# Patient Record
Sex: MALE | Race: WHITE | Employment: OTHER | URBAN - METROPOLITAN AREA
[De-identification: names, ages, dates, MRNs, and addresses within clinical notes are randomized per-mention and may not be internally consistent; named-entity substitution may affect disease eponyms.]

---

## 2017-01-18 ENCOUNTER — TELEPHONE (OUTPATIENT)
Dept: FAMILY MEDICINE | Facility: CLINIC | Age: 67
End: 2017-01-18

## 2017-01-18 NOTE — TELEPHONE ENCOUNTER
"  \"I have a little respiratory trouble lately, cough a lot and can't breathe at times and I just felt like I was going to pass out, \"   \"not feeling well at all. \"   A; sounds short of breath, and feeling faint/ light headed, episode of near faint.   R: advised ED immed. \"oh, really, well, ok. \"  Advised it wouldn't be a good idea to wait for a clinic appt tomorrow. Suggested 911 if he has no one to bring him and / or is worse in any way.  \"oh, ok, I will see what I can do. \"    Toshia Anders RNC    "

## 2017-01-18 NOTE — TELEPHONE ENCOUNTER
"Reason for call:  Patient reporting a symptom    Symptom or request: felt faint while shopping    Duration (how long have symptoms been present): week    Have you been treated for this before? No    Additional comments: pt calling to \"get an appt with Dr Mcgee\". He stated that while out shopping he felt faint and had to go home. He deny's cp, n,v,d. He states it makes him feel tired. He said right now he's not feeling too great. Call transferred to Toshia ADAMES    Phone Number patient can be reached at:  Home number on file [phone] (home)    Best Time:  any    Can we leave a detailed message on this number:  YES    Call taken on 1/18/2017 at 2:43 PM by Imelda Robin    "

## 2017-03-01 ENCOUNTER — TELEPHONE (OUTPATIENT)
Dept: FAMILY MEDICINE | Facility: CLINIC | Age: 67
End: 2017-03-01

## 2017-03-01 NOTE — LETTER
[facility]  [address] [address]  Phone: [phone]    March 3, 2017    Chilo Oneil  [address]  [place] [facility] [address]              Dear Chilo,    Your most recent blood pressure reading performed at our clinic was higher than we like to see it. The goal is to have it under 140/90 in clinic. Please call our clinic [phone] to schedule a blood pressure recheck on our RN schedule. This appointment is free of charge and takes about 15 minutes to complete. Be sure to take all of your blood pressure medications, and avoid stimulants like caffeine, cold medicines, sudafed, or tobacco prior to your recheck.    If your blood pressure medication was changed by your provider recently wait 2 weeks before making this recheck appointment.     Thank you for trusting us with your health care.  Sincerely,    Manish Plasencia MD

## 2017-03-01 NOTE — TELEPHONE ENCOUNTER
Panel Management Review      Patient has the following on his problem list:     Hypertension   Last three blood pressure readings:  BP Readings from Last 3 Encounters:   09/02/16 157/53   08/19/16 156/79   08/12/16 109/40     Blood pressure: FAILED    HTN Guidelines:  Age 18-59 BP range:  Less than 140/90  Age 60-85 with Diabetes:  Less than 140/90  Age 60-85 without Diabetes:  less than 150/90      Composite cancer screening  Chart review shows that this patient is due/due soon for the following Colonoscopy  Summary:    Patient is due/failing the following:   BP CHECK    Action needed:   Patient needs nurse only appointment.    Type of outreach:    Phone, left message for patient to call back.   CSS, when he calls back, please invite him to come in for a free RN BP recheck, thanks,     Questions for provider review:    None                                                                                                                                    Toshia Anders RNC       Chart routed to Care Team .

## 2017-03-03 NOTE — TELEPHONE ENCOUNTER
Left message for patient to return call to clinic.  Closing encounter - will await patient return call.  Eleanor QUINTANA RN

## 2017-06-20 ENCOUNTER — TELEPHONE (OUTPATIENT)
Dept: FAMILY MEDICINE | Facility: CLINIC | Age: 67
End: 2017-06-20

## 2017-06-20 NOTE — TELEPHONE ENCOUNTER
Panel Management:    Patient's blood pressure was elevated at last clinic visit with Dr. Mcgee.  It is important to get blood pressure < 140/80 for those with a history of hypertension, diabetes or vascular disease.      Please call the patient and have them schedule RN visit for free blood pressure check.      He is also due for colon cancer screening, colonoscopy or FIT.

## 2017-08-01 ENCOUNTER — TELEPHONE (OUTPATIENT)
Dept: FAMILY MEDICINE | Facility: CLINIC | Age: 67
End: 2017-08-01

## 2017-11-28 ENCOUNTER — TELEPHONE (OUTPATIENT)
Dept: FAMILY MEDICINE | Facility: CLINIC | Age: 67
End: 2017-11-28

## 2017-11-28 NOTE — TELEPHONE ENCOUNTER
"S-(situation): I spoke with pt.  He explains that his left shoulder began hurting 3 weeks ago.  Started in the middle of his left shoulder blade and has been getting worse with time.  The pain is now constant.  Now, the pain is so bad that pt did not sleep at all last night.  The pain is radiating into his left arm and into his left elbow.  Also, is radiating a little bit into his left chest and the left side of his torso.  Pt reports some numbness in his elbow.    Denies other tingling or numbness.    Denies weakness of extremities or ability to use his left arm.    Denies the ability to reproduce the shoulder pain    Denies chest pain or breathing changes.  Denies shortness of breath.  Denies fatigue.  Denies palpitations.  Denies nausea or vomiting.  Denies increased heartburn or new heartburn symptoms.  Denies GI symptoms.    B-(background): Pt says that he had pain like this in this left shoulder \"years ago\" which was relieved with a steroid injection.    A-(assessment): Left shoulder pain, radicular.    R-(recommendations):  Advised to be seen today given that the pain kept him awake all night.  Pt refuses stating that he has no ride until tomorrow.  Appt tomorrow.  Pt agrees to be seen in sooner if increasing symptoms.  Pt agrees to present to the ED if develops increased chest pain, shortness of breath, difficulty breathing.  Deepthi Love RN        "

## 2017-11-28 NOTE — TELEPHONE ENCOUNTER
Reason for call:  Patient reporting a symptom    Symptom or request: Pt had had left shoulder pain, that goes into pt's side and into the left side of his chest. Transferred to Clinic RN     Duration (how long have symptoms been present): 2 weeks    Have you been treated for this before? No    Additional comments:     Phone Number patient can be reached at:  Cell number on file:    Telephone Information:   Mobile [phone]       Best Time:  any    Can we leave a detailed message on this number:  YES    Call taken on 11/28/2017 at 9:40 AM by Lizzie June

## 2017-11-29 ENCOUNTER — OFFICE VISIT (OUTPATIENT)
Dept: FAMILY MEDICINE | Facility: CLINIC | Age: 67
End: 2017-11-29
Payer: MEDICARE

## 2017-11-29 ENCOUNTER — RADIANT APPOINTMENT (OUTPATIENT)
Dept: GENERAL RADIOLOGY | Facility: CLINIC | Age: 67
End: 2017-11-29
Attending: FAMILY MEDICINE
Payer: MEDICARE

## 2017-11-29 VITALS
HEIGHT: 75 IN | BODY MASS INDEX: 23.25 KG/M2 | HEART RATE: 83 BPM | SYSTOLIC BLOOD PRESSURE: 154 MMHG | DIASTOLIC BLOOD PRESSURE: 82 MMHG | WEIGHT: 187 LBS | TEMPERATURE: 99.4 F

## 2017-11-29 DIAGNOSIS — R07.89 CHEST WALL PAIN: ICD-10-CM

## 2017-11-29 DIAGNOSIS — R07.81 RIB PAIN ON LEFT SIDE: ICD-10-CM

## 2017-11-29 DIAGNOSIS — I10 BENIGN ESSENTIAL HYPERTENSION: ICD-10-CM

## 2017-11-29 DIAGNOSIS — R07.89 CHEST WALL PAIN: Primary | ICD-10-CM

## 2017-11-29 PROCEDURE — 99214 OFFICE O/P EST MOD 30 MIN: CPT | Performed by: FAMILY MEDICINE

## 2017-11-29 PROCEDURE — 71101 X-RAY EXAM UNILAT RIBS/CHEST: CPT | Mod: LT

## 2017-11-29 RX ORDER — INDOMETHACIN 25 MG/1
25 CAPSULE ORAL 2 TIMES DAILY WITH MEALS
Qty: 42 CAPSULE | Refills: 1 | Status: ON HOLD | OUTPATIENT
Start: 2017-11-29 | End: 2018-04-12

## 2017-11-29 NOTE — PATIENT INSTRUCTIONS
Thank you for choosing [facility].  You may be receiving a survey in the mail from Laura Frausto regarding your visit today.  Please take a few minutes to complete and return the survey to let us know how we are doing.      If you have questions or concerns, please contact us via [employer] or you can contact your care team at [phone].    Our Clinic hours are:  Monday 6:40 am  to 7:00 pm  Tuesday -Friday 6:40 am to 5:00 pm    The [place] outpatient lab hours are:  Monday - Friday 6:10 am to 4:45 pm  Saturdays 7:00 am to 11:00 am  Appointments are required, call [phone]    If you have clinical questions after hours or would like to schedule an appointment,  call the clinic at [phone].   *CHEST PAIN, UNCERTAIN CAUSE    Based on your exam today, the exact cause of your chest pain is not certain. Your condition does not seem serious at this time, and your pain does not appear to be coming from your heart. However, sometimes the signs of a serious problem take more time to appear. Therefore, watch for the warning signs listed below.  HOME CARE:  1. Rest today and avoid strenuous activity.  2. Take any prescribed medicine as directed.  FOLLOW UP with your doctor in 1-3 days.   GET PROMPT MEDICAL ATTENTION if any of the following occur:    A change in the type of pain: if it feels different, becomes more severe, lasts longer, or begins to spread into your shoulder, arm, neck, jaw or back    Shortness of breath or increased pain with breathing    Weakness, dizziness, or fainting    Cough with blood or dark colored sputum (phlegm)    Fever over 101  F (38.3  C)    Swelling, pain or redness in one leg    3215-2248 [employer]. [address] [place] [address], [place] [address]. All rights reserved. This information is not intended as a substitute for professional medical care. Always follow your healthcare professional's instructions.  This information has been modified by your health care provider  with permission from the publisher.

## 2017-11-29 NOTE — MR AVS SNAPSHOT
After Visit Summary   11/29/2017    Chilo Oneil    MRN: [id number]           Patient Information     Date Of Birth          1950        Visit Information        Provider Department      11/29/2017 11:20 AM Manish Plasencia MD [facility]        Today's Diagnoses     Chest wall pain    -  1    Rib pain on left side          Care Instructions          Thank you for choosing [facility].  You may be receiving a survey in the mail from [facility] regarding your visit today.  Please take a few minutes to complete and return the survey to let us know how we are doing.      If you have questions or concerns, please contact us via [employer] or you can contact your care team at [phone].    Our Clinic hours are:  Monday 6:40 am  to 7:00 pm  Tuesday -Friday 6:40 am to 5:00 pm    The [place] outpatient lab hours are:  Monday - Friday 6:10 am to 4:45 pm  Saturdays 7:00 am to 11:00 am  Appointments are required, call [phone]    If you have clinical questions after hours or would like to schedule an appointment,  call the clinic at [phone].   *CHEST PAIN, UNCERTAIN CAUSE    Based on your exam today, the exact cause of your chest pain is not certain. Your condition does not seem serious at this time, and your pain does not appear to be coming from your heart. However, sometimes the signs of a serious problem take more time to appear. Therefore, watch for the warning signs listed below.  HOME CARE:  1. Rest today and avoid strenuous activity.  2. Take any prescribed medicine as directed.  FOLLOW UP with your doctor in 1-3 days.   GET PROMPT MEDICAL ATTENTION if any of the following occur:    A change in the type of pain: if it feels different, becomes more severe, lasts longer, or begins to spread into your shoulder, arm, neck, jaw or back    Shortness of breath or increased pain with breathing    Weakness, dizziness, or fainting    Cough with blood or dark colored  "sputum (phlegm)    Fever over 101  F (38.3  C)    Swelling, pain or redness in one leg    1232-8224 [employer]. [address] [place] [address], [place] [address]. All rights reserved. This information is not intended as a substitute for professional medical care. Always follow your healthcare professional's instructions.  This information has been modified by your health care provider with permission from the publisher.            Follow-ups after your visit        Future tests that were ordered for you today     Open Future Orders        Priority Expected Expires Ordered    CT Chest w Contrast Routine  11/29/2018 11/29/2017            Who to contact     If you have questions or need follow up information about today's clinic visit or your schedule please contact [facility] directly at [phone].  Normal or non-critical lab and imaging results will be communicated to you by MyChart, letter or phone within 4 business days after the clinic has received the results. If you do not hear from us within 7 days, please contact the clinic through MyChart or phone. If you have a critical or abnormal lab result, we will notify you by phone as soon as possible.  Submit refill requests through [employer] or call your pharmacy and they will forward the refill request to us. Please allow 3 business days for your refill to be completed.          Additional Information About Your Visit        [employer] Information     [employer] lets you send messages to your doctor, view your test results, renew your prescriptions, schedule appointments and more. To sign up, go to [email or web address] . Click on \"Log in\" on the left side of the screen, which will take you to the Welcome page. Then click on \"Sign up Now\" on the right side of the page.     You will be asked to enter the access code listed below, as well as some personal information. Please follow the directions to create your username and password.     Your access " "code is: [email or web address]  Expires: 2018 12:18 PM     Your access code will  in 90 days. If you need help or a new code, please call your [facility] clinic or [phone].        Care EveryWhere ID     This is your Care EveryWhere ID. This could be used by other organizations to access your [facility] medical records  [id number]        Your Vitals Were     Pulse Temperature Height BMI (Body Mass Index)          83 99.4  F (37.4  C) (Tympanic) 6' 3\" (1.905 m) 23.37 kg/m2         Blood Pressure from Last 3 Encounters:   17 150/63   16 157/53   16 156/79    Weight from Last 3 Encounters:   17 187 lb (84.8 kg)   16 179 lb (81.2 kg)   16 182 lb (82.6 kg)                 Today's Medication Changes          These changes are accurate as of: 17 12:18 PM.  If you have any questions, ask your nurse or doctor.               Start taking these medicines.        Dose/Directions    indomethacin 25 MG capsule   Commonly known as:  INDOCIN   Used for:  Chest wall pain, Rib pain on left side   Started by:  Manish Plasencia MD        Dose:  25 mg   Take 1 capsule (25 mg) by mouth 2 times daily (with meals)   Quantity:  42 capsule   Refills:  1            Where to get your medicines      These medications were sent to [address] [address]     Phone:  [phone]     indomethacin 25 MG capsule                Primary Care Provider Office Phone # Fax #    Belkys Shari Mcgee [phone] [address] [facility] [address]        Equal Access to Services     [place] AH: Kevin Vidales, wawyatt camara, qaybta kaalcelina greene. So [facility] [phone].    ATENCIÓN: Si habla español, tiene a galaviz disposición servicios gratuitos de asistencia lingüística. Lljuana al [phone].    We comply with applicable federal civil rights " laws and [place] laws. We do not discriminate on the basis of race, color, national origin, age, disability, sex, sexual orientation, or gender identity.            Thank you!     Thank you for choosing [facility]  for your care. Our goal is always to provide you with excellent care. Hearing back from our patients is one way we can continue to improve our services. Please take a few minutes to complete the written survey that you may receive in the mail after your visit with us. Thank you!             Your Updated Medication List - Protect others around you: Learn how to safely use, store and throw away your medicines at www.disposemymeds.org.          This list is accurate as of: 11/29/17 12:18 PM.  Always use your most recent med list.                   Brand Name Dispense Instructions for use Diagnosis    aspirin 325 MG tablet     90 tablet    Take 1 tablet (325 mg) by mouth daily    Pain of left lower leg       hydrochlorothiazide 25 MG tablet    HYDRODIURIL    30 tablet    Take 1 tablet (25 mg) by mouth daily    Benign essential hypertension       indomethacin 25 MG capsule    INDOCIN    42 capsule    Take 1 capsule (25 mg) by mouth 2 times daily (with meals)    Chest wall pain, Rib pain on left side       metoprolol 25 MG 24 hr tablet    TOPROL XL    90 tablet    Take 1 tablet (25 mg) by mouth daily    Coronary artery disease, angina presence unspecified, unspecified vessel or lesion type, unspecified whether native or transplanted heart       ramipril 5 MG capsule    ALTACE    60 capsule    Take 2 capsules (10 mg) by mouth 2 times daily    PAD (peripheral artery disease) (H)

## 2017-11-29 NOTE — NURSING NOTE
"Chief Complaint   Patient presents with     Shoulder     L should and side pain        Initial /63  Pulse 83  Temp 99.4  F (37.4  C) (Tympanic)  Ht 6' 3\" (1.905 m)  Wt 187 lb (84.8 kg)  BMI 23.37 kg/m2 Estimated body mass index is 23.37 kg/(m^2) as calculated from the following:    Height as of this encounter: 6' 3\" (1.905 m).    Weight as of this encounter: 187 lb (84.8 kg).  Medication Reconciliation: complete  "

## 2017-11-29 NOTE — PROGRESS NOTES
SUBJECTIVE:   Chilo Oneil is a 67 year old male who presents to clinic today for the following health issues:      Concern - L/shoulder and L side pain   Onset: 3 weeks ago     Description:   Patient started with L side shoulder and has been traveling  to L/side about  to the whole L/side upper back and L side of chest for about 3 week he has had the pain has been taking aleve     Intensity: moderate    Progression of Symptoms:  worsening    Accompanying Signs & Symptoms:  none    Previous history of similar problem:   none    Precipitating factors:   Worsened by: worsen with relaxing     Alleviating factors:  Improved by: aleve    Therapies Tried and outcome: aleve      Patient is a 67 yr old male here for lateral chest wall pain. Started about three weeks ago. He reports that it is a constant dull pain but on occasion pain is sharp and very severe. He has not been able to correlate the pain with breathing, denies any chest pain . Pain does radiate into the front of his chest wall . He complained of a mild cough. He is a heavy smoker . No fevers or chills . Has not felt more tired than usual . Denies any trauma .    Problem list and histories reviewed & adjusted, as indicated.  Additional history: as documented    Patient Active Problem List   Diagnosis     Tobacco use disorder     CARDIOVASCULAR SCREENING; LDL GOAL LESS THAN 130     Hip fracture, left (H)     Alcohol use, daily use     PAD (peripheral artery disease) (H)     Benign essential hypertension     Hyperlipidemia LDL goal <100     Atypical angina (H)     Past Surgical History:   Procedure Laterality Date     FRACTURE TX, ANKLE RT/LT  1975    Fracture TX Ankle, LT     OPEN REDUCTION INTERNAL FIXATION HIP NAILING  9/20/2013    Procedure: OPEN REDUCTION INTERNAL FIXATION HIP NAILING;  Left Hip Open Reduction Internal Fixation ;  Surgeon: Rosas Dennis MD;  Location: [facility] OR       Social History   Substance Use Topics     Smoking status:  "Current Every Day Smoker     Packs/day: 0.30     Types: Cigarettes     Smokeless tobacco: Never Used      Comment: 2-3 cigs daily     Alcohol use Yes     Family History   Problem Relation Age of Onset     DIABETES Brother      type 2     DIABETES Father          Current Outpatient Prescriptions   Medication Sig Dispense Refill     indomethacin (INDOCIN) 25 MG capsule Take 1 capsule (25 mg) by mouth 2 times daily (with meals) 42 capsule 1     aspirin 325 MG tablet Take 1 tablet (325 mg) by mouth daily 90 tablet 3     metoprolol (TOPROL XL) 25 MG 24 hr tablet Take 1 tablet (25 mg) by mouth daily (Patient not taking: Reported on 11/29/2017) 90 tablet 3     ramipril (ALTACE) 5 MG capsule Take 2 capsules (10 mg) by mouth 2 times daily 60 capsule 3     hydrochlorothiazide (HYDRODIURIL) 25 MG tablet Take 1 tablet (25 mg) by mouth daily (Patient not taking: Reported on 11/29/2017) 30 tablet 11     Allergies   Allergen Reactions     Cipro [Ciprofloxacin] Swelling     BP Readings from Last 3 Encounters:   11/29/17 154/82   09/02/16 157/53   08/19/16 156/79    Wt Readings from Last 3 Encounters:   11/29/17 187 lb (84.8 kg)   08/19/16 179 lb (81.2 kg)   08/03/16 182 lb (82.6 kg)                  Labs reviewed in EPIC        Reviewed and updated as needed this visit by clinical staffTobacco  Allergies  Med Hx  Surg Hx  Fam Hx  Soc Hx      Reviewed and updated as needed this visit by Provider         ROS:  Constitutional, HEENT, cardiovascular, pulmonary, gi and gu systems are negative, except as otherwise noted.      OBJECTIVE:   /82  Pulse 83  Temp 99.4  F (37.4  C) (Tympanic)  Ht 6' 3\" (1.905 m)  Wt 187 lb (84.8 kg)  BMI 23.37 kg/m2  Body mass index is 23.37 kg/(m^2).  GENERAL: healthy, alert and no distress  EYES: Eyes grossly normal to inspection, PERRL and conjunctivae and sclerae normal  HENT: ear canals and TM's normal, nose and mouth without ulcers or lesions  NECK: no adenopathy, no asymmetry, masses, or " scars and thyroid normal to palpation  RESP: lungs clear to auscultation - no rales, rhonchi or wheezes  CV: regular rate and rhythm, normal S1 S2, no S3 or S4, no murmur, click or rub, no peripheral edema and peripheral pulses strong  ABDOMEN: soft, nontender, no hepatosplenomegaly, no masses and bowel sounds normal  MS: tenderness to palpation on chest wall    Diagnostic Test Results:  Chest x-ray    IMPRESSION: Two views of the chest show no irregular vague 1.6 cm  nodule projecting at the lateral margin of the left hilum. Chest CT is  recommended in further assessment. Two views the left ribs show no  fracture or other abnormality.  ASSESSMENT/PLAN:   1. Chest wall pain  Nodule seen on chest x-ray   - CT Chest w Contrast; Future  - indomethacin (INDOCIN) 25 MG capsule; Take 1 capsule (25 mg) by mouth 2 times daily (with meals)  Dispense: 42 capsule; Refill: 1    2. Rib pain on left side  No rib fracture seen . Given some indomethacin for pain . Will contact patient when ct results are back.   - XR Ribs & Chest Left G/E 3 Views; Future  - CT Chest w Contrast; Future  - indomethacin (INDOCIN) 25 MG capsule; Take 1 capsule (25 mg) by mouth 2 times daily (with meals)  Dispense: 42 capsule; Refill: 1      3. Benign essential hypertension  BP is elevated , patient has been off his medication citing no insurance, he is trying to get reinstated for prescription insurance    FUTURE APPOINTMENTS:       - Follow-up visit as needed    Manish Plasencia MD  [facility]

## 2017-12-01 RX ORDER — IOPAMIDOL 755 MG/ML
80 INJECTION, SOLUTION INTRAVASCULAR ONCE
Status: COMPLETED | OUTPATIENT
Start: 2017-12-04 | End: 2017-12-04

## 2017-12-04 ENCOUNTER — HOSPITAL ENCOUNTER (OUTPATIENT)
Dept: CT IMAGING | Facility: CLINIC | Age: 67
Discharge: HOME | End: 2017-12-04
Attending: FAMILY MEDICINE | Admitting: FAMILY MEDICINE
Payer: MEDICARE

## 2017-12-04 DIAGNOSIS — R07.89 CHEST WALL PAIN: ICD-10-CM

## 2017-12-04 DIAGNOSIS — R07.81 RIB PAIN ON LEFT SIDE: ICD-10-CM

## 2017-12-04 PROCEDURE — 71260 CT THORAX DX C+: CPT

## 2017-12-04 PROCEDURE — 25000128 H RX IP 250 OP 636: Performed by: RADIOLOGY

## 2017-12-04 PROCEDURE — 25000125 ZZHC RX 250: Performed by: RADIOLOGY

## 2017-12-04 RX ADMIN — SODIUM CHLORIDE 80 ML: 9 INJECTION, SOLUTION INTRAVENOUS at 08:49

## 2017-12-04 RX ADMIN — IOPAMIDOL 80 ML: 755 INJECTION, SOLUTION INTRAVENOUS at 08:49

## 2017-12-04 NOTE — DISCHARGE INSTRUCTIONS
Caring for Yourself After a Contrast Extravasation  ____________________________________    Patient Name: Chilo Oneil  Today's Date: December 4, 2017    During your procedure, some of the IV contrast dye leaked from the blood vessel into the surrounding tissues. This is called contrast extravasation.   Gravity may move this extra fluid down into your arm or hand. This causes swelling. A little swelling is normal.   You may have some pain or discomfort at the needle site over the next few days. Please follow the steps below. Call your doctor or radiology department if the pain gets worse or you have any other problems.    Try to keep your arm raised above heart level for the next 48 hours.    If you have pain, hold an ice pack to the area: 15 minutes on, and then 15 minutes off while awake. Do this for the next 24 hours, or as long as the pain lasts. Place a thin, dry towel between the skin and ice pack to prevent frostbite.     Check the IV site and arm for:  Increased pain or swelling. If you have swelling, use a marker to edmundo your   skin on both sides of the swollen area. Measure the area. Do this once or twice   a day until the swelling decreases. If the area gets bigger, call your doctor.   Blisters or blanching (skin that turns white or changes color).  Tingling or loss of feeling in the arm.   If you have any of these, call your doctor or your radiology department right away.   During weekends or evenings, call your family doctor or go to the emergency room.   Bring your copy of the Extravasation Flow Sheet and pictures with you.  [facility]   [facility]: [phone]   [facility]: [phone]   [facility]: [phone]   [facility]: [phone]  [place]: [phone]  [facility]: [phone]  [place]: [phone]  [facility]:  [phone]   Patient _____________________________ Instructor__________________________ Date/time ___________

## 2017-12-04 NOTE — IP AVS SNAPSHOT
[facility] [place]    [address]    [facility] [address]    Phone:  [phone]    Fax:  [phone]                                       After Visit Summary   12/4/2017    Chilo Oneil    MRN: [id number]           After Visit Summary Signature Page     I have received my discharge instructions, and my questions have been answered. I have discussed any challenges I see with this plan with the nurse or doctor.    ..........................................................................................................................................  Patient/Patient Representative Signature      ..........................................................................................................................................  Patient Representative Print Name and Relationship to Patient    ..................................................               ................................................  Date                                            Time    ..........................................................................................................................................  Reviewed by Signature/Title    ...................................................              ..............................................  Date                                                            Time

## 2017-12-04 NOTE — IP AVS SNAPSHOT
MRN:[id number]                      After Visit Summary   12/4/2017    Chilo Oneil    MRN: [id number]           Visit Information        Provider Department      12/4/2017  9:00 AM [facility] [place]           Review of your medicines      UNREVIEWED medicines. Ask your doctor about these medicines        Dose / Directions    aspirin 325 MG tablet   Used for:  Pain of left lower leg        Dose:  325 mg   Take 1 tablet (325 mg) by mouth daily   Quantity:  90 tablet   Refills:  3       hydrochlorothiazide 25 MG tablet   Commonly known as:  HYDRODIURIL   Used for:  Benign essential hypertension        Dose:  25 mg   Take 1 tablet (25 mg) by mouth daily   Quantity:  30 tablet   Refills:  11       indomethacin 25 MG capsule   Commonly known as:  INDOCIN   Used for:  Chest wall pain, Rib pain on left side        Dose:  25 mg   Take 1 capsule (25 mg) by mouth 2 times daily (with meals)   Quantity:  42 capsule   Refills:  1       metoprolol 25 MG 24 hr tablet   Commonly known as:  TOPROL XL   Used for:  Coronary artery disease, angina presence unspecified, unspecified vessel or lesion type, unspecified whether native or transplanted heart        Dose:  25 mg   Take 1 tablet (25 mg) by mouth daily   Quantity:  90 tablet   Refills:  3       ramipril 5 MG capsule   Commonly known as:  ALTACE   Used for:  PAD (peripheral artery disease) (H)        Dose:  10 mg   Take 2 capsules (10 mg) by mouth 2 times daily   Quantity:  60 capsule   Refills:  3                Protect others around you: Learn how to safely use, store and throw away your medicines at www.disposemymeds.org.         Follow-ups after your visit         Care Instructions        Further instructions from your care team       Caring for Yourself After a Contrast Extravasation  ____________________________________    Patient Name: Chilo Oneil  Today's Date: December 4, 2017    During your procedure, some of the IV contrast dye leaked  from the blood vessel into the surrounding tissues. This is called contrast extravasation.   Gravity may move this extra fluid down into your arm or hand. This causes swelling. A little swelling is normal.   You may have some pain or discomfort at the needle site over the next few days. Please follow the steps below. Call your doctor or radiology department if the pain gets worse or you have any other problems.    Try to keep your arm raised above heart level for the next 48 hours.    If you have pain, hold an ice pack to the area: 15 minutes on, and then 15 minutes off while awake. Do this for the next 24 hours, or as long as the pain lasts. Place a thin, dry towel between the skin and ice pack to prevent frostbite.     Check the IV site and arm for:  Increased pain or swelling. If you have swelling, use a marker to edmundo your   skin on both sides of the swollen area. Measure the area. Do this once or twice   a day until the swelling decreases. If the area gets bigger, call your doctor.   Blisters or blanching (skin that turns white or changes color).  Tingling or loss of feeling in the arm.   If you have any of these, call your doctor or your radiology department right away.   During weekends or evenings, call your family doctor or go to the emergency room.   Bring your copy of the Extravasation Flow Sheet and pictures with you.  [facility]   [facility]: [phone]   [facility]: [phone]   [facility]: [phone]   [facility]: [phone]  [facility]: [phone]  [facility]: [phone]  [facility]: [phone]  [facility]: [phone]   Patient _____________________________ Instructor__________________________ Date/time ___________       Additional Information About Your Visit        Tunezy Information     Tunezy lets you send messages  "to your doctor, view your test results, renew your prescriptions, schedule appointments and more. To sign up, go to [email or web address] . Click on \"Log in\" on the left side of the screen, which will take you to the Welcome page. Then click on \"Sign up Now\" on the right side of the page.     You will be asked to enter the access code listed below, as well as some personal information. Please follow the directions to create your username and password.     Your access code is: [email or web address]  Expires: 2018 12:18 PM     Your access code will  in 90 days. If you need help or a new code, please call your [facility] clinic or [phone].        Care EveryWhere ID     This is your Care EveryWhere ID. This could be used by other organizations to access your [facility] medical records  [id number]         Primary Care Provider Office Phone # Fax #    Belkys Mccarthy [place],  [phone]      Equal Access to Services     [facility]: Hadii krysta jordan hadasho Soearl, waaxda luqadaha, qaybta kaalmada aderaul, celina hewitt . So [facility] [phone].    ATENCIÓN: Si habla español, tiene a galaviz disposición servicios gratuitos de asistencia lingüística. Llame al [phone].    We comply with applicable federal civil rights laws and [place] laws. We do not discriminate on the basis of race, color, national origin, age, disability, sex, sexual orientation, or gender identity.            Thank you!     Thank you for choosing [facility] for your care. Our goal is always to provide you with excellent care. Hearing back from our patients is one way we can continue to improve our services. Please take a few minutes to complete the written survey that you may receive in the mail after you visit with us. Thank you!             Medication List: This is a list of all your medications and when to take them. Check marks below indicate your daily home schedule. Keep this list as a reference.    "   Medications           Morning Afternoon Evening Bedtime As Needed    aspirin 325 MG tablet   Take 1 tablet (325 mg) by mouth daily                                hydrochlorothiazide 25 MG tablet   Commonly known as:  HYDRODIURIL   Take 1 tablet (25 mg) by mouth daily                                indomethacin 25 MG capsule   Commonly known as:  INDOCIN   Take 1 capsule (25 mg) by mouth 2 times daily (with meals)                                metoprolol 25 MG 24 hr tablet   Commonly known as:  TOPROL XL   Take 1 tablet (25 mg) by mouth daily                                ramipril 5 MG capsule   Commonly known as:  ALTACE   Take 2 capsules (10 mg) by mouth 2 times daily

## 2017-12-05 ENCOUNTER — TELEPHONE (OUTPATIENT)
Dept: CT IMAGING | Facility: CLINIC | Age: 67
End: 2017-12-05

## 2017-12-06 ENCOUNTER — OFFICE VISIT (OUTPATIENT)
Dept: FAMILY MEDICINE | Facility: CLINIC | Age: 67
End: 2017-12-06
Payer: MEDICARE

## 2017-12-06 VITALS
TEMPERATURE: 97.3 F | BODY MASS INDEX: 23.87 KG/M2 | DIASTOLIC BLOOD PRESSURE: 84 MMHG | OXYGEN SATURATION: 98 % | SYSTOLIC BLOOD PRESSURE: 168 MMHG | WEIGHT: 192 LBS | HEART RATE: 62 BPM | HEIGHT: 75 IN

## 2017-12-06 DIAGNOSIS — C34.90 MALIGNANT NEOPLASM OF LUNG, UNSPECIFIED LATERALITY, UNSPECIFIED PART OF LUNG (H): ICD-10-CM

## 2017-12-06 DIAGNOSIS — I10 BENIGN ESSENTIAL HYPERTENSION: ICD-10-CM

## 2017-12-06 DIAGNOSIS — Z01.818 PREOP GENERAL PHYSICAL EXAM: Primary | ICD-10-CM

## 2017-12-06 DIAGNOSIS — C7A.090 MALIGNANT CARCINOID TUMOR OF BRONCHUS AND LUNG (H): ICD-10-CM

## 2017-12-06 LAB
INR PPP: 0.98 (ref 0.86–1.14)
PLATELET # BLD AUTO: 288 10E9/L (ref 150–450)

## 2017-12-06 PROCEDURE — 85049 AUTOMATED PLATELET COUNT: CPT | Performed by: FAMILY MEDICINE

## 2017-12-06 PROCEDURE — 99214 OFFICE O/P EST MOD 30 MIN: CPT | Performed by: FAMILY MEDICINE

## 2017-12-06 PROCEDURE — 36415 COLL VENOUS BLD VENIPUNCTURE: CPT | Performed by: FAMILY MEDICINE

## 2017-12-06 PROCEDURE — 85610 PROTHROMBIN TIME: CPT | Performed by: FAMILY MEDICINE

## 2017-12-06 RX ORDER — METOPROLOL TARTRATE 50 MG
50 TABLET ORAL 2 TIMES DAILY
Qty: 60 TABLET | Refills: 1 | Status: SHIPPED | OUTPATIENT
Start: 2017-12-06 | End: 2018-01-01

## 2017-12-06 RX ORDER — HYDROCHLOROTHIAZIDE 25 MG/1
25 TABLET ORAL DAILY
Qty: 90 TABLET | Refills: 3 | Status: SHIPPED | OUTPATIENT
Start: 2017-12-06 | End: 2018-05-10

## 2017-12-06 NOTE — NURSING NOTE
"Chief Complaint   Patient presents with     Results     of CT     Pre-Op Exam       Initial BP (!) 206/72  Pulse 62  Temp 97.3  F (36.3  C) (Tympanic)  Ht 6' 3\" (1.905 m)  Wt 192 lb (87.1 kg)  SpO2 98%  BMI 24 kg/m2 Estimated body mass index is 24 kg/(m^2) as calculated from the following:    Height as of this encounter: 6' 3\" (1.905 m).    Weight as of this encounter: 192 lb (87.1 kg).  Medication Reconciliation: complete  "

## 2017-12-06 NOTE — MR AVS SNAPSHOT
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 This includes signs of a cold or flu (such as a sore throat, runny nose, cough, rash or fever).    Do not smoke, drink alcohol or take over the counter medicine (unless your surgeon or primary care doctor tells you to) for the 24 hours before and after surgery.    If you take prescribed drugs: Follow your doctor s orders about which medicines to take and which to stop until after surgery.    Eating and drinking prior to surgery: follow the instructions from your surgeon    Take a shower or bath the night before surgery. Use the soap your surgeon gave you to gently clean your skin. If you do not have soap from your surgeon, use your regular soap. Do not shave or scrub the surgery site.  Wear clean pajamas and have clean sheets on your bed.           Follow-ups after your visit        Additional Services     ONC/HEME ADULT REFERRAL       Your provider has referred you to: [facility]: Cancer Care/Hematology (All Cancer Related Services) - [place] [phone]   [email or web address]    Please be aware that coverage of these services is subject to the terms and limitations of your health insurance plan.  Call member services at your health plan with any benefit or coverage questions.      Please bring the following with you to your  appointment:    (1) Any X-Rays, CTs or MRIs which have been performed.  Contact the facility where they were done to arrange for  prior to your scheduled appointment.   (2) List of current medications  (3) This referral request   (4) Any documents/labs given to you for this referral                  Your next 10 appointments already scheduled     Dec 18, 2017 12:00 PM CST   (Arrive by 11:45 AM)   CT LUNG MEDIASTINUM BIOPSY with FELIZ SALDIVAR IMAGING NURSEFELIZ   [facility] ([facility])    [address] [address]   [phone]           Plan for an adult to drive you home and stay with you until morning.  Tell your doctor in advance:   If you have any allergies.   If you are breastfeeding or there s any chance you are pregnant.  Please bring any scans or X-rays taken at other hospitals, if they may be helpful. Also bring a list of your medicines, including vitamins, minerals and over-the-counter drugs. It is safest to leave valuables at home.  If you take blood thinners, you may need to stop taking them a few days before treatment. Talk to your doctor before stopping these medicines. You will need a blood test the morning of your exam.   Stop taking Coumadin (warfarin) 5 days before treatment. Restart the day after treatment.   If you take aspirin, you may need to stop taking it 3 days before treatment.   If you take Plavix, Ticlid, Pletal or Persantine, you may need to stop taking them 5 days before your scan.  If you have diabetes:   If you take insulin, call your diabetes care team. Ask if you should adjust your insulin before this test.   If your kidney function is normal, continue taking your metformin (Avandamet, Glucophage, Glucovance, Metaglip) on the day of your exam.   If your kidney function is abnormal, wait 48 hours before restarting this medicine.  If you have any questions, please call the imaging department where you will have your exam.  The day  "before your exam: Drink extra fluids at least six 8-ounce glasses (unless your doctor tells you to restrict fluids). The day of your exam: No eating or drinking for 4 hours before your test. You may take medicine with small sips of water.              Who to contact     If you have questions or need follow up information about today's clinic visit or your schedule please contact [facility] directly at [phone].  Normal or non-critical lab and imaging results will be communicated to you by [employer], letter or phone within 4 business days after the clinic has received the results. If you do not hear from us within 7 days, please contact the clinic through [employer] or phone. If you have a critical or abnormal lab result, we will notify you by phone as soon as possible.  Submit refill requests through Civo or call your pharmacy and they will forward the refill request to us. Please allow 3 business days for your refill to be completed.          Additional Information About Your Visit        Civo Information     Civo lets you send messages to your doctor, view your test results, renew your prescriptions, schedule appointments and more. To sign up, go to [email or web address] . Click on \"Log in\" on the left side of the screen, which will take you to the Welcome page. Then click on \"Sign up Now\" on the right side of the page.     You will be asked to enter the access code listed below, as well as some personal information. Please follow the directions to create your username and password.     Your access code is: [email or web address]  Expires: 2018 12:18 PM     Your access code will  in 90 days. If you need help or a new code, please call your [facility] or [phone].        Care EveryWhere ID     This is your Care EveryWhere ID. This could be used by other organizations to access your [facility] medical records  [id number]        Your Vitals Were     Pulse Temperature Height Pulse " "Oximetry BMI (Body Mass Index)       62 97.3  F (36.3  C) (Tympanic) 6' 3\" (1.905 m) 98% 24 kg/m2        Blood Pressure from Last 3 Encounters:   12/08/17 160/80   12/06/17 168/84   11/29/17 154/82    Weight from Last 3 Encounters:   12/06/17 192 lb (87.1 kg)   11/29/17 187 lb (84.8 kg)   08/19/16 179 lb (81.2 kg)              We Performed the Following     INR     ONC/HEME ADULT REFERRAL     Platelet count          Today's Medication Changes          These changes are accurate as of: 12/6/17 11:59 PM.  If you have any questions, ask your nurse or doctor.               Start taking these medicines.        Dose/Directions    hydrochlorothiazide 25 MG tablet   Commonly known as:  HYDRODIURIL   Used for:  Benign essential hypertension   Started by:  Manish Plasencia MD        Dose:  25 mg   Take 1 tablet (25 mg) by mouth daily   Quantity:  90 tablet   Refills:  3       metoprolol 50 MG tablet   Commonly known as:  LOPRESSOR   Used for:  Benign essential hypertension   Started by:  Manish Plasencia MD        Dose:  50 mg   Take 1 tablet (50 mg) by mouth 2 times daily   Quantity:  60 tablet   Refills:  1            Where to get your medicines      These medications were sent to [facility] [address]  [address] [place] [address]     Phone:  [phone]     hydrochlorothiazide 25 MG tablet    metoprolol 50 MG tablet                Primary Care Provider Office Phone # Fax #    Belkys Shari Mcgee [phone]       [address] [place] [address]        Equal Access to Services     [facility]: Kevin Vidales, pepe cmaara, qacelina escobar. So [facility] [phone].    ATENCIÓN: Si habla español, tiene a galaviz disposición servicios gratuitos de asistencia lingüística. Llame al [phone].    We comply with applicable federal civil rights laws and [place] laws. We do " not discriminate on the basis of race, color, national origin, age, disability, sex, sexual orientation, or gender identity.            Thank you!     Thank you for choosing [facility]  for your care. Our goal is always to provide you with excellent care. Hearing back from our patients is one way we can continue to improve our services. Please take a few minutes to complete the written survey that you may receive in the mail after your visit with us. Thank you!             Your Updated Medication List - Protect others around you: Learn how to safely use, store and throw away your medicines at www.disposemymeds.org.          This list is accurate as of: 12/6/17 11:59 PM.  Always use your most recent med list.                   Brand Name Dispense Instructions for use Diagnosis    aspirin 325 MG tablet     90 tablet    Take 1 tablet (325 mg) by mouth daily    Pain of left lower leg       hydrochlorothiazide 25 MG tablet    HYDRODIURIL    90 tablet    Take 1 tablet (25 mg) by mouth daily    Benign essential hypertension       indomethacin 25 MG capsule    INDOCIN    42 capsule    Take 1 capsule (25 mg) by mouth 2 times daily (with meals)    Chest wall pain, Rib pain on left side       metoprolol 25 MG 24 hr tablet    TOPROL XL    90 tablet    Take 1 tablet (25 mg) by mouth daily    Coronary artery disease, angina presence unspecified, unspecified vessel or lesion type, unspecified whether native or transplanted heart       metoprolol 50 MG tablet    LOPRESSOR    60 tablet    Take 1 tablet (50 mg) by mouth 2 times daily    Benign essential hypertension

## 2017-12-06 NOTE — PROGRESS NOTES
[facility]  [address] [place] [address]  [phone]  Dept: [phone]  Chief Complaint   Patient presents with     Results     of CT     Pre-Op Exam     IMPRESSION: 1.6 cm nodular infiltrate in the left upper lobe seen on  image 24 corresponding with the focal density seen on the recent chest  x-ray. This lesion appears a bit spiculated and is worrisome for  primary lung neoplasm.. There is a second 0.7 cm indeterminate nodule  in the lingular portion of the left lung seen on image 28. There are  couple punctate less than 2 mm nodules in the right lobe seen on image  24 and image 30. The findings are indeterminant neoplasm and or  metastatic disease cannot be excluded.    PRE-OP EVALUATION:  Today's date: 2017    Chilo Oneil (: 1950) presents for pre-operative evaluation assessment as requested by Dr. Vasquez.  He requires evaluation and anesthesia risk assessment prior to undergoing surgery/procedure for treatment of biopsy of Lung  .  Proposed procedure: Biopsy    Date of Surgery/ Procedure: /  Time of Surgery/ Procedure: 11:30am  [place] [facility] [place] Physician: Belkys Mcgee  Type of Anesthesia Anticipated: to be determined    Patient has a Health Care Directive or Living Will:  NO    1. NO - Do you have a history of heart attack, stroke, stent, bypass or surgery on an artery in the head, neck, heart or legs?  2.yes  - Do you ever have any pain or discomfort in your chest?occ  3. NO - Do you have a history of  Heart Failure?  4. yes - Are you troubled by shortness of breath when: walking on the level, up a slight hill or at night? At night   5. NO - Do you currently have a cold, bronchitis or other respiratory infection?  6.yes - Do you have a cough, shortness of breath or wheezing?cough  7. NO - Do you sometimes get pains in the calves of your legs when you walk?  8. NO - Do you or anyone in your family have previous  history of blood clots?  9. NO - Do you or does anyone in your family have a serious bleeding problem such as prolonged bleeding following surgeries or cuts?a  10. NO - Have you ever had problems with anemia or been told to take iron pills?  11. NO - Have you had any abnormal blood loss such as black, tarry or bloody stools, or abnormal vaginal bleeding?  12. NO - Have you ever had a blood transfusion?  13. NO - Have you or any of your relatives ever had problems with anesthesia?  14. NO - Do you have sleep apnea, excessive snoring or daytime drowsiness?  15. NO - Do you have any prosthetic heart valves?  16. NO - Do you have prosthetic joints?  17. NO - Is there any chance that you may be pregnant?        HPI:                                                      Brief HPI related to upcoming procedure: Patient is a 64 yr old male with recently diagnosed lung cancer here for a pre op.He is having a lung biopsy under conscious sedation. Patient denies any acute symptoms at this time. He has a past medical history that is significant for hypertension which is poorly controlled due to non compliance and cost of medication, he has been out of his medication for a period of months. Today is blood pressure is quite high.       See problem list for active medical problems.  Problems all longstanding and stable, except as noted/documented.  See ROS for pertinent symptoms related to these conditions.                                                                                                  .    MEDICAL HISTORY:                                                    Patient Active Problem List    Diagnosis Date Noted     Atypical angina (H) 09/08/2016     Priority: Medium     PAD (peripheral artery disease) (H) 06/13/2016     Priority: Medium     Benign essential hypertension 06/13/2016     Priority: Medium     Hyperlipidemia LDL goal <100 06/13/2016     Priority: Medium     Alcohol use, daily use 09/20/2013     Priority:  Medium     Hip fracture, left (H) 09/19/2013     Priority: Medium     9/19/2013 - s/p Closed reduction, percutaneous screw fixation x3, left femoral neck fracture on 9/20/2013       CARDIOVASCULAR SCREENING; LDL GOAL LESS THAN 130 10/31/2010     Priority: Medium     Tobacco use disorder 10/16/2009     Priority: Medium      Past Medical History:   Diagnosis Date     Alcohol abuse     Status post treatment     Past Surgical History:   Procedure Laterality Date     FRACTURE TX, ANKLE RT/LT  1975    Fracture TX Ankle, LT     OPEN REDUCTION INTERNAL FIXATION HIP NAILING  9/20/2013    Procedure: OPEN REDUCTION INTERNAL FIXATION HIP NAILING;  Left Hip Open Reduction Internal Fixation ;  Surgeon: Rosas Dennis MD;  Location: [facility] OR     Current Outpatient Prescriptions   Medication Sig Dispense Refill     metoprolol (LOPRESSOR) 50 MG tablet Take 1 tablet (50 mg) by mouth 2 times daily 60 tablet 1     hydrochlorothiazide (HYDRODIURIL) 25 MG tablet Take 1 tablet (25 mg) by mouth daily 90 tablet 3     indomethacin (INDOCIN) 25 MG capsule Take 1 capsule (25 mg) by mouth 2 times daily (with meals) 42 capsule 1     aspirin 325 MG tablet Take 1 tablet (325 mg) by mouth daily 90 tablet 3     metoprolol (TOPROL XL) 25 MG 24 hr tablet Take 1 tablet (25 mg) by mouth daily (Patient not taking: Reported on 11/29/2017) 90 tablet 3     OTC products: None, except as noted above    Allergies   Allergen Reactions     Cipro [Ciprofloxacin] Swelling      Latex Allergy: NO    Social History   Substance Use Topics     Smoking status: Current Every Day Smoker     Packs/day: 0.30     Types: Cigarettes     Smokeless tobacco: Never Used      Comment: 2-3 cigs daily     Alcohol use Yes     History   Drug Use No       REVIEW OF SYSTEMS:                                                    C: NEGATIVE for fever, chills, change in weight  E/M: NEGATIVE for ear, mouth and throat problems  R: NEGATIVE for significant cough or SOB  CV: NEGATIVE for  "chest pain, palpitations or peripheral edema    EXAM:                                                    /84  Pulse 62  Temp 97.3  F (36.3  C) (Tympanic)  Ht 6' 3\" (1.905 m)  Wt 192 lb (87.1 kg)  SpO2 98%  BMI 24 kg/m2  GENERAL APPEARANCE: healthy, alert and no distress  HENT: ear canals and TM's normal and nose and mouth without ulcers or lesions  RESP: lungs clear to auscultation - no rales, rhonchi or wheezes  CV: regular rate and rhythm, normal S1 S2, no S3 or S4 and no murmur, click or rub   ABDOMEN: soft, nontender, no HSM or masses and bowel sounds normal  NEURO: Normal strength and tone, sensory exam grossly normal, mentation intact and speech normal    DIAGNOSTICS:                                                    EKG: Not indicated due to non-vascular surgery and low risk of event (age <65 and without cardiac risk factors)    Recent Labs   Lab Test  08/12/16   0840  07/13/16   0730   01/04/16   1433   09/19/13   1319   HGB   --   14.9   --   15.8   < >  15.3   PLT   --   338   --   288   < >  308   INR   --    --    --   0.95   --   1.00   NA  131*  128*   < >  140   < >  132*   POTASSIUM  4.2  4.1   < >  4.0   < >  4.6   CR  0.98  0.73   < >  0.61*   < >  0.57*    < > = values in this interval not displayed.        IMPRESSION:                                                    Reason for surgery/procedure: Lung Neoplasm   Diagnosis/reason for consult: Pre op evaluation     The proposed surgical procedure is considered LOW risk.    REVISED CARDIAC RISK INDEX  The patient has the following serious cardiovascular risks for perioperative complications such as (MI, PE, VFib and 3  AV Block):  No serious cardiac risks  INTERPRETATION: 0 risks: Class I (very low risk - 0.4% complication rate)    The patient has the following additional risks for perioperative complications:  No identified additional risks      ICD-10-CM    1. Preop general physical exam Z01.818 CT Lung Mediastinum Biopsy     " Platelet count     INR   2. Malignant neoplasm of lung, unspecified laterality, unspecified part of lung (H) C34.90 ONC/HEME ADULT REFERRAL   3. Benign essential hypertension I10 metoprolol (LOPRESSOR) 50 MG tablet     hydrochlorothiazide (HYDRODIURIL) 25 MG tablet   4. Malignant carcinoid tumor of bronchus and lung (H)  C7A.090 INR       RECOMMENDATIONS:                                                        Cardiovascular Risk  Performs 4 METs exercise without symptoms (Light housework (dusting, washing dishes) and Climb a flight of stairs) .       Pulmonary Risk  Incentive spirometry post op  Respiratory Therapy (Respiratory Care IP Consult)  post op  NG tube decompression if abdominal distension or significant vomiting       --Patient is to take all scheduled medications on the day of surgery EXCEPT for modifications listed below.    APPROVAL Pending   Needs to come in for a nurse visit to recheck BP .     Approval pending  to proceed with proposed procedure, without further diagnostic evaluation       Signed Electronically by: Manish Plasencia MD    Copy of this evaluation report is provided to requesting physician.    [place] Preop Guidelines

## 2017-12-08 ENCOUNTER — OFFICE VISIT (OUTPATIENT)
Dept: FAMILY MEDICINE | Facility: CLINIC | Age: 67
End: 2017-12-08
Payer: MEDICARE

## 2017-12-08 VITALS — DIASTOLIC BLOOD PRESSURE: 80 MMHG | SYSTOLIC BLOOD PRESSURE: 160 MMHG

## 2017-12-08 DIAGNOSIS — I10 BENIGN ESSENTIAL HYPERTENSION: Primary | ICD-10-CM

## 2017-12-08 PROCEDURE — 99207 ZZC NO CHARGE NURSE ONLY: CPT

## 2017-12-08 NOTE — PROGRESS NOTES
Manual cuff used as mechanical did not work. He plans to rtc Monday as he just restarted meds on Wed. Pau Delarosa RN

## 2017-12-13 ENCOUNTER — ALLIED HEALTH/NURSE VISIT (OUTPATIENT)
Dept: FAMILY MEDICINE | Facility: CLINIC | Age: 67
End: 2017-12-13
Payer: MEDICARE

## 2017-12-13 ENCOUNTER — TELEPHONE (OUTPATIENT)
Dept: FAMILY MEDICINE | Facility: CLINIC | Age: 67
End: 2017-12-13

## 2017-12-13 VITALS — DIASTOLIC BLOOD PRESSURE: 78 MMHG | SYSTOLIC BLOOD PRESSURE: 168 MMHG | OXYGEN SATURATION: 99 % | HEART RATE: 51 BPM

## 2017-12-13 DIAGNOSIS — I10 BENIGN ESSENTIAL HYPERTENSION: Primary | ICD-10-CM

## 2017-12-13 PROCEDURE — 99207 ZZC NO CHARGE NURSE ONLY: CPT

## 2017-12-13 RX ORDER — LOSARTAN POTASSIUM 50 MG/1
50 TABLET ORAL DAILY
Qty: 90 TABLET | Refills: 3 | Status: SHIPPED | OUTPATIENT
Start: 2017-12-13 | End: 2018-01-01

## 2017-12-13 NOTE — TELEPHONE ENCOUNTER
The pt is here for an RN Bp check after restarting his BP medications on 12/6/17. The pt reports taking all medications as advised without diff. He has taken the metoprolol and Hydrochlorothiazide this morning.   I have checked his BP x2 this visit. See those readings below.            BP  170/72   168/78     BP Location  Left arm   Right arm     Patient Position  Chair   Chair     Cuff Size  Adult Regular   Adult Regular     Pulse  51   51     SpO2  97%   99%      The pt denies any symptoms with this elevated BP. No headache, no vision changes.   Please review and advise. The pt reports that he will be running errands today and has okayed leaving a detailed message on his phone. He is requesting  pharmacy [place].   Thank you.   Danica Vanegas RN

## 2017-12-13 NOTE — TELEPHONE ENCOUNTER
Patient returned our call and I gave him the message and repeated it, so he understood.  Taylor Pereira  [facility] [place] [facility]

## 2017-12-13 NOTE — TELEPHONE ENCOUNTER
Blood pressure highly uncontrolled.  Add losartan 50 mg once daily, continue other meds.  Wait to start until 1-2 days after planned biopsy on 12/18.  RN BP check and BMP 2 weeks after starting.

## 2017-12-13 NOTE — TELEPHONE ENCOUNTER
Left message for patient to return call to clinic.    [facility] ok to deliver message below.    Martine QUINTANA Rn

## 2017-12-13 NOTE — NURSING NOTE
The pt is here for an RN Bp check after restarting his BP medications on 12/6/17. The pt reports taking all medications as advised without diff. He has taken the metoprolol and Hydrochlorothiazide this morning.   I have checked his BP x2 this visit. See those readings below.            BP  170/72   168/78     BP Location  Left arm   Right arm     Patient Position  Chair   Chair     Cuff Size  Adult Regular   Adult Regular     Pulse  51   51     SpO2  97%   99%      The pt denies any symptoms with this elevated BP. No headache, no vision changes.   I will route this information to the provider to review and advise.   Danica Vanegas RN

## 2017-12-13 NOTE — MR AVS SNAPSHOT
After Visit Summary   12/13/2017    Chilo Oneil    MRN: [id number]           Patient Information     Date Of Birth          1950        Visit Information        Provider Department      12/13/2017 10:15 AM JASMYN PIPER FP/ALEJANDRO RN [facility]        Today's Diagnoses     Benign essential hypertension    -  1       Follow-ups after your visit        Your next 10 appointments already scheduled     Dec 18, 2017 12:00 PM CST   (Arrive by 11:45 AM)   CT LUNG MEDIASTINUM BIOPSY with [place], [place] IMAGING NURSE, FELIZ BECK   [facility] CT ([facility])    [address] [address]   [phone]           Plan for an adult to drive you home and stay with you until morning.  Tell your doctor in advance:   If you have any allergies.   If you are breastfeeding or there s any chance you are pregnant.  Please bring any scans or X-rays taken at other hospitals, if they may be helpful. Also bring a list of your medicines, including vitamins, minerals and over-the-counter drugs. It is safest to leave valuables at home.  If you take blood thinners, you may need to stop taking them a few days before treatment. Talk to your doctor before stopping these medicines. You will need a blood test the morning of your exam.   Stop taking Coumadin (warfarin) 5 days before treatment. Restart the day after treatment.   If you take aspirin, you may need to stop taking it 3 days before treatment.   If you take Plavix, Ticlid, Pletal or Persantine, you may need to stop taking them 5 days before your scan.  If you have diabetes:   If you take insulin, call your diabetes care team. Ask if you should adjust your insulin before this test.   If your kidney function is normal, continue taking your metformin (Avandamet, Glucophage, Glucovance, Metaglip) on the day of your exam.   If your kidney function is abnormal, wait 48 hours before restarting this medicine.  If you have any questions, please  "call the imaging department where you will have your exam.  The day before your exam: Drink extra fluids at least six 8-ounce glasses (unless your doctor tells you to restrict fluids). The day of your exam: No eating or drinking for 4 hours before your test. You may take medicine with small sips of water.              Who to contact     If you have questions or need follow up information about today's clinic visit or your schedule please contact [facility] directly at [phone].  Normal or non-critical lab and imaging results will be communicated to you by [employer], letter or phone within 4 business days after the clinic has received the results. If you do not hear from us within 7 days, please contact the clinic through [employer] or phone. If you have a critical or abnormal lab result, we will notify you by phone as soon as possible.  Submit refill requests through [employer] or call your pharmacy and they will forward the refill request to us. Please allow 3 business days for your refill to be completed.          Additional Information About Your Visit        [employer] Information     [employer] lets you send messages to your doctor, view your test results, renew your prescriptions, schedule appointments and more. To sign up, go to [email or web address] . Click on \"Log in\" on the left side of the screen, which will take you to the Welcome page. Then click on \"Sign up Now\" on the right side of the page.     You will be asked to enter the access code listed below, as well as some personal information. Please follow the directions to create your username and password.     Your access code is: [email or web address]  Expires: 2018 12:18 PM     Your access code will  in 90 days. If you need help or a new code, please call your [facility] or [phone].        Care EveryWhere ID     This is your Care EveryWhere ID. This could be used by other organizations to access your [place] medical " records  [id number]        Your Vitals Were     Pulse Pulse Oximetry                51 99%           Blood Pressure from Last 3 Encounters:   12/13/17 168/78   12/08/17 160/80   12/06/17 168/84    Weight from Last 3 Encounters:   12/06/17 192 lb (87.1 kg)   11/29/17 187 lb (84.8 kg)   08/19/16 179 lb (81.2 kg)              Today, you had the following     No orders found for display       Primary Care Provider Office Phone # Fax #    Belkys Mcgee,  [phone] [address] [address]        Equal Access to Services     [place] : Hadii krysta Vidales, waaxda jax, qaybta kaalmada aderaul, celina fisher. So [facility] [phone].    ATENCIÓN: Si habla español, tiene a galaviz disposición servicios gratuitos de asistencia lingüística. Llame al [phone].    We comply with applicable federal civil rights laws and [place] laws. We do not discriminate on the basis of race, color, national origin, age, disability, sex, sexual orientation, or gender identity.            Thank you!     Thank you for choosing [facility]  for your care. Our goal is always to provide you with excellent care. Hearing back from our patients is one way we can continue to improve our services. Please take a few minutes to complete the written survey that you may receive in the mail after your visit with us. Thank you!             Your Updated Medication List - Protect others around you: Learn how to safely use, store and throw away your medicines at www.disposemymeds.org.          This list is accurate as of: 12/13/17 11:08 AM.  Always use your most recent med list.                   Brand Name Dispense Instructions for use Diagnosis    aspirin 325 MG tablet     90 tablet    Take 1 tablet (325 mg) by mouth daily    Pain of left lower leg       hydrochlorothiazide 25 MG tablet    HYDRODIURIL    90 tablet    Take 1 tablet (25 mg) by mouth daily     Benign essential hypertension       indomethacin 25 MG capsule    INDOCIN    42 capsule    Take 1 capsule (25 mg) by mouth 2 times daily (with meals)    Chest wall pain, Rib pain on left side       metoprolol 25 MG 24 hr tablet    TOPROL XL    90 tablet    Take 1 tablet (25 mg) by mouth daily    Coronary artery disease, angina presence unspecified, unspecified vessel or lesion type, unspecified whether native or transplanted heart       metoprolol 50 MG tablet    LOPRESSOR    60 tablet    Take 1 tablet (50 mg) by mouth 2 times daily    Benign essential hypertension

## 2017-12-16 RX ORDER — FENTANYL CITRATE 50 UG/ML
25-50 INJECTION, SOLUTION INTRAMUSCULAR; INTRAVENOUS EVERY 5 MIN PRN
Status: DISCONTINUED | OUTPATIENT
Start: 2017-12-16 | End: 2017-12-19 | Stop reason: HOSPADM

## 2017-12-16 RX ORDER — FLUMAZENIL 0.1 MG/ML
0.2 INJECTION, SOLUTION INTRAVENOUS
Status: DISCONTINUED | OUTPATIENT
Start: 2017-12-16 | End: 2017-12-19 | Stop reason: HOSPADM

## 2017-12-16 RX ORDER — NALOXONE HYDROCHLORIDE 0.4 MG/ML
.1-.4 INJECTION, SOLUTION INTRAMUSCULAR; INTRAVENOUS; SUBCUTANEOUS
Status: DISCONTINUED | OUTPATIENT
Start: 2017-12-16 | End: 2017-12-19 | Stop reason: HOSPADM

## 2017-12-18 ENCOUNTER — HOSPITAL ENCOUNTER (OUTPATIENT)
Dept: CT IMAGING | Facility: CLINIC | Age: 67
Discharge: HOME | End: 2017-12-18
Attending: FAMILY MEDICINE | Admitting: FAMILY MEDICINE
Payer: MEDICARE

## 2017-12-18 ENCOUNTER — HOSPITAL ENCOUNTER (OUTPATIENT)
Dept: GENERAL RADIOLOGY | Facility: CLINIC | Age: 67
End: 2017-12-18
Attending: RADIOLOGY
Payer: MEDICARE

## 2017-12-18 VITALS
HEART RATE: 59 BPM | DIASTOLIC BLOOD PRESSURE: 64 MMHG | SYSTOLIC BLOOD PRESSURE: 134 MMHG | TEMPERATURE: 99 F | OXYGEN SATURATION: 96 %

## 2017-12-18 DIAGNOSIS — Z01.818 PREOP GENERAL PHYSICAL EXAM: ICD-10-CM

## 2017-12-18 DIAGNOSIS — C34.92 NON-SMALL CELL CANCER OF LEFT LUNG (H): Primary | ICD-10-CM

## 2017-12-18 PROCEDURE — 88377 M/PHMTRC ALYS ISHQUANT/SEMIQ: CPT | Performed by: FAMILY MEDICINE

## 2017-12-18 PROCEDURE — 88341 IMHCHEM/IMCYTCHM EA ADD ANTB: CPT | Mod: 26 | Performed by: RADIOLOGY

## 2017-12-18 PROCEDURE — 00000159 ZZHCL STATISTIC H-SEND OUTS PREP: Performed by: RADIOLOGY

## 2017-12-18 PROCEDURE — 88305 TISSUE EXAM BY PATHOLOGIST: CPT | Performed by: RADIOLOGY

## 2017-12-18 PROCEDURE — 88342 IMHCHEM/IMCYTCHM 1ST ANTB: CPT | Mod: 26 | Performed by: RADIOLOGY

## 2017-12-18 PROCEDURE — 71010 XR CHEST 1 VW: CPT

## 2017-12-18 PROCEDURE — 40000986 XR CHEST 1 VW

## 2017-12-18 PROCEDURE — 81445 SO NEO GSAP 5-50DNA/DNA&RNA: CPT | Performed by: RADIOLOGY

## 2017-12-18 PROCEDURE — 88341 IMHCHEM/IMCYTCHM EA ADD ANTB: CPT | Performed by: RADIOLOGY

## 2017-12-18 PROCEDURE — 88342 IMHCHEM/IMCYTCHM 1ST ANTB: CPT | Performed by: RADIOLOGY

## 2017-12-18 PROCEDURE — 25000128 H RX IP 250 OP 636: Performed by: RADIOLOGY

## 2017-12-18 PROCEDURE — 32405 CT LUNG MEDIASTINUM BIOPSY: CPT

## 2017-12-18 PROCEDURE — 88305 TISSUE EXAM BY PATHOLOGIST: CPT | Mod: 26 | Performed by: RADIOLOGY

## 2017-12-18 RX ORDER — SODIUM CHLORIDE 9 MG/ML
INJECTION, SOLUTION INTRAVENOUS CONTINUOUS
Status: DISCONTINUED | OUTPATIENT
Start: 2017-12-18 | End: 2017-12-19 | Stop reason: HOSPADM

## 2017-12-18 RX ORDER — LIDOCAINE 40 MG/G
CREAM TOPICAL
Status: DISCONTINUED | OUTPATIENT
Start: 2017-12-18 | End: 2017-12-19 | Stop reason: HOSPADM

## 2017-12-18 RX ADMIN — FENTANYL CITRATE 25 MCG: 50 INJECTION INTRAMUSCULAR; INTRAVENOUS at 12:22

## 2017-12-18 RX ADMIN — SODIUM CHLORIDE: 9 INJECTION, SOLUTION INTRAVENOUS at 12:08

## 2017-12-18 RX ADMIN — FENTANYL CITRATE 50 MCG: 50 INJECTION INTRAMUSCULAR; INTRAVENOUS at 12:09

## 2017-12-18 RX ADMIN — FENTANYL CITRATE 25 MCG: 50 INJECTION INTRAMUSCULAR; INTRAVENOUS at 12:14

## 2017-12-18 RX ADMIN — MIDAZOLAM 1 MG: 1 INJECTION INTRAMUSCULAR; INTRAVENOUS at 12:08

## 2017-12-18 RX ADMIN — MIDAZOLAM 25 MG: 1 INJECTION INTRAMUSCULAR; INTRAVENOUS at 12:17

## 2017-12-18 RX ADMIN — MIDAZOLAM 0.5 MG: 1 INJECTION INTRAMUSCULAR; INTRAVENOUS at 12:13

## 2017-12-18 NOTE — IP AVS SNAPSHOT
MRN:[id number]                      After Visit Summary   12/18/2017    Chilo Oneil    MRN: [id number]           Visit Information        Provider Department      12/18/2017 12:00 PM [facility] RAD; [facility] IMAG[facility] NURSE; [facility]           Review of your medicines      UNREVIEWED medicines. Ask your doctor about these medicines        Dose / Directions    aspirin 325 MG tablet   Used for:  Pain of left lower leg        Dose:  325 mg   Take 1 tablet (325 mg) by mouth daily   Quantity:  90 tablet   Refills:  3       hydrochlorothiazide 25 MG tablet   Commonly known as:  HYDRODIURIL   Used for:  Benign essential hypertension        Dose:  25 mg   Take 1 tablet (25 mg) by mouth daily   Quantity:  90 tablet   Refills:  3       indomethacin 25 MG capsule   Commonly known as:  INDOCIN   Used for:  Chest wall pain, Rib pain on left side        Dose:  25 mg   Take 1 capsule (25 mg) by mouth 2 times daily (with meals)   Quantity:  42 capsule   Refills:  1       losartan 50 MG tablet   Commonly known as:  COZAAR   Used for:  Benign essential hypertension        Dose:  50 mg   Take 1 tablet (50 mg) by mouth daily   Quantity:  90 tablet   Refills:  3       metoprolol 25 MG 24 hr tablet   Commonly known as:  TOPROL XL   Used for:  Coronary artery disease, angina presence unspecified, unspecified vessel or lesion type, unspecified whether native or transplanted heart        Dose:  25 mg   Take 1 tablet (25 mg) by mouth daily   Quantity:  90 tablet   Refills:  3       metoprolol 50 MG tablet   Commonly known as:  LOPRESSOR   Used for:  Benign essential hypertension        Dose:  50 mg   Take 1 tablet (50 mg) by mouth 2 times daily   Quantity:  60 tablet   Refills:  1                Protect others around you: Learn how to safely use, store and throw away your medicines at www.disposemymeds.org.         Follow-ups after your visit         Care Instructions        Further instructions from your care team   "                     Radiology  Discharge Instructions for Lung/Chest Biopsy    A lung/chest biopsy is a procedure to obtain a small tissue sample from your lung/chest.  This tissue is obtained using a biopsy needle.  This sample will be examined in a laboratory for any abnormalities.    AFTER YOU ARE HOME:    You may return to your normal diet    Relax and take it easy for 24 hours    Do have someone stay with you for the next 24 hours to help you if you have any difficulties.  You may develop a complete or partial collapse of your lung (pneumothorax), from the needle entering your lung.    Remove band-aid from biopsy site in 24 hours.    You may use Tylenol for discomfort at biopsy site.    You may cough up small amounts of blood.    CALL YOUR PRIMARY PROVIDER IF:    You develop temperature over 101o F or redness at biopsy site.    AFTER HOURS CALL [place] [facility] AT [phone],    COME TO EMERGENCY ROOM IF:    You are having severe difficulty breathing, severe chest pain, coughing up large amounts of blood, or have heavy bleeding from biopsy site.  DO NOT DRIVE YOURSELF.    Your ordering physician will contact you with the laboratory results.      ADDITIONAL INSTRUCTIONS:     I have reviewed and understand these discharge instructions.        __________________________________________________  Patient Signature        __________________________________________________  Nurse/Radiologist Signature  12/18/2017         Additional Information About Your Visit        [employer] Information     [employer] lets you send messages to your doctor, view your test results, renew your prescriptions, schedule appointments and more. To sign up, go to [email or web address] . Click on \"Log in\" on the left side of the screen, which will take you to the Welcome page. Then click on \"Sign up Now\" on the right side of the page.     You will be asked to enter the access code listed below, as well as some personal information. " Please follow the directions to create your username and password.     Your access code is: [email or web address]  Expires: 2018 12:18 PM     Your access code will  in 90 days. If you need help or a new code, please call your [facility] clinic or [phone].        Care EveryWhere ID     This is your Care EveryWhere ID. This could be used by other organizations to access your [facility] medical records  [id number]        Your Vitals Were     Blood Pressure Pulse Temperature Pulse Oximetry          134/84 (BP Location: Left arm) 64 99  F (37.2  C) (Tympanic) 96%         Primary Care Provider Office Phone # Fax #    Belkys Mcgee, [phone]      Equal Access to Services     SONY SNOW : Hadii krysta jordan hadasho Soomaali, waaxda luqadaha, qaybta kaalmada adeegyada, celina hewitt . So [facility] [phone].    ATENCIÓN: Si habla español, tiene a galaviz disposición servicios gratuitos de asistencia lingüística. Llame al [phone].    We comply with applicable federal civil rights laws and [place] laws. We do not discriminate on the basis of race, color, national origin, age, disability, sex, sexual orientation, or gender identity.            Thank you!     Thank you for choosing [facility] for your care. Our goal is always to provide you with excellent care. Hearing back from our patients is one way we can continue to improve our services. Please take a few minutes to complete the written survey that you may receive in the mail after you visit with us. Thank you!             Medication List: This is a list of all your medications and when to take them. Check marks below indicate your daily home schedule. Keep this list as a reference.      Medications           Morning Afternoon Evening Bedtime As Needed    aspirin 325 MG tablet   Take 1 tablet (325 mg) by mouth daily                                hydrochlorothiazide 25 MG tablet   Commonly known as:  HYDRODIURIL   Take 1  tablet (25 mg) by mouth daily                                indomethacin 25 MG capsule   Commonly known as:  INDOCIN   Take 1 capsule (25 mg) by mouth 2 times daily (with meals)                                losartan 50 MG tablet   Commonly known as:  COZAAR   Take 1 tablet (50 mg) by mouth daily                                metoprolol 25 MG 24 hr tablet   Commonly known as:  TOPROL XL   Take 1 tablet (25 mg) by mouth daily                                metoprolol 50 MG tablet   Commonly known as:  LOPRESSOR   Take 1 tablet (50 mg) by mouth 2 times daily

## 2017-12-18 NOTE — PROGRESS NOTES
Review of Systems, Mallimpadi and ASA has been performed. The patient is approved for the imaging procedure using sedation.

## 2017-12-18 NOTE — DISCHARGE INSTRUCTIONS
Radiology  Discharge Instructions for Lung/Chest Biopsy    A lung/chest biopsy is a procedure to obtain a small tissue sample from your lung/chest.  This tissue is obtained using a biopsy needle.  This sample will be examined in a laboratory for any abnormalities.    AFTER YOU ARE HOME:    You may return to your normal diet    Relax and take it easy for 24 hours    Do have someone stay with you for the next 24 hours to help you if you have any difficulties.  You may develop a complete or partial collapse of your lung (pneumothorax), from the needle entering your lung.    Remove band-aid from biopsy site in 24 hours.    You may use Tylenol for discomfort at biopsy site.    You may cough up small amounts of blood.    CALL YOUR PRIMARY PROVIDER IF:    You develop temperature over 101o F or redness at biopsy site.    AFTER HOURS CALL [place] [facility] AT [phone],    COME TO EMERGENCY ROOM IF:    You are having severe difficulty breathing, severe chest pain, coughing up large amounts of blood, or have heavy bleeding from biopsy site.  DO NOT DRIVE YOURSELF.    Your ordering physician will contact you with the laboratory results.      ADDITIONAL INSTRUCTIONS:     I have reviewed and understand these discharge instructions.        __________________________________________________  Patient Signature        __________________________________________________  Nurse/Radiologist Signature  12/18/2017

## 2017-12-18 NOTE — PROGRESS NOTES
RADIOLOGY PROCEDURE NOTE  Patient name: Chilo Oneil  MRN: [id number]  : 1950    Pre-procedure diagnosis: ANDREA lung lesion.  Post-procedure diagnosis: Same    Procedure Date/Time: 2017  12:28 PM  Procedure: CT guided needle core biopsy.  Estimated blood loss: None  Specimen(s) collected:  Two needle cores.  The patient tolerated the procedure well with no immediate complications.    See imaging dictation for procedural details.    Provider name: Papa Vasquez  Assistant(s):None

## 2017-12-18 NOTE — IP AVS SNAPSHOT
[facility]    [address]    [facility] [address]    Phone:  [phone]    Fax:  [phone]                                       After Visit Summary   12/18/2017    Chilo Oneil    MRN: [id number]           After Visit Summary Signature Page     I have received my discharge instructions, and my questions have been answered. I have discussed any challenges I see with this plan with the nurse or doctor.    ..........................................................................................................................................  Patient/Patient Representative Signature      ..........................................................................................................................................  Patient Representative Print Name and Relationship to Patient    ..................................................               ................................................  Date                                            Time    ..........................................................................................................................................  Reviewed by Signature/Title    ...................................................              ..............................................  Date                                                            Time

## 2017-12-20 ENCOUNTER — OFFICE VISIT (OUTPATIENT)
Dept: FAMILY MEDICINE | Facility: CLINIC | Age: 67
End: 2017-12-20
Payer: MEDICARE

## 2017-12-20 VITALS
TEMPERATURE: 96.6 F | HEIGHT: 75 IN | SYSTOLIC BLOOD PRESSURE: 126 MMHG | HEART RATE: 56 BPM | BODY MASS INDEX: 23.87 KG/M2 | WEIGHT: 192 LBS | DIASTOLIC BLOOD PRESSURE: 50 MMHG

## 2017-12-20 DIAGNOSIS — K21.9 GASTROESOPHAGEAL REFLUX DISEASE WITHOUT ESOPHAGITIS: Primary | ICD-10-CM

## 2017-12-20 DIAGNOSIS — F41.9 ANXIETY: ICD-10-CM

## 2017-12-20 PROCEDURE — 99214 OFFICE O/P EST MOD 30 MIN: CPT | Performed by: FAMILY MEDICINE

## 2017-12-20 RX ORDER — ESOMEPRAZOLE MAGNESIUM 40 MG/1
40 CAPSULE, DELAYED RELEASE ORAL
Qty: 30 CAPSULE | Refills: 1 | Status: SHIPPED | OUTPATIENT
Start: 2017-12-20 | End: 2017-12-26

## 2017-12-20 RX ORDER — HYDROXYZINE PAMOATE 25 MG/1
25 CAPSULE ORAL
Qty: 120 CAPSULE | Refills: 0 | Status: SHIPPED | OUTPATIENT
Start: 2017-12-20 | End: 2018-01-11

## 2017-12-20 NOTE — PROGRESS NOTES
SUBJECTIVE:   Chilo Oneil is a 67 year old male who presents to clinic today for the following health issues:      Abnormal Mood Symptoms  Onset: 3 weeks ago     Description:   Depression: no  Anxiety: YES- patient has been having panic attacks     Accompanying Signs & Symptoms:  Still participating in activities that you used to enjoy: no  Fatigue: YES  Irritability: YES  Difficulty concentrating: YES  Changes in appetite: YES- some   Problems with sleep: YES- really bad at night   Heart racing/beating fast : no   Thoughts of hurting yourself or others: none    History:   Recent stress: YES- Patient previously dx with possible ca   Prior depression hospitalization: None  Family history of depression: no  Family history of anxiety: no    Precipitating factors:   Alcohol/drug use: YES- beer    Alleviating factors:  None     Therapies Tried and outcome: None  GERD/Heartburn  Onset: increased with bp medication     Description:     Burning in chest: YES    Intensity: moderate    Progression of Symptoms: worsening    Accompanying Signs & Symptoms:  Does it feel like food gets stuck: no  Nausea: YES  Vomiting (bloody?): no  Abdominal Pain: YES  Black-Tarry stools: no:  Bloody stools: no    History:   Previous ulcers: no    Precipitating factors:   Caffeine use: YES- 2  Alcohol use: YES- beer  NSAID/Aspirin use: YES- 325  Tobacco use: YES- but is cutting down   Worse with bp pill and acidy foods .    Alleviating factors:  None     Therapies Tried and outcome:tums       Patient comes in with increasing anxiety and panic attacks. Was recently diagnosed with Lung CA . Says he is having a hard time sleeping and sometimes will wake up with panic attacks. He is then unable to fall back asleep. He is opened to trying medication to help calm his nerves.     Patient also struggling with increase in heart burn.Patient reports despite taking nexium he is still having severe heart burn. Explained that stress  Can also cause  worsening heart burn. He is presently taking 20 mg . Asked that he increase the dose .            Problem list and histories reviewed & adjusted, as indicated.  Additional history: as documented    Patient Active Problem List   Diagnosis     Tobacco use disorder     CARDIOVASCULAR SCREENING; LDL GOAL LESS THAN 130     Hip fracture, left (H)     Alcohol use, daily use     PAD (peripheral artery disease) (H)     Benign essential hypertension     Hyperlipidemia LDL goal <100     Atypical angina (H)     Past Surgical History:   Procedure Laterality Date     FRACTURE TX, ANKLE RT/LT  1975    Fracture TX Ankle, LT     OPEN REDUCTION INTERNAL FIXATION HIP NAILING  9/20/2013    Procedure: OPEN REDUCTION INTERNAL FIXATION HIP NAILING;  Left Hip Open Reduction Internal Fixation ;  Surgeon: Rosas Dennis MD;  Location: [facility] OR       Social History   Substance Use Topics     Smoking status: Current Every Day Smoker     Packs/day: 0.30     Types: Cigarettes     Smokeless tobacco: Never Used      Comment: 2-3 cigs daily     Alcohol use Yes     Family History   Problem Relation Age of Onset     DIABETES Brother      type 2     DIABETES Father          Current Outpatient Prescriptions   Medication Sig Dispense Refill     hydrOXYzine (VISTARIL) 25 MG capsule Take 1 capsule (25 mg) by mouth nightly as needed for anxiety (May repeat  another dose at night) 120 capsule 0     esomeprazole (NEXIUM) 40 MG CR capsule Take 1 capsule (40 mg) by mouth every morning (before breakfast) Take 30-60 minutes before a eating. 30 capsule 1     losartan (COZAAR) 50 MG tablet Take 1 tablet (50 mg) by mouth daily 90 tablet 3     metoprolol (LOPRESSOR) 50 MG tablet Take 1 tablet (50 mg) by mouth 2 times daily 60 tablet 1     indomethacin (INDOCIN) 25 MG capsule Take 1 capsule (25 mg) by mouth 2 times daily (with meals) 42 capsule 1     aspirin 325 MG tablet Take 1 tablet (325 mg) by mouth daily 90 tablet 3     hydrochlorothiazide (HYDRODIURIL) 25  "MG tablet Take 1 tablet (25 mg) by mouth daily 90 tablet 3     Allergies   Allergen Reactions     Cipro [Ciprofloxacin] Swelling         Reviewed and updated as needed this visit by clinical staffTobacco  Allergies  Med Hx  Surg Hx  Fam Hx  Soc Hx      Reviewed and updated as needed this visit by Provider         ROS:  Constitutional, HEENT, cardiovascular, pulmonary, gi and gu systems are negative, except as otherwise noted.      OBJECTIVE:   /50  Pulse 56  Temp 96.6  F (35.9  C) (Tympanic)  Ht 6' 3\" (1.905 m)  Wt 192 lb (87.1 kg)  BMI 24 kg/m2  Body mass index is 24 kg/(m^2).  GENERAL: healthy, alert and no distress  HENT: ear canals and TM's normal, nose and mouth without ulcers or lesions  NECK: no adenopathy, no asymmetry, masses, or scars and thyroid normal to palpation  RESP: lungs clear to auscultation - no rales, rhonchi or wheezes  CV: regular rate and rhythm, normal S1 S2, no S3 or S4, no murmur, click or rub, no peripheral edema and peripheral pulses strong  MS: no gross musculoskeletal defects noted, no edema    Diagnostic Test Results:  none     ASSESSMENT/PLAN:   1. Gastroesophageal reflux disease without esophagitis  Medication faxed. Avoid triggers   - esomeprazole (NEXIUM) 40 MG CR capsule; Take 1 capsule (40 mg) by mouth every morning (before breakfast) Take 30-60 minutes before a eating.  Dispense: 30 capsule; Refill: 1    2. Anxiety  Trail of hydroxyzine.   - hydrOXYzine (VISTARIL) 25 MG capsule; Take 1 capsule (25 mg) by mouth nightly as needed for anxiety (May repeat  another dose at night)  Dispense: 120 capsule; Refill: 0    FUTURE APPOINTMENTS:       - Follow-up visit as needed    Manish Plasencia MD  [facility]  "

## 2017-12-20 NOTE — PATIENT INSTRUCTIONS
Thank you for choosing [facility].  You may be receiving a survey in the mail from Laura Frausto regarding your visit today.  Please take a few minutes to complete and return the survey to let us know how we are doing.      If you have questions or concerns, please contact us via [employer] or you can contact your care team at [phone].    Our Clinic hours are:  Monday 6:40 am  to 7:00 pm  Tuesday -Friday 6:40 am to 5:00 pm    The [facility] outpatient lab hours are:  Monday - Friday 6:10 am to 4:45 pm  Saturdays 7:00 am to 11:00 am  Appointments are required, call [phone]    If you have clinical questions after hours or would like to schedule an appointment,  call the clinic at [phone].

## 2017-12-20 NOTE — MR AVS SNAPSHOT
After Visit Summary   12/20/2017    Chilo Oneil    MRN: [id number]           Patient Information     Date Of Birth          1950        Visit Information        Provider Department      12/20/2017 10:40 AM Manish Plasencia MD [facility]        Today's Diagnoses     Gastroesophageal reflux disease without esophagitis    -  1    Anxiety          Care Instructions          Thank you for choosing [facility].  You may be receiving a survey in the mail from [facility] regarding your visit today.  Please take a few minutes to complete and return the survey to let us know how we are doing.      If you have questions or concerns, please contact us via [employer] or you can contact your care team at [phone].    Our Clinic hours are:  Monday 6:40 am  to 7:00 pm  Tuesday -Friday 6:40 am to 5:00 pm    The [facility] outpatient lab hours are:  Monday - Friday 6:10 am to 4:45 pm  Saturdays 7:00 am to 11:00 am  Appointments are required, call [phone]    If you have clinical questions after hours or would like to schedule an appointment,  call the clinic at [phone].          Follow-ups after your visit        Your next 10 appointments already scheduled     Dec 26, 2017 10:00 AM CST   New Visit with Mor Mccauley MD   [facility]    [facility]  [address]  [facility] [address] [phone]              Who to contact     If you have questions or need follow up information about today's clinic visit or your schedule please contact [facility] directly at [phone].  Normal or non-critical lab and imaging results will be communicated to you by MyChart, letter or phone within 4 business days after the clinic has received the results. If you do not hear from us within 7 days, please contact the clinic through [employer] or phone. If you have a critical or abnormal lab result, we  "will notify you by phone as soon as possible.  Submit refill requests through [employer] or call your pharmacy and they will forward the refill request to us. Please allow 3 business days for your refill to be completed.          Additional Information About Your Visit        [employer] Information     [employer] lets you send messages to your doctor, view your test results, renew your prescriptions, schedule appointments and more. To sign up, go to [email or web address] . Click on \"Log in\" on the left side of the screen, which will take you to the Welcome page. Then click on \"Sign up Now\" on the right side of the page.     You will be asked to enter the access code listed below, as well as some personal information. Please follow the directions to create your username and password.     Your access code is: [email or web address]  Expires: 2018 12:18 PM     Your access code will  in 90 days. If you need help or a new code, please call your [facility] clinic or [phone].        Care EveryWhere ID     This is your Care EveryWhere ID. This could be used by other organizations to access your [facility] medical records  [id number]        Your Vitals Were     Pulse Temperature Height BMI (Body Mass Index)          56 96.6  F (35.9  C) (Tympanic) 6' 3\" (1.905 m) 24 kg/m2         Blood Pressure from Last 3 Encounters:   17 126/50   17 134/64   17 168/78    Weight from Last 3 Encounters:   17 192 lb (87.1 kg)   17 192 lb (87.1 kg)   17 187 lb (84.8 kg)              Today, you had the following     No orders found for display         Today's Medication Changes          These changes are accurate as of: 17 11:03 AM.  If you have any questions, ask your nurse or doctor.               Start taking these medicines.        Dose/Directions    esomeprazole 40 MG CR capsule   Commonly known as:  nexIUM   Used for:  Gastroesophageal reflux disease without esophagitis   Started by:  Erinn " Manish Montes MD        Dose:  40 mg   Take 1 capsule (40 mg) by mouth every morning (before breakfast) Take 30-60 minutes before a eating.   Quantity:  30 capsule   Refills:  1       hydrOXYzine 25 MG capsule   Commonly known as:  VISTARIL   Used for:  Anxiety   Started by:  Manish Plasencia MD        Dose:  25 mg   Take 1 capsule (25 mg) by mouth nightly as needed for anxiety (May repeat  another dose at night)   Quantity:  120 capsule   Refills:  0         Stop taking these medicines if you haven't already. Please contact your care team if you have questions.     metoprolol 25 MG 24 hr tablet   Commonly known as:  TOPROL XL   Stopped by:  Manish Plasencia MD                Where to get your medicines      These medications were sent to [facility] [address]  [address] [address]     Phone:  [phone]     esomeprazole 40 MG CR capsule    hydrOXYzine 25 MG capsule                Primary Care Provider Office Phone # Fax #    Belkys Mccarthy DO [place] [phone] [address] [address]        Equal Access to Services     [employer] : Hadii krysta jordan hadasho Sochuyali, waaxda luqadaha, qaybta kaalmada adeegyada, celina fisher. So [facility] [phone].    ATENCIÓN: Si habla español, tiene a galaviz disposición servicios gratuitos de asistencia lingüística. Yecenia al [phone].    We comply with applicable federal civil rights laws and [place] laws. We do not discriminate on the basis of race, color, national origin, age, disability, sex, sexual orientation, or gender identity.            Thank you!     Thank you for choosing [facility]  for your care. Our goal is always to provide you with excellent care. Hearing back from our patients is one way we can continue to improve our services. Please take a few minutes to complete the written survey that you may receive in the  mail after your visit with us. Thank you!             Your Updated Medication List - Protect others around you: Learn how to safely use, store and throw away your medicines at www.disposemymeds.org.          This list is accurate as of: 12/20/17 11:04 AM.  Always use your most recent med list.                   Brand Name Dispense Instructions for use Diagnosis    aspirin 325 MG tablet     90 tablet    Take 1 tablet (325 mg) by mouth daily    Pain of left lower leg       esomeprazole 40 MG CR capsule    nexIUM    30 capsule    Take 1 capsule (40 mg) by mouth every morning (before breakfast) Take 30-60 minutes before a eating.    Gastroesophageal reflux disease without esophagitis       hydrochlorothiazide 25 MG tablet    HYDRODIURIL    90 tablet    Take 1 tablet (25 mg) by mouth daily    Benign essential hypertension       hydrOXYzine 25 MG capsule    VISTARIL    120 capsule    Take 1 capsule (25 mg) by mouth nightly as needed for anxiety (May repeat  another dose at night)    Anxiety       indomethacin 25 MG capsule    INDOCIN    42 capsule    Take 1 capsule (25 mg) by mouth 2 times daily (with meals)    Chest wall pain, Rib pain on left side       losartan 50 MG tablet    COZAAR    90 tablet    Take 1 tablet (50 mg) by mouth daily    Benign essential hypertension       metoprolol 50 MG tablet    LOPRESSOR    60 tablet    Take 1 tablet (50 mg) by mouth 2 times daily    Benign essential hypertension

## 2017-12-21 ENCOUNTER — TELEPHONE (OUTPATIENT)
Dept: FAMILY MEDICINE | Facility: CLINIC | Age: 67
End: 2017-12-21

## 2017-12-21 ASSESSMENT — PATIENT HEALTH QUESTIONNAIRE - PHQ9
SUM OF ALL RESPONSES TO PHQ QUESTIONS 1-9: 11
5. POOR APPETITE OR OVEREATING: MORE THAN HALF THE DAYS

## 2017-12-21 ASSESSMENT — ANXIETY QUESTIONNAIRES
IF YOU CHECKED OFF ANY PROBLEMS ON THIS QUESTIONNAIRE, HOW DIFFICULT HAVE THESE PROBLEMS MADE IT FOR YOU TO DO YOUR WORK, TAKE CARE OF THINGS AT HOME, OR GET ALONG WITH OTHER PEOPLE: SOMEWHAT DIFFICULT
GAD7 TOTAL SCORE: 13
6. BECOMING EASILY ANNOYED OR IRRITABLE: MORE THAN HALF THE DAYS
1. FEELING NERVOUS, ANXIOUS, OR ON EDGE: MORE THAN HALF THE DAYS
5. BEING SO RESTLESS THAT IT IS HARD TO SIT STILL: SEVERAL DAYS
7. FEELING AFRAID AS IF SOMETHING AWFUL MIGHT HAPPEN: MORE THAN HALF THE DAYS
2. NOT BEING ABLE TO STOP OR CONTROL WORRYING: MORE THAN HALF THE DAYS
3. WORRYING TOO MUCH ABOUT DIFFERENT THINGS: MORE THAN HALF THE DAYS

## 2017-12-21 NOTE — TELEPHONE ENCOUNTER
Panel Management Review      Patient has the following on his problem list:       Composite cancer screening  Chart review shows that this patient is due/due soon for the following Fecal Colorectal (FIT)  Summary:    Patient is due/failing the following:   BP CHECK, COLONOSCOPY, FIT and PHQ9    Action needed:   Patient needs to do PHQ9.    Type of outreach:    Phone, spoke to patient.  Patient was in and missed PHQ9 will add today     Questions for provider review:    None                                                                                                                                    Janet Sutherland CMA     Chart routed to  .

## 2017-12-22 ASSESSMENT — ANXIETY QUESTIONNAIRES: GAD7 TOTAL SCORE: 13

## 2017-12-26 ENCOUNTER — ONCOLOGY VISIT (OUTPATIENT)
Dept: ONCOLOGY | Facility: CLINIC | Age: 67
End: 2017-12-26
Attending: INTERNAL MEDICINE
Payer: MEDICARE

## 2017-12-26 VITALS
TEMPERATURE: 98.3 F | HEIGHT: 73 IN | SYSTOLIC BLOOD PRESSURE: 194 MMHG | RESPIRATION RATE: 16 BRPM | OXYGEN SATURATION: 100 % | HEART RATE: 59 BPM | WEIGHT: 190.8 LBS | BODY MASS INDEX: 25.29 KG/M2 | DIASTOLIC BLOOD PRESSURE: 71 MMHG

## 2017-12-26 DIAGNOSIS — I10 BENIGN ESSENTIAL HYPERTENSION: ICD-10-CM

## 2017-12-26 DIAGNOSIS — F17.200 TOBACCO USE DISORDER: ICD-10-CM

## 2017-12-26 DIAGNOSIS — C34.12 MALIGNANT NEOPLASM OF UPPER LOBE OF LEFT LUNG (H): Primary | ICD-10-CM

## 2017-12-26 DIAGNOSIS — E78.5 HYPERLIPIDEMIA LDL GOAL <100: ICD-10-CM

## 2017-12-26 DIAGNOSIS — K21.9 GASTROESOPHAGEAL REFLUX DISEASE WITHOUT ESOPHAGITIS: ICD-10-CM

## 2017-12-26 PROCEDURE — 99211 OFF/OP EST MAY X REQ PHY/QHP: CPT

## 2017-12-26 PROCEDURE — 99205 OFFICE O/P NEW HI 60 MIN: CPT | Performed by: INTERNAL MEDICINE

## 2017-12-26 RX ORDER — OMEPRAZOLE 40 MG/1
40 CAPSULE, DELAYED RELEASE ORAL DAILY
COMMUNITY
Start: 2017-12-20 | End: 2018-04-04

## 2017-12-26 ASSESSMENT — PAIN SCALES - GENERAL: PAINLEVEL: NO PAIN (0)

## 2017-12-26 NOTE — NURSING NOTE
"Oncology Rooming Note    December 26, 2017 10:03 AM   Chilo Oneil is a 67 year old male who presents for:    Chief Complaint   Patient presents with     Oncology Clinic Visit     New patient, Lung CA.      Initial Vitals: /71 (BP Location: Right arm, Patient Position: Sitting, Cuff Size: Adult Large)  Pulse 59  Temp 98.3  F (36.8  C) (Tympanic)  Resp 16  Ht 1.854 m (6' 1\")  Wt 86.5 kg (190 lb 12.8 oz)  SpO2 100%  BMI 25.17 kg/m2 Estimated body mass index is 25.17 kg/(m^2) as calculated from the following:    Height as of this encounter: 1.854 m (6' 1\").    Weight as of this encounter: 86.5 kg (190 lb 12.8 oz). Body surface area is 2.11 meters squared.  No Pain (0) Comment: Data Unavailable   No LMP for male patient.  Allergies reviewed: Yes  Medications reviewed: Yes    Medications: Medication refills not needed today.  Pharmacy name entered into [facility]: [facility] [place], [place] - [facility]    Clinical concerns: New patient, C/o developed a pain in chest associated with a cough and phlegm x 2 months. On 12/18/17 had CT scan and CXR.       7 minutes for nursing intake (face to face time)     Dorothy Hart [facility]            "

## 2017-12-26 NOTE — LETTER
12/26/2017         RE: Chilo Oneil  [address]  [place] [address]        Dear Colleague,    Thank you for referring your patient, Chilo Oneil, to the [facility]. Please see a copy of my visit note below.    DATE OF VISIT: Dec 26, 2017    REASON FOR REFERRAL: Management of lung mass.      CHIEF COMPLAINT:   Chief Complaint   Patient presents with     Oncology Clinic Visit     New patient, Lung CA.        HISTORY OF PRESENT ILLNESS:  Patient started with L side shoulder and has been traveling  to L/side about  to the whole L/side upper back and L side of chest for about 3 week he has had the pain has been taking aleve.  Subsequently went on to have a CT scan done.  Which showed 1.6 cm nodular infiltrate in the left upper lobe.  The lesion appear spiculated and worrisome for lung cancer.  There is a second 0.7 cm indeterminate nodule in the lingular portion of the left lung.  Subsequently the patient went on to have CT-guided biopsy.  The pathology came back showing moderately differentiated adenocarcinoma.  The patient is here today to discuss management of lung cancer.  He has a long history of smoking.    REVIEW OF SYSTEMS:   Constitutional: Negative for fever, chills, and night sweats.  Skin: negative.  Eyes: negative.  Ears/Nose/Throat: negative.  Respiratory: No shortness of breath, dyspnea on exertion, cough, or hemoptysis.  Cardiovascular: negative.  Gastrointestinal: negative.  Genitourinary: negative.  Musculoskeletal: negative.  Neurologic: negative.  Psychiatric: negative.  Hematologic/Lymphatic/Immunologic: negative.  Endocrine: negative.    PAST MEDICAL HISTORY:   Past Medical History:   Diagnosis Date     Alcohol abuse     Status post treatment       PAST SURGICAL HISTORY:   Past Surgical History:   Procedure Laterality Date     FRACTURE TX, ANKLE RT/LT  1975    Fracture TX Ankle, LT     OPEN REDUCTION INTERNAL FIXATION HIP NAILING  9/20/2013    Procedure: OPEN REDUCTION  INTERNAL FIXATION HIP NAILING;  Left Hip Open Reduction Internal Fixation ;  Surgeon: Rosas Dennis MD;  Location: [facility] OR       ALLERGIES:   Allergies as of 12/26/2017 - Alcides as Reviewed 12/26/2017   Allergen Reaction Noted     Cipro [ciprofloxacin] Swelling 06/22/2009       MEDICATIONS:   Current Outpatient Prescriptions   Medication Sig Dispense Refill     omeprazole (PRILOSEC) 40 MG capsule        hydrOXYzine (VISTARIL) 25 MG capsule Take 1 capsule (25 mg) by mouth nightly as needed for anxiety (May repeat  another dose at night) 120 capsule 0     losartan (COZAAR) 50 MG tablet Take 1 tablet (50 mg) by mouth daily 90 tablet 3     metoprolol (LOPRESSOR) 50 MG tablet Take 1 tablet (50 mg) by mouth 2 times daily 60 tablet 1     hydrochlorothiazide (HYDRODIURIL) 25 MG tablet Take 1 tablet (25 mg) by mouth daily 90 tablet 3     indomethacin (INDOCIN) 25 MG capsule Take 1 capsule (25 mg) by mouth 2 times daily (with meals) 42 capsule 1     aspirin 325 MG tablet Take 1 tablet (325 mg) by mouth daily 90 tablet 3        FAMILY HISTORY:   Family History   Problem Relation Age of Onset     DIABETES Brother      type 2     DIABETES Father       Family history was reviewed with the patient.    SOCIAL HISTORY:   Social History     Social History     Marital status: Single     Spouse name: N/A     Number of children: N/A     Years of education: N/A     Social History Main Topics     Smoking status: Current Every Day Smoker     Packs/day: 0.30     Years: 47.00     Types: Cigarettes     Start date: 12/26/1967     Smokeless tobacco: Never Used      Comment: 3 cigs daily     Alcohol use Yes     Drug use: No     Sexual activity: Not Asked     Other Topics Concern     Parent/Sibling W/ Cabg, Mi Or Angioplasty Before 65f 55m? No     Social History Narrative       PHYSICAL EXAMINATION:   /71 (BP Location: Right arm, Patient Position: Sitting, Cuff Size: Adult Large)  Pulse 59  Temp 98.3  F (36.8  C) (Tympanic)  Resp 16  " Ht 1.854 m (6' 1\")  Wt 86.5 kg (190 lb 12.8 oz)  SpO2 100%  BMI 25.17 kg/m2  Wt Readings from Last 10 Encounters:   12/26/17 86.5 kg (190 lb 12.8 oz)   12/20/17 87.1 kg (192 lb)   12/06/17 87.1 kg (192 lb)   11/29/17 84.8 kg (187 lb)   08/19/16 81.2 kg (179 lb)   08/03/16 82.6 kg (182 lb)   07/19/16 82.1 kg (181 lb)   07/13/16 81.2 kg (179 lb)   06/27/16 83 kg (183 lb)   06/21/16 85.3 kg (188 lb)      ECOG performance status: 0  GENERAL APPEARANCE: Healthy, alert and in no acute distress.  HEENT: Sclerae anicteric. PERRLA. Oropharynx without ulcers, lesions, or thrush.  NECK: Supple. No asymmetry or masses.  LYMPHATICS: No palpable cervical, supraclavicular, axillary, or inguinal lymphadenopathy.  RESP: Lungs clear to auscultation bilaterally without rales, rhonchi or wheezes.  CARDIOVASCULAR: Regular rate and rhythm. Normal S1, S2; no S3 or S4. No murmur, gallop, or rub.  ABDOMEN: Soft, nontender. Bowel sounds normal. No palpable organomegaly or masses.  MUSCULOSKELETAL: Extremities without gross deformities noted. No edema of bilateral lower extremities.  SKIN: No suspicious lesions or rashes.  NEURO: Alert and oriented x 3. Cranial nerves II-XII grossly intact.  PSYCHIATRIC: Mentation and affect appear normal.    LABORATORY RESULTS:  Hospital Outpatient Visit on 12/18/2017   Component Date Value Ref Range Status     Copath Report 12/18/2017    Final                    Value:Patient Name: SERENE AHN  MR#: [id number]  Specimen #: [id number]  Collected: 12/18/2017  Received: 12/18/2017  Reported: 12/22/2017 05:15  Ordering Phy(s): VERONICA HINTON    For improved result formatting, select 'View Enhanced Report Format'  under Linked Documents section.    SPECIMEN(S):  Lung biopsy, left upper lobe    FINAL DIAGNOSIS:  Lung biopsy, left upper lobe:  - Moderate rejection adenocarcinoma consistent with pulmonary primary  malignancy.    COMMENT:  This case was seen in intradepartmental consultation. The " "specimen will  be forwarded for molecular studies.    Electronically signed out by:    CLAUDE York M.D.    CLINICAL HISTORY:  67-year-old male with left lung mass.    GROSS:  A single specimen container with formalin is received labeled with the  patient's name, date of birth, and medical record number.  Information  on the requisition slip, container, and associated labels is confirmed.    The specimen is designated \"left lung mass\" consisting of two t                          an-brown  and focally black pigmented needle biopsies which are 8 mm each. The  entire specimen is filtered over a tissue wrap with one portion of the  specimen been removed and placed in a separate wrap, the entire specimen  is submitted in two cassettes. (Dictated by: BING York MD  12/18/2017 05:14 PM)    MICROSCOPIC:  The sections show portions of lung parenchyma with a invasive moderately  differentiated adenocarcinoma with a desmoplastic stromal reaction.  The  primary pulmonary nature of the malignancy is supported with positive  reactions to immunohistochemical stains TTF-1, p40, Napsin-A and  cytokeratin 7. (Dictated by: BING York MD 12/21/2017)    CPT Codes:  A: 02411-TO2, 65245-BOH, 59025-DWL, 85746-WAA    TESTING LAB LOCATION:  [address] [address] [phone]    COLLECTION SITE:  Client: [facility]  Location: [facility] ()         IMAGING RESULTS:  Recent Results (from the past 744 hour(s))   XR Ribs & Chest Left G/E 3 Views    Narrative    XR RIBS & CHEST LT 3VW, XR CHEST 1 VW 11/29/2017 12:05 PM    HISTORY: Left-sided pain.    COMPARISON: 9/19/2015.      Impression    IMPRESSION: Two views of the chest show no irregular vague 1.6 cm  nodule projecting at the lateral margin of the left hilum. Chest CT is  recommended in further assessment. Two views the left ribs show no  fracture or other " abnormality.    VERONICA HINTON MD   XR Chest 1 View    Narrative    XR RIBS & CHEST LT 3VW, XR CHEST 1 VW 11/29/2017 12:05 PM    HISTORY: Left-sided pain.    COMPARISON: 9/19/2015.      Impression    IMPRESSION: Two views of the chest show no irregular vague 1.6 cm  nodule projecting at the lateral margin of the left hilum. Chest CT is  recommended in further assessment. Two views the left ribs show no  fracture or other abnormality.    VERONICA HINTON MD   CT Chest w Contrast    Narrative    CT CHEST W CONTRAST 12/4/2017 9:34 AM    HISTORY:  Peuritic chest pain ongoing for three weeks. Chest xray  showed a lung nodule recommend CT chest for further evaluation; Rib  pain on left side; Chest wall pain     TECHNIQUE: Volumetric acquisition of CT images from the lung apices  through the upper abdomen. Radiation dose for this scan was reduced  using automated exposure control, adjustment of the mA and/or KV  according to patient size, or iterative reconstruction technique.     COMPARISON: None.      Impression    IMPRESSION: 1.6 cm nodular infiltrate in the left upper lobe seen on  image 24 corresponding with the focal density seen on the recent chest  x-ray. This lesion appears a bit spiculated and is worrisome for  primary lung neoplasm.. There is a second 0.7 cm indeterminate nodule  in the lingular portion of the left lung seen on image 28. There are  couple punctate less than 2 mm nodules in the right lobe seen on image  24 and image 30. The findings are indeterminant neoplasm and or  metastatic disease cannot be excluded.    JAIME RAMIREZ MD   XR Chest 1 View    Narrative    CHEST ONE VIEW  12/18/2017 12:39 PM     HISTORY:  Chest biopsy.    COMPARISON: 11/29/2017      Impression    IMPRESSION: No pneumothorax or pleural effusion. Pulmonary nodule  projected over the mid left lung. Heart size normal.    GIL TONY MD   CT Lung Mediastinum Biopsy    Narrative    CT LUNG MEDIASTINUM BIOPSY 12/18/2017 12:45  PM    HISTORY: Left upper lobe lung lesion.    COMPARISON: 12/4/2017.    PROCEDURE: Risks and benefits of a CT guided core biopsy of the left  upper lobe lung lesion are discussed with the patient. Under CT  guidance, aseptic conditions, and utilizing 10 mL 1% lidocaine as  local anesthetic, a 19 gauge coaxial needle is placed. Through this,  two 20 gauge core biopsy samples are obtained and placed in formalin  for pathologic analysis. The patient tolerated the procedure well.  There is no significant blood loss.    Radiation dose for this procedure is reduced using automated exposure  control of mA and/or kV according to patient size, or iterative  reconstruction technique.    CONSCIOUS SEDATION: The patient's status is monitored before, during,  and after the procedure. The patient is given 2 mg of Versed and 100  mcg of fentanyl intravenously during the procedure. Intra procedure  time is 22 minutes. There were no untoward effects demonstrated by  these administered medications.      Impression    IMPRESSION: Technically uneventful CT guided needle core biopsy, as  described above. Pathologic results are pending.    VERONICA HINTON MD   XR Chest 1 View    Narrative    CHEST ONE VIEW  12/18/2017 2:31 PM     HISTORY:  Chest biopsy.     COMPARISON: 12/18/2017.      Impression    IMPRESSION: Minimal apical pleural air on the left. No pneumothorax on  the right. No pleural effusion. Heart size normal.    GIL TONY MD       ASSESSMENT AND PLAN:  (C34.12) 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)  (primary encounter diagnosis)  I reviewed with patient the images of CT scan.  I gave him an overview about the natural history of adenocarcinoma of the lung.  We talked about staging, biology, management and prognosis.  We talked about different modalities and treatment of adenocarcinoma of the lung including surgical resection, radiation therapy and systemic therapy.  I would recommend to proceed with staging  workup with a PET scan and MRI of the brain.  Patient would be referred to be seen by thoracic surgery for possible surgical resection.  I will see the patient with a workup was concluded to discuss further management plan.  We      Plan: PET Oncology (Eyes to Thighs), MR Brain w/o & w Contrast    (I10) Benign essential hypertension  Patient currently on Cozaar 50 mg orally daily.  Is on Lopressor 50 mg orally daily.  Hydrochlorothiazide 25 mg orally daily.    (F17.200) Tobacco use disorder  I strongly emphasized the importance of quitting smoking.    (K21.9) Gastroesophageal reflux disease without esophagitis  Patient currently on Prilosec 40 mg orally daily.    The patient is ready to learn, no apparent learning barriers were identified, Diagnosis and treatment plans were explained to the patient. The patient expressed understanding of the content. The patient questions were answered to his satisfaction.    Mor Mccauley MD    Chart documentation with Dragon Voice recognition Software. Although reviewed after completion, some words and grammatical errors may remain.    Per Dr. Mccauley, pt is to be seen by Thoracic surgery.  Order placed.  Message to scheduling to assist with this.    Again, thank you for allowing me to participate in the care of your patient.        Sincerely,        Mor Mccauley MD

## 2017-12-26 NOTE — MR AVS SNAPSHOT
After Visit Summary   12/26/2017    Chilo Oneil    MRN: [id number]           Patient Information     Date Of Birth          1950        Visit Information        Provider Department      12/26/2017 10:00 AM Mor Mccauley MD [facility] ONCOLOGY      Today's Diagnoses     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)    -  1      Care Instructions    We would like you to have a PET scan tomorrow at 10:00, arrival at 9:30. Dr. Mccauley would like to see you back on Friday. When you are in need of a refill, please call your pharmacy and they will send us a request.  Copy of appointments, and after visit summary (AVS) given to patient.  If you have any questions during business hours (M-F 8 AM- 4PM), please call Erica Elliott RN, BSN, OCN Oncology Hematology /Breast Cancer Navigator at [facility] [phone].   For questions after business hours, or on holidays/weekends, please call our after hours Nurse Triage line [phone]. Thank you.            Follow-ups after your visit        Follow-up notes from your care team     Return for Imaging ordered before next appointment.      Your next 10 appointments already scheduled     Dec 27, 2017 10:00 AM CST   (Arrive by 9:45 AM)   PE NPET ONCOLOGY (EYES TO THIGHS) with [id number]   [facility] Pet CT ([facility])    [address] [address]   [phone]           Tell your doctor:   If there is any chance you may be pregnant or if you are breastfeeding.   If you have problems lying in small spaces (claustrophobia). If you do, your doctor may give you medicine to help you relax. If you have diabetes:   Have your exam early in the morning. Your blood glucose will go up as the day goes by.   Your glucose level must be 180 or less at the start of the exam. Please take any medicines you need to ensure this blood glucose level. 24 hours before your scan:  "Don t do any heavy exercise. (No jogging, aerobics or other workouts.) Exercise will make your pictures less accurate. 6 hours before your scan:   Stop all food and liquids (except water).   Do not chew gum or suck on mints.   If you need to take medicine with food, you may take it with a few crackers.  Please call your Imaging Department at your exam site with any questions.            Dec 29, 2017 11:30 AM CST   Return Visit with Mor Mccauley MD   [facility] ([facility])    [facility]  [address]  [facility] [address]   [phone]              Future tests that were ordered for you today     Open Future Orders        Priority Expected Expires Ordered    MR Brain w/o & w Contrast Routine  2/9/2018 12/26/2017    PET Oncology (Eyes to Thighs) Routine 12/27/2017 12/26/2018 12/26/2017            Who to contact     If you have questions or need follow up information about today's clinic visit or your schedule please contact [facility] directly at [phone].  Normal or non-critical lab and imaging results will be communicated to you by MyChart, letter or phone within 4 business days after the clinic has received the results. If you do not hear from us within 7 days, please contact the clinic through [employer] or phone. If you have a critical or abnormal lab result, we will notify you by phone as soon as possible.  Submit refill requests through [employer] or call your pharmacy and they will forward the refill request to us. Please allow 3 business days for your refill to be completed.          Additional Information About Your Visit        [employer] Information     [employer] lets you send messages to your doctor, view your test results, renew your prescriptions, schedule appointments and more. To sign up, go to [email or web address] . Click on \"Log in\" on the left side of the screen, which will take you to the Welcome page. Then click on \"Sign up " "Now\" on the right side of the page.     You will be asked to enter the access code listed below, as well as some personal information. Please follow the directions to create your username and password.     Your access code is: [email or web address]  Expires: 2018 12:18 PM     Your access code will  in 90 days. If you need help or a new code, please call your [facility] clinic or [phone].        Care EveryWhere ID     This is your Care EveryWhere ID. This could be used by other organizations to access your [facility] medical records  [id number]        Your Vitals Were     Pulse Temperature Respirations Height Pulse Oximetry BMI (Body Mass Index)    59 98.3  F (36.8  C) (Tympanic) 16 1.854 m (6' 1\") 100% 25.17 kg/m2       Blood Pressure from Last 3 Encounters:   17 194/71   17 126/50   17 134/64    Weight from Last 3 Encounters:   17 86.5 kg (190 lb 12.8 oz)   17 87.1 kg (192 lb)   17 87.1 kg (192 lb)               Primary Care Provider Office Phone # Fax #    Belkys McgeeDO [phone] [address] [facility] [address]        Equal Access to Services     [place] : Hadii krysta jrodan hadasho Soearl, waaxda luqadaha, qaybta kaalmada alma, celina hewitt . So [facility] [phone].    ATENCIÓN: Si habla español, tiene a galaviz disposición servicios gratuitos de asistencia lingüística. Yecenia al [phone].    We comply with applicable federal civil rights laws and [place] laws. We do not discriminate on the basis of race, color, national origin, age, disability, sex, sexual orientation, or gender identity.            Thank you!     Thank you for choosing [facility]  for your care. Our goal is always to provide you with excellent care. Hearing back from our patients is one way we can continue to improve our services. Please take a few minutes to complete the written survey that you may receive in the mail after " your visit with us. Thank you!             Your Updated Medication List - Protect others around you: Learn how to safely use, store and throw away your medicines at www.disposemymeds.org.          This list is accurate as of: 12/26/17 10:27 AM.  Always use your most recent med list.                   Brand Name Dispense Instructions for use Diagnosis    aspirin 325 MG tablet     90 tablet    Take 1 tablet (325 mg) by mouth daily    Pain of left lower leg       hydrochlorothiazide 25 MG tablet    HYDRODIURIL    90 tablet    Take 1 tablet (25 mg) by mouth daily    Benign essential hypertension       hydrOXYzine 25 MG capsule    VISTARIL    120 capsule    Take 1 capsule (25 mg) by mouth nightly as needed for anxiety (May repeat  another dose at night)    Anxiety       indomethacin 25 MG capsule    INDOCIN    42 capsule    Take 1 capsule (25 mg) by mouth 2 times daily (with meals)    Chest wall pain, Rib pain on left side       losartan 50 MG tablet    COZAAR    90 tablet    Take 1 tablet (50 mg) by mouth daily    Benign essential hypertension       metoprolol 50 MG tablet    LOPRESSOR    60 tablet    Take 1 tablet (50 mg) by mouth 2 times daily    Benign essential hypertension       omeprazole 40 MG capsule    priLOSEC

## 2017-12-26 NOTE — PATIENT INSTRUCTIONS
We would like you to have a PET scan tomorrow at 10:00, arrival at 9:30. Dr. Mccauley would like to see you back on Friday. When you are in need of a refill, please call your pharmacy and they will send us a request.  Copy of appointments, and after visit summary (AVS) given to patient.  If you have any questions during business hours (M-F 8 AM- 4PM), please call Erica Elliott RN, BSN, OCN Oncology Hematology /Breast Cancer Navigator at [facility] [phone].   For questions after business hours, or on holidays/weekends, please call our after hours Nurse Triage line [phone]. Thank you.

## 2017-12-29 NOTE — PROGRESS NOTES
Per Dr. Mccauley, pt is to be seen by Thoracic surgery.  Order placed.  Message to scheduling to assist with this.

## 2017-12-29 NOTE — PROGRESS NOTES
DATE OF VISIT: Dec 26, 2017    REASON FOR REFERRAL: Management of lung mass.      CHIEF COMPLAINT:   Chief Complaint   Patient presents with     Oncology Clinic Visit     New patient, Lung CA.        HISTORY OF PRESENT ILLNESS:  Patient started with L side shoulder and has been traveling  to L/side about  to the whole L/side upper back and L side of chest for about 3 week he has had the pain has been taking aleve.  Subsequently went on to have a CT scan done.  Which showed 1.6 cm nodular infiltrate in the left upper lobe.  The lesion appear spiculated and worrisome for lung cancer.  There is a second 0.7 cm indeterminate nodule in the lingular portion of the left lung.  Subsequently the patient went on to have CT-guided biopsy.  The pathology came back showing moderately differentiated adenocarcinoma.  The patient is here today to discuss management of lung cancer.  He has a long history of smoking.    REVIEW OF SYSTEMS:   Constitutional: Negative for fever, chills, and night sweats.  Skin: negative.  Eyes: negative.  Ears/Nose/Throat: negative.  Respiratory: No shortness of breath, dyspnea on exertion, cough, or hemoptysis.  Cardiovascular: negative.  Gastrointestinal: negative.  Genitourinary: negative.  Musculoskeletal: negative.  Neurologic: negative.  Psychiatric: negative.  Hematologic/Lymphatic/Immunologic: negative.  Endocrine: negative.    PAST MEDICAL HISTORY:   Past Medical History:   Diagnosis Date     Alcohol abuse     Status post treatment       PAST SURGICAL HISTORY:   Past Surgical History:   Procedure Laterality Date     FRACTURE TX, ANKLE RT/LT  1975    Fracture TX Ankle, LT     OPEN REDUCTION INTERNAL FIXATION HIP NAILING  9/20/2013    Procedure: OPEN REDUCTION INTERNAL FIXATION HIP NAILING;  Left Hip Open Reduction Internal Fixation ;  Surgeon: Rosas Dennis MD;  Location: [facility]       ALLERGIES:   Allergies as of 12/26/2017 - Alcides as Reviewed 12/26/2017   Allergen Reaction Noted      "Cipro [ciprofloxacin] Swelling 06/22/2009       MEDICATIONS:   Current Outpatient Prescriptions   Medication Sig Dispense Refill     omeprazole (PRILOSEC) 40 MG capsule        hydrOXYzine (VISTARIL) 25 MG capsule Take 1 capsule (25 mg) by mouth nightly as needed for anxiety (May repeat  another dose at night) 120 capsule 0     losartan (COZAAR) 50 MG tablet Take 1 tablet (50 mg) by mouth daily 90 tablet 3     metoprolol (LOPRESSOR) 50 MG tablet Take 1 tablet (50 mg) by mouth 2 times daily 60 tablet 1     hydrochlorothiazide (HYDRODIURIL) 25 MG tablet Take 1 tablet (25 mg) by mouth daily 90 tablet 3     indomethacin (INDOCIN) 25 MG capsule Take 1 capsule (25 mg) by mouth 2 times daily (with meals) 42 capsule 1     aspirin 325 MG tablet Take 1 tablet (325 mg) by mouth daily 90 tablet 3        FAMILY HISTORY:   Family History   Problem Relation Age of Onset     DIABETES Brother      type 2     DIABETES Father       Family history was reviewed with the patient.    SOCIAL HISTORY:   Social History     Social History     Marital status: Single     Spouse name: N/A     Number of children: N/A     Years of education: N/A     Social History Main Topics     Smoking status: Current Every Day Smoker     Packs/day: 0.30     Years: 47.00     Types: Cigarettes     Start date: 12/26/1967     Smokeless tobacco: Never Used      Comment: 3 cigs daily     Alcohol use Yes     Drug use: No     Sexual activity: Not Asked     Other Topics Concern     Parent/Sibling W/ Cabg, Mi Or Angioplasty Before 65f 55m? No     Social History Narrative       PHYSICAL EXAMINATION:   /71 (BP Location: Right arm, Patient Position: Sitting, Cuff Size: Adult Large)  Pulse 59  Temp 98.3  F (36.8  C) (Tympanic)  Resp 16  Ht 1.854 m (6' 1\")  Wt 86.5 kg (190 lb 12.8 oz)  SpO2 100%  BMI 25.17 kg/m2  Wt Readings from Last 10 Encounters:   12/26/17 86.5 kg (190 lb 12.8 oz)   12/20/17 87.1 kg (192 lb)   12/06/17 87.1 kg (192 lb)   11/29/17 84.8 kg (187 " lb)   08/19/16 81.2 kg (179 lb)   08/03/16 82.6 kg (182 lb)   07/19/16 82.1 kg (181 lb)   07/13/16 81.2 kg (179 lb)   06/27/16 83 kg (183 lb)   06/21/16 85.3 kg (188 lb)      ECOG performance status: 0  GENERAL APPEARANCE: Healthy, alert and in no acute distress.  HEENT: Sclerae anicteric. PERRLA. Oropharynx without ulcers, lesions, or thrush.  NECK: Supple. No asymmetry or masses.  LYMPHATICS: No palpable cervical, supraclavicular, axillary, or inguinal lymphadenopathy.  RESP: Lungs clear to auscultation bilaterally without rales, rhonchi or wheezes.  CARDIOVASCULAR: Regular rate and rhythm. Normal S1, S2; no S3 or S4. No murmur, gallop, or rub.  ABDOMEN: Soft, nontender. Bowel sounds normal. No palpable organomegaly or masses.  MUSCULOSKELETAL: Extremities without gross deformities noted. No edema of bilateral lower extremities.  SKIN: No suspicious lesions or rashes.  NEURO: Alert and oriented x 3. Cranial nerves II-XII grossly intact.  PSYCHIATRIC: Mentation and affect appear normal.    LABORATORY RESULTS:  Hospital Outpatient Visit on 12/18/2017   Component Date Value Ref Range Status     Copath Report 12/18/2017    Final                    Value:Patient Name: SERENE AHN  MR#: [id number]  Specimen #: [id number]  Collected: 12/18/2017  Received: 12/18/2017  Reported: 12/22/2017 05:15  Ordering Phy(s): VERONICA HINTON    For improved result formatting, select 'View Enhanced Report Format'  under Linked Documents section.    SPECIMEN(S):  Lung biopsy, left upper lobe    FINAL DIAGNOSIS:  Lung biopsy, left upper lobe:  - Moderate rejection adenocarcinoma consistent with pulmonary primary  malignancy.    COMMENT:  This case was seen in intradepartmental consultation. The specimen will  be forwarded for molecular studies.    Electronically signed out by:    CLAUDE York M.D.    CLINICAL HISTORY:  67-year-old male with left lung mass.    GROSS:  A single specimen container with formalin is received labeled  "with the  patient's name, date of birth, and medical record number.  Information  on the requisition slip, container, and associated labels is confirmed.    The specimen is designated \"left lung mass\" consisting of two t                          an-brown  and focally black pigmented needle biopsies which are 8 mm each. The  entire specimen is filtered over a tissue wrap with one portion of the  specimen been removed and placed in a separate wrap, the entire specimen  is submitted in two cassettes. (Dictated by: BING York MD  12/18/2017 05:14 PM)    MICROSCOPIC:  The sections show portions of lung parenchyma with a invasive moderately  differentiated adenocarcinoma with a desmoplastic stromal reaction.  The  primary pulmonary nature of the malignancy is supported with positive  reactions to immunohistochemical stains TTF-1, p40, Napsin-A and  cytokeratin 7. (Dictated by: BING York MD 12/21/2017)    CPT Codes:  A: 51301-NO7, 76622-AOI, 21964-EFK, 16333-AJY    TESTING LAB LOCATION:  [address] [address] [phone]    COLLECTION SITE:  Client: [facility]  Location: [facility] (K)         IMAGING RESULTS:  Recent Results (from the past 744 hour(s))   XR Ribs & Chest Left G/E 3 Views    Narrative    XR RIBS & CHEST LT 3VW, XR CHEST 1 VW 11/29/2017 12:05 PM    HISTORY: Left-sided pain.    COMPARISON: 9/19/2015.      Impression    IMPRESSION: Two views of the chest show no irregular vague 1.6 cm  nodule projecting at the lateral margin of the left hilum. Chest CT is  recommended in further assessment. Two views the left ribs show no  fracture or other abnormality.    VERONICA HINTON MD   XR Chest 1 View    Narrative    XR RIBS & CHEST LT 3VW, XR CHEST 1 VW 11/29/2017 12:05 PM    HISTORY: Left-sided pain.    COMPARISON: 9/19/2015.      Impression    IMPRESSION: Two views of the chest show no irregular vague 1.6 " cm  nodule projecting at the lateral margin of the left hilum. Chest CT is  recommended in further assessment. Two views the left ribs show no  fracture or other abnormality.    VERONICA HINTON MD   CT Chest w Contrast    Narrative    CT CHEST W CONTRAST 12/4/2017 9:34 AM    HISTORY:  Peuritic chest pain ongoing for three weeks. Chest xray  showed a lung nodule recommend CT chest for further evaluation; Rib  pain on left side; Chest wall pain     TECHNIQUE: Volumetric acquisition of CT images from the lung apices  through the upper abdomen. Radiation dose for this scan was reduced  using automated exposure control, adjustment of the mA and/or KV  according to patient size, or iterative reconstruction technique.     COMPARISON: None.      Impression    IMPRESSION: 1.6 cm nodular infiltrate in the left upper lobe seen on  image 24 corresponding with the focal density seen on the recent chest  x-ray. This lesion appears a bit spiculated and is worrisome for  primary lung neoplasm.. There is a second 0.7 cm indeterminate nodule  in the lingular portion of the left lung seen on image 28. There are  couple punctate less than 2 mm nodules in the right lobe seen on image  24 and image 30. The findings are indeterminant neoplasm and or  metastatic disease cannot be excluded.    JAIME RAMIREZ MD   XR Chest 1 View    Narrative    CHEST ONE VIEW  12/18/2017 12:39 PM     HISTORY:  Chest biopsy.    COMPARISON: 11/29/2017      Impression    IMPRESSION: No pneumothorax or pleural effusion. Pulmonary nodule  projected over the mid left lung. Heart size normal.    GIL TONY MD   CT Lung Mediastinum Biopsy    Narrative    CT LUNG MEDIASTINUM BIOPSY 12/18/2017 12:45 PM    HISTORY: Left upper lobe lung lesion.    COMPARISON: 12/4/2017.    PROCEDURE: Risks and benefits of a CT guided core biopsy of the left  upper lobe lung lesion are discussed with the patient. Under CT  guidance, aseptic conditions, and utilizing 10 mL 1%  lidocaine as  local anesthetic, a 19 gauge coaxial needle is placed. Through this,  two 20 gauge core biopsy samples are obtained and placed in formalin  for pathologic analysis. The patient tolerated the procedure well.  There is no significant blood loss.    Radiation dose for this procedure is reduced using automated exposure  control of mA and/or kV according to patient size, or iterative  reconstruction technique.    CONSCIOUS SEDATION: The patient's status is monitored before, during,  and after the procedure. The patient is given 2 mg of Versed and 100  mcg of fentanyl intravenously during the procedure. Intra procedure  time is 22 minutes. There were no untoward effects demonstrated by  these administered medications.      Impression    IMPRESSION: Technically uneventful CT guided needle core biopsy, as  described above. Pathologic results are pending.    VERONICA HINTON MD   XR Chest 1 View    Narrative    CHEST ONE VIEW  12/18/2017 2:31 PM     HISTORY:  Chest biopsy.     COMPARISON: 12/18/2017.      Impression    IMPRESSION: Minimal apical pleural air on the left. No pneumothorax on  the right. No pleural effusion. Heart size normal.    GIL TONY MD       ASSESSMENT AND PLAN:  (C34.12) 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)  (primary encounter diagnosis)  I reviewed with patient the images of CT scan.  I gave him an overview about the natural history of adenocarcinoma of the lung.  We talked about staging, biology, management and prognosis.  We talked about different modalities and treatment of adenocarcinoma of the lung including surgical resection, radiation therapy and systemic therapy.  I would recommend to proceed with staging workup with a PET scan and MRI of the brain.  Patient would be referred to be seen by thoracic surgery for possible surgical resection.  I will see the patient with a workup was concluded to discuss further management plan.  We      Plan: PET Oncology (Eyes to  Thighs), MR Brain w/o & w Contrast    (I10) Benign essential hypertension  Patient currently on Cozaar 50 mg orally daily.  Is on Lopressor 50 mg orally daily.  Hydrochlorothiazide 25 mg orally daily.    (F17.200) Tobacco use disorder  I strongly emphasized the importance of quitting smoking.    (K21.9) Gastroesophageal reflux disease without esophagitis  Patient currently on Prilosec 40 mg orally daily.    The patient is ready to learn, no apparent learning barriers were identified, Diagnosis and treatment plans were explained to the patient. The patient expressed understanding of the content. The patient questions were answered to his satisfaction.    Mor Mccauley MD    Chart documentation with Dragon Voice recognition Software. Although reviewed after completion, some words and grammatical errors may remain.

## 2018-01-01 ENCOUNTER — TRANSFERRED RECORDS (OUTPATIENT)
Dept: HEALTH INFORMATION MANAGEMENT | Facility: CLINIC | Age: 68
End: 2018-01-01

## 2018-01-01 ENCOUNTER — RECORDS - HEALTHEAST (OUTPATIENT)
Dept: LAB | Facility: CLINIC | Age: 68
End: 2018-01-01

## 2018-01-01 ENCOUNTER — ANTICOAGULATION THERAPY VISIT (OUTPATIENT)
Dept: ANTICOAGULATION | Facility: CLINIC | Age: 68
End: 2018-01-01

## 2018-01-01 ENCOUNTER — NURSING HOME VISIT (OUTPATIENT)
Dept: GERIATRICS | Facility: CLINIC | Age: 68
End: 2018-01-01
Payer: MEDICARE

## 2018-01-01 ENCOUNTER — NURSING HOME VISIT (OUTPATIENT)
Dept: GERIATRICS | Facility: CLINIC | Age: 68
End: 2018-01-01
Payer: COMMERCIAL

## 2018-01-01 ENCOUNTER — PATIENT OUTREACH (OUTPATIENT)
Dept: CARE COORDINATION | Facility: CLINIC | Age: 68
End: 2018-01-01

## 2018-01-01 ENCOUNTER — OFFICE VISIT (OUTPATIENT)
Dept: FAMILY MEDICINE | Facility: CLINIC | Age: 68
End: 2018-01-01
Payer: MEDICARE

## 2018-01-01 ENCOUNTER — HOSPITAL ENCOUNTER (EMERGENCY)
Facility: CLINIC | Age: 68
Discharge: HOME | End: 2018-07-17
Attending: FAMILY MEDICINE | Admitting: FAMILY MEDICINE
Payer: MEDICARE

## 2018-01-01 ENCOUNTER — HOSPITAL ENCOUNTER (OUTPATIENT)
Dept: CT IMAGING | Facility: CLINIC | Age: 68
Discharge: HOME | End: 2018-12-28
Attending: INTERNAL MEDICINE | Admitting: INTERNAL MEDICINE
Payer: COMMERCIAL

## 2018-01-01 ENCOUNTER — HOSPITAL ENCOUNTER (OUTPATIENT)
Facility: CLINIC | Age: 68
Discharge: HOME | End: 2018-07-18
Attending: INTERNAL MEDICINE | Admitting: INTERNAL MEDICINE
Payer: MEDICARE

## 2018-01-01 ENCOUNTER — INFUSION THERAPY VISIT (OUTPATIENT)
Dept: INFUSION THERAPY | Facility: CLINIC | Age: 68
End: 2018-01-01
Attending: INTERNAL MEDICINE
Payer: MEDICARE

## 2018-01-01 ENCOUNTER — ONCOLOGY VISIT (OUTPATIENT)
Dept: ONCOLOGY | Facility: CLINIC | Age: 68
End: 2018-01-01
Attending: INTERNAL MEDICINE
Payer: MEDICARE

## 2018-01-01 ENCOUNTER — INFUSION THERAPY VISIT (OUTPATIENT)
Dept: INFUSION THERAPY | Facility: CLINIC | Age: 68
End: 2018-01-01
Attending: FAMILY MEDICINE
Payer: MEDICARE

## 2018-01-01 ENCOUNTER — RADIANT APPOINTMENT (OUTPATIENT)
Dept: GENERAL RADIOLOGY | Facility: CLINIC | Age: 68
End: 2018-01-01
Attending: PEDIATRICS
Payer: COMMERCIAL

## 2018-01-01 ENCOUNTER — TELEPHONE (OUTPATIENT)
Dept: ORTHOPEDICS | Facility: CLINIC | Age: 68
End: 2018-01-01

## 2018-01-01 ENCOUNTER — APPOINTMENT (OUTPATIENT)
Dept: CT IMAGING | Facility: CLINIC | Age: 68
DRG: 175 | End: 2018-01-01
Attending: FAMILY MEDICINE
Payer: MEDICARE

## 2018-01-01 ENCOUNTER — MEDICAL CORRESPONDENCE (OUTPATIENT)
Dept: HEALTH INFORMATION MANAGEMENT | Facility: CLINIC | Age: 68
End: 2018-01-01

## 2018-01-01 ENCOUNTER — HOSPITAL ENCOUNTER (EMERGENCY)
Facility: CLINIC | Age: 68
Discharge: HOME | End: 2018-06-19
Attending: NURSE PRACTITIONER | Admitting: NURSE PRACTITIONER
Payer: MEDICARE

## 2018-01-01 ENCOUNTER — HOSPITAL ENCOUNTER (OUTPATIENT)
Facility: CLINIC | Age: 68
Discharge: HOME | End: 2018-06-21
Attending: INTERNAL MEDICINE | Admitting: INTERNAL MEDICINE
Payer: MEDICARE

## 2018-01-01 ENCOUNTER — TELEPHONE (OUTPATIENT)
Dept: ONCOLOGY | Facility: CLINIC | Age: 68
End: 2018-01-01

## 2018-01-01 ENCOUNTER — HOSPITAL ENCOUNTER (OUTPATIENT)
Dept: CT IMAGING | Facility: CLINIC | Age: 68
Discharge: HOME | End: 2018-10-05
Attending: INTERNAL MEDICINE | Admitting: INTERNAL MEDICINE
Payer: MEDICARE

## 2018-01-01 ENCOUNTER — HOSPITAL ENCOUNTER (OUTPATIENT)
Dept: CT IMAGING | Facility: CLINIC | Age: 68
Discharge: HOME | End: 2018-09-26
Attending: INTERNAL MEDICINE | Admitting: INTERNAL MEDICINE
Payer: MEDICARE

## 2018-01-01 ENCOUNTER — HOSPITAL ENCOUNTER (OUTPATIENT)
Facility: CLINIC | Age: 68
Discharge: HOME | End: 2018-05-31
Attending: INTERNAL MEDICINE | Admitting: INTERNAL MEDICINE
Payer: MEDICARE

## 2018-01-01 ENCOUNTER — OFFICE VISIT (OUTPATIENT)
Dept: PALLIATIVE MEDICINE | Facility: CLINIC | Age: 68
End: 2018-01-01
Payer: MEDICARE

## 2018-01-01 ENCOUNTER — TELEPHONE (OUTPATIENT)
Dept: GERIATRICS | Facility: CLINIC | Age: 68
End: 2018-01-01

## 2018-01-01 ENCOUNTER — TELEPHONE (OUTPATIENT)
Dept: FAMILY MEDICINE | Facility: CLINIC | Age: 68
End: 2018-01-01

## 2018-01-01 ENCOUNTER — HOSPITAL ENCOUNTER (EMERGENCY)
Facility: CLINIC | Age: 68
Discharge: HOME | End: 2018-07-03
Attending: FAMILY MEDICINE | Admitting: FAMILY MEDICINE
Payer: MEDICARE

## 2018-01-01 ENCOUNTER — INFUSION THERAPY VISIT (OUTPATIENT)
Dept: INFUSION THERAPY | Facility: CLINIC | Age: 68
End: 2018-01-01
Attending: INTERNAL MEDICINE
Payer: COMMERCIAL

## 2018-01-01 ENCOUNTER — TELEPHONE (OUTPATIENT)
Dept: NUTRITION | Facility: CLINIC | Age: 68
End: 2018-01-01

## 2018-01-01 ENCOUNTER — APPOINTMENT (OUTPATIENT)
Dept: GENERAL RADIOLOGY | Facility: CLINIC | Age: 68
End: 2018-01-01
Attending: FAMILY MEDICINE
Payer: MEDICARE

## 2018-01-01 ENCOUNTER — OFFICE VISIT (OUTPATIENT)
Dept: ORTHOPEDICS | Facility: CLINIC | Age: 68
End: 2018-01-01
Payer: MEDICARE

## 2018-01-01 ENCOUNTER — APPOINTMENT (OUTPATIENT)
Dept: OCCUPATIONAL THERAPY | Facility: CLINIC | Age: 68
DRG: 175 | End: 2018-01-01
Payer: MEDICARE

## 2018-01-01 ENCOUNTER — OFFICE VISIT (OUTPATIENT)
Dept: CARDIOLOGY | Facility: CLINIC | Age: 68
End: 2018-01-01
Payer: COMMERCIAL

## 2018-01-01 ENCOUNTER — HOSPITAL ENCOUNTER (OUTPATIENT)
Dept: PET IMAGING | Facility: CLINIC | Age: 68
Discharge: HOME | End: 2018-08-01
Attending: INTERNAL MEDICINE | Admitting: INTERNAL MEDICINE
Payer: MEDICARE

## 2018-01-01 ENCOUNTER — HOSPITAL ENCOUNTER (INPATIENT)
Facility: CLINIC | Age: 68
LOS: 4 days | Discharge: HOME | DRG: 175 | End: 2018-06-09
Attending: FAMILY MEDICINE | Admitting: FAMILY MEDICINE
Payer: MEDICARE

## 2018-01-01 ENCOUNTER — HOSPITAL ENCOUNTER (EMERGENCY)
Facility: CLINIC | Age: 68
Discharge: HOME | End: 2018-06-26
Attending: FAMILY MEDICINE | Admitting: FAMILY MEDICINE
Payer: MEDICARE

## 2018-01-01 VITALS
SYSTOLIC BLOOD PRESSURE: 147 MMHG | WEIGHT: 147.2 LBS | RESPIRATION RATE: 18 BRPM | HEART RATE: 75 BPM | BODY MASS INDEX: 18.65 KG/M2 | TEMPERATURE: 98.6 F | OXYGEN SATURATION: 94 % | DIASTOLIC BLOOD PRESSURE: 66 MMHG

## 2018-01-01 VITALS
SYSTOLIC BLOOD PRESSURE: 138 MMHG | HEIGHT: 72 IN | WEIGHT: 145 LBS | BODY MASS INDEX: 19.64 KG/M2 | DIASTOLIC BLOOD PRESSURE: 88 MMHG

## 2018-01-01 VITALS
HEIGHT: 75 IN | TEMPERATURE: 98.1 F | BODY MASS INDEX: 19.02 KG/M2 | OXYGEN SATURATION: 96 % | RESPIRATION RATE: 18 BRPM | DIASTOLIC BLOOD PRESSURE: 70 MMHG | WEIGHT: 153 LBS | HEART RATE: 90 BPM | SYSTOLIC BLOOD PRESSURE: 158 MMHG

## 2018-01-01 VITALS — DIASTOLIC BLOOD PRESSURE: 93 MMHG | HEART RATE: 72 BPM | SYSTOLIC BLOOD PRESSURE: 197 MMHG

## 2018-01-01 VITALS
DIASTOLIC BLOOD PRESSURE: 61 MMHG | OXYGEN SATURATION: 93 % | RESPIRATION RATE: 18 BRPM | HEART RATE: 79 BPM | TEMPERATURE: 97.6 F | WEIGHT: 155.7 LBS | SYSTOLIC BLOOD PRESSURE: 163 MMHG | BODY MASS INDEX: 19.99 KG/M2

## 2018-01-01 VITALS
RESPIRATION RATE: 18 BRPM | HEART RATE: 50 BPM | TEMPERATURE: 97.8 F | OXYGEN SATURATION: 96 % | DIASTOLIC BLOOD PRESSURE: 70 MMHG | SYSTOLIC BLOOD PRESSURE: 162 MMHG

## 2018-01-01 VITALS
TEMPERATURE: 98.2 F | HEART RATE: 88 BPM | WEIGHT: 147.4 LBS | SYSTOLIC BLOOD PRESSURE: 142 MMHG | DIASTOLIC BLOOD PRESSURE: 68 MMHG | BODY MASS INDEX: 19.99 KG/M2 | RESPIRATION RATE: 18 BRPM | OXYGEN SATURATION: 96 %

## 2018-01-01 VITALS
WEIGHT: 153.6 LBS | OXYGEN SATURATION: 97 % | RESPIRATION RATE: 18 BRPM | DIASTOLIC BLOOD PRESSURE: 76 MMHG | SYSTOLIC BLOOD PRESSURE: 134 MMHG | BODY MASS INDEX: 19.46 KG/M2 | HEART RATE: 87 BPM | TEMPERATURE: 98.2 F

## 2018-01-01 VITALS
DIASTOLIC BLOOD PRESSURE: 74 MMHG | HEART RATE: 80 BPM | SYSTOLIC BLOOD PRESSURE: 133 MMHG | RESPIRATION RATE: 18 BRPM | BODY MASS INDEX: 18.75 KG/M2 | TEMPERATURE: 98.2 F | OXYGEN SATURATION: 96 % | WEIGHT: 148 LBS

## 2018-01-01 VITALS
DIASTOLIC BLOOD PRESSURE: 68 MMHG | WEIGHT: 154.2 LBS | OXYGEN SATURATION: 96 % | BODY MASS INDEX: 20.91 KG/M2 | SYSTOLIC BLOOD PRESSURE: 142 MMHG | RESPIRATION RATE: 18 BRPM | TEMPERATURE: 98.2 F | HEART RATE: 88 BPM

## 2018-01-01 VITALS
SYSTOLIC BLOOD PRESSURE: 130 MMHG | WEIGHT: 150.4 LBS | HEART RATE: 77 BPM | BODY MASS INDEX: 19.05 KG/M2 | DIASTOLIC BLOOD PRESSURE: 78 MMHG | OXYGEN SATURATION: 98 % | TEMPERATURE: 97.7 F | RESPIRATION RATE: 18 BRPM

## 2018-01-01 VITALS
BODY MASS INDEX: 18.12 KG/M2 | RESPIRATION RATE: 11 BRPM | OXYGEN SATURATION: 93 % | HEART RATE: 94 BPM | SYSTOLIC BLOOD PRESSURE: 159 MMHG | WEIGHT: 145 LBS | TEMPERATURE: 98.4 F | DIASTOLIC BLOOD PRESSURE: 95 MMHG

## 2018-01-01 VITALS
DIASTOLIC BLOOD PRESSURE: 77 MMHG | WEIGHT: 156.4 LBS | SYSTOLIC BLOOD PRESSURE: 137 MMHG | BODY MASS INDEX: 19.81 KG/M2 | OXYGEN SATURATION: 94 % | HEART RATE: 71 BPM | RESPIRATION RATE: 18 BRPM | TEMPERATURE: 97.7 F

## 2018-01-01 VITALS
WEIGHT: 147.4 LBS | BODY MASS INDEX: 19.99 KG/M2 | RESPIRATION RATE: 18 BRPM | OXYGEN SATURATION: 96 % | TEMPERATURE: 98.2 F | SYSTOLIC BLOOD PRESSURE: 142 MMHG | HEART RATE: 88 BPM | DIASTOLIC BLOOD PRESSURE: 68 MMHG

## 2018-01-01 VITALS
HEART RATE: 78 BPM | HEART RATE: 75 BPM | BODY MASS INDEX: 19.15 KG/M2 | OXYGEN SATURATION: 91 % | TEMPERATURE: 98.5 F | HEIGHT: 75 IN | SYSTOLIC BLOOD PRESSURE: 147 MMHG | RESPIRATION RATE: 18 BRPM | BODY MASS INDEX: 18.65 KG/M2 | RESPIRATION RATE: 16 BRPM | WEIGHT: 154 LBS | OXYGEN SATURATION: 94 % | TEMPERATURE: 97.7 F | DIASTOLIC BLOOD PRESSURE: 66 MMHG | DIASTOLIC BLOOD PRESSURE: 60 MMHG | SYSTOLIC BLOOD PRESSURE: 112 MMHG | WEIGHT: 147.2 LBS

## 2018-01-01 VITALS
DIASTOLIC BLOOD PRESSURE: 76 MMHG | TEMPERATURE: 97.3 F | WEIGHT: 136 LBS | HEART RATE: 74 BPM | BODY MASS INDEX: 17.46 KG/M2 | OXYGEN SATURATION: 98 % | SYSTOLIC BLOOD PRESSURE: 149 MMHG | RESPIRATION RATE: 18 BRPM

## 2018-01-01 VITALS
TEMPERATURE: 98.2 F | DIASTOLIC BLOOD PRESSURE: 79 MMHG | OXYGEN SATURATION: 99 % | HEIGHT: 75 IN | RESPIRATION RATE: 17 BRPM | BODY MASS INDEX: 18.65 KG/M2 | WEIGHT: 150 LBS | SYSTOLIC BLOOD PRESSURE: 152 MMHG

## 2018-01-01 VITALS
OXYGEN SATURATION: 99 % | TEMPERATURE: 98.6 F | DIASTOLIC BLOOD PRESSURE: 70 MMHG | HEART RATE: 75 BPM | SYSTOLIC BLOOD PRESSURE: 136 MMHG | RESPIRATION RATE: 14 BRPM

## 2018-01-01 VITALS
DIASTOLIC BLOOD PRESSURE: 68 MMHG | HEART RATE: 77 BPM | TEMPERATURE: 97.7 F | SYSTOLIC BLOOD PRESSURE: 132 MMHG | OXYGEN SATURATION: 98 % | BODY MASS INDEX: 17.2 KG/M2 | HEIGHT: 74 IN | WEIGHT: 134 LBS

## 2018-01-01 VITALS
TEMPERATURE: 97.6 F | SYSTOLIC BLOOD PRESSURE: 166 MMHG | RESPIRATION RATE: 20 BRPM | HEIGHT: 75 IN | WEIGHT: 142.2 LBS | BODY MASS INDEX: 17.68 KG/M2 | DIASTOLIC BLOOD PRESSURE: 49 MMHG | OXYGEN SATURATION: 96 % | HEART RATE: 87 BPM

## 2018-01-01 VITALS — DIASTOLIC BLOOD PRESSURE: 67 MMHG | SYSTOLIC BLOOD PRESSURE: 130 MMHG | HEART RATE: 80 BPM

## 2018-01-01 VITALS
SYSTOLIC BLOOD PRESSURE: 140 MMHG | WEIGHT: 147.4 LBS | HEART RATE: 68 BPM | OXYGEN SATURATION: 96 % | DIASTOLIC BLOOD PRESSURE: 77 MMHG | BODY MASS INDEX: 18.67 KG/M2

## 2018-01-01 VITALS
BODY MASS INDEX: 17.97 KG/M2 | HEART RATE: 88 BPM | RESPIRATION RATE: 18 BRPM | WEIGHT: 140 LBS | OXYGEN SATURATION: 96 % | SYSTOLIC BLOOD PRESSURE: 142 MMHG | DIASTOLIC BLOOD PRESSURE: 68 MMHG | TEMPERATURE: 98.2 F

## 2018-01-01 VITALS
WEIGHT: 151.2 LBS | OXYGEN SATURATION: 97 % | TEMPERATURE: 98.2 F | BODY MASS INDEX: 19.15 KG/M2 | RESPIRATION RATE: 18 BRPM | DIASTOLIC BLOOD PRESSURE: 76 MMHG | HEART RATE: 87 BPM | SYSTOLIC BLOOD PRESSURE: 134 MMHG

## 2018-01-01 VITALS
WEIGHT: 155.8 LBS | TEMPERATURE: 98.8 F | HEART RATE: 90 BPM | HEIGHT: 74 IN | OXYGEN SATURATION: 93 % | SYSTOLIC BLOOD PRESSURE: 119 MMHG | DIASTOLIC BLOOD PRESSURE: 59 MMHG | RESPIRATION RATE: 18 BRPM | BODY MASS INDEX: 19.99 KG/M2

## 2018-01-01 VITALS
BODY MASS INDEX: 16.69 KG/M2 | WEIGHT: 130 LBS | HEART RATE: 83 BPM | TEMPERATURE: 98.2 F | RESPIRATION RATE: 20 BRPM | SYSTOLIC BLOOD PRESSURE: 138 MMHG | DIASTOLIC BLOOD PRESSURE: 70 MMHG | OXYGEN SATURATION: 97 %

## 2018-01-01 VITALS
WEIGHT: 151 LBS | DIASTOLIC BLOOD PRESSURE: 68 MMHG | SYSTOLIC BLOOD PRESSURE: 142 MMHG | RESPIRATION RATE: 18 BRPM | BODY MASS INDEX: 20.48 KG/M2 | TEMPERATURE: 98.2 F | HEART RATE: 88 BPM | OXYGEN SATURATION: 96 %

## 2018-01-01 VITALS
OXYGEN SATURATION: 96 % | SYSTOLIC BLOOD PRESSURE: 142 MMHG | WEIGHT: 147.4 LBS | BODY MASS INDEX: 19.99 KG/M2 | DIASTOLIC BLOOD PRESSURE: 68 MMHG | TEMPERATURE: 98.2 F | RESPIRATION RATE: 18 BRPM | HEART RATE: 88 BPM

## 2018-01-01 VITALS
BODY MASS INDEX: 19.05 KG/M2 | SYSTOLIC BLOOD PRESSURE: 132 MMHG | HEART RATE: 76 BPM | DIASTOLIC BLOOD PRESSURE: 76 MMHG | RESPIRATION RATE: 16 BRPM | WEIGHT: 150.4 LBS | TEMPERATURE: 97.8 F | OXYGEN SATURATION: 97 %

## 2018-01-01 VITALS
HEART RATE: 88 BPM | RESPIRATION RATE: 20 BRPM | BODY MASS INDEX: 16.69 KG/M2 | SYSTOLIC BLOOD PRESSURE: 136 MMHG | TEMPERATURE: 98.2 F | OXYGEN SATURATION: 96 % | DIASTOLIC BLOOD PRESSURE: 78 MMHG | WEIGHT: 130 LBS

## 2018-01-01 VITALS
HEART RATE: 68 BPM | DIASTOLIC BLOOD PRESSURE: 69 MMHG | SYSTOLIC BLOOD PRESSURE: 132 MMHG | RESPIRATION RATE: 18 BRPM | OXYGEN SATURATION: 96 % | WEIGHT: 151.4 LBS | TEMPERATURE: 97.3 F | BODY MASS INDEX: 19.18 KG/M2

## 2018-01-01 VITALS
HEART RATE: 71 BPM | TEMPERATURE: 97.3 F | WEIGHT: 152.4 LBS | RESPIRATION RATE: 18 BRPM | SYSTOLIC BLOOD PRESSURE: 126 MMHG | HEIGHT: 75 IN | BODY MASS INDEX: 18.95 KG/M2 | DIASTOLIC BLOOD PRESSURE: 52 MMHG | OXYGEN SATURATION: 96 %

## 2018-01-01 VITALS
BODY MASS INDEX: 18.95 KG/M2 | HEART RATE: 77 BPM | TEMPERATURE: 97.7 F | WEIGHT: 149.6 LBS | SYSTOLIC BLOOD PRESSURE: 130 MMHG | RESPIRATION RATE: 18 BRPM | OXYGEN SATURATION: 98 % | DIASTOLIC BLOOD PRESSURE: 78 MMHG

## 2018-01-01 VITALS
BODY MASS INDEX: 19.81 KG/M2 | SYSTOLIC BLOOD PRESSURE: 137 MMHG | BODY MASS INDEX: 19.18 KG/M2 | OXYGEN SATURATION: 94 % | WEIGHT: 156.4 LBS | HEART RATE: 71 BPM | HEART RATE: 68 BPM | DIASTOLIC BLOOD PRESSURE: 69 MMHG | SYSTOLIC BLOOD PRESSURE: 132 MMHG | TEMPERATURE: 97.7 F | RESPIRATION RATE: 18 BRPM | WEIGHT: 151.4 LBS | OXYGEN SATURATION: 96 % | TEMPERATURE: 97.3 F | DIASTOLIC BLOOD PRESSURE: 77 MMHG | RESPIRATION RATE: 18 BRPM

## 2018-01-01 VITALS
WEIGHT: 159.6 LBS | SYSTOLIC BLOOD PRESSURE: 163 MMHG | HEART RATE: 79 BPM | RESPIRATION RATE: 18 BRPM | OXYGEN SATURATION: 93 % | BODY MASS INDEX: 21.65 KG/M2 | DIASTOLIC BLOOD PRESSURE: 61 MMHG | TEMPERATURE: 97.6 F

## 2018-01-01 VITALS
DIASTOLIC BLOOD PRESSURE: 77 MMHG | RESPIRATION RATE: 18 BRPM | TEMPERATURE: 97.1 F | WEIGHT: 154.6 LBS | BODY MASS INDEX: 19.58 KG/M2 | HEART RATE: 71 BPM | OXYGEN SATURATION: 94 % | SYSTOLIC BLOOD PRESSURE: 137 MMHG

## 2018-01-01 VITALS
HEART RATE: 84 BPM | WEIGHT: 145.9 LBS | TEMPERATURE: 97.6 F | DIASTOLIC BLOOD PRESSURE: 80 MMHG | HEIGHT: 75 IN | RESPIRATION RATE: 18 BRPM | SYSTOLIC BLOOD PRESSURE: 165 MMHG | BODY MASS INDEX: 18.14 KG/M2 | OXYGEN SATURATION: 96 %

## 2018-01-01 VITALS
DIASTOLIC BLOOD PRESSURE: 87 MMHG | BODY MASS INDEX: 16.9 KG/M2 | SYSTOLIC BLOOD PRESSURE: 154 MMHG | HEART RATE: 72 BPM | RESPIRATION RATE: 18 BRPM | WEIGHT: 135.2 LBS | TEMPERATURE: 98.3 F | OXYGEN SATURATION: 100 %

## 2018-01-01 VITALS
RESPIRATION RATE: 18 BRPM | DIASTOLIC BLOOD PRESSURE: 78 MMHG | WEIGHT: 129.5 LBS | TEMPERATURE: 96.6 F | OXYGEN SATURATION: 97 % | BODY MASS INDEX: 16.63 KG/M2 | SYSTOLIC BLOOD PRESSURE: 145 MMHG | HEART RATE: 94 BPM

## 2018-01-01 VITALS
RESPIRATION RATE: 18 BRPM | HEART RATE: 89 BPM | DIASTOLIC BLOOD PRESSURE: 74 MMHG | OXYGEN SATURATION: 95 % | TEMPERATURE: 98.1 F | BODY MASS INDEX: 24.6 KG/M2 | SYSTOLIC BLOOD PRESSURE: 142 MMHG | WEIGHT: 191.6 LBS

## 2018-01-01 VITALS
SYSTOLIC BLOOD PRESSURE: 137 MMHG | HEART RATE: 71 BPM | OXYGEN SATURATION: 94 % | RESPIRATION RATE: 18 BRPM | TEMPERATURE: 97.7 F | WEIGHT: 153 LBS | BODY MASS INDEX: 19.38 KG/M2 | DIASTOLIC BLOOD PRESSURE: 77 MMHG

## 2018-01-01 VITALS
WEIGHT: 154.6 LBS | TEMPERATURE: 97.7 F | SYSTOLIC BLOOD PRESSURE: 137 MMHG | OXYGEN SATURATION: 94 % | RESPIRATION RATE: 18 BRPM | BODY MASS INDEX: 19.58 KG/M2 | HEART RATE: 71 BPM | DIASTOLIC BLOOD PRESSURE: 77 MMHG

## 2018-01-01 VITALS
BODY MASS INDEX: 18.7 KG/M2 | OXYGEN SATURATION: 99 % | HEART RATE: 86 BPM | DIASTOLIC BLOOD PRESSURE: 74 MMHG | SYSTOLIC BLOOD PRESSURE: 146 MMHG | WEIGHT: 149.6 LBS

## 2018-01-01 VITALS
SYSTOLIC BLOOD PRESSURE: 142 MMHG | HEART RATE: 88 BPM | RESPIRATION RATE: 18 BRPM | TEMPERATURE: 98.2 F | WEIGHT: 147.4 LBS | DIASTOLIC BLOOD PRESSURE: 68 MMHG | OXYGEN SATURATION: 96 % | BODY MASS INDEX: 19.99 KG/M2

## 2018-01-01 VITALS
WEIGHT: 145.6 LBS | RESPIRATION RATE: 18 BRPM | TEMPERATURE: 98.1 F | HEART RATE: 82 BPM | HEIGHT: 74 IN | DIASTOLIC BLOOD PRESSURE: 64 MMHG | BODY MASS INDEX: 18.68 KG/M2 | SYSTOLIC BLOOD PRESSURE: 139 MMHG | OXYGEN SATURATION: 98 %

## 2018-01-01 DIAGNOSIS — C34.92 CARCINOMA, LUNG, LEFT (H): Primary | ICD-10-CM

## 2018-01-01 DIAGNOSIS — C34.92 CARCINOMA, LUNG, LEFT (H): ICD-10-CM

## 2018-01-01 DIAGNOSIS — F41.1 GAD (GENERALIZED ANXIETY DISORDER): ICD-10-CM

## 2018-01-01 DIAGNOSIS — I26.99 OTHER PULMONARY EMBOLISM WITHOUT ACUTE COR PULMONALE, UNSPECIFIED CHRONICITY (H): ICD-10-CM

## 2018-01-01 DIAGNOSIS — Z86.711 HISTORY OF PULMONARY EMBOLISM: Primary | ICD-10-CM

## 2018-01-01 DIAGNOSIS — D63.8 ANEMIA IN OTHER CHRONIC DISEASES CLASSIFIED ELSEWHERE: ICD-10-CM

## 2018-01-01 DIAGNOSIS — F17.200 TOBACCO USE DISORDER: ICD-10-CM

## 2018-01-01 DIAGNOSIS — I26.99 OTHER PULMONARY EMBOLISM WITHOUT ACUTE COR PULMONALE, UNSPECIFIED CHRONICITY (H): Primary | ICD-10-CM

## 2018-01-01 DIAGNOSIS — F10.20 UNCOMPLICATED ALCOHOL DEPENDENCE (H): ICD-10-CM

## 2018-01-01 DIAGNOSIS — I10 ESSENTIAL HYPERTENSION: ICD-10-CM

## 2018-01-01 DIAGNOSIS — E78.5 HYPERLIPIDEMIA LDL GOAL <100: Chronic | ICD-10-CM

## 2018-01-01 DIAGNOSIS — T45.1X5A CHEMOTHERAPY INDUCED NAUSEA AND VOMITING: ICD-10-CM

## 2018-01-01 DIAGNOSIS — G47.00 INSOMNIA, UNSPECIFIED TYPE: ICD-10-CM

## 2018-01-01 DIAGNOSIS — Z79.01 LONG-TERM (CURRENT) USE OF ANTICOAGULANTS: ICD-10-CM

## 2018-01-01 DIAGNOSIS — K21.9 GASTROESOPHAGEAL REFLUX DISEASE WITHOUT ESOPHAGITIS: Chronic | ICD-10-CM

## 2018-01-01 DIAGNOSIS — F32.9 SINGLE CURRENT EPISODE OF MAJOR DEPRESSIVE DISORDER, UNSPECIFIED DEPRESSION EPISODE SEVERITY: ICD-10-CM

## 2018-01-01 DIAGNOSIS — J45.909 MODERATE ASTHMA WITHOUT COMPLICATION, UNSPECIFIED WHETHER PERSISTENT: ICD-10-CM

## 2018-01-01 DIAGNOSIS — G89.29 CHRONIC LEFT SHOULDER PAIN: Primary | ICD-10-CM

## 2018-01-01 DIAGNOSIS — Z79.01 LONG TERM (CURRENT) USE OF ANTICOAGULANTS: ICD-10-CM

## 2018-01-01 DIAGNOSIS — C34.32 MALIGNANT NEOPLASM OF LOWER LOBE OF LEFT LUNG (H): Primary | Chronic | ICD-10-CM

## 2018-01-01 DIAGNOSIS — E83.42 HYPOMAGNESEMIA: ICD-10-CM

## 2018-01-01 DIAGNOSIS — Z79.01 LONG TERM CURRENT USE OF ANTICOAGULANT THERAPY: ICD-10-CM

## 2018-01-01 DIAGNOSIS — M25.512 CHRONIC LEFT SHOULDER PAIN: ICD-10-CM

## 2018-01-01 DIAGNOSIS — I25.10 CORONARY ARTERY DISEASE INVOLVING NATIVE HEART WITHOUT ANGINA PECTORIS, UNSPECIFIED VESSEL OR LESION TYPE: ICD-10-CM

## 2018-01-01 DIAGNOSIS — R11.2 NAUSEA AND VOMITING, INTRACTABILITY OF VOMITING NOT SPECIFIED, UNSPECIFIED VOMITING TYPE: ICD-10-CM

## 2018-01-01 DIAGNOSIS — Z86.711 HISTORY OF PULMONARY EMBOLISM: ICD-10-CM

## 2018-01-01 DIAGNOSIS — M25.512 CHRONIC PERISCAPULAR PAIN ON LEFT SIDE: Primary | ICD-10-CM

## 2018-01-01 DIAGNOSIS — R41.89 COGNITIVE IMPAIRMENT: ICD-10-CM

## 2018-01-01 DIAGNOSIS — M25.512 CHRONIC LEFT SHOULDER PAIN: Primary | ICD-10-CM

## 2018-01-01 DIAGNOSIS — R06.02 SOB (SHORTNESS OF BREATH): Primary | ICD-10-CM

## 2018-01-01 DIAGNOSIS — M25.512 LEFT SHOULDER PAIN: ICD-10-CM

## 2018-01-01 DIAGNOSIS — M25.512 LEFT SHOULDER PAIN, UNSPECIFIED CHRONICITY: ICD-10-CM

## 2018-01-01 DIAGNOSIS — M25.512 LEFT SHOULDER PAIN, UNSPECIFIED CHRONICITY: Primary | ICD-10-CM

## 2018-01-01 DIAGNOSIS — E44.0 MODERATE PROTEIN-CALORIE MALNUTRITION (H): ICD-10-CM

## 2018-01-01 DIAGNOSIS — G89.4 CHRONIC PAIN SYNDROME: ICD-10-CM

## 2018-01-01 DIAGNOSIS — R11.2 CHEMOTHERAPY INDUCED NAUSEA AND VOMITING: ICD-10-CM

## 2018-01-01 DIAGNOSIS — Z51.81 ENCOUNTER FOR THERAPEUTIC DRUG MONITORING: ICD-10-CM

## 2018-01-01 DIAGNOSIS — D61.818 PANCYTOPENIA (H): ICD-10-CM

## 2018-01-01 DIAGNOSIS — F41.9 ANXIETY: ICD-10-CM

## 2018-01-01 DIAGNOSIS — C34.32 MALIGNANT NEOPLASM OF LOWER LOBE OF LEFT LUNG (H): Primary | ICD-10-CM

## 2018-01-01 DIAGNOSIS — C80.1 CARCINOMA (H): ICD-10-CM

## 2018-01-01 DIAGNOSIS — N18.30 CKD (CHRONIC KIDNEY DISEASE) STAGE 3, GFR 30-59 ML/MIN (H): ICD-10-CM

## 2018-01-01 DIAGNOSIS — I10 BENIGN ESSENTIAL HYPERTENSION: Chronic | ICD-10-CM

## 2018-01-01 DIAGNOSIS — M25.552 HIP PAIN, LEFT: ICD-10-CM

## 2018-01-01 DIAGNOSIS — C34.32 MALIGNANT NEOPLASM OF LOWER LOBE OF LEFT LUNG (H): Chronic | ICD-10-CM

## 2018-01-01 DIAGNOSIS — R53.81 DEBILITY: ICD-10-CM

## 2018-01-01 DIAGNOSIS — Z13.1 SCREENING FOR DIABETES MELLITUS: ICD-10-CM

## 2018-01-01 DIAGNOSIS — E63.9 POOR NUTRITION: ICD-10-CM

## 2018-01-01 DIAGNOSIS — D69.6 THROMBOCYTOPENIA (H): ICD-10-CM

## 2018-01-01 DIAGNOSIS — M25.519 SHOULDER PAIN: ICD-10-CM

## 2018-01-01 DIAGNOSIS — I25.119 CORONARY ARTERY DISEASE INVOLVING NATIVE HEART WITH ANGINA PECTORIS, UNSPECIFIED VESSEL OR LESION TYPE (H): Chronic | ICD-10-CM

## 2018-01-01 DIAGNOSIS — I25.2 H/O ACUTE MYOCARDIAL INFARCTION: ICD-10-CM

## 2018-01-01 DIAGNOSIS — G89.29 CHRONIC PERISCAPULAR PAIN ON LEFT SIDE: Primary | ICD-10-CM

## 2018-01-01 DIAGNOSIS — Z09 HOSPITAL DISCHARGE FOLLOW-UP: Primary | ICD-10-CM

## 2018-01-01 DIAGNOSIS — R55 NEAR SYNCOPE: ICD-10-CM

## 2018-01-01 DIAGNOSIS — E87.6 HYPOKALEMIA: ICD-10-CM

## 2018-01-01 DIAGNOSIS — R94.31 ABNORMAL ELECTROCARDIOGRAM: ICD-10-CM

## 2018-01-01 DIAGNOSIS — G89.29 CHRONIC LEFT SHOULDER PAIN: ICD-10-CM

## 2018-01-01 DIAGNOSIS — M54.2 NECK PAIN: ICD-10-CM

## 2018-01-01 DIAGNOSIS — D64.9 ANEMIA, UNSPECIFIED TYPE: ICD-10-CM

## 2018-01-01 DIAGNOSIS — C34.12 MALIGNANT NEOPLASM OF UPPER LOBE OF LEFT LUNG (H): Chronic | ICD-10-CM

## 2018-01-01 DIAGNOSIS — T45.1X5A CHEMOTHERAPY-INDUCED NEUTROPENIA (H): ICD-10-CM

## 2018-01-01 DIAGNOSIS — C34.32 MALIGNANT NEOPLASM OF LOWER LOBE OF LEFT LUNG (H): ICD-10-CM

## 2018-01-01 DIAGNOSIS — E87.1 HYPONATREMIA: ICD-10-CM

## 2018-01-01 DIAGNOSIS — M79.18 MYOFASCIAL PAIN: Primary | ICD-10-CM

## 2018-01-01 DIAGNOSIS — F32.9 CURRENT EPISODE OF MAJOR DEPRESSIVE DISORDER WITHOUT PRIOR EPISODE, UNSPECIFIED DEPRESSION EPISODE SEVERITY: ICD-10-CM

## 2018-01-01 DIAGNOSIS — Z13.29 SCREENING FOR THYROID DISORDER: ICD-10-CM

## 2018-01-01 DIAGNOSIS — R29.6 FALLS FREQUENTLY: ICD-10-CM

## 2018-01-01 DIAGNOSIS — M62.838 MUSCLE SPASM: ICD-10-CM

## 2018-01-01 DIAGNOSIS — F10.10 ALCOHOL ABUSE, DAILY USE: Chronic | ICD-10-CM

## 2018-01-01 DIAGNOSIS — M54.2 NECK PAIN, ACUTE: ICD-10-CM

## 2018-01-01 DIAGNOSIS — I26.99 OTHER ACUTE PULMONARY EMBOLISM WITHOUT ACUTE COR PULMONALE (H): ICD-10-CM

## 2018-01-01 DIAGNOSIS — R07.89 ATYPICAL CHEST PAIN: Primary | ICD-10-CM

## 2018-01-01 DIAGNOSIS — M25.519 ARTHRALGIA OF SHOULDER, UNSPECIFIED LATERALITY: Primary | ICD-10-CM

## 2018-01-01 DIAGNOSIS — D70.1 CHEMOTHERAPY-INDUCED NEUTROPENIA (H): ICD-10-CM

## 2018-01-01 DIAGNOSIS — N17.9 AKI (ACUTE KIDNEY INJURY) (H): ICD-10-CM

## 2018-01-01 LAB
ABO + RH BLD: NORMAL
ABO + RH BLD: NORMAL
ALBUMIN SERPL-MCNC: 2 G/DL (ref 3.4–5)
ALBUMIN SERPL-MCNC: 2.1 G/DL (ref 3.4–5)
ALBUMIN SERPL-MCNC: 2.3 G/DL (ref 3.4–5)
ALBUMIN SERPL-MCNC: 2.6 G/DL (ref 3.4–5)
ALBUMIN SERPL-MCNC: 2.9 G/DL (ref 3.4–5)
ALBUMIN SERPL-MCNC: 3.4 G/DL (ref 3.4–5)
ALBUMIN SERPL-MCNC: 3.7 G/DL (ref 3.4–5)
ALP SERPL-CCNC: 61 U/L (ref 40–150)
ALP SERPL-CCNC: 70 U/L (ref 40–150)
ALP SERPL-CCNC: 76 U/L (ref 40–150)
ALP SERPL-CCNC: 79 U/L (ref 40–150)
ALP SERPL-CCNC: 79 U/L (ref 40–150)
ALP SERPL-CCNC: 82 U/L (ref 40–150)
ALP SERPL-CCNC: 83 U/L (ref 40–150)
ALP SERPL-CCNC: 84 U/L (ref 40–150)
ALP SERPL-CCNC: 91 U/L (ref 40–150)
ALT SERPL W P-5'-P-CCNC: 11 U/L (ref 0–70)
ALT SERPL W P-5'-P-CCNC: 12 U/L (ref 0–70)
ALT SERPL W P-5'-P-CCNC: 15 U/L (ref 0–70)
ALT SERPL W P-5'-P-CCNC: 16 U/L (ref 0–70)
ALT SERPL W P-5'-P-CCNC: 18 U/L (ref 0–70)
ALT SERPL W P-5'-P-CCNC: 20 U/L (ref 0–70)
ALT SERPL W P-5'-P-CCNC: 22 U/L (ref 0–70)
ALT SERPL W P-5'-P-CCNC: 24 U/L (ref 0–70)
ALT SERPL W P-5'-P-CCNC: 31 U/L (ref 0–70)
ANION GAP SERPL CALCULATED.3IONS-SCNC: 11 MMOL/L (ref 3–14)
ANION GAP SERPL CALCULATED.3IONS-SCNC: 12 MMOL/L (ref 5–18)
ANION GAP SERPL CALCULATED.3IONS-SCNC: 3 MMOL/L (ref 3–14)
ANION GAP SERPL CALCULATED.3IONS-SCNC: 5 MMOL/L (ref 3–14)
ANION GAP SERPL CALCULATED.3IONS-SCNC: 6 MMOL/L (ref 3–14)
ANION GAP SERPL CALCULATED.3IONS-SCNC: 7 MMOL/L (ref 3–14)
ANION GAP SERPL CALCULATED.3IONS-SCNC: 8 MMOL/L (ref 3–14)
ANION GAP SERPL CALCULATED.3IONS-SCNC: 8 MMOL/L (ref 5–18)
ANION GAP SERPL CALCULATED.3IONS-SCNC: 8 MMOL/L (ref 5–18)
ANION GAP SERPL CALCULATED.3IONS-SCNC: 9 MMOL/L (ref 5–18)
ANION GAP SERPL CALCULATED.3IONS-SCNC: 9 MMOL/L (ref 5–18)
ANISOCYTOSIS BLD QL SMEAR: SLIGHT
AST SERPL W P-5'-P-CCNC: 13 U/L (ref 0–45)
AST SERPL W P-5'-P-CCNC: 17 U/L (ref 0–45)
AST SERPL W P-5'-P-CCNC: 22 U/L (ref 0–45)
AST SERPL W P-5'-P-CCNC: 23 U/L (ref 0–45)
AST SERPL W P-5'-P-CCNC: 25 U/L (ref 0–45)
AST SERPL W P-5'-P-CCNC: 29 U/L (ref 0–45)
AST SERPL W P-5'-P-CCNC: 32 U/L (ref 0–45)
AST SERPL W P-5'-P-CCNC: 38 U/L (ref 0–45)
AST SERPL W P-5'-P-CCNC: 41 U/L (ref 0–45)
BASE EXCESS BLDV CALC-SCNC: 11.4 MMOL/L
BASE EXCESS BLDV CALC-SCNC: 7.9 MMOL/L
BASE EXCESS BLDV CALC-SCNC: 9.3 MMOL/L
BASOPHILS # BLD AUTO: 0 10E9/L (ref 0–0.2)
BASOPHILS # BLD AUTO: 0 THOU/UL (ref 0–0.2)
BASOPHILS NFR BLD AUTO: 0 %
BASOPHILS NFR BLD AUTO: 0 % (ref 0–2)
BASOPHILS NFR BLD AUTO: 0.1 %
BASOPHILS NFR BLD AUTO: 0.2 %
BASOPHILS NFR BLD AUTO: 0.2 %
BASOPHILS NFR BLD AUTO: 0.4 %
BASOPHILS NFR BLD AUTO: 0.4 %
BILIRUB DIRECT SERPL-MCNC: 0.2 MG/DL (ref 0–0.2)
BILIRUB DIRECT SERPL-MCNC: 0.2 MG/DL (ref 0–0.2)
BILIRUB SERPL-MCNC: 0.2 MG/DL (ref 0.2–1.3)
BILIRUB SERPL-MCNC: 0.3 MG/DL (ref 0.2–1.3)
BILIRUB SERPL-MCNC: 0.4 MG/DL (ref 0.2–1.3)
BILIRUB SERPL-MCNC: 0.5 MG/DL (ref 0.2–1.3)
BILIRUB SERPL-MCNC: 0.5 MG/DL (ref 0.2–1.3)
BILIRUB SERPL-MCNC: 0.6 MG/DL (ref 0.2–1.3)
BILIRUB SERPL-MCNC: 0.6 MG/DL (ref 0.2–1.3)
BILIRUB SERPL-MCNC: 0.7 MG/DL (ref 0.2–1.3)
BILIRUB SERPL-MCNC: 0.8 MG/DL (ref 0.2–1.3)
BLD GP AB SCN SERPL QL: NORMAL
BLD PROD TYP BPU: NORMAL
BLD UNIT ID BPU: 0
BLD UNIT ID BPU: 0
BLOOD BANK CMNT PATIENT-IMP: NORMAL
BLOOD PRODUCT CODE: NORMAL
BLOOD PRODUCT CODE: NORMAL
BPU ID: NORMAL
BPU ID: NORMAL
BUN SERPL-MCNC: 10 MG/DL (ref 7–30)
BUN SERPL-MCNC: 11 MG/DL (ref 7–30)
BUN SERPL-MCNC: 12 MG/DL (ref 7–30)
BUN SERPL-MCNC: 12 MG/DL (ref 7–30)
BUN SERPL-MCNC: 13 MG/DL (ref 7–30)
BUN SERPL-MCNC: 13 MG/DL (ref 7–30)
BUN SERPL-MCNC: 14 MG/DL (ref 7–30)
BUN SERPL-MCNC: 14 MG/DL (ref 8–22)
BUN SERPL-MCNC: 16 MG/DL (ref 8–22)
BUN SERPL-MCNC: 17 MG/DL (ref 7–30)
BUN SERPL-MCNC: 17 MG/DL (ref 8–22)
BUN SERPL-MCNC: 18 MG/DL (ref 7–30)
BUN SERPL-MCNC: 18 MG/DL (ref 7–30)
BUN SERPL-MCNC: 19 MG/DL (ref 8–22)
BUN SERPL-MCNC: 20 MG/DL (ref 8–22)
BUN SERPL-MCNC: 22 MG/DL (ref 7–30)
BUN SERPL-MCNC: 9 MG/DL (ref 7–30)
CA-I SERPL ISE-MCNC: NORMAL MG/DL (ref 4.4–5.2)
CALCIUM SERPL-MCNC: 10 MG/DL (ref 8.5–10.1)
CALCIUM SERPL-MCNC: 10.9 MG/DL (ref 8.5–10.5)
CALCIUM SERPL-MCNC: 7.1 MG/DL (ref 8.5–10.1)
CALCIUM SERPL-MCNC: 7.2 MG/DL (ref 8.5–10.1)
CALCIUM SERPL-MCNC: 7.3 MG/DL (ref 8.5–10.1)
CALCIUM SERPL-MCNC: 7.3 MG/DL (ref 8.5–10.1)
CALCIUM SERPL-MCNC: 7.5 MG/DL (ref 8.5–10.1)
CALCIUM SERPL-MCNC: 7.8 MG/DL (ref 8.5–10.1)
CALCIUM SERPL-MCNC: 7.9 MG/DL (ref 8.5–10.1)
CALCIUM SERPL-MCNC: 8.1 MG/DL (ref 8.5–10.1)
CALCIUM SERPL-MCNC: 8.5 MG/DL (ref 8.5–10.1)
CALCIUM SERPL-MCNC: 8.5 MG/DL (ref 8.5–10.1)
CALCIUM SERPL-MCNC: 8.5 MG/DL (ref 8.5–10.5)
CALCIUM SERPL-MCNC: 8.9 MG/DL (ref 8.5–10.5)
CALCIUM SERPL-MCNC: 9 MG/DL (ref 8.5–10.5)
CALCIUM SERPL-MCNC: 9 MG/DL (ref 8.5–10.5)
CALCIUM SERPL-MCNC: 9.2 MG/DL (ref 8.5–10.1)
CHLORIDE BLD-SCNC: 93 MMOL/L (ref 98–107)
CHLORIDE BLD-SCNC: 95 MMOL/L (ref 98–107)
CHLORIDE BLD-SCNC: 97 MMOL/L (ref 98–107)
CHLORIDE BLD-SCNC: 97 MMOL/L (ref 98–107)
CHLORIDE BLD-SCNC: 98 MMOL/L (ref 98–107)
CHLORIDE SERPL-SCNC: 100 MMOL/L (ref 94–109)
CHLORIDE SERPL-SCNC: 89 MMOL/L (ref 94–109)
CHLORIDE SERPL-SCNC: 90 MMOL/L (ref 94–109)
CHLORIDE SERPL-SCNC: 91 MMOL/L (ref 94–109)
CHLORIDE SERPL-SCNC: 93 MMOL/L (ref 94–109)
CHLORIDE SERPL-SCNC: 94 MMOL/L (ref 94–109)
CHLORIDE SERPL-SCNC: 94 MMOL/L (ref 94–109)
CHLORIDE SERPL-SCNC: 95 MMOL/L (ref 94–109)
CHLORIDE SERPL-SCNC: 96 MMOL/L (ref 94–109)
CHLORIDE SERPL-SCNC: 97 MMOL/L (ref 94–109)
CHLORIDE SERPL-SCNC: 98 MMOL/L (ref 94–109)
CHLORIDE SERPL-SCNC: 99 MMOL/L (ref 94–109)
CHOLEST SERPL-MCNC: 175 MG/DL
CHOLEST SERPL-MCNC: 175 MG/DL
CO2 SERPL-SCNC: 25 MMOL/L (ref 22–31)
CO2 SERPL-SCNC: 27 MMOL/L (ref 22–31)
CO2 SERPL-SCNC: 27 MMOL/L (ref 22–31)
CO2 SERPL-SCNC: 28 MMOL/L (ref 20–32)
CO2 SERPL-SCNC: 28 MMOL/L (ref 22–31)
CO2 SERPL-SCNC: 29 MMOL/L (ref 20–32)
CO2 SERPL-SCNC: 30 MMOL/L (ref 20–32)
CO2 SERPL-SCNC: 30 MMOL/L (ref 20–32)
CO2 SERPL-SCNC: 30 MMOL/L (ref 22–31)
CO2 SERPL-SCNC: 31 MMOL/L (ref 20–32)
CO2 SERPL-SCNC: 31 MMOL/L (ref 20–32)
CO2 SERPL-SCNC: 32 MMOL/L (ref 20–32)
CO2 SERPL-SCNC: 33 MMOL/L (ref 20–32)
CO2 SERPL-SCNC: 34 MMOL/L (ref 20–32)
CREAT SERPL-MCNC: 0.68 MG/DL (ref 0.7–1.3)
CREAT SERPL-MCNC: 0.71 MG/DL (ref 0.7–1.3)
CREAT SERPL-MCNC: 0.78 MG/DL (ref 0.66–1.25)
CREAT SERPL-MCNC: 0.82 MG/DL (ref 0.66–1.25)
CREAT SERPL-MCNC: 0.84 MG/DL (ref 0.7–1.3)
CREAT SERPL-MCNC: 0.85 MG/DL (ref 0.7–1.3)
CREAT SERPL-MCNC: 0.86 MG/DL (ref 0.66–1.25)
CREAT SERPL-MCNC: 0.96 MG/DL (ref 0.7–1.3)
CREAT SERPL-MCNC: 0.96 MG/DL (ref 0.7–1.3)
CREAT SERPL-MCNC: 1 MG/DL (ref 0.66–1.25)
CREAT SERPL-MCNC: 1.07 MG/DL (ref 0.66–1.25)
CREAT SERPL-MCNC: 1.08 MG/DL (ref 0.66–1.25)
CREAT SERPL-MCNC: 1.08 MG/DL (ref 0.66–1.25)
CREAT SERPL-MCNC: 1.09 MG/DL (ref 0.66–1.25)
CREAT SERPL-MCNC: 1.1 MG/DL (ref 0.66–1.25)
CREAT SERPL-MCNC: 1.13 MG/DL (ref 0.66–1.25)
CREAT SERPL-MCNC: 1.13 MG/DL (ref 0.66–1.25)
CREAT SERPL-MCNC: 1.46 MG/DL (ref 0.66–1.25)
DIFFERENTIAL METHOD BLD: ABNORMAL
EOSINOPHIL # BLD AUTO: 0 10E9/L (ref 0–0.7)
EOSINOPHIL # BLD AUTO: 0.1 10E9/L (ref 0–0.7)
EOSINOPHIL # BLD AUTO: 0.2 10E9/L (ref 0–0.7)
EOSINOPHIL COUNT (ABSOLUTE): 0 THOU/UL (ref 0–0.4)
EOSINOPHIL NFR BLD AUTO: 0 %
EOSINOPHIL NFR BLD AUTO: 0 % (ref 0–6)
EOSINOPHIL NFR BLD AUTO: 0.4 %
EOSINOPHIL NFR BLD AUTO: 0.6 %
EOSINOPHIL NFR BLD AUTO: 0.8 %
EOSINOPHIL NFR BLD AUTO: 1 %
EOSINOPHIL NFR BLD AUTO: 1.6 %
EOSINOPHIL NFR BLD AUTO: 2.4 %
ERYTHROCYTE [DISTWIDTH] IN BLOOD BY AUTOMATED COUNT: 13.2 % (ref 10–15)
ERYTHROCYTE [DISTWIDTH] IN BLOOD BY AUTOMATED COUNT: 13.5 % (ref 10–15)
ERYTHROCYTE [DISTWIDTH] IN BLOOD BY AUTOMATED COUNT: 14.1 % (ref 11–14.5)
ERYTHROCYTE [DISTWIDTH] IN BLOOD BY AUTOMATED COUNT: 14.3 % (ref 10–15)
ERYTHROCYTE [DISTWIDTH] IN BLOOD BY AUTOMATED COUNT: 14.5 % (ref 10–15)
ERYTHROCYTE [DISTWIDTH] IN BLOOD BY AUTOMATED COUNT: 14.5 % (ref 10–15)
ERYTHROCYTE [DISTWIDTH] IN BLOOD BY AUTOMATED COUNT: 14.6 % (ref 10–15)
ERYTHROCYTE [DISTWIDTH] IN BLOOD BY AUTOMATED COUNT: 14.7 % (ref 10–15)
ERYTHROCYTE [DISTWIDTH] IN BLOOD BY AUTOMATED COUNT: 16 % (ref 10–15)
ERYTHROCYTE [DISTWIDTH] IN BLOOD BY AUTOMATED COUNT: 16.2 % (ref 10–15)
ERYTHROCYTE [DISTWIDTH] IN BLOOD BY AUTOMATED COUNT: 16.6 % (ref 10–15)
ERYTHROCYTE [DISTWIDTH] IN BLOOD BY AUTOMATED COUNT: 17.1 % (ref 10–15)
ERYTHROCYTE [DISTWIDTH] IN BLOOD BY AUTOMATED COUNT: 17.8 % (ref 11–14.5)
ERYTHROCYTE [DISTWIDTH] IN BLOOD BY AUTOMATED COUNT: 17.9 % (ref 10–15)
ERYTHROCYTE [DISTWIDTH] IN BLOOD BY AUTOMATED COUNT: 19.6 % (ref 11–14.5)
FASTING STATUS PATIENT QL REPORTED: YES
GFR SERPL CREATININE-BSD FRML MDRD: 48 ML/MIN/1.7M2
GFR SERPL CREATININE-BSD FRML MDRD: 65 ML/MIN/1.7M2
GFR SERPL CREATININE-BSD FRML MDRD: 65 ML/MIN/1.7M2
GFR SERPL CREATININE-BSD FRML MDRD: 67 ML/MIN/1.7M2
GFR SERPL CREATININE-BSD FRML MDRD: 67 ML/MIN/1.7M2
GFR SERPL CREATININE-BSD FRML MDRD: 68 ML/MIN/1.7M2
GFR SERPL CREATININE-BSD FRML MDRD: 69 ML/MIN/1.7M2
GFR SERPL CREATININE-BSD FRML MDRD: 70 ML/MIN/{1.73_M2}
GFR SERPL CREATININE-BSD FRML MDRD: 75 ML/MIN/1.7M2
GFR SERPL CREATININE-BSD FRML MDRD: 89 ML/MIN/1.7M2
GFR SERPL CREATININE-BSD FRML MDRD: >60 ML/MIN/1.73M2
GFR SERPL CREATININE-BSD FRML MDRD: >90 ML/MIN/1.7M2
GFR SERPL CREATININE-BSD FRML MDRD: >90 ML/MIN/1.7M2
GLUCOSE BLD-MCNC: 104 MG/DL (ref 70–125)
GLUCOSE BLD-MCNC: 137 MG/DL (ref 70–125)
GLUCOSE BLD-MCNC: 57 MG/DL (ref 70–125)
GLUCOSE BLD-MCNC: 69 MG/DL (ref 70–125)
GLUCOSE BLD-MCNC: 71 MG/DL (ref 70–125)
GLUCOSE SERPL-MCNC: 103 MG/DL (ref 70–99)
GLUCOSE SERPL-MCNC: 103 MG/DL (ref 70–99)
GLUCOSE SERPL-MCNC: 109 MG/DL (ref 70–99)
GLUCOSE SERPL-MCNC: 114 MG/DL (ref 70–99)
GLUCOSE SERPL-MCNC: 142 MG/DL (ref 70–99)
GLUCOSE SERPL-MCNC: 57 MG/DL (ref 70–125)
GLUCOSE SERPL-MCNC: 67 MG/DL (ref 70–99)
GLUCOSE SERPL-MCNC: 71 MG/DL (ref 70–99)
GLUCOSE SERPL-MCNC: 72 MG/DL (ref 70–99)
GLUCOSE SERPL-MCNC: 75 MG/DL (ref 70–99)
GLUCOSE SERPL-MCNC: 76 MG/DL (ref 70–99)
GLUCOSE SERPL-MCNC: 80 MG/DL (ref 70–99)
GLUCOSE SERPL-MCNC: 90 MG/DL (ref 70–99)
HBA1C MFR BLD: 4.5 % (ref 4.2–6.1)
HCO3 BLDV-SCNC: 33 MMOL/L (ref 21–28)
HCO3 BLDV-SCNC: 34 MMOL/L (ref 21–28)
HCO3 BLDV-SCNC: 35 MMOL/L (ref 21–28)
HCT VFR BLD AUTO: 15.7 % (ref 40–54)
HCT VFR BLD AUTO: 19.1 % (ref 40–53)
HCT VFR BLD AUTO: 20.2 % (ref 40–53)
HCT VFR BLD AUTO: 22.5 % (ref 40–53)
HCT VFR BLD AUTO: 26.4 % (ref 40–53)
HCT VFR BLD AUTO: 26.6 % (ref 40–53)
HCT VFR BLD AUTO: 27.5 % (ref 40–53)
HCT VFR BLD AUTO: 28.3 % (ref 40–53)
HCT VFR BLD AUTO: 28.5 % (ref 40–53)
HCT VFR BLD AUTO: 28.7 % (ref 40–53)
HCT VFR BLD AUTO: 28.7 % (ref 40–53)
HCT VFR BLD AUTO: 29.3 % (ref 40–53)
HCT VFR BLD AUTO: 29.3 % (ref 40–53)
HCT VFR BLD AUTO: 29.3 % (ref 40–54)
HCT VFR BLD AUTO: 30.4 % (ref 40–53)
HCT VFR BLD AUTO: 31.6 % (ref 40–53)
HCT VFR BLD AUTO: 32.5 % (ref 40–54)
HCT VFR BLD AUTO: 32.8 % (ref 40–53)
HDLC SERPL-MCNC: 44 MG/DL
HDLC SERPL-MCNC: 44 MG/DL
HEMOGLOBIN: 9.6 G/DL (ref 14–18)
HGB BLD-MCNC: 10 G/DL (ref 13.3–17.7)
HGB BLD-MCNC: 10.1 G/DL (ref 13.3–17.7)
HGB BLD-MCNC: 10.1 G/DL (ref 13.3–17.7)
HGB BLD-MCNC: 10.3 G/DL (ref 13.3–17.7)
HGB BLD-MCNC: 11.1 G/DL (ref 14–18)
HGB BLD-MCNC: 11.5 G/DL (ref 13.3–17.7)
HGB BLD-MCNC: 11.8 G/DL (ref 13.3–17.7)
HGB BLD-MCNC: 5.6 G/DL (ref 14–18)
HGB BLD-MCNC: 6.7 G/DL (ref 13.3–17.7)
HGB BLD-MCNC: 7.4 G/DL (ref 13.3–17.7)
HGB BLD-MCNC: 7.9 G/DL (ref 13.3–17.7)
HGB BLD-MCNC: 9.4 G/DL (ref 13.3–17.7)
HGB BLD-MCNC: 9.4 G/DL (ref 13.3–17.7)
HGB BLD-MCNC: 9.6 G/DL (ref 14–18)
HGB BLD-MCNC: 9.7 G/DL (ref 13.3–17.7)
HGB BLD-MCNC: 9.8 G/DL (ref 13.3–17.7)
HGB BLD-MCNC: 9.8 G/DL (ref 14–18)
HGB BLD-MCNC: 9.9 G/DL (ref 13.3–17.7)
IMM GRANULOCYTES # BLD: 0 10E9/L (ref 0–0.4)
IMM GRANULOCYTES # BLD: 0.1 10E9/L (ref 0–0.4)
IMM GRANULOCYTES # BLD: 0.1 10E9/L (ref 0–0.4)
IMM GRANULOCYTES NFR BLD: 0.3 %
IMM GRANULOCYTES NFR BLD: 0.4 %
IMM GRANULOCYTES NFR BLD: 0.6 %
IMM GRANULOCYTES NFR BLD: 0.7 %
IMM GRANULOCYTES NFR BLD: 0.9 %
IMM GRANULOCYTES NFR BLD: 1.3 %
IMM GRANULOCYTES NFR BLD: 1.4 %
INR PPP: 1.03 (ref 0.86–1.14)
INR PPP: 1.59 (ref 0.9–1.1)
INR PPP: 1.64 (ref 0.9–1.1)
INR PPP: 1.69 (ref 0.9–1.1)
INR PPP: 1.75 (ref 0.9–1.1)
INR PPP: 1.76 (ref 0.9–1.1)
INR PPP: 1.78 (ref 0.9–1.1)
INR PPP: 1.79 (ref 0.9–1.1)
INR PPP: 1.9 (ref 0.9–1.1)
INR PPP: 1.92 (ref 0.9–1.1)
INR PPP: 1.92 (ref 0.9–1.1)
INR PPP: 2.14 (ref 0.9–1.1)
INR PPP: 2.26 (ref 0.9–1.1)
INR PPP: 2.42 (ref 0.9–1.1)
INR PPP: 2.52 (ref 0.9–1.1)
INR PPP: 2.52 (ref 0.9–1.1)
INR PPP: 2.6 (ref 0.9–1.1)
INR PPP: 2.64 (ref 0.9–1.1)
INR PPP: 2.75 (ref 0.9–1.1)
INR PPP: 2.76 (ref 0.9–1.1)
INR PPP: 2.81 (ref 0.9–1.1)
INR PPP: 3.06 (ref 0.9–1.1)
INR PPP: 3.06 (ref 0.9–1.1)
INR PPP: 3.13 (ref 0.9–1.1)
INR PPP: 3.21 (ref 0.9–1.1)
INR PPP: 3.36 (ref 0.9–1.1)
INR PPP: 3.47 (ref 0.9–1.1)
INR PPP: 3.63 (ref 0.9–1.1)
INR PPP: 4.03 (ref 0.9–1.1)
INR PPP: 4.03 (ref 0.9–1.1)
INR PPP: 4.77 (ref 0.9–1.1)
LDLC SERPL CALC-MCNC: 99 MG/DL
LDLC SERPL CALC-MCNC: 99 MG/DL
LIPASE SERPL-CCNC: 81 U/L (ref 73–393)
LYMPHOCYTES # BLD AUTO: 0.8 10E9/L (ref 0.8–5.3)
LYMPHOCYTES # BLD AUTO: 1.2 10E9/L (ref 0.8–5.3)
LYMPHOCYTES # BLD AUTO: 1.3 10E9/L (ref 0.8–5.3)
LYMPHOCYTES # BLD AUTO: 1.4 10E9/L (ref 0.8–5.3)
LYMPHOCYTES # BLD AUTO: 1.4 THOU/UL (ref 0.8–4.4)
LYMPHOCYTES # BLD AUTO: 1.5 10E9/L (ref 0.8–5.3)
LYMPHOCYTES # BLD AUTO: 1.8 10E9/L (ref 0.8–5.3)
LYMPHOCYTES # BLD AUTO: 1.9 10E9/L (ref 0.8–5.3)
LYMPHOCYTES # BLD AUTO: 1.9 10E9/L (ref 0.8–5.3)
LYMPHOCYTES NFR BLD AUTO: 10 %
LYMPHOCYTES NFR BLD AUTO: 17.1 %
LYMPHOCYTES NFR BLD AUTO: 17.3 %
LYMPHOCYTES NFR BLD AUTO: 24.6 %
LYMPHOCYTES NFR BLD AUTO: 25 %
LYMPHOCYTES NFR BLD AUTO: 26.3 %
LYMPHOCYTES NFR BLD AUTO: 30.4 %
LYMPHOCYTES NFR BLD AUTO: 34.2 %
LYMPHOCYTES NFR BLD AUTO: 39 % (ref 20–40)
LYMPHOCYTES NFR BLD AUTO: 41.1 %
LYMPHOCYTES NFR BLD AUTO: 68 %
MAGNESIUM SERPL-MCNC: 0.7 MG/DL (ref 1.6–2.3)
MAGNESIUM SERPL-MCNC: 1.1 MG/DL (ref 1.6–2.3)
MAGNESIUM SERPL-MCNC: 1.1 MG/DL (ref 1.8–2.6)
MAGNESIUM SERPL-MCNC: 1.2 MG/DL (ref 1.6–2.3)
MAGNESIUM SERPL-MCNC: 1.4 MG/DL (ref 1.6–2.3)
MAGNESIUM SERPL-MCNC: 1.5 MG/DL (ref 1.6–2.3)
MAGNESIUM SERPL-MCNC: 1.5 MG/DL (ref 1.8–2.6)
MAGNESIUM SERPL-MCNC: 1.5 MG/DL (ref 1.8–2.6)
MAGNESIUM SERPL-MCNC: 1.7 MG/DL (ref 1.8–2.6)
MAGNESIUM SERPL-MCNC: 2.1 MG/DL (ref 1.6–2.3)
MAGNESIUM SERPL-MCNC: 2.1 MG/DL (ref 1.6–2.3)
MCH RBC QN AUTO: 32.6 PG (ref 26.5–33)
MCH RBC QN AUTO: 32.6 PG (ref 26.5–33)
MCH RBC QN AUTO: 32.7 PG (ref 26.5–33)
MCH RBC QN AUTO: 32.8 PG (ref 26.5–33)
MCH RBC QN AUTO: 32.9 PG (ref 26.5–33)
MCH RBC QN AUTO: 33 PG (ref 27–34)
MCH RBC QN AUTO: 33.1 PG (ref 26.5–33)
MCH RBC QN AUTO: 33.3 PG (ref 26.5–33)
MCH RBC QN AUTO: 33.9 PG (ref 27–34)
MCH RBC QN AUTO: 34 PG (ref 26.5–33)
MCH RBC QN AUTO: 34.1 PG (ref 26.5–33)
MCH RBC QN AUTO: 34.2 PG (ref 26.5–33)
MCH RBC QN AUTO: 34.8 PG (ref 27–34)
MCHC RBC AUTO-ENTMCNC: 33.1 G/DL (ref 31.5–36.5)
MCHC RBC AUTO-ENTMCNC: 33.4 G/DL (ref 32–36)
MCHC RBC AUTO-ENTMCNC: 33.9 G/DL (ref 31.5–36.5)
MCHC RBC AUTO-ENTMCNC: 34.2 G/DL (ref 32–36)
MCHC RBC AUTO-ENTMCNC: 34.5 G/DL (ref 31.5–36.5)
MCHC RBC AUTO-ENTMCNC: 34.8 G/DL (ref 31.5–36.5)
MCHC RBC AUTO-ENTMCNC: 35 G/DL (ref 31.5–36.5)
MCHC RBC AUTO-ENTMCNC: 35.1 G/DL (ref 31.5–36.5)
MCHC RBC AUTO-ENTMCNC: 35.2 G/DL (ref 31.5–36.5)
MCHC RBC AUTO-ENTMCNC: 35.3 G/DL (ref 31.5–36.5)
MCHC RBC AUTO-ENTMCNC: 35.6 G/DL (ref 31.5–36.5)
MCHC RBC AUTO-ENTMCNC: 35.6 G/DL (ref 31.5–36.5)
MCHC RBC AUTO-ENTMCNC: 35.7 G/DL (ref 32–36)
MCHC RBC AUTO-ENTMCNC: 36 G/DL (ref 31.5–36.5)
MCHC RBC AUTO-ENTMCNC: 36.4 G/DL (ref 31.5–36.5)
MCHC RBC AUTO-ENTMCNC: 36.6 G/DL (ref 31.5–36.5)
MCV RBC AUTO: 101 FL (ref 78–100)
MCV RBC AUTO: 102 FL (ref 80–100)
MCV RBC AUTO: 103 FL (ref 78–100)
MCV RBC AUTO: 91 FL (ref 78–100)
MCV RBC AUTO: 92 FL (ref 78–100)
MCV RBC AUTO: 92 FL (ref 78–100)
MCV RBC AUTO: 93 FL (ref 78–100)
MCV RBC AUTO: 94 FL (ref 78–100)
MCV RBC AUTO: 94 FL (ref 78–100)
MCV RBC AUTO: 95 FL (ref 78–100)
MCV RBC AUTO: 95 FL (ref 80–100)
MCV RBC AUTO: 97 FL (ref 78–100)
MCV RBC AUTO: 99 FL (ref 80–100)
MONOCYTES # BLD AUTO: 0 10E9/L (ref 0–1.3)
MONOCYTES # BLD AUTO: 0 10E9/L (ref 0–1.3)
MONOCYTES # BLD AUTO: 0.3 THOU/UL (ref 0–0.9)
MONOCYTES # BLD AUTO: 0.4 10E9/L (ref 0–1.3)
MONOCYTES # BLD AUTO: 0.6 10E9/L (ref 0–1.3)
MONOCYTES # BLD AUTO: 0.6 10E9/L (ref 0–1.3)
MONOCYTES # BLD AUTO: 0.8 10E9/L (ref 0–1.3)
MONOCYTES # BLD AUTO: 0.9 10E9/L (ref 0–1.3)
MONOCYTES # BLD AUTO: 1.2 10E9/L (ref 0–1.3)
MONOCYTES # BLD AUTO: 1.5 10E9/L (ref 0–1.3)
MONOCYTES # BLD AUTO: 1.5 10E9/L (ref 0–1.3)
MONOCYTES NFR BLD AUTO: 0 %
MONOCYTES NFR BLD AUTO: 1 %
MONOCYTES NFR BLD AUTO: 11.6 %
MONOCYTES NFR BLD AUTO: 12.3 %
MONOCYTES NFR BLD AUTO: 13.3 %
MONOCYTES NFR BLD AUTO: 19.5 %
MONOCYTES NFR BLD AUTO: 23.6 %
MONOCYTES NFR BLD AUTO: 28 %
MONOCYTES NFR BLD AUTO: 7 % (ref 2–10)
MONOCYTES NFR BLD AUTO: 8.3 %
MONOCYTES NFR BLD AUTO: 9.1 %
NEUTROPHILS # BLD AUTO: 0.9 10E9/L (ref 1.6–8.3)
NEUTROPHILS # BLD AUTO: 1.5 10E9/L (ref 1.6–8.3)
NEUTROPHILS # BLD AUTO: 2.1 10E9/L (ref 1.6–8.3)
NEUTROPHILS # BLD AUTO: 2.6 10E9/L (ref 1.6–8.3)
NEUTROPHILS # BLD AUTO: 2.9 10E9/L (ref 1.6–8.3)
NEUTROPHILS # BLD AUTO: 3 10E9/L (ref 1.6–8.3)
NEUTROPHILS # BLD AUTO: 4.5 10E9/L (ref 1.6–8.3)
NEUTROPHILS # BLD AUTO: 4.9 10E9/L (ref 1.6–8.3)
NEUTROPHILS # BLD AUTO: 6 10E9/L (ref 1.6–8.3)
NEUTROPHILS # BLD AUTO: 7.3 10E9/L (ref 1.6–8.3)
NEUTROPHILS NFR BLD AUTO: 31 %
NEUTROPHILS NFR BLD AUTO: 34.8 %
NEUTROPHILS NFR BLD AUTO: 45.5 %
NEUTROPHILS NFR BLD AUTO: 48.9 %
NEUTROPHILS NFR BLD AUTO: 55.1 %
NEUTROPHILS NFR BLD AUTO: 60.7 %
NEUTROPHILS NFR BLD AUTO: 63.3 %
NEUTROPHILS NFR BLD AUTO: 64.3 %
NEUTROPHILS NFR BLD AUTO: 72 %
NEUTROPHILS NFR BLD AUTO: 90 %
NRBC # BLD AUTO: 0 10*3/UL
NRBC BLD AUTO-RTO: 0 /100
NUM BPU REQUESTED: 2
PCO2 BLDV: 36 MM HG (ref 40–50)
PCO2 BLDV: 49 MM HG (ref 40–50)
PCO2 BLDV: 51 MM HG (ref 40–50)
PF4 HEPARIN CMPLX AB SER QL: NEGATIVE
PH BLDV: 7.44 PH (ref 7.32–7.43)
PH BLDV: 7.44 PH (ref 7.32–7.43)
PH BLDV: 7.58 PH (ref 7.32–7.43)
PHOSPHATE SERPL-MCNC: 2.4 MG/DL (ref 2.5–4.5)
PHOSPHATE SERPL-MCNC: 3 MG/DL (ref 2.5–4.5)
PHOSPHATE SERPL-MCNC: 3.9 MG/DL (ref 2.5–4.5)
PLAT MORPH BLD: NORMAL
PLATELET # BLD AUTO: 108 10E9/L (ref 150–450)
PLATELET # BLD AUTO: 146 10E9/L (ref 150–450)
PLATELET # BLD AUTO: 171 10E9/L (ref 150–450)
PLATELET # BLD AUTO: 243 10E9/L (ref 150–450)
PLATELET # BLD AUTO: 308 THOU/UL (ref 140–440)
PLATELET # BLD AUTO: 309 THOU/UL (ref 140–440)
PLATELET # BLD AUTO: 31 10E9/L (ref 150–450)
PLATELET # BLD AUTO: 336 10E9/L (ref 150–450)
PLATELET # BLD AUTO: 350 10E9/L (ref 150–450)
PLATELET # BLD AUTO: 364 THOU/UL (ref 140–440)
PLATELET # BLD AUTO: 372 10E9/L (ref 150–450)
PLATELET # BLD AUTO: 472 10E9/L (ref 150–450)
PLATELET # BLD AUTO: 531 10E9/L (ref 150–450)
PLATELET # BLD AUTO: 607 10E9/L (ref 150–450)
PLATELET # BLD AUTO: 75 10E9/L (ref 150–450)
PLATELET # BLD AUTO: 778 10E9/L (ref 150–450)
PLATELET # BLD AUTO: 85 10E9/L (ref 150–450)
PLATELET # BLD AUTO: 95 10E9/L (ref 150–450)
PLATELET # BLD EST: ABNORMAL 10*3/UL
PMV BLD AUTO: 10 FL (ref 8.5–12.5)
PMV BLD AUTO: 10.4 FL (ref 8.5–12.5)
PMV BLD AUTO: 8.8 FL (ref 8.5–12.5)
PO2 BLDV: 25 MM HG (ref 25–47)
PO2 BLDV: 31 MM HG (ref 25–47)
PO2 BLDV: 34 MM HG (ref 25–47)
POLYCHROMASIA BLD QL SMEAR: ABNORMAL
POTASSIUM BLD-SCNC: 3.6 MMOL/L (ref 3.5–5)
POTASSIUM BLD-SCNC: 4.3 MMOL/L (ref 3.5–5)
POTASSIUM BLD-SCNC: 4.5 MMOL/L (ref 3.5–5)
POTASSIUM BLD-SCNC: 4.7 MMOL/L (ref 3.5–5)
POTASSIUM BLD-SCNC: 5.7 MMOL/L (ref 3.5–5)
POTASSIUM SERPL-SCNC: 2.7 MMOL/L (ref 3.4–5.3)
POTASSIUM SERPL-SCNC: 3 MMOL/L (ref 3.4–5.3)
POTASSIUM SERPL-SCNC: 3.1 MMOL/L (ref 3.4–5.3)
POTASSIUM SERPL-SCNC: 3.2 MMOL/L (ref 3.4–5.3)
POTASSIUM SERPL-SCNC: 3.3 MMOL/L (ref 3.4–5.3)
POTASSIUM SERPL-SCNC: 3.4 MMOL/L (ref 3.4–5.3)
POTASSIUM SERPL-SCNC: 3.6 MMOL/L (ref 3.5–5)
POTASSIUM SERPL-SCNC: 3.7 MMOL/L (ref 3.4–5.3)
POTASSIUM SERPL-SCNC: 3.8 MMOL/L (ref 3.4–5.3)
POTASSIUM SERPL-SCNC: 4.3 MMOL/L (ref 3.4–5.3)
POTASSIUM SERPL-SCNC: 4.4 MMOL/L (ref 3.4–5.3)
POTASSIUM SERPL-SCNC: 4.8 MMOL/L (ref 3.4–5.3)
PROT SERPL-MCNC: 4.5 G/DL (ref 6.8–8.8)
PROT SERPL-MCNC: 4.8 G/DL (ref 6.8–8.8)
PROT SERPL-MCNC: 4.9 G/DL (ref 6.8–8.8)
PROT SERPL-MCNC: 4.9 G/DL (ref 6.8–8.8)
PROT SERPL-MCNC: 5 G/DL (ref 6.8–8.8)
PROT SERPL-MCNC: 5.7 G/DL (ref 6.8–8.8)
PROT SERPL-MCNC: 5.8 G/DL (ref 6.8–8.8)
PROT SERPL-MCNC: 6.9 G/DL (ref 6.8–8.8)
PROT SERPL-MCNC: 7.3 G/DL (ref 6.8–8.8)
RADIOLOGIST FLAGS: ABNORMAL
RBC # BLD AUTO: 1.65 MILL/UL (ref 4.4–6.2)
RBC # BLD AUTO: 1.97 10E12/L (ref 4.4–5.9)
RBC # BLD AUTO: 2.22 10E12/L (ref 4.4–5.9)
RBC # BLD AUTO: 2.4 10E12/L (ref 4.4–5.9)
RBC # BLD AUTO: 2.84 10E12/L (ref 4.4–5.9)
RBC # BLD AUTO: 2.86 10E12/L (ref 4.4–5.9)
RBC # BLD AUTO: 2.87 10E12/L (ref 4.4–5.9)
RBC # BLD AUTO: 2.97 MILL/UL (ref 4.4–6.2)
RBC # BLD AUTO: 2.98 10E12/L (ref 4.4–5.9)
RBC # BLD AUTO: 3.02 10E12/L (ref 4.4–5.9)
RBC # BLD AUTO: 3.03 10E12/L (ref 4.4–5.9)
RBC # BLD AUTO: 3.05 10E12/L (ref 4.4–5.9)
RBC # BLD AUTO: 3.07 10E12/L (ref 4.4–5.9)
RBC # BLD AUTO: 3.08 10E12/L (ref 4.4–5.9)
RBC # BLD AUTO: 3.1 10E12/L (ref 4.4–5.9)
RBC # BLD AUTO: 3.19 MILL/UL (ref 4.4–6.2)
RBC # BLD AUTO: 3.47 10E12/L (ref 4.4–5.9)
RBC # BLD AUTO: 3.59 10E12/L (ref 4.4–5.9)
RBC MORPH BLD: NORMAL
SODIUM SERPL-SCNC: 127 MMOL/L (ref 133–144)
SODIUM SERPL-SCNC: 130 MMOL/L (ref 133–144)
SODIUM SERPL-SCNC: 130 MMOL/L (ref 136–145)
SODIUM SERPL-SCNC: 131 MMOL/L (ref 133–144)
SODIUM SERPL-SCNC: 131 MMOL/L (ref 136–145)
SODIUM SERPL-SCNC: 132 MMOL/L (ref 133–144)
SODIUM SERPL-SCNC: 133 MMOL/L (ref 133–144)
SODIUM SERPL-SCNC: 133 MMOL/L (ref 136–145)
SODIUM SERPL-SCNC: 134 MMOL/L (ref 133–144)
SODIUM SERPL-SCNC: 134 MMOL/L (ref 136–145)
SODIUM SERPL-SCNC: 135 MMOL/L (ref 136–145)
SPECIMEN EXP DATE BLD: NORMAL
T4 FREE SERPL-MCNC: 0.9 NG/DL (ref 0.7–1.8)
TOTAL NEUTROPHILS-ABS(DIFF): 2 THOU/UL (ref 2–7.7)
TOTAL NEUTROPHILS-REL(DIFF): 54 % (ref 50–70)
TOXIC GRANULES BLD QL SMEAR: PRESENT
TRANSFUSION STATUS PATIENT QL: NORMAL
TRIGL SERPL-MCNC: 161 MG/DL
TRIGL SERPL-MCNC: 161 MG/DL
TROPONIN I SERPL-MCNC: 0.01 UG/L (ref 0–0.04)
TROPONIN I SERPL-MCNC: 0.02 UG/L (ref 0–0.04)
TROPONIN I SERPL-MCNC: <0.015 UG/L (ref 0–0.04)
TSH SERPL DL<=0.005 MIU/L-ACNC: 0.77 UIU/ML (ref 0.3–5)
TSH SERPL-ACNC: 0.77 UIU/ML (ref 0.3–5)
WBC # BLD AUTO: 2.8 10E9/L (ref 4–11)
WBC # BLD AUTO: 3.2 10E9/L (ref 4–11)
WBC # BLD AUTO: 3.3 10E9/L (ref 4–11)
WBC # BLD AUTO: 3.4 10E9/L (ref 4–11)
WBC # BLD AUTO: 3.5 10E9/L (ref 4–11)
WBC # BLD AUTO: 4.4 10E9/L (ref 4–11)
WBC # BLD AUTO: 4.5 10E9/L (ref 4–11)
WBC # BLD AUTO: 4.6 10E9/L (ref 4–11)
WBC # BLD AUTO: 4.8 10E9/L (ref 4–11)
WBC # BLD AUTO: 5.1 10E9/L (ref 4–11)
WBC # BLD AUTO: 6.2 10E9/L (ref 4–11)
WBC # BLD AUTO: 7.4 10E9/L (ref 4–11)
WBC # BLD AUTO: 7.7 10E9/L (ref 4–11)
WBC # BLD AUTO: 8.1 10E9/L (ref 4–11)
WBC # BLD AUTO: 8.3 10E9/L (ref 4–11)
WBC: 3.7 THOU/UL (ref 4–11)
WBC: 6.8 THOU/UL (ref 4–11)
WBC: 9 THOU/UL (ref 4–11)

## 2018-01-01 PROCEDURE — 99309 SBSQ NF CARE MODERATE MDM 30: CPT | Performed by: NURSE PRACTITIONER

## 2018-01-01 PROCEDURE — A9270 NON-COVERED ITEM OR SERVICE: HCPCS | Mod: GY | Performed by: FAMILY MEDICINE

## 2018-01-01 PROCEDURE — 99203 OFFICE O/P NEW LOW 30 MIN: CPT | Performed by: PEDIATRICS

## 2018-01-01 PROCEDURE — 96413 CHEMO IV INFUSION 1 HR: CPT

## 2018-01-01 PROCEDURE — 25000128 H RX IP 250 OP 636: Performed by: INTERNAL MEDICINE

## 2018-01-01 PROCEDURE — 85027 COMPLETE CBC AUTOMATED: CPT | Performed by: FAMILY MEDICINE

## 2018-01-01 PROCEDURE — 96368 THER/DIAG CONCURRENT INF: CPT

## 2018-01-01 PROCEDURE — 25000125 ZZHC RX 250: Performed by: NURSE PRACTITIONER

## 2018-01-01 PROCEDURE — 99283 EMERGENCY DEPT VISIT LOW MDM: CPT | Mod: Z6 | Performed by: FAMILY MEDICINE

## 2018-01-01 PROCEDURE — 96374 THER/PROPH/DIAG INJ IV PUSH: CPT | Performed by: FAMILY MEDICINE

## 2018-01-01 PROCEDURE — 74177 CT ABD & PELVIS W/CONTRAST: CPT

## 2018-01-01 PROCEDURE — 97165 OT EVAL LOW COMPLEX 30 MIN: CPT | Mod: GO

## 2018-01-01 PROCEDURE — 36415 COLL VENOUS BLD VENIPUNCTURE: CPT | Performed by: FAMILY MEDICINE

## 2018-01-01 PROCEDURE — 96415 CHEMO IV INFUSION ADDL HR: CPT

## 2018-01-01 PROCEDURE — 85025 COMPLETE CBC W/AUTO DIFF WBC: CPT | Performed by: INTERNAL MEDICINE

## 2018-01-01 PROCEDURE — 93010 ELECTROCARDIOGRAM REPORT: CPT | Mod: Z6 | Performed by: FAMILY MEDICINE

## 2018-01-01 PROCEDURE — 99308 SBSQ NF CARE LOW MDM 20: CPT | Performed by: NURSE PRACTITIONER

## 2018-01-01 PROCEDURE — 20553 NJX 1/MLT TRIGGER POINTS 3/>: CPT | Performed by: ANESTHESIOLOGY

## 2018-01-01 PROCEDURE — 82803 BLOOD GASES ANY COMBINATION: CPT | Performed by: FAMILY MEDICINE

## 2018-01-01 PROCEDURE — 25000128 H RX IP 250 OP 636: Performed by: FAMILY MEDICINE

## 2018-01-01 PROCEDURE — 83735 ASSAY OF MAGNESIUM: CPT | Performed by: INTERNAL MEDICINE

## 2018-01-01 PROCEDURE — 25000132 ZZH RX MED GY IP 250 OP 250 PS 637: Mod: GY | Performed by: FAMILY MEDICINE

## 2018-01-01 PROCEDURE — 99223 1ST HOSP IP/OBS HIGH 75: CPT | Mod: AI | Performed by: FAMILY MEDICINE

## 2018-01-01 PROCEDURE — 25000132 ZZH RX MED GY IP 250 OP 250 PS 637: Performed by: FAMILY MEDICINE

## 2018-01-01 PROCEDURE — 85025 COMPLETE CBC W/AUTO DIFF WBC: CPT | Performed by: NURSE PRACTITIONER

## 2018-01-01 PROCEDURE — 99285 EMERGENCY DEPT VISIT HI MDM: CPT | Mod: 25 | Performed by: FAMILY MEDICINE

## 2018-01-01 PROCEDURE — 25000132 ZZH RX MED GY IP 250 OP 250 PS 637: Mod: GY | Performed by: NURSE PRACTITIONER

## 2018-01-01 PROCEDURE — 80053 COMPREHEN METABOLIC PANEL: CPT | Performed by: FAMILY MEDICINE

## 2018-01-01 PROCEDURE — 93005 ELECTROCARDIOGRAM TRACING: CPT | Performed by: FAMILY MEDICINE

## 2018-01-01 PROCEDURE — 12000000 ZZH R&B MED SURG/OB

## 2018-01-01 PROCEDURE — 99214 OFFICE O/P EST MOD 30 MIN: CPT | Performed by: INTERNAL MEDICINE

## 2018-01-01 PROCEDURE — G0463 HOSPITAL OUTPT CLINIC VISIT: HCPCS

## 2018-01-01 PROCEDURE — 36591 DRAW BLOOD OFF VENOUS DEVICE: CPT

## 2018-01-01 PROCEDURE — 25000125 ZZHC RX 250: Performed by: FAMILY MEDICINE

## 2018-01-01 PROCEDURE — 25000128 H RX IP 250 OP 636: Performed by: RADIOLOGY

## 2018-01-01 PROCEDURE — A9270 NON-COVERED ITEM OR SERVICE: HCPCS | Mod: GY | Performed by: NURSE PRACTITIONER

## 2018-01-01 PROCEDURE — 99358 PROLONG SERVICE W/O CONTACT: CPT | Performed by: NURSE PRACTITIONER

## 2018-01-01 PROCEDURE — 99215 OFFICE O/P EST HI 40 MIN: CPT | Performed by: INTERNAL MEDICINE

## 2018-01-01 PROCEDURE — 78815 PET IMAGE W/CT SKULL-THIGH: CPT | Mod: PS

## 2018-01-01 PROCEDURE — 82330 ASSAY OF CALCIUM: CPT | Performed by: FAMILY MEDICINE

## 2018-01-01 PROCEDURE — 99285 EMERGENCY DEPT VISIT HI MDM: CPT | Mod: Z6 | Performed by: FAMILY MEDICINE

## 2018-01-01 PROCEDURE — 86850 RBC ANTIBODY SCREEN: CPT | Performed by: FAMILY MEDICINE

## 2018-01-01 PROCEDURE — 96376 TX/PRO/DX INJ SAME DRUG ADON: CPT | Performed by: FAMILY MEDICINE

## 2018-01-01 PROCEDURE — 99318 ZZC ANNUAL NURSING FAC ASSESSMNT, STABLE: CPT | Performed by: NURSE PRACTITIONER

## 2018-01-01 PROCEDURE — 96411 CHEMO IV PUSH ADDL DRUG: CPT

## 2018-01-01 PROCEDURE — 96375 TX/PRO/DX INJ NEW DRUG ADDON: CPT

## 2018-01-01 PROCEDURE — 84132 ASSAY OF SERUM POTASSIUM: CPT | Performed by: FAMILY MEDICINE

## 2018-01-01 PROCEDURE — 99214 OFFICE O/P EST MOD 30 MIN: CPT | Performed by: FAMILY MEDICINE

## 2018-01-01 PROCEDURE — 20610 DRAIN/INJ JOINT/BURSA W/O US: CPT | Performed by: NURSE PRACTITIONER

## 2018-01-01 PROCEDURE — 99310 SBSQ NF CARE HIGH MDM 45: CPT | Performed by: FAMILY MEDICINE

## 2018-01-01 PROCEDURE — 40000557 ZZH STATISTIC PORT-A-CATH ACCESS/FLUSHING

## 2018-01-01 PROCEDURE — 84100 ASSAY OF PHOSPHORUS: CPT | Performed by: FAMILY MEDICINE

## 2018-01-01 PROCEDURE — 25000128 H RX IP 250 OP 636

## 2018-01-01 PROCEDURE — 99239 HOSP IP/OBS DSCHRG MGMT >30: CPT | Performed by: FAMILY MEDICINE

## 2018-01-01 PROCEDURE — 84484 ASSAY OF TROPONIN QUANT: CPT | Performed by: FAMILY MEDICINE

## 2018-01-01 PROCEDURE — 99232 SBSQ HOSP IP/OBS MODERATE 35: CPT | Performed by: FAMILY MEDICINE

## 2018-01-01 PROCEDURE — 99359 PROLONG SERV W/O CONTACT ADD: CPT | Performed by: NURSE PRACTITIONER

## 2018-01-01 PROCEDURE — 96360 HYDRATION IV INFUSION INIT: CPT

## 2018-01-01 PROCEDURE — 25000125 ZZHC RX 250: Performed by: INTERNAL MEDICINE

## 2018-01-01 PROCEDURE — 99284 EMERGENCY DEPT VISIT MOD MDM: CPT | Performed by: NURSE PRACTITIONER

## 2018-01-01 PROCEDURE — 96361 HYDRATE IV INFUSION ADD-ON: CPT

## 2018-01-01 PROCEDURE — 25000125 ZZHC RX 250: Performed by: RADIOLOGY

## 2018-01-01 PROCEDURE — 93010 ELECTROCARDIOGRAM REPORT: CPT | Mod: Z6 | Performed by: STUDENT IN AN ORGANIZED HEALTH CARE EDUCATION/TRAINING PROGRAM

## 2018-01-01 PROCEDURE — 86923 COMPATIBILITY TEST ELECTRIC: CPT | Performed by: FAMILY MEDICINE

## 2018-01-01 PROCEDURE — 40000133 ZZH STATISTIC OT WARD VISIT

## 2018-01-01 PROCEDURE — 85025 COMPLETE CBC W/AUTO DIFF WBC: CPT | Performed by: FAMILY MEDICINE

## 2018-01-01 PROCEDURE — 93005 ELECTROCARDIOGRAM TRACING: CPT | Performed by: NURSE PRACTITIONER

## 2018-01-01 PROCEDURE — 86022 PLATELET ANTIBODIES: CPT | Performed by: FAMILY MEDICINE

## 2018-01-01 PROCEDURE — 72125 CT NECK SPINE W/O DYE: CPT

## 2018-01-01 PROCEDURE — 80048 BASIC METABOLIC PNL TOTAL CA: CPT | Performed by: INTERNAL MEDICINE

## 2018-01-01 PROCEDURE — 80048 BASIC METABOLIC PNL TOTAL CA: CPT | Performed by: FAMILY MEDICINE

## 2018-01-01 PROCEDURE — 86901 BLOOD TYPING SEROLOGIC RH(D): CPT | Performed by: FAMILY MEDICINE

## 2018-01-01 PROCEDURE — 85610 PROTHROMBIN TIME: CPT | Performed by: FAMILY MEDICINE

## 2018-01-01 PROCEDURE — 96374 THER/PROPH/DIAG INJ IV PUSH: CPT

## 2018-01-01 PROCEDURE — 85018 HEMOGLOBIN: CPT | Performed by: FAMILY MEDICINE

## 2018-01-01 PROCEDURE — 99221 1ST HOSP IP/OBS SF/LOW 40: CPT | Performed by: NURSE PRACTITIONER

## 2018-01-01 PROCEDURE — 93005 ELECTROCARDIOGRAM TRACING: CPT | Mod: 76

## 2018-01-01 PROCEDURE — 80053 COMPREHEN METABOLIC PANEL: CPT | Performed by: INTERNAL MEDICINE

## 2018-01-01 PROCEDURE — A9552 F18 FDG: HCPCS | Performed by: INTERNAL MEDICINE

## 2018-01-01 PROCEDURE — 93005 ELECTROCARDIOGRAM TRACING: CPT

## 2018-01-01 PROCEDURE — 80048 BASIC METABOLIC PNL TOTAL CA: CPT | Performed by: NURSE PRACTITIONER

## 2018-01-01 PROCEDURE — 96372 THER/PROPH/DIAG INJ SC/IM: CPT

## 2018-01-01 PROCEDURE — 99310 SBSQ NF CARE HIGH MDM 45: CPT | Performed by: NURSE PRACTITIONER

## 2018-01-01 PROCEDURE — 80076 HEPATIC FUNCTION PANEL: CPT | Performed by: FAMILY MEDICINE

## 2018-01-01 PROCEDURE — 71260 CT THORAX DX C+: CPT

## 2018-01-01 PROCEDURE — 83735 ASSAY OF MAGNESIUM: CPT | Performed by: FAMILY MEDICINE

## 2018-01-01 PROCEDURE — 96375 TX/PRO/DX INJ NEW DRUG ADDON: CPT | Performed by: FAMILY MEDICINE

## 2018-01-01 PROCEDURE — P9016 RBC LEUKOCYTES REDUCED: HCPCS | Performed by: FAMILY MEDICINE

## 2018-01-01 PROCEDURE — 99284 EMERGENCY DEPT VISIT MOD MDM: CPT | Mod: 25 | Performed by: NURSE PRACTITIONER

## 2018-01-01 PROCEDURE — 83690 ASSAY OF LIPASE: CPT | Performed by: FAMILY MEDICINE

## 2018-01-01 PROCEDURE — 99285 EMERGENCY DEPT VISIT HI MDM: CPT | Mod: 25

## 2018-01-01 PROCEDURE — 34300033 ZZH RX 343: Performed by: INTERNAL MEDICINE

## 2018-01-01 PROCEDURE — 96367 TX/PROPH/DG ADDL SEQ IV INF: CPT

## 2018-01-01 PROCEDURE — 99306 1ST NF CARE HIGH MDM 50: CPT | Performed by: FAMILY MEDICINE

## 2018-01-01 PROCEDURE — 12000007 ZZH R&B INTERMEDIATE

## 2018-01-01 PROCEDURE — 93010 ELECTROCARDIOGRAM REPORT: CPT | Performed by: FAMILY MEDICINE

## 2018-01-01 PROCEDURE — 99284 EMERGENCY DEPT VISIT MOD MDM: CPT | Mod: 25

## 2018-01-01 PROCEDURE — 73030 X-RAY EXAM OF SHOULDER: CPT | Mod: LT

## 2018-01-01 PROCEDURE — 71046 X-RAY EXAM CHEST 2 VIEWS: CPT

## 2018-01-01 PROCEDURE — 36430 TRANSFUSION BLD/BLD COMPNT: CPT | Performed by: FAMILY MEDICINE

## 2018-01-01 PROCEDURE — 86900 BLOOD TYPING SEROLOGIC ABO: CPT | Performed by: FAMILY MEDICINE

## 2018-01-01 PROCEDURE — 96361 HYDRATE IV INFUSION ADD-ON: CPT | Performed by: FAMILY MEDICINE

## 2018-01-01 PROCEDURE — 99213 OFFICE O/P EST LOW 20 MIN: CPT | Performed by: FAMILY MEDICINE

## 2018-01-01 PROCEDURE — 99284 EMERGENCY DEPT VISIT MOD MDM: CPT | Mod: 25 | Performed by: FAMILY MEDICINE

## 2018-01-01 PROCEDURE — 84484 ASSAY OF TROPONIN QUANT: CPT | Performed by: NURSE PRACTITIONER

## 2018-01-01 RX ORDER — ONDANSETRON 4 MG/1
4 TABLET, ORALLY DISINTEGRATING ORAL ONCE
Status: COMPLETED | OUTPATIENT
Start: 2018-01-01 | End: 2018-01-01

## 2018-01-01 RX ORDER — FENTANYL 12.5 UG/1
1 PATCH TRANSDERMAL
Qty: 30 PATCH | Refills: 0 | Status: SHIPPED | OUTPATIENT
Start: 2018-01-01 | End: 2019-01-01

## 2018-01-01 RX ORDER — DIPHENHYDRAMINE HYDROCHLORIDE 50 MG/ML
50 INJECTION INTRAMUSCULAR; INTRAVENOUS
Status: CANCELLED
Start: 2018-01-01

## 2018-01-01 RX ORDER — NALOXONE HYDROCHLORIDE 0.4 MG/ML
.1-.4 INJECTION, SOLUTION INTRAMUSCULAR; INTRAVENOUS; SUBCUTANEOUS
Status: DISCONTINUED | OUTPATIENT
Start: 2018-01-01 | End: 2018-01-01 | Stop reason: HOSPADM

## 2018-01-01 RX ORDER — HYDROMORPHONE HYDROCHLORIDE 2 MG/1
4 TABLET ORAL ONCE
Status: COMPLETED | OUTPATIENT
Start: 2018-01-01 | End: 2018-01-01

## 2018-01-01 RX ORDER — NICOTINE 21 MG/24HR
1 PATCH, TRANSDERMAL 24 HOURS TRANSDERMAL DAILY
Qty: 30 PATCH | Refills: 1 | Status: SHIPPED | OUTPATIENT
Start: 2018-01-01 | End: 2018-01-01

## 2018-01-01 RX ORDER — EPINEPHRINE 1 MG/ML
0.3 INJECTION, SOLUTION, CONCENTRATE INTRAVENOUS EVERY 5 MIN PRN
Status: CANCELLED | OUTPATIENT
Start: 2018-01-01

## 2018-01-01 RX ORDER — FENTANYL 25 UG/1
1 PATCH TRANSDERMAL
Qty: 30 PATCH | Refills: 0 | Status: SHIPPED | OUTPATIENT
Start: 2018-01-01 | End: 2018-01-01

## 2018-01-01 RX ORDER — POTASSIUM CHLORIDE 7.45 MG/ML
10 INJECTION INTRAVENOUS
Status: DISCONTINUED | OUTPATIENT
Start: 2018-01-01 | End: 2018-01-01 | Stop reason: HOSPADM

## 2018-01-01 RX ORDER — HYDROMORPHONE HYDROCHLORIDE 2 MG/1
2-4 TABLET ORAL
Qty: 80 TABLET | Refills: 0 | Status: SHIPPED | OUTPATIENT
Start: 2018-01-01 | End: 2018-01-01

## 2018-01-01 RX ORDER — ONDANSETRON 2 MG/ML
4 INJECTION INTRAMUSCULAR; INTRAVENOUS EVERY 30 MIN PRN
Status: DISCONTINUED | OUTPATIENT
Start: 2018-01-01 | End: 2018-01-01

## 2018-01-01 RX ORDER — METHYLPREDNISOLONE SODIUM SUCCINATE 125 MG/2ML
125 INJECTION, POWDER, LYOPHILIZED, FOR SOLUTION INTRAMUSCULAR; INTRAVENOUS
Status: CANCELLED
Start: 2018-01-01

## 2018-01-01 RX ORDER — HYDROMORPHONE HYDROCHLORIDE 2 MG/1
2-4 TABLET ORAL
Status: DISCONTINUED | OUTPATIENT
Start: 2018-01-01 | End: 2018-01-01 | Stop reason: HOSPADM

## 2018-01-01 RX ORDER — LIDOCAINE 40 MG/G
CREAM TOPICAL
Status: DISCONTINUED | OUTPATIENT
Start: 2018-01-01 | End: 2018-01-01 | Stop reason: HOSPADM

## 2018-01-01 RX ORDER — SODIUM CHLORIDE 9 MG/ML
1000 INJECTION, SOLUTION INTRAVENOUS CONTINUOUS
Status: DISCONTINUED | OUTPATIENT
Start: 2018-01-01 | End: 2018-01-01 | Stop reason: HOSPADM

## 2018-01-01 RX ORDER — FENTANYL 25 UG/1
25 PATCH TRANSDERMAL
Status: DISCONTINUED | OUTPATIENT
Start: 2018-01-01 | End: 2018-01-01 | Stop reason: HOSPADM

## 2018-01-01 RX ORDER — EPINEPHRINE 0.3 MG/.3ML
0.3 INJECTION SUBCUTANEOUS EVERY 5 MIN PRN
Status: CANCELLED | OUTPATIENT
Start: 2018-01-01

## 2018-01-01 RX ORDER — OXYCODONE HYDROCHLORIDE 30 MG/1
10 TABLET, FILM COATED, EXTENDED RELEASE ORAL 2 TIMES DAILY
COMMUNITY
End: 2019-01-01

## 2018-01-01 RX ORDER — METOCLOPRAMIDE HYDROCHLORIDE 5 MG/ML
5 INJECTION INTRAMUSCULAR; INTRAVENOUS EVERY 6 HOURS PRN
Status: DISCONTINUED | OUTPATIENT
Start: 2018-01-01 | End: 2018-01-01 | Stop reason: HOSPADM

## 2018-01-01 RX ORDER — OXYCODONE HYDROCHLORIDE 5 MG/1
5-10 TABLET ORAL
Status: DISCONTINUED | OUTPATIENT
Start: 2018-01-01 | End: 2018-01-01

## 2018-01-01 RX ORDER — PROCHLORPERAZINE MALEATE 10 MG
5 TABLET ORAL EVERY 6 HOURS PRN
Qty: 30 TABLET | Refills: 1 | Status: SHIPPED | OUTPATIENT
Start: 2018-01-01 | End: 2018-01-01

## 2018-01-01 RX ORDER — HYDROMORPHONE HYDROCHLORIDE 2 MG/1
2 TABLET ORAL
Qty: 12 TABLET | Refills: 0 | Status: SHIPPED | OUTPATIENT
Start: 2018-01-01 | End: 2018-01-01

## 2018-01-01 RX ORDER — POTASSIUM CL/LIDO/0.9 % NACL 10MEQ/0.1L
10 INTRAVENOUS SOLUTION, PIGGYBACK (ML) INTRAVENOUS
Status: DISCONTINUED | OUTPATIENT
Start: 2018-01-01 | End: 2018-01-01 | Stop reason: HOSPADM

## 2018-01-01 RX ORDER — ACETAMINOPHEN 325 MG/1
650 TABLET ORAL EVERY 4 HOURS PRN
Qty: 100 TABLET | COMMUNITY
Start: 2018-01-01 | End: 2018-01-01

## 2018-01-01 RX ORDER — AMOXICILLIN 250 MG
2 CAPSULE ORAL 2 TIMES DAILY PRN
Status: DISCONTINUED | OUTPATIENT
Start: 2018-01-01 | End: 2018-01-01 | Stop reason: HOSPADM

## 2018-01-01 RX ORDER — ONDANSETRON 8 MG/1
8 TABLET, ORALLY DISINTEGRATING ORAL EVERY 8 HOURS
Qty: 90 TABLET | Refills: 1 | Status: SHIPPED | OUTPATIENT
Start: 2018-01-01 | End: 2018-01-01

## 2018-01-01 RX ORDER — SODIUM CHLORIDE, SODIUM LACTATE, POTASSIUM CHLORIDE, CALCIUM CHLORIDE 600; 310; 30; 20 MG/100ML; MG/100ML; MG/100ML; MG/100ML
1000 INJECTION, SOLUTION INTRAVENOUS CONTINUOUS
Status: DISCONTINUED | OUTPATIENT
Start: 2018-01-01 | End: 2018-01-01

## 2018-01-01 RX ORDER — POTASSIUM CHLORIDE 750 MG/1
10 TABLET, EXTENDED RELEASE ORAL DAILY
Qty: 30 TABLET | Refills: 1 | Status: SHIPPED | OUTPATIENT
Start: 2018-01-01 | End: 2018-01-01

## 2018-01-01 RX ORDER — HYDROMORPHONE HYDROCHLORIDE 1 MG/ML
.3-.5 INJECTION, SOLUTION INTRAMUSCULAR; INTRAVENOUS; SUBCUTANEOUS
Status: DISCONTINUED | OUTPATIENT
Start: 2018-01-01 | End: 2018-01-01

## 2018-01-01 RX ORDER — POLYETHYLENE GLYCOL 3350 17 G/17G
17 POWDER, FOR SOLUTION ORAL DAILY
COMMUNITY

## 2018-01-01 RX ORDER — ACETAMINOPHEN 325 MG/1
650 TABLET ORAL EVERY 4 HOURS PRN
Status: DISCONTINUED | OUTPATIENT
Start: 2018-01-01 | End: 2018-01-01 | Stop reason: HOSPADM

## 2018-01-01 RX ORDER — HEPARIN SODIUM (PORCINE) LOCK FLUSH IV SOLN 100 UNIT/ML 100 UNIT/ML
5 SOLUTION INTRAVENOUS
Status: DISCONTINUED | OUTPATIENT
Start: 2018-01-01 | End: 2018-01-01 | Stop reason: HOSPADM

## 2018-01-01 RX ORDER — OLANZAPINE 5 MG/1
5 TABLET, ORALLY DISINTEGRATING ORAL AT BEDTIME
COMMUNITY
End: 2018-01-01

## 2018-01-01 RX ORDER — METOCLOPRAMIDE 5 MG/1
5 TABLET ORAL EVERY 6 HOURS PRN
Status: DISCONTINUED | OUTPATIENT
Start: 2018-01-01 | End: 2018-01-01 | Stop reason: HOSPADM

## 2018-01-01 RX ORDER — ONDANSETRON 4 MG/1
4-8 TABLET, ORALLY DISINTEGRATING ORAL EVERY 8 HOURS PRN
Qty: 12 TABLET | Refills: 0 | Status: SHIPPED | OUTPATIENT
Start: 2018-01-01 | End: 2019-01-01

## 2018-01-01 RX ORDER — DULOXETIN HYDROCHLORIDE 30 MG/1
30 CAPSULE, DELAYED RELEASE ORAL DAILY
Qty: 30 CAPSULE | Refills: 1 | Status: SHIPPED | OUTPATIENT
Start: 2018-01-01 | End: 2018-01-01

## 2018-01-01 RX ORDER — PROCHLORPERAZINE MALEATE 5 MG
5 TABLET ORAL EVERY 6 HOURS PRN
Status: DISCONTINUED | OUTPATIENT
Start: 2018-01-01 | End: 2018-01-01 | Stop reason: HOSPADM

## 2018-01-01 RX ORDER — ALBUTEROL SULFATE 0.83 MG/ML
2.5 SOLUTION RESPIRATORY (INHALATION)
Status: CANCELLED | OUTPATIENT
Start: 2018-01-01

## 2018-01-01 RX ORDER — ASPIRIN 81 MG/1
81 TABLET, CHEWABLE ORAL DAILY
Status: DISCONTINUED | OUTPATIENT
Start: 2018-01-01 | End: 2018-01-01 | Stop reason: HOSPADM

## 2018-01-01 RX ORDER — PALONOSETRON 0.05 MG/ML
0.25 INJECTION, SOLUTION INTRAVENOUS ONCE
Status: COMPLETED | OUTPATIENT
Start: 2018-01-01 | End: 2018-01-01

## 2018-01-01 RX ORDER — DIAZEPAM 5 MG
5 TABLET ORAL ONCE
Status: COMPLETED | OUTPATIENT
Start: 2018-01-01 | End: 2018-01-01

## 2018-01-01 RX ORDER — SODIUM CHLORIDE 9 MG/ML
1000 INJECTION, SOLUTION INTRAVENOUS CONTINUOUS PRN
Status: CANCELLED
Start: 2018-01-01

## 2018-01-01 RX ORDER — NALOXONE HYDROCHLORIDE 0.4 MG/ML
.1-.4 INJECTION, SOLUTION INTRAMUSCULAR; INTRAVENOUS; SUBCUTANEOUS
Status: DISCONTINUED | OUTPATIENT
Start: 2018-01-01 | End: 2018-01-01

## 2018-01-01 RX ORDER — IOPAMIDOL 755 MG/ML
69 INJECTION, SOLUTION INTRAVASCULAR ONCE
Status: COMPLETED | OUTPATIENT
Start: 2018-01-01 | End: 2018-01-01

## 2018-01-01 RX ORDER — ALBUTEROL SULFATE 90 UG/1
1-2 AEROSOL, METERED RESPIRATORY (INHALATION)
Status: CANCELLED
Start: 2018-01-01

## 2018-01-01 RX ORDER — IOPAMIDOL 755 MG/ML
74 INJECTION, SOLUTION INTRAVASCULAR ONCE
Status: COMPLETED | OUTPATIENT
Start: 2018-01-01 | End: 2018-01-01

## 2018-01-01 RX ORDER — ONDANSETRON 2 MG/ML
4 INJECTION INTRAMUSCULAR; INTRAVENOUS EVERY 6 HOURS PRN
Status: DISCONTINUED | OUTPATIENT
Start: 2018-01-01 | End: 2018-01-01 | Stop reason: DRUGHIGH

## 2018-01-01 RX ORDER — HEPARIN SODIUM,PORCINE 10 UNIT/ML
5-10 VIAL (ML) INTRAVENOUS
Status: DISCONTINUED | OUTPATIENT
Start: 2018-01-01 | End: 2018-01-01 | Stop reason: HOSPADM

## 2018-01-01 RX ORDER — MEGESTROL ACETATE 40 MG/ML
200 SUSPENSION ORAL DAILY
Qty: 600 ML | Refills: 2 | Status: SHIPPED | OUTPATIENT
Start: 2018-01-01 | End: 2018-01-01

## 2018-01-01 RX ORDER — TRAMADOL HYDROCHLORIDE 50 MG/1
50 TABLET ORAL EVERY 6 HOURS PRN
Qty: 20 TABLET | Refills: 0 | Status: SHIPPED | OUTPATIENT
Start: 2018-01-01 | End: 2018-01-01

## 2018-01-01 RX ORDER — PROCHLORPERAZINE 25 MG
12.5 SUPPOSITORY, RECTAL RECTAL EVERY 12 HOURS PRN
Status: DISCONTINUED | OUTPATIENT
Start: 2018-01-01 | End: 2018-01-01 | Stop reason: HOSPADM

## 2018-01-01 RX ORDER — LOSARTAN POTASSIUM 50 MG/1
50 TABLET ORAL DAILY
Status: DISCONTINUED | OUTPATIENT
Start: 2018-01-01 | End: 2018-01-01 | Stop reason: HOSPADM

## 2018-01-01 RX ORDER — PROCHLORPERAZINE MALEATE 10 MG
10 TABLET ORAL EVERY 6 HOURS PRN
Status: DISCONTINUED | OUTPATIENT
Start: 2018-01-01 | End: 2018-01-01

## 2018-01-01 RX ORDER — OLANZAPINE 5 MG/1
5 TABLET ORAL AT BEDTIME
Qty: 49 TABLET | Refills: 1
Start: 2018-01-01 | End: 2018-01-01

## 2018-01-01 RX ORDER — ONDANSETRON 4 MG/1
8 TABLET, ORALLY DISINTEGRATING ORAL EVERY 6 HOURS
Status: DISCONTINUED | OUTPATIENT
Start: 2018-01-01 | End: 2018-01-01 | Stop reason: HOSPADM

## 2018-01-01 RX ORDER — POTASSIUM CHLORIDE 750 MG/1
10 TABLET, EXTENDED RELEASE ORAL DAILY
Status: DISCONTINUED | OUTPATIENT
Start: 2018-01-01 | End: 2018-01-01 | Stop reason: HOSPADM

## 2018-01-01 RX ORDER — AMOXICILLIN 250 MG
2 CAPSULE ORAL 2 TIMES DAILY
Status: DISCONTINUED | OUTPATIENT
Start: 2018-01-01 | End: 2018-01-01 | Stop reason: HOSPADM

## 2018-01-01 RX ORDER — LORAZEPAM 0.5 MG/1
0.5 TABLET ORAL EVERY 4 HOURS PRN
Status: DISCONTINUED | OUTPATIENT
Start: 2018-01-01 | End: 2018-01-01 | Stop reason: HOSPADM

## 2018-01-01 RX ORDER — MEPERIDINE HYDROCHLORIDE 25 MG/ML
25 INJECTION INTRAMUSCULAR; INTRAVENOUS; SUBCUTANEOUS EVERY 30 MIN PRN
Status: CANCELLED | OUTPATIENT
Start: 2018-01-01

## 2018-01-01 RX ORDER — ROSUVASTATIN CALCIUM 5 MG/1
5 TABLET, COATED ORAL DAILY
Status: DISCONTINUED | OUTPATIENT
Start: 2018-01-01 | End: 2018-01-01 | Stop reason: HOSPADM

## 2018-01-01 RX ORDER — HEPARIN SODIUM (PORCINE) LOCK FLUSH IV SOLN 100 UNIT/ML 100 UNIT/ML
500 SOLUTION INTRAVENOUS EVERY 8 HOURS
Status: DISCONTINUED | OUTPATIENT
Start: 2018-01-01 | End: 2018-01-01 | Stop reason: HOSPADM

## 2018-01-01 RX ORDER — DRONABINOL 5 MG/1
5 CAPSULE ORAL 2 TIMES DAILY
Qty: 28 CAPSULE | Refills: 0 | Status: SHIPPED | OUTPATIENT
Start: 2018-01-01 | End: 2019-01-01

## 2018-01-01 RX ORDER — POLYETHYLENE GLYCOL 3350 17 G
2 POWDER IN PACKET (EA) ORAL
Status: DISCONTINUED | OUTPATIENT
Start: 2018-01-01 | End: 2018-01-01

## 2018-01-01 RX ORDER — HEPARIN SODIUM (PORCINE) LOCK FLUSH IV SOLN 100 UNIT/ML 100 UNIT/ML
500 SOLUTION INTRAVENOUS
Status: COMPLETED | OUTPATIENT
Start: 2018-01-01 | End: 2018-01-01

## 2018-01-01 RX ORDER — NICOTINE 21 MG/24HR
1 PATCH, TRANSDERMAL 24 HOURS TRANSDERMAL DAILY
Status: DISCONTINUED | OUTPATIENT
Start: 2018-01-01 | End: 2018-01-01 | Stop reason: HOSPADM

## 2018-01-01 RX ORDER — HEPARIN SODIUM,PORCINE 10 UNIT/ML
5-10 VIAL (ML) INTRAVENOUS EVERY 24 HOURS
Status: DISCONTINUED | OUTPATIENT
Start: 2018-01-01 | End: 2018-01-01 | Stop reason: HOSPADM

## 2018-01-01 RX ORDER — ALPRAZOLAM 0.5 MG
TABLET ORAL
Status: ON HOLD | COMMUNITY
Start: 2018-05-02 | End: 2018-01-01

## 2018-01-01 RX ORDER — POTASSIUM CHLORIDE 1500 MG/1
40 TABLET, EXTENDED RELEASE ORAL ONCE
Status: COMPLETED | OUTPATIENT
Start: 2018-01-01 | End: 2018-01-01

## 2018-01-01 RX ORDER — HYDROMORPHONE HYDROCHLORIDE 2 MG/1
2 TABLET ORAL
Status: DISCONTINUED | OUTPATIENT
Start: 2018-01-01 | End: 2018-01-01

## 2018-01-01 RX ORDER — BENZONATATE 100 MG/1
100 CAPSULE ORAL 3 TIMES DAILY PRN
Status: DISCONTINUED | OUTPATIENT
Start: 2018-01-01 | End: 2018-01-01 | Stop reason: HOSPADM

## 2018-01-01 RX ORDER — BENZONATATE 100 MG/1
100 CAPSULE ORAL 3 TIMES DAILY PRN
Qty: 42 CAPSULE | Refills: 0 | Status: SHIPPED | OUTPATIENT
Start: 2018-01-01 | End: 2018-01-01

## 2018-01-01 RX ORDER — DULOXETIN HYDROCHLORIDE 30 MG/1
30 CAPSULE, DELAYED RELEASE ORAL DAILY
Status: DISCONTINUED | OUTPATIENT
Start: 2018-01-01 | End: 2018-01-01 | Stop reason: HOSPADM

## 2018-01-01 RX ORDER — DULOXETIN HYDROCHLORIDE 30 MG/1
60 CAPSULE, DELAYED RELEASE ORAL DAILY
Qty: 60 CAPSULE | Refills: 1
Start: 2018-01-01 | End: 2018-01-01

## 2018-01-01 RX ORDER — AMOXICILLIN 250 MG
1 CAPSULE ORAL 2 TIMES DAILY PRN
Status: DISCONTINUED | OUTPATIENT
Start: 2018-01-01 | End: 2018-01-01 | Stop reason: HOSPADM

## 2018-01-01 RX ORDER — MAGNESIUM SULFATE HEPTAHYDRATE 40 MG/ML
4 INJECTION, SOLUTION INTRAVENOUS EVERY 4 HOURS PRN
Status: DISCONTINUED | OUTPATIENT
Start: 2018-01-01 | End: 2018-01-01 | Stop reason: HOSPADM

## 2018-01-01 RX ORDER — LORAZEPAM 2 MG/ML
0.5 INJECTION INTRAMUSCULAR EVERY 4 HOURS PRN
Status: CANCELLED
Start: 2018-01-01

## 2018-01-01 RX ORDER — METOPROLOL TARTRATE 25 MG/1
50 TABLET, FILM COATED ORAL 2 TIMES DAILY
Status: DISCONTINUED | OUTPATIENT
Start: 2018-01-01 | End: 2018-01-01 | Stop reason: HOSPADM

## 2018-01-01 RX ORDER — HYDROMORPHONE HYDROCHLORIDE 1 MG/ML
0.5 INJECTION, SOLUTION INTRAMUSCULAR; INTRAVENOUS; SUBCUTANEOUS
Status: DISCONTINUED | OUTPATIENT
Start: 2018-01-01 | End: 2018-01-01

## 2018-01-01 RX ORDER — LORAZEPAM 2 MG/ML
0.5 INJECTION INTRAMUSCULAR EVERY 4 HOURS PRN
Status: DISCONTINUED | OUTPATIENT
Start: 2018-01-01 | End: 2018-01-01 | Stop reason: HOSPADM

## 2018-01-01 RX ORDER — IOPAMIDOL 755 MG/ML
80 INJECTION, SOLUTION INTRAVASCULAR ONCE
Status: COMPLETED | OUTPATIENT
Start: 2018-01-01 | End: 2018-01-01

## 2018-01-01 RX ORDER — MENTHOL 1.4 %
ADHESIVE PATCH, MEDICATED TOPICAL 3 TIMES DAILY
COMMUNITY
End: 2019-01-01

## 2018-01-01 RX ORDER — MAGNESIUM OXIDE 400 MG/1
400 TABLET ORAL DAILY
Status: DISCONTINUED | OUTPATIENT
Start: 2018-01-01 | End: 2018-01-01 | Stop reason: HOSPADM

## 2018-01-01 RX ORDER — POTASSIUM CHLORIDE 1500 MG/1
20-40 TABLET, EXTENDED RELEASE ORAL
Status: DISCONTINUED | OUTPATIENT
Start: 2018-01-01 | End: 2018-01-01 | Stop reason: HOSPADM

## 2018-01-01 RX ORDER — HEPARIN SODIUM (PORCINE) LOCK FLUSH IV SOLN 100 UNIT/ML 100 UNIT/ML
500 SOLUTION INTRAVENOUS ONCE
Status: COMPLETED | OUTPATIENT
Start: 2018-01-01 | End: 2018-01-01

## 2018-01-01 RX ORDER — MAGNESIUM SULFATE HEPTAHYDRATE 40 MG/ML
4 INJECTION, SOLUTION INTRAVENOUS ONCE
Status: COMPLETED | OUTPATIENT
Start: 2018-01-01 | End: 2018-01-01

## 2018-01-01 RX ORDER — POTASSIUM CHLORIDE 1.5 G/1.58G
20-40 POWDER, FOR SOLUTION ORAL
Status: DISCONTINUED | OUTPATIENT
Start: 2018-01-01 | End: 2018-01-01 | Stop reason: HOSPADM

## 2018-01-01 RX ORDER — ALBUTEROL SULFATE 90 UG/1
2 AEROSOL, METERED RESPIRATORY (INHALATION) EVERY 4 HOURS PRN
Status: DISCONTINUED | OUTPATIENT
Start: 2018-01-01 | End: 2018-01-01 | Stop reason: HOSPADM

## 2018-01-01 RX ORDER — PALONOSETRON 0.05 MG/ML
0.25 INJECTION, SOLUTION INTRAVENOUS ONCE
Status: CANCELLED
Start: 2018-01-01

## 2018-01-01 RX ORDER — TRAMADOL HYDROCHLORIDE 50 MG/1
50 TABLET ORAL EVERY 6 HOURS PRN
Status: DISCONTINUED | OUTPATIENT
Start: 2018-01-01 | End: 2018-01-01 | Stop reason: HOSPADM

## 2018-01-01 RX ORDER — IBUPROFEN 600 MG/1
600 TABLET, FILM COATED ORAL EVERY 6 HOURS PRN
Status: DISCONTINUED | OUTPATIENT
Start: 2018-01-01 | End: 2018-01-01 | Stop reason: HOSPADM

## 2018-01-01 RX ORDER — ONDANSETRON 4 MG/1
4 TABLET, ORALLY DISINTEGRATING ORAL EVERY 6 HOURS PRN
Status: DISCONTINUED | OUTPATIENT
Start: 2018-01-01 | End: 2018-01-01 | Stop reason: HOSPADM

## 2018-01-01 RX ORDER — ONDANSETRON 2 MG/ML
8 INJECTION INTRAMUSCULAR; INTRAVENOUS EVERY 8 HOURS
Status: DISCONTINUED | OUTPATIENT
Start: 2018-01-01 | End: 2018-01-01 | Stop reason: HOSPADM

## 2018-01-01 RX ORDER — ZOLPIDEM TARTRATE 5 MG/1
2.5-5 TABLET ORAL
Qty: 2 TABLET | Refills: 0 | Status: SHIPPED | OUTPATIENT
Start: 2018-01-01 | End: 2018-01-01

## 2018-01-01 RX ORDER — IOPAMIDOL 755 MG/ML
20-135 INJECTION, SOLUTION INTRAVASCULAR ONCE
Status: COMPLETED | OUTPATIENT
Start: 2018-01-01 | End: 2018-01-01

## 2018-01-01 RX ORDER — FENTANYL 12.5 UG/1
12 PATCH TRANSDERMAL
Status: DISCONTINUED | OUTPATIENT
Start: 2018-01-01 | End: 2018-01-01 | Stop reason: HOSPADM

## 2018-01-01 RX ORDER — ACETAMINOPHEN 325 MG/1
650 TABLET ORAL EVERY 4 HOURS PRN
Status: DISCONTINUED | OUTPATIENT
Start: 2018-01-01 | End: 2018-01-01

## 2018-01-01 RX ORDER — POTASSIUM CHLORIDE 29.8 MG/ML
20 INJECTION INTRAVENOUS
Status: DISCONTINUED | OUTPATIENT
Start: 2018-01-01 | End: 2018-01-01 | Stop reason: HOSPADM

## 2018-01-01 RX ORDER — LORAZEPAM 0.5 MG/1
.5-1 TABLET ORAL EVERY 4 HOURS PRN
Qty: 30 TABLET | Refills: 5
Start: 2018-01-01 | End: 2018-01-01

## 2018-01-01 RX ADMIN — ASPIRIN 81 MG 81 MG: 81 TABLET ORAL at 07:59

## 2018-01-01 RX ADMIN — BENZONATATE 100 MG: 100 CAPSULE ORAL at 11:29

## 2018-01-01 RX ADMIN — LORAZEPAM 0.5 MG: 0.5 TABLET ORAL at 07:58

## 2018-01-01 RX ADMIN — METOPROLOL TARTRATE 50 MG: 25 TABLET ORAL at 20:06

## 2018-01-01 RX ADMIN — SODIUM CHLORIDE, POTASSIUM CHLORIDE, SODIUM LACTATE AND CALCIUM CHLORIDE 1000 ML: 600; 310; 30; 20 INJECTION, SOLUTION INTRAVENOUS at 05:01

## 2018-01-01 RX ADMIN — ONDANSETRON 8 MG: 4 TABLET, ORALLY DISINTEGRATING ORAL at 18:28

## 2018-01-01 RX ADMIN — HEPARIN 5 ML: 100 SYRINGE at 10:58

## 2018-01-01 RX ADMIN — ENOXAPARIN SODIUM 70 MG: 80 INJECTION SUBCUTANEOUS at 11:29

## 2018-01-01 RX ADMIN — FLUDEOXYGLUCOSE F-18 12.06 MCI.: 500 INJECTION, SOLUTION INTRAVENOUS at 13:16

## 2018-01-01 RX ADMIN — POTASSIUM CHLORIDE 40 MEQ: 1500 TABLET, EXTENDED RELEASE ORAL at 02:00

## 2018-01-01 RX ADMIN — POTASSIUM CHLORIDE 10 MEQ: 750 TABLET, FILM COATED, EXTENDED RELEASE ORAL at 07:38

## 2018-01-01 RX ADMIN — ONDANSETRON 4 MG: 4 TABLET, ORALLY DISINTEGRATING ORAL at 13:58

## 2018-01-01 RX ADMIN — HYDROMORPHONE HYDROCHLORIDE 4 MG: 2 TABLET ORAL at 02:39

## 2018-01-01 RX ADMIN — HYDROMORPHONE HYDROCHLORIDE 4 MG: 2 TABLET ORAL at 14:59

## 2018-01-01 RX ADMIN — ROSUVASTATIN CALCIUM 5 MG: 5 TABLET, FILM COATED ORAL at 08:17

## 2018-01-01 RX ADMIN — ONDANSETRON 4 MG: 2 INJECTION INTRAMUSCULAR; INTRAVENOUS at 17:29

## 2018-01-01 RX ADMIN — SODIUM CHLORIDE 1000 ML: 9 INJECTION, SOLUTION INTRAVENOUS at 09:36

## 2018-01-01 RX ADMIN — Medication 400 MG: at 07:39

## 2018-01-01 RX ADMIN — HEPARIN 500 UNITS: 100 SYRINGE at 14:57

## 2018-01-01 RX ADMIN — IOPAMIDOL 69 ML: 755 INJECTION, SOLUTION INTRAVENOUS at 18:37

## 2018-01-01 RX ADMIN — OMEPRAZOLE 20 MG: 20 CAPSULE, DELAYED RELEASE ORAL at 08:18

## 2018-01-01 RX ADMIN — OMEPRAZOLE 20 MG: 20 CAPSULE, DELAYED RELEASE ORAL at 07:38

## 2018-01-01 RX ADMIN — HYDROMORPHONE HYDROCHLORIDE 4 MG: 2 TABLET ORAL at 22:34

## 2018-01-01 RX ADMIN — HEPARIN 5 ML: 100 SYRINGE at 13:41

## 2018-01-01 RX ADMIN — METOPROLOL TARTRATE 50 MG: 25 TABLET ORAL at 07:59

## 2018-01-01 RX ADMIN — POTASSIUM CHLORIDE 40 MEQ: 1500 TABLET, EXTENDED RELEASE ORAL at 20:00

## 2018-01-01 RX ADMIN — HYDROMORPHONE HYDROCHLORIDE 4 MG: 2 TABLET ORAL at 07:31

## 2018-01-01 RX ADMIN — GUAIFENESIN 10 ML: 100 SOLUTION ORAL at 21:42

## 2018-01-01 RX ADMIN — SODIUM CHLORIDE 1000 ML: 9 INJECTION, SOLUTION INTRAVENOUS at 11:55

## 2018-01-01 RX ADMIN — ONDANSETRON 8 MG: 4 TABLET, ORALLY DISINTEGRATING ORAL at 06:58

## 2018-01-01 RX ADMIN — DULOXETINE HYDROCHLORIDE 30 MG: 30 CAPSULE, DELAYED RELEASE PELLETS ORAL at 13:17

## 2018-01-01 RX ADMIN — POTASSIUM CHLORIDE 40 MEQ: 1500 TABLET, EXTENDED RELEASE ORAL at 23:59

## 2018-01-01 RX ADMIN — Medication 400 MG: at 08:24

## 2018-01-01 RX ADMIN — SODIUM CHLORIDE, POTASSIUM CHLORIDE, SODIUM LACTATE AND CALCIUM CHLORIDE 1000 ML: 600; 310; 30; 20 INJECTION, SOLUTION INTRAVENOUS at 21:04

## 2018-01-01 RX ADMIN — SENNOSIDES AND DOCUSATE SODIUM 2 TABLET: 8.6; 5 TABLET ORAL at 08:20

## 2018-01-01 RX ADMIN — HYDROMORPHONE HYDROCHLORIDE 0.5 MG: 1 INJECTION, SOLUTION INTRAMUSCULAR; INTRAVENOUS; SUBCUTANEOUS at 17:28

## 2018-01-01 RX ADMIN — ENOXAPARIN SODIUM 70 MG: 80 INJECTION SUBCUTANEOUS at 09:49

## 2018-01-01 RX ADMIN — ASPIRIN 81 MG 81 MG: 81 TABLET ORAL at 08:24

## 2018-01-01 RX ADMIN — MAGNESIUM SULFATE IN WATER 4 G: 40 INJECTION, SOLUTION INTRAVENOUS at 13:38

## 2018-01-01 RX ADMIN — SODIUM CHLORIDE, POTASSIUM CHLORIDE, SODIUM LACTATE AND CALCIUM CHLORIDE 1000 ML: 600; 310; 30; 20 INJECTION, SOLUTION INTRAVENOUS at 23:24

## 2018-01-01 RX ADMIN — Medication 400 MG: at 07:59

## 2018-01-01 RX ADMIN — METOPROLOL TARTRATE 50 MG: 25 TABLET ORAL at 08:04

## 2018-01-01 RX ADMIN — PROCHLORPERAZINE MALEATE 5 MG: 5 TABLET, FILM COATED ORAL at 21:43

## 2018-01-01 RX ADMIN — HYDROMORPHONE HYDROCHLORIDE 1 MG: 1 INJECTION, SOLUTION INTRAMUSCULAR; INTRAVENOUS; SUBCUTANEOUS at 13:59

## 2018-01-01 RX ADMIN — ENOXAPARIN SODIUM 70 MG: 80 INJECTION SUBCUTANEOUS at 22:41

## 2018-01-01 RX ADMIN — POTASSIUM CHLORIDE 20 MEQ: 400 INJECTION, SOLUTION INTRAVENOUS at 10:18

## 2018-01-01 RX ADMIN — ROSUVASTATIN CALCIUM 5 MG: 5 TABLET, FILM COATED ORAL at 08:19

## 2018-01-01 RX ADMIN — LOSARTAN POTASSIUM 50 MG: 50 TABLET, FILM COATED ORAL at 08:00

## 2018-01-01 RX ADMIN — HYDROMORPHONE HYDROCHLORIDE 4 MG: 2 TABLET ORAL at 22:40

## 2018-01-01 RX ADMIN — HYDROMORPHONE HYDROCHLORIDE 4 MG: 2 TABLET ORAL at 21:26

## 2018-01-01 RX ADMIN — SODIUM CHLORIDE, POTASSIUM CHLORIDE, SODIUM LACTATE AND CALCIUM CHLORIDE 1000 ML: 600; 310; 30; 20 INJECTION, SOLUTION INTRAVENOUS at 12:50

## 2018-01-01 RX ADMIN — ROSUVASTATIN CALCIUM 5 MG: 5 TABLET, FILM COATED ORAL at 07:42

## 2018-01-01 RX ADMIN — SENNOSIDES AND DOCUSATE SODIUM 1 TABLET: 8.6; 5 TABLET ORAL at 08:23

## 2018-01-01 RX ADMIN — SODIUM CHLORIDE, PRESERVATIVE FREE 500 UNITS: 5 INJECTION INTRAVENOUS at 01:20

## 2018-01-01 RX ADMIN — SODIUM CHLORIDE, PRESERVATIVE FREE 500 UNITS: 5 INJECTION INTRAVENOUS at 10:22

## 2018-01-01 RX ADMIN — FENTANYL 1 PATCH: 25 PATCH, EXTENDED RELEASE TRANSDERMAL at 10:21

## 2018-01-01 RX ADMIN — ONDANSETRON 8 MG: 2 INJECTION INTRAMUSCULAR; INTRAVENOUS at 02:56

## 2018-01-01 RX ADMIN — SODIUM CHLORIDE 1000 ML: 9 INJECTION, SOLUTION INTRAVENOUS at 19:00

## 2018-01-01 RX ADMIN — LORAZEPAM 0.5 MG: 0.5 TABLET ORAL at 03:00

## 2018-01-01 RX ADMIN — NICOTINE POLACRILEX 2 MG: 2 GUM, CHEWING ORAL at 09:01

## 2018-01-01 RX ADMIN — SODIUM CHLORIDE 80 ML: 9 INJECTION, SOLUTION INTRAVENOUS at 10:07

## 2018-01-01 RX ADMIN — NICOTINE 1 PATCH: 14 PATCH, EXTENDED RELEASE TRANSDERMAL at 07:39

## 2018-01-01 RX ADMIN — MAGNESIUM SULFATE HEPTAHYDRATE 4 G: 40 INJECTION, SOLUTION INTRAVENOUS at 02:00

## 2018-01-01 RX ADMIN — HYDROMORPHONE HYDROCHLORIDE 0.5 MG: 1 INJECTION, SOLUTION INTRAMUSCULAR; INTRAVENOUS; SUBCUTANEOUS at 04:11

## 2018-01-01 RX ADMIN — LOSARTAN POTASSIUM 50 MG: 50 TABLET, FILM COATED ORAL at 08:17

## 2018-01-01 RX ADMIN — HYDROMORPHONE HYDROCHLORIDE 4 MG: 2 TABLET ORAL at 07:09

## 2018-01-01 RX ADMIN — PROCHLORPERAZINE EDISYLATE 5 MG: 5 INJECTION INTRAMUSCULAR; INTRAVENOUS at 14:25

## 2018-01-01 RX ADMIN — POTASSIUM CHLORIDE 20 MEQ: 400 INJECTION, SOLUTION INTRAVENOUS at 09:05

## 2018-01-01 RX ADMIN — SODIUM CHLORIDE 925 MG: 9 INJECTION, SOLUTION INTRAVENOUS at 13:19

## 2018-01-01 RX ADMIN — GUAIFENESIN 10 ML: 100 SOLUTION ORAL at 08:17

## 2018-01-01 RX ADMIN — METOPROLOL TARTRATE 50 MG: 25 TABLET ORAL at 08:18

## 2018-01-01 RX ADMIN — SODIUM CHLORIDE 990 MG: 9 INJECTION, SOLUTION INTRAVENOUS at 11:27

## 2018-01-01 RX ADMIN — ENOXAPARIN SODIUM 70 MG: 80 INJECTION SUBCUTANEOUS at 10:20

## 2018-01-01 RX ADMIN — POTASSIUM CHLORIDE 40 MEQ: 1500 TABLET, EXTENDED RELEASE ORAL at 08:20

## 2018-01-01 RX ADMIN — LORAZEPAM 0.5 MG: 0.5 TABLET ORAL at 16:56

## 2018-01-01 RX ADMIN — IOPAMIDOL 74 ML: 755 INJECTION, SOLUTION INTRAVENOUS at 15:00

## 2018-01-01 RX ADMIN — MAGNESIUM SULFATE HEPTAHYDRATE: 500 INJECTION, SOLUTION INTRAMUSCULAR; INTRAVENOUS at 11:22

## 2018-01-01 RX ADMIN — SODIUM CHLORIDE, POTASSIUM CHLORIDE, SODIUM LACTATE AND CALCIUM CHLORIDE 1000 ML: 600; 310; 30; 20 INJECTION, SOLUTION INTRAVENOUS at 23:55

## 2018-01-01 RX ADMIN — NICOTINE 1 PATCH: 14 PATCH, EXTENDED RELEASE TRANSDERMAL at 00:12

## 2018-01-01 RX ADMIN — ENOXAPARIN SODIUM 70 MG: 80 INJECTION SUBCUTANEOUS at 21:43

## 2018-01-01 RX ADMIN — POTASSIUM CHLORIDE 20 MEQ: 1500 TABLET, EXTENDED RELEASE ORAL at 10:22

## 2018-01-01 RX ADMIN — BENZONATATE 100 MG: 100 CAPSULE ORAL at 06:48

## 2018-01-01 RX ADMIN — SODIUM CHLORIDE, PRESERVATIVE FREE 5 ML: 5 INJECTION INTRAVENOUS at 08:40

## 2018-01-01 RX ADMIN — HYDROMORPHONE HYDROCHLORIDE 2 MG: 2 TABLET ORAL at 09:51

## 2018-01-01 RX ADMIN — POTASSIUM CHLORIDE 10 MEQ: 750 TABLET, FILM COATED, EXTENDED RELEASE ORAL at 08:24

## 2018-01-01 RX ADMIN — HYDROMORPHONE HYDROCHLORIDE 4 MG: 2 TABLET ORAL at 02:59

## 2018-01-01 RX ADMIN — ENOXAPARIN SODIUM 70 MG: 80 INJECTION SUBCUTANEOUS at 10:31

## 2018-01-01 RX ADMIN — LOSARTAN POTASSIUM 50 MG: 50 TABLET, FILM COATED ORAL at 07:38

## 2018-01-01 RX ADMIN — FENTANYL 1 PATCH: 12 PATCH, EXTENDED RELEASE TRANSDERMAL at 10:21

## 2018-01-01 RX ADMIN — ENOXAPARIN SODIUM 70 MG: 80 INJECTION SUBCUTANEOUS at 23:59

## 2018-01-01 RX ADMIN — HEPARIN SODIUM (PORCINE) LOCK FLUSH IV SOLN 100 UNIT/ML 500 UNITS: 100 SOLUTION at 01:20

## 2018-01-01 RX ADMIN — NICOTINE POLACRILEX 2 MG: 2 GUM, CHEWING ORAL at 08:21

## 2018-01-01 RX ADMIN — POTASSIUM PHOSPHATE, MONOBASIC AND POTASSIUM PHOSPHATE, DIBASIC 15 MMOL: 224; 236 INJECTION, SOLUTION INTRAVENOUS at 11:10

## 2018-01-01 RX ADMIN — SODIUM CHLORIDE 150 MG: 9 INJECTION, SOLUTION INTRAVENOUS at 10:43

## 2018-01-01 RX ADMIN — HYDROMORPHONE HYDROCHLORIDE 4 MG: 2 TABLET ORAL at 22:05

## 2018-01-01 RX ADMIN — SENNOSIDES AND DOCUSATE SODIUM 1 TABLET: 8.6; 5 TABLET ORAL at 08:19

## 2018-01-01 RX ADMIN — DEXAMETHASONE SODIUM PHOSPHATE: 10 INJECTION, SOLUTION INTRAMUSCULAR; INTRAVENOUS at 11:56

## 2018-01-01 RX ADMIN — HYDROMORPHONE HYDROCHLORIDE 0.5 MG: 1 INJECTION, SOLUTION INTRAMUSCULAR; INTRAVENOUS; SUBCUTANEOUS at 23:59

## 2018-01-01 RX ADMIN — NICOTINE 1 PATCH: 14 PATCH, EXTENDED RELEASE TRANSDERMAL at 10:34

## 2018-01-01 RX ADMIN — SODIUM CHLORIDE, POTASSIUM CHLORIDE, SODIUM LACTATE AND CALCIUM CHLORIDE 1000 ML: 600; 310; 30; 20 INJECTION, SOLUTION INTRAVENOUS at 19:53

## 2018-01-01 RX ADMIN — IOPAMIDOL 100 ML: 755 INJECTION, SOLUTION INTRAVENOUS at 13:15

## 2018-01-01 RX ADMIN — ASPIRIN 81 MG 81 MG: 81 TABLET ORAL at 08:16

## 2018-01-01 RX ADMIN — PALONOSETRON HYDROCHLORIDE 0.25 MG: 0.25 INJECTION INTRAVENOUS at 10:37

## 2018-01-01 RX ADMIN — HYDROMORPHONE HYDROCHLORIDE 4 MG: 2 TABLET ORAL at 18:08

## 2018-01-01 RX ADMIN — HYDROMORPHONE HYDROCHLORIDE 4 MG: 2 TABLET ORAL at 04:28

## 2018-01-01 RX ADMIN — PALONOSETRON HYDROCHLORIDE 0.25 MG: 0.25 INJECTION INTRAVENOUS at 10:41

## 2018-01-01 RX ADMIN — DEXAMETHASONE SODIUM PHOSPHATE 12 MG: 10 INJECTION, SOLUTION INTRAMUSCULAR; INTRAVENOUS at 10:28

## 2018-01-01 RX ADMIN — HEPARIN SODIUM (PORCINE) LOCK FLUSH IV SOLN 100 UNIT/ML 5 ML: 100 SOLUTION at 08:40

## 2018-01-01 RX ADMIN — IOPAMIDOL 80 ML: 755 INJECTION, SOLUTION INTRAVENOUS at 10:07

## 2018-01-01 RX ADMIN — POTASSIUM CHLORIDE 10 MEQ: 750 TABLET, FILM COATED, EXTENDED RELEASE ORAL at 08:01

## 2018-01-01 RX ADMIN — HYDROMORPHONE HYDROCHLORIDE 0.5 MG: 1 INJECTION, SOLUTION INTRAMUSCULAR; INTRAVENOUS; SUBCUTANEOUS at 20:27

## 2018-01-01 RX ADMIN — MAGNESIUM SULFATE HEPTAHYDRATE: 500 INJECTION, SOLUTION INTRAMUSCULAR; INTRAVENOUS at 11:54

## 2018-01-01 RX ADMIN — ONDANSETRON 8 MG: 2 INJECTION INTRAMUSCULAR; INTRAVENOUS at 10:52

## 2018-01-01 RX ADMIN — HYDROMORPHONE HYDROCHLORIDE 4 MG: 2 TABLET ORAL at 15:16

## 2018-01-01 RX ADMIN — SODIUM CHLORIDE, POTASSIUM CHLORIDE, SODIUM LACTATE AND CALCIUM CHLORIDE 1000 ML: 600; 310; 30; 20 INJECTION, SOLUTION INTRAVENOUS at 07:34

## 2018-01-01 RX ADMIN — OMEPRAZOLE 20 MG: 20 CAPSULE, DELAYED RELEASE ORAL at 08:24

## 2018-01-01 RX ADMIN — SODIUM CHLORIDE 150 MG: 9 INJECTION, SOLUTION INTRAVENOUS at 10:40

## 2018-01-01 RX ADMIN — LORAZEPAM 0.5 MG: 0.5 TABLET ORAL at 10:52

## 2018-01-01 RX ADMIN — HYDROMORPHONE HYDROCHLORIDE 4 MG: 2 TABLET ORAL at 00:12

## 2018-01-01 RX ADMIN — ONDANSETRON 4 MG: 2 INJECTION INTRAMUSCULAR; INTRAVENOUS at 21:03

## 2018-01-01 RX ADMIN — NICOTINE POLACRILEX 2 MG: 2 GUM, CHEWING ORAL at 16:52

## 2018-01-01 RX ADMIN — METOPROLOL TARTRATE 50 MG: 25 TABLET ORAL at 21:31

## 2018-01-01 RX ADMIN — FAMOTIDINE 20 MG: 20 INJECTION, SOLUTION INTRAVENOUS at 10:15

## 2018-01-01 RX ADMIN — METOPROLOL TARTRATE 50 MG: 25 TABLET ORAL at 21:00

## 2018-01-01 RX ADMIN — MAGNESIUM SULFATE HEPTAHYDRATE 4 G: 40 INJECTION, SOLUTION INTRAVENOUS at 08:25

## 2018-01-01 RX ADMIN — HEPARIN, PORCINE (PF) 10 UNIT/ML INTRAVENOUS SYRINGE 5 ML: at 09:45

## 2018-01-01 RX ADMIN — LORAZEPAM 0.5 MG: 0.5 TABLET ORAL at 00:23

## 2018-01-01 RX ADMIN — ONDANSETRON 8 MG: 2 INJECTION INTRAMUSCULAR; INTRAVENOUS at 18:29

## 2018-01-01 RX ADMIN — PROCHLORPERAZINE EDISYLATE 5 MG: 5 INJECTION INTRAMUSCULAR; INTRAVENOUS at 02:56

## 2018-01-01 RX ADMIN — PROCHLORPERAZINE EDISYLATE 5 MG: 5 INJECTION INTRAMUSCULAR; INTRAVENOUS at 21:01

## 2018-01-01 RX ADMIN — ONDANSETRON 8 MG: 4 TABLET, ORALLY DISINTEGRATING ORAL at 00:13

## 2018-01-01 RX ADMIN — LORAZEPAM 0.5 MG: 2 INJECTION INTRAMUSCULAR; INTRAVENOUS at 11:23

## 2018-01-01 RX ADMIN — ONDANSETRON 4 MG: 4 TABLET, ORALLY DISINTEGRATING ORAL at 16:53

## 2018-01-01 RX ADMIN — METOPROLOL TARTRATE 50 MG: 25 TABLET ORAL at 08:23

## 2018-01-01 RX ADMIN — ENOXAPARIN SODIUM 70 MG: 80 INJECTION SUBCUTANEOUS at 23:23

## 2018-01-01 RX ADMIN — NICOTINE 1 PATCH: 14 PATCH, EXTENDED RELEASE TRANSDERMAL at 08:24

## 2018-01-01 RX ADMIN — DULOXETINE HYDROCHLORIDE 30 MG: 30 CAPSULE, DELAYED RELEASE PELLETS ORAL at 08:23

## 2018-01-01 RX ADMIN — PROCHLORPERAZINE MALEATE 5 MG: 5 TABLET, FILM COATED ORAL at 04:28

## 2018-01-01 RX ADMIN — SODIUM CHLORIDE, PRESERVATIVE FREE 500 UNITS: 5 INJECTION INTRAVENOUS at 20:00

## 2018-01-01 RX ADMIN — ASPIRIN 81 MG 81 MG: 81 TABLET ORAL at 07:39

## 2018-01-01 RX ADMIN — BENZONATATE 100 MG: 100 CAPSULE ORAL at 23:23

## 2018-01-01 RX ADMIN — LORAZEPAM 0.5 MG: 0.5 TABLET ORAL at 12:10

## 2018-01-01 RX ADMIN — SODIUM CHLORIDE, POTASSIUM CHLORIDE, SODIUM LACTATE AND CALCIUM CHLORIDE 1000 ML: 600; 310; 30; 20 INJECTION, SOLUTION INTRAVENOUS at 17:27

## 2018-01-01 RX ADMIN — SODIUM CHLORIDE 96 ML: 9 INJECTION, SOLUTION INTRAVENOUS at 18:37

## 2018-01-01 RX ADMIN — ONDANSETRON 4 MG: 2 INJECTION INTRAMUSCULAR; INTRAVENOUS at 07:58

## 2018-01-01 RX ADMIN — ROSUVASTATIN CALCIUM 5 MG: 5 TABLET, FILM COATED ORAL at 08:27

## 2018-01-01 RX ADMIN — HYDROMORPHONE HYDROCHLORIDE 0.5 MG: 1 INJECTION, SOLUTION INTRAMUSCULAR; INTRAVENOUS; SUBCUTANEOUS at 07:47

## 2018-01-01 RX ADMIN — SODIUM CHLORIDE, PRESERVATIVE FREE 500 UNITS: 5 INJECTION INTRAVENOUS at 13:16

## 2018-01-01 RX ADMIN — MANNITOL 139 MG: 250 INJECTION, SOLUTION INTRAVENOUS at 11:07

## 2018-01-01 RX ADMIN — LOSARTAN POTASSIUM 50 MG: 50 TABLET, FILM COATED ORAL at 08:24

## 2018-01-01 RX ADMIN — NICOTINE POLACRILEX 2 MG: 2 GUM, CHEWING ORAL at 11:31

## 2018-01-01 RX ADMIN — SODIUM CHLORIDE 1000 ML: 9 INJECTION, SOLUTION INTRAVENOUS at 09:15

## 2018-01-01 RX ADMIN — HEPARIN 5 ML: 100 SYRINGE at 12:50

## 2018-01-01 RX ADMIN — HYDROMORPHONE HYDROCHLORIDE 4 MG: 2 TABLET ORAL at 06:42

## 2018-01-01 RX ADMIN — ONDANSETRON 4 MG: 2 INJECTION INTRAMUSCULAR; INTRAVENOUS at 15:17

## 2018-01-01 RX ADMIN — ONDANSETRON 4 MG: 2 INJECTION INTRAMUSCULAR; INTRAVENOUS at 19:52

## 2018-01-01 RX ADMIN — ONDANSETRON 8 MG: 2 INJECTION INTRAMUSCULAR; INTRAVENOUS at 09:38

## 2018-01-01 RX ADMIN — HYDROMORPHONE HYDROCHLORIDE 4 MG: 2 TABLET ORAL at 10:30

## 2018-01-01 RX ADMIN — Medication 400 MG: at 08:18

## 2018-01-01 RX ADMIN — DIAZEPAM 5 MG: 5 TABLET ORAL at 16:54

## 2018-01-01 RX ADMIN — DEXAMETHASONE SODIUM PHOSPHATE 12 MG: 10 INJECTION, SOLUTION INTRAMUSCULAR; INTRAVENOUS at 10:31

## 2018-01-01 RX ADMIN — POTASSIUM CHLORIDE 10 MEQ: 750 TABLET, FILM COATED, EXTENDED RELEASE ORAL at 08:19

## 2018-01-01 RX ADMIN — HYDROMORPHONE HYDROCHLORIDE 4 MG: 2 TABLET ORAL at 10:22

## 2018-01-01 RX ADMIN — BENZONATATE 100 MG: 100 CAPSULE ORAL at 02:17

## 2018-01-01 RX ADMIN — MANNITOL 149 MG: 250 INJECTION, SOLUTION INTRAVENOUS at 11:42

## 2018-01-01 RX ADMIN — OMEPRAZOLE 20 MG: 20 CAPSULE, DELAYED RELEASE ORAL at 08:00

## 2018-01-01 RX ADMIN — PROCHLORPERAZINE EDISYLATE 5 MG: 5 INJECTION INTRAMUSCULAR; INTRAVENOUS at 07:42

## 2018-01-01 RX ADMIN — DULOXETINE HYDROCHLORIDE 30 MG: 30 CAPSULE, DELAYED RELEASE PELLETS ORAL at 07:38

## 2018-01-01 RX ADMIN — HYDROMORPHONE HYDROCHLORIDE 2 MG: 2 TABLET ORAL at 11:29

## 2018-01-01 RX ADMIN — PROCHLORPERAZINE MALEATE 5 MG: 5 TABLET, FILM COATED ORAL at 08:24

## 2018-01-01 RX ADMIN — ONDANSETRON 4 MG: 2 INJECTION INTRAMUSCULAR; INTRAVENOUS at 02:40

## 2018-01-01 RX ADMIN — HYDROMORPHONE HYDROCHLORIDE 4 MG: 2 TABLET ORAL at 18:28

## 2018-01-01 RX ADMIN — HYDROMORPHONE HYDROCHLORIDE 4 MG: 2 TABLET ORAL at 19:15

## 2018-01-01 ASSESSMENT — ENCOUNTER SYMPTOMS
GASTROINTESTINAL NEGATIVE: 1
NERVOUS/ANXIOUS: 1
NEUROLOGICAL NEGATIVE: 1
FATIGUE: 1
EYE PAIN: 0
HEADACHES: 0
ABDOMINAL PAIN: 0
WOUND: 0
SINUS PRESSURE: 0
HEMATURIA: 0
WEAKNESS: 1
ABDOMINAL PAIN: 0
COUGH: 0
APPETITE CHANGE: 1
FREQUENCY: 0
FEVER: 0
DIARRHEA: 0
ALLERGIC/IMMUNOLOGIC NEGATIVE: 1
DIAPHORESIS: 0
ENDOCRINE NEGATIVE: 1
COUGH: 0
WHEEZING: 0
DIARRHEA: 0
SHORTNESS OF BREATH: 1
RESPIRATORY NEGATIVE: 1
SORE THROAT: 0
MYALGIAS: 0
FEVER: 0
PSYCHIATRIC NEGATIVE: 1
DIAPHORESIS: 0
LIGHT-HEADEDNESS: 1
FREQUENCY: 0
FATIGUE: 1
NAUSEA: 0
CONSTIPATION: 0
EYES NEGATIVE: 1
CONSTIPATION: 0
MUSCULOSKELETAL NEGATIVE: 1
CHILLS: 0
BLOOD IN STOOL: 0
CHILLS: 0
PALPITATIONS: 0
VOMITING: 0
NAUSEA: 0
DYSURIA: 0
BLOOD IN STOOL: 0
STRIDOR: 0
SHORTNESS OF BREATH: 0
SORE THROAT: 0
CARDIOVASCULAR NEGATIVE: 1
ACTIVITY CHANGE: 1
WHEEZING: 0
VOMITING: 0
HEMATOLOGIC/LYMPHATIC NEGATIVE: 1
DIFFICULTY URINATING: 0
DYSURIA: 0

## 2018-01-01 ASSESSMENT — ANXIETY QUESTIONNAIRES
7. FEELING AFRAID AS IF SOMETHING AWFUL MIGHT HAPPEN: NOT AT ALL
IF YOU CHECKED OFF ANY PROBLEMS ON THIS QUESTIONNAIRE, HOW DIFFICULT HAVE THESE PROBLEMS MADE IT FOR YOU TO DO YOUR WORK, TAKE CARE OF THINGS AT HOME, OR GET ALONG WITH OTHER PEOPLE: NOT DIFFICULT AT ALL
GAD7 TOTAL SCORE: 0
5. BEING SO RESTLESS THAT IT IS HARD TO SIT STILL: NOT AT ALL
6. BECOMING EASILY ANNOYED OR IRRITABLE: NOT AT ALL
3. WORRYING TOO MUCH ABOUT DIFFERENT THINGS: NOT AT ALL
1. FEELING NERVOUS, ANXIOUS, OR ON EDGE: NOT AT ALL
GAD7 TOTAL SCORE: 0
2. NOT BEING ABLE TO STOP OR CONTROL WORRYING: NOT AT ALL

## 2018-01-01 ASSESSMENT — ACTIVITIES OF DAILY LIVING (ADL)
DEPENDENT_IADLS:: INDEPENDENT;MEDICATION MANAGEMENT
PREVIOUS_RESPONSIBILITIES: MEAL PREP;HOUSEKEEPING;LAUNDRY;MEDICATION MANAGEMENT
DEPENDENT_IADLS:: INDEPENDENT;MEDICATION MANAGEMENT
DEPENDENT_IADLS:: INDEPENDENT;MEDICATION MANAGEMENT

## 2018-01-01 ASSESSMENT — PAIN SCALES - GENERAL
PAINLEVEL: MODERATE PAIN (4)
PAINLEVEL: NO PAIN (0)
PAINLEVEL: MILD PAIN (3)
PAINLEVEL: MILD PAIN (3)
PAINLEVEL: SEVERE PAIN (7)

## 2018-01-01 ASSESSMENT — PATIENT HEALTH QUESTIONNAIRE - PHQ9
SUM OF ALL RESPONSES TO PHQ QUESTIONS 1-9: 3
5. POOR APPETITE OR OVEREATING: NOT AT ALL

## 2018-01-01 ASSESSMENT — ASTHMA QUESTIONNAIRES: ACT_TOTALSCORE: 25

## 2018-01-02 LAB — LAB SCANNED RESULT: NORMAL

## 2018-01-03 ENCOUNTER — DOCUMENTATION ONLY (OUTPATIENT)
Dept: ONCOLOGY | Facility: CLINIC | Age: 68
End: 2018-01-03

## 2018-01-03 ENCOUNTER — HOSPITAL ENCOUNTER (OUTPATIENT)
Dept: PET IMAGING | Facility: CLINIC | Age: 68
End: 2018-01-03
Attending: INTERNAL MEDICINE
Payer: MEDICARE

## 2018-01-03 ENCOUNTER — NURSE TRIAGE (OUTPATIENT)
Dept: NURSING | Facility: CLINIC | Age: 68
End: 2018-01-03

## 2018-01-03 DIAGNOSIS — F17.200 TOBACCO USE DISORDER: ICD-10-CM

## 2018-01-03 DIAGNOSIS — E78.5 HYPERLIPIDEMIA LDL GOAL <100: ICD-10-CM

## 2018-01-03 DIAGNOSIS — C34.12 MALIGNANT NEOPLASM OF UPPER LOBE OF LEFT LUNG (H): ICD-10-CM

## 2018-01-03 DIAGNOSIS — F40.240 CLAUSTROPHOBIA: ICD-10-CM

## 2018-01-03 DIAGNOSIS — I10 BENIGN ESSENTIAL HYPERTENSION: ICD-10-CM

## 2018-01-03 DIAGNOSIS — K21.9 GASTROESOPHAGEAL REFLUX DISEASE WITHOUT ESOPHAGITIS: ICD-10-CM

## 2018-01-03 DIAGNOSIS — F41.9 ANXIETY: Primary | ICD-10-CM

## 2018-01-03 PROCEDURE — 78815 PET IMAGE W/CT SKULL-THIGH: CPT | Mod: PI

## 2018-01-03 PROCEDURE — A9552 F18 FDG: HCPCS | Performed by: INTERNAL MEDICINE

## 2018-01-03 PROCEDURE — 34300033 ZZH RX 343: Performed by: INTERNAL MEDICINE

## 2018-01-03 RX ORDER — ALPRAZOLAM 0.5 MG
TABLET ORAL
Qty: 2 TABLET | Refills: 0 | Status: SHIPPED | OUTPATIENT
Start: 2018-01-03 | End: 2018-01-11

## 2018-01-03 RX ADMIN — FLUDEOXYGLUCOSE F-18 14.16 MCI.: 500 INJECTION, SOLUTION INTRAVENOUS at 08:37

## 2018-01-03 NOTE — PROGRESS NOTES
MRI technician called to report patient is claustrophobic and unable to have MRI completed today.    Advised MRI tech that we would call in a prescription per standing order for the claustrophobia prior ot the MRI and that patient should call DI department to reschedule MRI.    Technician states understanding and will update patient.

## 2018-01-03 NOTE — PROGRESS NOTES
Message left for patient to return call to clinic in regards to test and prescription.  Direct line provided.  Message also left for patient's brother Zaki to return call to clinic.  Direct line provided.    Per standing order, Xanax prescription called into patient's pharmacy on file to be utilized prior to MRI. Also, spoke with Alley in DI scheduling to assist patient with rescheduling MRI appt.

## 2018-01-05 ENCOUNTER — TELEPHONE (OUTPATIENT)
Dept: SURGERY | Facility: CLINIC | Age: 68
End: 2018-01-05

## 2018-01-05 DIAGNOSIS — C34.12 MALIGNANT NEOPLASM OF UPPER LOBE OF LEFT LUNG (H): Primary | ICD-10-CM

## 2018-01-05 NOTE — TELEPHONE ENCOUNTER
Per Kartik in scheduling. He called pt to arrange for PFTs prior to his appt next week with DR. Moreno.    He doesn't feel comfortable with traveling all the way here from [place], [place] for just a consultation for surgery.  I take it it's difficult for him to arrange a ride.   I spoke with him just now and offered someone from the care team call him and discuss his options.       Kartik       I attempted to contact patient from [facility] on Friday afternoon. I left a message informing him that he could contact our office on Monday and speak with Kylah GRIFFIN if he wanted to speak with someone prior to my return to the office on Tuesday.  I will attempt to contact him again when I return to the office.  Tonia Rangel CNP    01/09/18:   I spoke with the patient. He states that he absolutely can not make his appt for tomorrow afternoon. I explained that his appt is very important. The patient states he has transportation issues and is willing to reschedule, but he will not be at the appt on 01/10.  The patient will still plan to get his Brain MRI as scheduled in [place].   I instructed him I would have our scheduling team contact about rescheduling his PFTs and his new patient consult.  I stressed to pt that his appt should be scheduled as soon as he can arrange transportation.  Message sent to Kartik to reschedule.    Tonia Rangel CNP.

## 2018-01-08 LAB — COPATH REPORT: NORMAL

## 2018-01-09 ENCOUNTER — TELEPHONE (OUTPATIENT)
Dept: FAMILY MEDICINE | Facility: CLINIC | Age: 68
End: 2018-01-09

## 2018-01-09 NOTE — TELEPHONE ENCOUNTER
Gregory NIELSEN provider is not a cancer Dr. The appt patient cancelled was with Dr. Moreno in Thoracic surgery and needs to be rescheduled.  Pt does see Dr. Mccauley oncologist here at [facility].  Thanks,  Erica Elliott, RN, BSN, OCN

## 2018-01-09 NOTE — TELEPHONE ENCOUNTER
Reason for Call:  Other call back    Detailed comments: Patient would like to talk to a nurse about his [facility] appointment.    Phone Number Patient can be reached at: Home number on file [phone] (home)    Best Time: any    Can we leave a detailed message on this number? YES    Call taken on 1/9/2018 at 12:49 PM by Jammie Malone

## 2018-01-09 NOTE — TELEPHONE ENCOUNTER
He already set up appt with Dr. Pleitez. He said he didn't need anything else at this time.  Pau Delarosa RN

## 2018-01-09 NOTE — TELEPHONE ENCOUNTER
Reason for Call:  Other appointment and call back    Detailed comments: He is having a MRI tomorrow to check for cancer.  He would like to talk to Dr. Plasencia about seeing a Cancer Dr at [place].  He is unable to make it to the [facility].  He needs a consult to see how his breathing is doing.  Please advise.    Phone Number Patient can be reached at: Home number on file [phone] (home)    Best Time: any    Can we leave a detailed message on this number? YES    Call taken on 1/9/2018 at 2:57 PM by Thalia Alegria

## 2018-01-10 ENCOUNTER — HOSPITAL ENCOUNTER (OUTPATIENT)
Dept: MRI IMAGING | Facility: CLINIC | Age: 68
Discharge: HOME | End: 2018-01-10
Attending: INTERNAL MEDICINE | Admitting: INTERNAL MEDICINE
Payer: MEDICARE

## 2018-01-10 PROCEDURE — 25000128 H RX IP 250 OP 636: Performed by: INTERNAL MEDICINE

## 2018-01-10 PROCEDURE — A9585 GADOBUTROL INJECTION: HCPCS | Performed by: INTERNAL MEDICINE

## 2018-01-10 PROCEDURE — 70553 MRI BRAIN STEM W/O & W/DYE: CPT

## 2018-01-10 RX ORDER — GADOBUTROL 604.72 MG/ML
9 INJECTION INTRAVENOUS ONCE
Status: COMPLETED | OUTPATIENT
Start: 2018-01-10 | End: 2018-01-10

## 2018-01-10 RX ADMIN — GADOBUTROL 9 ML: 604.72 INJECTION INTRAVENOUS at 07:01

## 2018-01-11 ENCOUNTER — ONCOLOGY VISIT (OUTPATIENT)
Dept: ONCOLOGY | Facility: CLINIC | Age: 68
End: 2018-01-11
Attending: INTERNAL MEDICINE
Payer: MEDICARE

## 2018-01-11 VITALS
TEMPERATURE: 96.2 F | HEIGHT: 73 IN | DIASTOLIC BLOOD PRESSURE: 60 MMHG | SYSTOLIC BLOOD PRESSURE: 153 MMHG | HEART RATE: 65 BPM | OXYGEN SATURATION: 97 % | BODY MASS INDEX: 25.46 KG/M2 | RESPIRATION RATE: 18 BRPM | WEIGHT: 192.1 LBS

## 2018-01-11 DIAGNOSIS — C34.12 MALIGNANT NEOPLASM OF UPPER LOBE OF LEFT LUNG (H): Primary | ICD-10-CM

## 2018-01-11 DIAGNOSIS — F17.200 TOBACCO USE DISORDER: ICD-10-CM

## 2018-01-11 DIAGNOSIS — M25.512 CHRONIC LEFT SHOULDER PAIN: ICD-10-CM

## 2018-01-11 DIAGNOSIS — G89.29 CHRONIC LEFT SHOULDER PAIN: ICD-10-CM

## 2018-01-11 LAB — COPATH REPORT: NORMAL

## 2018-01-11 PROCEDURE — G0463 HOSPITAL OUTPT CLINIC VISIT: HCPCS

## 2018-01-11 PROCEDURE — 99214 OFFICE O/P EST MOD 30 MIN: CPT | Performed by: INTERNAL MEDICINE

## 2018-01-11 RX ORDER — TRAMADOL HYDROCHLORIDE 50 MG/1
50-100 TABLET ORAL EVERY 8 HOURS PRN
Qty: 30 TABLET | Refills: 0 | Status: SHIPPED | OUTPATIENT
Start: 2018-01-11 | End: 2018-03-09

## 2018-01-11 ASSESSMENT — PAIN SCALES - GENERAL: PAINLEVEL: EXTREME PAIN (8)

## 2018-01-11 NOTE — PROGRESS NOTES
DATE OF SERVICE:  01/11/2018     Oncology follow up visit    Primary Oncologist: Dr. Mccauley     CHIEF COMPLAINT AND REASON FOR VISIT:  Newly diagnosed left upper lobe non-small cell lung cancer workup.      HISTORY OF PRESENT ILLNESS:   Chilo Oneil is a 67-year-old gentleman, a long time smoker, presented with left shoulder pain for at least 2 months and chest x-ray did not identify any acute pathology, but picked up the small left upper lobe nodule in 11/2017.    CT 12/2017 identified a 1.6 cm nodule infiltrate left upper lobe that appears a bit spiculated worrisome for primary lung cancer and there is a second 0.7 cm undetermined nodule in the lingular portion of the left lung.   CT-guided left upper lobe biopsy 12/18/2017 identified non-small cell lung cancer or adeno CA. NGS did not identify any EGFR mutation.  ROS1 ELK1.  No arrangement was found with ROS1 and ELK1.  PDL1 study low positive 1-5%. 1/2018 MRI was negative for brain mets.  PET scan confirmed left upper lobe mass, SUV 19, additional 0.7 cm nodule in the lingula of the left lung.  No FDG activity, mild hypermetabolic left hilar lymph nodes, no evidence of metastatic disease neck, abdomen and pelvis, possible inflammatory node left external iliac region, simple right renal cyst.      PAST MEDICAL HISTORY:  Alcohol abuse.      FAMILY HISTORY:  No family history of cancer, but positive for diabetes.      SOCIAL HISTORY:  He is a lifetime smoker, currently in the process of cutting down.  Denies alcohol abuse.  He is single, not .      REVIEW OF SYSTEMS:    He reports ongoing left shoulder discomfort.  He finished indomethacin his primary care had given to him for inflammation.  He said the pain is mainly at night, wakes him up and is 8 out of 10.    He has no shortness of breath, no weight loss, no hemoptysis, otherwise and no other pain.      PHYSICAL EXAMINATION:   VITAL SIGNS:  Blood pressure 153/60, pulse 65, temperature 96.2  F (35.7  " C), temperature source Tympanic, resp. rate 18, height 1.854 m (6' 1\"), weight 87.1 kg (192 lb 1.6 oz), SpO2 97 %.    GENERAL APPEARANCE:  A middle-aged gentleman looks like his stated age.  Tobacco smell.  Not in acute distress.     HEENT: The patient is normocephalic, atraumatic. Pupils are equally reactive to light.  Sclerae are anicteric.  Moist oral mucosa.  Negative pharynx.  No oral thrush.   NECK:  Supple.  No jugular venous distention.  Thyroid is not palpable.   LYMPH NODES:  Superficial lymphadenopathy is not appreciable in the bilateral cervical, supraclavicular, axillary or inguinal areas.    CARDIOVASCULAR:  S1, S2 regular with no murmurs or gallops.  No carotid or abdominal bruits.   PULMONARY:  Lungs are clear to auscultation and percussion bilaterally.  There is no wheezing or rhonchi.   GASTROINTESTINAL:  Abdomen is soft, nontender.  No hepatosplenomegaly.  No signs of ascites.  No mass appreciable.   MUSCULOSKELETAL/EXTREMITIES:  No edema.  No cyanotic changes.  No signs of joint deformity.  No lymphedema.   No spinal or paraspinal tenderness.  No CVA tenderness.   NEUROLOGIC:  Cranial nerves II-XII are grossly intact.  Sensation intact.  Muscle strength and muscle tone symmetrical, 5/5 throughout.   SKIN:  No petechiae.  No rash.  No signs of cellulitis.   EXTREMITIES:  Left shoulder with decreased range of motion.      CURRENT LABORATORY DATA reviewed  CT-guided left upper lobe biopsy  identified non-small cell lung cancer or adeno CA. NGS did not identify any EGFR mutation.  ROS1 ELK1.  No arrangement was found with ROS1 and ELK1.  PDL1 study low positive 1-5%.       CURRENT IMAGING DATA  Reviewed:    1/2018 MRI was negative for brain mets.  PET scan confirmed left upper lobe mass, SUV 19, additional 0.7 cm nodule in the lingula of the left lung.  No FDG activity, mild hypermetabolic left hilar lymph nodes, no evidence of metastatic disease neck, abdomen and pelvis, possible inflammatory node " left external iliac region, simple right renal cyst.      OLD DATA REVIEWED WITH SUMMARY:    CT 2017 identified a 1.6 cm nodule infiltrate left upper lobe that appears a bit spiculated worrisome for primary lung cancer and there is a second 0.7 cm undetermined nodule in the lingular portion of the left lung.     ASSESSMENT AND PLAN:     1.  Clinical T1 questionable N1 left upper lobe adenocarcinoma diagnosed through a CT-guided biopsy.  He has mild nonspecific PET scan noise.  He is a lifetime smoker.      He is informed likely this is resectable disease.  He is advised to meet with thoracic surgeon down at the [facility].  Prior to that we would like to get a PFT.     We talked about lung cancer treatment overall in general in terms of early stage and advanced stage.  He is very happy to hear the possibility of resection.      nal oncologic recommendation will be provided after he is done meeting with thoracic surgical team down at the [facility].     2.  Left shoulder pain:  I am not sure this is related to his left upper lobe nodule.  He does not like narcotics doting effect on him.  I asked him to try Ultram.     3.  Active lifetime smoker.  He understands he needs to quit.  He is working on it.  Smoking cessation is advised.         ARTIS WERNER MD             D: 2018 11:14   T: 2018 11:59   MT: ANGELA      Name:     SERENE AHN   MRN:      [id number]        Account:      [id number]   :      1950           Visit Date:   2018      Document: [id number]

## 2018-01-11 NOTE — PATIENT INSTRUCTIONS
Dr. Pleitez would like you to have a PFT test and to see Josh next week. A follow up appointment with Dr. Mccauley will depend on Dr. Moreno's visit. When you are in need of a refill, please call your pharmacy and they will send us a request.  Copy of appointments, and after visit summary (AVS) given to patient.  If you have any questions please call Rylee Ca RN, BSN Oncology Hematology  [facility] [phone]. For questions after business hours, or on holidays/weekends, please call our after hours Nurse Triage line [phone]. Thank you.           PFT, to see Dr. Moreno next wk. F/u with dr. GIOVANI partida at this point pending Dr. Moreno's visit.

## 2018-01-11 NOTE — MR AVS SNAPSHOT
After Visit Summary   1/11/2018    Chilo Oneil    MRN: [id number]           Patient Information     Date Of Birth          1950        Visit Information        Provider Department      1/11/2018 10:30 AM Nirali Pleitez MD [facility]        Today's Diagnoses     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)    -  1    Tobacco use disorder        Chronic left shoulder pain          Care Instructions    Dr. Pleitez would like you to have a PFT test and to see Josh next week. A follow up appointment with Dr. Mccauley will depend on Dr. Moreno's visit. When you are in need of a refill, please call your pharmacy and they will send us a request.  Copy of appointments, and after visit summary (AVS) given to patient.  If you have any questions please call Rylee Ca RN, BSN Oncology Hematology  [facility] [phone]. For questions after business hours, or on holidays/weekends, please call our after hours Nurse Triage line [phone]. Thank you.           PFT, to see Dr. Moreno next wk. F/u with dr. GIOVANI partida at this point pending Dr. Moreno's visit.           Follow-ups after your visit        Your next 10 appointments already scheduled     Jan 17, 2018 11:00 AM CST   Pulmonary Function with [place] PULMONARY FUNCTION   [facility] Respiratory Therapy ([facility])    [address] [address] [phone]           No Inhalers for 6 hours prior to test No Smoking 2 hours prior to test              Who to contact     If you have questions or need follow up information about today's clinic visit or your schedule please contact [facility] directly at [phone].  Normal or non-critical lab and imaging results will be communicated to you by MyChart, letter or phone within 4 business days after the clinic has received the results. If you do not hear from us within 7 days, please contact the clinic through  "[employer] or phone. If you have a critical or abnormal lab result, we will notify you by phone as soon as possible.  Submit refill requests through [employer] or call your pharmacy and they will forward the refill request to us. Please allow 3 business days for your refill to be completed.          Additional Information About Your Visit        [employer] Information     [email or web address] lets you send messages to your doctor, view your test results, renew your prescriptions, schedule appointments and more. To sign up, go to [email or web address] . Click on \"Log in\" on the left side of the screen, which will take you to the Welcome page. Then click on \"Sign up Now\" on the right side of the page.     You will be asked to enter the access code listed below, as well as some personal information. Please follow the directions to create your username and password.     Your access code is: [email or web address]  Expires: 2018 12:18 PM     Your access code will  in 90 days. If you need help or a new code, please call your [facility] clinic or [phone].        Care EveryWhere ID     This is your Care EveryWhere ID. This could be used by other organizations to access your [facility] medical records  [id number]        Your Vitals Were     Pulse Temperature Respirations Height Pulse Oximetry BMI (Body Mass Index)    65 96.2  F (35.7  C) (Tympanic) 18 1.854 m (6' 1\") 97% 25.34 kg/m2       Blood Pressure from Last 3 Encounters:   18 153/60   17 194/71   17 126/50    Weight from Last 3 Encounters:   18 87.1 kg (192 lb 1.6 oz)   17 86.5 kg (190 lb 12.8 oz)   17 87.1 kg (192 lb)              Today, you had the following     No orders found for display         Today's Medication Changes          These changes are accurate as of: 18 11:34 AM.  If you have any questions, ask your nurse or doctor.               Start taking these medicines.        Dose/Directions    traMADol 50 MG tablet   Commonly known as: "  ULTRAM   Used for:  Chronic left shoulder pain   Started by:  Nirali Pleitez MD        Dose:   mg   Take 1-2 tablets ( mg) by mouth every 8 hours as needed for pain   Quantity:  30 tablet   Refills:  0            Where to get your medicines      Some of these will need a paper prescription and others can be bought over the counter.  Ask your nurse if you have questions.     Bring a paper prescription for each of these medications     traMADol 50 MG tablet                Primary Care Provider Office Phone # Fax #    Belkys Mcgee  [phone] [address] [address] [facility] [address]        Equal Access to Services     [facility]: Hadii krysta jordan hadsimoneo Soearl, waaxda lumodestoadaha, qaybta kaalmada alma, celina hewitt . So [facility] [phone].    ATENCIÓN: Si habla español, tiene a galaviz disposición servicios gratuitos de asistencia lingüística. [facility] [phone].    We comply with applicable federal civil rights laws and [place] laws. We do not discriminate on the basis of race, color, national origin, age, disability, sex, sexual orientation, or gender identity.            Thank you!     Thank you for choosing [facility] [place]  for your care. Our goal is always to provide you with excellent care. Hearing back from our patients is one way we can continue to improve our services. Please take a few minutes to complete the written survey that you may receive in the mail after your visit with us. Thank you!             Your Updated Medication List - Protect others around you: Learn how to safely use, store and throw away your medicines at www.disposemymeds.org.          This list is accurate as of: 1/11/18 11:34 AM.  Always use your most recent med list.                   Brand Name Dispense Instructions for use Diagnosis    aspirin 325 MG tablet     90 tablet    Take 1 tablet (325 mg) by mouth daily    Pain of left lower leg       hydrochlorothiazide  25 MG tablet    HYDRODIURIL    90 tablet    Take 1 tablet (25 mg) by mouth daily    Benign essential hypertension       indomethacin 25 MG capsule    INDOCIN    42 capsule    Take 1 capsule (25 mg) by mouth 2 times daily (with meals)    Chest wall pain, Rib pain on left side       losartan 50 MG tablet    COZAAR    90 tablet    Take 1 tablet (50 mg) by mouth daily    Benign essential hypertension       metoprolol 50 MG tablet    LOPRESSOR    60 tablet    Take 1 tablet (50 mg) by mouth 2 times daily    Benign essential hypertension       omeprazole 40 MG capsule    priLOSEC      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), Hyperlipidemia LDL goal <100, Benign essential hypertension, Tobacco use disorder, Gastroesophageal reflux disease without esophagitis       traMADol 50 MG tablet    ULTRAM    30 tablet    Take 1-2 tablets ( mg) by mouth every 8 hours as needed for pain    Chronic left shoulder pain

## 2018-01-11 NOTE — LETTER
1/11/2018         RE: Chilo Oneil  [address]  [place] [address]        Dear Colleague,    Thank you for referring your patient, Chilo Oneil, to the [facility]. Please see a copy of my visit note below.    DATE OF SERVICE:  01/11/2018     Oncology follow up visit    Primary Oncologist: Dr. Mccauley     CHIEF COMPLAINT AND REASON FOR VISIT:  Newly diagnosed left upper lobe non-small cell lung cancer workup.      HISTORY OF PRESENT ILLNESS:   Chilo Oneil is a 67-year-old gentleman, a long time smoker, presented with left shoulder pain for at least 2 months and chest x-ray did not identify any acute pathology, but picked up the small left upper lobe nodule in 11/2017.    CT 12/2017 identified a 1.6 cm nodule infiltrate left upper lobe that appears a bit spiculated worrisome for primary lung cancer and there is a second 0.7 cm undetermined nodule in the lingular portion of the left lung.   CT-guided left upper lobe biopsy 12/18/2017 identified non-small cell lung cancer or adeno CA. NGS did not identify any EGFR mutation.  ROS1 ELK1.  No arrangement was found with ROS1 and ELK1.  PDL1 study low positive 1-5%. 1/2018 MRI was negative for brain mets.  PET scan confirmed left upper lobe mass, SUV 19, additional 0.7 cm nodule in the lingula of the left lung.  No FDG activity, mild hypermetabolic left hilar lymph nodes, no evidence of metastatic disease neck, abdomen and pelvis, possible inflammatory node left external iliac region, simple right renal cyst.      PAST MEDICAL HISTORY:  Alcohol abuse.      FAMILY HISTORY:  No family history of cancer, but positive for diabetes.      SOCIAL HISTORY:  He is a lifetime smoker, currently in the process of cutting down.  Denies alcohol abuse.  He is single, not .      REVIEW OF SYSTEMS:    He reports ongoing left shoulder discomfort.  He finished indomethacin his primary care had given to him for inflammation.  He said the pain is mainly at  "night, wakes him up and is 8 out of 10.    He has no shortness of breath, no weight loss, no hemoptysis, otherwise and no other pain.      PHYSICAL EXAMINATION:   VITAL SIGNS:  Blood pressure 153/60, pulse 65, temperature 96.2  F (35.7  C), temperature source Tympanic, resp. rate 18, height 1.854 m (6' 1\"), weight 87.1 kg (192 lb 1.6 oz), SpO2 97 %.    GENERAL APPEARANCE:  A middle-aged gentleman looks like his stated age.  Tobacco smell.  Not in acute distress.     HEENT: The patient is normocephalic, atraumatic. Pupils are equally reactive to light.  Sclerae are anicteric.  Moist oral mucosa.  Negative pharynx.  No oral thrush.   NECK:  Supple.  No jugular venous distention.  Thyroid is not palpable.   LYMPH NODES:  Superficial lymphadenopathy is not appreciable in the bilateral cervical, supraclavicular, axillary or inguinal areas.    CARDIOVASCULAR:  S1, S2 regular with no murmurs or gallops.  No carotid or abdominal bruits.   PULMONARY:  Lungs are clear to auscultation and percussion bilaterally.  There is no wheezing or rhonchi.   GASTROINTESTINAL:  Abdomen is soft, nontender.  No hepatosplenomegaly.  No signs of ascites.  No mass appreciable.   MUSCULOSKELETAL/EXTREMITIES:  No edema.  No cyanotic changes.  No signs of joint deformity.  No lymphedema.   No spinal or paraspinal tenderness.  No CVA tenderness.   NEUROLOGIC:  Cranial nerves II-XII are grossly intact.  Sensation intact.  Muscle strength and muscle tone symmetrical, 5/5 throughout.   SKIN:  No petechiae.  No rash.  No signs of cellulitis.   EXTREMITIES:  Left shoulder with decreased range of motion.      CURRENT LABORATORY DATA reviewed  CT-guided left upper lobe biopsy  identified non-small cell lung cancer or adeno CA. NGS did not identify any EGFR mutation.  ROS1 ELK1.  No arrangement was found with ROS1 and ELK1.  PDL1 study low positive 1-5%.       CURRENT IMAGING DATA  Reviewed:    1/2018 MRI was negative for brain mets.  PET scan confirmed " left upper lobe mass, SUV 19, additional 0.7 cm nodule in the lingula of the left lung.  No FDG activity, mild hypermetabolic left hilar lymph nodes, no evidence of metastatic disease neck, abdomen and pelvis, possible inflammatory node left external iliac region, simple right renal cyst.      OLD DATA REVIEWED WITH SUMMARY:    CT 2017 identified a 1.6 cm nodule infiltrate left upper lobe that appears a bit spiculated worrisome for primary lung cancer and there is a second 0.7 cm undetermined nodule in the lingular portion of the left lung.     ASSESSMENT AND PLAN:     1.  Clinical T1 questionable N1 left upper lobe adenocarcinoma diagnosed through a CT-guided biopsy.  He has mild nonspecific PET scan noise.  He is a lifetime smoker.      He is informed likely this is resectable disease.  He is advised to meet with thoracic surgeon down at the [facility].  Prior to that we would like to get a PFT.     We talked about lung cancer treatment overall in general in terms of early stage and advanced stage.  He is very happy to hear the possibility of resection.      nal oncologic recommendation will be provided after he is done meeting with thoracic surgical team down at [facility].     2.  Left shoulder pain:  I am not sure this is related to his left upper lobe nodule.  He does not like narcotics doting effect on him.  I asked him to try Ultram.     3.  Active lifetime smoker.  He understands he needs to quit.  He is working on it.  Smoking cessation is advised.         ARTIS WERNER MD             D: 2018 11:14   T: 2018 11:59   MT:       Name:     SERENE AHN   MRN:      [id number]        Account:      [id number]   :      1950           Visit Date:   2018      Document: [id number]        Again, thank you for allowing me to participate in the care of your patient.        Sincerely,        Nirali Werner MD, MD

## 2018-01-11 NOTE — NURSING NOTE
"Oncology Rooming Note    January 11, 2018 10:43 AM   Chilo Oneil is a 67 year old male who presents for:    Chief Complaint   Patient presents with     Oncology Clinic Visit     Recheck Lung CA, review PET & MRI, PDL-1, Next Gen results     Initial Vitals: /60 (BP Location: Right arm, Patient Position: Sitting, Cuff Size: Adult Large)  Pulse 65  Temp 96.2  F (35.7  C) (Tympanic)  Resp 18  Ht 1.854 m (6' 1\")  Wt 87.1 kg (192 lb 1.6 oz)  SpO2 97%  BMI 25.34 kg/m2 Estimated body mass index is 25.34 kg/(m^2) as calculated from the following:    Height as of this encounter: 1.854 m (6' 1\").    Weight as of this encounter: 87.1 kg (192 lb 1.6 oz). Body surface area is 2.12 meters squared.  Extreme Pain (8) Comment: left    No LMP for male patient.  Allergies reviewed: Yes  Medications reviewed: Yes    Medications: Medication refills not needed today.  Pharmacy name entered into [employer]: [facility] [place], [place] - [address]    Clinical concerns: Recheck Lung CA, review PET & MRI, PDL-1, Next Gen results    7 minutes for nursing intake (face to face time)     Dorothy Hart CMA            "

## 2018-01-17 ENCOUNTER — HOSPITAL ENCOUNTER (OUTPATIENT)
Dept: RESPIRATORY THERAPY | Facility: CLINIC | Age: 68
Discharge: HOME | End: 2018-01-17
Attending: INTERNAL MEDICINE | Admitting: INTERNAL MEDICINE
Payer: MEDICARE

## 2018-01-17 DIAGNOSIS — C34.12 MALIGNANT NEOPLASM OF UPPER LOBE OF LEFT LUNG (H): ICD-10-CM

## 2018-01-17 PROCEDURE — 94060 EVALUATION OF WHEEZING: CPT

## 2018-01-17 PROCEDURE — 25000125 ZZHC RX 250: Performed by: NURSE PRACTITIONER

## 2018-01-17 PROCEDURE — 94729 DIFFUSING CAPACITY: CPT

## 2018-01-17 PROCEDURE — 94729 DIFFUSING CAPACITY: CPT | Mod: 26 | Performed by: INTERNAL MEDICINE

## 2018-01-17 PROCEDURE — 94726 PLETHYSMOGRAPHY LUNG VOLUMES: CPT

## 2018-01-17 PROCEDURE — 94060 EVALUATION OF WHEEZING: CPT | Mod: 26 | Performed by: INTERNAL MEDICINE

## 2018-01-17 PROCEDURE — 94726 PLETHYSMOGRAPHY LUNG VOLUMES: CPT | Mod: 26 | Performed by: INTERNAL MEDICINE

## 2018-01-17 RX ORDER — ALBUTEROL SULFATE 0.83 MG/ML
2.5 SOLUTION RESPIRATORY (INHALATION) ONCE
Status: COMPLETED | OUTPATIENT
Start: 2018-01-17 | End: 2018-01-17

## 2018-01-17 RX ADMIN — ALBUTEROL SULFATE 2.5 MG: 2.5 SOLUTION RESPIRATORY (INHALATION) at 11:40

## 2018-01-18 LAB
DLCOUNC-%PRED-PRE: 76 %
DLCOUNC-PRE: 21.61 ML/MIN/MMHG
DLCOUNC-PRED: 28.33 ML/MIN/MMHG
ERV-%PRED-PRE: 82 %
ERV-PRE: 1.24 L
ERV-PRED: 1.5 L
EXPTIME-PRE: 7.91 SEC
FEF2575-%PRED-POST: 50 %
FEF2575-%PRED-PRE: 44 %
FEF2575-POST: 1.43 L/SEC
FEF2575-PRE: 1.25 L/SEC
FEF2575-PRED: 2.84 L/SEC
FEFMAX-%PRED-PRE: 59 %
FEFMAX-PRE: 5.65 L/SEC
FEFMAX-PRED: 9.46 L/SEC
FEV1-%PRED-PRE: 65 %
FEV1-PRE: 2.42 L
FEV1FEV6-PRE: 64 %
FEV1FEV6-PRED: 78 %
FEV1FVC-PRE: 62 %
FEV1FVC-PRED: 76 %
FEV1SVC-PRE: 59 %
FEV1SVC-PRED: 68 %
FIFMAX-PRE: 4.47 L/SEC
FRCPLETH-%PRED-PRE: 169 %
FRCPLETH-PRE: 6.58 L
FRCPLETH-PRED: 3.88 L
FVC-%PRED-PRE: 80 %
FVC-PRE: 3.93 L
FVC-PRED: 4.91 L
IC-%PRED-PRE: 70 %
IC-PRE: 2.76 L
IC-PRED: 3.94 L
RVPLETH-%PRED-PRE: 196 %
RVPLETH-PRE: 5.27 L
RVPLETH-PRED: 2.68 L
TLCPLETH-%PRED-PRE: 119 %
TLCPLETH-PRE: 9.34 L
TLCPLETH-PRED: 7.83 L
VA-%PRED-PRE: 95 %
VA-PRE: 7.15 L
VC-%PRED-PRE: 74 %
VC-PRE: 4.07 L
VC-PRED: 5.44 L

## 2018-01-20 LAB — COPATH REPORT: NORMAL

## 2018-01-22 ENCOUNTER — CARE COORDINATION (OUTPATIENT)
Dept: CARE COORDINATION | Facility: CLINIC | Age: 68
End: 2018-01-22

## 2018-01-25 ENCOUNTER — TELEPHONE (OUTPATIENT)
Dept: SURGERY | Facility: CLINIC | Age: 68
End: 2018-01-25

## 2018-01-25 NOTE — TELEPHONE ENCOUNTER
Per Chart review, pt was referred to thoracic surgery by Dr. Mccauley.  The patient was contacted back on 01/05 regarding the importance of this appt.  He has no cancelled x 3 appts with Dr. Moreno.  I called and spoke with Erica Elliott RN case manager with Dr. Mccauley at the [facility].  She stated that she was aware that the patient had not made any of the appts. She states that the patient does not have any ability through family, friends or outside resources to help with the transportation issues.  At this point they are unsure how to facilitate ongoing care recommendations for this patient.  Dr Mccauley would like to reach out to Dr. Moreno personally to discuss this patient.  I gave Erica's Dr. Moreno's contact info, but did inform her that he will be out of the office after today for 2 weeks.  Erica plans on keeping me posted on what the ongoing plan of care will be.  Tonia Rangel, CNP  Thoracic Surgery.

## 2018-02-05 NOTE — TELEPHONE ENCOUNTER
Multiple options for transportation have been attempted without success; road to recovery, [employer], senior [employer], insurance company, local transportation companies, etc.    Call placed to patient to discuss other options (family, friends, [employer]).  No answer, message left for patient to return call to clinic.  Direct line provided.    May have the option to utilize [employer] patient assistance fund per Janel Sykes.  Will discuss this with my clinic administrator Yasmin Murphy, after speaking with patient.

## 2018-02-06 NOTE — TELEPHONE ENCOUNTER
"Patient returned call to clinic.  Reported that his brother in law recently  from lung caner \"just like I have\" and he is now \"motivated to get things moving\".  Patient reports he does not have anyone at this time to provide transportation to [facility], but he will be seeing his nieces and nephews this weekend and will talk with them and see if he can get a ride.  Did [place] going to county office [place] secondary insurance.  He will do that next week as he is tied up with \" stuff\" this week.    Patient reports he will call our office and Tonia Rangel CNP next week to provide updates.  "

## 2018-02-06 NOTE — TELEPHONE ENCOUNTER
Call placed to patient today for status check and to discuss other options. No answer.  Message left for patient to call back.  Direct line provided.  Did also let patient know via message that I would be reaching out to emergency contact, brother tomorrow even though pt only wants brother contacted for emergencies.  I feel this is an emergent situation as we have exhausted our options and are unsure if patient has other options and we are now unable to reach patient to coordinate care.

## 2018-02-09 ENCOUNTER — APPOINTMENT (OUTPATIENT)
Dept: CT IMAGING | Facility: CLINIC | Age: 68
End: 2018-02-09
Attending: FAMILY MEDICINE
Payer: MEDICARE

## 2018-02-09 ENCOUNTER — HOSPITAL ENCOUNTER (EMERGENCY)
Facility: CLINIC | Age: 68
Discharge: HOME | End: 2018-02-09
Attending: FAMILY MEDICINE | Admitting: FAMILY MEDICINE
Payer: MEDICARE

## 2018-02-09 VITALS
WEIGHT: 195 LBS | TEMPERATURE: 97.6 F | HEIGHT: 74 IN | RESPIRATION RATE: 16 BRPM | SYSTOLIC BLOOD PRESSURE: 138 MMHG | OXYGEN SATURATION: 97 % | BODY MASS INDEX: 25.03 KG/M2 | DIASTOLIC BLOOD PRESSURE: 66 MMHG | HEART RATE: 72 BPM

## 2018-02-09 DIAGNOSIS — J98.01 ACUTE BRONCHOSPASM: ICD-10-CM

## 2018-02-09 DIAGNOSIS — F41.9 ANXIETY: ICD-10-CM

## 2018-02-09 LAB
ALBUMIN SERPL-MCNC: 4 G/DL (ref 3.4–5)
ALP SERPL-CCNC: 69 U/L (ref 40–150)
ALT SERPL W P-5'-P-CCNC: 109 U/L (ref 0–70)
ANION GAP SERPL CALCULATED.3IONS-SCNC: 10 MMOL/L (ref 3–14)
AST SERPL W P-5'-P-CCNC: 140 U/L (ref 0–45)
BASOPHILS # BLD AUTO: 0.1 10E9/L (ref 0–0.2)
BASOPHILS NFR BLD AUTO: 1.2 %
BILIRUB SERPL-MCNC: 0.6 MG/DL (ref 0.2–1.3)
BUN SERPL-MCNC: 8 MG/DL (ref 7–30)
CALCIUM SERPL-MCNC: 8.7 MG/DL (ref 8.5–10.1)
CHLORIDE SERPL-SCNC: 103 MMOL/L (ref 94–109)
CO2 SERPL-SCNC: 25 MMOL/L (ref 20–32)
CREAT SERPL-MCNC: 0.52 MG/DL (ref 0.66–1.25)
DIFFERENTIAL METHOD BLD: NORMAL
DIFFERENTIAL METHOD BLD: NORMAL
EOSINOPHIL # BLD AUTO: 0.1 10E9/L (ref 0–0.7)
EOSINOPHIL NFR BLD AUTO: 0.9 %
ERYTHROCYTE [DISTWIDTH] IN BLOOD BY AUTOMATED COUNT: 12.8 % (ref 10–15)
ERYTHROCYTE [DISTWIDTH] IN BLOOD BY AUTOMATED COUNT: NORMAL % (ref 10–15)
ETHANOL SERPL-MCNC: 0.32 G/DL
GFR SERPL CREATININE-BSD FRML MDRD: >90 ML/MIN/1.7M2
GLUCOSE SERPL-MCNC: 69 MG/DL (ref 70–99)
HCT VFR BLD AUTO: 41.7 % (ref 40–53)
HCT VFR BLD AUTO: NORMAL % (ref 40–53)
HGB BLD-MCNC: 15.2 G/DL (ref 13.3–17.7)
HGB BLD-MCNC: NORMAL G/DL (ref 13.3–17.7)
IMM GRANULOCYTES # BLD: 0 10E9/L (ref 0–0.4)
IMM GRANULOCYTES NFR BLD: 0 %
LYMPHOCYTES # BLD AUTO: 2.1 10E9/L (ref 0.8–5.3)
LYMPHOCYTES NFR BLD AUTO: 36.5 %
MCH RBC QN AUTO: 32.6 PG (ref 26.5–33)
MCH RBC QN AUTO: NORMAL PG (ref 26.5–33)
MCHC RBC AUTO-ENTMCNC: 36.5 G/DL (ref 31.5–36.5)
MCHC RBC AUTO-ENTMCNC: NORMAL G/DL (ref 31.5–36.5)
MCV RBC AUTO: 90 FL (ref 78–100)
MCV RBC AUTO: NORMAL FL (ref 78–100)
MONOCYTES # BLD AUTO: 0.8 10E9/L (ref 0–1.3)
MONOCYTES NFR BLD AUTO: 14.2 %
NEUTROPHILS # BLD AUTO: 2.7 10E9/L (ref 1.6–8.3)
NEUTROPHILS NFR BLD AUTO: 47.2 %
NT-PROBNP SERPL-MCNC: 106 PG/ML (ref 0–900)
PLATELET # BLD AUTO: 261 10E9/L (ref 150–450)
PLATELET # BLD AUTO: NORMAL 10E9/L (ref 150–450)
POTASSIUM SERPL-SCNC: 4 MMOL/L (ref 3.4–5.3)
PROT SERPL-MCNC: 7.3 G/DL (ref 6.8–8.8)
RBC # BLD AUTO: 4.66 10E12/L (ref 4.4–5.9)
RBC # BLD AUTO: NORMAL 10E12/L (ref 4.4–5.9)
SODIUM SERPL-SCNC: 138 MMOL/L (ref 133–144)
TROPONIN I SERPL-MCNC: 0.02 UG/L (ref 0–0.04)
WBC # BLD AUTO: 5.7 10E9/L (ref 4–11)
WBC # BLD AUTO: NORMAL 10E9/L (ref 4–11)

## 2018-02-09 PROCEDURE — 25000128 H RX IP 250 OP 636: Performed by: FAMILY MEDICINE

## 2018-02-09 PROCEDURE — 93005 ELECTROCARDIOGRAM TRACING: CPT | Mod: 76 | Performed by: FAMILY MEDICINE

## 2018-02-09 PROCEDURE — 85025 COMPLETE CBC W/AUTO DIFF WBC: CPT | Performed by: FAMILY MEDICINE

## 2018-02-09 PROCEDURE — 93005 ELECTROCARDIOGRAM TRACING: CPT | Performed by: FAMILY MEDICINE

## 2018-02-09 PROCEDURE — 99285 EMERGENCY DEPT VISIT HI MDM: CPT | Mod: Z6 | Performed by: FAMILY MEDICINE

## 2018-02-09 PROCEDURE — 80053 COMPREHEN METABOLIC PANEL: CPT | Performed by: FAMILY MEDICINE

## 2018-02-09 PROCEDURE — 83880 ASSAY OF NATRIURETIC PEPTIDE: CPT | Performed by: FAMILY MEDICINE

## 2018-02-09 PROCEDURE — 71260 CT THORAX DX C+: CPT

## 2018-02-09 PROCEDURE — 80320 DRUG SCREEN QUANTALCOHOLS: CPT | Performed by: FAMILY MEDICINE

## 2018-02-09 PROCEDURE — 84484 ASSAY OF TROPONIN QUANT: CPT | Performed by: FAMILY MEDICINE

## 2018-02-09 PROCEDURE — 99285 EMERGENCY DEPT VISIT HI MDM: CPT | Mod: 25 | Performed by: FAMILY MEDICINE

## 2018-02-09 PROCEDURE — 93010 ELECTROCARDIOGRAM REPORT: CPT | Mod: Z6 | Performed by: FAMILY MEDICINE

## 2018-02-09 PROCEDURE — 25000125 ZZHC RX 250: Performed by: FAMILY MEDICINE

## 2018-02-09 RX ORDER — ALBUTEROL SULFATE 90 UG/1
2 AEROSOL, METERED RESPIRATORY (INHALATION) EVERY 4 HOURS PRN
Qty: 1 INHALER | Refills: 0 | Status: SHIPPED | OUTPATIENT
Start: 2018-02-09 | End: 2018-04-04

## 2018-02-09 RX ORDER — IOPAMIDOL 755 MG/ML
100 INJECTION, SOLUTION INTRAVASCULAR ONCE
Status: COMPLETED | OUTPATIENT
Start: 2018-02-09 | End: 2018-02-09

## 2018-02-09 RX ADMIN — SODIUM CHLORIDE 100 ML: 9 INJECTION, SOLUTION INTRAVENOUS at 20:42

## 2018-02-09 RX ADMIN — IOPAMIDOL 100 ML: 755 INJECTION, SOLUTION INTRAVENOUS at 20:42

## 2018-02-09 NOTE — ED AVS SNAPSHOT
[facility] Emergency Department    [address] [address]    Phone:  [phone]    Fax:  [phone]                                       Chilo Oneil   MRN: [id number]    Department:  [facility] Emergency Department   Date of Visit:  2/9/2018           After Visit Summary Signature Page     I have received my discharge instructions, and my questions have been answered. I have discussed any challenges I see with this plan with the nurse or doctor.    ..........................................................................................................................................  Patient/Patient Representative Signature      ..........................................................................................................................................  Patient Representative Print Name and Relationship to Patient    ..................................................               ................................................  Date                                            Time    ..........................................................................................................................................  Reviewed by Signature/Title    ...................................................              ..............................................  Date                                                            Time

## 2018-02-09 NOTE — ED AVS SNAPSHOT
[facility]    [address] [address]    Phone:  [phone]    Fax:  [phone]                                       Chilo Oneil   MRN: [id number]    Department:  [facility]   Date of Visit:  2/9/2018           Patient Information     Date Of Birth          1950        Your diagnoses for this visit were:     Acute bronchospasm     Anxiety        You were seen by Alli Lopez MD.        Discharge Instructions       Return to the Emergency Room if the following occurs:     Worsened breathing, fever >101, worsened pain, or for any concern at anytime.    Or, follow-up with the following provider as we discussed:     Return to your primary doctor / oncologist as scheduled.    Medications discussed:    Albuterol for cough, shortness of breath as needed.    If you received pain-relieving or sedating medication during your time in the ER, avoid alcohol, driving automobiles, or working with machinery.  Also, a responsible adult must stay with you.        Call the Nurse Advice Line at [phone] or [phone] for any concern at anytime.      24 Hour Appointment Hotline       To make an appointment at any [facility], call [phone] ([phone]). If you don't have a family doctor or clinic, we will help you find one. [facility] clinics are conveniently located to serve the needs of you and your family.             Review of your medicines      START taking        Dose / Directions Last dose taken    albuterol 108 (90 BASE) MCG/ACT Inhaler   Commonly known as:  PROAIR HFA/PROVENTIL HFA/VENTOLIN HFA   Dose:  2 puff   Quantity:  1 Inhaler        Inhale 2 puffs into the lungs every 4 hours as needed for shortness of breath / dyspnea or wheezing WITH SPACER   Refills:  0          Our records show that you are taking the medicines listed below. If these are incorrect, please call your family doctor or clinic.        Dose /  Directions Last dose taken    aspirin 325 MG tablet   Dose:  325 mg   Quantity:  90 tablet        Take 1 tablet (325 mg) by mouth daily   Refills:  3        hydrochlorothiazide 25 MG tablet   Commonly known as:  HYDRODIURIL   Dose:  25 mg   Quantity:  90 tablet        Take 1 tablet (25 mg) by mouth daily   Refills:  3        indomethacin 25 MG capsule   Commonly known as:  INDOCIN   Dose:  25 mg   Quantity:  42 capsule        Take 1 capsule (25 mg) by mouth 2 times daily (with meals)   Refills:  1        losartan 50 MG tablet   Commonly known as:  COZAAR   Dose:  50 mg   Quantity:  90 tablet        Take 1 tablet (50 mg) by mouth daily   Refills:  3        metoprolol tartrate 50 MG tablet   Commonly known as:  LOPRESSOR   Dose:  50 mg   Quantity:  60 tablet        Take 1 tablet (50 mg) by mouth 2 times daily   Refills:  1        omeprazole 40 MG capsule   Commonly known as:  priLOSEC        Refills:  0        traMADol 50 MG tablet   Commonly known as:  ULTRAM   Dose:   mg   Quantity:  30 tablet        Take 1-2 tablets ( mg) by mouth every 8 hours as needed for pain   Refills:  0                Prescriptions were sent or printed at these locations (1 Prescription)                   Other Prescriptions                Printed at Department/Unit printer (1 of 1)         albuterol (PROAIR HFA/PROVENTIL HFA/VENTOLIN HFA) 108 (90 BASE) MCG/ACT Inhaler                Procedures and tests performed during your visit     Procedure/Test Number of Times Performed    Alcohol level blood 1    CBC with platelets, differential 2    CT Chest Pulmonary Embolism w Contrast 1    Comprehensive metabolic panel 1    EKG 12 lead 1    EKG 12-lead, tracing only 1    NT pro BNP 1    Troponin I 1      Orders Needing Specimen Collection     None      Pending Results     No orders found from 2/7/2018 to 2/10/2018.            Pending Culture Results     No orders found from 2/7/2018 to 2/10/2018.            Pending Results Instructions      If you had any lab results that were not finalized at the time of your Discharge, you can call the ED Lab Result RN at [phone]. You will be contacted by this team for any positive Lab results or changes in treatment. The nurses are available 7 days a week from 10A to 6:30P.  You can leave a message 24 hours per day and they will return your call.        Test Results From Your Hospital Stay        2/9/2018  8:32 PM      Component Results     Component Value Ref Range & Units Status    WBC Canceled, Test credited 4.0 - 11.0 10e9/L Final    Unsatisfactory specimen - clotted    RBC Count Canceled, Test credited 4.4 - 5.9 10e12/L Final    Unsatisfactory specimen - clotted    Hemoglobin Canceled, Test credited 13.3 - 17.7 g/dL Final    Unsatisfactory specimen - clotted    Hematocrit Canceled, Test credited 40.0 - 53.0 % Final    Unsatisfactory specimen - clotted    MCV Canceled, Test credited 78 - 100 fl Final    Unsatisfactory specimen - clotted    MCH Canceled, Test credited 26.5 - 33.0 pg Final    Unsatisfactory specimen - clotted    MCHC Canceled, Test credited 31.5 - 36.5 g/dL Final    Unsatisfactory specimen - clotted    RDW Canceled, Test credited 10.0 - 15.0 % Final    Unsatisfactory specimen - clotted    Platelet Count Canceled, Test credited 150 - 450 10e9/L Final    Unsatisfactory specimen - clotted    Diff Method Canceled, Test credited  Final    Unsatisfactory specimen - clotted         2/9/2018  8:45 PM      Component Results     Component Value Ref Range & Units Status    Sodium 138 133 - 144 mmol/L Final    Potassium 4.0 3.4 - 5.3 mmol/L Final    Chloride 103 94 - 109 mmol/L Final    Carbon Dioxide 25 20 - 32 mmol/L Final    Anion Gap 10 3 - 14 mmol/L Final    Glucose 69 (L) 70 - 99 mg/dL Final    Urea Nitrogen 8 7 - 30 mg/dL Final    Creatinine 0.52 (L) 0.66 - 1.25 mg/dL Final    GFR Estimate >90 >60 mL/min/1.7m2 Final    Non  GFR Calc    GFR Estimate If Black >90 >60 mL/min/1.7m2  Final     GFR Calc    Calcium 8.7 8.5 - 10.1 mg/dL Final    Bilirubin Total 0.6 0.2 - 1.3 mg/dL Final    Albumin 4.0 3.4 - 5.0 g/dL Final    Protein Total 7.3 6.8 - 8.8 g/dL Final    Alkaline Phosphatase 69 40 - 150 U/L Final     (H) 0 - 70 U/L Final     (H) 0 - 45 U/L Final         2/9/2018  9:11 PM      Component Results     Component Value Ref Range & Units Status    Ethanol g/dL 0.32 (HH) <0.01 g/dL Final    Critical Value called to and read back by  CONCEPCION CANTU RN 2/9/18 2110 KW           2/9/2018  8:45 PM      Component Results     Component Value Ref Range & Units Status    Troponin I ES 0.020 0.000 - 0.045 ug/L Final    The 99th percentile for upper reference range is 0.045 ug/L.  Troponin values   in the range of 0.045 - 0.120 ug/L may be associated with risks of adverse   clinical events.           2/9/2018  8:45 PM      Component Results     Component Value Ref Range & Units Status    N-Terminal Pro BNP Inpatient 106 0 - 900 pg/mL Final       Reference range shown and results flagged as abnormal are suggested inpatient   cut points for confirming diagnosis if CHF in an acute setting. Establishing a   baseline value for each individual patient is useful for follow-up. An   inpatient or emergency department NT-proPBNP <300 pg/mL effectively rules out   acute CHF, with 99% negative predictive value.  The outpatient non-acute reference range for ruling out CHF is:   0-125 pg/mL (age 18 to less than 75)   0-450 pg/mL (age 75 yrs and older)           2/9/2018  8:56 PM      Narrative     Exam: CT CHEST PULMONARY EMBOLISM W CONTRAST 2/9/2018 8:46 PM    Comparison: PET/CT dated 1/3/2018     Clinical History: Shortness of breath and left scapular/anterior chest  pain.    Technique: Volumetric helical acquisition of the chest were obtained  following pulmonary embolism protocol. Three-dimensional (3D)  post-processed angiographic images were reconstructed, archived to  PACS and used in  interpretation of this study. Radiation dose for this  scan was reduced using automated exposure control, adjustment of the  mA and/or kV according to patient size, or iterative reconstruction  technique.    Contrast: 100 mL Isovue-370    Findings:  Contrast bolus timing is adequate for evaluation for pulmonary emboli.  There is no evidence of pulmonary emboli.  There is no evidence of  right heart strain.     Apically predominant emphysematous changes again seen. Spiculated  nodule in the left upper lobe again seen and unchanged. Additional  subcentimeter nodule posteriorly in the left upper lobe (series 5  image 74) is also again seen. No new focal airspace disease. No  evidence of pleural effusion is noted.    The heart size and great vessels are normal in caliber. Prominent  mediastinal lymph nodes again seen and unchanged.    Upper abdomen: Evaluation of the upper abdomen is limited by contrast  bolus timing and coverage.    Bones: No concerning lesions identified.        Impression     Impression:   1. No evidence of pulmonary embolus.  2. Left upper lobe nodules again seen and unchanged.  3. Apically predominant emphysema.    SAIGE WATT MD         2/9/2018  8:54 PM      Component Results     Component Value Ref Range & Units Status    WBC 5.7 4.0 - 11.0 10e9/L Final    RBC Count 4.66 4.4 - 5.9 10e12/L Final    Hemoglobin 15.2 13.3 - 17.7 g/dL Final    Hematocrit 41.7 40.0 - 53.0 % Final    MCV 90 78 - 100 fl Final    MCH 32.6 26.5 - 33.0 pg Final    MCHC 36.5 31.5 - 36.5 g/dL Final    RDW 12.8 10.0 - 15.0 % Final    Platelet Count 261 150 - 450 10e9/L Final    Diff Method Automated Method  Final    % Neutrophils 47.2 % Final    % Lymphocytes 36.5 % Final    % Monocytes 14.2 % Final    % Eosinophils 0.9 % Final    % Basophils 1.2 % Final    % Immature Granulocytes 0.0 % Final    Absolute Neutrophil 2.7 1.6 - 8.3 10e9/L Final    Absolute Lymphocytes 2.1 0.8 - 5.3 10e9/L Final    Absolute Monocytes 0.8 0.0 -  "1.3 10e9/L Final    Absolute Eosinophils 0.1 0.0 - 0.7 10e9/L Final    Absolute Basophils 0.1 0.0 - 0.2 10e9/L Final    Abs Immature Granulocytes 0.0 0 - 0.4 10e9/L Final                Thank you for choosing [facility]       Thank you for choosing [facility] for your care. Our goal is always to provide you with excellent care. Hearing back from our patients is one way we can continue to improve our services. Please take a few minutes to complete the written survey that you may receive in the mail after you visit with us. Thank you!        [employer] Information     [employer] lets you send messages to your doctor, view your test results, renew your prescriptions, schedule appointments and more. To sign up, go to [email or web address] . Click on \"Log in\" on the left side of the screen, which will take you to the Welcome page. Then click on \"Sign up Now\" on the right side of the page.     You will be asked to enter the access code listed below, as well as some personal information. Please follow the directions to create your username and password.     Your access code is: [email or web address]  Expires: 2018 12:18 PM     Your access code will  in 90 days. If you need help or a new code, please call your [facility] clinic or [phone].        Care EveryWhere ID     This is your Care EveryWhere ID. This could be used by other organizations to access your [facility] medical records  [id number]        Equal Access to Services     [facility] Center[facility]DE : Hadii aad ku hadasho Soomaali, waaxda luqadaha, qaybta kaalmada adeegyada, celina hewitt . So [facility] [phone].    ATENCIÓN: Si habla español, tiene a galaviz disposición servicios gratuitos de asistencia lingüística. Llame al [phone].    We comply with applicable federal civil rights laws and [place] laws. We do not discriminate on the basis of race, color, national origin, age, disability, sex, sexual orientation, or gender identity.            After " Visit Summary       This is your record. Keep this with you and show to your community pharmacist(s) and doctor(s) at your next visit.

## 2018-02-10 NOTE — DISCHARGE INSTRUCTIONS
Return to the Emergency Room if the following occurs:     Worsened breathing, fever >101, worsened pain, or for any concern at anytime.    Or, follow-up with the following provider as we discussed:     Return to your primary doctor / oncologist as scheduled.    Medications discussed:    Albuterol for cough, shortness of breath as needed.    If you received pain-relieving or sedating medication during your time in the ER, avoid alcohol, driving automobiles, or working with machinery.  Also, a responsible adult must stay with you.        Call the Nurse Advice Line at [phone] or [phone] for any concern at anytime.

## 2018-02-10 NOTE — ED PROVIDER NOTES
HPI  Patient is a 67-year-old male presenting with shortness of breath.  He has a recent diagnoses of lung cancer.  Please see the oncology note for details.  He takes medication for blood pressure.  He abuses alcohol.  He smokes.  He denies having to use beta agonist inhalers in the past.  No illegal drug use.  No prior heart disease known.    The patient has had a chronic cough.  He was seen for this previously.  He had a chest x-ray recently which showed this concerning nodule.  He had follow-up with oncology and then diagnosed with lung cancer.  The patient has had significantly worsened breathing over the past 3-4 days.  His cough is unchanged.  It is not productive.  No hemoptysis.  He has had some constant left upper back pain over the past month.  This is not changed.  He had some new left anterior chest pain that is been constant over the past 3-4 days.  No obvious exacerbating or relieving factors.  His breathing is difficult at rest and worsened with activity.  He denies new leg edema.  He does report some left calf cramping that comes and goes.  Today he fainted twice.  The first episode was when he was standing up from a seated position.  The second episode was when he was walking.  He had mild trauma with his fall, scraping his left arm.  No reported head trauma.  No nausea or vomiting.  No reported fever.  No nasal congestion or rhinorrhea.  No sore throat.  No headache.  No diarrhea or constipation.  No hematochezia or melena.    ROS: All other review of systems are negative other than that noted above.     Past Medical History:   Diagnosis Date     Alcohol abuse     Status post treatment     Past Surgical History:   Procedure Laterality Date     FRACTURE TX, ANKLE RT/LT  1975    Fracture TX Ankle, LT     OPEN REDUCTION INTERNAL FIXATION HIP NAILING  9/20/2013    Procedure: OPEN REDUCTION INTERNAL FIXATION HIP NAILING;  Left Hip Open Reduction Internal Fixation ;  Surgeon: Rosas Dennis MD;   "Location: [facility] OR     Family History   Problem Relation Age of Onset     DIABETES Brother      type 2     DIABETES Father      Social History   Substance Use Topics     Smoking status: Current Every Day Smoker     Packs/day: 0.30     Years: 47.00     Types: Cigarettes     Start date: 12/26/1967     Smokeless tobacco: Never Used      Comment: 3 cigs daily     Alcohol use Yes         PHYSICAL  /66  Pulse 72  Temp 97.6  F (36.4  C) (Temporal)  Resp 16  Ht 1.88 m (6' 2\")  Wt 88.5 kg (195 lb)  SpO2 97%  BMI 25.04 kg/m2  General: Patient is alert and in moderate to severe distress.  Disheveled.  Sitting up in bed.  He seems quite concerned and somewhat anxious but there is no respiratory distress obvious.  He is talking in complete sentences.  He smells of alcohol.  2 neighbors brought him in and are present in the room.  Neurological: Alert.  Moving upper and lower extremities equally, bilaterally.  Head / Neck: Atraumatic.  Ears: Not done.  Eyes: Pupils are equal, round, and reactive.  Normal conjunctiva.  Nose: Midline.  No epistaxis.  Mouth / Throat: No ulcerations or lesions.  Upper pharynx is not erythematous.  Moist.  Respiratory: No respiratory distress.  Mild wheeze on the left side, decreased breath sounds bilaterally.  Cardiovascular: Regular rhythm.  Peripheral extremities are warm.  No edema.  No calf tenderness.  Abdomen / Pelvis: Right abdominal tenderness.  He pushes my hand away as I push with any force.  No distention.  Soft throughout.  Genitalia: Not done.  Musculoskeletal: No tenderness over major muscles and joints.  Skin: No evidence of rash or trauma.        ED COURSE  1940.  The patient has no shortness of breath.  He has had a recent workup and diagnosed with lung cancer.  He smells of alcohol and is likely intoxicated.  He abuses alcohol.  No known heart disease.  He appears to have a sinus rhythm without ischemic change on the EKG but this could be atrial fibrillation, as documented " below.    Labs Ordered and Resulted from Time of ED Arrival Up to the Time of Departure from the ED   COMPREHENSIVE METABOLIC PANEL - Abnormal; Notable for the following:        Result Value    Glucose 69 (*)     Creatinine 0.52 (*)      (*)      (*)     All other components within normal limits   ALCOHOL ETHYL - Abnormal; Notable for the following:     Ethanol g/dL 0.32 (*)     All other components within normal limits   CBC WITH PLATELETS DIFFERENTIAL   TROPONIN I   NT PROBNP INPATIENT   CBC WITH PLATELETS DIFFERENTIAL       IMAGING  Images reviewed by me.  Radiology report also reviewed.  CT Chest Pulmonary Embolism w Contrast   Final Result   Impression:    1. No evidence of pulmonary embolus.   2. Left upper lobe nodules again seen and unchanged.   3. Apically predominant emphysema.      SAIGE WATT MD        EKG  (1922)   Interpretation performed by me.  Rate: 70     Rhythm: sinus vs atrial fib (likely)     Axis: nl  Intervals: SD (12-2) 214 (?), QRS (<12) 100, QTc (>5) 419  P wave: ?     QRS complex: nl  ST segment / T-wave: nl  Conclusion: Normal versus atrial fibrillation.    2220.  The patient has an unremarkable work up, other than the known diagnoses of pulmonary malignancy.  No evidence for acute coronary syndrome.  Low concern for this given the timing of his symptoms and his negative EKG/troponin.  No evidence of pulmonary embolism.  No evidence of aortic pathology.  No evidence of pneumonia or pleural effusion or pulmonary congestion.  Repeat EKG shows a sinus rhythm with occasional premature atrial contraction.  The patient did have some very mild wheeze when he arrived here but none now.  He feels much better and would like to go home.  He acknowledges that he was quite anxious.  A prescription for albuterol is given.  Follow-up as scheduled.  Return here for worsening.  Of note, the patient has an alcohol level but is quite high as well and is acutely intoxicated.      IMPRESSION     ICD-10-CM    1. Acute bronchospasm J98.01    2. Anxiety F41.9            Critical Care time:  none                    Alli Lopez MD  02/09/18 4643

## 2018-02-16 ENCOUNTER — TELEPHONE (OUTPATIENT)
Dept: ONCOLOGY | Facility: CLINIC | Age: 68
End: 2018-02-16

## 2018-02-18 LAB — INR PPP: 3.52 (ref 0.9–1.1)

## 2018-03-06 NOTE — PROGRESS NOTES
THORACIC SURGERY - NEW PATIENT OFFICE VISIT      Dear Dr. Mccauley,    I saw Mr. Oneil at your request in consultation for the evaluation and treatment of a LEFT upper lobe adenocarcinoma.     HPI  Mr. Chilo Oneil is a 67 year old year-old male with a recently diagnosed LEFT upper lobe adenocarcinoma that was found while getting worked up for left sided/upper chest/left shoulder/back pain. He underwent CT-guided biopsy dated 12/18/17 that showed moderately differentiated adenocarcinoma.     Today he endorses a productive cough that he has had for months, which he thinks is getting better. Has been fatigued for several months. His scalpular and arm pains are worse when he lays down at night. Occasional shortness of breath, does not use oxygen at home.     Previsit Tests   Echocardiogram (8/22/16) - EF 65-70%, no WMA, grade I LV dysfunction.  PFT (1/17/18) - FEV1 2.42 (65% predicted, no change with BD), DLCO 76%  PET-CT (1/3/18) - ANDREA nodule, 1.5 cm, SUV 19, hilar LN with SUV 3.            CT Chest PE with contrast (2/9/18): No evidence of PE; left upper lobe nodule unchanged    MRI Brain (1/10/18) - No metastatic lesions.     PMH  Benign essential hypertension   Tobacco use disorder  Gastroesophageal reflux disease without esophagitis  Alcohol abuse  Fracture TX, ankle RT/LT 1975  Open reduction internal fixation hip nailing 9/20/2013     ETOH 8-10 beers a day  TOB 75 pack year history, trying to quit actively and down to 5 cigarettes a day    Physical examination  BMI 25  Non contributory    From a personal perspective, Mr Oneil lives in [facility] (~35-40 minutes away) and does not like to drive in the cities so he had trouble getting to his appointments. Now he has a ride setup. He lives with a roommate named Jb Hamm.       IMPRESSION Left upper lobe adenocarcinoma    67 year old year-old male with a LEFT upper lobe adenocarinoma and PET avid left hilar node, tentatively T2N1M0.     PLAN  I spent a total of  45 minutes with Mr. Oneil, more than 50% of which were spent in counseling, coordination of care, and face-to-face time. I reviewed the plan as follows:    Tentative procedure planned: TEMLA, Left VATS wedge resection, possible lobectomy, with possible thoracotomy.  I reviewed the indications, risks, and benefits of the procedure with Mr. Chilo Oneil. We discussed the intraoperative risks of bleeding and injury to vital organs, potential postoperative complications including, but not limited to, major respiratory events, arrhythmia, bleeding, infection, reoperation, and death. I explained the anticipated hospital course (+/- 5-7 days) and postoperative recovery including pain control, chest drain management, and variable degrees of dyspnea (or need for supplemental oxygen) and fatigue that tend to get better with time.    1) Cardiology evaluation:  Mr. Oneil has some atypical left arm pain, and although my level of suspicion is low, he has multiple risk factors for CAD. His appointment is being arranged by his local care coordinator in [facility].  2) Repeat PET and Brain MRI  3) PAC visit and clinic visit with us in 3 weeks  4) Alcohol cessation  We had a candid conversation about his alcohol intake. I explained that we cannot proceed with surgery if he is still drinking, ad that he would need to be abstinent for 2 weeks prior to surgery.  5) Smoking cessation: I explained that ideally he should quit smoking as well, but I also mentioned that since he is going through very stressful times it would be tolerable to continue smoking till the time of surgery.  6) Follow-up: Tonia Rangel NP, will call him in 7-10 days to get a sense of how he is progressing.  7) Disposition: Mr. Oneil has a precarious social network and we will work with him on perioperative planning and help.    They had all their questions answered and were in agreement with the plan.  I appreciate the opportunity to participate in the care of  your patient and will keep you updated.  Sincerely,

## 2018-03-07 ENCOUNTER — OFFICE VISIT (OUTPATIENT)
Dept: SURGERY | Facility: CLINIC | Age: 68
End: 2018-03-07
Attending: THORACIC SURGERY (CARDIOTHORACIC VASCULAR SURGERY)
Payer: MEDICARE

## 2018-03-07 VITALS
TEMPERATURE: 97.8 F | RESPIRATION RATE: 18 BRPM | SYSTOLIC BLOOD PRESSURE: 170 MMHG | WEIGHT: 186.51 LBS | DIASTOLIC BLOOD PRESSURE: 71 MMHG | HEART RATE: 69 BPM | BODY MASS INDEX: 23.95 KG/M2

## 2018-03-07 DIAGNOSIS — C34.12 MALIGNANT NEOPLASM OF UPPER LOBE OF LEFT LUNG (H): ICD-10-CM

## 2018-03-07 DIAGNOSIS — C34.12 MALIGNANT NEOPLASM OF UPPER LOBE OF LEFT LUNG (H): Primary | ICD-10-CM

## 2018-03-07 DIAGNOSIS — F10.10 ALCOHOL ABUSE: ICD-10-CM

## 2018-03-07 LAB
ALBUMIN SERPL-MCNC: 3.9 G/DL (ref 3.4–5)
PREALB SERPL IA-MCNC: 34 MG/DL (ref 15–45)

## 2018-03-07 PROCEDURE — 84134 ASSAY OF PREALBUMIN: CPT

## 2018-03-07 PROCEDURE — G0463 HOSPITAL OUTPT CLINIC VISIT: HCPCS | Mod: ZF

## 2018-03-07 PROCEDURE — 82040 ASSAY OF SERUM ALBUMIN: CPT

## 2018-03-07 ASSESSMENT — PAIN SCALES - GENERAL: PAINLEVEL: SEVERE PAIN (7)

## 2018-03-07 NOTE — NURSING NOTE
"Oncology Rooming Note    March 7, 2018 8:34 AM   Chilo Oneil is a 67 year old male who presents for:    Chief Complaint   Patient presents with     Oncology Clinic Visit     New for Lung Ca , discuss Surgery      Initial Vitals: There were no vitals taken for this visit. Estimated body mass index is 25.04 kg/(m^2) as calculated from the following:    Height as of 2/9/18: 1.88 m (6' 2\").    Weight as of 2/9/18: 88.5 kg (195 lb). There is no height or weight on file to calculate BSA.  Data Unavailable Comment: Data Unavailable   No LMP for male patient.  Allergies reviewed: Yes  Medications reviewed: Yes    Medications: Medication refills not needed today.  Pharmacy name entered into [employer]: [facility] [place], [place] - [address] [place] [address]    Clinical concerns: discuss surgery [facility]  was notified.    8 minutes for nursing intake (face to face time)     Renee Dailey MA              "

## 2018-03-07 NOTE — MR AVS SNAPSHOT
After Visit Summary   3/7/2018    Chilo Oneil    MRN: [id number]           Patient Information     Date Of Birth          1950        Visit Information        Provider Department      3/7/2018 9:30 AM Mega Moreno MD [facility]        Today's Diagnoses     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)    -  1       Follow-ups after your visit        Additional Services     PAC Visit Referral (For [facility] Only)       Does this visit require an Anesthesia consult?  ERAS    H&P done by:  PAC      Please be aware that coverage of these services is subject to the terms and limitations of your health insurance plan.  Call member services at your health plan with any benefit or coverage questions.      Please bring the following to your appointment:  >>   Any x-rays, CTs or MRIs which have been performed.  Contact the facility where they were done to arrange for  prior to your scheduled appointment.  Any new CT, MRI or other procedures ordered by your specialist must be performed at a [place] facility or coordinated by your clinic's referral office.    >>   List of current medications  >>   This referral request   >>   Any documents/labs given to you for this referral                  Your next 10 appointments already scheduled     Mar 07, 2018 10:00 AM CST   LAB with  LAB    Health Lab ([facility])    [address] [address] [phone]           Please do not eat 10-12 hours before your appointment if you are coming in fasting for labs on lipids, cholesterol, or glucose (sugar). This does not apply to pregnant women. Water, hot tea and black coffee (with nothing added) are okay. Do not drink other fluids, diet soda or chew gum.            Mar 29, 2018 11:00 AM CDT   New Visit with Chad Crawford MD   [facility] ([facility])    [address]  [facility] [address]   [phone]              Future tests that were ordered for you today     Open Future Orders        Priority Expected Expires Ordered    PET Oncology (Eyes to Thighs) Routine  3/7/2019 3/7/2018    MRI Brain w & w/o contrast Routine  2018 3/7/2018    X-ray Chest 2 vws* Routine 3/7/2018 3/7/2019 3/7/2018    Albumin level Routine  3/7/2019 3/7/2018    Prealbumin Routine  3/7/2019 3/7/2018            Who to contact     If you have questions or need follow up information about today's clinic visit or your schedule please contact [facility] directly at [phone].  Normal or non-critical lab and imaging results will be communicated to you by MyChart, letter or phone within 4 business days after the clinic has received the results. If you do not hear from us within 7 days, please contact the clinic through MyChart or phone. If you have a critical or abnormal lab result, we will notify you by phone as soon as possible.  Submit refill requests through [employer] or call your pharmacy and they will forward the refill request to us. Please allow 3 business days for your refill to be completed.          Additional Information About Your Visit        [employer] Information     [employer] lets you send messages to your doctor, view your test results, renew your prescriptions, schedule appointments and more. To sign up, go to [email or web address] . Click on \"Log in\" on the left side of the screen, which will take you to the Welcome page. Then click on \"Sign up Now\" on the right side of the page.     You will be asked to enter the access code listed below, as well as some personal information. Please follow the directions to create your username and password.     Your access code is: [email or web address]  Expires: 2018  9:49 AM     Your access code will  in 90 days. If you need help or a new code, please call your [place] clinic or [phone].        Care " EveryWhere ID     This is your Care EveryWhere ID. This could be used by other organizations to access your [place] medical records  [id number]        Your Vitals Were     Pulse Temperature Respirations BMI (Body Mass Index)          69 97.8  F (36.6  C) (Oral) 18 23.95 kg/m2         Blood Pressure from Last 3 Encounters:   03/07/18 170/71   02/09/18 138/66   01/11/18 153/60    Weight from Last 3 Encounters:   03/07/18 84.6 kg (186 lb 8.2 oz)   02/09/18 88.5 kg (195 lb)   01/11/18 87.1 kg (192 lb 1.6 oz)              We Performed the Following     PAC Visit Referral (For [facility] Only)        Primary Care Provider Office Phone # Fax #    Manish Plasencia [phone] [address] [address]        Equal Access to Services     [place] : Hadii aad ku hadasho Soearl, waaxda luqadaha, qaybta kaalmada adeegyada, celina hewitt . So [facility] [phone].    ATENCIÓN: Si leonala español, tiene a galaviz disposición servicios gratuitos de asistencia lingüística. Llame al [phone].    We comply with applicable federal civil rights laws and [place] laws. We do not discriminate on the basis of race, color, national origin, age, disability, sex, sexual orientation, or gender identity.            Thank you!     Thank you for choosing [facility]  for your care. Our goal is always to provide you with excellent care. Hearing back from our patients is one way we can continue to improve our services. Please take a few minutes to complete the written survey that you may receive in the mail after your visit with us. Thank you!             Your Updated Medication List - Protect others around you: Learn how to safely use, store and throw away your medicines at www.disposemymeds.org.          This list is accurate as of 3/7/18  9:49 AM.  Always use your most recent med list.                   Brand Name Dispense Instructions for use Diagnosis     albuterol 108 (90 BASE) MCG/ACT Inhaler    PROAIR HFA/PROVENTIL HFA/VENTOLIN HFA    1 Inhaler    Inhale 2 puffs into the lungs every 4 hours as needed for shortness of breath / dyspnea or wheezing WITH SPACER        aspirin 325 MG tablet     90 tablet    Take 1 tablet (325 mg) by mouth daily    Pain of left lower leg       hydrochlorothiazide 25 MG tablet    HYDRODIURIL    90 tablet    Take 1 tablet (25 mg) by mouth daily    Benign essential hypertension       indomethacin 25 MG capsule    INDOCIN    42 capsule    Take 1 capsule (25 mg) by mouth 2 times daily (with meals)    Chest wall pain, Rib pain on left side       losartan 50 MG tablet    COZAAR    90 tablet    Take 1 tablet (50 mg) by mouth daily    Benign essential hypertension       metoprolol tartrate 50 MG tablet    LOPRESSOR    60 tablet    Take 1 tablet (50 mg) by mouth 2 times daily    Benign essential hypertension       omeprazole 40 MG capsule    priLOSEC      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), Hyperlipidemia LDL goal <100, Benign essential hypertension, Tobacco use disorder, Gastroesophageal reflux disease without esophagitis       traMADol 50 MG tablet    ULTRAM    30 tablet    Take 1-2 tablets ( mg) by mouth every 8 hours as needed for pain    Chronic left shoulder pain

## 2018-03-07 NOTE — LETTER
3/7/2018       RE: Chilo Oneil  [address]  [place] [address]       THORACIC SURGERY - NEW PATIENT OFFICE VISIT      Dear Dr. Mccauley,    I saw Mr. Oneil at your request in consultation for the evaluation and treatment of a LEFT upper lobe adenocarcinoma.     HPI  Mr. Chilo Oneil is a 67 year old year-old male with a recently diagnosed LEFT upper lobe adenocarcinoma that was found while getting worked up for left sided/upper chest/left shoulder/back pain. He underwent CT-guided biopsy dated 12/18/17 that showed moderately differentiated adenocarcinoma.     Today he endorses a productive cough that he has had for months, which he thinks is getting better. Has been fatigued for several months. His scalpular and arm pains are worse when he lays down at night. Occasional shortness of breath, does not use oxygen at home.     Previsit Tests   Echocardiogram (8/22/16) - EF 65-70%, no WMA, grade I LV dysfunction.  PFT (1/17/18) - FEV1 2.42 (65% predicted, no change with BD), DLCO 76%  PET-CT (1/3/1 8) - ANDREA  nodule, 1.5 cm, SUV 19, hilar LN with SUV 3.            CT Chest PE with contrast (2/9/18): No evidence of PE; left upper lobe nodule unchanged    MRI Brain (1/10/18) - No metastatic lesions.     PMH  Benign essential hypertension   Tobacco use disorder  Gastroesophageal reflux disease without esophagitis  Alcohol abuse  Fracture TX, ankle RT/LT 1975  Open reduction internal fixation hip nailing 9/20/2013     ETOH 8-10 beers a day  TOB 75 pack year history, trying to quit actively and down to 5 cigarettes a day    Physical examination  BMI 25  Non contributory    From a personal perspective, Mr Oneil lives in [place] (~35-40 minutes away) and does not like to drive in the cities so he had trouble getting to his appointments. Now he has a ride setup. He lives with a roommate named Jb Hamm.       IMPRESSION Left upper lobe adenocarcinoma    67 year old year-old male with a LEFT upper lobe adenocarinoma  and PET avid left hilar node, tentatively T2N1M0.     PLAN  I spent a total of 45 minutes with Mr. Oneil, more than 50% of which were spent in counseling, coordination of care, and face-to-face time. I reviewed the plan as follows:    Tentative procedure planned: TEMLA, Left VATS wedge resection, possible lobectomy, with possible thoracotomy.  I reviewed the indications, risks, and benefits of the procedure with Mr. Chilo Oneil. We discussed the intraoperative risks of bleeding and injury to vital organs, potential postoperative complications including, but not limited to, major respiratory events, arrhythmia, bleeding, infection, reoperation, and death. I explained the anticipated hospital course (+/- 5-7 days) and postoperative recovery including pain control, chest drain management, and variable degrees of dyspnea (or need for supplemental oxygen) and fatigue that tend to get better with time.    1) Cardiology evaluation:  Mr. Oneil has some atypical left arm pain, and although my level of suspicion is low, he has multiple risk factors for CAD. His appointment is being arranged by his local care coordinator in [facility].  2) Repeat PET and Brain MRI  3) PAC visit and clinic visit with us in 3 weeks  4) Alcohol cessation  We had a candid conversation about his alcohol intake. I explained that we cannot proceed with surgery if he is still drinking, ad that he would need to be abstinent for 2 weeks prior to surgery.  5) Smoking cessation: I explained that ideally he should quit smoking as well, but I also mentioned that since he is going through very stressful times it would be tolerable to continue smoking till the time of surgery.  6) Follow-up: Tonia Rangel NP, will call him in 7-10 days to get a sense of how he is progressing.  7) Disposition: Mr. Oneil has a precarious social network and we will work with him on perioperative planning and help.    They had all their questions answered and were in  agreement with the plan.  I appreciate the opportunity to participate in the care of your patient and will keep you updated.  Sincerely,      Mega Moreno MD

## 2018-03-07 NOTE — TELEPHONE ENCOUNTER
Received a call from Dr. Moreno's office after meeting with the pt today and they would like to move forward with surgery in about 3 weeks. Prior to surgery he needs to have clearance from cardiology since he reports intermittent chest pain that radiates down left arm. Their clinic was able to get him set up for 3/29 but they are hoping we can  him sooner so tests and clearance can be done prior to surgery. We were able to move him up to 2/20 at 1:00, updated pt and Dr. Moreno's office. Pt verbalized understanding.

## 2018-03-07 NOTE — Clinical Note
Please schedule the patient to see cardiology in [place]--we would like this in the next week or two. Pt is waiting in room 20. Thanks.

## 2018-03-08 ENCOUNTER — TELEPHONE (OUTPATIENT)
Dept: SURGERY | Facility: CLINIC | Age: 68
End: 2018-03-08

## 2018-03-08 NOTE — TELEPHONE ENCOUNTER
Spoke with patient about upcoming procedure on 4/9/18 at 10:10am (arrival time 8:10am) with .  Setting up pre-op appts/pac on 4/4/18 and mailing all info to him. Gave my number [phone] to call with questions.

## 2018-03-09 ENCOUNTER — TELEPHONE (OUTPATIENT)
Dept: SURGERY | Facility: CLINIC | Age: 68
End: 2018-03-09

## 2018-03-09 ENCOUNTER — OFFICE VISIT (OUTPATIENT)
Dept: FAMILY MEDICINE | Facility: CLINIC | Age: 68
End: 2018-03-09
Payer: MEDICARE

## 2018-03-09 VITALS
DIASTOLIC BLOOD PRESSURE: 63 MMHG | SYSTOLIC BLOOD PRESSURE: 148 MMHG | TEMPERATURE: 98.4 F | BODY MASS INDEX: 23.88 KG/M2 | WEIGHT: 186 LBS | HEART RATE: 72 BPM | OXYGEN SATURATION: 96 %

## 2018-03-09 DIAGNOSIS — M54.9 UPPER BACK PAIN ON LEFT SIDE: Primary | ICD-10-CM

## 2018-03-09 DIAGNOSIS — F40.240 CLAUSTROPHOBIA: Primary | ICD-10-CM

## 2018-03-09 PROCEDURE — 99213 OFFICE O/P EST LOW 20 MIN: CPT | Performed by: FAMILY MEDICINE

## 2018-03-09 RX ORDER — ALPRAZOLAM 0.5 MG
0.5 TABLET ORAL PRN
Qty: 2 TABLET | Refills: 0 | Status: ON HOLD
Start: 2018-03-09 | End: 2018-04-12

## 2018-03-09 RX ORDER — TRAMADOL HYDROCHLORIDE 50 MG/1
50-100 TABLET ORAL 2 TIMES DAILY
Qty: 40 TABLET | Refills: 1 | Status: ON HOLD | OUTPATIENT
Start: 2018-03-09 | End: 2018-04-12

## 2018-03-09 NOTE — PROGRESS NOTES
SUBJECTIVE:   Chilo Oneil is a 67 year old male who presents to clinic today for the following health issues:      Concern - Patient is having upper back L side L shoulder and arm pain, can not sleeping  due to the pain    Onset: 2-3 mos     Description:   Patient is having increased pain in upper L side back,  L shoulder and arm     Intensity: severe    Progression of Symptoms:  worsening    Accompanying Signs & Symptoms:  Patient dx with lung cancer     Previous history of similar problem:   None     Precipitating factors:   Worsened by: with anything     Alleviating factors:  Improved by: none     Therapies Tried and outcome: aleve not working Patient has been given tramadol in the past and did seem to work better than aleve     Patient is a 67 yr old male here for pain in his upper back on left side, he has had this pain for months and this pain was what led to the work up that finally disclosed that he had left lung cancer. He is having a wedge resection done on April 9 th at the [facility]. He reports that the pain is there all the time , not associated with shortness of breath or chest pain. Pain does radiate into left arm, does not cause any numbness or heaviness of the arm. No tingling sensation. Denies any chest pain. Lung CA is on the left. Had a CT of the chest to rule out PE about a month ago. No PE seen.     Problem list and histories reviewed & adjusted, as indicated.  Additional history: as documented    Patient Active Problem List   Diagnosis     Tobacco use disorder     CARDIOVASCULAR SCREENING; LDL GOAL LESS THAN 130     Hip fracture, left (H)     Alcohol use, daily use     PAD (peripheral artery disease) (H)     Benign essential hypertension     Hyperlipidemia LDL goal <100     Atypical angina (H)     Anxiety     Gastroesophageal reflux disease without esophagitis     Past Surgical History:   Procedure Laterality Date     FRACTURE TX, ANKLE RT/LT  1975    Fracture TX Ankle, LT     OPEN  REDUCTION INTERNAL FIXATION HIP NAILING  9/20/2013    Procedure: OPEN REDUCTION INTERNAL FIXATION HIP NAILING;  Left Hip Open Reduction Internal Fixation ;  Surgeon: Rosas Dennis MD;  Location: [facility] OR       Social History   Substance Use Topics     Smoking status: Current Every Day Smoker     Packs/day: 0.30     Years: 47.00     Types: Cigarettes     Start date: 12/26/1967     Smokeless tobacco: Never Used      Comment: 3 cigs daily     Alcohol use Yes     Family History   Problem Relation Age of Onset     DIABETES Brother      type 2     DIABETES Father          Current Outpatient Prescriptions   Medication Sig Dispense Refill     traMADol (ULTRAM) 50 MG tablet Take 1-2 tablets ( mg) by mouth 2 times daily 40 tablet 1     albuterol (PROAIR HFA/PROVENTIL HFA/VENTOLIN HFA) 108 (90 BASE) MCG/ACT Inhaler Inhale 2 puffs into the lungs every 4 hours as needed for shortness of breath / dyspnea or wheezing WITH SPACER 1 Inhaler 0     losartan (COZAAR) 50 MG tablet Take 1 tablet (50 mg) by mouth daily 90 tablet 3     metoprolol (LOPRESSOR) 50 MG tablet Take 1 tablet (50 mg) by mouth 2 times daily 60 tablet 1     indomethacin (INDOCIN) 25 MG capsule Take 1 capsule (25 mg) by mouth 2 times daily (with meals) 42 capsule 1     aspirin 325 MG tablet Take 1 tablet (325 mg) by mouth daily 90 tablet 3     omeprazole (PRILOSEC) 40 MG capsule        hydrochlorothiazide (HYDRODIURIL) 25 MG tablet Take 1 tablet (25 mg) by mouth daily 90 tablet 3     Allergies   Allergen Reactions     Cipro [Ciprofloxacin] Swelling     BP Readings from Last 3 Encounters:   03/09/18 148/63   03/07/18 170/71   02/09/18 138/66    Wt Readings from Last 3 Encounters:   03/09/18 186 lb (84.4 kg)   03/07/18 186 lb 8.2 oz (84.6 kg)   02/09/18 195 lb (88.5 kg)                  Labs reviewed in EPIC    Reviewed and updated as needed this visit by clinical staff  Tobacco  Allergies  Med Hx  Surg Hx  Fam Hx  Soc Hx      Reviewed and updated as  needed this visit by Provider         ROS:  Constitutional, HEENT, cardiovascular, pulmonary, gi and gu systems are negative, except as otherwise noted.    OBJECTIVE:     /63  Pulse 72  Temp 98.4  F (36.9  C) (Tympanic)  Wt 186 lb (84.4 kg)  SpO2 96%  BMI 23.88 kg/m2  Body mass index is 23.88 kg/(m^2).  GENERAL: healthy, alert and no distress  EYES: Eyes grossly normal to inspection, PERRL and conjunctivae and sclerae normal  HENT: ear canals and TM's normal, nose and mouth without ulcers or lesions  NECK: no adenopathy, no asymmetry, masses, or scars and thyroid normal to palpation  RESP: lungs clear to auscultation - no rales, rhonchi or wheezes  CV: regular rate and rhythm, normal S1 S2, no S3 or S4, no murmur, click or rub, no peripheral edema and peripheral pulses strong  MS: tenderness to palpation on left upper back inferior to left rib     Diagnostic Test Results:  none     ASSESSMENT/PLAN:     (M54.9) Upper back pain on left side  (primary encounter diagnosis)  Comment: Feel this is related to the malignancy . Will have patient try pain medication . If pain worsens consider imaging.   Plan: traMADol (ULTRAM) 50 MG tablet  BP elevated today. Comes back on the 20 th of March for cardiac clearance, will have patient come back in for a BP recheck.     FUTURE APPOINTMENTS:       - Follow-up visit as needed    Manish Plasencia MD  [facility]

## 2018-03-09 NOTE — TELEPHONE ENCOUNTER
"Pt called Dr. Mccauley's office to ask for \"sedative for my brain scan in April\".  Chart review shows this was refilled today by Tonia Rangel CNP.  Reminded patient of this, he expresses his thanks and states, \"just wanted to make sure it was done\".  "

## 2018-03-09 NOTE — PATIENT INSTRUCTIONS
Thank you for choosing [facility].  You may be receiving a survey in the mail from Laura Frausto regarding your visit today.  Please take a few minutes to complete and return the survey to let us know how we are doing.      If you have questions or concerns, please contact us via [employer] or you can contact your care team at [phone].    Our Clinic hours are:  Monday 6:40 am  to 7:00 pm  Tuesday -Friday 6:40 am to 5:00 pm    The [place] outpatient lab hours are:  Monday - Friday 6:10 am to 4:45 pm  Saturdays 7:00 am to 11:00 am  Appointments are required, call [phone]    If you have clinical questions after hours or would like to schedule an appointment,  call the clinic at [phone].

## 2018-03-09 NOTE — TELEPHONE ENCOUNTER
Rx for Alprazolam called to the [facility].  Pt notified and reminded he will need a  to his procedure.  Tonia Rangel CNP

## 2018-03-09 NOTE — NURSING NOTE
"Chief Complaint   Patient presents with     Shoulder Pain      and upper back        Initial /63  Pulse 72  Temp 98.4  F (36.9  C) (Tympanic)  Wt 186 lb (84.4 kg)  SpO2 96%  BMI 23.88 kg/m2 Estimated body mass index is 23.88 kg/(m^2) as calculated from the following:    Height as of 2/9/18: 6' 2\" (1.88 m).    Weight as of this encounter: 186 lb (84.4 kg).  Medication Reconciliation: complete  "

## 2018-03-09 NOTE — MR AVS SNAPSHOT
After Visit Summary   3/9/2018    Chilo Oneil    MRN: [id number]           Patient Information     Date Of Birth          1950        Visit Information        Provider Department      3/9/2018 8:40 AM Manish Plasencia MD [facility]        Today's Diagnoses     Chronic left shoulder pain          Care Instructions          Thank you for choosing [facility].  You may be receiving a survey in the mail from Laura Frausto regarding your visit today.  Please take a few minutes to complete and return the survey to let us know how we are doing.      If you have questions or concerns, please contact us via [employer] or you can contact your care team at [phone].    Our Clinic hours are:  Monday 6:40 am  to 7:00 pm  Tuesday -Friday 6:40 am to 5:00 pm    The [place] outpatient lab hours are:  Monday - Friday 6:10 am to 4:45 pm  Saturdays 7:00 am to 11:00 am  Appointments are required, call [phone]    If you have clinical questions after hours or would like to schedule an appointment,  call the clinic at [phone].          Follow-ups after your visit        Your next 10 appointments already scheduled     Mar 20, 2018  1:00 PM CDT   New Visit with Nadia Leger MD   [facility] ([facility])    [address] [place] [address]   [phone]            Apr 04, 2018  9:45 AM CDT   (Arrive by 9:30 AM)   PE NPET ONCOLOGY (EYES TO THIGHS) with [email or web address]   [facility] PET CT ([facility])    [address] [facility] [id number] [phone]           Tell your doctor:   If there is any chance you may be pregnant or if you are breastfeeding.   If you have problems lying in small spaces (claustrophobia). If you do, your doctor may give you medicine to help you relax. If you have diabetes:   Have your exam early in the morning. Your blood glucose will go up as  the day goes by.   Your glucose level must be 180 or less at the start of the exam. Please take any medicines you need to ensure this blood glucose level. 24 hours before your scan: Don t do any heavy exercise. (No jogging, aerobics or other workouts.) Exercise will make your pictures less accurate. 6 hours before your scan:   Stop all food and liquids (except water).   Do not chew gum or suck on mints.   If you need to take medicine with food, you may take it with a few crackers.  Please call your Imaging Department at your exam site with any questions.            Apr 04, 2018 11:00 AM CDT   (Arrive by 10:45 AM)   PAC EVALUATION with  Pac Tricia 7   [facility] ([facility])    [address] [place] [address]  4th Floor  [facility] [address]   [phone]            Apr 04, 2018 12:00 PM CDT   (Arrive by 11:45 AM)   PAC RN ASSESSMENT with  Pac Rn   [facility] ([facility])    [address] [place] [address]  4th [facility] [address]   [phone]            Apr 04, 2018 12:30 PM CDT   (Arrive by 12:15 PM)   PAC Anesthesia Consult with  Pac Anesthesiologist   [facility] [place] ([facility])    [address] [place] [address]  4th [facility] [address]   [phone]            Apr 04, 2018  1:00 PM CDT   (Arrive by 12:45 PM)   Return Visit with Mega Moreno MD   [facility] ([facility])    [address] [place] [address]  [address]  [facility] [address]   [phone]            Apr 09, 2018   Procedure with Mega Moreno MD   [facility] [place] Same Day Surgery (--)    [address] [facility] [address]   [phone]            May 11, 2018  2:15 PM CDT   (Arrive by 2:00 PM)   XR CHEST 2 VIEWS with UCXR1   [facility] [place] [facility] ([facility])    [address] [place] [address]  "Floor  [facility] [id number] [phone]           Please bring a list of your current medicines to your exam. (Include vitamins, minerals and over-thecounter medicines.) Leave your valuables at home.  Tell your doctor if there is a chance you may be pregnant.  You do not need to do anything special for this exam.            May 11, 2018  2:45 PM CDT   (Arrive by 2:30 PM)   Return Visit with NATALIE Berg [facility] ([facility])    [address] [facility]  [address]  [facility] [address] [phone]              Who to contact     If you have questions or need follow up information about today's clinic visit or your schedule please contact [facility] directly at [phone].  Normal or non-critical lab and imaging results will be communicated to you by MyChart, letter or phone within 4 business days after the clinic has received the results. If you do not hear from us within 7 days, please contact the clinic through MyChart or phone. If you have a critical or abnormal lab result, we will notify you by phone as soon as possible.  Submit refill requests through [employer] or call your pharmacy and they will forward the refill request to us. Please allow 3 business days for your refill to be completed.          Additional Information About Your Visit        MyChart Information     [employer] lets you send messages to your doctor, view your test results, renew your prescriptions, schedule appointments and more. To sign up, go to [email or web address] . Click on \"Log in\" on the left side of the screen, which will take you to the Welcome page. Then click on \"Sign up Now\" on the right side of the page.     You will be asked to enter the access code listed below, as well as some personal information. Please follow the directions to create your username and password.     Your access code is: [email or web address]  Expires: 6/5/2018  9:49 AM   "   Your access code will  in 90 days. If you need help or a new code, please call your [place] clinic or [phone].        Care EveryWhere ID     This is your Care EveryWhere ID. This could be used by other organizations to access your [place] medical records  [id number]        Your Vitals Were     Pulse Temperature Pulse Oximetry BMI (Body Mass Index)          72 98.4  F (36.9  C) (Tympanic) 96% 23.88 kg/m2         Blood Pressure from Last 3 Encounters:   18 163/63   18 170/71   18 138/66    Weight from Last 3 Encounters:   18 186 lb (84.4 kg)   18 186 lb 8.2 oz (84.6 kg)   18 195 lb (88.5 kg)              Today, you had the following     No orders found for display         Today's Medication Changes          These changes are accurate as of 3/9/18  8:56 AM.  If you have any questions, ask your nurse or doctor.               These medicines have changed or have updated prescriptions.        Dose/Directions    traMADol 50 MG tablet   Commonly known as:  ULTRAM   This may have changed:    - when to take this  - reasons to take this   Used for:  Chronic left shoulder pain   Changed by:  Manish Plasencia MD        Dose:   mg   Take 1-2 tablets ( mg) by mouth 2 times daily   Quantity:  40 tablet   Refills:  1            Where to get your medicines      Some of these will need a paper prescription and others can be bought over the counter.  Ask your nurse if you have questions.     Bring a paper prescription for each of these medications     traMADol 50 MG tablet                Primary Care Provider Office Phone # Fax #    Manish Plasencia [phone] [address] [facility] [address]        Equal Access to Services     SONY SNOW : Kevin Vidales, pepe camara, celina jaime. So [facility] [phone].    ATENCIÓN: Si go murphy  disposición servicios gratuitos de asistencia lingüística. Yecenia posada [phone].    We comply with applicable federal civil rights laws and [place] laws. We do not discriminate on the basis of race, color, national origin, age, disability, sex, sexual orientation, or gender identity.            Thank you!     Thank you for choosing [facility]  for your care. Our goal is always to provide you with excellent care. Hearing back from our patients is one way we can continue to improve our services. Please take a few minutes to complete the written survey that you may receive in the mail after your visit with us. Thank you!             Your Updated Medication List - Protect others around you: Learn how to safely use, store and throw away your medicines at www.disposemymeds.org.          This list is accurate as of 3/9/18  8:56 AM.  Always use your most recent med list.                   Brand Name Dispense Instructions for use Diagnosis    albuterol 108 (90 BASE) MCG/ACT Inhaler    PROAIR HFA/PROVENTIL HFA/VENTOLIN HFA    1 Inhaler    Inhale 2 puffs into the lungs every 4 hours as needed for shortness of breath / dyspnea or wheezing WITH SPACER        aspirin 325 MG tablet     90 tablet    Take 1 tablet (325 mg) by mouth daily    Pain of left lower leg       hydrochlorothiazide 25 MG tablet    HYDRODIURIL    90 tablet    Take 1 tablet (25 mg) by mouth daily    Benign essential hypertension       indomethacin 25 MG capsule    INDOCIN    42 capsule    Take 1 capsule (25 mg) by mouth 2 times daily (with meals)    Chest wall pain, Rib pain on left side       losartan 50 MG tablet    COZAAR    90 tablet    Take 1 tablet (50 mg) by mouth daily    Benign essential hypertension       metoprolol tartrate 50 MG tablet    LOPRESSOR    60 tablet    Take 1 tablet (50 mg) by mouth 2 times daily    Benign essential hypertension       omeprazole 40 MG capsule    priLOSEC      Malignant neoplasm of upper lobe of left lung  (H), Hyperlipidemia LDL goal <100, Benign essential hypertension, Tobacco use disorder, Gastroesophageal reflux disease without esophagitis       traMADol 50 MG tablet    ULTRAM    40 tablet    Take 1-2 tablets ( mg) by mouth 2 times daily    Chronic left shoulder pain

## 2018-03-14 ENCOUNTER — TELEPHONE (OUTPATIENT)
Dept: FAMILY MEDICINE | Facility: CLINIC | Age: 68
End: 2018-03-14

## 2018-03-14 ENCOUNTER — TELEPHONE (OUTPATIENT)
Dept: SURGERY | Facility: CLINIC | Age: 68
End: 2018-03-14

## 2018-03-14 DIAGNOSIS — F40.240 CLAUSTROPHOBIA: ICD-10-CM

## 2018-03-14 RX ORDER — ALPRAZOLAM 0.5 MG
0.5 TABLET ORAL PRN
Qty: 2 TABLET | Refills: 0 | Status: CANCELLED | OUTPATIENT
Start: 2018-03-14

## 2018-03-14 NOTE — TELEPHONE ENCOUNTER
Patient has received Xanax in the past for procedures. He was just given xanax 3/9/18 for his brain scan which is also scheduled on 3/20/18. Has he used this?    Michela Jimenez RN

## 2018-03-14 NOTE — TELEPHONE ENCOUNTER
Reason for Call:  Other prescription    Detailed comments: Pt is scheduled for MRI of his heart on 3/20/18. He is requesting a sedative. Dr Plasencia has given him this in the past for his claustrophobia. He uses [facility].    Phone Number Patient can be reached at: Home number on file [phone] (home)    Best Time: any    Can we leave a detailed message on this number?     Call taken on 3/14/2018 at 9:34 AM by Tata Montiel

## 2018-03-14 NOTE — TELEPHONE ENCOUNTER
03/14/18: I called pt and left message to get an update on how cessation of smoking and drinking is going. Per Dr. Moreno's office note, the patient needs at minimum 2 weeks complete cessation from alcohol prior to surgery.  Will attempt to contact patient again.  Tonia Ansari CNP    On 03/15/18 I spoke with pt. He states that he stopped drinking all together on 03/12/18. He reports he has continued to cut back on his smoking--currently only smoking a couple cigarettes daily. He states he plans to continue to try for complete cessation.    I provided encouragement on his efforts and stressed the importance of continued complete cessation from etoh and continued efforts to stop smoking all together as well.    Tonia Rangel CNP  Thoracic Surgery.   
No

## 2018-03-20 ENCOUNTER — OFFICE VISIT (OUTPATIENT)
Dept: CARDIOLOGY | Facility: CLINIC | Age: 68
End: 2018-03-20
Payer: MEDICARE

## 2018-03-20 ENCOUNTER — HOSPITAL ENCOUNTER (OUTPATIENT)
Dept: MRI IMAGING | Facility: CLINIC | Age: 68
Discharge: HOME | End: 2018-03-20
Attending: NURSE PRACTITIONER | Admitting: NURSE PRACTITIONER
Payer: MEDICARE

## 2018-03-20 VITALS
WEIGHT: 174.8 LBS | DIASTOLIC BLOOD PRESSURE: 73 MMHG | OXYGEN SATURATION: 94 % | BODY MASS INDEX: 22.44 KG/M2 | HEART RATE: 82 BPM | SYSTOLIC BLOOD PRESSURE: 136 MMHG

## 2018-03-20 DIAGNOSIS — C34.12 MALIGNANT NEOPLASM OF UPPER LOBE OF LEFT LUNG (H): ICD-10-CM

## 2018-03-20 DIAGNOSIS — I25.10 CORONARY ARTERY DISEASE INVOLVING NATIVE CORONARY ARTERY OF NATIVE HEART WITHOUT ANGINA PECTORIS: Primary | ICD-10-CM

## 2018-03-20 PROCEDURE — 25000128 H RX IP 250 OP 636: Performed by: NURSE PRACTITIONER

## 2018-03-20 PROCEDURE — 99215 OFFICE O/P EST HI 40 MIN: CPT | Performed by: INTERNAL MEDICINE

## 2018-03-20 PROCEDURE — 70553 MRI BRAIN STEM W/O & W/DYE: CPT

## 2018-03-20 PROCEDURE — A9585 GADOBUTROL INJECTION: HCPCS | Performed by: NURSE PRACTITIONER

## 2018-03-20 RX ORDER — GADOBUTROL 604.72 MG/ML
8 INJECTION INTRAVENOUS ONCE
Status: COMPLETED | OUTPATIENT
Start: 2018-03-20 | End: 2018-03-20

## 2018-03-20 RX ORDER — ROSUVASTATIN CALCIUM 5 MG/1
5 TABLET, COATED ORAL DAILY
Qty: 30 TABLET | Refills: 3 | Status: SHIPPED | OUTPATIENT
Start: 2018-03-20 | End: 2018-01-01

## 2018-03-20 RX ADMIN — GADOBUTROL 8 ML: 604.72 INJECTION INTRAVENOUS at 14:44

## 2018-03-20 NOTE — PROGRESS NOTES
HPI and Plan:     Mr. Chilo Oneil return for follow-up at [facility]. At 67 years of age, he has previously seen Dr. [facility]. He missed his initial follow-up and is here for pre-operative clearance for a recently discovered lung cancer.    Mr. Oneil has left shoulder pain. It began several months ago and first bothered him at night preventing him from sleeping. It then became constant without provocation with activity. He has been initiated on tramadol and he notes that the discomfort has markedly improved and he can now sleep. He does not have any change in the discomfort with position or activity. It is atypical for angina. The discomfort is thought the result of his lung cancer according to . He has no other chest discomfort such as the sharp precordial or right arm discomfort which initially resulted in his evaluation with Dr. Washington.    He has been diagnosed with lung cancer after developing a persistent cough. He has seen Dr. Pleitez. He has cancer is considered potentially curative with surgery which is planned for April 9. He will have the surgery at [facility].    He has noted a decrease in appetite and a loss in weight. He states that he is hungry but then after a bit of food, he loses his appetite. He is still smoking about 3 cigarettes daily but is continuing to decrease his tobacco usage and plans to stop completely prior to the surgery.      After he was seen in 2016, he underwent a stress study which was abnormal.  It demonstrated a small area of ischemia in the inferior wall from the base to the mid ventricle with evidence of small fixed defect in the anteroseptal base and the distal inferior septal wall. Ejection fraction was estimated to be 52% at rest. Medical therapy was pursued and the plan was to proceed to coronary angiography if symptoms returned.    An echocardiogram at that time demonstrated hyperdynamic left ventricular systolic performance without evidence of regional wall motion  abnormalities.    Mr. Oneil he is on an ARB agent, beta blockade, aspirin but no longer on statin therapy.    Physical examination:  On examination, this is a man in no apparent distress.  The blood pressure was 136/73 mmHg, heart rate 82 bpm and regular respiratory rate 14-18/m. The chest examination revealed decreased breath sounds at both bases without crackles or wheezes. One sounds were otherwise clear and the breathing was unlabored. On cardiac auscultation, there is an S1 and S2 without extra sounds or a murmur. The rhythm was regular. The carotid upstrokes were normal without bruits.    Assessment/Plan:    Mr. Oneil has a history of exertional chest discomfort which she complained about 2 years ago when he saw Dr. Washington. He has not experienced that precordial and right arm discomfort since he saw Dr. Washington. He had a previously abnormal stress nuclear study and medical therapy was pursued. He continues on beta blockade, and ARB agent and aspirin daily. He is not on statin therapy which has been demonstrated to them improve charlene-operative cardiac outcomes.    As such, I have recommended a repeat Lexiscan-exercise stress nuclear study to be compared to his prior study. If there is evidence of new ischemia or if he develops symptoms with the stress study, I would recommend pre-operative coronary angiography. If the stress study is unchanged or improved and he is asymptomatic, I would recommend proceeding with surgery without further evaluation from a cardiac standpoint.    His risk of cardiac complications around the time of his surgery is increased given his history and his continued tobacco use. He has decreased his tobacco use to 3 cigarettes daily. I discussed this with him. Nonetheless, if there is no peripheral palpable angina or any increase in ischemia, then further cardiac evaluation is unlikely to lead to lower his risk.    I have arranged for follow-up with Dr. Todd. In addition, resume his statin  5 mg p.o. daily is being initiated. He should continue all his medications through the time of surgery. Aspirin can be decreased to 81 mg and if stopped prior to surgery, should be reinitiated as soon as possible after the surgery.    Orders Placed This Encounter   Procedures     NM Lexiscan stress test       Orders Placed This Encounter   Medications     rosuvastatin (CRESTOR) 5 MG tablet     Sig: Take 1 tablet (5 mg) by mouth daily     Dispense:  30 tablet     Refill:  3     New script       There are no discontinued medications.      Encounter Diagnosis   Name Primary?     CAD (coronary artery disease) Yes       CURRENT MEDICATIONS:  Current Outpatient Prescriptions   Medication Sig Dispense Refill     rosuvastatin (CRESTOR) 5 MG tablet Take 1 tablet (5 mg) by mouth daily 30 tablet 3     traMADol (ULTRAM) 50 MG tablet Take 1-2 tablets ( mg) by mouth 2 times daily 40 tablet 1     ALPRAZolam (XANAX) 0.5 MG tablet Take 1 tablet (0.5 mg) by mouth as needed for anxiety Take one tablet 60 min prior to procedure. May repeat x1 if little effect. 2 tablet 0     albuterol (PROAIR HFA/PROVENTIL HFA/VENTOLIN HFA) 108 (90 BASE) MCG/ACT Inhaler Inhale 2 puffs into the lungs every 4 hours as needed for shortness of breath / dyspnea or wheezing WITH SPACER 1 Inhaler 0     omeprazole (PRILOSEC) 40 MG capsule        losartan (COZAAR) 50 MG tablet Take 1 tablet (50 mg) by mouth daily 90 tablet 3     metoprolol (LOPRESSOR) 50 MG tablet Take 1 tablet (50 mg) by mouth 2 times daily 60 tablet 1     hydrochlorothiazide (HYDRODIURIL) 25 MG tablet Take 1 tablet (25 mg) by mouth daily 90 tablet 3     indomethacin (INDOCIN) 25 MG capsule Take 1 capsule (25 mg) by mouth 2 times daily (with meals) 42 capsule 1     aspirin 325 MG tablet Take 1 tablet (325 mg) by mouth daily 90 tablet 3       ALLERGIES     Allergies   Allergen Reactions     Cipro [Ciprofloxacin] Swelling       PAST MEDICAL HISTORY:  Past Medical History:   Diagnosis Date      Alcohol abuse     Status post treatment       PAST SURGICAL HISTORY:  Past Surgical History:   Procedure Laterality Date     FRACTURE TX, ANKLE RT/LT  1975    Fracture TX Ankle, LT     OPEN REDUCTION INTERNAL FIXATION HIP NAILING  9/20/2013    Procedure: OPEN REDUCTION INTERNAL FIXATION HIP NAILING;  Left Hip Open Reduction Internal Fixation ;  Surgeon: Rosas Dennis MD;  Location: [facility] OR       FAMILY HISTORY:  Family History   Problem Relation Age of Onset     DIABETES Brother      type 2     DIABETES Father        SOCIAL HISTORY:  Social History     Social History     Marital status: Single     Spouse name: N/A     Number of children: N/A     Years of education: N/A     Social History Main Topics     Smoking status: Current Every Day Smoker     Packs/day: 0.30     Years: 47.00     Types: Cigarettes     Start date: 12/26/1967     Smokeless tobacco: Never Used      Comment: 2-3 cigs daily     Alcohol use Yes     Drug use: No     Sexual activity: Not Asked     Other Topics Concern     Parent/Sibling W/ Cabg, Mi Or Angioplasty Before 65f 55m? No     Social History Narrative       Review of Systems:  Skin:  Negative       Eyes:  Negative      ENT:  Negative      Respiratory:  Positive for shortness of breath;dyspnea on exertion;cough     Cardiovascular:    Positive for;fatigue    Gastroenterology: Positive for heartburn    Genitourinary:  Negative      Musculoskeletal:  Positive for joint pain;joint swelling;joint stiffness Shoulder  Neurologic:  Positive for numbness or tingling of hands    Psychiatric:  Negative      Heme/Lymph/Imm:  Negative      Endocrine:  Negative        Physical Exam:  Vitals: /73 (BP Location: Right arm, Patient Position: Sitting, Cuff Size: Adult Regular)  Pulse 82  Wt 79.3 kg (174 lb 12.8 oz)  SpO2 94%  BMI 22.44 kg/m2    Constitutional:  cooperative, alert and oriented, well developed, well nourished, in no acute distress        Skin:  warm and dry to the touch           Head:  normocephalic        Eyes:  sclera white        Lymph:      ENT:           Neck:           Respiratory:  normal respiratory excursion    Decreased BS at bases, otherwise clear.    Cardiac: regular rhythm, normal S1/S2, no S3 or S4, apical impulse not displaced, no murmurs, gallops or rubs                                                         GI:           Extremities and Muscular Skeletal:  no deformities, clubbing, cyanosis, erythema observed;no edema              Neurological:  no gross motor deficits;affect appropriate        Psych:  Alert and Oriented x 3;affect appropriate, oriented to time, person and place          CC  No referring provider defined for this encounter.

## 2018-03-20 NOTE — LETTER
3/20/2018    Manish Plasencia MD  [address] [facility] [address]    RE: Chilo Oneil       Dear Colleague,    I had the pleasure of seeing Chilo Oneil in the [facility].    HPI and Plan:     Mr. Chilo Oneil return for follow-up at [facility]. At 67 years of age, he has previously seen Dr. Washington. He missed his initial follow-up and is here for pre-operative clearance for a recently discovered lung cancer.    Mr. Oneil has left shoulder pain. It began several months ago and first bothered him at night preventing him from sleeping. It then became constant without provocation with activity. He has been initiated on tramadol and he notes that the discomfort has markedly improved and he can now sleep. He does not have any change in the discomfort with position or activity. It is atypical for angina. The discomfort is thought the result of his lung cancer according to . He has no other chest discomfort such as the sharp precordial or right arm discomfort which initially resulted in his evaluation with Dr. Washington.    He has been diagnosed with lung cancer after developing a persistent cough. He has seen Dr. Pleitez. He has cancer is considered potentially curative with surgery which is planned for April 9. He will have the surgery at [facility].    He has noted a decrease in appetite and a loss in weight. He states that he is hungry but then after a bit of food, he loses his appetite. He is still smoking about 3 cigarettes daily but is continuing to decrease his tobacco usage and plans to stop completely prior to the surgery.      After he was seen in 2016, he underwent a stress study which was abnormal.  It demonstrated a small area of ischemia in the inferior wall from the base to the mid ventricle with evidence of small fixed defect in the anteroseptal base and the distal inferior septal wall. Ejection fraction was estimated to be 52% at rest. Medical  therapy was pursued and the plan was to proceed to coronary angiography if symptoms returned.    An echocardiogram at that time demonstrated hyperdynamic left ventricular systolic performance without evidence of regional wall motion abnormalities.    Mr. Oneil he is on an ARB agent, beta blockade, aspirin but no longer on statin therapy.    Physical examination:  On examination, this is a man in no apparent distress.  The blood pressure was 136/73 mmHg, heart rate 82 bpm and regular respiratory rate 14-18/m. The chest examination revealed decreased breath sounds at both bases without crackles or wheezes. One sounds were otherwise clear and the breathing was unlabored. On cardiac auscultation, there is an S1 and S2 without extra sounds or a murmur. The rhythm was regular. The carotid upstrokes were normal without bruits.    Assessment/Plan:    Mr. Oneil has a history of exertional chest discomfort which she complained about 2 years ago when he saw Dr. Washington. He has not experienced that precordial and right arm discomfort since he saw Dr. Washington. He had a previously abnormal stress nuclear study and medical therapy was pursued. He continues on beta blockade, and ARB agent and aspirin daily. He is not on statin therapy which has been demonstrated to them improve charlene-operative cardiac outcomes.    As such, I have recommended a repeat Lexiscan-exercise stress nuclear study to be compared to his prior study. If there is evidence of new ischemia or if he develops symptoms with the stress study, I would recommend pre-operative coronary angiography. If the stress study is unchanged or improved and he is asymptomatic, I would recommend proceeding with surgery without further evaluation from a cardiac standpoint.    His risk of cardiac complications around the time of his surgery is increased given his history and his continued tobacco use. He has decreased his tobacco use to 3 cigarettes daily. I discussed this with him.  Nonetheless, if there is no peripheral palpable angina or any increase in ischemia, then further cardiac evaluation is unlikely to lead to lower his risk.    I have arranged for follow-up with Dr. Todd. In addition, resume his statin 5 mg p.o. daily is being initiated. He should continue all his medications through the time of surgery. Aspirin can be decreased to 81 mg and if stopped prior to surgery, should be reinitiated as soon as possible after the surgery.    Orders Placed This Encounter   Procedures     NM Lexiscan stress test       Orders Placed This Encounter   Medications     rosuvastatin (CRESTOR) 5 MG tablet     Sig: Take 1 tablet (5 mg) by mouth daily     Dispense:  30 tablet     Refill:  3     New script       There are no discontinued medications.      Encounter Diagnosis   Name Primary?     CAD (coronary artery disease) Yes       CURRENT MEDICATIONS:  Current Outpatient Prescriptions   Medication Sig Dispense Refill     rosuvastatin (CRESTOR) 5 MG tablet Take 1 tablet (5 mg) by mouth daily 30 tablet 3     traMADol (ULTRAM) 50 MG tablet Take 1-2 tablets ( mg) by mouth 2 times daily 40 tablet 1     ALPRAZolam (XANAX) 0.5 MG tablet Take 1 tablet (0.5 mg) by mouth as needed for anxiety Take one tablet 60 min prior to procedure. May repeat x1 if little effect. 2 tablet 0     albuterol (PROAIR HFA/PROVENTIL HFA/VENTOLIN HFA) 108 (90 BASE) MCG/ACT Inhaler Inhale 2 puffs into the lungs every 4 hours as needed for shortness of breath / dyspnea or wheezing WITH SPACER 1 Inhaler 0     omeprazole (PRILOSEC) 40 MG capsule        losartan (COZAAR) 50 MG tablet Take 1 tablet (50 mg) by mouth daily 90 tablet 3     metoprolol (LOPRESSOR) 50 MG tablet Take 1 tablet (50 mg) by mouth 2 times daily 60 tablet 1     hydrochlorothiazide (HYDRODIURIL) 25 MG tablet Take 1 tablet (25 mg) by mouth daily 90 tablet 3     indomethacin (INDOCIN) 25 MG capsule Take 1 capsule (25 mg) by mouth 2 times daily (with meals) 42  capsule 1     aspirin 325 MG tablet Take 1 tablet (325 mg) by mouth daily 90 tablet 3       ALLERGIES     Allergies   Allergen Reactions     Cipro [Ciprofloxacin] Swelling       PAST MEDICAL HISTORY:  Past Medical History:   Diagnosis Date     Alcohol abuse     Status post treatment       PAST SURGICAL HISTORY:  Past Surgical History:   Procedure Laterality Date     FRACTURE TX, ANKLE RT/LT  1975    Fracture TX Ankle, LT     OPEN REDUCTION INTERNAL FIXATION HIP NAILING  9/20/2013    Procedure: OPEN REDUCTION INTERNAL FIXATION HIP NAILING;  Left Hip Open Reduction Internal Fixation ;  Surgeon: Rosas Dennis MD;  Location: [facility] OR       FAMILY HISTORY:  Family History   Problem Relation Age of Onset     DIABETES Brother      type 2     DIABETES Father        SOCIAL HISTORY:  Social History     Social History     Marital status: Single     Spouse name: N/A     Number of children: N/A     Years of education: N/A     Social History Main Topics     Smoking status: Current Every Day Smoker     Packs/day: 0.30     Years: 47.00     Types: Cigarettes     Start date: 12/26/1967     Smokeless tobacco: Never Used      Comment: 2-3 cigs daily     Alcohol use Yes     Drug use: No     Sexual activity: Not Asked     Other Topics Concern     Parent/Sibling W/ Cabg, Mi Or Angioplasty Before 65f 55m? No     Social History Narrative       Review of Systems:  Skin:  Negative       Eyes:  Negative      ENT:  Negative      Respiratory:  Positive for shortness of breath;dyspnea on exertion;cough     Cardiovascular:    Positive for;fatigue    Gastroenterology: Positive for heartburn    Genitourinary:  Negative      Musculoskeletal:  Positive for joint pain;joint swelling;joint stiffness Shoulder  Neurologic:  Positive for numbness or tingling of hands    Psychiatric:  Negative      Heme/Lymph/Imm:  Negative      Endocrine:  Negative        Physical Exam:  Vitals: /73 (BP Location: Right arm, Patient Position: Sitting, Cuff Size:  Adult Regular)  Pulse 82  Wt 79.3 kg (174 lb 12.8 oz)  SpO2 94%  BMI 22.44 kg/m2    Constitutional:  cooperative, alert and oriented, well developed, well nourished, in no acute distress        Skin:  warm and dry to the touch          Head:  normocephalic        Eyes:  sclera white        Lymph:      ENT:           Neck:           Respiratory:  normal respiratory excursion    Decreased BS at bases, otherwise clear.    Cardiac: regular rhythm, normal S1/S2, no S3 or S4, apical impulse not displaced, no murmurs, gallops or rubs                                                         GI:           Extremities and Muscular Skeletal:  no deformities, clubbing, cyanosis, erythema observed;no edema              Neurological:  no gross motor deficits;affect appropriate        Psych:  Alert and Oriented x 3;affect appropriate, oriented to time, person and place          CC  No referring provider defined for this encounter.                    Thank you for allowing me to participate in the care of your patient.      Sincerely,     Nadia Leger MD     [facility]    cc:   No referring provider defined for this encounter.

## 2018-03-20 NOTE — MR AVS SNAPSHOT
After Visit Summary   3/20/2018    Chilo Oneil    MRN: [id number]           Patient Information     Date Of Birth          1950        Visit Information        Provider Department      3/20/2018 1:00 PM Nadia Leger MD [facility]        Today's Diagnoses     CAD (coronary artery disease)    -  1      Care Instructions    [facility] [facility]~[place], [place]~[address]  Thank you for your  Heart Care visit today. If you have questions regarding your visit, please contact your cardiology RN's, Nancy Martini or Thalia Genao, at [phone]. Your provider has recommended the following:  Medication Changes:  1. START rosuvastatin 5 mg daily  Recommendations:  1. Lexiscan stress test  Follow-up:  We will call you with the results of your stress test and clearance for surgery  To schedule a future appointment, we kindly ask that you call cardiology scheduling at [phone] three months prior to requested revisit date.               Reminder:  For your safety, we ask that you bring in your current medication(s) or an updated list of your medications with you to EACH office visit. Include the medication name, dose of pill on bottle and how you are taking it. Include over-the-counter medications or supplements. Your provider will review this at each visit and plan your care based on your current information.               Follow-ups after your visit        Your next 10 appointments already scheduled     Mar 20, 2018  2:30 PM CDT   (Arrive by 2:15 PM)   MR BRAIN W/O & W CONTRAST with [address] MRI ([facility])    [address] [id number] [phone]           Take your medicines as usual, unless your doctor tells you not to. Bring a list of your current medicines to your exam (including vitamins, minerals and over-the-counter drugs).  You  may or may not receive intravenous (IV) contrast for this exam pending the discretion of the Radiologist.  You do not need to do anything special to prepare.  The MRI machine uses a strong magnet. Please wear clothes without metal (snaps, zippers). A sweatsuit works well, or we may give you a hospital gown.  Please remove any body piercings and hair extensions before you arrive. You will also remove watches, jewelry, hairpins, wallets, dentures, partial dental plates and hearing aids. You may wear contact lenses, and you may be able to wear your rings. We have a safe place to keep your personal items, but it is safer to leave them at home.  **IMPORTANT** THE INSTRUCTIONS BELOW ARE ONLY FOR THOSE PATIENTS WHO HAVE BEEN PRESCRIBED SEDATION OR GENERAL ANESTHESIA DURING THEIR MRI PROCEDURE:  IF YOUR DOCTOR PRESCRIBED ORAL SEDATION (take medicine to help you relax during your exam):   You must get the medicine from your doctor (oral medication) before you arrive. Bring the medicine to the exam. Do not take it at home. You ll be told when to take it upon arriving for your exam.   Arrive one hour early. Bring someone who can take you home after the test. Your medicine will make you sleepy. After the exam, you may not drive, take a bus or take a taxi by yourself.  IF YOUR DOCTOR PRESCRIBED IV SEDATION:   Arrive one hour early. Bring someone who can take you home after the test. Your medicine will make you sleepy. After the exam, you may not drive, take a bus or take a taxi by yourself.   No eating 6 hours before your exam. You may have clear liquids up until 4 hours before your exam. (Clear liquids include water, clear tea, black coffee and fruit juice without pulp.)  IF YOUR DOCTOR PRESCRIBED ANESTHESIA (be asleep for your exam):   Arrive 1 1/2 hours early. Bring someone who can take you home after the test. You may not drive, take a bus or take a taxi by yourself.   No eating 8 hours before your exam. You may have clear  liquids up until 4 hours before your exam. (Clear liquids include water, clear tea, black coffee and fruit juice without pulp.)   You will spend four to five hours in the recovery room.  Please call the Imaging Department at your exam site with any questions.            Apr 04, 2018  9:45 AM CDT   (Arrive by 9:30 AM)   PE NPET ONCOLOGY (EYES TO THIGHS) with [email or web address]   [facility] [place] PET [place] ([facility])    [address] [address] [phone]           Tell your doctor:   If there is any chance you may be pregnant or if you are breastfeeding.   If you have problems lying in small spaces (claustrophobia). If you do, your doctor may give you medicine to help you relax. If you have diabetes:   Have your exam early in the morning. Your blood glucose will go up as the day goes by.   Your glucose level must be 180 or less at the start of the exam. Please take any medicines you need to ensure this blood glucose level. 24 hours before your scan: Don t do any heavy exercise. (No jogging, aerobics or other workouts.) Exercise will make your pictures less accurate. 6 hours before your scan:   Stop all food and liquids (except water).   Do not chew gum or suck on mints.   If you need to take medicine with food, you may take it with a few crackers.  Please call your Imaging Department at your exam site with any questions.            Apr 04, 2018 11:00 AM CDT   (Arrive by 10:45 AM)   PAC EVALUATION with Pramod Pac Tricia 7   [facility] Preoperative Assessment [place] ([facility])    [address] [place] [address]   [phone]            Apr 04, 2018 12:00 PM CDT   (Arrive by 11:45 AM)   PAC RN ASSESSMENT with Pramod Pac Rn   [facility] Preoperative Assessment [place] ([facility])    [address] [place] [address]   [phone]            Apr 04, 2018 12:30 PM CDT   (Arrive  by 12:15 PM)   PAC Anesthesia Consult with  Pac Anesthesiologist   [facility] ([facility])    [address]  4th Floor  [facility] [address] [phone]            Apr 04, 2018  1:00 PM CDT   (Arrive by 12:45 PM)   Return Visit with Nova Maguire MD   [facility] ([facility])    [address]  [address]  [facility] [id number] [phone]            Apr 11, 2018   Procedure with Mega Moreno MD   [facility], [place], Same Day Surgery (--)    [address] [facility] [address] [phone]            May 11, 2018  2:15 PM CDT   (Arrive by 2:00 PM)   XR CHEST 2 VIEWS with [email or web address]   [facility] [place] Xray ([facility])    [address]  1st Floor  [facility] [address] [phone]           Please bring a list of your current medicines to your exam. (Include vitamins, minerals and over-thecounter medicines.) Leave your valuables at home.  Tell your doctor if there is a chance you may be pregnant.  You do not need to do anything special for this exam.            May 11, 2018  2:45 PM CDT   (Arrive by 2:30 PM)   Return Visit with NATALIE Berg CNP   [facility] ([facility])    [address]  [address]  [facility] [address] [phone]              Future tests that were ordered for you today     Open Future Orders        Priority Expected Expires Ordered    NM Lexiscan stress test Routine 3/21/2018 3/20/2019 3/20/2018            Who to contact     If you have questions or need follow up information about today's clinic visit or your schedule please contact [facility] directly at [phone].  Normal or non-critical lab and imaging results will be communicated to you by MyChart, letter or phone within 4 business days after the  "clinic has received the results. If you do not hear from us within 7 days, please contact the clinic through [facility] or phone. If you have a critical or abnormal lab result, we will notify you by phone as soon as possible.  Submit refill requests through [facility] or call your pharmacy and they will forward the refill request to us. Please allow 3 business days for your refill to be completed.          Additional Information About Your Visit        [employer] Information     [facility] lets you send messages to your doctor, view your test results, renew your prescriptions, schedule appointments and more. To sign up, go to [email or web address] . Click on \"Log in\" on the left side of the screen, which will take you to the Welcome page. Then click on \"Sign up Now\" on the right side of the page.     You will be asked to enter the access code listed below, as well as some personal information. Please follow the directions to create your username and password.     Your access code is: [email or web address]  Expires: 2018 10:49 AM     Your access code will  in 90 days. If you need help or a new code, please call your [place] clinic or [phone].        Care EveryWhere ID     This is your Care EveryWhere ID. This could be used by other organizations to access your [place] medical records  [id number]        Your Vitals Were     Pulse Pulse Oximetry BMI (Body Mass Index)             82 94% 22.44 kg/m2          Blood Pressure from Last 3 Encounters:   18 136/73   18 148/63   18 170/71    Weight from Last 3 Encounters:   18 79.3 kg (174 lb 12.8 oz)   18 84.4 kg (186 lb)   18 84.6 kg (186 lb 8.2 oz)                 Today's Medication Changes          These changes are accurate as of 3/20/18  1:31 PM.  If you have any questions, ask your nurse or doctor.               Start taking these medicines.        Dose/Directions    rosuvastatin 5 MG tablet   Commonly known as:  CRESTOR   Used for:  " CAD (coronary artery disease)   Started by:  Nadia Leger MD        Dose:  5 mg   Take 1 tablet (5 mg) by mouth daily   Quantity:  30 tablet   Refills:  3            Where to get your medicines      These medications were sent to [facility] - [place], [place] - [address]  [address] [address]     Phone:  [phone]     rosuvastatin 5 MG tablet                Primary Care Provider Office Phone # Fax #    Omerrolly Jyoti Plasencia [phone]       [address] [address]        Equal Access to Services     [facility] Hospital[facility]DE : Hadii aad ku hadasho Soomaali, waaxda luqadaha, qaybta kaalmada adeegyada, waxay idiin hayaan adepilo khdaniel hewitt . So [facility] [phone].    ATENCIÓN: Si habla español, tiene a galaviz disposición servicios gratuitos de asistencia lingüística. Llame al [phone].    We comply with applicable federal civil rights laws and [place] laws. We do not discriminate on the basis of race, color, national origin, age, disability, sex, sexual orientation, or gender identity.            Thank you!     Thank you for choosing [facility]  for your care. Our goal is always to provide you with excellent care. Hearing back from our patients is one way we can continue to improve our services. Please take a few minutes to complete the written survey that you may receive in the mail after your visit with us. Thank you!             Your Updated Medication List - Protect others around you: Learn how to safely use, store and throw away your medicines at www.disposemymeds.org.          This list is accurate as of 3/20/18  1:31 PM.  Always use your most recent med list.                   Brand Name Dispense Instructions for use Diagnosis    albuterol 108 (90 BASE) MCG/ACT Inhaler    PROAIR HFA/PROVENTIL HFA/VENTOLIN HFA    1 Inhaler    Inhale 2 puffs into the lungs every 4 hours as needed for shortness of breath /  dyspnea or wheezing WITH SPACER        ALPRAZolam 0.5 MG tablet    XANAX    2 tablet    Take 1 tablet (0.5 mg) by mouth as needed for anxiety Take one tablet 60 min prior to procedure. May repeat x1 if little effect.    Claustrophobia       aspirin 325 MG tablet     90 tablet    Take 1 tablet (325 mg) by mouth daily    Pain of left lower leg       hydrochlorothiazide 25 MG tablet    HYDRODIURIL    90 tablet    Take 1 tablet (25 mg) by mouth daily    Benign essential hypertension       indomethacin 25 MG capsule    INDOCIN    42 capsule    Take 1 capsule (25 mg) by mouth 2 times daily (with meals)    Chest wall pain, Rib pain on left side       losartan 50 MG tablet    COZAAR    90 tablet    Take 1 tablet (50 mg) by mouth daily    Benign essential hypertension       metoprolol tartrate 50 MG tablet    LOPRESSOR    60 tablet    Take 1 tablet (50 mg) by mouth 2 times daily    Benign essential hypertension       omeprazole 40 MG capsule    priLOSEC      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), Hyperlipidemia LDL goal <100, Benign essential hypertension, Tobacco use disorder, Gastroesophageal reflux disease without esophagitis       rosuvastatin 5 MG tablet    CRESTOR    30 tablet    Take 1 tablet (5 mg) by mouth daily    CAD (coronary artery disease)       traMADol 50 MG tablet    ULTRAM    40 tablet    Take 1-2 tablets ( mg) by mouth 2 times daily    Upper back pain on left side

## 2018-03-20 NOTE — LETTER
3/20/2018    Manish Plasencia MD  [address] [facility] [address]    RE: Chilo Oneil       Dear Colleague,    I had the pleasure of seeing Chilo Oneil in the [facility].    HPI and Plan:     Mr. Chilo Oneil return for follow-up at [facility]. At 67 years of age, he has previously seen Dr. Washington. He missed his initial follow-up and is here for pre-operative clearance for a recently discovered lung cancer.    Mr. Oneil has left shoulder pain. It began several months ago and first bothered him at night preventing him from sleeping. It then became constant without provocation with activity. He has been initiated on tramadol and he notes that the discomfort has markedly improved and he can now sleep. He does not have any change in the discomfort with position or activity. It is atypical for angina. The discomfort is thought the result of his lung cancer according to . He has no other chest discomfort such as the sharp precordial or right arm discomfort which initially resulted in his evaluation with Dr. Washington.    He has been diagnosed with lung cancer after developing a persistent cough. He has seen Dr. Pleitez. He has cancer is considered potentially curative with surgery which is planned for April 9. He will have the surgery at [facility].    He has noted a decrease in appetite and a loss in weight. He states that he is hungry but then after a bit of food, he loses his appetite. He is still smoking about 3 cigarettes daily but is continuing to decrease his tobacco usage and plans to stop completely prior to the surgery.      After he was seen in 2016, he underwent a stress study which was abnormal.  It demonstrated a small area of ischemia in the inferior wall from the base to the mid ventricle with evidence of small fixed defect in the anteroseptal base and the distal inferior septal wall. Ejection fraction was estimated to be 52% at rest. Medical  therapy was pursued and the plan was to proceed to coronary angiography if symptoms returned.    An echocardiogram at that time demonstrated hyperdynamic left ventricular systolic performance without evidence of regional wall motion abnormalities.    Mr. Oneil he is on an ARB agent, beta blockade, aspirin but no longer on statin therapy.    Physical examination:  On examination, this is a man in no apparent distress.  The blood pressure was 136/73 mmHg, heart rate 82 bpm and regular respiratory rate 14-18/m. The chest examination revealed decreased breath sounds at both bases without crackles or wheezes. One sounds were otherwise clear and the breathing was unlabored. On cardiac auscultation, there is an S1 and S2 without extra sounds or a murmur. The rhythm was regular. The carotid upstrokes were normal without bruits.    Assessment/Plan:    Mr. Oneil has a history of exertional chest discomfort which she complained about 2 years ago when he saw Dr. Washington. He has not experienced that precordial and right arm discomfort since he saw Dr. Washington. He had a previously abnormal stress nuclear study and medical therapy was pursued. He continues on beta blockade, and ARB agent and aspirin daily. He is not on statin therapy which has been demonstrated to them improve charlene-operative cardiac outcomes.    As such, I have recommended a repeat Lexiscan-exercise stress nuclear study to be compared to his prior study. If there is evidence of new ischemia or if he develops symptoms with the stress study, I would recommend pre-operative coronary angiography. If the stress study is unchanged or improved and he is asymptomatic, I would recommend proceeding with surgery without further evaluation from a cardiac standpoint.    His risk of cardiac complications around the time of his surgery is increased given his history and his continued tobacco use. He has decreased his tobacco use to 3 cigarettes daily. I discussed this with him.  Nonetheless, if there is no peripheral palpable angina or any increase in ischemia, then further cardiac evaluation is unlikely to lead to lower his risk.    I have arranged for follow-up with Dr. Todd. In addition, resume his statin 5 mg p.o. daily is being initiated. He should continue all his medications through the time of surgery. Aspirin can be decreased to 81 mg and if stopped prior to surgery, should be reinitiated as soon as possible after the surgery.    Orders Placed This Encounter   Procedures     NM Lexiscan stress test       Orders Placed This Encounter   Medications     rosuvastatin (CRESTOR) 5 MG tablet     Sig: Take 1 tablet (5 mg) by mouth daily     Dispense:  30 tablet     Refill:  3     New script       There are no discontinued medications.      Encounter Diagnosis   Name Primary?     CAD (coronary artery disease) Yes       CURRENT MEDICATIONS:  Current Outpatient Prescriptions   Medication Sig Dispense Refill     rosuvastatin (CRESTOR) 5 MG tablet Take 1 tablet (5 mg) by mouth daily 30 tablet 3     traMADol (ULTRAM) 50 MG tablet Take 1-2 tablets ( mg) by mouth 2 times daily 40 tablet 1     ALPRAZolam (XANAX) 0.5 MG tablet Take 1 tablet (0.5 mg) by mouth as needed for anxiety Take one tablet 60 min prior to procedure. May repeat x1 if little effect. 2 tablet 0     albuterol (PROAIR HFA/PROVENTIL HFA/VENTOLIN HFA) 108 (90 BASE) MCG/ACT Inhaler Inhale 2 puffs into the lungs every 4 hours as needed for shortness of breath / dyspnea or wheezing WITH SPACER 1 Inhaler 0     omeprazole (PRILOSEC) 40 MG capsule        losartan (COZAAR) 50 MG tablet Take 1 tablet (50 mg) by mouth daily 90 tablet 3     metoprolol (LOPRESSOR) 50 MG tablet Take 1 tablet (50 mg) by mouth 2 times daily 60 tablet 1     hydrochlorothiazide (HYDRODIURIL) 25 MG tablet Take 1 tablet (25 mg) by mouth daily 90 tablet 3     indomethacin (INDOCIN) 25 MG capsule Take 1 capsule (25 mg) by mouth 2 times daily (with meals) 42  capsule 1     aspirin 325 MG tablet Take 1 tablet (325 mg) by mouth daily 90 tablet 3       ALLERGIES     Allergies   Allergen Reactions     Cipro [Ciprofloxacin] Swelling       PAST MEDICAL HISTORY:  Past Medical History:   Diagnosis Date     Alcohol abuse     Status post treatment       PAST SURGICAL HISTORY:  Past Surgical History:   Procedure Laterality Date     FRACTURE TX, ANKLE RT/LT  1975    Fracture TX Ankle, LT     OPEN REDUCTION INTERNAL FIXATION HIP NAILING  9/20/2013    Procedure: OPEN REDUCTION INTERNAL FIXATION HIP NAILING;  Left Hip Open Reduction Internal Fixation ;  Surgeon: Rosas Dennis MD;  Location: [facility] OR       FAMILY HISTORY:  Family History   Problem Relation Age of Onset     DIABETES Brother      type 2     DIABETES Father        SOCIAL HISTORY:  Social History     Social History     Marital status: Single     Spouse name: N/A     Number of children: N/A     Years of education: N/A     Social History Main Topics     Smoking status: Current Every Day Smoker     Packs/day: 0.30     Years: 47.00     Types: Cigarettes     Start date: 12/26/1967     Smokeless tobacco: Never Used      Comment: 2-3 cigs daily     Alcohol use Yes     Drug use: No     Sexual activity: Not Asked     Other Topics Concern     Parent/Sibling W/ Cabg, Mi Or Angioplasty Before 65f 55m? No     Social History Narrative       Review of Systems:  Skin:  Negative       Eyes:  Negative      ENT:  Negative      Respiratory:  Positive for shortness of breath;dyspnea on exertion;cough     Cardiovascular:    Positive for;fatigue    Gastroenterology: Positive for heartburn    Genitourinary:  Negative      Musculoskeletal:  Positive for joint pain;joint swelling;joint stiffness Shoulder  Neurologic:  Positive for numbness or tingling of hands    Psychiatric:  Negative      Heme/Lymph/Imm:  Negative      Endocrine:  Negative        Physical Exam:  Vitals: /73 (BP Location: Right arm, Patient Position: Sitting, Cuff Size:  Adult Regular)  Pulse 82  Wt 79.3 kg (174 lb 12.8 oz)  SpO2 94%  BMI 22.44 kg/m2    Constitutional:  cooperative, alert and oriented, well developed, well nourished, in no acute distress        Skin:  warm and dry to the touch          Head:  normocephalic        Eyes:  sclera white        Lymph:      ENT:           Neck:           Respiratory:  normal respiratory excursion    Decreased BS at bases, otherwise clear.    Cardiac: regular rhythm, normal S1/S2, no S3 or S4, apical impulse not displaced, no murmurs, gallops or rubs                                                         GI:           Extremities and Muscular Skeletal:  no deformities, clubbing, cyanosis, erythema observed;no edema              Neurological:  no gross motor deficits;affect appropriate        Psych:  Alert and Oriented x 3;affect appropriate, oriented to time, person and place        Thank you for allowing me to participate in the care of your patient.    Sincerely,     Nadia Leger MD     [facility]

## 2018-03-20 NOTE — PATIENT INSTRUCTIONS
[facility]  [facility]~[address]~[place], [place]~[address]  Thank you for your [facility] visit today. If you have questions regarding your visit, please contact your cardiology RN's, Nancy Martini or Thalia Genao, at [phone]. Your provider has recommended the following:  Medication Changes:  1. START rosuvastatin 5 mg daily  Recommendations:  1. Lexiscan stress test  Follow-up:  We will call you with the results of your stress test and clearance for surgery  To schedule a future appointment, we kindly ask that you call cardiology scheduling at [phone] three months prior to requested revisit date.               Reminder:  For your safety, we ask that you bring in your current medication(s) or an updated list of your medications with you to EACH office visit. Include the medication name, dose of pill on bottle and how you are taking it. Include over-the-counter medications or supplements. Your provider will review this at each visit and plan your care based on your current information.

## 2018-03-21 ENCOUNTER — TELEPHONE (OUTPATIENT)
Dept: SURGERY | Facility: CLINIC | Age: 68
End: 2018-03-21

## 2018-03-21 NOTE — TELEPHONE ENCOUNTER
Left a message for the patient to return my call at [phone] about the new date of his surgery. It is now on 4/11/18 at 7:45am, arrive at 5:45am.

## 2018-03-26 ENCOUNTER — TELEPHONE (OUTPATIENT)
Dept: CARDIOLOGY | Facility: CLINIC | Age: 68
End: 2018-03-26

## 2018-03-26 NOTE — TELEPHONE ENCOUNTER
Central Prior Authorization Team   Phone: [phone]      PA Initiation    Medication: rosuvastatin (CRESTOR) 5 MG tablet  Insurance Company: [employer] - Phone [phone] Fax [phone]  Pharmacy Filling the Rx:    Filling Pharmacy Phone:    Filling Pharmacy Fax:    Start Date: 3/26/2018

## 2018-03-28 ENCOUNTER — APPOINTMENT (OUTPATIENT)
Dept: NUCLEAR MEDICINE | Facility: CLINIC | Age: 68
End: 2018-03-28
Attending: PHYSICIAN ASSISTANT
Payer: MEDICARE

## 2018-03-28 ENCOUNTER — HOSPITAL ENCOUNTER (OUTPATIENT)
Facility: CLINIC | Age: 68
Setting detail: OBSERVATION
Discharge: HOME | End: 2018-03-29
Attending: EMERGENCY MEDICINE | Admitting: INTERNAL MEDICINE
Payer: MEDICARE

## 2018-03-28 ENCOUNTER — APPOINTMENT (OUTPATIENT)
Dept: CT IMAGING | Facility: CLINIC | Age: 68
End: 2018-03-28
Attending: EMERGENCY MEDICINE
Payer: MEDICARE

## 2018-03-28 DIAGNOSIS — C34.92 NON-SMALL CELL CANCER OF LEFT LUNG (H): ICD-10-CM

## 2018-03-28 DIAGNOSIS — F10.10 ALCOHOL ABUSE: ICD-10-CM

## 2018-03-28 DIAGNOSIS — F43.23 ADJUSTMENT DISORDER WITH MIXED ANXIETY AND DEPRESSED MOOD: ICD-10-CM

## 2018-03-28 DIAGNOSIS — E86.0 DEHYDRATION: ICD-10-CM

## 2018-03-28 DIAGNOSIS — F17.200 TOBACCO USE DISORDER: ICD-10-CM

## 2018-03-28 DIAGNOSIS — R07.9 CHEST PAIN, UNSPECIFIED TYPE: ICD-10-CM

## 2018-03-28 DIAGNOSIS — C34.12 MALIGNANT NEOPLASM OF UPPER LOBE OF LEFT LUNG (H): ICD-10-CM

## 2018-03-28 DIAGNOSIS — N30.00 ACUTE CYSTITIS WITHOUT HEMATURIA: Primary | ICD-10-CM

## 2018-03-28 DIAGNOSIS — R63.4 LOSS OF WEIGHT: ICD-10-CM

## 2018-03-28 DIAGNOSIS — R07.9 CHEST PAIN: ICD-10-CM

## 2018-03-28 DIAGNOSIS — E87.1 HYPONATREMIA: ICD-10-CM

## 2018-03-28 PROBLEM — K21.9 GASTROESOPHAGEAL REFLUX DISEASE WITHOUT ESOPHAGITIS: Status: ACTIVE | Noted: 2017-12-28

## 2018-03-28 PROBLEM — F41.9 ANXIETY: Chronic | Status: ACTIVE | Noted: 2017-12-28

## 2018-03-28 PROBLEM — K21.9 GASTROESOPHAGEAL REFLUX DISEASE WITHOUT ESOPHAGITIS: Chronic | Status: ACTIVE | Noted: 2017-12-28

## 2018-03-28 PROBLEM — R11.2 NAUSEA WITH VOMITING: Status: ACTIVE | Noted: 2018-03-28

## 2018-03-28 PROBLEM — I25.10 CAD (CORONARY ARTERY DISEASE): Chronic | Status: ACTIVE | Noted: 2018-03-28

## 2018-03-28 PROBLEM — R06.02 SOB (SHORTNESS OF BREATH): Status: ACTIVE | Noted: 2018-03-28

## 2018-03-28 PROBLEM — F41.9 ANXIETY: Status: ACTIVE | Noted: 2017-12-28

## 2018-03-28 PROBLEM — I25.10 CAD (CORONARY ARTERY DISEASE): Status: ACTIVE | Noted: 2018-03-28

## 2018-03-28 LAB
ALBUMIN SERPL-MCNC: 3.8 G/DL (ref 3.4–5)
ALBUMIN UR-MCNC: 30 MG/DL
ALP SERPL-CCNC: 95 U/L (ref 40–150)
ALT SERPL W P-5'-P-CCNC: 29 U/L (ref 0–70)
ANION GAP SERPL CALCULATED.3IONS-SCNC: 13 MMOL/L (ref 3–14)
ANION GAP SERPL CALCULATED.3IONS-SCNC: 6 MMOL/L (ref 3–14)
APPEARANCE UR: CLEAR
APTT PPP: 29 SEC (ref 22–37)
AST SERPL W P-5'-P-CCNC: 24 U/L (ref 0–45)
BASE EXCESS BLDV CALC-SCNC: 9.9 MMOL/L
BASOPHILS # BLD AUTO: 0 10E9/L (ref 0–0.2)
BASOPHILS NFR BLD AUTO: 0.2 %
BILIRUB SERPL-MCNC: 0.5 MG/DL (ref 0.2–1.3)
BILIRUB UR QL STRIP: NEGATIVE
BUN SERPL-MCNC: 12 MG/DL (ref 7–30)
BUN SERPL-MCNC: 12 MG/DL (ref 7–30)
CALCIUM SERPL-MCNC: 8.8 MG/DL (ref 8.5–10.1)
CALCIUM SERPL-MCNC: 9.5 MG/DL (ref 8.5–10.1)
CHLORIDE SERPL-SCNC: 83 MMOL/L (ref 94–109)
CHLORIDE SERPL-SCNC: 85 MMOL/L (ref 94–109)
CO2 SERPL-SCNC: 32 MMOL/L (ref 20–32)
CO2 SERPL-SCNC: 35 MMOL/L (ref 20–32)
COLOR UR AUTO: YELLOW
CREAT SERPL-MCNC: 1.03 MG/DL (ref 0.66–1.25)
CREAT SERPL-MCNC: 1.04 MG/DL (ref 0.66–1.25)
DIFFERENTIAL METHOD BLD: ABNORMAL
EOSINOPHIL # BLD AUTO: 0 10E9/L (ref 0–0.7)
EOSINOPHIL NFR BLD AUTO: 0.1 %
ERYTHROCYTE [DISTWIDTH] IN BLOOD BY AUTOMATED COUNT: 12.1 % (ref 10–15)
FLUAV+FLUBV AG SPEC QL: NEGATIVE
FLUAV+FLUBV AG SPEC QL: NEGATIVE
GFR SERPL CREATININE-BSD FRML MDRD: 71 ML/MIN/1.7M2
GFR SERPL CREATININE-BSD FRML MDRD: 72 ML/MIN/1.7M2
GLUCOSE SERPL-MCNC: 114 MG/DL (ref 70–99)
GLUCOSE SERPL-MCNC: 92 MG/DL (ref 70–99)
GLUCOSE UR STRIP-MCNC: NEGATIVE MG/DL
HCO3 BLDV-SCNC: 35 MMOL/L (ref 21–28)
HCT VFR BLD AUTO: 39.9 % (ref 40–53)
HGB BLD-MCNC: 14.8 G/DL (ref 13.3–17.7)
HGB UR QL STRIP: NEGATIVE
HYALINE CASTS #/AREA URNS LPF: 26 /LPF (ref 0–2)
IMM GRANULOCYTES # BLD: 0 10E9/L (ref 0–0.4)
IMM GRANULOCYTES NFR BLD: 0.3 %
INR PPP: 1.04 (ref 0.86–1.14)
KETONES UR STRIP-MCNC: 5 MG/DL
LEUKOCYTE ESTERASE UR QL STRIP: ABNORMAL
LYMPHOCYTES # BLD AUTO: 0.8 10E9/L (ref 0.8–5.3)
LYMPHOCYTES NFR BLD AUTO: 8.5 %
MCH RBC QN AUTO: 33 PG (ref 26.5–33)
MCHC RBC AUTO-ENTMCNC: 37.1 G/DL (ref 31.5–36.5)
MCV RBC AUTO: 89 FL (ref 78–100)
MONOCYTES # BLD AUTO: 0.9 10E9/L (ref 0–1.3)
MONOCYTES NFR BLD AUTO: 10.1 %
MUCOUS THREADS #/AREA URNS LPF: PRESENT /LPF
NEUTROPHILS # BLD AUTO: 7.4 10E9/L (ref 1.6–8.3)
NEUTROPHILS NFR BLD AUTO: 80.8 %
NITRATE UR QL: NEGATIVE
OSMOLALITY SERPL: 262 MMOL/KG (ref 280–301)
OSMOLALITY UR: 429 MMOL/KG (ref 100–1200)
PCO2 BLDV: 47 MM HG (ref 40–50)
PH BLDV: 7.48 PH (ref 7.32–7.43)
PH UR STRIP: 7 PH (ref 5–7)
PLATELET # BLD AUTO: 422 10E9/L (ref 150–450)
PO2 BLDV: 18 MM HG (ref 25–47)
POTASSIUM SERPL-SCNC: 3.3 MMOL/L (ref 3.4–5.3)
POTASSIUM SERPL-SCNC: 3.4 MMOL/L (ref 3.4–5.3)
POTASSIUM SERPL-SCNC: 3.9 MMOL/L (ref 3.4–5.3)
PROT SERPL-MCNC: 7.6 G/DL (ref 6.8–8.8)
RBC # BLD AUTO: 4.48 10E12/L (ref 4.4–5.9)
RBC #/AREA URNS AUTO: 3 /HPF (ref 0–2)
SODIUM SERPL-SCNC: 126 MMOL/L (ref 133–144)
SODIUM SERPL-SCNC: 127 MMOL/L (ref 133–144)
SODIUM SERPL-SCNC: 128 MMOL/L (ref 133–144)
SODIUM UR-SCNC: 49 MMOL/L
SOURCE: ABNORMAL
SP GR UR STRIP: 1.02 (ref 1–1.03)
SPECIMEN SOURCE: NORMAL
SQUAMOUS #/AREA URNS AUTO: <1 /HPF (ref 0–1)
TROPONIN I SERPL-MCNC: <0.015 UG/L (ref 0–0.04)
UROBILINOGEN UR STRIP-MCNC: 4 MG/DL (ref 0–2)
WBC # BLD AUTO: 9.2 10E9/L (ref 4–11)
WBC #/AREA URNS AUTO: 18 /HPF (ref 0–5)

## 2018-03-28 PROCEDURE — 87086 URINE CULTURE/COLONY COUNT: CPT | Performed by: PHYSICIAN ASSISTANT

## 2018-03-28 PROCEDURE — A9502 TC99M TETROFOSMIN: HCPCS | Performed by: INTERNAL MEDICINE

## 2018-03-28 PROCEDURE — 85610 PROTHROMBIN TIME: CPT | Performed by: EMERGENCY MEDICINE

## 2018-03-28 PROCEDURE — 25000132 ZZH RX MED GY IP 250 OP 250 PS 637: Mod: GY | Performed by: EMERGENCY MEDICINE

## 2018-03-28 PROCEDURE — 99220 ZZC INITIAL OBSERVATION CARE,LEVL III: CPT | Performed by: PHYSICIAN ASSISTANT

## 2018-03-28 PROCEDURE — 71260 CT THORAX DX C+: CPT

## 2018-03-28 PROCEDURE — 84484 ASSAY OF TROPONIN QUANT: CPT | Performed by: EMERGENCY MEDICINE

## 2018-03-28 PROCEDURE — 83935 ASSAY OF URINE OSMOLALITY: CPT | Performed by: INTERNAL MEDICINE

## 2018-03-28 PROCEDURE — 96374 THER/PROPH/DIAG INJ IV PUSH: CPT | Performed by: EMERGENCY MEDICINE

## 2018-03-28 PROCEDURE — 93016 CV STRESS TEST SUPVJ ONLY: CPT | Performed by: FAMILY MEDICINE

## 2018-03-28 PROCEDURE — 80048 BASIC METABOLIC PNL TOTAL CA: CPT | Performed by: PHYSICIAN ASSISTANT

## 2018-03-28 PROCEDURE — 25000125 ZZHC RX 250: Performed by: EMERGENCY MEDICINE

## 2018-03-28 PROCEDURE — 34300033 ZZH RX 343: Performed by: INTERNAL MEDICINE

## 2018-03-28 PROCEDURE — 84132 ASSAY OF SERUM POTASSIUM: CPT | Mod: 91 | Performed by: PHYSICIAN ASSISTANT

## 2018-03-28 PROCEDURE — 93005 ELECTROCARDIOGRAM TRACING: CPT | Performed by: EMERGENCY MEDICINE

## 2018-03-28 PROCEDURE — 78452 HT MUSCLE IMAGE SPECT MULT: CPT

## 2018-03-28 PROCEDURE — 93017 CV STRESS TEST TRACING ONLY: CPT

## 2018-03-28 PROCEDURE — 84484 ASSAY OF TROPONIN QUANT: CPT | Mod: 91 | Performed by: PHYSICIAN ASSISTANT

## 2018-03-28 PROCEDURE — 25000128 H RX IP 250 OP 636: Performed by: EMERGENCY MEDICINE

## 2018-03-28 PROCEDURE — 25000128 H RX IP 250 OP 636: Performed by: PHYSICIAN ASSISTANT

## 2018-03-28 PROCEDURE — G0378 HOSPITAL OBSERVATION PER HR: HCPCS

## 2018-03-28 PROCEDURE — 78452 HT MUSCLE IMAGE SPECT MULT: CPT | Mod: 26 | Performed by: INTERNAL MEDICINE

## 2018-03-28 PROCEDURE — A9270 NON-COVERED ITEM OR SERVICE: HCPCS | Mod: GY | Performed by: PHYSICIAN ASSISTANT

## 2018-03-28 PROCEDURE — 82803 BLOOD GASES ANY COMBINATION: CPT | Performed by: EMERGENCY MEDICINE

## 2018-03-28 PROCEDURE — A9270 NON-COVERED ITEM OR SERVICE: HCPCS | Mod: GY | Performed by: EMERGENCY MEDICINE

## 2018-03-28 PROCEDURE — 93018 CV STRESS TEST I&R ONLY: CPT | Performed by: INTERNAL MEDICINE

## 2018-03-28 PROCEDURE — 83930 ASSAY OF BLOOD OSMOLALITY: CPT | Performed by: PHYSICIAN ASSISTANT

## 2018-03-28 PROCEDURE — 36415 COLL VENOUS BLD VENIPUNCTURE: CPT | Performed by: PHYSICIAN ASSISTANT

## 2018-03-28 PROCEDURE — 96375 TX/PRO/DX INJ NEW DRUG ADDON: CPT

## 2018-03-28 PROCEDURE — 80053 COMPREHEN METABOLIC PANEL: CPT | Performed by: EMERGENCY MEDICINE

## 2018-03-28 PROCEDURE — 85730 THROMBOPLASTIN TIME PARTIAL: CPT | Performed by: EMERGENCY MEDICINE

## 2018-03-28 PROCEDURE — 87804 INFLUENZA ASSAY W/OPTIC: CPT | Performed by: PHYSICIAN ASSISTANT

## 2018-03-28 PROCEDURE — 83935 ASSAY OF URINE OSMOLALITY: CPT | Performed by: PHYSICIAN ASSISTANT

## 2018-03-28 PROCEDURE — 85025 COMPLETE CBC W/AUTO DIFF WBC: CPT | Performed by: EMERGENCY MEDICINE

## 2018-03-28 PROCEDURE — 25000132 ZZH RX MED GY IP 250 OP 250 PS 637: Mod: GY | Performed by: PHYSICIAN ASSISTANT

## 2018-03-28 PROCEDURE — 99285 EMERGENCY DEPT VISIT HI MDM: CPT | Mod: 25 | Performed by: EMERGENCY MEDICINE

## 2018-03-28 PROCEDURE — 93010 ELECTROCARDIOGRAM REPORT: CPT | Mod: Z6 | Performed by: EMERGENCY MEDICINE

## 2018-03-28 PROCEDURE — 84300 ASSAY OF URINE SODIUM: CPT | Performed by: INTERNAL MEDICINE

## 2018-03-28 PROCEDURE — 84295 ASSAY OF SERUM SODIUM: CPT | Performed by: PHYSICIAN ASSISTANT

## 2018-03-28 PROCEDURE — 81001 URINALYSIS AUTO W/SCOPE: CPT | Performed by: EMERGENCY MEDICINE

## 2018-03-28 RX ORDER — NICOTINE 21 MG/24HR
1 PATCH, TRANSDERMAL 24 HOURS TRANSDERMAL DAILY
Status: DISCONTINUED | OUTPATIENT
Start: 2018-03-28 | End: 2018-03-29 | Stop reason: HOSPADM

## 2018-03-28 RX ORDER — POTASSIUM CHLORIDE 29.8 MG/ML
20 INJECTION INTRAVENOUS
Status: DISCONTINUED | OUTPATIENT
Start: 2018-03-28 | End: 2018-03-28 | Stop reason: CLARIF

## 2018-03-28 RX ORDER — ASPIRIN 325 MG
325 TABLET ORAL DAILY
Status: DISCONTINUED | OUTPATIENT
Start: 2018-03-28 | End: 2018-03-29 | Stop reason: HOSPADM

## 2018-03-28 RX ORDER — FOLIC ACID 1 MG/1
1 TABLET ORAL DAILY
Status: DISCONTINUED | OUTPATIENT
Start: 2018-03-28 | End: 2018-03-29 | Stop reason: HOSPADM

## 2018-03-28 RX ORDER — LORAZEPAM 1 MG/1
1-2 TABLET ORAL EVERY 30 MIN PRN
Status: DISCONTINUED | OUTPATIENT
Start: 2018-03-28 | End: 2018-03-29 | Stop reason: HOSPADM

## 2018-03-28 RX ORDER — NITROGLYCERIN 0.4 MG/1
0.4 TABLET SUBLINGUAL EVERY 5 MIN PRN
Status: DISCONTINUED | OUTPATIENT
Start: 2018-03-28 | End: 2018-03-29 | Stop reason: HOSPADM

## 2018-03-28 RX ORDER — ALPRAZOLAM 0.5 MG
0.5 TABLET ORAL 3 TIMES DAILY PRN
Status: DISCONTINUED | OUTPATIENT
Start: 2018-03-28 | End: 2018-03-29 | Stop reason: HOSPADM

## 2018-03-28 RX ORDER — SULFAMETHOXAZOLE/TRIMETHOPRIM 800-160 MG
1 TABLET ORAL 2 TIMES DAILY
Status: DISCONTINUED | OUTPATIENT
Start: 2018-03-28 | End: 2018-03-29 | Stop reason: HOSPADM

## 2018-03-28 RX ORDER — HYDROCHLOROTHIAZIDE 25 MG/1
25 TABLET ORAL DAILY
Status: DISCONTINUED | OUTPATIENT
Start: 2018-03-28 | End: 2018-03-29 | Stop reason: HOSPADM

## 2018-03-28 RX ORDER — ACETAMINOPHEN 650 MG/1
650 SUPPOSITORY RECTAL EVERY 4 HOURS PRN
Status: DISCONTINUED | OUTPATIENT
Start: 2018-03-28 | End: 2018-03-28

## 2018-03-28 RX ORDER — INDOMETHACIN 25 MG/1
25 CAPSULE ORAL 2 TIMES DAILY WITH MEALS
Status: DISCONTINUED | OUTPATIENT
Start: 2018-03-28 | End: 2018-03-29 | Stop reason: HOSPADM

## 2018-03-28 RX ORDER — POTASSIUM CHLORIDE 1500 MG/1
20-40 TABLET, EXTENDED RELEASE ORAL
Status: DISCONTINUED | OUTPATIENT
Start: 2018-03-28 | End: 2018-03-29 | Stop reason: HOSPADM

## 2018-03-28 RX ORDER — REGADENOSON 0.08 MG/ML
0.4 INJECTION, SOLUTION INTRAVENOUS ONCE
Status: COMPLETED | OUTPATIENT
Start: 2018-03-28 | End: 2018-03-28

## 2018-03-28 RX ORDER — MULTIPLE VITAMINS W/ MINERALS TAB 9MG-400MCG
1 TAB ORAL DAILY
Status: DISCONTINUED | OUTPATIENT
Start: 2018-03-28 | End: 2018-03-29 | Stop reason: HOSPADM

## 2018-03-28 RX ORDER — ONDANSETRON 2 MG/ML
4 INJECTION INTRAMUSCULAR; INTRAVENOUS EVERY 6 HOURS PRN
Status: DISCONTINUED | OUTPATIENT
Start: 2018-03-28 | End: 2018-03-29 | Stop reason: HOSPADM

## 2018-03-28 RX ORDER — LANOLIN ALCOHOL/MO/W.PET/CERES
100 CREAM (GRAM) TOPICAL DAILY
Status: DISCONTINUED | OUTPATIENT
Start: 2018-03-28 | End: 2018-03-29 | Stop reason: HOSPADM

## 2018-03-28 RX ORDER — ONDANSETRON 4 MG/1
4 TABLET, ORALLY DISINTEGRATING ORAL EVERY 6 HOURS PRN
Status: DISCONTINUED | OUTPATIENT
Start: 2018-03-28 | End: 2018-03-29 | Stop reason: HOSPADM

## 2018-03-28 RX ORDER — POTASSIUM CL/LIDO/0.9 % NACL 10MEQ/0.1L
10 INTRAVENOUS SOLUTION, PIGGYBACK (ML) INTRAVENOUS
Status: DISCONTINUED | OUTPATIENT
Start: 2018-03-28 | End: 2018-03-29 | Stop reason: HOSPADM

## 2018-03-28 RX ORDER — ALUMINA, MAGNESIA, AND SIMETHICONE 2400; 2400; 240 MG/30ML; MG/30ML; MG/30ML
30 SUSPENSION ORAL EVERY 4 HOURS PRN
Status: DISCONTINUED | OUTPATIENT
Start: 2018-03-28 | End: 2018-03-29 | Stop reason: HOSPADM

## 2018-03-28 RX ORDER — ONDANSETRON 4 MG/1
4 TABLET, ORALLY DISINTEGRATING ORAL ONCE
Status: COMPLETED | OUTPATIENT
Start: 2018-03-28 | End: 2018-03-28

## 2018-03-28 RX ORDER — ALBUTEROL SULFATE 90 UG/1
2 AEROSOL, METERED RESPIRATORY (INHALATION) EVERY 4 HOURS PRN
Status: DISCONTINUED | OUTPATIENT
Start: 2018-03-28 | End: 2018-03-29 | Stop reason: HOSPADM

## 2018-03-28 RX ORDER — NALOXONE HYDROCHLORIDE 0.4 MG/ML
.1-.4 INJECTION, SOLUTION INTRAMUSCULAR; INTRAVENOUS; SUBCUTANEOUS
Status: DISCONTINUED | OUTPATIENT
Start: 2018-03-28 | End: 2018-03-29 | Stop reason: HOSPADM

## 2018-03-28 RX ORDER — POTASSIUM CHLORIDE 1.5 G/1.58G
20-40 POWDER, FOR SOLUTION ORAL
Status: DISCONTINUED | OUTPATIENT
Start: 2018-03-28 | End: 2018-03-29 | Stop reason: HOSPADM

## 2018-03-28 RX ORDER — KETOROLAC TROMETHAMINE 15 MG/ML
15 INJECTION, SOLUTION INTRAMUSCULAR; INTRAVENOUS ONCE
Status: COMPLETED | OUTPATIENT
Start: 2018-03-28 | End: 2018-03-28

## 2018-03-28 RX ORDER — LORAZEPAM 2 MG/ML
.5-1 INJECTION INTRAMUSCULAR ONCE
Status: DISCONTINUED | OUTPATIENT
Start: 2018-03-28 | End: 2018-03-28

## 2018-03-28 RX ORDER — LORAZEPAM 2 MG/ML
1-2 INJECTION INTRAMUSCULAR EVERY 30 MIN PRN
Status: DISCONTINUED | OUTPATIENT
Start: 2018-03-28 | End: 2018-03-29 | Stop reason: HOSPADM

## 2018-03-28 RX ORDER — IOPAMIDOL 755 MG/ML
76 INJECTION, SOLUTION INTRAVASCULAR ONCE
Status: COMPLETED | OUTPATIENT
Start: 2018-03-28 | End: 2018-03-28

## 2018-03-28 RX ORDER — ROSUVASTATIN CALCIUM 5 MG/1
5 TABLET, COATED ORAL EVERY EVENING
Status: DISCONTINUED | OUTPATIENT
Start: 2018-03-28 | End: 2018-03-29 | Stop reason: HOSPADM

## 2018-03-28 RX ORDER — LOSARTAN POTASSIUM 50 MG/1
50 TABLET ORAL DAILY
Status: DISCONTINUED | OUTPATIENT
Start: 2018-03-28 | End: 2018-03-29 | Stop reason: HOSPADM

## 2018-03-28 RX ORDER — POTASSIUM CHLORIDE 7.45 MG/ML
10 INJECTION INTRAVENOUS
Status: DISCONTINUED | OUTPATIENT
Start: 2018-03-28 | End: 2018-03-29 | Stop reason: HOSPADM

## 2018-03-28 RX ORDER — ACETAMINOPHEN 325 MG/1
650 TABLET ORAL EVERY 4 HOURS PRN
Status: DISCONTINUED | OUTPATIENT
Start: 2018-03-28 | End: 2018-03-29 | Stop reason: HOSPADM

## 2018-03-28 RX ORDER — LIDOCAINE 40 MG/G
CREAM TOPICAL
Status: DISCONTINUED | OUTPATIENT
Start: 2018-03-28 | End: 2018-03-29 | Stop reason: HOSPADM

## 2018-03-28 RX ADMIN — LORAZEPAM 2 MG: 2 INJECTION INTRAMUSCULAR; INTRAVENOUS at 14:39

## 2018-03-28 RX ADMIN — ALUMINUM HYDROXIDE, MAGNESIUM HYDROXIDE, AND DIMETHICONE 30 ML: 400; 400; 40 SUSPENSION ORAL at 13:18

## 2018-03-28 RX ADMIN — FOLIC ACID 1 MG: 1 TABLET ORAL at 08:35

## 2018-03-28 RX ADMIN — ALUMINUM HYDROXIDE, MAGNESIUM HYDROXIDE, AND DIMETHICONE 30 ML: 400; 400; 40 SUSPENSION ORAL at 08:35

## 2018-03-28 RX ADMIN — POTASSIUM CHLORIDE 40 MEQ: 1500 TABLET, EXTENDED RELEASE ORAL at 09:42

## 2018-03-28 RX ADMIN — OMEPRAZOLE 40 MG: 20 CAPSULE, DELAYED RELEASE ORAL at 08:09

## 2018-03-28 RX ADMIN — INDOMETHACIN 25 MG: 25 CAPSULE ORAL at 19:56

## 2018-03-28 RX ADMIN — LOSARTAN POTASSIUM 50 MG: 50 TABLET ORAL at 08:14

## 2018-03-28 RX ADMIN — SULFAMETHOXAZOLE AND TRIMETHOPRIM 1 TABLET: 800; 160 TABLET ORAL at 19:56

## 2018-03-28 RX ADMIN — REGADENOSON 0.4 MG: 0.08 INJECTION, SOLUTION INTRAVENOUS at 14:44

## 2018-03-28 RX ADMIN — Medication 100 MG: at 08:35

## 2018-03-28 RX ADMIN — SULFAMETHOXAZOLE AND TRIMETHOPRIM 1 TABLET: 800; 160 TABLET ORAL at 08:36

## 2018-03-28 RX ADMIN — MULTIPLE VITAMINS W/ MINERALS TAB 1 TABLET: TAB at 08:35

## 2018-03-28 RX ADMIN — ASPIRIN 325 MG ORAL TABLET 325 MG: 325 PILL ORAL at 08:14

## 2018-03-28 RX ADMIN — NICOTINE 1 PATCH: 14 PATCH, EXTENDED RELEASE TRANSDERMAL at 08:14

## 2018-03-28 RX ADMIN — INDOMETHACIN 25 MG: 25 CAPSULE ORAL at 08:14

## 2018-03-28 RX ADMIN — KETOROLAC TROMETHAMINE 15 MG: 15 INJECTION, SOLUTION INTRAMUSCULAR; INTRAVENOUS at 02:38

## 2018-03-28 RX ADMIN — ONDANSETRON 4 MG: 4 TABLET, ORALLY DISINTEGRATING ORAL at 02:38

## 2018-03-28 RX ADMIN — IOPAMIDOL 76 ML: 755 INJECTION, SOLUTION INTRAVENOUS at 02:56

## 2018-03-28 RX ADMIN — SODIUM CHLORIDE 100 ML: 9 INJECTION, SOLUTION INTRAVENOUS at 02:56

## 2018-03-28 RX ADMIN — ROSUVASTATIN CALCIUM 5 MG: 5 TABLET, FILM COATED ORAL at 19:57

## 2018-03-28 RX ADMIN — TETROFOSMIN 10 MCI.: 1.38 INJECTION, POWDER, LYOPHILIZED, FOR SOLUTION INTRAVENOUS at 12:45

## 2018-03-28 RX ADMIN — HYDROCHLOROTHIAZIDE 25 MG: 25 TABLET ORAL at 08:14

## 2018-03-28 RX ADMIN — TETROFOSMIN 30.2 MCI.: 1.38 INJECTION, POWDER, LYOPHILIZED, FOR SOLUTION INTRAVENOUS at 14:45

## 2018-03-28 ASSESSMENT — ACTIVITIES OF DAILY LIVING (ADL)
DRESS: 0-->INDEPENDENT
SWALLOWING: 0 - SWALLOWS FOODS/LIQUIDS WITHOUT DIFFICULTY
AMBULATION: 0 - INDEPENDENT
EATING: 0 - INDEPENDENT
TRANSFERRING: 0-->INDEPENDENT
BATHING: 0-->INDEPENDENT
FALL_HISTORY_WITHIN_LAST_SIX_MONTHS: YES
BATHING: 0 - INDEPENDENT
COGNITION: 0 - NO COGNITION ISSUES REPORTED
CHANGE_IN_FUNCTIONAL_STATUS_SINCE_ONSET_OF_CURRENT_ILLNESS/INJURY: NO
TRANSFERRING: 0 - INDEPENDENT
TOILETING: 0 - INDEPENDENT
AMBULATION: 0-->INDEPENDENT
COMMUNICATION: 0 - UNDERSTANDS/COMMUNICATES WITHOUT DIFFICULTY
TOILETING: 0-->INDEPENDENT
NUMBER_OF_TIMES_PATIENT_HAS_FALLEN_WITHIN_LAST_SIX_MONTHS: 1
SWALLOWING: 0-->SWALLOWS FOODS/LIQUIDS WITHOUT DIFFICULTY
RETIRED_COMMUNICATION: 0-->UNDERSTANDS/COMMUNICATES WITHOUT DIFFICULTY
RETIRED_EATING: 0-->INDEPENDENT
DRESS: 0 - INDEPENDENT

## 2018-03-28 ASSESSMENT — PAIN DESCRIPTION - DESCRIPTORS: DESCRIPTORS: ACHING

## 2018-03-28 NOTE — IP AVS SNAPSHOT
MRN:[id number]                      After Visit Summary   3/28/2018    Chilo Oneil    MRN: [id number]           Thank you!     Thank you for choosing [facility] for your care. Our goal is always to provide you with excellent care. Hearing back from our patients is one way we can continue to improve our services. Please take a few minutes to complete the written survey that you may receive in the mail after you visit with us. Thank you!        Patient Information     Date Of Birth          1950        Designated Caregiver       Most Recent Value    Caregiver    Will someone help with your care after discharge? no      About your hospital stay     You were admitted on:  March 28, 2018 You last received care in the:  [facility]    You were discharged on:  March 29, 2018       Who to Call     For medical emergencies, please call 911.  For non-urgent questions about your medical care, please call your primary care provider or clinic, [phone]          Attending Provider     Provider Specialty    Dm Elliott DO Emergency Medicine    Aniceto Sotomayor MD Family Practice    ee, Los ACHARYA MD Internal Medicine       Primary Care Provider Office Phone # Fax #    Maximinomarina Jyoti Plasencia [phone]      Your next 10 appointments already scheduled     Apr 04, 2018  9:45 AM CDT   (Arrive by 9:30 AM)   PE NPET ONCOLOGY (EYES TO THIGHS) with UUPET1   [facility] PET CT ([facility] [place])    [address] [address] [phone]           Tell your doctor:   If there is any chance you may be pregnant or if you are breastfeeding.   If you have problems lying in small spaces (claustrophobia). If you do, your doctor may give you medicine to help you relax. If you have diabetes:   Have your exam early in the morning. Your blood glucose will go up as the day goes by.   Your glucose level  must be 180 or less at the start of the exam. Please take any medicines you need to ensure this blood glucose level. 24 hours before your scan: Don t do any heavy exercise. (No jogging, aerobics or other workouts.) Exercise will make your pictures less accurate. 6 hours before your scan:   Stop all food and liquids (except water).   Do not chew gum or suck on mints.   If you need to take medicine with food, you may take it with a few crackers.  Please call your Imaging Department at your exam site with any questions.            Apr 04, 2018 11:00 AM CDT   (Arrive by 10:45 AM)   PAC EVALUATION with  Pac Tricia 7   [facility] Preoperative Assessment [place] ([facility])    [address]  4th Floor  [facility] [place] [address]   [phone]            Apr 04, 2018 12:00 PM CDT   (Arrive by 11:45 AM)   PAC RN ASSESSMENT with  Pac Rn   [facility] Preoperative Assessment [place] ([facility])    [address] [place] [address]  4th Floor  [facility] [place] [address]   [phone]            Apr 04, 2018 12:30 PM CDT   (Arrive by 12:15 PM)   PAC Anesthesia Consult with  Pac Anesthesiologist   [facility] Preoperative Assessment [place] ([facility])    [address] [place] [address]  4th Floor  [facility] [place] [address]   [phone]            Apr 04, 2018  1:00 PM CDT   (Arrive by 12:45 PM)   Return Visit with Nova Maguire MD   [facility] ([facility])    [address] [place] [address]  [address]  [facility] [place] [address]   [phone]            Apr 11, 2018   Procedure with Mega Moreno MD   [facility] [place], Same Day Surgery (--)    [address] [address]   [phone]            May 11, 2018  2:45 PM CDT   (Arrive by 2:30 PM)   Return Visit with NATALIE Berg [facility] ([facility])    [address] [place] [address]  [address]  [facility]  [id number] [phone]              Further instructions from your care team       [facility] Discharge Instructions     Discharge disposition:  Discharged to home       Diet:  Regular       Activity Activity as tolerated       Follow-up: Follow up with primary care provider in 4-7 days  -RECHECK SODIUM BEFORE SURGERY       Additional instructions: Call Dr. Leger in specialty clinic to find out what needs to be done next regarding heart prior to surgery. # [phone]         Pending Results     No orders found for last 3 day(s).            Statement of Approval     Ordered          18 1317  I have reviewed and agree with all the recommendations and orders detailed in this document.  EFFECTIVE NOW     Approved and electronically signed by:  Los Liu MD             Admission Information     Date & Time Provider Department Dept. Phone    3/28/2018 Los Liu MD [facility] [phone]      Your Vitals Were     Blood Pressure Pulse Temperature Respirations Height Weight    165/53 (BP Location: Right arm) 68 98.3  F (36.8  C) (Oral) 18 1.905 m (6' 3\") 77.2 kg (170 lb 3.1 oz)    Pulse Oximetry BMI (Body Mass Index)                93% 21.27 kg/m2          MyChart Information     [facility] lets you send messages to your doctor, view your test results, renew your prescriptions, schedule appointments and more. To sign up, go to [email or web address] . Click on \"Log in\" on the left side of the screen, which will take you to the Welcome page. Then click on \"Sign up Now\" on the right side of the page.     You will be asked to enter the access code listed below, as well as some personal information. Please follow the directions to create your username and password.     Your access code is: [email or web address]  Expires: 2018 10:49 AM     Your access code will  in 90 days. If you need help or a new code, please call your [place] clinic or [phone].        Care " EveryWhere ID     This is your Care EveryWhere ID. This could be used by other organizations to access your [facility] medical records  [id number]        Equal Access to Services     [place] : Kevin Vidales, pepe camara, riverasergio amezcuanaldo marquez, celina erastoin hayaaalmaz ramospilo gresham marcelina fisher. So [facility] [phone].    ATENCIÓN: Si habla español, tiene a galaviz disposición servicios gratuitos de asistencia lingüística. Llame al [phone].    We comply with applicable federal civil rights laws and [place] laws. We do not discriminate on the basis of race, color, national origin, age, disability, sex, sexual orientation, or gender identity.               Review of your medicines      START taking        Dose / Directions    sulfamethoxazole-trimethoprim 800-160 MG per tablet   Commonly known as:  BACTRIM DS/SEPTRA DS   Indication:  Urinary Tract Infection   Used for:  Acute cystitis without hematuria        Dose:  1 tablet   Take 1 tablet by mouth 2 times daily for 3 doses   Quantity:  3 tablet   Refills:  0         CONTINUE these medicines which have NOT CHANGED        Dose / Directions    albuterol 108 (90 BASE) MCG/ACT Inhaler   Commonly known as:  PROAIR HFA/PROVENTIL HFA/VENTOLIN HFA        Dose:  2 puff   Inhale 2 puffs into the lungs every 4 hours as needed for shortness of breath / dyspnea or wheezing WITH SPACER   Quantity:  1 Inhaler   Refills:  0       ALPRAZolam 0.5 MG tablet   Commonly known as:  XANAX   Used for:  Claustrophobia        Dose:  0.5 mg   Take 1 tablet (0.5 mg) by mouth as needed for anxiety Take one tablet 60 min prior to procedure. May repeat x1 if little effect.   Quantity:  2 tablet   Refills:  0       aspirin 325 MG tablet   Used for:  Pain of left lower leg        Dose:  325 mg   Take 1 tablet (325 mg) by mouth daily   Quantity:  90 tablet   Refills:  3       hydrochlorothiazide 25 MG tablet   Commonly known as:  HYDRODIURIL   Used for:  Benign essential  hypertension        Dose:  25 mg   Take 1 tablet (25 mg) by mouth daily   Quantity:  90 tablet   Refills:  3       indomethacin 25 MG capsule   Commonly known as:  INDOCIN   Used for:  Chest wall pain, Rib pain on left side        Dose:  25 mg   Take 1 capsule (25 mg) by mouth 2 times daily (with meals)   Quantity:  42 capsule   Refills:  1       losartan 50 MG tablet   Commonly known as:  COZAAR   Used for:  Benign essential hypertension        Dose:  50 mg   Take 1 tablet (50 mg) by mouth daily   Quantity:  90 tablet   Refills:  3       metoprolol tartrate 50 MG tablet   Commonly known as:  LOPRESSOR   Used for:  Benign essential hypertension        Dose:  50 mg   Take 1 tablet (50 mg) by mouth 2 times daily   Quantity:  60 tablet   Refills:  1       omeprazole 40 MG capsule   Commonly known as:  priLOSEC   Used for:  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), Hyperlipidemia LDL goal <100, Benign essential hypertension, Tobacco use disorder, Gastroesophageal reflux disease without esophagitis        Dose:  40 mg   Take 40 mg by mouth daily   Refills:  0       rosuvastatin 5 MG tablet   Commonly known as:  CRESTOR        Dose:  5 mg   Take 1 tablet (5 mg) by mouth daily   Quantity:  30 tablet   Refills:  3       traMADol 50 MG tablet   Commonly known as:  ULTRAM   Used for:  Upper back pain on left side        Dose:   mg   Take 1-2 tablets ( mg) by mouth 2 times daily   Quantity:  40 tablet   Refills:  1            Where to get your medicines      These medications were sent to [facility] [place], [place] - [address]  [address] [address]     Phone:  [phone]     sulfamethoxazole-trimethoprim 800-160 MG per tablet                Protect others around you: Learn how to safely use, store and throw away your medicines at www.disposemymeds.org.        ANTIBIOTIC INSTRUCTION     You've Been Prescribed an Antibiotic - Now What?  Your healthcare team thinks that  you or your loved one might have an infection. Some infections can be treated with antibiotics, which are powerful, life-saving drugs. Like all medications, antibiotics have side effects and should only be used when necessary. There are some important things you should know about your antibiotic treatment.      Your healthcare team may run tests before you start taking an antibiotic.    Your team may take samples (e.g., from your blood, urine or other areas) to run tests to look for bacteria. These test can be important to determine if you need an antibiotic at all and, if you do, which antibiotic will work best.      Within a few days, your healthcare team might change or even stop your antibiotic.    Your team may start you on an antibiotic while they are working to find out what is making you sick.    Your team might change your antibiotic because test results show that a different antibiotic would be better to treat your infection.    In some cases, once your team has more information, they learn that you do not need an antibiotic at all. They may find out that you don't have an infection, or that the antibiotic you're taking won't work against your infection. For example, an infection caused by a virus can't be treated with antibiotics. Staying on an antibiotic when you don't need it is more likely to be harmful than helpful.      You may experience side effects from your antibiotic.    Like all medications, antibiotics have side effects. Some of these can be serious.    Let you healthcare team know if you have any known allergies when you are admitted to the hospital.    One significant side effect of nearly all antibiotics is the risk of severe and sometimes deadly diarrhea caused by Clostridium difficile (C. Difficile). This occurs when a person takes antibiotics because some good germs are destroyed. Antibiotic use allows C. diificile to take over, putting patients at high risk for this serious  infection.    As a patient or caregiver, it is important to understand your or your loved one's antibiotic treatment. It is especially important for caregivers to speak up when patients can't speak for themselves. Here are some important questions to ask your healthcare team.    What infection is this antibiotic treating and how do you know I have that infection?    What side effects might occur from this antibiotic?    How long will I need to take this antibiotic?    Is it safe to take this antibiotic with other medications or supplements (e.g., vitamins) that I am taking?     Are there any special directions I need to know about taking this antibiotic? For example, should I take it with food?    How will I be monitored to know whether my infection is responding to the antibiotic?    What tests may help to make sure the right antibiotic is prescribed for me?      Information provided by:  www.cdc.gov/getsmart  U.S. Department of Health and Human Services  Centers for disease Control and Prevention  National Center for Emerging and Zoonotic Infectious Diseases  Division of Healthcare Quality Promotion             Medication List: This is a list of all your medications and when to take them. Check marks below indicate your daily home schedule. Keep this list as a reference.      Medications           Morning Afternoon Evening Bedtime As Needed    albuterol 108 (90 BASE) MCG/ACT Inhaler   Commonly known as:  PROAIR HFA/PROVENTIL HFA/VENTOLIN HFA   Inhale 2 puffs into the lungs every 4 hours as needed for shortness of breath / dyspnea or wheezing WITH SPACER                                   ALPRAZolam 0.5 MG tablet   Commonly known as:  XANAX   Take 1 tablet (0.5 mg) by mouth as needed for anxiety Take one tablet 60 min prior to procedure. May repeat x1 if little effect.                                   aspirin 325 MG tablet   Take 1 tablet (325 mg) by mouth daily   Last time this was given:  325 mg on 3/29/2018   9:01 AM            Had this morning, take tomorrow                       hydrochlorothiazide 25 MG tablet   Commonly known as:  HYDRODIURIL   Take 1 tablet (25 mg) by mouth daily   Last time this was given:  25 mg on 3/29/2018  9:01 AM            Had this morning, take tomorrow                       indomethacin 25 MG capsule   Commonly known as:  INDOCIN   Take 1 capsule (25 mg) by mouth 2 times daily (with meals)   Last time this was given:  25 mg on 3/29/2018  9:04 AM            Had this morning           Take with supper               losartan 50 MG tablet   Commonly known as:  COZAAR   Take 1 tablet (50 mg) by mouth daily   Last time this was given:  50 mg on 3/29/2018  9:01 AM            Had this morning, take tomorrow                       metoprolol tartrate 50 MG tablet   Commonly known as:  LOPRESSOR   Take 1 tablet (50 mg) by mouth 2 times daily            Resume           Resume               omeprazole 40 MG capsule   Commonly known as:  priLOSEC   Take 40 mg by mouth daily   Last time this was given:  40 mg on 3/29/2018  9:01 AM            Had this morning, take tomorrow                       rosuvastatin 5 MG tablet   Commonly known as:  CRESTOR   Take 1 tablet (5 mg) by mouth daily   Last time this was given:  5 mg on 3/28/2018  7:57 PM                    Take tonight               sulfamethoxazole-trimethoprim 800-160 MG per tablet   Commonly known as:  BACTRIM DS/SEPTRA DS   Take 1 tablet by mouth 2 times daily for 3 doses   Last time this was given:  1 tablet on 3/29/2018  9:01 AM            Had this morning           Take tonight               traMADol 50 MG tablet   Commonly known as:  ULTRAM   Take 1-2 tablets ( mg) by mouth 2 times daily            Resume               Resume

## 2018-03-28 NOTE — H&P
"[facility]    History and Physical  Hospital Medicine       Date of Admission:  3/28/2018  Date of Service: 3/28/2018     Primary Care Physician   Manish Plasencia [phone]    Assessment & Plan   Chilo Oneil is a 67 year old male with PMH significant for NSCLC diagnosed 12/2017 (no treatment, scheduled for resection 04/09/2018), hx of alcohol use disorder, ADA, HTN, probable CAD, GERD, and HLD who now presents with dyspnea, nausea and left shoulder pain.    Dyspnea  Presents with dyspnea and CHRONIC left shoulder pain. Previously abnormal stress testing 2016; did not pursue follow up at that point in time. VSS (initially required O2 per EMS, but was weaned to RA in ED). Troponin negative, EKG is normal sinus and does not show new ST segment elevation or T-wave inversions. CT PE shows no PE, persistent nodule. Seen by Dr. Leger 03/20/2018 for preoperative clearance; note states: \"I have recommended a repeat Lexiscan-exercise stress nuclear study to be compared to his prior study. If there is evidence of new ischemia or if he develops symptoms with the stress study, I would recommend pre-operative coronary angiography.\"   DDx: cardiac origin (unstable angina) versus pulmonary origin (known malignancy) versus GI origin   - give 325mg aspirin now  - order GI cocktail  - continue telemetry monitoring  - order serial troponin Q6  - cardiac testing (NM MPI Lexiscan)    Pyuria  18 WBC.  26 hyaline casts.  Trace LE.  Negative nitrite. Reports increasing urinary frequency x2 weeks.  Denies dysuria, incomplete bladder emptying, hematuria.  - urine culture ordered, pending  - start bactrim DS BID (allergy to Cipro)    Nausea and Vomiting  - order influenza swab  - antibiotic as above    Alcohol Dependence  Previously drank 4-6 beers daily.  Stopped drinking 03/12 in preparation for surgery.  Drank 4-5 beers 03/27 as chronic ache of left shoulder was \"getting\" to him.  No previous " withdrawals.  Does not appear to be withdrawing.  - [facility] protocol in place  - thiamine, folic acid, multivitamin, magnesium/phosphorus therapy initiated.  - initiate lorazepam therapy, PRN    Hyponatremia  Sodium 128 on arrival.  Baseline 131-140.  Repeat on morning draw 126  DDx: EtOH consumption verus volume depletion secondary to dehydration and poor PO intake versus SIADH related to malignancy versus idiopathic.  - order urine osmolality, serum osmolality, urine sodium    Chronic Left Shoulder Pain  Presumed secondary to malignancy  - continue PTA indomethacin  - continue PTA PRN tramadol    Left Upper Lobe NSCLC  Recent Weight Loss  Diagnosed by biopsy 12/2017.  Thought to be resectable disease. Surgery scheduled 04/09/2018; plans to undergo Left VATS wedge resection, possible lobectomy, possible thoracotomy, but patient has not yet undergone any other type of treatment. Follows with Dr. Moreno for thoracic surgery.  - IP nutrition consult placed  - continue outpatient follow up    CAD  - see above    Alcohol use, daily use  Stopped drinking 03/12/2018.      Benign essential hypertension  BP reviewed, stable  - continue PTA losartan, HCTZ  - hold PTA metoprolol given Lexiscan as above    Hyperlipidemia LDL goal <100  - continue PTA rosuvastatin      Anxiety  - continue PTA Xanax PRN      Gastroesophageal reflux disease without esophagitis  - continue PTA omeprazole       F: not indicated  E: BMP now  N: cardiac diet, no caffeine  DVT Prophylaxis: not indicated    Code Status: Full Code    Disposition: Anticipate discharge in 1-2 days. Appropriate for observation care.    Case discussed with Dr. Los Liu.  Assessment and plan as written above.    Lolis Layne PA-C  [facility]    History is obtained from the patient and review of the EMR.    Past Medical History    Past Medical History:   Diagnosis Date     Alcohol abuse     Status post treatment     Hypertension      Lung cancer (H)   "    Uncomplicated asthma        Past Surgical History   Past Surgical History:   Procedure Laterality Date     FRACTURE TX, ANKLE RT/LT  1975    Fracture TX Ankle, LT     OPEN REDUCTION INTERNAL FIXATION HIP NAILING  9/20/2013    Procedure: OPEN REDUCTION INTERNAL FIXATION HIP NAILING;  Left Hip Open Reduction Internal Fixation ;  Surgeon: Rosas Dennis MD;  Location: [facility] OR       Family History    Family History   Problem Relation Age of Onset     DIABETES Brother      type 2     DIABETES Father        History of Present Illness   Chilo Oneil is a 67 year old male with PMH significant for NSCLC diagnosed 12/2017 (no treatment, scheduled for resection 04/09/2018), hx of alcohol use disorder, ADA, HTN, probable CAD, GERD, and HLD who now presents with dyspnea, nausea and left shoulder pain.    The patient has had chronic left shoulder pain for the past 3-4 months. He reports that this is typically dull with intermittent \"shooting\" pain which lasts for a period of seconds. He states that he pain is entirely unchanged from baseline.    On Monday evening, the patient developed nausea with emesis.  He reports that he has been unable to keep \"anything\" down since that point in time.  He reports he last ate oatmeal approximately 48 hours prior to arrival and has not consumed anything else since that point in time.  In addition, he notes that on Monday evening he had severe shortness of breath at rest.  He reports that nothing brought this on.  He is unable to ascribe how long this episode lasted.  Again, Tuesday evening, he developed acute shortness of breath and called 911.  He also drank 4-5 beers yesterday evening as chronic pain of his left shoulder was \"getting to\" him. He denies associated CP, palpitation, diaphoresis, numbness/paresthesias and weakness.      ROS: Denies fever, chills, myalgias, cold-like symptoms (congestion, pharyngitis, sinus pressure, rhinorrhea), headaches, lightheadedness, " dizziness, chest pain, palpitations/flutters, wheezes, abdominal pain, diarrhea/constipation, dysuria, hematuria, joint swelling, joint pain, leg swelling, and rashes. The patient has a chronic cough which is unchanged from baseline.      Prior to Admission Medications   Prior to Admission Medications   Prescriptions Last Dose Informant Patient Reported? Taking?   ALPRAZolam (XANAX) 0.5 MG tablet More than a month at Unknown time  No No   Sig: Take 1 tablet (0.5 mg) by mouth as needed for anxiety Take one tablet 60 min prior to procedure. May repeat x1 if little effect.   albuterol (PROAIR HFA/PROVENTIL HFA/VENTOLIN HFA) 108 (90 BASE) MCG/ACT Inhaler 3/27/2018 at Unknown time  No Yes   Sig: Inhale 2 puffs into the lungs every 4 hours as needed for shortness of breath / dyspnea or wheezing WITH SPACER   aspirin 325 MG tablet 3/27/2018 at am  No Yes   Sig: Take 1 tablet (325 mg) by mouth daily   hydrochlorothiazide (HYDRODIURIL) 25 MG tablet 3/27/2018 at am  No Yes   Sig: Take 1 tablet (25 mg) by mouth daily   indomethacin (INDOCIN) 25 MG capsule 3/27/2018 at am  No Yes   Sig: Take 1 capsule (25 mg) by mouth 2 times daily (with meals)   losartan (COZAAR) 50 MG tablet 3/27/2018 at am  No Yes   Sig: Take 1 tablet (50 mg) by mouth daily   metoprolol (LOPRESSOR) 50 MG tablet Unknown at Unknown time  No No   Sig: Take 1 tablet (50 mg) by mouth 2 times daily   omeprazole (PRILOSEC) 40 MG capsule More than a month at Unknown time  Yes No   rosuvastatin (CRESTOR) 5 MG tablet More than a month at Unknown time  No No   Sig: Take 1 tablet (5 mg) by mouth daily   traMADol (ULTRAM) 50 MG tablet 3/27/2018 at pm  No Yes   Sig: Take 1-2 tablets ( mg) by mouth 2 times daily      Facility-Administered Medications: None       Allergies   Allergies   Allergen Reactions     Cipro [Ciprofloxacin] Swelling       Social History   Social History     Social History     Marital status: Single     Spouse name: N/A     Number of children:  "N/A     Years of education: N/A     Occupational History     Not on file.     Social History Main Topics     Smoking status: Current Every Day Smoker     Packs/day: 0.30     Years: 47.00     Types: Cigarettes     Start date: 12/26/1967     Smokeless tobacco: Never Used      Comment: 2-3 cigs daily     Alcohol use Yes      Comment: 6-8 beers per day, last 3/27 at 6pm     Drug use: Yes     Special: Marijuana      Comment: 3 times a day for pain     Sexual activity: Not on file     Other Topics Concern     Parent/Sibling W/ Cabg, Mi Or Angioplasty Before 65f 55m? No     Social History Narrative       Physical Exam     /69 (BP Location: Right arm)  Pulse 68  Temp 97.8  F (36.6  C) (Oral)  Resp 19  Ht 1.905 m (6' 3\")  Wt 77.2 kg (170 lb 3.1 oz)  SpO2 99%  BMI 21.27 kg/m2     Weight: 170 lbs 3.12 oz Body mass index is 21.27 kg/(m^2).     # Pain Assessment:  Current Pain Score 3/28/2018   Patient currently in pain? yes   Pain score (0-10) 8   Pain location Shoulder   Pain descriptors Aching   - Chilo is experiencing pain due to chronic left shoulder pain. Pain management was discussed and the plan was created in a collaborative fashion.  Chilo's response to the current recommendations: engaged  - Please see the plan for pain management as documented above          Constitutional: Alert and oriented x4.  Cooperative.  Appears older than stated age.  Appears in no acute distress.   HEENT: Oropharynx is clear and moist. No evidence of cranial trauma.  Lymph/Hematologic: No occipital, submental, submandibular, anterior or posterior cervical, or supraclavicular lymphadenopathy is appreciated.  Cardiovascular: Distant heart sounds secondary to body habitus. Regular rate/rhythm.  S1 and S2 grossly normal.  No appreciable murmur, rub, gallop.  No lower extremity edema.  Respiratory: Diminished breath sounds throughout. Clear to auscultation bilaterally. Equal chest expansion.  GI: Soft, non-tender, normal bowel " sounds, no hepatosplenomegaly. Some what cachetic appearing abdomen  Genitourinary: Deferred  Musculoskeletal: Normal muscle bulk and tone.  Skin: Warm and dry, no rashes.   Neurologic: Neck supple. Cranial nerves 3-12 are grossly intact.  is symmetric.     Data   Data reviewed today:     Recent Labs  Lab 03/28/18  0214 03/28/18  0150   WBC  --  9.2   HGB  --  14.8   MCV  --  89   PLT  --  422   INR 1.04  --    *  --    POTASSIUM 3.4  --    CHLORIDE 83*  --    CO2 32  --    BUN 12  --    CR 1.04  --    ANIONGAP 13  --    DAMARI 9.5  --    *  --    ALBUMIN 3.8  --    PROTTOTAL 7.6  --    BILITOTAL 0.5  --    ALKPHOS 95  --    ALT 29  --    AST 24  --    TROPI <0.015  --        Recent Results (from the past 24 hour(s))   CT Chest Pulmonary Embolism w Contrast    Narrative    CT CHEST PULMONARY EMBOLISM W CONTRAST  3/28/2018 3:14 AM     HISTORY: Dyspnea.    TECHNIQUE: Volumetric acquisition of the chest after the  administration of 76 mL Isovue-370 IV contrast. Radiation dose for  this scan was reduced using automated exposure control, adjustment of  the mA and/or kV according to patient size, or iterative  reconstruction technique.     COMPARISON: 2/9/2018.    FINDINGS: No visualized pulmonary embolism. Emphysema. Spiculated  nodule in the left upper lung laterally measuring approximately 1.8 x  2.1 x 1.6 cm has not significantly changed. On a previous PET scan  this demonstrated increased metabolism. Atheromatous changes of the  thoracic aorta. There are a few small, nonenlarged mediastinal lymph  nodes. Coronary artery calcifications. Normal heart size. Mild  nonspecific adrenal gland thickening. Small cyst upper right kidney.      Impression    IMPRESSION:  1. No visualized pulmonary embolism or significant change.  2. Irregular, spiculated left upper lung nodule is suspicious for  malignancy, stable.  3. Emphysema.    JACKY HENRY MD       I personally reviewed the EKG tracing showing normal  sinus rhythm.    Lolis Layne PAGUIDO  [facility]

## 2018-03-28 NOTE — PROGRESS NOTES
"[facility] ADMISSION NOTE    Patient admitted to room 2215 at approximately 0500 via cart from emergency room. Patient was accompanied by ER RN.     Verbal SBAR report received from Yumiko Escudero RN in ER prior to patient arrival.     Patient ambulated to bed with stand-by assist. Patient alert and oriented X 4. Pain is controlled with current analgesics.  Medication(s) being used: prescription NSAID's including ketorolac (Toradol). 0-10 Pain Scale: 8. Admission vital signs: Blood pressure 185/69, pulse 68, temperature 97.8  F (36.6  C), temperature source Oral, resp. rate 19, height 1.905 m (6' 3\"), weight 77.2 kg (170 lb 3.1 oz), SpO2 99 %. Patient was oriented to plan of care, call light, bed controls, tv, telephone, bathroom and visiting hours.     The following safety risks were identified during admission: fall. Yellow risk band applied: YES.     Skin assessment performed and verified with Lashawn ADAMES.    Caroline Avalos RN    "

## 2018-03-28 NOTE — ED NOTES
"Pt reports in CT department that he will not be able to complete CT scan without \"medication to relax\". Pt reports to CT tech significant anxiety with scans. MD notified and DEBORAH received for lorazepam.   "

## 2018-03-28 NOTE — IP AVS SNAPSHOT
[facility]    [address] [address]    Phone:  [phone]    Fax:  [phone]                                       After Visit Summary   3/28/2018    Chilo Oneil    MRN: [id number]           After Visit Summary Signature Page     I have received my discharge instructions, and my questions have been answered. I have discussed any challenges I see with this plan with the nurse or doctor.    ..........................................................................................................................................  Patient/Patient Representative Signature      ..........................................................................................................................................  Patient Representative Print Name and Relationship to Patient    ..................................................               ................................................  Date                                            Time    ..........................................................................................................................................  Reviewed by Signature/Title    ...................................................              ..............................................  Date                                                            Time

## 2018-03-28 NOTE — ED NOTES
"Patient has  [place] to Observation  order. Patient has been given the Observation brochure -  What does Observation mean to me.\"  Patient has been given the opportunity to ask questions about observation status and their plan of care.      Frieda Ortega    "

## 2018-03-28 NOTE — ED NOTES
Pt states a few days ago he started to have increased SOB and left upper back pain.(pt reports he has had left upper back pain for several months, but that it continues to get worse). Pt states he has not slept in 2 days d/t pain, nausea and anxiety.  Pt did have emesis this morning after drinking ensure, and then continued to vomit several more times. Pt c/o heartburn. Pt reports stage 1 lung CA--has surgery planned for 4/11. Pt does not use 02 at home. Pt has not used inhaler x1 week as it is empty (pt reports difficulty affording prescriptions and is not taking all of his meds as prescribed). Pt denies fever.

## 2018-03-28 NOTE — CONSULTS
"CLINICAL NUTRITION SERVICES  -  ASSESSMENT NOTE     REASON FOR ASSESSMENT  Chilo Oneil is a 67 year old male seen by Registered Dietitian for Admission Nutrition Risk Screen - unintentional weight loss of 10#s or more in the last two months and Provider Order - weight loss, lung cancer      NUTRITION HISTORY  - Information obtained from the patient. The patient reports that he was not able to eat since Monday evening, today he is only able to take bites of food. He has no appetite, no nausea currently. He has used Ensure plus at home and would like chocolate Ensure plus on his meal trays. The patient reports that he has been eating 50% or less of his normal intake for the last few months. He reports that his usual weight is around 200-210#s.      CURRENT NUTRITION ORDERS  Diet Order:     Regular    Current Intake/Tolerance:  Poor, just bites of food      PHYSICAL FINDINGS  Observed  No nutrition-related physical findings observed, the patient has his street clothes on  Obtained from Chart/Interdisciplinary Team  Cachectic appearing  abdomen    ANTHROPOMETRICS  Height: 6' 3\", clinic measured height is 6'2\"  Weight: 170 lbs 3.12 oz  Body mass index is 21.27 kg/(m^2).  Weight Status:  Normal BMI  IBW: 190#s  % IBW: 89%  Weight History:   Wt Readings from Last 12 Encounters:   03/28/18 77.2 kg (170 lb 3.1 oz)   03/20/18 79.3 kg (174 lb 12.8 oz)   03/09/18 84.4 kg (186 lb)   03/07/18 84.6 kg (186 lb 8.2 oz)   02/09/18 88.5 kg (195 lb)   01/11/18 87.1 kg (192 lb 1.6 oz)   12/26/17 86.5 kg (190 lb 12.8 oz)   12/20/17 87.1 kg (192 lb)   12/06/17 87.1 kg (192 lb)   11/29/17 84.8 kg (187 lb)   08/19/16 81.2 kg (179 lb)   08/03/16 82.6 kg (182 lb)     11% weight loss in the last three months    LABS  Labs reviewed    MEDICATIONS  Medications reviewed    Dosing Weight 77 [id number]    ASSESSED NUTRITION NEEDS PER APPROVED PRACTICE GUIDELINES:  Estimated Energy Needs: 6062-1110 kcals (30-35 Kcal/Kg)  Justification: " unintentional weight loss, cancer diagnosis  Estimated Protein Needs:  grams protein (1.2-1.5 g pro/Kg)  Justification: repletion, cancer diagnosis  Estimated Fluid Needs: 1 mL/Kcal  Justification: maintenance    MALNUTRITION:  % Weight Loss:  > 7.5% in 3 months (severe malnutrition)  % Intake:  </= 50% for >/= 1 month (severe malnutrition)  Subcutaneous Fat Loss:  Not assessed  Muscle Loss:  Not assessed  Fluid Retention:  No edema per provider note    Malnutrition Diagnosis: Severe malnutrition  In Context of:  Chronic illness or disease    NUTRITION DIAGNOSIS:  Inadequate oral intake related to decreased appetite as evidenced by 22# weight loss (11%) over the last three months, patient reports eating less than 50% of his usual intake for the last few months      NUTRITION INTERVENTIONS  Recommendations / Nutrition Prescription  High calorie, high protein oral nutrition supplement with meals  .      Implementation  Nutrition education: No education needs assessed at this time  Composition of Meals and Snacks, General/healthful diet and Medical Food Supplement  .      Nutrition Goals  Patient to consume 25-50% of his meals and oral nutrition supplement in 1-2 days.  .      MONITORING AND EVALUATION:  Progress towards goals will be monitored and evaluated per protocol and Practice Guidelines, Food intake and Liquid meal replacement or supplement intake.    Melany Awan RD,LD  Clinical Dietitian

## 2018-03-28 NOTE — ED NOTES
"CT tech notified RN that patient is willing to try scan without medication, as he \"just wants to get it over with\". Will hold medication at time.   "

## 2018-03-28 NOTE — ED PROVIDER NOTES
History     Chief Complaint   Patient presents with     Shortness of Breath     increase in SOA related to Lung CA  patient  on 10 liters nasal canula      Back Pain     pain increasing  more in back at this time      HPI  Chilo Oneil is a 67 year old male, with a history of recently diagnosed non small cell lung cancer (left upper lobe adenocarcinoma), who presents with dyspnea since Sunday night and left shoulder pain. The dyspnea started without trauma or precipitating event. He has a chronic cough with clear sputum production and this is unchanged from baseline. He feels he could walk 1 block or 1 flight of stairs and then would have to take a break. He is not normally on oxygen at home. He is a lifetime smoker and currently smokes 2-3 cigarettes a day. He has felt feverish (no temp taken) and chilly. He has also felt very weak and reports he has lost 8 lbs over the last 11 days. His shoulder pain has also been so bad that he has not been able to sleep over the last 2 days and he has not been eating either. He also describes some chest tightness in the center of his chest and he has felt nauseated and has vomited multiple times over the last few days. No abdominal pain, diarrhea, or constipation. He has had a hard time initiating and maintaining urination, but no report of dysuria, hematuria, burning or decreased frequency.     He last saw Oncology 1/11/18.     Problem List:    Patient Active Problem List    Diagnosis Date Noted     Anxiety 12/28/2017     Priority: Medium     Gastroesophageal reflux disease without esophagitis 12/28/2017     Priority: Medium     Atypical angina (H) 09/08/2016     Priority: Medium     PAD (peripheral artery disease) (H) 06/13/2016     Priority: Medium     Benign essential hypertension 06/13/2016     Priority: Medium     Hyperlipidemia LDL goal <100 06/13/2016     Priority: Medium     Alcohol use, daily use 09/20/2013     Priority: Medium     Hip fracture, left (H)  09/19/2013     Priority: Medium     9/19/2013 - s/p Closed reduction, percutaneous screw fixation x3, left femoral neck fracture on 9/20/2013       CARDIOVASCULAR SCREENING; LDL GOAL LESS THAN 130 10/31/2010     Priority: Medium     Tobacco use disorder 10/16/2009     Priority: Medium        Past Medical History:    Past Medical History:   Diagnosis Date     Alcohol abuse      Hypertension      Lung cancer (H)        Past Surgical History:    Past Surgical History:   Procedure Laterality Date     FRACTURE TX, ANKLE RT/LT  1975    Fracture TX Ankle, LT     OPEN REDUCTION INTERNAL FIXATION HIP NAILING  9/20/2013    Procedure: OPEN REDUCTION INTERNAL FIXATION HIP NAILING;  Left Hip Open Reduction Internal Fixation ;  Surgeon: Rosas Dennis MD;  Location: [facility] OR       Family History:    Family History   Problem Relation Age of Onset     DIABETES Brother      type 2     DIABETES Father        Social History:  Marital Status:  Single [1]  Social History   Substance Use Topics     Smoking status: Current Every Day Smoker     Packs/day: 0.30     Years: 47.00     Types: Cigarettes     Start date: 12/26/1967     Smokeless tobacco: Never Used      Comment: 2-3 cigs daily     Alcohol use Yes      Comment: 6-8 beers per day        Medications:      traMADol (ULTRAM) 50 MG tablet   losartan (COZAAR) 50 MG tablet   hydrochlorothiazide (HYDRODIURIL) 25 MG tablet   indomethacin (INDOCIN) 25 MG capsule   aspirin 325 MG tablet   rosuvastatin (CRESTOR) 5 MG tablet   ALPRAZolam (XANAX) 0.5 MG tablet   albuterol (PROAIR HFA/PROVENTIL HFA/VENTOLIN HFA) 108 (90 BASE) MCG/ACT Inhaler   omeprazole (PRILOSEC) 40 MG capsule   metoprolol (LOPRESSOR) 50 MG tablet         Review of Systems  25 pound weight loss since diagnosis  Vegetative symptoms of depression  Some social isolation  Still living independent with resident shared with a renter.  Limited social support  All pertinent positives and negatives are documented in the HPI.  All  "others reviewed and are negative .    Physical Exam   BP: 137/65  Temp: 97.6  F (36.4  C)  Resp: 20  Height: 190.5 cm (6' 3\")  Weight: 78 kg (172 lb)  SpO2: 97 %      Physical Exam  Nursing notes reviewed  Vital signs reviewed.  Constitutional: Appears weak, dehydrated, quiet voice, flat affect- not diaphoretic.  HEENT: Normocephalic.  Atraumatic.  Right TM normal.  Left TM normal.  Oral pharynx normal except for dry oral mucous membranes.  No thrush per posterior pharynx normal.  Dentition normal.  Eyes: PERRLA.  Conjunctiva clear.  No icteric sclerae.  Extraocular motion normal.  No discharge  Neck: Normal range of motion and supple.  No thyromegaly.  No cervical adenopathy  Cardiovascular: Normal rate and rhythm.  Heart sounds normal.  Intact distal pulses.  No detected murmur.  No friction rub or gallop.  Respiratory: Respiratory effort normal.  Breath sounds clear throughout on auscultation.  No wheezing.  No rales.  No respiratory splinting.  A few rhonchi with forced cough noted.  Chest/Breast: No deformity.  Chest wall nontender.  Abdomen: Appearance is mildly obese.  Soft.  Bowel sounds present and normal.  No detected abdominal bruit.  No palpable mass.  No hepatomegaly.  Spleen tip not palpable.  No reproducible tenderness.  No guarding.  No rebound.  No palpable hernia.  Genitourinary: Deferred  Musculoskeletal: Normal range of motion both upper and lower extremities with no discomfort.  No edema or tenderness.  Neurologic: Alert.  Oriented ×3.  No cranial nerve deficits.  Normal tone.  No motor or sensory deficits. No pathologic reflexes.   Skin: Warm and dry.  No rash.  Hematologic/lymphatic:  Psychiatric: Flat affect with mood being dysthymic to depressed..  Behavior is normal.  Thought content normal.  Judgment normal.    ED Course     ED Course     Procedures           EKG:  Interpretation by Dm Elliott DO.   Indication: Chest pain  Normal sinus rhythm.  Rate 73 bpm  No ectopy.  Normal " repolarization and axis  Normal EKG    GATED MYOCARDIAL PERFUSION SCINTIGRAPHY WITH INTRAVENOUS PHARMACOLOGIC  VASODILATATION FLORINISCAN -ONE DAY STUDY      8/8/2016 10:36 AM  SERENE AHN  66 years  Male  1950.     Indication/Clinical History: Chest pain     Impression  1.  Myocardial perfusion imaging using single isotope technique  demonstrated a small of inferior ischemia at the base to mid ventricle  There is a second moderate area of ischemia in the anterior and  anteroseptal wall from base through mid ventricle, involving the  lateral base as well. There is a small fixed portion in the  anteroseptal base consistent with prior infarction  There is a fixed inferoseptal defect from apex to mid ventricle  consistent with either prior nontransmural infarct or attenuation  artifact.   2. Gated images demonstrated inferior, inferoseptal, anterior and  anteroseptal basal hypokinesis.  The left ventricular systolic  function is 52% at rest and 47% post stress.  3. Compared to the prior study from there is no prior study for  comparison .        Results for orders placed or performed during the hospital encounter of 03/28/18   CT Chest Pulmonary Embolism w Contrast    Narrative    CT CHEST PULMONARY EMBOLISM W CONTRAST  3/28/2018 3:14 AM     HISTORY: Dyspnea.    TECHNIQUE: Volumetric acquisition of the chest after the  administration of 76 mL Isovue-370 IV contrast. Radiation dose for  this scan was reduced using automated exposure control, adjustment of  the mA and/or kV according to patient size, or iterative  reconstruction technique.     COMPARISON: 2/9/2018.    FINDINGS: No visualized pulmonary embolism. Emphysema. Spiculated  nodule in the left upper lung laterally measuring approximately 1.8 x  2.1 x 1.6 cm has not significantly changed. On a previous PET scan  this demonstrated increased metabolism. Atheromatous changes of the  thoracic aorta. There are a few small, nonenlarged mediastinal lymph  nodes.  Coronary artery calcifications. Normal heart size. Mild  nonspecific adrenal gland thickening. Small cyst upper right kidney.      Impression    IMPRESSION:  1. No visualized pulmonary embolism or significant change.  2. Irregular, spiculated left upper lung nodule is suspicious for  malignancy, stable.  3. Emphysema.    JACKY HENRY MD   CBC with platelets differential   Result Value Ref Range    WBC 9.2 4.0 - 11.0 10e9/L    RBC Count 4.48 4.4 - 5.9 10e12/L    Hemoglobin 14.8 13.3 - 17.7 g/dL    Hematocrit 39.9 (L) 40.0 - 53.0 %    MCV 89 78 - 100 fl    MCH 33.0 26.5 - 33.0 pg    MCHC 37.1 (H) 31.5 - 36.5 g/dL    RDW 12.1 10.0 - 15.0 %    Platelet Count 422 150 - 450 10e9/L    Diff Method Automated Method     % Neutrophils 80.8 %    % Lymphocytes 8.5 %    % Monocytes 10.1 %    % Eosinophils 0.1 %    % Basophils 0.2 %    % Immature Granulocytes 0.3 %    Absolute Neutrophil 7.4 1.6 - 8.3 10e9/L    Absolute Lymphocytes 0.8 0.8 - 5.3 10e9/L    Absolute Monocytes 0.9 0.0 - 1.3 10e9/L    Absolute Eosinophils 0.0 0.0 - 0.7 10e9/L    Absolute Basophils 0.0 0.0 - 0.2 10e9/L    Abs Immature Granulocytes 0.0 0 - 0.4 10e9/L   INR   Result Value Ref Range    INR 1.04 0.86 - 1.14   Partial thromboplastin time   Result Value Ref Range    PTT 29 22 - 37 sec   Troponin I   Result Value Ref Range    Troponin I ES <0.015 0.000 - 0.045 ug/L   Blood gas venous   Result Value Ref Range    Ph Venous 7.48 (H) 7.32 - 7.43 pH    PCO2 Venous 47 40 - 50 mm Hg    PO2 Venous 18 (L) 25 - 47 mm Hg    Bicarbonate Venous 35 (H) 21 - 28 mmol/L    Base Excess Venous 9.9 mmol/L   UA reflex to Microscopic   Result Value Ref Range    Color Urine Yellow     Appearance Urine Clear     Glucose Urine Negative NEG^Negative mg/dL    Bilirubin Urine Negative NEG^Negative    Ketones Urine 5 (A) NEG^Negative mg/dL    Specific Gravity Urine 1.019 1.003 - 1.035    Blood Urine Negative NEG^Negative    pH Urine 7.0 5.0 - 7.0 pH    Protein Albumin Urine 30 (A)  NEG^Negative mg/dL    Urobilinogen mg/dL 4.0 (H) 0.0 - 2.0 mg/dL    Nitrite Urine Negative NEG^Negative    Leukocyte Esterase Urine Trace (A) NEG^Negative    Source Midstream Urine     RBC Urine 3 (H) 0 - 2 /HPF    WBC Urine 18 (H) 0 - 5 /HPF    Squamous Epithelial /HPF Urine <1 0 - 1 /HPF    Mucous Urine Present (A) NEG^Negative /LPF    Hyaline Casts 26 (H) 0 - 2 /LPF   Comprehensive metabolic panel   Result Value Ref Range    Sodium 128 (L) 133 - 144 mmol/L    Potassium 3.4 3.4 - 5.3 mmol/L    Chloride 83 (L) 94 - 109 mmol/L    Carbon Dioxide 32 20 - 32 mmol/L    Anion Gap 13 3 - 14 mmol/L    Glucose 114 (H) 70 - 99 mg/dL    Urea Nitrogen 12 7 - 30 mg/dL    Creatinine 1.04 0.66 - 1.25 mg/dL    GFR Estimate 71 >60 mL/min/1.7m2    GFR Estimate If Black 86 >60 mL/min/1.7m2    Calcium 9.5 8.5 - 10.1 mg/dL    Bilirubin Total 0.5 0.2 - 1.3 mg/dL    Albumin 3.8 3.4 - 5.0 g/dL    Protein Total 7.6 6.8 - 8.8 g/dL    Alkaline Phosphatase 95 40 - 150 U/L    ALT 29 0 - 70 U/L    AST 24 0 - 45 U/L         Assessments & Plan (with Medical Decision Making) 67-year-old male recently diagnosed with non-small cell lung cancer involving left apical.  Recurrent left-sided chest pain.  Previous physician encounters thought this might represent cancer related pain but were uncertain given his cardiac risk profile with an abnormal GXT in 2016.   Examination found patient appeared clinically dry/fluid volume down.  He looked depressed.  Initially came in with hypoxia per EMS on 10 L nasal cannula but were able to taper him off of all O2 maintaining sats at 93%.  This improvement noted with pain control.   Differential diagnosis include chest pain secondary to underlying lung cancer, pulmonary embolism, pneumonia, spontaneous pneumothorax, pleural effusion due to malignancy, coronary artery disease.  CBC and other chemistries stable except for mild hyponatremia.  Suspect due to fluid volume down not due to SIADH.  CT identified no  infectious process no PE no pneumothorax and no significant tumor expansion.   I reviewed patient's GXT from a 2016.  Concerning for reversible ischemic findings and one fixed lesion consistent with previous infarct.  Patient indicates he never had any appropriate follow-up.  Continues to smoke.  Recommending hospitalization for repeat troponin.  If troponin remains normal in the morning scheduled for Lexiscan.  This will help determine if his chest active chest discomfort is cardiac in origin and help provide evaluation for preoperative setting.  He is temporally scheduled in a few weeks to undergo tumor resection.     I have reviewed the nursing notes.    I have reviewed the findings, diagnosis, plan and need for follow up with the patient.      New Prescriptions    No medications on file       Final diagnoses:   Chest pain   Hyponatremia   Dehydration   Loss of weight   Adjustment disorder with mixed anxiety and depressed mood   Tobacco use disorder   Alcohol abuse   Non-small cell cancer of left lung (H)       3/28/2018   [facility] EMERGENCY DEPARTMENT     Dm Elliott DO  03/28/18 0513

## 2018-03-29 ENCOUNTER — TELEPHONE (OUTPATIENT)
Dept: SURGERY | Facility: CLINIC | Age: 68
End: 2018-03-29

## 2018-03-29 ENCOUNTER — APPOINTMENT (OUTPATIENT)
Dept: GENERAL RADIOLOGY | Facility: CLINIC | Age: 68
End: 2018-03-29
Attending: NURSE PRACTITIONER
Payer: MEDICARE

## 2018-03-29 VITALS
HEART RATE: 68 BPM | TEMPERATURE: 98.3 F | OXYGEN SATURATION: 93 % | HEIGHT: 75 IN | DIASTOLIC BLOOD PRESSURE: 53 MMHG | RESPIRATION RATE: 18 BRPM | SYSTOLIC BLOOD PRESSURE: 165 MMHG | BODY MASS INDEX: 21.16 KG/M2 | WEIGHT: 170.19 LBS

## 2018-03-29 LAB
ANION GAP SERPL CALCULATED.3IONS-SCNC: 7 MMOL/L (ref 3–14)
BACTERIA SPEC CULT: NO GROWTH
BASOPHILS # BLD AUTO: 0.1 10E9/L (ref 0–0.2)
BASOPHILS NFR BLD AUTO: 0.7 %
BUN SERPL-MCNC: 13 MG/DL (ref 7–30)
CALCIUM SERPL-MCNC: 8.8 MG/DL (ref 8.5–10.1)
CHLORIDE SERPL-SCNC: 89 MMOL/L (ref 94–109)
CO2 SERPL-SCNC: 34 MMOL/L (ref 20–32)
CREAT SERPL-MCNC: 1.1 MG/DL (ref 0.66–1.25)
DIFFERENTIAL METHOD BLD: ABNORMAL
EOSINOPHIL # BLD AUTO: 0.1 10E9/L (ref 0–0.7)
EOSINOPHIL NFR BLD AUTO: 1.5 %
ERYTHROCYTE [DISTWIDTH] IN BLOOD BY AUTOMATED COUNT: 12.2 % (ref 10–15)
GFR SERPL CREATININE-BSD FRML MDRD: 67 ML/MIN/1.7M2
GLUCOSE SERPL-MCNC: 91 MG/DL (ref 70–99)
HCT VFR BLD AUTO: 37.8 % (ref 40–53)
HGB BLD-MCNC: 13.6 G/DL (ref 13.3–17.7)
IMM GRANULOCYTES # BLD: 0 10E9/L (ref 0–0.4)
IMM GRANULOCYTES NFR BLD: 0.1 %
LYMPHOCYTES # BLD AUTO: 1.3 10E9/L (ref 0.8–5.3)
LYMPHOCYTES NFR BLD AUTO: 17.3 %
Lab: NORMAL
MCH RBC QN AUTO: 32.9 PG (ref 26.5–33)
MCHC RBC AUTO-ENTMCNC: 36 G/DL (ref 31.5–36.5)
MCV RBC AUTO: 91 FL (ref 78–100)
MONOCYTES # BLD AUTO: 0.9 10E9/L (ref 0–1.3)
MONOCYTES NFR BLD AUTO: 12.4 %
NEUTROPHILS # BLD AUTO: 4.9 10E9/L (ref 1.6–8.3)
NEUTROPHILS NFR BLD AUTO: 68 %
OSMOLALITY UR: 298 MMOL/KG (ref 100–1200)
PLATELET # BLD AUTO: 381 10E9/L (ref 150–450)
POTASSIUM SERPL-SCNC: 3.7 MMOL/L (ref 3.4–5.3)
RBC # BLD AUTO: 4.14 10E12/L (ref 4.4–5.9)
SODIUM SERPL-SCNC: 130 MMOL/L (ref 133–144)
SPECIMEN SOURCE: NORMAL
WBC # BLD AUTO: 7.2 10E9/L (ref 4–11)

## 2018-03-29 PROCEDURE — 80048 BASIC METABOLIC PNL TOTAL CA: CPT | Performed by: INTERNAL MEDICINE

## 2018-03-29 PROCEDURE — G0008 ADMIN INFLUENZA VIRUS VAC: HCPCS

## 2018-03-29 PROCEDURE — G0378 HOSPITAL OBSERVATION PER HR: HCPCS

## 2018-03-29 PROCEDURE — 85025 COMPLETE CBC W/AUTO DIFF WBC: CPT | Performed by: INTERNAL MEDICINE

## 2018-03-29 PROCEDURE — 25000128 H RX IP 250 OP 636: Performed by: FAMILY MEDICINE

## 2018-03-29 PROCEDURE — 25000132 ZZH RX MED GY IP 250 OP 250 PS 637: Mod: GY | Performed by: EMERGENCY MEDICINE

## 2018-03-29 PROCEDURE — 99217 ZZC OBSERVATION CARE DISCHARGE: CPT | Performed by: INTERNAL MEDICINE

## 2018-03-29 PROCEDURE — 71046 X-RAY EXAM CHEST 2 VIEWS: CPT

## 2018-03-29 PROCEDURE — 90662 IIV NO PRSV INCREASED AG IM: CPT | Performed by: FAMILY MEDICINE

## 2018-03-29 PROCEDURE — A9270 NON-COVERED ITEM OR SERVICE: HCPCS | Mod: GY | Performed by: EMERGENCY MEDICINE

## 2018-03-29 PROCEDURE — 25000132 ZZH RX MED GY IP 250 OP 250 PS 637: Mod: GY | Performed by: PHYSICIAN ASSISTANT

## 2018-03-29 PROCEDURE — 36415 COLL VENOUS BLD VENIPUNCTURE: CPT | Performed by: INTERNAL MEDICINE

## 2018-03-29 PROCEDURE — A9270 NON-COVERED ITEM OR SERVICE: HCPCS | Mod: GY | Performed by: PHYSICIAN ASSISTANT

## 2018-03-29 RX ORDER — SULFAMETHOXAZOLE/TRIMETHOPRIM 800-160 MG
1 TABLET ORAL 2 TIMES DAILY
Qty: 3 TABLET | Refills: 0 | Status: SHIPPED | OUTPATIENT
Start: 2018-03-29 | End: 2019-01-01

## 2018-03-29 RX ADMIN — FOLIC ACID 1 MG: 1 TABLET ORAL at 09:01

## 2018-03-29 RX ADMIN — OMEPRAZOLE 40 MG: 20 CAPSULE, DELAYED RELEASE ORAL at 09:01

## 2018-03-29 RX ADMIN — MULTIPLE VITAMINS W/ MINERALS TAB 1 TABLET: TAB at 09:01

## 2018-03-29 RX ADMIN — LOSARTAN POTASSIUM 50 MG: 50 TABLET ORAL at 09:01

## 2018-03-29 RX ADMIN — ASPIRIN 325 MG ORAL TABLET 325 MG: 325 PILL ORAL at 09:01

## 2018-03-29 RX ADMIN — ACETAMINOPHEN 650 MG: 325 TABLET, FILM COATED ORAL at 09:01

## 2018-03-29 RX ADMIN — Medication 100 MG: at 09:01

## 2018-03-29 RX ADMIN — INDOMETHACIN 25 MG: 25 CAPSULE ORAL at 09:04

## 2018-03-29 RX ADMIN — NICOTINE 1 PATCH: 14 PATCH, EXTENDED RELEASE TRANSDERMAL at 04:19

## 2018-03-29 RX ADMIN — HYDROCHLOROTHIAZIDE 25 MG: 25 TABLET ORAL at 09:01

## 2018-03-29 RX ADMIN — INFLUENZA A VIRUSA/MICHIGAN/45/2015 X-275 (H1N1) ANTIGEN (FORMALDEHYDE INACTIVATED), INFLUENZA A VIRUS A/HONG KONG/4801/2014 X-263B (H3N2) ANTIGEN (FORMALDEHYDE INACTIVATED), AND INFLUENZA B VIRUS B/BRISBANE/60/2008 ANTIGEN (FORMALDEHYDE INACTIVATED) 0.5 ML: 60; 60; 60 INJECTION, SUSPENSION INTRAMUSCULAR at 13:08

## 2018-03-29 RX ADMIN — SULFAMETHOXAZOLE AND TRIMETHOPRIM 1 TABLET: 800; 160 TABLET ORAL at 09:01

## 2018-03-29 NOTE — DISCHARGE INSTRUCTIONS
[facility] Discharge Instructions     Discharge disposition:  Discharged to home       Diet:  Regular       Activity Activity as tolerated       Follow-up: Follow up with primary care provider in 4-7 days  -RECHECK SODIUM BEFORE SURGERY       Additional instructions: Call Dr. eLger in specialty clinic to find out what needs to be done next regarding heart prior to surgery. # [phone]

## 2018-03-29 NOTE — PLAN OF CARE
Problem: Patient Care Overview  Goal: Plan of Care/Patient Progress Review  Outcome: Improving  Appears somehwat dypsneic after activity but denies feeling SOA. Has been on RA t/o night. Redness on coxycc  resolved. States wants to disch. As soon as able. Eddie. Patch on right deltiod.

## 2018-03-29 NOTE — TELEPHONE ENCOUNTER
"Pt was hospitalized from 03/28/18-03/29/18 for SOB. I spoke with him shortly after he got home from discharge. He states he is feeling \"much better\" and states that he believes some of this was related to anxiety.  Pt states that he had four beers on 03/27/18, but non since that time and verbalizes understanding that if he drinks, his surgery may be postponed. He states he is still smoking a few cigarettes daily, but trying to quit completely. Continued encouragement toward complete cessation provided.  Reviewed pt's upcoming appts.  He has PET, PAC and follow up on 04/04/2018 and his surgery is scheduled for 04/10/18.  Tonia Rangel, AMAURY  Thoracic Surgery      "

## 2018-03-29 NOTE — DISCHARGE SUMMARY
"Discharge Summary    Chilo Oneil MRN# [id number]   YOB: 1950 Age: 67 year old     Date of Admission:  3/28/2018  Date of Discharge:  3/29/2018  Admitting Physician:  Los Liu MD  Discharge Physician:  Los Liu MD  Discharging Service:  Hospitalist     Primary Provider: Manish Plasencia            Discharge Diagnosis:     Principal Problem:    SOB (shortness of breath)  Active Problems:    CAD (coronary artery disease)    Lung cancer (H)    Alcohol use, daily use    Benign essential hypertension    Hyperlipidemia LDL goal <100    Anxiety    Uncomplicated asthma    Hyponatremia    Nausea with vomiting    Gastroesophageal reflux disease without esophagitis               Discharge Disposition:     Discharged to home           Condition on Discharge:     Discharge condition:   Good   Discharge vitals: Blood pressure 165/53, pulse 68, temperature 98.3  F (36.8  C), temperature source Oral, resp. rate 18, height 1.905 m (6' 3\"), weight 77.2 kg (170 lb 3.1 oz), SpO2 93 %.     Code status on discharge: Full Code           Procedures / Labs / Imaging:     Results for orders placed or performed during the hospital encounter of 03/28/18   CT Chest Pulmonary Embolism w Contrast    Narrative    CT CHEST PULMONARY EMBOLISM W CONTRAST  3/28/2018 3:14 AM     HISTORY: Dyspnea.    TECHNIQUE: Volumetric acquisition of the chest after the  administration of 76 mL Isovue-370 IV contrast. Radiation dose for  this scan was reduced using automated exposure control, adjustment of  the mA and/or kV according to patient size, or iterative  reconstruction technique.     COMPARISON: 2/9/2018.    FINDINGS: No visualized pulmonary embolism. Emphysema. Spiculated  nodule in the left upper lung laterally measuring approximately 1.8 x  2.1 x 1.6 cm has not significantly changed. On a previous PET scan  this demonstrated increased metabolism. Atheromatous changes of the  thoracic aorta. There are a few small, " nonenlarged mediastinal lymph  nodes. Coronary artery calcifications. Normal heart size. Mild  nonspecific adrenal gland thickening. Small cyst upper right kidney.      Impression    IMPRESSION:  1. No visualized pulmonary embolism or significant change.  2. Irregular, spiculated left upper lung nodule is suspicious for  malignancy, stable.  3. Emphysema.    JACKY HENRY MD       NM MPI w Lexiscan    Narrative    GATED MYOCARDIAL PERFUSION SCINTIGRAPHY WITH INTRAVENOUS PHARMACOLOGIC  VASODILATATION LEXISCAN -ONE DAY STUDY     3/28/2018 3:49 PM  SERENE AHN  67 years  Male  1950.    Indication/Clinical History: Chest pain and dyspnea    Impression  1.  Myocardial perfusion imaging using single isotope technique  demonstrated probable diaphragmatic attenuation artifact. There is a  small area of reversibility at the inferior apex and a small area of  ischemia cannot be excluded.   2. Gated images demonstrated normal wall motion and thickening.  The  left ventricular systolic function is 61% at rest and 67% post stress   3. Compared to the prior study from 8/8/2016, prior study described  inferior ischemia at the base to mid ventricle and a moderate area of  ischemia at the anterior and anteroseptal wall from base to mid  ventricle. Ejection fraction was 47% post stress.   .    Procedure  Pharmacologic stress testing was performed with Lexiscan at a rate of  0.08 mg/ml rapid bolus injection, for 15 seconds, 0.4 mg/5ml  intravenously. Low-level exercise was not performed along with the  vasodilator infusion.  The heart rate was 69 at baseline and ac to  78 beats per minute during the Lexiscan infusion. The rest blood  pressure was 132/64 mmHg and was 110/50 mm Hg during Lexiscan  infusion. The patient experienced no chest pain  during the test.    Myocardial perfusion imaging was performed at rest, approximately 45  minutes after the injection intravenously of 10 mCi of Tc-99m Myoview.  At peak  pharmacologic effect, 10-20 seconds after Lexiscan,  the  patient was injected intravenously with 30.2 mCi of  Tc-99m Myoview.  The post-stress tomographic imaging was performed approximately 60  minutes after stress.    EKG Findings  The resting EKG demonstrated sinus rhythm. The stress EKG demonstrated  no ST-T abnormalities diagnostic of ischemia or injury.    Tomographic Findings  Overall, the study quality is adequate . On the stress images, there  is a decrease in uptake the length of the inferior wall and  inferoseptal base. On the rest images, there is a decrease in uptake  at the inferoseptal base and more trivially inferior wall at the base.  Gated images demonstrated normal wall motion and thickening. The left  ventricular ejection fraction was calculated to be 67% post stress.  TID was is not seen with an index of 1.2. Rotating planar images are  reviewed and appropriately motion corrected. Mild diaphragmatic  attenuation artifact is seen. There is bright adjacent gut uptake to  the inferior wall seen on both rest and stress images as well.    TOM DOOLEY MD     X-ray Chest 2 vws*    Narrative    XR CHEST 2 VW 3/29/2018 11:33 AM    HISTORY: Left-sided lung cancer. Cough.    COMPARISON: 12/18/2017.      Impression    IMPRESSION: 2 views of the chest show no acute cardiopulmonary  disease. A small spiculated mass projects over the left perihilar  region and measures smaller compared to the prior study.    VERONICA HINTON MD               Discharge Medications:     Current Discharge Medication List      START taking these medications    Details   sulfamethoxazole-trimethoprim (BACTRIM DS/SEPTRA DS) 800-160 MG per tablet Take 1 tablet by mouth 2 times daily for 3 doses  Qty: 3 tablet, Refills: 0    Associated Diagnoses: Acute cystitis without hematuria         CONTINUE these medications which have NOT CHANGED    Details   traMADol (ULTRAM) 50 MG tablet Take 1-2 tablets ( mg) by mouth 2 times  daily  Qty: 40 tablet, Refills: 1    Associated Diagnoses: Upper back pain on left side      albuterol (PROAIR HFA/PROVENTIL HFA/VENTOLIN HFA) 108 (90 BASE) MCG/ACT Inhaler Inhale 2 puffs into the lungs every 4 hours as needed for shortness of breath / dyspnea or wheezing WITH SPACER  Qty: 1 Inhaler, Refills: 0      losartan (COZAAR) 50 MG tablet Take 1 tablet (50 mg) by mouth daily  Qty: 90 tablet, Refills: 3    Associated Diagnoses: Benign essential hypertension      hydrochlorothiazide (HYDRODIURIL) 25 MG tablet Take 1 tablet (25 mg) by mouth daily  Qty: 90 tablet, Refills: 3    Associated Diagnoses: Benign essential hypertension      indomethacin (INDOCIN) 25 MG capsule Take 1 capsule (25 mg) by mouth 2 times daily (with meals)  Qty: 42 capsule, Refills: 1    Associated Diagnoses: Chest wall pain; Rib pain on left side      aspirin 325 MG tablet Take 1 tablet (325 mg) by mouth daily  Qty: 90 tablet, Refills: 3    Associated Diagnoses: Pain of left lower leg      rosuvastatin (CRESTOR) 5 MG tablet Take 1 tablet (5 mg) by mouth daily  Qty: 30 tablet, Refills: 3    Comments: New script      ALPRAZolam (XANAX) 0.5 MG tablet Take 1 tablet (0.5 mg) by mouth as needed for anxiety Take one tablet 60 min prior to procedure. May repeat x1 if little effect.  Qty: 2 tablet, Refills: 0    Associated Diagnoses: Claustrophobia      omeprazole (PRILOSEC) 40 MG capsule Take 40 mg by mouth daily     Associated Diagnoses: 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); Hyperlipidemia LDL goal <100; Benign essential hypertension; Tobacco use disorder; Gastroesophageal reflux disease without esophagitis      metoprolol (LOPRESSOR) 50 MG tablet Take 1 tablet (50 mg) by mouth 2 times daily  Qty: 60 tablet, Refills: 1    Associated Diagnoses: Benign essential hypertension                   Consultations:     No consultations were requested during this admission             Brief History of Illness:     Chilo Oneil is a 67 year  "old male with PMH significant for NSCLC diagnosed 12/2017 (no treatment, scheduled for resection 04/09/2018), hx of alcohol use disorder, ADA, HTN, probable CAD, GERD, and HLD who now presents with dyspnea, nausea and left shoulder pain.     The patient has had chronic left shoulder pain for the past 3-4 months. He reports that this is typically dull with intermittent \"shooting\" pain which lasts for a period of seconds. He states that he pain is entirely unchanged from baseline.     On Monday evening, the patient developed nausea with emesis.  He reports that he has been unable to keep \"anything\" down since that point in time.  He reports he last ate oatmeal approximately 48 hours prior to arrival and has not consumed anything else since that point in time.  In addition, he notes that on Monday evening he had severe shortness of breath at rest.  He reports that nothing brought this on.  He is unable to ascribe how long this episode lasted.  Again, Tuesday evening, he developed acute shortness of breath and called 911.  He also drank 4-5 beers yesterday evening as chronic pain of his left shoulder was \"getting to\" him. He denies associated CP, palpitation, diaphoresis, numbness/paresthesias and weakness.       ROS: Denies fever, chills, myalgias, cold-like symptoms (congestion, pharyngitis, sinus pressure, rhinorrhea), headaches, lightheadedness, dizziness, chest pain, palpitations/flutters, wheezes, abdominal pain, diarrhea/constipation, dysuria, hematuria, joint swelling, joint pain, leg swelling, and rashes. The patient has a chronic cough which is unchanged from baseline.          Hospital Course:     Chilo Oneil is a 67 year old male with PMH significant for NSCLC diagnosed 12/2017 (no treatment, scheduled for resection 04/09/2018), hx of alcohol use disorder, ADA, HTN, probable CAD, GERD, and HLD who now presents with dyspnea, nausea and left shoulder pain.     Dyspnea  Presents with dyspnea and CHRONIC " "left shoulder pain. Previously abnormal stress testing 2016; did not pursue follow up at that point in time. VSS (initially required O2 per EMS, but was weaned to RA in ED). Troponin negative, EKG is normal sinus and does not show new ST segment elevation or T-wave inversions. CT PE shows no PE, persistent nodule. Seen by Dr. Leger 03/20/2018 for preoperative clearance; note states: \"I have recommended a repeat Lexiscan-exercise stress nuclear study to be compared to his prior study. If there is evidence of new ischemia or if he develops symptoms with the stress study, I would recommend pre-operative coronary angiography.\"   DDx: cardiac origin (unstable angina) versus pulmonary origin (known malignancy, probable COPD) versus GI origin.  - cardiac testing (NM MPI Lexiscan) shows no new areas of ischemia. Paged Dr. Leger, but not available. Patient to contact clinic tomorrow after discharge for further instruction  -Continued ASA, beta-blocker      Chronic Left Shoulder Pain  Presumed secondary to malignancy  - continued PTA indomethacin  - continued PTA PRN tramadol    Pyuria  18 WBC.  26 hyaline casts.  Trace LE.  Negative nitrite. Reports increasing urinary frequency x2 weeks.  Denies dysuria, incomplete bladder emptying, hematuria.  - urine culture ordered, pending  - Rx bactrim DS BID (allergy to Cipro) x 3 days     Nausea and Vomiting  Resolved      Alcohol Dependence  Previously drank 4-6 beers daily.  Stopped drinking 03/12 in preparation for surgery.  Drank 4-5 beers 03/27 as chronic ache of left shoulder was \"getting\" to him.  No previous withdrawals.  Does not appear to be withdrawing.     Hyponatremia  Sodium 128 on arrival.  Baseline 131-140.  Repeat on morning draw 126  DDx: EtOH consumption verus volume depletion secondary to dehydration and poor PO intake versus SIADH related to malignancy versus idiopathic. Na corrected safely with minimal intervention.   -Recheck at follow-up      Left Upper Lobe " NSCLC  Recent Weight Loss  Diagnosed by biopsy 12/2017.  Thought to be resectable disease. Surgery scheduled 04/09/2018; plans to undergo Left VATS wedge resection, possible lobectomy, possible thoracotomy, but patient has not yet undergone any other type of treatment. Follows with Dr. Moreno for thoracic surgery.      Benign essential hypertension  BP reviewed, stable  - continued PTA losartan, HCTZ  - held PTA metoprolol given Lexiscan as above, restarted afterwards            Final Day of Progress before Discharge:       Assessment and Plan:  Stable, as above, discharge          Interval History:  No complaints. Shoulder same.   Denies nausea, shortness of breath  Anxious to leave          Physical Exam:  Vitals were reviewed  Constitutional:   awake, alert, cooperative, no apparent distress, and appears stated age     Lungs:   Decrease movement, no wheezes     Cardiovascular:   regular rate and rhythm          Data:  Lab Results   Component Value Date    TROPI <0.015 03/28/2018    TROPI <0.015 03/28/2018    TROPI <0.015 03/28/2018    TROPI 0.020 02/09/2018     CMP  Recent Labs  Lab 03/29/18  0550 03/28/18  1720 03/28/18  1155 03/28/18  0800 03/28/18  0214   * 127*  --  126* 128*   POTASSIUM 3.7  --  3.9 3.3* 3.4   CHLORIDE 89*  --   --  85* 83*   CO2 34*  --   --  35* 32   ANIONGAP 7  --   --  6 13   GLC 91  --   --  92 114*   BUN 13  --   --  12 12   CR 1.10  --   --  1.03 1.04   GFRESTIMATED 67  --   --  72 71   GFRESTBLACK 81  --   --  87 86   DAMARI 8.8  --   --  8.8 9.5   PROTTOTAL  --   --   --   --  7.6   ALBUMIN  --   --   --   --  3.8   BILITOTAL  --   --   --   --  0.5   ALKPHOS  --   --   --   --  95   AST  --   --   --   --  24   ALT  --   --   --   --  29     CBC  Recent Labs  Lab 03/29/18  0550 03/28/18  0150   WBC 7.2 9.2   RBC 4.14* 4.48   HGB 13.6 14.8   HCT 37.8* 39.9*   MCV 91 89   MCH 32.9 33.0   MCHC 36.0 37.1*   RDW 12.2 12.1    422     INR  Recent Labs  Lab 03/28/18  0214   INR  1.04     Arterial Blood GasNo lab results found in last 7 days.             Pending Results:       Unresulted Labs Ordered in the Past 30 Days of this Admission     No orders found for last 61 day(s).                 Discharge Instructions and Follow-Up:     Discharge disposition:  Discharged to home       Diet:  Regular       Activity Activity as tolerated       Follow-up: Follow up with primary care provider in 4-7 days       Additional instructions: Call Dr. Leger in specialty clinic to find out what needs to be done next regarding heart prior to surgery  -RECHECK SODIUM BEFORE SURGERY

## 2018-03-29 NOTE — PLAN OF CARE
Problem: Patient Care Overview  Goal: Plan of Care/Patient Progress Review  Outcome: No Change  Patient VSS, sinus rhythm on tele. Patient is very tired tonight and wants to rest, denies pain at this time. CIWA score of 2. Bed alarm active and audible.

## 2018-03-29 NOTE — PLAN OF CARE
Problem: Pain, Acute (Adult)  Goal: Identify Related Risk Factors and Signs and Symptoms  Related risk factors and signs and symptoms are identified upon initiation of Human Response Clinical Practice Guideline (CPG).   Outcome: No Change  Patient rubbing left mid chest when complaining of pain. Then would ask for something for heartburn. Maalox given x2.     Comments: Patient anxious, stated that he is claustrophobic and and cant do pictures in machine during stress test. Medicated with 1mg at first with no results then medicated with 2nd 1mg after 15 minutes. Patient able to complete stress test, but is very sleepy at this time.

## 2018-03-30 ENCOUNTER — PATIENT OUTREACH (OUTPATIENT)
Dept: CARE COORDINATION | Facility: CLINIC | Age: 68
End: 2018-03-30

## 2018-03-30 NOTE — PROGRESS NOTES
Clinic Care Coordination Contact    OUTREACH    Referral Information:  Referral Source: IP Report    Primary Diagnosis: Oncology    Chief Complaint   Patient presents with     Clinic Care Coordination - Post Hospital     Nurse: d/c from [facility] 3/29/18        Universal Utilization:   Utilization    Last refreshed: 3/30/2018 12:19 AM:  No Show Count (past year) 4       Last refreshed: 3/30/2018 12:19 AM:  ED visits 1       Last refreshed: 3/30/2018 12:19 AM:  Hospital admissions 1          Current as of: 3/30/2018 12:19 AM             Clinical Concerns:  Current Medical Concerns:  RN CC spoke with patient, he states he is doing better since his discharge from the hospital yesterday. He denies having any questions or concerns regarding his discharge instructions. He states he is waiting to hear back from his [facility] [employer] worker regarding his Medicare benefits issue. He has pre-op with Cardiology 4/4/18 and then has surgery planned 4/11/18. He states he is hoping to go to a transitional care unit after his surgery for recovery due to not really having anyone and his roommate works full time. RN CC explained to him that the inpatient team will assess him and help him figure this out, he verbalized understanding.      Current Behavioral Concerns: No concerns at this time.      Education Provided to patient: RN CC educated about Care Coordination Services, discharge instructions, medications reviewed and follow up. RN CC also explained that his primary care coordinator Kaz is out of the office until Monday, he verbalized understanding.     Clinical Pathway Name: None  Health Maintenance Reviewed: Due/Overdue   Health Maintenance Due   Topic Date Due     TETANUS IMMUNIZATION (SYSTEM ASSIGNED)  04/17/1969     HEPATITIS C SCREENING  04/17/1969     COLON CANCER SCREEN (SYSTEM ASSIGNED)  04/17/2001     ADVANCE DIRECTIVE PLANNING Q5 YRS  04/17/2006     PNEUMOCOCCAL (1 of 2 - PCV13) 04/17/2016     AORTIC ANEURYSM  SCREENING (SYSTEM ASSIGNED)  04/17/2016     FALL RISK ASSESSMENT  05/20/2017         Medication Management:  Patient is independent in medication management. Pt. verbalized adherence and understanding of medication regimen, side effects, purposes.        Functional Status:  Mobility Status: Independent  Equipment Currently Used at Home: none  Transportation:  Transportation means:: Regular car     Psychosocial:  Current living arrangement:: I live in a private home, Other (Roommate)  Type of residence:: Private home - stairs  Financial/Insurance: Patient is waiting to hear back from his [facility] regarding his concerns on his finances.        Resources and Interventions:  Current Resources:  ; County Worker  Advanced Care Plans/Directives on file:: No     RN will mail out a letter to pt's home with RN CC contact information, explanation of CC services and patient care plan.          Patient/Caregiver understanding: Patient verbalized understanding of his discharge instructions and plan of care.     Future Appointments              In 5 days [facility]UP[facility]    In 5 days 7,  [facility], [facility]    In 5 days Rn, [facility], [facility]    In 5 days Anesthesiologist, [facility], [facility]    In 5 days Nova Maguire MD [facility]    In 1 month Janny Rangel APRN CNP [facility], [facility]           Plan:   No further Care Coordination needs identified at this time. Patient may be referred to Care Coordination in the future if additional needs arise.  Pt encouraged to contact Care Coordinator through the clinic if situation changes and assistance is needed. No follow-up planned.    Alva Arrington RN, [facility]  RN Care Coordinator  [facility], [facility]  Phone # [phone]  3/30/2018  10:50 AM

## 2018-03-30 NOTE — LETTER
Health Care Home - Access Care Plan    About Me  Patient Name:  Chilo Oneil    YOB: 1950  Age:                             67 year old   Adali MRN:            [id number] Telephone Information:     Home Phone [phone]   Mobile Not on file.       Address:    [address] [facility] [place] [address] Email address:  No e-mail address on record      Emergency Contact(s)  Name Relationship Lgl Grd Work Phone Home Phone Mobile Phone   1TAHIR FORTE Relative   [phone]             Health Maintenance: Routine Health maintenance Reviewed: Due/Overdue   Health Maintenance Due   Topic Date Due     TETANUS IMMUNIZATION (SYSTEM ASSIGNED)  04/17/1969     HEPATITIS C SCREENING  04/17/1969     COLON CANCER SCREEN (SYSTEM ASSIGNED)  04/17/2001     ADVANCE DIRECTIVE PLANNING Q5 YRS  04/17/2006     PNEUMOCOCCAL (1 of 2 - PCV13) 04/17/2016     AORTIC ANEURYSM SCREENING (SYSTEM ASSIGNED)  04/17/2016     FALL RISK ASSESSMENT  05/20/2017         My Access Plan  Medical Emergency 910   Questions or concerns during clinic hours Primary Clinic Line, I will call the clinic directly: [facility] - [phone]   24 Hour Appointment Line [phone] or  [phone]) (toll free)   24 Hour Nurse Line [phone] (toll free)   Questions or concerns outside clinic hours 24 Hour Appointment Line, I will call the after-hours on-call line:   [facility] [phone] or [phone]) (toll-free)   Preferred Urgent Care [facility], [phone]   Preferred Hospital [place], [place]  [phone]   Preferred Pharmacy [facility] [address]     Behavioral Health Crisis Line The National Suicide Prevention Lifeline at 1-879.398.2031 or 911     My Care Team Members  Patient Care Team       Relationship Specialty Notifications Start End    Manish Plasencia MD PCP -  General Family Practice  2/8/18     Phone: [phone] Fax: [phone]         [address] [facility] [address]    Alva Arrington, RN Clinic Care Coordinator Primary Care - CC Admissions 3/30/18     Phone: [phone] Fax: [phone]        Kaz Pollard, RN Clinic Care Coordinator Primary Care - CC Admissions 3/30/18     Phone: [phone]         Nadia Leger MD MD Cardiology Admissions 3/30/18     Phone: [phone] Fax: [phone] [address] [facility] [address]    Janny Rangel APRN CNP Nurse Practitioner Nurse Practitioner - Family Admissions 3/30/18     Phone: [phone] Fax: [phone]         [address] [facility] [address]    Mega Moreno MD MD Thoracic Diseases Admissions 3/30/18     Phone: [phone] Fax: [phone]         [address] [facility]  [facility] [address]    Mor Mccauley MD MD Hematology & Oncology Admissions 3/30/18     Phone: [phone] Fax: [phone]         [address] [facility] [address]    Erica Elliott RN Clinic Care Coordinator Oncology Admissions 3/30/18     Comment:  [phone]        My Medical and Care Information  Problem List   Patient Active Problem List   Diagnosis     Tobacco use disorder     Alcohol use, daily use     PAD (peripheral artery disease) (H)     Benign essential hypertension     Hyperlipidemia LDL goal <100     Anxiety     Gastroesophageal reflux disease without esophagitis     CAD (coronary artery disease)     Lung cancer (H)     Uncomplicated asthma     Hyponatremia     SOB (shortness of breath)     Nausea with vomiting      Current Medications and Allergies:  See printed Medication Report

## 2018-04-03 ENCOUNTER — TELEPHONE (OUTPATIENT)
Dept: CARDIOLOGY | Facility: CLINIC | Age: 68
End: 2018-04-03

## 2018-04-03 NOTE — TELEPHONE ENCOUNTER
Pt was in to see Dr. Leger 3/20/18 and they discussed cardiac risk stratification for upcoming surgery at [facility]. Pt was to have Lexiscan Stress test with further recommendations based on result. This test result reviewed personally by Dr. Leger, (who is incidentally in house this afternoon) She reviewed the Lexiscan stress test done 3/28/2018 and states this patient is ok to proceed to surgery. Will contact ROSANGELA Randall at [facility] as well per phone. Sofya Martini RN Cardiology at [facility] April 3, 2018, 2:22 PM  ADDENDUM: Did contact patient as well with result and cardiac clearance for surgery. Sofya Martini RN Cardiology at [facility] April 3, 2018, 2:34 PM

## 2018-04-04 ENCOUNTER — OFFICE VISIT (OUTPATIENT)
Dept: SURGERY | Facility: CLINIC | Age: 68
End: 2018-04-04
Payer: MEDICARE

## 2018-04-04 ENCOUNTER — OFFICE VISIT (OUTPATIENT)
Dept: SURGERY | Facility: CLINIC | Age: 68
End: 2018-04-04
Attending: THORACIC SURGERY (CARDIOTHORACIC VASCULAR SURGERY)
Payer: MEDICARE

## 2018-04-04 ENCOUNTER — ANESTHESIA EVENT (OUTPATIENT)
Dept: SURGERY | Facility: CLINIC | Age: 68
DRG: 165 | End: 2018-04-04
Payer: MEDICARE

## 2018-04-04 ENCOUNTER — ALLIED HEALTH/NURSE VISIT (OUTPATIENT)
Dept: SURGERY | Facility: CLINIC | Age: 68
End: 2018-04-04
Payer: MEDICARE

## 2018-04-04 ENCOUNTER — HOSPITAL ENCOUNTER (OUTPATIENT)
Dept: PET IMAGING | Facility: CLINIC | Age: 68
Discharge: HOME | End: 2018-04-04
Attending: NURSE PRACTITIONER | Admitting: NURSE PRACTITIONER
Payer: MEDICARE

## 2018-04-04 ENCOUNTER — APPOINTMENT (OUTPATIENT)
Dept: SURGERY | Facility: CLINIC | Age: 68
End: 2018-04-04
Payer: MEDICARE

## 2018-04-04 VITALS
SYSTOLIC BLOOD PRESSURE: 138 MMHG | DIASTOLIC BLOOD PRESSURE: 71 MMHG | TEMPERATURE: 97.4 F | HEART RATE: 76 BPM | BODY MASS INDEX: 21.25 KG/M2 | WEIGHT: 170 LBS | OXYGEN SATURATION: 94 %

## 2018-04-04 VITALS
OXYGEN SATURATION: 94 % | TEMPERATURE: 97.4 F | BODY MASS INDEX: 21.21 KG/M2 | HEART RATE: 76 BPM | WEIGHT: 170.6 LBS | DIASTOLIC BLOOD PRESSURE: 71 MMHG | HEIGHT: 75 IN | RESPIRATION RATE: 18 BRPM | SYSTOLIC BLOOD PRESSURE: 138 MMHG

## 2018-04-04 DIAGNOSIS — C34.12 MALIGNANT NEOPLASM OF UPPER LOBE OF LEFT LUNG (H): Primary | ICD-10-CM

## 2018-04-04 DIAGNOSIS — Z01.818 PREOP GENERAL PHYSICAL EXAM: ICD-10-CM

## 2018-04-04 DIAGNOSIS — C34.12 MALIGNANT NEOPLASM OF UPPER LOBE OF LEFT LUNG (H): ICD-10-CM

## 2018-04-04 DIAGNOSIS — Z01.818 PREOP GENERAL PHYSICAL EXAM: Primary | ICD-10-CM

## 2018-04-04 LAB
ALBUMIN SERPL-MCNC: 3.7 G/DL (ref 3.4–5)
ALP SERPL-CCNC: 76 U/L (ref 40–150)
ALT SERPL W P-5'-P-CCNC: 71 U/L (ref 0–70)
ANION GAP SERPL CALCULATED.3IONS-SCNC: 5 MMOL/L (ref 3–14)
AST SERPL W P-5'-P-CCNC: 51 U/L (ref 0–45)
BILIRUB SERPL-MCNC: 0.4 MG/DL (ref 0.2–1.3)
BUN SERPL-MCNC: 10 MG/DL (ref 7–30)
CALCIUM SERPL-MCNC: 9.2 MG/DL (ref 8.5–10.1)
CHLORIDE SERPL-SCNC: 97 MMOL/L (ref 94–109)
CO2 SERPL-SCNC: 28 MMOL/L (ref 20–32)
CREAT SERPL-MCNC: 0.79 MG/DL (ref 0.66–1.25)
ERYTHROCYTE [DISTWIDTH] IN BLOOD BY AUTOMATED COUNT: 12.5 % (ref 10–15)
GFR SERPL CREATININE-BSD FRML MDRD: >90 ML/MIN/1.7M2
GLUCOSE BLDC GLUCOMTR-MCNC: 103 MG/DL (ref 70–99)
GLUCOSE SERPL-MCNC: 97 MG/DL (ref 70–99)
HBA1C MFR BLD: 4.7 % (ref 0–6.4)
HCT VFR BLD AUTO: 41 % (ref 40–53)
HGB BLD-MCNC: 14.5 G/DL (ref 13.3–17.7)
INR PPP: 0.96 (ref 0.86–1.14)
MCH RBC QN AUTO: 33 PG (ref 26.5–33)
MCHC RBC AUTO-ENTMCNC: 35.4 G/DL (ref 31.5–36.5)
MCV RBC AUTO: 93 FL (ref 78–100)
PLATELET # BLD AUTO: 359 10E9/L (ref 150–450)
POTASSIUM SERPL-SCNC: 3.8 MMOL/L (ref 3.4–5.3)
PROT SERPL-MCNC: 7.2 G/DL (ref 6.8–8.8)
RBC # BLD AUTO: 4.39 10E12/L (ref 4.4–5.9)
SODIUM SERPL-SCNC: 130 MMOL/L (ref 133–144)
WBC # BLD AUTO: 8.6 10E9/L (ref 4–11)

## 2018-04-04 PROCEDURE — 40000114 ZZH STATISTIC NO CHARGE CLINIC VISIT

## 2018-04-04 PROCEDURE — A9552 F18 FDG: HCPCS | Performed by: NURSE PRACTITIONER

## 2018-04-04 PROCEDURE — 82962 GLUCOSE BLOOD TEST: CPT

## 2018-04-04 PROCEDURE — 74177 CT ABD & PELVIS W/CONTRAST: CPT

## 2018-04-04 PROCEDURE — 34300033 ZZH RX 343: Performed by: NURSE PRACTITIONER

## 2018-04-04 PROCEDURE — 25000128 H RX IP 250 OP 636: Performed by: NURSE PRACTITIONER

## 2018-04-04 RX ORDER — CELECOXIB 100 MG/1
200 CAPSULE ORAL ONCE
Status: CANCELLED | OUTPATIENT
Start: 2018-04-04 | End: 2018-04-04

## 2018-04-04 RX ORDER — ACETAMINOPHEN 325 MG/1
975 TABLET ORAL ONCE
Status: CANCELLED | OUTPATIENT
Start: 2018-04-04 | End: 2018-04-04

## 2018-04-04 RX ORDER — IOPAMIDOL 755 MG/ML
10-140 INJECTION, SOLUTION INTRAVASCULAR ONCE
Status: COMPLETED | OUTPATIENT
Start: 2018-04-04 | End: 2018-04-04

## 2018-04-04 RX ORDER — POLYETHYLENE GLYCOL 3350 17 G
2 POWDER IN PACKET (EA) ORAL
COMMUNITY
End: 2018-01-01

## 2018-04-04 RX ORDER — CHLORHEXIDINE GLUCONATE ORAL RINSE 1.2 MG/ML
15 SOLUTION DENTAL ONCE
Status: CANCELLED | OUTPATIENT
Start: 2018-04-04 | End: 2018-04-04

## 2018-04-04 RX ORDER — GABAPENTIN 300 MG/1
300 CAPSULE ORAL ONCE
Status: CANCELLED | OUTPATIENT
Start: 2018-04-04 | End: 2018-04-04

## 2018-04-04 RX ADMIN — FLUDEOXYGLUCOSE F-18 10.8 MCI.: 500 INJECTION, SOLUTION INTRAVENOUS at 10:25

## 2018-04-04 RX ADMIN — IOPAMIDOL 104 ML: 755 INJECTION, SOLUTION INTRAVENOUS at 10:25

## 2018-04-04 ASSESSMENT — LIFESTYLE VARIABLES: TOBACCO_USE: 1

## 2018-04-04 ASSESSMENT — PAIN SCALES - GENERAL: PAINLEVEL: SEVERE PAIN (6)

## 2018-04-04 NOTE — NURSING NOTE
"Oncology Rooming Note    April 4, 2018 1:09 PM   Chilo Oneil is a 67 year old male who presents for:    Chief Complaint   Patient presents with     Oncology Clinic Visit     pre op     Initial Vitals: /71  Pulse 76  Temp 97.4  F (36.3  C) (Oral)  Wt 77.1 kg (170 lb)  SpO2 94%  BMI 21.25 kg/m2 Estimated body mass index is 21.25 kg/(m^2) as calculated from the following:    Height as of an earlier encounter on 4/4/18: 1.905 m (6' 3\").    Weight as of this encounter: 77.1 kg (170 lb). Body surface area is 2.02 meters squared.  Severe Pain (6) Comment: Data Unavailable   No LMP for male patient.  Allergies reviewed: Yes  Medications reviewed: Yes    Medications: Medication refills not needed today.  Pharmacy name entered into [employer]: [facility] [place], [place] - [address]    Clinical concerns: none     6 minutes for nursing intake (face to face time)     Ashley Butler RN            "

## 2018-04-04 NOTE — H&P
Pre-Operative H & P     CC:  Preoperative exam to assess for increased cardiopulmonary risk while undergoing surgery and anesthesia.    Date of Encounter: 4/4/2018  Primary Care Physician:  Manish Plasencia    Reason for visit:  Preop general physical exam  Lung cancer      HPI  Chilo Oneil is a 67 year old male who presents for pre-operative H & P in preparation for Transcervical Extended Mediastinal Lymphadenectomy, Flexible Bronchoscopy, Left Upper Lobe Thoracoscopic Surgery Wedge Resection Possible Lobectomy Possible Thoracotomy on 4/11/2018 by Dr. Moreno in treatment of Left upper lobe cancer  at [facility].     History is obtained from the patient.   Recent left lung cancer found on imaging during work-up for left shoulder pain and shortness of breath. He has been smoking for 47 years, but has cut down to about 2 cigarettes a day. Other history include MI in 2016 which was medically managed, he had a negative stress test recently.      Past Medical History  Past Medical History:   Diagnosis Date     GERD (gastroesophageal reflux disease)      HTN (hypertension)      Lung cancer (H)      MI (myocardial infarction)     2016, medically managed       Past Surgical History  Past Surgical History:   Procedure Laterality Date     FRACTURE TX, ANKLE RT/LT  1975    Fracture TX Ankle, LT     OPEN REDUCTION INTERNAL FIXATION HIP NAILING  9/20/2013    Procedure: OPEN REDUCTION INTERNAL FIXATION HIP NAILING;  Left Hip Open Reduction Internal Fixation ;  Surgeon: Rosas Dennis MD;  Location: [facility] OR       Hx of Blood transfusions/reactions: none    Hx of abnormal bleeding or anti-platelet use: asa 81mg, hold DOS    Menstrual history: No LMP for male patient.:     Steroid use in the last year: none    Personal or FH with difficulty with Anesthesia:  None        Prior to Admission Medications  Current Outpatient Prescriptions   Medication Sig Dispense Refill      ASPIRIN PO Take 81 mg by mouth       rosuvastatin (CRESTOR) 5 MG tablet Take 1 tablet (5 mg) by mouth daily (Patient taking differently: Take 5 mg by mouth daily PICKING THIS RX UP TODAY FOR THE FIRST TIME.) 30 tablet 3     traMADol (ULTRAM) 50 MG tablet Take 1-2 tablets ( mg) by mouth 2 times daily (Patient taking differently: Take 50 mg by mouth At Bedtime ) 40 tablet 1     losartan (COZAAR) 50 MG tablet Take 1 tablet (50 mg) by mouth daily (Patient taking differently: Take 50 mg by mouth every morning ) 90 tablet 3     metoprolol (LOPRESSOR) 50 MG tablet Take 1 tablet (50 mg) by mouth 2 times daily 60 tablet 1     hydrochlorothiazide (HYDRODIURIL) 25 MG tablet Take 1 tablet (25 mg) by mouth daily (Patient taking differently: Take 25 mg by mouth every morning ) 90 tablet 3     indomethacin (INDOCIN) 25 MG capsule Take 1 capsule (25 mg) by mouth 2 times daily (with meals) (Patient not taking: Reported on 4/4/2018) 42 capsule 1     nicotine polacrilex (EQL NICOTINE) 2 MG lozenge Place 2 mg inside cheek every hour as needed for smoking cessation       ALPRAZolam (XANAX) 0.5 MG tablet Take 1 tablet (0.5 mg) by mouth as needed for anxiety Take one tablet 60 min prior to procedure. May repeat x1 if little effect. (Patient not taking: Reported on 4/4/2018) 2 tablet 0       Allergies  Allergies   Allergen Reactions     Cipro [Ciprofloxacin] Swelling       Social History  Social History     Social History     Marital status: Single     Spouse name: N/A     Number of children: N/A     Years of education: N/A     Occupational History     Not on file.     Social History Main Topics     Smoking status: Current Every Day Smoker     Packs/day: 0.15     Years: 47.00     Types: Cigarettes     Start date: 12/26/1967     Smokeless tobacco: Never Used      Comment: 2-3 cigs daily     Alcohol use Yes      Comment: 6-8 beers per day, last 3/27 at 6pm     Drug use: Yes     Special: Marijuana      Comment: 3 times a day, 2-3  "times/week for pain     Sexual activity: Not on file     Other Topics Concern     Parent/Sibling W/ Cabg, Mi Or Angioplasty Before 65f 55m? No     Social History Narrative       Family History  Family History   Problem Relation Age of Onset     DIABETES Brother      type 2     DIABETES Father            Anesthesia Evaluation     . Pt has had prior anesthetic. Type: General and MAC           ROS/MED HX    ENT/Pulmonary:     (+)tobacco use, Current use 0.3 PPD for 47 years, cutting down to 2-3 cigs a guillaume packs/day  , . Other pulmonary disease Left lung cancer.    Neurologic:  - neg neurologic ROS     Cardiovascular:     (+) hypertension--CAD, -past MI,-. : . . . :. . Previous cardiac testing Echodate:results:Stress Testdate: results:ECG reviewed date: results: date: results:          METS/Exercise Tolerance:  3 - Able to walk 1-2 blocks without stopping   Hematologic:  - neg hematologic  ROS       Musculoskeletal:  - neg musculoskeletal ROS       GI/Hepatic:     (+) GERD Asymptomatic on medication,       Renal/Genitourinary:  - ROS Renal section negative       Endo:  - neg endo ROS       Psychiatric: Comment: ETOH, last drink 3/27/2018        Infectious Disease:  - neg infectious disease ROS       Malignancy:      - no malignancy   Other:    (+) No chance of pregnancy C-spine cleared: N/A, no H/O Chronic Pain,no other significant disability   - neg other ROS         Physical Exam  Normal systems: cardiovascular, pulmonary and dental    Airway   Mallampati: II  TM distance: >3 FB  Neck ROM: full    Dental   Comment: No teeth and currently not wearing dentures    Cardiovascular   Rhythm and rate: regular and normal      Pulmonary   Deminished      The complete review of systems is negative other than noted in the HPI or here.   Temp: 97.4  F (36.3  C) Temp src: Oral BP: 138/71 Pulse: 76   Resp: 18 SpO2: 94 %         170 lbs 9.6 oz  6' 3\"   Body mass index is 21.32 kg/(m^2).       Physical Exam  Constitutional: Awake, " alert, cooperative, no apparent distress, and appears stated age.  Eyes: Pupils equal, round and reactive to light, extra ocular muscles intact, sclera clear, conjunctiva normal.  HENT: Normocephalic, oral pharynx with moist mucus membranes, no teeth. No goiter appreciated.   Respiratory: Clear to auscultation bilaterally, diminished, no crackles or wheezing.  Cardiovascular: Regular rate and rhythm, normal S1 and S2, and no murmur noted.  Carotids +2, no bruits. No edema. Palpable pulses to radial  DP and PT arteries.   GI: Normal bowel sounds, soft, non-distended, non-tender, no masses palpated, no hepatosplenomegaly.    Lymph/Hematologic: No cervical lymphadenopathy and no supraclavicular lymphadenopathy.  Genitourinary:  deferred   Skin: Warm and dry.  No rashes at anticipated surgical site.   Musculoskeletal: Full ROM of neck. There is no redness, warmth, or swelling of the joints. Gross motor strength is normal.    Neurologic: Awake, alert, oriented to name, place and time. Cranial nerves II-XII are grossly intact. Gait is normal.   Neuropsychiatric: Calm, cooperative. Normal affect.     Labs: (personally reviewed)  Results for SERENE AHN (MRN [id number]) as of 4/4/2018 14:05   Ref. Range 4/4/2018 12:56   Sodium Latest Ref Range: 133 - 144 mmol/L 130 (L)   Potassium Latest Ref Range: 3.4 - 5.3 mmol/L 3.8   Chloride Latest Ref Range: 94 - 109 mmol/L 97   Carbon Dioxide Latest Ref Range: 20 - 32 mmol/L 28   Urea Nitrogen Latest Ref Range: 7 - 30 mg/dL 10   Creatinine Latest Ref Range: 0.66 - 1.25 mg/dL 0.79   GFR Estimate Latest Ref Range: >60 mL/min/1.7m2 >90   GFR Estimate If Black Latest Ref Range: >60 mL/min/1.7m2 >90   Calcium Latest Ref Range: 8.5 - 10.1 mg/dL 9.2   Anion Gap Latest Ref Range: 3 - 14 mmol/L 5   Albumin Latest Ref Range: 3.4 - 5.0 g/dL 3.7   Protein Total Latest Ref Range: 6.8 - 8.8 g/dL 7.2   Bilirubin Total Latest Ref Range: 0.2 - 1.3 mg/dL 0.4   Alkaline Phosphatase Latest Ref  Range: 40 - 150 U/L 76   ALT Latest Ref Range: 0 - 70 U/L 71 (H)   AST Latest Ref Range: 0 - 45 U/L 51 (H)   Hemoglobin A1C Latest Ref Range: 0 - 6.4 % 4.7   Glucose Latest Ref Range: 70 - 99 mg/dL 97   WBC Latest Ref Range: 4.0 - 11.0 10e9/L 8.6   Hemoglobin Latest Ref Range: 13.3 - 17.7 g/dL 14.5   Hematocrit Latest Ref Range: 40.0 - 53.0 % 41.0   Platelet Count Latest Ref Range: 150 - 450 10e9/L 359   RBC Count Latest Ref Range: 4.4 - 5.9 10e12/L 4.39 (L)   MCV Latest Ref Range: 78 - 100 fl 93   MCH Latest Ref Range: 26.5 - 33.0 pg 33.0   MCHC Latest Ref Range: 31.5 - 36.5 g/dL 35.4   RDW Latest Ref Range: 10.0 - 15.0 % 12.5   INR Latest Ref Range: 0.86 - 1.14  0.96       EKG: Personally reviewed but formal cardiology read pending: 3/28/2018 SR      Stress test 3/28/2018  Impression  1.  Myocardial perfusion imaging using single isotope technique  demonstrated probable diaphragmatic attenuation artifact. There is a  small area of reversibility at the inferior apex and a small area of  ischemia cannot be excluded.   2. Gated images demonstrated normal wall motion and thickening.  The  left ventricular systolic function is 61% at rest and 67% post stress   3. Compared to the prior study from 8/8/2016, prior study described  inferior ischemia at the base to mid ventricle and a moderate area of  ischemia at the anterior and anteroseptal wall from base to mid  ventricle. Ejection fraction was 47% post stress.   .     EKG Findings  The resting EKG demonstrated sinus rhythm. The stress EKG demonstrated  no ST-T abnormalities diagnostic of ischemia or injury.       Most Recent [facility] Pulmonary Function Testing    FVC-Pred   Date Value Ref Range Status   01/17/2018 4.91 L      FVC-Pre   Date Value Ref Range Status   01/17/2018 3.93 L      FVC-%Pred-Pre   Date Value Ref Range Status   01/17/2018 80 %      FEV1-Pre   Date Value Ref Range Status   01/17/2018 2.42 L      FEV1-%Pred-Pre   Date Value Ref Range Status    01/17/2018 65 %      FEV1FVC-Pred   Date Value Ref Range Status   01/17/2018 76 %      FEV1FVC-Pre   Date Value Ref Range Status   01/17/2018 62 %      No results found for: 20029  FEFMax-Pred   Date Value Ref Range Status   01/17/2018 9.46 L/sec      FEFMax-Pre   Date Value Ref Range Status   01/17/2018 5.65 L/sec      FEFMax-%Pred-Pre   Date Value Ref Range Status   01/17/2018 59 %      ExpTime-Pre   Date Value Ref Range Status   01/17/2018 7.91 sec      FIFMax-Pre   Date Value Ref Range Status   01/17/2018 4.47 L/sec      FEV1FEV6-Pred   Date Value Ref Range Status   01/17/2018 78 %      FEV1FEV6-Pre   Date Value Ref Range Status   01/17/2018 64 %          ASSESSMENT and PLAN  Chilo Oneil is a 67 year old male scheduled to undergo Transcervical Extended Mediastinal Lymphadenectomy, Flexible Bronchoscopy, Left Upper Lobe Thoracoscopic Surgery Wedge Resection Possible Lobectomy Possible Thoracotomy on 4/11/2018 by Dr. Moreno in treatment of Left upper lobe cancer. He has the following specific operative considerations:   - RCRI : High serious cardiac risks.  6.6% risk of major adverse cardiac event.   - Anesthesia considerations:  Refer to PAC assessment in anesthesia records  - VTE risk: 3%  - STANLEY # of risks 2/8 = low risk  - Post-op delirium risk: high risk due to age  - Risk of PONV score = 2.  If > 2, anti-emetic intervention recommended.     Previous anesthesia without complications.  1) Cardiac: MI in 2016 and treated by medical management. Recent cardiac work-up 3/28/2018 shows EKG of SR, negative stress test with EF of 61-67%.   2) Pulmonary: Current smoker and has smoked at least   PPD for 47 years, he has cut down to 2-3 cigarettes daily. Recent lung cancer found on imaging during work-up for left shoulder pain and shortness of breath.  3) GI: Occasional GERD, well managed with OTC meds.  4) Other: He has a history of ETOH abuse, but last drink was 3/27/2018. No withdrawals. He continues to smoke  marijuana 3 times weekly  He does not wear his dentures  Feasible airway      I spent 30 minutes with patient, greater than 50% educating on preop meds, counseling on anesthesia and coordinating care for lung cancer  Pt optimized for surgery. AVS with information on surgery time/arrival time, meds and NPO status given by nursing staff      Patient was discussed with Dr Gandhi.    NATALIE Duque CNS  Preoperative Assessment Center  [facility]  Cl[facility] and [facility] Center  Phone: [phone]  Fax: [phone]

## 2018-04-04 NOTE — PROGRESS NOTES
THORACIC SURGERY FOLLOW UP VISIT    Dear Dr. Mccauley,     I saw Mr. Oneil in follow-up today. The clinical summary follows:    HISTOPATHOLOGY   12/18/17: moderately differentiated adenocarcinoma per CT-guided biopsy    INTERVAL STUDIES  NM stress test (3/28/18)  Small area of reversibility at the inferior apex and a small area of  ischemia cannot be excluded, EF 67%. Okay to proceed with surgery per cardiology.    Brain MRI (3/20/18)  No evidence for metastatic disease.    Repeat PET CT (4/4/18): Left upper lobe lesion and L hilar node largely unchanged; increased uptake in superior mediastinal mass and posterior spinal mass. Formal read pending.            ETOH: 8-10 beers per day at baseline; quit since 3/12/18 except relapse of 4 beers on 3/27/18.   TOB: 7 pack year hx, currently 5 cigarettes per day  BMI 21    SUBJECTIVE   He was recently hospitalized from 3/28/18 to 3/29/18 for nausea, vomiting, increased shortness of breath and shoulder pain. An extensive work up was negative and the patient believes his SOB was related to anxiety.   His last drink was one week ago.    Physical exam was notable for extensive eczema, otherwise unremarkable.    From a personal perspective, he is here today by himself. He has little social support and is planning on going to a skilled nursing facility after surgery for recovery.     IMPRESSION   67 year old year-old male with a LEFT upper lobe adenocarinoma and PET avid left hilar node, [id number].       PLAN    I spent a total of 30 minutes with Mr. Chilo Oneil, more than 50% of which were spent in counseling, coordination of care, and face-to-face time. I reviewed the plan as follows:  Procedure planned: Transcervical Extended Mediastinal Lymphadenectomy, possible  LEFT VATS wedge resection, possible lobectomy and mediastinal lymph node dissection, with possible  thoracotomy.  I reviewed the indications, risks, and benefits of the procedure with Ms. Chilo Oneil.  We discussed the intraoperative risks of bleeding and injury to vital organs, potential postoperative complications including, but not limited to, major respiratory events, arrhythmia, bleeding, infection, reoperation, and death. I explained the anticipated hospital course (+/-  3-5 days) and postoperative recovery including pain control, chest drain management, and variable degrees of dyspnea (or need for supplemental oxygen) and fatigue that tend to get better with time.TEMLA, Left VATS wedge resection, possible lobectomy, with possible thoracotomy     Necessary Tests & Appointments:     PAC done - OK to proceed with surgery from cardiology perspective    Repeat PFT (to be done at a hospital near his home),     Follow up final read of PET (rule out metastatic disease and new sites of increased uptake).  Pain Control Plan: Exparel intraop, PCA postop  Anticoagulation Plan: Lovenox in preop holding and postop as well  Smoking Cessation: Patient continues to use 2-3 cigarettes/day. He was encouraged to continue in his efforts to quit smoking.     All questions were answered and the patient and present family were in agreement with the plan.  I appreciate the opportunity to participate in the care of your patient and will keep you updated.  Sincerely,

## 2018-04-04 NOTE — MR AVS SNAPSHOT
After Visit Summary   2018    Chilo Oneil    MRN: [id number]           Patient Information     Date Of Birth          1950        Visit Information        Provider Department      2018 12:00 PM Rn, [facility]        Care Instructions    Preparing for Your Surgery      Name:  Chilo Oneil   MRN:  [id number]   :  1950   Today's Date:  2018     Arriving for surgery:  Surgery date:  18  Arrival time:  5:15 am    Please come to:    [facility], 3rd floor, [address]    [address] [id number]    -   parking is available in front of the hospital from 5:15 am to 8:00 pm    -  Stop at the Information Desk in the lobby    -   Inform the information person that you are here for surgery. An escort to 3c will be provided. If you would not like an escort, please proceed to 3C on the 3rd floor. [phone]     What can I eat or drink?  -  You may have solid food or milk products until 11:45 pm.  -  You may have water, Gatorade, apple juice or 7up/Sprite until arrival time.    Which medicines can I take?        -  Starting today, switch to 81 mg Aspirin.    -  Do NOT take these medications in the morning, the day of surgery:   Aspirin   Losartan   Hydrochlorothiazide   Indomethacin    - Take these medications the day of surgery:     *Metoprolol   *Omeprazole   Rosuvastatin if taken in the morning.   Alprazolam as needed.   Tramadol as needed.   Albuterol inhaler as needed.        -  Do not bring your own medications to the hospital, except for inhalers and eye drops.    How do I prepare myself?  -  Take two showers: one the night before surgery; and one the morning of surgery.         Use Scrubcare or Hibiclens to wash from neck down.  You may use your own shampoo and conditioner. No other hair products.   -  Do NOT use lotion, powder, deodorant, or antiperspirant the day of your  surgery.  -  Do NOT wear any makeup, fingernail polish or jewelry.  -  Bring your ID and insurance card.         * Try to quit smoking before surgery.  If you cannot quit, it is critical that you do not smoke on the day of surgery.  Smoking just before surgery increases your risk of infection.    Enhanced Recovery After Surgery     This is a team effort, including you, to get you back on your feet, eating and drinking normally and out of the hospital as quickly as possible.     The goals are: 1) NO INFECTIONS and      2) RETURN TO NORMAL DIET    How can we achieve these goals?    1) STAY ACTIVE:  Walk every day before your surgery; try to increase the amount every day.  Walk after surgery as much as you can-the nurses will help you.  Walking speeds healing and gets you home quicker, you heal better at home and have less risk of infection.     2) DEEP BREATHING:  Using an Incentive Spirometer    An incentive spirometer is a device that helps you do deep breathing exercises. These exercises expand your lungs, aid in circulation, and help prevent pneumonia. Deep breathing exercises also help you breathe better and improve the function of your lungs by:    Keeping your lungs clear    Strengthening your breathing muscles    Helping prevent respiratory complications or problems  The incentive spirometer gives you a way to take an active part in your care. A nurse or therapist will teach you breathing exercises. To do these exercises, you will breathe in through your mouth and not your nose. The incentive spirometer only works correctly if you breathe in through your mouth.    Steps to clear lungs  Step 1. Exhale normally. Then, inhale normally.    Relax and breathe out.  Step 2. Place your lips tightly around the mouthpiece.    Make sure the device is upright and not tilted.  Step 3. Inhale as much air as you can through the mouthpiece (don't breath through your nose).    Inhale slowly and deeply.    Hold your breath long  enough to keep the balls or disk raised for at least 3 to 5 seconds, or as instructed by your healthcare provider.  Step 4. Repeat the exercise regularly.    Begin using the Incentive Spirometer one week prior to your surgery, 4 times per day, 5-10 breaths each time. Please bring spirometer to surgery.    3) STAY HYDRATED: Drink clear liquids up until 2 hours before your surgery. We would like you to purchase a drink such as Gatorade or Ensure Clear (not the milkshake type).  Drink this before bedtime and on the way into the hospital, drink between 8-12 ounces or until you feel hydrated.  Keeping well hydrated leads to your veins being plump, you wake up faster, and you are less likely to be nauseated. Start drinking water as soon as you can after surgery and advance to clear liquids and food as tolerated.  IV fluids contain salt, drinking fluids will minimize the amount of IV fluids you need and decrease the amount of salt you get.    The most common reason for the patient to be readmitted is dehydration. Staying hydrated after you go home from the hospital is very important.  Ensure or Ensure Clear are good options to keep you hydrated.     4) PAIN MANAGEMENT: If we minimize the amount of opioids and narcotics, and use regional blocks (which numb the area where your surgery is) along with oral pain medications; you will have less side effects of nausea and constipation. Narcotics can slow down your bowels and cause you to stay in the hospital longer.     Our goal is to keep you comfortable; eating and drinking normally and back home safely.     Questions or Concerns:    If you have questions or concerns regarding the day of surgery, please call the Preoperative Assessment Center (PAC), Monday-Friday 7AM-7PM:  [phone].      After surgery, please call your surgeon's office.       How to Quit Smoking  Smoking is one of the hardest habits to break. About half of all people who have ever smoked have been able to  quit. Most people who still smoke want to quit. Here are some of the best ways to stop smoking.    Keep trying  Most smokers make many attempts at quitting before they are successful. It s important not to give up.  Go cold turkey  Most former smokers quit cold turkey (all at once). Trying to cut back gradually doesn't seem to work as well, perhaps because it continues the smoking habit. Also, it is possible to inhale more while smoking fewer cigarettes. This results in the same amount of nicotine in your body.  Get support  Support programs can be a big help, especially for heavy smokers. These groups offer lectures, ways to change behavior, and peer support. Here are some ways to find a support program:    Free national quitline: 800-QUIT-NOW (234-705-3678).    Hospital quit-smoking programs.    American Lung Association: (551.554.8594).    American Cancer Society (247-333-6142).  Support at home is important too. Nonsmokers can offer praise and encouragement. If the smoker in your life finds it hard to quit, encourage them to keep trying.  Over-the-counter medicines  Nicotine replacement therapy may make quitting easier. Certain aids, such as the nicotine patch, gum, and lozenges, are available without a prescription. It is best to use these under a doctor s care, though. The skin patch provides a steady supply of nicotine. Nicotine gum and lozenges give temporary bursts of low levels of nicotine. Both methods reduce the craving for cigarettes. Warning: If you have nausea, vomiting, dizziness, weakness, or a fast heartbeat, stop using these products and see your doctor.  Prescription medicines  After reviewing your smoking patterns and past attempts to quit, your doctor may offer a prescription medicine such as bupropion, varenicline, a nicotine inhaler, or nasal spray. Each has advantages and side effects. Your doctor can review these with you.  Health benefits of quitting  The benefits of quitting start right  "away and keep improving the longer you go without smoking. These benefits occur at any age.  So whether you are 17 or 70, quitting is a good decision. Some of the benefits include:    20 minutes: Blood pressure and pulse return to normal.    8 hours: Oxygen levels return to normal.    2 days: Ability to smell and taste begin to improve as damaged nerves regrow.    2 to 3 weeks: Circulation and lung function improve.    1 to 9 months: Coughing, congestion, and shortness of breath decrease; tiredness decreases.    1 year: Risk of heart attack decreases by half.    5 years: Risk of lung cancer decreases by half; risk of stroke becomes the same as a nonsmoker s.  For more on how to quit smoking, try these online resources:     Smokefree.gov    \"Clearing the Air\" booklet from the National Cancer [place]: smokefree.gov/sites/default/files/pdf/clearing-the-air-accessible.pdf  Date Last Reviewed: 3/1/2017    2071-1997 [employer]. [address] [place] [address]. All rights reserved. This information is not intended as a substitute for professional medical care. Always follow your healthcare professional's instructions.          Follow-ups after your visit        Your next 10 appointments already scheduled     Apr 04, 2018 12:00 PM CDT   (Arrive by 11:45 AM)   PAC RN ASSESSMENT with  Pac Rn   [facility] ([facility])    [address] [place] [id number] [phone]            Apr 04, 2018 12:30 PM CDT   (Arrive by 12:15 PM)   PAC Anesthesia Consult with  Pac Anesthesiologist   [facility] ([facility])    [address] [place] [address]  [facility] [id number] [phone]            Apr 04, 2018 12:45 PM CDT   LAB with [facility]   [facility] ([facility])    [address] [place] [id number] [phone]    "        Please do not eat 10-12 hours before your appointment if you are coming in fasting for labs on lipids, cholesterol, or glucose (sugar). This does not apply to pregnant women. Water, hot tea and black coffee (with nothing added) are okay. Do not drink other fluids, diet soda or chew gum.            Apr 04, 2018  1:00 PM CDT   (Arrive by 12:45 PM)   Return Visit with Nova Maguire MD   [facility] ([facility])    [address]  [address]  [facility] [id number] [phone]            Apr 11, 2018   Procedure with Mega Moreno MD   [facility], [place], Same Day Surgery (--)    [address] [id number] [phone]            May 11, 2018  2:45 PM CDT   (Arrive by 2:30 PM)   Return Visit with NATALIE Berg CNP   [facility] ([facility])    [address]  [address]  [facility] [id number] [phone]              Future tests that were ordered for you today     Open Future Orders        Priority Expected Expires Ordered    ABO/Rh type and screen Routine 4/4/2018 5/4/2018 4/4/2018    Comprehensive metabolic panel Routine 4/4/2018 5/4/2018 4/4/2018    CBC with platelets Routine 4/4/2018 5/4/2018 4/4/2018    Hemoglobin A1c Routine 4/4/2018 5/4/2018 4/4/2018    INR Routine 4/4/2018 5/4/2018 4/4/2018    PET Oncology Whole Body Routine  3/7/2019 3/7/2018            Who to contact     Please call your clinic at [phone] to:    Ask questions about your health    Make or cancel appointments    Discuss your medicines    Learn about your test results    Speak to your doctor            Additional Information About Your Visit        [employer] Information     [employer] is an electronic gateway that provides easy, online access to your medical records. With [employer], you can request a clinic appointment, read your test results, renew a prescription or communicate with your care  team.     To sign up for [employer] visit the website at [email or web address]   You will be asked to enter the access code listed below, as well as some personal information. Please follow the directions to create your username and password.     Your access code is: [email or web address]  Expires: 2018 10:49 AM     Your access code will  in 90 days. If you need help or a new code, please contact your [facility] or call [phone] for assistance.        Care EveryWhere ID     This is your Care EveryWhere ID. This could be used by other organizations to access your [place] medical records  [id number]         Blood Pressure from Last 3 Encounters:   18 138/71   18 165/53   18 136/73    Weight from Last 3 Encounters:   18 77.4 kg (170 lb 9.6 oz)   18 77.2 kg (170 lb 3.1 oz)   18 79.3 kg (174 lb 12.8 oz)              Today, you had the following     No orders found for display         Today's Medication Changes          These changes are accurate as of 18 11:57 AM.  If you have any questions, ask your nurse or doctor.               These medicines have changed or have updated prescriptions.        Dose/Directions    aspirin 325 MG tablet   This may have changed:  when to take this   Used for:  Pain of left lower leg        Dose:  325 mg   Take 1 tablet (325 mg) by mouth daily   Quantity:  90 tablet   Refills:  3       hydrochlorothiazide 25 MG tablet   Commonly known as:  HYDRODIURIL   This may have changed:  when to take this   Used for:  Benign essential hypertension        Dose:  25 mg   Take 1 tablet (25 mg) by mouth daily   Quantity:  90 tablet   Refills:  3       losartan 50 MG tablet   Commonly known as:  COZAAR   This may have changed:  when to take this   Used for:  Benign essential hypertension        Dose:  50 mg   Take 1 tablet (50 mg) by mouth daily   Quantity:  90 tablet   Refills:  3       rosuvastatin 5 MG tablet   Commonly  known as:  CRESTOR   This may have changed:  additional instructions        Dose:  5 mg   Take 1 tablet (5 mg) by mouth daily   Quantity:  30 tablet   Refills:  3       traMADol 50 MG tablet   Commonly known as:  ULTRAM   This may have changed:    - how much to take  - when to take this   Used for:  Upper back pain on left side        Dose:   mg   Take 1-2 tablets ( mg) by mouth 2 times daily   Quantity:  40 tablet   Refills:  1                Primary Care Provider Office Phone # Fax #    Maximinomarina Jyoti Plasencia [phone] [address] [address]        Equal Access to Services     [facility] Center[facility]AUDE : Hadii krysta Vidales, wawyatt camara, maru bamakirit marquez, celina fisher. So [facility] [place] [phone].    ATENCIÓN: Si habla espvalencia, tiene a galaviz disposición servicios gratuitos de asistencia lingüística. [facility] [phone].    We comply with applicable federal civil rights laws and [place] laws. We do not discriminate on the basis of race, color, national origin, age, disability, sex, sexual orientation, or gender identity.            Thank you!     Thank you for choosing [facility]  for your care. Our goal is always to provide you with excellent care. Hearing back from our patients is one way we can continue to improve our services. Please take a few minutes to complete the written survey that you may receive in the mail after your visit with us. Thank you!             Your Updated Medication List - Protect others around you: Learn how to safely use, store and throw away your medicines at www.disposemymeds.org.          This list is accurate as of 4/4/18 11:57 AM.  Always use your most recent med list.                   Brand Name Dispense Instructions for use Diagnosis    ALPRAZolam 0.5 MG tablet    XANAX    2 tablet    Take 1 tablet (0.5 mg) by mouth as needed for anxiety Take one tablet 60 min prior  to procedure. May repeat x1 if little effect.    Claustrophobia       aspirin 325 MG tablet     90 tablet    Take 1 tablet (325 mg) by mouth daily    Pain of left lower leg       hydrochlorothiazide 25 MG tablet    HYDRODIURIL    90 tablet    Take 1 tablet (25 mg) by mouth daily    Benign essential hypertension       indomethacin 25 MG capsule    INDOCIN    42 capsule    Take 1 capsule (25 mg) by mouth 2 times daily (with meals)    Chest wall pain, Rib pain on left side       losartan 50 MG tablet    COZAAR    90 tablet    Take 1 tablet (50 mg) by mouth daily    Benign essential hypertension       metoprolol tartrate 50 MG tablet    LOPRESSOR    60 tablet    Take 1 tablet (50 mg) by mouth 2 times daily    Benign essential hypertension       rosuvastatin 5 MG tablet    CRESTOR    30 tablet    Take 1 tablet (5 mg) by mouth daily        traMADol 50 MG tablet    ULTRAM    40 tablet    Take 1-2 tablets ( mg) by mouth 2 times daily    Upper back pain on left side

## 2018-04-04 NOTE — TELEPHONE ENCOUNTER
Prior Authorization Approval    Authorization Effective Date: 12/30/2017  Authorization Expiration Date: 12/31/2018  Medication: rosuvastatin (CRESTOR) 5 MG tablet - approved   Insurance Company: [employer] - Phone [phone] Fax [phone]   Which Pharmacy is filling the prescription (Not needed for infusion/clinic administered): [facility] [place], [place] - [address]  Pharmacy Notified: Yes  Patient Notified: Yes

## 2018-04-04 NOTE — MR AVS SNAPSHOT
After Visit Summary   4/4/2018    Chilo Oneil    MRN: [id number]           Patient Information     Date Of Birth          1950        Visit Information        Provider Department      4/4/2018 1:00 PM Nova Maguire MD [facility]        Today's Diagnoses     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)    -  1       Follow-ups after your visit        Your next 10 appointments already scheduled     Apr 11, 2018   Procedure with Mega Moreno MD   [facility], [place], Same Day Surgery (--)    [address] [id number] [phone]            May 11, 2018  2:45 PM CDT   (Arrive by 2:30 PM)   Return Visit with NATALIE Berg CNP   [facility] ([facility])    [address]  [address]  [place] [address] [phone]              Future tests that were ordered for you today     Open Future Orders        Priority Expected Expires Ordered    General PFT Lab (Please always keep checked) Routine  4/4/2019 4/4/2018    Pulmonary Function Test Routine  4/4/2019 4/4/2018    PET Oncology Whole Body Routine  3/7/2019 3/7/2018            Who to contact     If you have questions or need follow up information about today's clinic visit or your schedule please contact [facility] directly at [phone].  Normal or non-critical lab and imaging results will be communicated to you by MyChart, letter or phone within 4 business days after the clinic has received the results. If you do not hear from us within 7 days, please contact the clinic through MyChart or phone. If you have a critical or abnormal lab result, we will notify you by phone as soon as possible.  Submit refill requests through rankdesk or call your pharmacy and they will forward the refill request to us. Please allow 3 business days for your refill to be completed.          Additional Information About Your Visit    "     MyChart Information     Mapidy lets you send messages to your doctor, view your test results, renew your prescriptions, schedule appointments and more. To sign up, go to [email or web address] . Click on \"Log in\" on the left side of the screen, which will take you to the Welcome page. Then click on \"Sign up Now\" on the right side of the page.     You will be asked to enter the access code listed below, as well as some personal information. Please follow the directions to create your username and password.     Your access code is: [email or web address]  Expires: 2018 10:49 AM     Your access code will  in 90 days. If you need help or a new code, please call your [place] clinic or [phone].        Care EveryWhere ID     This is your Care EveryWhere ID. This could be used by other organizations to access your [place] medical records  [id number]        Your Vitals Were     Pulse Temperature Pulse Oximetry BMI (Body Mass Index)          76 97.4  F (36.3  C) (Oral) 94% 21.25 kg/m2         Blood Pressure from Last 3 Encounters:   18 138/71   18 138/71   18 165/53    Weight from Last 3 Encounters:   18 77.1 kg (170 lb)   18 77.4 kg (170 lb 9.6 oz)   18 77.2 kg (170 lb 3.1 oz)                 Today's Medication Changes          These changes are accurate as of 18  3:54 PM.  If you have any questions, ask your nurse or doctor.               These medicines have changed or have updated prescriptions.        Dose/Directions    hydrochlorothiazide 25 MG tablet   Commonly known as:  HYDRODIURIL   This may have changed:  when to take this   Used for:  Benign essential hypertension        Dose:  25 mg   Take 1 tablet (25 mg) by mouth daily   Quantity:  90 tablet   Refills:  3       losartan 50 MG tablet   Commonly known as:  COZAAR   This may have changed:  when to take this   Used for:  Benign essential hypertension        Dose:  50 mg   Take 1 tablet (50 mg) by mouth daily "   Quantity:  90 tablet   Refills:  3       rosuvastatin 5 MG tablet   Commonly known as:  CRESTOR   This may have changed:  additional instructions        Dose:  5 mg   Take 1 tablet (5 mg) by mouth daily   Quantity:  30 tablet   Refills:  3       traMADol 50 MG tablet   Commonly known as:  ULTRAM   This may have changed:    - how much to take  - when to take this   Used for:  Upper back pain on left side        Dose:   mg   Take 1-2 tablets ( mg) by mouth 2 times daily   Quantity:  40 tablet   Refills:  1                Primary Care Provider Office Phone # Fax #    Omerrolly Jyoti Plasencia [phone] [address] [address]        Equal Access to Services     [place] : Kevin Vidales, pepe camara, qasergio kaalmakirit marquez, celina fisher. So [facility] [phone].    ATENCIÓN: Si leonala espvalnecia, tiene a galaviz disposición servicios gratuitos de asistencia lingüística. [facility] [phone].    We comply with applicable federal civil rights laws and [place] laws. We do not discriminate on the basis of race, color, national origin, age, disability, sex, sexual orientation, or gender identity.            Thank you!     Thank you for choosing [facility]  for your care. Our goal is always to provide you with excellent care. Hearing back from our patients is one way we can continue to improve our services. Please take a few minutes to complete the written survey that you may receive in the mail after your visit with us. Thank you!             Your Updated Medication List - Protect others around you: Learn how to safely use, store and throw away your medicines at www.disposemymeds.org.          This list is accurate as of 4/4/18  3:54 PM.  Always use your most recent med list.                   Brand Name Dispense Instructions for use Diagnosis    ALPRAZolam 0.5 MG tablet    XANAX    2 tablet    Take 1 tablet  (0.5 mg) by mouth as needed for anxiety Take one tablet 60 min prior to procedure. May repeat x1 if little effect.    Claustrophobia       ASPIRIN PO      Take 81 mg by mouth        EQL NICOTINE 2 MG lozenge   Generic drug:  nicotine polacrilex      Place 2 mg inside cheek every hour as needed for smoking cessation        hydrochlorothiazide 25 MG tablet    HYDRODIURIL    90 tablet    Take 1 tablet (25 mg) by mouth daily    Benign essential hypertension       indomethacin 25 MG capsule    INDOCIN    42 capsule    Take 1 capsule (25 mg) by mouth 2 times daily (with meals)    Chest wall pain, Rib pain on left side       losartan 50 MG tablet    COZAAR    90 tablet    Take 1 tablet (50 mg) by mouth daily    Benign essential hypertension       metoprolol tartrate 50 MG tablet    LOPRESSOR    60 tablet    Take 1 tablet (50 mg) by mouth 2 times daily    Benign essential hypertension       rosuvastatin 5 MG tablet    CRESTOR    30 tablet    Take 1 tablet (5 mg) by mouth daily        traMADol 50 MG tablet    ULTRAM    40 tablet    Take 1-2 tablets ( mg) by mouth 2 times daily    Upper back pain on left side

## 2018-04-04 NOTE — PATIENT INSTRUCTIONS
Preparing for Your Surgery      Name:  Chilo Oneil   MRN:  [id number]   :  1950   Today's Date:  2018     Arriving for surgery:  Surgery date:  18  Arrival time:  5:15 am    Please come to:    [facility], 3rd floor, [address]    [address] [place]  [address]    -   parking is available in front of the hospital from 5:15 am to 8:00 pm    -  Stop at the Information Desk in the lobby    -   Inform the information person that you are here for surgery. An escort to 3c will be provided. If you would not like an escort, please proceed to 3C on the 3rd floor. [phone]     What can I eat or drink?  -  You may have solid food or milk products until 11:45 pm.  -  You may have water, Gatorade, apple juice or 7up/Sprite until arrival time.    Which medicines can I take?        -  Starting today, switch to 81 mg Aspirin.    -  Do NOT take these medications in the morning, the day of surgery:   Aspirin   Losartan   Hydrochlorothiazide   Indomethacin    - Take these medications the day of surgery:     *Metoprolol   *Omeprazole   Rosuvastatin if taken in the morning.   Alprazolam as needed.   Tramadol as needed.   Albuterol inhaler as needed.        -  Do not bring your own medications to the hospital, except for inhalers and eye drops.    How do I prepare myself?  -  Take two showers: one the night before surgery; and one the morning of surgery.         Use Scrubcare or Hibiclens to wash from neck down.  You may use your own shampoo and conditioner. No other hair products.   -  Do NOT use lotion, powder, deodorant, or antiperspirant the day of your surgery.  -  Do NOT wear any makeup, fingernail polish or jewelry.  -  Bring your ID and insurance card.         * Try to quit smoking before surgery.  If you cannot quit, it is critical that you do not smoke on the day of surgery.  Smoking just before surgery increases your risk of infection.    Enhanced  Recovery After Surgery     This is a team effort, including you, to get you back on your feet, eating and drinking normally and out of the hospital as quickly as possible.     The goals are: 1) NO INFECTIONS and      2) RETURN TO NORMAL DIET    How can we achieve these goals?    1) STAY ACTIVE:  Walk every day before your surgery; try to increase the amount every day.  Walk after surgery as much as you can-the nurses will help you.  Walking speeds healing and gets you home quicker, you heal better at home and have less risk of infection.     2) DEEP BREATHING:  Using an Incentive Spirometer    An incentive spirometer is a device that helps you do deep breathing exercises. These exercises expand your lungs, aid in circulation, and help prevent pneumonia. Deep breathing exercises also help you breathe better and improve the function of your lungs by:    Keeping your lungs clear    Strengthening your breathing muscles    Helping prevent respiratory complications or problems  The incentive spirometer gives you a way to take an active part in your care. A nurse or therapist will teach you breathing exercises. To do these exercises, you will breathe in through your mouth and not your nose. The incentive spirometer only works correctly if you breathe in through your mouth.    Steps to clear lungs  Step 1. Exhale normally. Then, inhale normally.    Relax and breathe out.  Step 2. Place your lips tightly around the mouthpiece.    Make sure the device is upright and not tilted.  Step 3. Inhale as much air as you can through the mouthpiece (don't breath through your nose).    Inhale slowly and deeply.    Hold your breath long enough to keep the balls or disk raised for at least 3 to 5 seconds, or as instructed by your healthcare provider.  Step 4. Repeat the exercise regularly.    Begin using the Incentive Spirometer one week prior to your surgery, 4 times per day, 5-10 breaths each time. Please bring spirometer to  surgery.    3) STAY HYDRATED: Drink clear liquids up until 2 hours before your surgery. We would like you to purchase a drink such as Gatorade or Ensure Clear (not the milkshake type).  Drink this before bedtime and on the way into the hospital, drink between 8-12 ounces or until you feel hydrated.  Keeping well hydrated leads to your veins being plump, you wake up faster, and you are less likely to be nauseated. Start drinking water as soon as you can after surgery and advance to clear liquids and food as tolerated.  IV fluids contain salt, drinking fluids will minimize the amount of IV fluids you need and decrease the amount of salt you get.    The most common reason for the patient to be readmitted is dehydration. Staying hydrated after you go home from the hospital is very important.  Ensure or Ensure Clear are good options to keep you hydrated.     4) PAIN MANAGEMENT: If we minimize the amount of opioids and narcotics, and use regional blocks (which numb the area where your surgery is) along with oral pain medications; you will have less side effects of nausea and constipation. Narcotics can slow down your bowels and cause you to stay in the hospital longer.     Our goal is to keep you comfortable; eating and drinking normally and back home safely.     Questions or Concerns:    If you have questions or concerns regarding the day of surgery, please call the Preoperative Assessment Center (PAC), Monday-Friday 7AM-7PM:  [phone].      After surgery, please call your surgeon's office.       How to Quit Smoking  Smoking is one of the hardest habits to break. About half of all people who have ever smoked have been able to quit. Most people who still smoke want to quit. Here are some of the best ways to stop smoking.    Keep trying  Most smokers make many attempts at quitting before they are successful. It s important not to give up.  Go cold turkey  Most former smokers quit cold turkey (all at once). Trying to  cut back gradually doesn't seem to work as well, perhaps because it continues the smoking habit. Also, it is possible to inhale more while smoking fewer cigarettes. This results in the same amount of nicotine in your body.  Get support  Support programs can be a big help, especially for heavy smokers. These groups offer lectures, ways to change behavior, and peer support. Here are some ways to find a support program:    Free national quitline: 800-QUIT-NOW (981-561-5183).    Hospital quit-smoking programs.    American Lung Association: (246.329.2221).    American Cancer Society (855-380-2635).  Support at home is important too. Nonsmokers can offer praise and encouragement. If the smoker in your life finds it hard to quit, encourage them to keep trying.  Over-the-counter medicines  Nicotine replacement therapy may make quitting easier. Certain aids, such as the nicotine patch, gum, and lozenges, are available without a prescription. It is best to use these under a doctor s care, though. The skin patch provides a steady supply of nicotine. Nicotine gum and lozenges give temporary bursts of low levels of nicotine. Both methods reduce the craving for cigarettes. Warning: If you have nausea, vomiting, dizziness, weakness, or a fast heartbeat, stop using these products and see your doctor.  Prescription medicines  After reviewing your smoking patterns and past attempts to quit, your doctor may offer a prescription medicine such as bupropion, varenicline, a nicotine inhaler, or nasal spray. Each has advantages and side effects. Your doctor can review these with you.  Health benefits of quitting  The benefits of quitting start right away and keep improving the longer you go without smoking. These benefits occur at any age.  So whether you are 17 or 70, quitting is a good decision. Some of the benefits include:    20 minutes: Blood pressure and pulse return to normal.    8 hours: Oxygen levels return to normal.    2 days:  "Ability to smell and taste begin to improve as damaged nerves regrow.    2 to 3 weeks: Circulation and lung function improve.    1 to 9 months: Coughing, congestion, and shortness of breath decrease; tiredness decreases.    1 year: Risk of heart attack decreases by half.    5 years: Risk of lung cancer decreases by half; risk of stroke becomes the same as a nonsmoker s.  For more on how to quit smoking, try these online resources:     Smokefree.gov    \"Clearing the Air\" booklet from the National Cancer Barton: smokefree.gov/sites/default/files/pdf/clearing-the-air-accessible.pdf  Date Last Reviewed: 3/1/2017    1445-6045 [employer]. [address] [place] [address], [place] [address]. All rights reserved. This information is not intended as a substitute for professional medical care. Always follow your healthcare professional's instructions.  "

## 2018-04-04 NOTE — ANESTHESIA PREPROCEDURE EVALUATION
Anesthesia Evaluation     . Pt has had prior anesthetic. Type: General and MAC           ROS/MED HX    ENT/Pulmonary:     (+)tobacco use, Current use 0.3 PPD for 47 years, cutting down to 2-3 cigs a day packs/day  , . Other pulmonary disease Left lung cancer.    Neurologic:  - neg neurologic ROS     Cardiovascular:     (+) hypertension--CAD, -past MI,-. : . . . :. . Previous cardiac testing Echodate:2016results:Stress Testdate:3/28/2018 results:ECG reviewed date:3/28/2018 results:SR date: results:          METS/Exercise Tolerance:  3 - Able to walk 1-2 blocks without stopping   Hematologic:  - neg hematologic  ROS       Musculoskeletal:  - neg musculoskeletal ROS       GI/Hepatic:     (+) GERD Asymptomatic on medication,       Renal/Genitourinary:  - ROS Renal section negative       Endo:  - neg endo ROS       Psychiatric: Comment: ETOH, last drink 3/27/2018        Infectious Disease:  - neg infectious disease ROS       Malignancy:      - no malignancy   Other:    (+) No chance of pregnancy C-spine cleared: N/A, no H/O Chronic Pain,no other significant disability   - neg other ROS                 Physical Exam  Normal systems: cardiovascular, pulmonary and dental    Airway   Mallampati: II  TM distance: >3 FB  Neck ROM: full    Dental   Comment: No teeth and currently not wearing dentures    Cardiovascular   Rhythm and rate: regular and normal      Pulmonary (+) decreased breath sounds       Other findings:   Stress test 3/28/2018     Impression  1.  Myocardial perfusion imaging using single isotope technique  demonstrated probable diaphragmatic attenuation artifact. There is a  small area of reversibility at the inferior apex and a small area of  ischemia cannot be excluded.   2. Gated images demonstrated normal wall motion and thickening.  The  left ventricular systolic function is 61% at rest and 67% post stress   3. Compared to the prior study from 8/8/2016, prior study described  inferior ischemia at the base  to mid ventricle and a moderate area of  ischemia at the anterior and anteroseptal wall from base to mid  ventricle. Ejection fraction was 47% post stress.   .     EKG Findings  The resting EKG demonstrated sinus rhythm. The stress EKG demonstrated  no ST-T abnormalities diagnostic of ischemia or injury.         Most Recent [facility] Pulmonary Function Testing    FVC-Pred       Date                     Value               Ref Range           Status                01/17/2018               4.91                L                                    ----------  FVC-Pre       Date                     Value               Ref Range           Status                01/17/2018               3.93                L                                    ----------  FVC-%Pred-Pre       Date                     Value               Ref Range           Status                01/17/2018               80                  %                                    ----------  FEV1-Pre       Date                     Value               Ref Range           Status                01/17/2018               2.42                L                                    ----------  FEV1-%Pred-Pre       Date                     Value               Ref Range           Status                01/17/2018               65                  %                                    ----------  FEV1FVC-Pred       Date                     Value               Ref Range           Status                01/17/2018               76                  %                                    ----------  FEV1FVC-Pre       Date                     Value               Ref Range           Status                01/17/2018               62                  %                                    ----------  No results found for: 20029  FEFMax-Pred       Date                     Value               Ref Range           Status                01/17/2018               9.46                L/sec                                 ----------  FEFMax-Pre       Date                     Value               Ref Range           Status                01/17/2018               5.65                L/sec                                ----------  FEFMax-%Pred-Pre       Date                     Value               Ref Range           Status                01/17/2018               59                  %                                    ----------  ExpTime-Pre       Date                     Value               Ref Range           Status                01/17/2018               7.91                sec                                  ----------  FIFMax-Pre       Date                     Value               Ref Range           Status                01/17/2018               4.47                L/sec                                ----------  FEV1FEV6-Pred       Date                     Value               Ref Range           Status                01/17/2018               78                  %                                    ----------  FEV1FEV6-Pre       Date                     Value               Ref Range           Status                01/17/2018               64                  %                                    ----------        Results for SERENE AHN (MRN [id number]) as of 4/4/2018 14:05    4/4/2018 12:56  Sodium: 130 (L)  Potassium: 3.8  Chloride: 97  Carbon Dioxide: 28  Urea Nitrogen: 10  Creatinine: 0.79  GFR Estimate: >90  GFR Estimate If Black: >90  Calcium: 9.2  Anion Gap: 5  Albumin: 3.7  Protein Total: 7.2  Bilirubin Total: 0.4  Alkaline Phosphatase: 76  ALT: 71 (H)  AST: 51 (H)  Hemoglobin A1C: 4.7  Glucose: 97  WBC: 8.6  Hemoglobin: 14.5  Hematocrit: 41.0  Platelet Count: 359  RBC Count: 4.39 (L)  MCV: 93  MCH: 33.0  MCHC: 35.4  RDW: 12.5  INR: 0.96           PAC Discussion and Assessment    ASA Classification: 3  Case is suitable for: [employer]  Anesthetic techniques and relevant risks discussed: GA  Invasive  monitoring and risk discussed: Yes  Types:   Possibility and Risk of blood transfusion discussed: Yes  NPO instructions given:   Additional anesthetic preparation and risks discussed:   Needs early admission to pre-op area:   Other:     PAC Resident/NP Anesthesia Assessment:  Chilo Oneil is a 66 yo male scheduled for Transcervical Extended Mediastinal Lymphadenectomy, Flexible Bronchoscopy, Left Upper Lobe Thoracoscopic Surgery Wedge Resection Possible Lobectomy Possible Thoracotomy on 4/11/2018 by Dr. Moreno in treatment of Left upper lobe cancer      Previous anesthesia without complications.    1) Cardiac: MI in 2016 and treated by medical management. Recent cardiac work-up 3/28/2018 shows EKG of SR, negative stress test with EF of 61-67%.   2) Pulmonary: Current smoker and has smoked at least   PPD for 47 years, he has cut down to 2-3 cigarettes daily.     PFT 1/2018 IMPRESSION:  Mild-moderate Airflow Obstruction  with air-trapping and some reversibility  Mild Diffusion Defect    Recent lung cancer found on imaging during work-up for left shoulder pain and shortness of breath.    3) GI: Occasional GERD, well managed with OTC meds.  4) Other: He has a history of ETOH abuse, but last drink was 3/27/2018. No withdrawals. He continues to smoke marijuana 3 times weekly  He does not wear his dentures  Feasible airway      I spent 30 minutes with patient, greater than 50% educating on preop meds, counseling on anesthesia and coordinating care for lung cancer      Reviewed and Signed by PAC Mid-Level Provider/Resident  Mid-Level Provider/Resident: NATALIE Hargrove  Date: 4/4/2018  Time: 1230    Attending Anesthesiologist Anesthesia Assessment:  67 year old transcervical mediastinal lymphadenectomy, left upper lobe resection for ANDREA cancer. No cardiac disease; long term smoker without significant COPD at this point.    Patient/case discussed with MONY. No need to see patient. Patient is appropriate for the planned  procedure without further work-up or medical management.       Reviewed and Signed by PAC Anesthesiologist  Anesthesiologist: lazaro  Date: 4/4/2018  Time:   Pass/Fail: Pass  Disposition:     PAC Pharmacist Assessment:        Pharmacist:   Date:   Time:      Anesthesia Plan      History & Physical Review      ASA Status:  3 .    NPO Status:  > 8 hours    Plan for General and ETT with Intravenous induction. Maintenance will be Balanced.    PONV prophylaxis:  Ondansetron (or other 5HT-3) and Dexamethasone or Solumedrol  Additional equipment: Double Lumen ETT, 2nd IV, Arterial Line and Fiberoptic bronchoscope      Postoperative Care  Postoperative pain management:  Multi-modal analgesia, IV analgesics and Oral pain medications.      Consents  Anesthetic plan, risks, benefits and alternatives discussed with:  Patient and Sibling.  Use of blood products discussed: Yes.   Use of blood products discussed with Patient and Sibling.  Consented to blood products.  .                          .

## 2018-04-04 NOTE — LETTER
4/4/2018       RE: Chilo Oneil  [address]  [place] [address]     Dear Colleague,    Thank you for referring your patient, Chilo Oneil, to the [facility]. Please see a copy of my visit note below.    THORACIC SURGERY FOLLOW UP VISIT    Dear Dr. Mccauley,     I saw Mr. Oneil in follow-up today. The clinical summary follows:    HISTOPATHOLOGY   12/18/17: moderately differentiated adenocarcinoma per CT-guided biopsy    INTERVAL STUDIES  NM stress test (3/28/18)  Small area of reversibility at the inferior apex and a small area of  ischemia cannot be excluded, EF 67%. Okay to proceed with surgery per cardiology.    Brain MRI (3/20/18)  No evidence for metastatic disease.    Repeat PET CT (4/4/18): Left upper lobe lesion and L hilar node largely unchanged; increased uptake in superior mediastinal mass and posterior spinal mass. Formal read pending.            ETOH: 8-10 beers per day at baseline; quit since 3/12/18 except relapse of 4 beers on 3/27/18.   TOB: 7 pack year hx, currently 5 cigarettes per day  BMI 21    SUBJECTIVE   He was recently hospitalized from 3/28/18 to 3/29/18 for nausea, vomiting, increased shortness of breath and shoulder pain. An extensive work up was negative and the patient believes his SOB was related to anxiety.   His last drink was one week ago.    Physical exam was notable for extensive eczema, otherwise unremarkable.    From a personal perspective, he is here today by himself. He has little social support and is planning on going to a skilled nursing facility after surgery for recovery.     IMPRESSION   67 year old year-old male with a LEFT upper lobe adenocarinoma and PET avid left hilar node, [id number].       PLAN    I spent a total of 30 minutes with Mr. Chilo Oneil, more than 50% of which were spent in counseling, coordination of care, and face-to-face time. I reviewed the plan as follows:  Procedure planned: Transcervical Extended  Mediastinal Lymphadenectomy, possible  LEFT VATS wedge resection, possible lobectomy and mediastinal lymph node dissection, with possible  thoracotomy.  I reviewed the indications, risks, and benefits of the procedure with Ms. Chilo Oneil. We discussed the intraoperative risks of bleeding and injury to vital organs, potential postoperative complications including, but not limited to, major respiratory events, arrhythmia, bleeding, infection, reoperation, and death. I explained the anticipated hospital course (+/-  3-5 days) and postoperative recovery including pain control, chest drain management, and variable degrees of dyspnea (or need for supplemental oxygen) and fatigue that tend to get better with time.TEMLA, Left VATS wedge resection, possible lobectomy, with possible thoracotomy     Necessary Tests & Appointments:     PAC done - OK to proceed with surgery from cardiology perspective    Repeat PFT (to be done at a hospital near his home),     Follow up final read of PET (rule out metastatic disease and new sites of increased uptake).  Pain Control Plan: Exparel intraop, PCA postop  Anticoagulation Plan: Lovenox in preop holding and postop as well  Smoking Cessation: Patient continues to use 2-3 cigarettes/day. He was encouraged to continue in his efforts to quit smoking.     All questions were answered and the patient and present family were in agreement with the plan.  I appreciate the opportunity to participate in the care of your patient and will keep you updated.  Sincerely,        Nova Maguire MD

## 2018-04-05 ENCOUNTER — TEAM CONFERENCE (OUTPATIENT)
Dept: SURGERY | Facility: CLINIC | Age: 68
End: 2018-04-05

## 2018-04-05 DIAGNOSIS — C34.12 MALIGNANT NEOPLASM OF UPPER LOBE OF LEFT LUNG (H): Primary | Chronic | ICD-10-CM

## 2018-04-05 NOTE — TELEPHONE ENCOUNTER
Pulmonary Nodule Conference      Patient Name: Chilo Oneil    Reason for conference discussion (brief overview): 67 year old with biopsy proven left upper lobe adenocarcinomaa along with PET avid left hilar node (clincal stage T2N1M0).  He is currently scheduled for TEMLA along with a left upper lobe wedge/possible lobectomy on 04/11/18.    Repeat PET scan done 04/04/18 reporting:  IMPRESSION: In this patient with history of recently diagnosed left  lung adenocarcinoma;  1. No significant change in 2.4 x 1.6 cm primary mass in the left  upper lobe. The mass abuts the anterior chest wall without invasion.   1a. Second smaller suspicious semisolid nodule left upper lobe.  2. New mild FDG uptake in several mediastinal and hilar lymph nodes which are not pathologic by size criteria and not changed in size since 1/3/2018. These are indeterminant, favor inflammatory.  3. Increased size and FDG uptake of pleural nodule in the left upper lobe. This is probably metastatic.  4. New FDG uptake in the left second rib without CT abnormality, suspicious for metastasis.    Specific Question:  Could IR biopsy either the pleural based nodule or rib?    Pertinent Histology:    Lung Biopsy 12/18/2017:  Lung biopsy, left upper lobe:   Moderately differentiated adenocarcinoma consistent with pulmonary primary malignancy.     Referring Physician: Dr. Maguire     The patient's case was presented at the multidisciplinary conference for the above noted reason.  There was a consensus recommendation for the following actions:     Elisa Nolan will ask her IR providers about biopsy of either the left pleural based nodule, the left 2nd rib or the left axillary LN.    Case Lead:  Janny Rangel    Interventional Radiology Staff Present: Elisa Nolan, ELIAS

## 2018-04-05 NOTE — TELEPHONE ENCOUNTER
Thoracic Tumor Conference    Patient Name: Chilo Oneil    Reason for conference discussion (brief overview): 67 year old with biopsy proven left upper lobe adenocarcinomaa along with PET avid left hilar node (clincal stage T2N1M0).  He is currently scheduled for TEMLA along with a left upper lobe wedge/possible lobectomy on 04/11/18.    Repeat PET scan done 04/04/18 reporting:  IMPRESSION: In this patient with history of recently diagnosed left  lung adenocarcinoma;  1. No significant change in 2.4 x 1.6 cm primary mass in the left  upper lobe. The mass abuts the anterior chest wall without invasion.   1a. Second smaller suspicious semisolid nodule left upper lobe.  2. New mild FDG uptake in several mediastinal and hilar lymph nodes which are not pathologic by size criteria and not changed in size since 1/3/2018. These are indeterminant, favor inflammatory.  3. Increased size and FDG uptake of pleural nodule in the left upper lobe. This is probably metastatic.  4. New FDG uptake in the left second rib without CT abnormality, suspicious for metastasis.    Specific Question:  Could IR biopsy either the pleural based nodule or rib?    Pertinent Histology:    Lung Biopsy 12/18/2017:  Lung biopsy, left upper lobe:   Moderately differentiated adenocarcinoma consistent with pulmonary primary malignancy.     Referring Physician:   Dr. Moreno.  This patient was not presented at thoracic tumor conference as he plans to see Dr Mccauley, (medical oncology) at [facility] for consideration of systemic therapy for his metastatic lung cancer.      A message was sent to Dr. Mccauley's care coordinator (Erica Elliott RN) to help arrange appointment with Dr. Mccauley.    Tonia Rangel, CNP

## 2018-04-08 ENCOUNTER — HOSPITAL ENCOUNTER (EMERGENCY)
Facility: CLINIC | Age: 68
Discharge: HOME | End: 2018-04-08
Attending: EMERGENCY MEDICINE | Admitting: EMERGENCY MEDICINE
Payer: MEDICARE

## 2018-04-08 VITALS
OXYGEN SATURATION: 96 % | RESPIRATION RATE: 16 BRPM | WEIGHT: 170 LBS | TEMPERATURE: 97.7 F | SYSTOLIC BLOOD PRESSURE: 180 MMHG | BODY MASS INDEX: 21.25 KG/M2 | DIASTOLIC BLOOD PRESSURE: 90 MMHG | HEART RATE: 94 BPM

## 2018-04-08 DIAGNOSIS — F43.22 ADJUSTMENT DISORDER WITH ANXIOUS MOOD: ICD-10-CM

## 2018-04-08 DIAGNOSIS — F41.9 ANXIETY: Chronic | ICD-10-CM

## 2018-04-08 DIAGNOSIS — Z76.0 MEDICATION REFILL: ICD-10-CM

## 2018-04-08 DIAGNOSIS — F41.0 PANIC ATTACK: ICD-10-CM

## 2018-04-08 PROCEDURE — 99283 EMERGENCY DEPT VISIT LOW MDM: CPT

## 2018-04-08 PROCEDURE — 99283 EMERGENCY DEPT VISIT LOW MDM: CPT | Mod: Z6 | Performed by: EMERGENCY MEDICINE

## 2018-04-08 PROCEDURE — 99281 EMR DPT VST MAYX REQ PHY/QHP: CPT

## 2018-04-08 RX ORDER — LORAZEPAM 1 MG/1
1 TABLET ORAL ONCE
Status: DISCONTINUED | OUTPATIENT
Start: 2018-04-08 | End: 2018-04-08

## 2018-04-08 RX ORDER — ALBUTEROL SULFATE 90 UG/1
2 AEROSOL, METERED RESPIRATORY (INHALATION) EVERY 4 HOURS PRN
Qty: 1 INHALER | Refills: 1 | Status: SHIPPED | OUTPATIENT
Start: 2018-04-08 | End: 2018-05-09

## 2018-04-08 RX ORDER — ALPRAZOLAM 0.5 MG
0.5 TABLET ORAL 3 TIMES DAILY PRN
Qty: 20 TABLET | Refills: 0 | Status: SHIPPED | OUTPATIENT
Start: 2018-04-08 | End: 2018-04-21

## 2018-04-08 NOTE — ED NOTES
States he is having lung surgery on Wednesday and getting anxious about it. Out of his anxiety pills and albuterol inhaler

## 2018-04-08 NOTE — ED AVS SNAPSHOT
[facility] Emergency Department    [address] [facility] [address]    Phone:  [phone]    Fax:  [phone]                                       Chilo Oneil   MRN: [id number]    Department:  [facility] Emergency Department   Date of Visit:  4/8/2018           Patient Information     Date Of Birth          1950        Your diagnoses for this visit were:     Panic attack     Adjustment disorder with anxious mood     Anxiety     Medication refill Albuterol inhaler       You were seen by Nigel Romero MD.      Follow-up Information     Follow up with Manish Plasencia MD.    Specialty:  Family Practice    Contact information:    [address] [facility] [id number] [phone]          Discharge Instructions         Understanding Adjustment Disorders  Most people have stress in their lives, and sometimes you may have more than you can handle. You may find it hard to cope with a stressful event. As a result, you may become anxious and depressed. You might even get sick. These can be symptoms of an adjustment disorder. But you don t have to suffer. Ask your healthcare provider or mental health professional for help.    Common symptoms of an adjustment disorder    Hopelessness    Frequent crying    Depressed mood    Trembling or twitching    Fast, pounding, or fluttering heartbeat (palpitations)    Health problems    Withdrawal    Anxiety or tension   What is an adjustment disorder?  Adjustment disorders sometimes occur when life gets to be too much. They often appear within 3 months of a stressful time. The symptoms vary widely. You might pretend the stressful event never happened. Or you might think about it so much you can t eat or sleep. In most cases, your feelings may seem beyond your control.  What causes it?  The events that trigger an adjustment disorder vary from person to person. Adults may be troubled by work, money, or marriage problems. Teens are more likely  bothered by school or conflict with parents. They also may find it hard to cope with a divorce or sex. The death of a loved one can be especially hard to face. So can major life changes such as a move. Poverty or a lack of social skills may make matters worse.  What can be done?  Adjustment disorders can almost always be helped by therapy. You may feel relieved just to talk to someone. In some cases, only you and your therapist will meet. In others, your whole family may be involved. You might also join a group for people with this disorder. The support and concern of others can help you recover more quickly.  Date Last Reviewed: 1/1/2017 2000-2017 [employer]. [address] [place] [address], [place] [address]. All rights reserved. This information is not intended as a substitute for professional medical care. Always follow your healthcare professional's instructions.          Discharge References/Attachments     PANIC ATTACK (ENGLISH)    ANXIETY, YOUR BODY'S RESPONSE TO  (ENGLISH)    ANXIETY REACTION (ENGLISH)    ANXIETY DISORDERS, UNDERSTANDING (ENGLISH)      Your next 10 appointments already scheduled     Apr 09, 2018  3:30 PM CDT   Pulmonary Function with [place] [facility]   [facility] ([facility])    [address] [facility] [address]   [phone]           No Inhalers for 6 hours prior to test No Smoking 2 hours prior to test            Apr 11, 2018   Procedure with Mega Moreno MD   [facility], [place], Same Day Surgery (--)    [address] [address]   [phone]            May 11, 2018  2:45 PM CDT   (Arrive by 2:30 PM)   Return Visit with NATALIE Berg [facility] ([facility])    [address] [facility]  [address]  [facility] [address] [phone]              24 Hour Appointment Hotline       To make an appointment at any [facility], call  [phone] ([phone]). If you don't have a family doctor or clinic, we will help you find one. [facility] are conveniently located to serve the needs of you and your family.             Review of your medicines      START taking        Dose / Directions Last dose taken    albuterol 108 (90 BASE) MCG/ACT Inhaler   Commonly known as:  PROAIR HFA/PROVENTIL HFA/VENTOLIN HFA   Dose:  2 puff   Quantity:  1 Inhaler        Inhale 2 puffs into the lungs every 4 hours as needed for shortness of breath / dyspnea or wheezing   Refills:  1          Our records show that you are taking the medicines listed below. If these are incorrect, please call your family doctor or clinic.        Dose / Directions Last dose taken    ASPIRIN PO   Dose:  81 mg        Take 81 mg by mouth   Refills:  0        EQL NICOTINE 2 MG lozenge   Dose:  2 mg   Generic drug:  nicotine polacrilex        Place 2 mg inside cheek every hour as needed for smoking cessation   Refills:  0        hydrochlorothiazide 25 MG tablet   Commonly known as:  HYDRODIURIL   Dose:  25 mg   Quantity:  90 tablet        Take 1 tablet (25 mg) by mouth daily   Refills:  3        indomethacin 25 MG capsule   Commonly known as:  INDOCIN   Dose:  25 mg   Quantity:  42 capsule        Take 1 capsule (25 mg) by mouth 2 times daily (with meals)   Refills:  1        losartan 50 MG tablet   Commonly known as:  COZAAR   Dose:  50 mg   Quantity:  90 tablet        Take 1 tablet (50 mg) by mouth daily   Refills:  3        metoprolol tartrate 50 MG tablet   Commonly known as:  LOPRESSOR   Dose:  50 mg   Quantity:  60 tablet        Take 1 tablet (50 mg) by mouth 2 times daily   Refills:  1        rosuvastatin 5 MG tablet   Commonly known as:  CRESTOR   Dose:  5 mg   Quantity:  30 tablet        Take 1 tablet (5 mg) by mouth daily   Refills:  3        traMADol 50 MG tablet   Commonly known as:  ULTRAM   Dose:   mg   Quantity:  40 tablet        Take 1-2 tablets ( mg) by  mouth 2 times daily   Refills:  1          ASK your doctor about these medications        Dose / Directions Last dose taken    * ALPRAZolam 0.5 MG tablet   Commonly known as:  XANAX   Dose:  0.5 mg   What changed:  Another medication with the same name was added. Make sure you understand how and when to take each.   Quantity:  2 tablet   Ask about: Which instructions should I use?        Take 1 tablet (0.5 mg) by mouth as needed for anxiety Take one tablet 60 min prior to procedure. May repeat x1 if little effect.   Refills:  0        * ALPRAZolam 0.5 MG tablet   Commonly known as:  XANAX   Dose:  0.5 mg   What changed:  You were already taking a medication with the same name, and this prescription was added. Make sure you understand how and when to take each.   Quantity:  20 tablet   Ask about: Which instructions should I use?        Take 1 tablet (0.5 mg) by mouth 3 times daily as needed for anxiety   Refills:  0        * Notice:  This list has 2 medication(s) that are the same as other medications prescribed for you. Read the directions carefully, and ask your doctor or other care provider to review them with you.            Prescriptions were sent or printed at these locations (2 Prescriptions)                   Other Prescriptions                Printed at Department/Unit printer (2 of 2)         ALPRAZolam (XANAX) 0.5 MG tablet               albuterol (PROAIR HFA/PROVENTIL HFA/VENTOLIN HFA) 108 (90 BASE) MCG/ACT Inhaler                Orders Needing Specimen Collection     None      Pending Results     No orders found from 4/6/2018 to 4/9/2018.            Pending Culture Results     No orders found from 4/6/2018 to 4/9/2018.            Pending Results Instructions     If you had any lab results that were not finalized at the time of your Discharge, you can call the ED Lab Result RN at [phone]. You will be contacted by this team for any positive Lab results or changes in treatment. The nurses are available  "7 days a week from 10A to 6:30P.  You can leave a message 24 hours per day and they will return your call.        Test Results From Your Hospital Stay               Thank you for choosing [facility]       Thank you for choosing [facility] for your care. Our goal is always to provide you with excellent care. Hearing back from our patients is one way we can continue to improve our services. Please take a few minutes to complete the written survey that you may receive in the mail after you visit with us. Thank you!        [employer] Information     [employer] lets you send messages to your doctor, view your test results, renew your prescriptions, schedule appointments and more. To sign up, go to [email or web address] . Click on \"Log in\" on the left side of the screen, which will take you to the Welcome page. Then click on \"Sign up Now\" on the right side of the page.     You will be asked to enter the access code listed below, as well as some personal information. Please follow the directions to create your username and password.     Your access code is: [email or web address]  Expires: 2018 10:49 AM     Your access code will  in 90 days. If you need help or a new code, please call your [facility] clinic or [phone].        Care EveryWhere ID     This is your Care EveryWhere ID. This could be used by other organizations to access your [facility] medical records  [id number]        Equal Access to Services     [place] AH: Hadii krysta Vidales, waaxda jax, qaybta kaalmarques marquez, celina hewitt . So [facility] [phone].    ATENCIÓN: Si habla español, tiene a galaviz disposición servicios gratuitos de asistencia lingüística. Yecenia al [phone].    We comply with applicable federal civil rights laws and [place] laws. We do not discriminate on the basis of race, color, national origin, age, disability, sex, sexual orientation, or gender identity.            After Visit Summary       This " is your record. Keep this with you and show to your community pharmacist(s) and doctor(s) at your next visit.

## 2018-04-08 NOTE — ED AVS SNAPSHOT
[facility] Emergency Department    [address] [facility] [address] [address]    Phone:  [phone]    Fax:  [phone]                                       Chilo Oneil   MRN: [id number]    Department:  [facility] Emergency Department   Date of Visit:  4/8/2018           After Visit Summary Signature Page     I have received my discharge instructions, and my questions have been answered. I have discussed any challenges I see with this plan with the nurse or doctor.    ..........................................................................................................................................  Patient/Patient Representative Signature      ..........................................................................................................................................  Patient Representative Print Name and Relationship to Patient    ..................................................               ................................................  Date                                            Time    ..........................................................................................................................................  Reviewed by Signature/Title    ...................................................              ..............................................  Date                                                            Time

## 2018-04-08 NOTE — ED NOTES
Anxiety related to upcoming surgery. Discussed at length advanced healthcare directive, and paperwork given.

## 2018-04-08 NOTE — ED PROVIDER NOTES
History     Chief Complaint   Patient presents with     Anxiety     panic attacks, out of inhaler, and out of anxiety meds. Had 3 panic attacks last night. Has lung surgery this week.      HPI  Chilo Oneil is a 67 year old male who presents with history of anxiety, anxiety and panic attacks with complaint of multiple panic attacks since last evening. He has increased anxiety about recent diagnosis of lung cancer and upcoming lung surgery this coming week.  3 panic attacks last evening and 1this afternoon.  He feels depressed and worried, but is not suicidal.  Like a prescription for medication for anxiety and panic attacks. He also quests for refill of albuterol inhaler.  No other acute complaints or concerns.    Problem List:    Patient Active Problem List    Diagnosis Date Noted     CAD (coronary artery disease) 03/28/2018     Priority: Medium     No previous angiogram.  No previous stenting.    Atypical Stress Test consistent with possible CAD 8/2016: Myocardial perfusion imaging using single isotope technique demonstrated a small of inferior ischemia at the base to mid ventricle There is a second moderate area of ischemia in the anterior and anteroseptal wall from base through mid ventricle, involving the lateral base as well. There is a small fixed portion in the anteroseptal base consistent with prior infarction There is a fixed inferoseptal defect from apex to mid ventricle consistent with either prior nontransmural infarct or attenuation artifact. Gated images demonstrated inferior, inferoseptal, anterior and anteroseptal basal hypokinesis.  The left ventricular systolic function is 52% at rest and 47% post stress.       Hyponatremia 03/28/2018     Priority: Medium     SOB (shortness of breath) 03/28/2018     Priority: Medium     Nausea with vomiting 03/28/2018     Priority: Medium     Lung cancer (H)      Priority: Medium     Left shoulder pain, cough. Bx 12/2017- Non Small Cell. Resection planned  4/2018       Uncomplicated asthma      Priority: Medium     Anxiety 12/28/2017     Priority: Medium     Gastroesophageal reflux disease without esophagitis 12/28/2017     Priority: Medium     PAD (peripheral artery disease) (H) 06/13/2016     Priority: Medium     Benign essential hypertension 06/13/2016     Priority: Medium     Hyperlipidemia LDL goal <100 06/13/2016     Priority: Medium     Alcohol use, daily use 09/20/2013     Priority: Medium     Tobacco use disorder 10/16/2009     Priority: Medium        Past Medical History:    Past Medical History:   Diagnosis Date     GERD (gastroesophageal reflux disease)      HTN (hypertension)      Lung cancer (H)      MI (myocardial infarction)        Past Surgical History:    Past Surgical History:   Procedure Laterality Date     FRACTURE TX, ANKLE RT/LT  1975    Fracture TX Ankle, LT     OPEN REDUCTION INTERNAL FIXATION HIP NAILING  9/20/2013    Procedure: OPEN REDUCTION INTERNAL FIXATION HIP NAILING;  Left Hip Open Reduction Internal Fixation ;  Surgeon: Rosas Dennis MD;  Location: [facility] OR       Family History:    Family History   Problem Relation Age of Onset     DIABETES Brother      type 2     DIABETES Father        Social History:  Marital Status:  Single [1]  Social History   Substance Use Topics     Smoking status: Current Every Day Smoker     Packs/day: 0.15     Years: 47.00     Types: Cigarettes     Start date: 12/26/1967     Smokeless tobacco: Never Used      Comment: 2-3 cigs daily     Alcohol use Yes      Comment: 6-8 beers per day, last 3/27 at 6pm        Medications:      No current outpatient prescriptions on file.  Review of Systems   Cardiovascular: Negative.         No acute unusual cardiovascular abnormality.   Neurological: Negative.    Psychiatric/Behavioral: Positive for decreased concentration. The patient is nervous/anxious.        Physical Exam   BP: 191/90  Pulse: 97  Temp: 97.7  F (36.5  C)  Resp: 16  Weight: 77.1 kg (170 lb)  SpO2: 96  %      Physical Exam   Constitutional: He is oriented to person, place, and time. He appears well-developed and well-nourished. No distress.   HENT:   Head: Normocephalic and atraumatic.   Eyes: Conjunctivae and EOM are normal. No scleral icterus.   Neck: Normal range of motion. Neck supple. No JVD present. No tracheal deviation present.   Cardiovascular: Normal rate, regular rhythm, normal heart sounds and intact distal pulses.  Exam reveals no gallop and no friction rub.    No murmur heard.  Pulmonary/Chest: Effort normal. No respiratory distress.   Decreased breath sounds and air movement bilaterally.  Prolonged expiratory phase.  Speaks in normal sentences.   Musculoskeletal: Normal range of motion. He exhibits no edema.   Neurological: He is alert and oriented to person, place, and time.   Skin: Skin is warm and dry. No rash noted. He is not diaphoretic. No erythema. No pallor.   Psychiatric: His behavior is normal.   Anxious affect.   Nursing note and vitals reviewed.      ED Course     ED Course     Procedures               No results found for this or any previous visit (from the past 24 hour(s)).    Medications - No data to display    Assessments & Plan (with Medical Decision Making)   67-year-old male with anxiety and panic attacks with increased anxiety and frequent panic attacks in the past 24 hours, due to upcoming lung surgery for lung cancer.  He was prescribed 20 tablets of Xanax 0.5 mg and instructed to follow-up in primary care clinic regarding more definitive treatment of his anxiety and panic attacks.  He requests a refill of albuterol inhaler and this was provided for him. Patient was provided instructions for supportive care and will return as needed for worsened condition or worsening symptoms, or new problems or concerns.    I have reviewed the nursing notes.    I have reviewed the findings, diagnosis, plan and need for follow up with the patient.    Discharge Medication List as of 4/8/2018   6:41 PM      START taking these medications    Details   !! ALPRAZolam (XANAX) 0.5 MG tablet Take 1 tablet (0.5 mg) by mouth 3 times daily as needed for anxiety, Disp-20 tablet, R-0, Local PrintDO NOT DRIVE IF USING THIS MEDICATION!      albuterol (PROAIR HFA/PROVENTIL HFA/VENTOLIN HFA) 108 (90 BASE) MCG/ACT Inhaler Inhale 2 puffs into the lungs every 4 hours as needed for shortness of breath / dyspnea or wheezing, Disp-1 Inhaler, R-1, Local Print       !! - Potential duplicate medications found. Please discuss with provider.          Final diagnoses:   Panic attack   Adjustment disorder with anxious mood   Anxiety   Medication refill - Albuterol inhaler       4/8/2018   [facility] EMERGENCY DEPARTMENT     Nigel Romero MD  04/11/18 0957

## 2018-04-09 ENCOUNTER — PATIENT OUTREACH (OUTPATIENT)
Dept: CARE COORDINATION | Facility: CLINIC | Age: 68
End: 2018-04-09

## 2018-04-09 ENCOUNTER — HOSPITAL ENCOUNTER (OUTPATIENT)
Dept: RESPIRATORY THERAPY | Facility: CLINIC | Age: 68
Discharge: HOME | End: 2018-04-09
Attending: INTERNAL MEDICINE | Admitting: INTERNAL MEDICINE
Payer: MEDICARE

## 2018-04-09 ENCOUNTER — TELEPHONE (OUTPATIENT)
Dept: SURGERY | Facility: CLINIC | Age: 68
End: 2018-04-09

## 2018-04-09 DIAGNOSIS — C34.12 MALIGNANT NEOPLASM OF UPPER LOBE OF LEFT LUNG (H): ICD-10-CM

## 2018-04-09 PROCEDURE — 94729 DIFFUSING CAPACITY: CPT | Mod: 26 | Performed by: INTERNAL MEDICINE

## 2018-04-09 PROCEDURE — 94375 RESPIRATORY FLOW VOLUME LOOP: CPT

## 2018-04-09 PROCEDURE — 94010 BREATHING CAPACITY TEST: CPT | Mod: 26 | Performed by: INTERNAL MEDICINE

## 2018-04-09 PROCEDURE — 94726 PLETHYSMOGRAPHY LUNG VOLUMES: CPT

## 2018-04-09 PROCEDURE — 94726 PLETHYSMOGRAPHY LUNG VOLUMES: CPT | Mod: 26 | Performed by: INTERNAL MEDICINE

## 2018-04-09 PROCEDURE — 94729 DIFFUSING CAPACITY: CPT

## 2018-04-09 NOTE — TELEPHONE ENCOUNTER
Pt discussed at [facility] conference--per IR biopsy by IR would be very difficult.  Further discussion with Dr. Moreno--pt's upcoming surgery on 04/11 should be changed to L VATS pleural biopsy.  PT was called and explained the PET results along with change in surgery to a diagnostic procedure--NOT the ANDREA wedge/possible lobectomy that had previous been scheduled.  Informed him his time for surgery would also be changing to later in the morning. Salena our surgery scheduler will be calling him with the new time.  I also asked pt how he has been doing as I saw he was seen in ER again for anxiety.  Pt states he is fine, he just needed a refill of his anxiety medication.  He states he has not had any further drinks. He is still smoking 1-2 cigarettes daily.    Tonia Rangel, CNP  Thoracic Surgery.

## 2018-04-09 NOTE — DISCHARGE INSTRUCTIONS
Understanding Adjustment Disorders  Most people have stress in their lives, and sometimes you may have more than you can handle. You may find it hard to cope with a stressful event. As a result, you may become anxious and depressed. You might even get sick. These can be symptoms of an adjustment disorder. But you don t have to suffer. Ask your healthcare provider or mental health professional for help.    Common symptoms of an adjustment disorder    Hopelessness    Frequent crying    Depressed mood    Trembling or twitching    Fast, pounding, or fluttering heartbeat (palpitations)    Health problems    Withdrawal    Anxiety or tension   What is an adjustment disorder?  Adjustment disorders sometimes occur when life gets to be too much. They often appear within 3 months of a stressful time. The symptoms vary widely. You might pretend the stressful event never happened. Or you might think about it so much you can t eat or sleep. In most cases, your feelings may seem beyond your control.  What causes it?  The events that trigger an adjustment disorder vary from person to person. Adults may be troubled by work, money, or marriage problems. Teens are more likely bothered by school or conflict with parents. They also may find it hard to cope with a divorce or sex. The death of a loved one can be especially hard to face. So can major life changes such as a move. Poverty or a lack of social skills may make matters worse.  What can be done?  Adjustment disorders can almost always be helped by therapy. You may feel relieved just to talk to someone. In some cases, only you and your therapist will meet. In others, your whole family may be involved. You might also join a group for people with this disorder. The support and concern of others can help you recover more quickly.  Date Last Reviewed: 1/1/2017 2000-2017 [employer]. [address], [place] [address]. All rights reserved. This information is  not intended as a substitute for professional medical care. Always follow your healthcare professional's instructions.

## 2018-04-09 NOTE — PROGRESS NOTES
Clinic Care Coordination Contact  [facility]/Voicemail    Referral Source: ED Follow-Up (S DC List)  Clinical Data: Care Coordinator Outreach  Outreach attempted x 1. Left message on voicemail with call back information and requested return call.  Plan: Care Coordinator mailed out care coordination introduction letter on 3/30/18. Care Coordinator will try to reach patient again in 1-2 business days.  Kaz MOREL,RN- BC  Clinic Care Coordinator  [facility]  Phone: [phone]

## 2018-04-10 ENCOUNTER — TELEPHONE (OUTPATIENT)
Dept: SURGERY | Facility: CLINIC | Age: 68
End: 2018-04-10

## 2018-04-10 NOTE — PROGRESS NOTES
"Clinic Care Coordination Contact  OUTREACH  Referral Information:  Referral Source: ED Follow-Up ([facility] DC List)  Primary Diagnosis: Other (include Comment box) (panic attack)  Chief Complaint   Patient presents with     Clinic Care Coordination - Follow-up     ED RN CC   Universal Utilization:   Utilization    Last refreshed: 4/10/2018  9:37 AM:  No Show Count (past year) 4       Last refreshed: 4/10/2018  9:37 AM:  ED visits 2       Last refreshed: 4/10/2018  9:37 AM:  Hospital admissions 1          Current as of: 4/10/2018  9:37 AM         Clinical Concerns:  Current Medical Concerns: Patient with ED visit for panic attack. He was out of meds and feeling anxious about upcoming surgery for lung cancer. ED provider note is incomplete but per portion of completed note:     Assessments & Plan (with Medical Decision Making)   I have reviewed the nursing notes.  I have reviewed the findings, diagnosis, plan and need for follow up with the patient.      Discharge Medication List as of 4/8/2018  6:41 PM           START taking these medications     Details   !! ALPRAZolam (XANAX) 0.5 MG tablet Take 1 tablet (0.5 mg) by mouth 3 times daily as needed for anxiety, Disp-20 tablet, R-0, Local PrintDO NOT DRIVE IF USING THIS MEDICATION!       albuterol (PROAIR HFA/PROVENTIL HFA/VENTOLIN HFA) 108 (90 BASE) MCG/ACT Inhaler Inhale 2 puffs into the lungs every 4 hours as needed for shortness of breath / dyspnea or wheezing, Disp-1 Inhaler, R-1, Local Print        !! - Potential duplicate medications found. Please discuss with provider.         Final diagnoses:   Panic attack   Adjustment disorder with anxious mood   Anxiety   Medication refill - Albuterol inhaler     Patient reports 3 panic attacks and that he was out of Xanax and albuterol inhaler. These were refilled and he was discharged.    Current Behavioral Concerns: None. Reports he is \"fine\" now that he got needed medications    Education Provided to patient: Role of " CC  Health Maintenance Reviewed: Due/Overdue: (Tetanus, Hep C, Colon Ca, AD Planning, PNVX. AAA screen)    Medication Management:  Reports he is doing well. Using Xanax and albuterol inhaler as needed.    Functional Status:  Mobility Status: Independent  Equipment Currently Used at Home: none  Transportation means: Regular car, Friend, Accessible car     Psychosocial:  Current living arrangement: I live in a private home, Other (Roommate)  Type of residence: Private home - stairs  Financial/Insurance: No concerns     Resources and Interventions:  Current Resources: County Worker  Advanced Care Plans/Directives on file: No (given paperwork in ED)     Patient/Caregiver understanding: Patient reports he is doing well after visit to Ed and getting needed medications. He still has anxiety as he is scheduled for surgery for lung cancer tomorrow. Procedure has been changed, per patient. He states they found more extensive ca than they thought so a different procedure will be done (notes reflect this in chart as well). He has been contacted by pulmonary clinic re plan for surgery and afterward. He reports he will be going to a NH as he has nobody to help him after discharge (has a roommate but no other family/friends). Has a county worker who is involved as well. Reports he is down to 2-3 cigarettes/day and has not had any ETOH. Denied need for CC follow up at this time. Discussed that I may be contacting him after hospital/TCU if needs arise. He is aware and agreeable to this but denied immediate needs.   Future Appointments              In 2 weeks Mor Mccauley MD [facility]    In 1 month Janny Rangel APRN CNP [facility]         Plan: Will not enroll in CC at this time as patient declines and needs currently being met by pulmonary team. He has CC contact info from previous encounter and is aware he may receive calls in the future if needs arise  postoperatively.

## 2018-04-10 NOTE — TELEPHONE ENCOUNTER
Let Freddy know the time of his surgery tomorrow, 4/11/18, is not at 12:25pm, arrive at 10:25am, procedure has been shorten/changed per Dr Moreno. Still listed as an admit.

## 2018-04-11 ENCOUNTER — HOSPITAL ENCOUNTER (INPATIENT)
Facility: CLINIC | Age: 68
LOS: 1 days | Discharge: HOME | DRG: 165 | End: 2018-04-12
Attending: THORACIC SURGERY (CARDIOTHORACIC VASCULAR SURGERY) | Admitting: THORACIC SURGERY (CARDIOTHORACIC VASCULAR SURGERY)
Payer: MEDICARE

## 2018-04-11 ENCOUNTER — APPOINTMENT (OUTPATIENT)
Dept: GENERAL RADIOLOGY | Facility: CLINIC | Age: 68
DRG: 165 | End: 2018-04-11
Attending: THORACIC SURGERY (CARDIOTHORACIC VASCULAR SURGERY)
Payer: MEDICARE

## 2018-04-11 ENCOUNTER — TELEPHONE (OUTPATIENT)
Dept: NUTRITION | Facility: CLINIC | Age: 68
End: 2018-04-11

## 2018-04-11 ENCOUNTER — ANESTHESIA (OUTPATIENT)
Dept: SURGERY | Facility: CLINIC | Age: 68
DRG: 165 | End: 2018-04-11
Payer: MEDICARE

## 2018-04-11 DIAGNOSIS — C34.92 CARCINOMA, LUNG, LEFT (H): Primary | ICD-10-CM

## 2018-04-11 LAB
ABO + RH BLD: NORMAL
ABO + RH BLD: NORMAL
BLD GP AB SCN SERPL QL: NORMAL
BLOOD BANK CMNT PATIENT-IMP: NORMAL
BLOOD BANK CMNT PATIENT-IMP: NORMAL
CREAT SERPL-MCNC: 1.8 MG/DL (ref 0.66–1.25)
DLCOCOR-%PRED-PRE: 78 %
DLCOCOR-PRE: 22.11 ML/MIN/MMHG
DLCOUNC-%PRED-PRE: 77 %
DLCOUNC-PRE: 22.05 ML/MIN/MMHG
DLCOUNC-PRED: 28.27 ML/MIN/MMHG
ERV-%PRED-PRE: 79 %
ERV-PRE: 1.34 L
ERV-PRED: 1.69 L
EXPTIME-PRE: 9.37 SEC
FEF2575-%PRED-PRE: 40 %
FEF2575-PRE: 1.15 L/SEC
FEF2575-PRED: 2.82 L/SEC
FEFMAX-%PRED-PRE: 52 %
FEFMAX-PRE: 4.97 L/SEC
FEFMAX-PRED: 9.44 L/SEC
FEV1-%PRED-PRE: 61 %
FEV1-PRE: 2.26 L
FEV1FEV6-PRE: 64 %
FEV1FEV6-PRED: 78 %
FEV1FVC-PRE: 62 %
FEV1FVC-PRED: 74 %
FEV1SVC-PRE: 62 %
FEV1SVC-PRED: 68 %
FIFMAX-PRE: 4 L/SEC
FRCPLETH-%PRED-PRE: 176 %
FRCPLETH-PRE: 6.83 L
FRCPLETH-PRED: 3.88 L
FVC-%PRED-PRE: 74 %
FVC-PRE: 3.63 L
FVC-PRED: 4.9 L
GFR SERPL CREATININE-BSD FRML MDRD: 38 ML/MIN/1.7M2
GLUCOSE BLDC GLUCOMTR-MCNC: 91 MG/DL (ref 70–99)
IC-%PRED-PRE: 61 %
IC-PRE: 2.31 L
IC-PRED: 3.75 L
PLATELET # BLD AUTO: 342 10E9/L (ref 150–450)
RVPLETH-%PRED-PRE: 204 %
RVPLETH-PRE: 5.49 L
RVPLETH-PRED: 2.69 L
SPECIMEN EXP DATE BLD: NORMAL
TLCPLETH-%PRED-PRE: 116 %
TLCPLETH-PRE: 9.14 L
TLCPLETH-PRED: 7.83 L
VA-%PRED-PRE: 90 %
VA-PRE: 6.8 L
VC-%PRED-PRE: 67 %
VC-PRE: 3.65 L
VC-PRED: 5.44 L

## 2018-04-11 PROCEDURE — C9399 UNCLASSIFIED DRUGS OR BIOLOG: HCPCS

## 2018-04-11 PROCEDURE — A9270 NON-COVERED ITEM OR SERVICE: HCPCS | Mod: GY | Performed by: NURSE PRACTITIONER

## 2018-04-11 PROCEDURE — 37000008 ZZH ANESTHESIA TECHNICAL FEE, 1ST 30 MIN: Performed by: THORACIC SURGERY (CARDIOTHORACIC VASCULAR SURGERY)

## 2018-04-11 PROCEDURE — A9270 NON-COVERED ITEM OR SERVICE: HCPCS | Mod: GY | Performed by: ANESTHESIOLOGY

## 2018-04-11 PROCEDURE — 37000009 ZZH ANESTHESIA TECHNICAL FEE, EACH ADDTL 15 MIN: Performed by: THORACIC SURGERY (CARDIOTHORACIC VASCULAR SURGERY)

## 2018-04-11 PROCEDURE — G0378 HOSPITAL OBSERVATION PER HR: HCPCS

## 2018-04-11 PROCEDURE — 36000064 ZZH SURGERY LEVEL 4 EA 15 ADDTL MIN - UMMC: Performed by: THORACIC SURGERY (CARDIOTHORACIC VASCULAR SURGERY)

## 2018-04-11 PROCEDURE — 36415 COLL VENOUS BLD VENIPUNCTURE: CPT | Performed by: STUDENT IN AN ORGANIZED HEALTH CARE EDUCATION/TRAINING PROGRAM

## 2018-04-11 PROCEDURE — 25000128 H RX IP 250 OP 636

## 2018-04-11 PROCEDURE — 88307 TISSUE EXAM BY PATHOLOGIST: CPT | Performed by: THORACIC SURGERY (CARDIOTHORACIC VASCULAR SURGERY)

## 2018-04-11 PROCEDURE — 25000132 ZZH RX MED GY IP 250 OP 250 PS 637: Mod: GY | Performed by: NURSE PRACTITIONER

## 2018-04-11 PROCEDURE — 36415 COLL VENOUS BLD VENIPUNCTURE: CPT | Performed by: THORACIC SURGERY (CARDIOTHORACIC VASCULAR SURGERY)

## 2018-04-11 PROCEDURE — 71045 X-RAY EXAM CHEST 1 VIEW: CPT | Mod: 77

## 2018-04-11 PROCEDURE — 88331 PATH CONSLTJ SURG 1 BLK 1SPC: CPT | Performed by: THORACIC SURGERY (CARDIOTHORACIC VASCULAR SURGERY)

## 2018-04-11 PROCEDURE — 27210794 ZZH OR GENERAL SUPPLY STERILE: Performed by: THORACIC SURGERY (CARDIOTHORACIC VASCULAR SURGERY)

## 2018-04-11 PROCEDURE — 86850 RBC ANTIBODY SCREEN: CPT | Performed by: THORACIC SURGERY (CARDIOTHORACIC VASCULAR SURGERY)

## 2018-04-11 PROCEDURE — A9270 NON-COVERED ITEM OR SERVICE: HCPCS | Mod: GY | Performed by: STUDENT IN AN ORGANIZED HEALTH CARE EDUCATION/TRAINING PROGRAM

## 2018-04-11 PROCEDURE — C9290 INJ, BUPIVACAINE LIPOSOME: HCPCS | Performed by: THORACIC SURGERY (CARDIOTHORACIC VASCULAR SURGERY)

## 2018-04-11 PROCEDURE — 88305 TISSUE EXAM BY PATHOLOGIST: CPT | Performed by: THORACIC SURGERY (CARDIOTHORACIC VASCULAR SURGERY)

## 2018-04-11 PROCEDURE — 0BBJ4ZZ EXCISION OF LEFT LOWER LUNG LOBE, PERCUTANEOUS ENDOSCOPIC APPROACH: ICD-10-PCS | Performed by: THORACIC SURGERY (CARDIOTHORACIC VASCULAR SURGERY)

## 2018-04-11 PROCEDURE — 86901 BLOOD TYPING SEROLOGIC RH(D): CPT | Performed by: THORACIC SURGERY (CARDIOTHORACIC VASCULAR SURGERY)

## 2018-04-11 PROCEDURE — 86900 BLOOD TYPING SEROLOGIC ABO: CPT | Performed by: THORACIC SURGERY (CARDIOTHORACIC VASCULAR SURGERY)

## 2018-04-11 PROCEDURE — 93005 ELECTROCARDIOGRAM TRACING: CPT

## 2018-04-11 PROCEDURE — 88342 IMHCHEM/IMCYTCHM 1ST ANTB: CPT | Performed by: THORACIC SURGERY (CARDIOTHORACIC VASCULAR SURGERY)

## 2018-04-11 PROCEDURE — 25000132 ZZH RX MED GY IP 250 OP 250 PS 637: Mod: GY | Performed by: ANESTHESIOLOGY

## 2018-04-11 PROCEDURE — 88341 IMHCHEM/IMCYTCHM EA ADD ANTB: CPT | Performed by: THORACIC SURGERY (CARDIOTHORACIC VASCULAR SURGERY)

## 2018-04-11 PROCEDURE — 25000128 H RX IP 250 OP 636: Performed by: THORACIC SURGERY (CARDIOTHORACIC VASCULAR SURGERY)

## 2018-04-11 PROCEDURE — 71045 X-RAY EXAM CHEST 1 VIEW: CPT

## 2018-04-11 PROCEDURE — 82565 ASSAY OF CREATININE: CPT | Performed by: STUDENT IN AN ORGANIZED HEALTH CARE EDUCATION/TRAINING PROGRAM

## 2018-04-11 PROCEDURE — 36000062 ZZH SURGERY LEVEL 4 1ST 30 MIN - UMMC: Performed by: THORACIC SURGERY (CARDIOTHORACIC VASCULAR SURGERY)

## 2018-04-11 PROCEDURE — 0BBP4ZX EXCISION OF LEFT PLEURA, PERCUTANEOUS ENDOSCOPIC APPROACH, DIAGNOSTIC: ICD-10-PCS | Performed by: THORACIC SURGERY (CARDIOTHORACIC VASCULAR SURGERY)

## 2018-04-11 PROCEDURE — 25000128 H RX IP 250 OP 636: Performed by: STUDENT IN AN ORGANIZED HEALTH CARE EDUCATION/TRAINING PROGRAM

## 2018-04-11 PROCEDURE — 25000566 ZZH SEVOFLURANE, EA 15 MIN: Performed by: THORACIC SURGERY (CARDIOTHORACIC VASCULAR SURGERY)

## 2018-04-11 PROCEDURE — 85049 AUTOMATED PLATELET COUNT: CPT | Performed by: STUDENT IN AN ORGANIZED HEALTH CARE EDUCATION/TRAINING PROGRAM

## 2018-04-11 PROCEDURE — 25000132 ZZH RX MED GY IP 250 OP 250 PS 637: Mod: GY | Performed by: STUDENT IN AN ORGANIZED HEALTH CARE EDUCATION/TRAINING PROGRAM

## 2018-04-11 PROCEDURE — 93010 ELECTROCARDIOGRAM REPORT: CPT | Performed by: INTERNAL MEDICINE

## 2018-04-11 PROCEDURE — 00000146 ZZHCL STATISTIC GLUCOSE BY METER IP

## 2018-04-11 PROCEDURE — 40000170 ZZH STATISTIC PRE-PROCEDURE ASSESSMENT II: Performed by: THORACIC SURGERY (CARDIOTHORACIC VASCULAR SURGERY)

## 2018-04-11 PROCEDURE — 25000125 ZZHC RX 250

## 2018-04-11 PROCEDURE — 25000128 H RX IP 250 OP 636: Performed by: ANESTHESIOLOGY

## 2018-04-11 PROCEDURE — 71000014 ZZH RECOVERY PHASE 1 LEVEL 2 FIRST HR: Performed by: THORACIC SURGERY (CARDIOTHORACIC VASCULAR SURGERY)

## 2018-04-11 PROCEDURE — 00000159 ZZHCL STATISTIC H-SEND OUTS PREP: Performed by: THORACIC SURGERY (CARDIOTHORACIC VASCULAR SURGERY)

## 2018-04-11 RX ORDER — CEFAZOLIN SODIUM 2 G/100ML
2 INJECTION, SOLUTION INTRAVENOUS
Status: DISCONTINUED | OUTPATIENT
Start: 2018-04-11 | End: 2018-04-11 | Stop reason: HOSPADM

## 2018-04-11 RX ORDER — HYDROMORPHONE HYDROCHLORIDE 1 MG/ML
.3-.5 INJECTION, SOLUTION INTRAMUSCULAR; INTRAVENOUS; SUBCUTANEOUS
Status: DISCONTINUED | OUTPATIENT
Start: 2018-04-11 | End: 2018-04-11 | Stop reason: HOSPADM

## 2018-04-11 RX ORDER — OXYCODONE HYDROCHLORIDE 5 MG/1
5-10 TABLET ORAL EVERY 4 HOURS PRN
Status: DISCONTINUED | OUTPATIENT
Start: 2018-04-11 | End: 2018-04-12 | Stop reason: HOSPADM

## 2018-04-11 RX ORDER — ONDANSETRON 4 MG/1
4-8 TABLET, ORALLY DISINTEGRATING ORAL EVERY 6 HOURS PRN
Status: DISCONTINUED | OUTPATIENT
Start: 2018-04-11 | End: 2018-04-12 | Stop reason: HOSPADM

## 2018-04-11 RX ORDER — ONDANSETRON 2 MG/ML
INJECTION INTRAMUSCULAR; INTRAVENOUS PRN
Status: DISCONTINUED | OUTPATIENT
Start: 2018-04-11 | End: 2018-04-11

## 2018-04-11 RX ORDER — FENTANYL CITRATE 50 UG/ML
25-50 INJECTION, SOLUTION INTRAMUSCULAR; INTRAVENOUS EVERY 5 MIN PRN
Status: DISCONTINUED | OUTPATIENT
Start: 2018-04-11 | End: 2018-04-11 | Stop reason: HOSPADM

## 2018-04-11 RX ORDER — GLYCOPYRROLATE 0.2 MG/ML
INJECTION, SOLUTION INTRAMUSCULAR; INTRAVENOUS PRN
Status: DISCONTINUED | OUTPATIENT
Start: 2018-04-11 | End: 2018-04-11

## 2018-04-11 RX ORDER — CEFAZOLIN SODIUM 1 G/3ML
1 INJECTION, POWDER, FOR SOLUTION INTRAMUSCULAR; INTRAVENOUS SEE ADMIN INSTRUCTIONS
Status: DISCONTINUED | OUTPATIENT
Start: 2018-04-11 | End: 2018-04-11 | Stop reason: HOSPADM

## 2018-04-11 RX ORDER — PROPOFOL 10 MG/ML
INJECTION, EMULSION INTRAVENOUS PRN
Status: DISCONTINUED | OUTPATIENT
Start: 2018-04-11 | End: 2018-04-11

## 2018-04-11 RX ORDER — ASPIRIN 81 MG/1
81 TABLET, CHEWABLE ORAL DAILY
Status: DISCONTINUED | OUTPATIENT
Start: 2018-04-11 | End: 2018-04-12 | Stop reason: HOSPADM

## 2018-04-11 RX ORDER — AMOXICILLIN 250 MG
2 CAPSULE ORAL 2 TIMES DAILY
Status: DISCONTINUED | OUTPATIENT
Start: 2018-04-11 | End: 2018-04-12 | Stop reason: HOSPADM

## 2018-04-11 RX ORDER — HYDRALAZINE HYDROCHLORIDE 20 MG/ML
2.5-5 INJECTION INTRAMUSCULAR; INTRAVENOUS EVERY 10 MIN PRN
Status: DISCONTINUED | OUTPATIENT
Start: 2018-04-11 | End: 2018-04-11 | Stop reason: HOSPADM

## 2018-04-11 RX ORDER — GABAPENTIN 300 MG/1
300 CAPSULE ORAL ONCE
Status: COMPLETED | OUTPATIENT
Start: 2018-04-11 | End: 2018-04-11

## 2018-04-11 RX ORDER — ROSUVASTATIN CALCIUM 5 MG/1
5 TABLET, COATED ORAL DAILY
Status: DISCONTINUED | OUTPATIENT
Start: 2018-04-11 | End: 2018-04-12 | Stop reason: HOSPADM

## 2018-04-11 RX ORDER — PROCHLORPERAZINE MALEATE 5 MG
5 TABLET ORAL EVERY 6 HOURS PRN
Status: DISCONTINUED | OUTPATIENT
Start: 2018-04-11 | End: 2018-04-12 | Stop reason: HOSPADM

## 2018-04-11 RX ORDER — SODIUM CHLORIDE, SODIUM LACTATE, POTASSIUM CHLORIDE, CALCIUM CHLORIDE 600; 310; 30; 20 MG/100ML; MG/100ML; MG/100ML; MG/100ML
INJECTION, SOLUTION INTRAVENOUS CONTINUOUS
Status: DISCONTINUED | OUTPATIENT
Start: 2018-04-11 | End: 2018-04-12 | Stop reason: HOSPADM

## 2018-04-11 RX ORDER — SODIUM CHLORIDE, SODIUM GLUCONATE, SODIUM ACETATE, POTASSIUM CHLORIDE AND MAGNESIUM CHLORIDE 526; 502; 368; 37; 30 MG/100ML; MG/100ML; MG/100ML; MG/100ML; MG/100ML
INJECTION, SOLUTION INTRAVENOUS CONTINUOUS PRN
Status: DISCONTINUED | OUTPATIENT
Start: 2018-04-11 | End: 2018-04-11

## 2018-04-11 RX ORDER — CHLORHEXIDINE GLUCONATE ORAL RINSE 1.2 MG/ML
15 SOLUTION DENTAL ONCE
Status: COMPLETED | OUTPATIENT
Start: 2018-04-11 | End: 2018-04-11

## 2018-04-11 RX ORDER — FENTANYL CITRATE 50 UG/ML
INJECTION, SOLUTION INTRAMUSCULAR; INTRAVENOUS PRN
Status: DISCONTINUED | OUTPATIENT
Start: 2018-04-11 | End: 2018-04-11

## 2018-04-11 RX ORDER — LABETALOL HYDROCHLORIDE 5 MG/ML
5 INJECTION, SOLUTION INTRAVENOUS EVERY 10 MIN PRN
Status: DISCONTINUED | OUTPATIENT
Start: 2018-04-11 | End: 2018-04-11 | Stop reason: HOSPADM

## 2018-04-11 RX ORDER — CELECOXIB 200 MG/1
200 CAPSULE ORAL ONCE
Status: COMPLETED | OUTPATIENT
Start: 2018-04-11 | End: 2018-04-11

## 2018-04-11 RX ORDER — METOPROLOL TARTRATE 25 MG/1
25 TABLET, FILM COATED ORAL ONCE
Status: COMPLETED | OUTPATIENT
Start: 2018-04-11 | End: 2018-04-11

## 2018-04-11 RX ORDER — NALOXONE HYDROCHLORIDE 0.4 MG/ML
.1-.4 INJECTION, SOLUTION INTRAMUSCULAR; INTRAVENOUS; SUBCUTANEOUS
Status: DISCONTINUED | OUTPATIENT
Start: 2018-04-11 | End: 2018-04-12 | Stop reason: HOSPADM

## 2018-04-11 RX ORDER — SENNA AND DOCUSATE SODIUM 50; 8.6 MG/1; MG/1
2 TABLET, FILM COATED ORAL 2 TIMES DAILY
Status: DISCONTINUED | OUTPATIENT
Start: 2018-04-12 | End: 2018-04-11

## 2018-04-11 RX ORDER — CEFAZOLIN SODIUM 1 G/3ML
INJECTION, POWDER, FOR SOLUTION INTRAMUSCULAR; INTRAVENOUS PRN
Status: DISCONTINUED | OUTPATIENT
Start: 2018-04-11 | End: 2018-04-11

## 2018-04-11 RX ORDER — EPHEDRINE SULFATE 50 MG/ML
INJECTION, SOLUTION INTRAMUSCULAR; INTRAVENOUS; SUBCUTANEOUS PRN
Status: DISCONTINUED | OUTPATIENT
Start: 2018-04-11 | End: 2018-04-11

## 2018-04-11 RX ORDER — LIDOCAINE HYDROCHLORIDE 20 MG/ML
INJECTION, SOLUTION INFILTRATION; PERINEURAL PRN
Status: DISCONTINUED | OUTPATIENT
Start: 2018-04-11 | End: 2018-04-11

## 2018-04-11 RX ORDER — ONDANSETRON 2 MG/ML
4 INJECTION INTRAMUSCULAR; INTRAVENOUS EVERY 30 MIN PRN
Status: DISCONTINUED | OUTPATIENT
Start: 2018-04-11 | End: 2018-04-11 | Stop reason: HOSPADM

## 2018-04-11 RX ORDER — ONDANSETRON 2 MG/ML
4-8 INJECTION INTRAMUSCULAR; INTRAVENOUS EVERY 6 HOURS PRN
Status: DISCONTINUED | OUTPATIENT
Start: 2018-04-11 | End: 2018-04-12 | Stop reason: HOSPADM

## 2018-04-11 RX ORDER — ACETAMINOPHEN 325 MG/1
975 TABLET ORAL ONCE
Status: COMPLETED | OUTPATIENT
Start: 2018-04-11 | End: 2018-04-11

## 2018-04-11 RX ORDER — ACETAMINOPHEN 325 MG/1
975 TABLET ORAL EVERY 8 HOURS
Status: DISCONTINUED | OUTPATIENT
Start: 2018-04-11 | End: 2018-04-12 | Stop reason: HOSPADM

## 2018-04-11 RX ORDER — ONDANSETRON 4 MG/1
4 TABLET, ORALLY DISINTEGRATING ORAL EVERY 30 MIN PRN
Status: DISCONTINUED | OUTPATIENT
Start: 2018-04-11 | End: 2018-04-11 | Stop reason: HOSPADM

## 2018-04-11 RX ORDER — ALBUTEROL SULFATE 5 MG/ML
2.5 SOLUTION RESPIRATORY (INHALATION) EVERY 4 HOURS PRN
Status: DISCONTINUED | OUTPATIENT
Start: 2018-04-11 | End: 2018-04-12 | Stop reason: HOSPADM

## 2018-04-11 RX ORDER — METOPROLOL TARTRATE 50 MG
50 TABLET ORAL 2 TIMES DAILY
Status: DISCONTINUED | OUTPATIENT
Start: 2018-04-12 | End: 2018-04-12 | Stop reason: HOSPADM

## 2018-04-11 RX ADMIN — GLYCOPYRROLATE 0.2 MG: 0.2 INJECTION, SOLUTION INTRAMUSCULAR; INTRAVENOUS at 14:29

## 2018-04-11 RX ADMIN — HYDROMORPHONE HYDROCHLORIDE 0.5 MG: 1 INJECTION, SOLUTION INTRAMUSCULAR; INTRAVENOUS; SUBCUTANEOUS at 16:35

## 2018-04-11 RX ADMIN — FENTANYL CITRATE 50 MCG: 50 INJECTION, SOLUTION INTRAMUSCULAR; INTRAVENOUS at 14:54

## 2018-04-11 RX ADMIN — PHENYLEPHRINE HYDROCHLORIDE 100 MCG: 10 INJECTION, SOLUTION INTRAMUSCULAR; INTRAVENOUS; SUBCUTANEOUS at 14:21

## 2018-04-11 RX ADMIN — GABAPENTIN 300 MG: 300 CAPSULE ORAL at 11:03

## 2018-04-11 RX ADMIN — PHENYLEPHRINE HYDROCHLORIDE 100 MCG: 10 INJECTION, SOLUTION INTRAMUSCULAR; INTRAVENOUS; SUBCUTANEOUS at 14:27

## 2018-04-11 RX ADMIN — Medication 5 MG: at 14:19

## 2018-04-11 RX ADMIN — Medication 5 MG: at 14:39

## 2018-04-11 RX ADMIN — ENOXAPARIN SODIUM 40 MG: 40 INJECTION SUBCUTANEOUS at 11:04

## 2018-04-11 RX ADMIN — PROPOFOL 70 MG: 10 INJECTION, EMULSION INTRAVENOUS at 14:12

## 2018-04-11 RX ADMIN — ROCURONIUM BROMIDE 30 MG: 10 INJECTION INTRAVENOUS at 14:53

## 2018-04-11 RX ADMIN — Medication 5 MG: at 14:17

## 2018-04-11 RX ADMIN — Medication 10 MG: at 14:27

## 2018-04-11 RX ADMIN — ACETAMINOPHEN 975 MG: 325 TABLET, FILM COATED ORAL at 11:03

## 2018-04-11 RX ADMIN — CHLORHEXIDINE GLUCONATE 15 ML: 1.2 RINSE ORAL at 11:03

## 2018-04-11 RX ADMIN — ROCURONIUM BROMIDE 10 MG: 10 INJECTION INTRAVENOUS at 15:23

## 2018-04-11 RX ADMIN — ACETAMINOPHEN 975 MG: 325 TABLET, FILM COATED ORAL at 23:46

## 2018-04-11 RX ADMIN — ROSUVASTATIN CALCIUM 5 MG: 5 TABLET, FILM COATED ORAL at 21:04

## 2018-04-11 RX ADMIN — SUGAMMADEX 200 MG: 100 INJECTION, SOLUTION INTRAVENOUS at 15:58

## 2018-04-11 RX ADMIN — Medication 5 MG: at 14:21

## 2018-04-11 RX ADMIN — FENTANYL CITRATE 200 MCG: 50 INJECTION, SOLUTION INTRAMUSCULAR; INTRAVENOUS at 14:12

## 2018-04-11 RX ADMIN — SODIUM CHLORIDE, SODIUM GLUCONATE, SODIUM ACETATE, POTASSIUM CHLORIDE AND MAGNESIUM CHLORIDE: 526; 502; 368; 37; 30 INJECTION, SOLUTION INTRAVENOUS at 14:01

## 2018-04-11 RX ADMIN — Medication 5 MG: at 14:15

## 2018-04-11 RX ADMIN — SODIUM CHLORIDE, POTASSIUM CHLORIDE, SODIUM LACTATE AND CALCIUM CHLORIDE: 600; 310; 30; 20 INJECTION, SOLUTION INTRAVENOUS at 18:56

## 2018-04-11 RX ADMIN — PHENYLEPHRINE HYDROCHLORIDE 100 MCG: 10 INJECTION, SOLUTION INTRAMUSCULAR; INTRAVENOUS; SUBCUTANEOUS at 14:17

## 2018-04-11 RX ADMIN — METOPROLOL TARTRATE 25 MG: 25 TABLET ORAL at 11:17

## 2018-04-11 RX ADMIN — Medication 10 MG: at 14:34

## 2018-04-11 RX ADMIN — ONDANSETRON 4 MG: 2 INJECTION INTRAMUSCULAR; INTRAVENOUS at 15:50

## 2018-04-11 RX ADMIN — HYDROMORPHONE HYDROCHLORIDE 0.2 MG: 1 INJECTION, SOLUTION INTRAMUSCULAR; INTRAVENOUS; SUBCUTANEOUS at 16:27

## 2018-04-11 RX ADMIN — ASPIRIN 81 MG CHEWABLE TABLET 81 MG: 81 TABLET CHEWABLE at 17:07

## 2018-04-11 RX ADMIN — LIDOCAINE HYDROCHLORIDE 60 MG: 20 INJECTION, SOLUTION INFILTRATION; PERINEURAL at 14:12

## 2018-04-11 RX ADMIN — PHENYLEPHRINE HYDROCHLORIDE 0.3 MCG/KG/MIN: 10 INJECTION, SOLUTION INTRAMUSCULAR; INTRAVENOUS; SUBCUTANEOUS at 14:41

## 2018-04-11 RX ADMIN — HYDROMORPHONE HYDROCHLORIDE 0.5 MG: 1 INJECTION, SOLUTION INTRAMUSCULAR; INTRAVENOUS; SUBCUTANEOUS at 16:58

## 2018-04-11 RX ADMIN — PHENYLEPHRINE HYDROCHLORIDE 100 MCG: 10 INJECTION, SOLUTION INTRAMUSCULAR; INTRAVENOUS; SUBCUTANEOUS at 14:15

## 2018-04-11 RX ADMIN — HYDROMORPHONE HYDROCHLORIDE 0.8 MG: 1 INJECTION, SOLUTION INTRAMUSCULAR; INTRAVENOUS; SUBCUTANEOUS at 15:02

## 2018-04-11 RX ADMIN — ROCURONIUM BROMIDE 50 MG: 10 INJECTION INTRAVENOUS at 14:14

## 2018-04-11 RX ADMIN — CEFAZOLIN 2 G: 1 INJECTION, POWDER, FOR SOLUTION INTRAMUSCULAR; INTRAVENOUS at 14:25

## 2018-04-11 RX ADMIN — CELECOXIB 200 MG: 200 CAPSULE ORAL at 11:03

## 2018-04-11 RX ADMIN — ACETAMINOPHEN 975 MG: 325 TABLET, FILM COATED ORAL at 17:07

## 2018-04-11 ASSESSMENT — ENCOUNTER SYMPTOMS
NERVOUS/ANXIOUS: 1
NEUROLOGICAL NEGATIVE: 1
DECREASED CONCENTRATION: 1
CARDIOVASCULAR NEGATIVE: 1

## 2018-04-11 ASSESSMENT — ACTIVITIES OF DAILY LIVING (ADL): FALL_HISTORY_WITHIN_LAST_SIX_MONTHS: NO

## 2018-04-11 NOTE — ANESTHESIA CARE TRANSFER NOTE
Patient: Chilo Oneil    Procedure(s):  subxiphoid left video assisted thorascopic surgery pleural biops, left lower lobe wedge biopsy  - Wound Class: I-Clean    Diagnosis: Malignant neoplasm of upper lobe of left lung   Diagnosis Additional Information: No value filed.    Anesthesia Type:   General, ETT     Note:  Airway :Face Mask  Patient transferred to:PACU  Comments: Pt remains stable, monitors on alarms in place, report to PACU RN, no complicationsHandoff Report: Identifed the Patient, Identified the Reponsible Provider, Reviewed the pertinent medical history, Discussed the surgical course, Reviewed Intra-OP anesthesia mangement and issues during anesthesia, Set expectations for post-procedure period and Allowed opportunity for questions and acknowledgement of understanding      Vitals: (Last set prior to Anesthesia Care Transfer)    CRNA VITALS  4/11/2018 1558 - 4/11/2018 1628      4/11/2018             Resp Rate (observed): (!)  2                Electronically Signed By: NATALIE Goldman CRNA  April 11, 2018  4:28 PM

## 2018-04-11 NOTE — OP NOTE
Preoperative diagnosis:   1) LEFT upper lobe NSCLC      2) New PET positive LEFT pleural nodules  Postoperative diagnosis:  1) LEFT upper lobe NSCLC      2) New PET positive LEFT pleural nodules  Procedure:   Subxiphoid LEFT thoracoscopic pleural biopsies and lower lobe wedge resection  Anesthesia: General  Surgeon:  Mega Moreno (present and participated in the entire procedure)  Resident surgeon:  Blanca Sandoval  EBL: 10 ml  Complications:  none immediate  Findings:  the pleural nodules looked like benign plaque, however, one of the pleural nodules (LEFT apex) was positive for malignancy on frozen section. We also identified a nodule in segment 6 of the LEFT lower lobe which was positive for malignancy as well.    Procedure  We placed Chilo Thole in the semi-lateral position and the upper abdomen and LEFT chest were prepared and draped. We made a 3 cm transverse subxiphoid incision, opened the fascia and digitally dissected retrosternally into the LEFT pleural space. We placed a 12 mm port into the LEFT pleural space and verified proper placement. We then placed 2 LEFT subcostal ports under digital palpation or direct visualization (5 mm on and 12 mm on). Once we had placed our ports, we insufflated with CO2 and had good visualization of the pleural cavity. We then identified the areas of interest, which grossly looked like pleural plaque. We biopsies both areas on the parietal pleura and sent the apical plaque for frozen section and the more inferior plaque for permanent pathology. We also discovered a LEFT lower lobe nodule (segment 6) and took a wedge resection with a stapler, and we removed the specimen through the subxiphoid port with a retrieval bag.  We did not close the sites of the subcostal ports and verified laparoscopically that neither port had entered the peritoneal cavity.  We placed a chest tube and pulled it out through a subcostal port site, re-insufflated the lung, and  closed the fascia of the subxiphoid port and the skin with absorbable sutures.

## 2018-04-11 NOTE — OR NURSING
"Pt is pleasant and cooperative but collecting information from him took a long time. He said he would be going to a skilled care after he lives the hospital but does not know which facility. He said,he has a brother \"Samm\"but hard to get hold of. He recommend staff call a family friend \"Messi Ovalle\" ,during surgery,he accompanied  Pt.  Brother \"Samm Oneil\" just came to be with his brother Freddy.  Pt also has not been taking most of his medications at home,including his Beta Blockers. Dr. Lacey was updated. Anesthesia saw pt and order was placed to give 25 mg of Metoprolol. This was given with pt's pre op medications.  Dr. Marisel Reed came to see pt and to review pt's signed consent. Consent was review with pt, while his brother was present at the bedside. Consent was signed after,questions were encouraged and answered by Dr. Reed. She also said,\"Dr. Moreno and team will be coming to see pt before going for surgery\" She was made aware of pt not knowing were he will be going after discharge. She said,  upstairs will take care of it after surgery.   "

## 2018-04-11 NOTE — TELEPHONE ENCOUNTER
Nutrition Services:     Called Chilo today regarding his concern with his weight (10) on the oncology distress screening.      Left VM indicating reason for the phone call, advised pt to return call if he wishes to speak with a dietitian in the cancer center.  RD contact information provided.     Carolee Crawfodr RD, LD  [facility]  [phone]  Pager: [phone]

## 2018-04-11 NOTE — ANESTHESIA POSTPROCEDURE EVALUATION
Patient: Chilo Oneil    Procedure(s):  subxiphoid left video assisted thorascopic surgery pleural biops, left lower lobe wedge biopsy  - Wound Class: I-Clean    Diagnosis:Malignant neoplasm of upper lobe of left lung   Diagnosis Additional Information: No value filed.    Anesthesia Type:  General, ETT    Note:  Anesthesia Post Evaluation    Patient location during evaluation: PACU  Patient participation: Able to fully participate in evaluation  Level of consciousness: awake and alert  Pain management: satisfactory to patient  Airway patency: patent  Cardiovascular status: acceptable and stable  Respiratory status: acceptable and spontaneous ventilation  Hydration status: euvolemic  PONV: controlled     Anesthetic complications: None    Comments: ST elevations briefly intraop. Resolved with appropriate analgesia. EKG stable post op.         Last vitals:  Vitals:    04/11/18 1620 04/11/18 1630 04/11/18 1645   BP: 137/72 115/67 116/63   Pulse:      Resp: 16 17 14   Temp: 36.4  C (97.6  F)     SpO2: 100% 99% 95%         Electronically Signed By: Roderick Shaw MD  April 11, 2018  5:02 PM

## 2018-04-11 NOTE — IP AVS SNAPSHOT
MRN:[id number]                      After Visit Summary   4/11/2018    Chilo Oneil    MRN: [id number]           Thank you!     Thank you for choosing [facility] for your care. Our goal is always to provide you with excellent care. Hearing back from our patients is one way we can continue to improve our services. Please take a few minutes to complete the written survey that you may receive in the mail after you visit with us. Thank you!        Patient Information     Date Of Birth          1950        About your hospital stay     You were admitted on:  April 11, 2018 You last received care in the:  [address] [facility]    You were discharged on:  April 12, 2018        Reason for your hospital stay       Wedge resection 4/11.                  Who to Call     For medical emergencies, please call 911.  For non-urgent questions about your medical care, please call your primary care provider or clinic, [phone]  For questions related to your surgery, please call your surgery clinic        Attending Provider     Provider Mega Culp MD Thoracic Diseases       Primary Care Provider Office Phone # Fax #    Manish Plasencia [phone]      After Care Instructions     Diet       Follow this diet upon discharge: Regular Diet Adult            Discharge Instructions           Wound care and dressings       Instructions to care for your wound at home: leave your chest tube dressing on until 4/13. Then dermabond will fall off as needed.                  Follow-up Appointments     Follow Up and recommended labs and tests       1.) Follow up with primary care physician, Manish Plasencia, in 1-2 weeks.  2.) Follow up with a thoracic surgery Clinical Nurse Specialist in Thoracic Surgery clinic in 1 month, prior to which a chest xray should be performed.                  Your next 10 appointments already scheduled     Apr 27, 2018  1:45 PM CDT  "  Return Visit with Mor Mccauley MD   [facility] ([facility])    [facility]  [address]  [facility] [address]   [phone]            May 11, 2018  2:45 PM CDT   (Arrive by 2:30 PM)   Return Visit with NATALIE Berg [facility] ([facility])    [address] [place] [address]  [address] [place] [address] [phone]              Pending Results     Date and Time Order Name Status Description    2018 0008 XR Chest 2 Views In process     2018 1511 Surgical pathology exam Preliminary             Statement of Approval     Ordered          18 1323  I have reviewed and agree with all the recommendations and orders detailed in this document.  EFFECTIVE NOW     Approved and electronically signed by:  Martínez Jensen PA-C             Admission Information     Date & Time Provider Department Dept. Phone    2018 Mega Moreno MD [address] [facility] [phone]      Your Vitals Were     Blood Pressure Pulse Temperature Respirations Height Weight    128/51 (BP Location: Left arm) 83 97.6  F (36.4  C) (Oral) 12 1.905 m (6' 3\") 74.9 kg (165 lb 2 oz)    Pulse Oximetry BMI (Body Mass Index)                96% 20.64 kg/m2          MyChart Information     xMatter[employer] lets you send messages to your doctor, view your test results, renew your prescriptions, schedule appointments and more. To sign up, go to [email or web address] . Click on \"Log in\" on the left side of the screen, which will take you to the Welcome page. Then click on \"Sign up Now\" on the right side of the page.     You will be asked to enter the access code listed below, as well as some personal information. Please follow the directions to create your username and password.     Your access code is: [email or web address]  Expires: 2018 10:49 AM     Your access code will  in 90 days. If you need help " or a new code, please call your [facility] clinic or [phone].        Care EveryWhere ID     This is your Care EveryWhere ID. This could be used by other organizations to access your [facility] medical records  [id number]        Equal Access to Services     [place] : Kevin jordan selenajose angel Soearl, watylerda luqadaha, qaybta kaalmada aderaul, celina maria a shakiraalmaz ramospilo barcenastegan fisher. So [facility] [phone].    ATENCIÓN: Si habla español, tiene a galaviz disposición servicios gratuitos de asistencia lingüística. Llame al [phone].    We comply with applicable federal civil rights laws and [place] laws. We do not discriminate on the basis of race, color, national origin, age, disability, sex, sexual orientation, or gender identity.               Review of your medicines      START taking        Dose / Directions    acetaminophen 325 MG tablet   Commonly known as:  TYLENOL        Dose:  975 mg   Take 3 tablets (975 mg) by mouth every 8 hours   Quantity:  100 tablet   Refills:  0       enoxaparin 40 MG/0.4ML injection   Commonly known as:  LOVENOX        Dose:  40 mg   Start taking on:  4/13/2018   Inject 0.4 mLs (40 mg) Subcutaneous every 24 hours for 5 days   Quantity:  2 mL   Refills:  0       oxyCODONE IR 5 MG tablet   Commonly known as:  ROXICODONE        Dose:  5-10 mg   Take 1-2 tablets (5-10 mg) by mouth every 6 hours as needed for moderate to severe pain   Quantity:  14 tablet   Refills:  0       senna-docusate 8.6-50 MG per tablet   Commonly known as:  SENOKOT-S;PERICOLACE        Dose:  2 tablet   Take 2 tablets by mouth 2 times daily   Quantity:  100 tablet   Refills:  0         CONTINUE these medicines which may have CHANGED, or have new prescriptions. If we are uncertain of the size of tablets/capsules you have at home, strength may be listed as something that might have changed.        Dose / Directions    ALPRAZolam 0.5 MG tablet   Commonly known as:  XANAX   This may have changed:  Another  medication with the same name was removed. Continue taking this medication, and follow the directions you see here.        Dose:  0.5 mg   Take 1 tablet (0.5 mg) by mouth 3 times daily as needed for anxiety   Quantity:  20 tablet   Refills:  0       hydrochlorothiazide 25 MG tablet   Commonly known as:  HYDRODIURIL   This may have changed:  when to take this   Used for:  Benign essential hypertension        Dose:  25 mg   Take 1 tablet (25 mg) by mouth daily   Quantity:  90 tablet   Refills:  3       losartan 50 MG tablet   Commonly known as:  COZAAR   This may have changed:  when to take this   Used for:  Benign essential hypertension        Dose:  50 mg   Take 1 tablet (50 mg) by mouth daily   Quantity:  90 tablet   Refills:  3       rosuvastatin 5 MG tablet   Commonly known as:  CRESTOR   This may have changed:  additional instructions        Dose:  5 mg   Take 1 tablet (5 mg) by mouth daily   Quantity:  30 tablet   Refills:  3         CONTINUE these medicines which have NOT CHANGED        Dose / Directions    albuterol 108 (90 Base) MCG/ACT Inhaler   Commonly known as:  PROAIR HFA/PROVENTIL HFA/VENTOLIN HFA        Dose:  2 puff   Inhale 2 puffs into the lungs every 4 hours as needed for shortness of breath / dyspnea or wheezing   Quantity:  1 Inhaler   Refills:  1       ASPIRIN PO        Dose:  81 mg   Take 81 mg by mouth   Refills:  0       EQL NICOTINE 2 MG lozenge   Generic drug:  nicotine polacrilex        Dose:  2 mg   Place 2 mg inside cheek every hour as needed for smoking cessation   Refills:  0       metoprolol tartrate 50 MG tablet   Commonly known as:  LOPRESSOR   Used for:  Benign essential hypertension        Dose:  50 mg   Take 1 tablet (50 mg) by mouth 2 times daily   Quantity:  60 tablet   Refills:  1         STOP taking     indomethacin 25 MG capsule   Commonly known as:  INDOCIN           traMADol 50 MG tablet   Commonly known as:  ULTRAM                Where to get your medicines      These  medications were sent to [facility] - [place], [place] - [address]  [address], [facility] [address]     Phone:  [phone]     acetaminophen 325 MG tablet    enoxaparin 40 MG/0.4ML injection    senna-docusate 8.6-50 MG per tablet         Some of these will need a paper prescription and others can be bought over the counter. Ask your nurse if you have questions.     Bring a paper prescription for each of these medications     oxyCODONE IR 5 MG tablet                Protect others around you: Learn how to safely use, store and throw away your medicines at www.disposemymeds.org.        Information about OPIOIDS     PRESCRIPTION OPIOIDS: WHAT YOU NEED TO KNOW    Prescription opioids can be used to help relieve moderate to severe pain and are often prescribed following a surgery or injury, or for certain health conditions. These medications can be an important part of treatment but also come with serious risks. It is important to work with your health care provider to make sure you are getting the safest, most effective care.    WHAT ARE THE RISKS AND SIDE EFFECTS OF OPIOID USE?  Prescription opioids carry serious risks of addiction and overdose, especially with prolonged use. An opioid overdose, often marked by slowed breathing can cause sudden death. The use of prescription opioids can have a number of side effects as well, even when taken as directed:      Tolerance - meaning you might need to take more of a medication for the same pain relief    Physical dependence - meaning you have symptoms of withdrawal when a medication is stopped    Increased sensitivity to pain    Constipation    Nausea, vomiting, and dry mouth    Sleepiness and dizziness    Confusion    Depression    Low levels of testosterone that can result in lower sex drive, energy, and strength    Itching and sweating    RISKS ARE GREATER WITH:    History of drug misuse, substance use disorder, or overdose    Mental  health conditions (such as depression or anxiety)    Sleep apnea    Older age (65 years or older)    Pregnancy    Avoid alcohol while taking prescription opioids.   Also, unless specifically advised by your health care provider, medications to avoid include:    Benzodiazepines (such as Xanax or Valium)    Muscle relaxants (such as Soma or Flexeril)    Hypnotics (such as Ambien or Lunesta)    Other prescription opioids    KNOW YOUR OPTIONS:  Talk to your health care provider about ways to manage your pain that do not involve prescription opioids. Some of these options may actually work better and have fewer risks and side effects:    Pain relievers such as acetaminophen, ibuprofen, and naproxen    Some medications that are also used for depression or seizures    Physical therapy and exercise    Cognitive behavioral therapy, a psychological, goal-directed approach, in which patients learn how to modify physical, behavioral, and emotional triggers of pain and stress    IF YOU ARE PRESCRIBED OPIOIDS FOR PAIN:    Never take opioids in greater amounts or more often than prescribed    Follow up with your primary health care provider and work together to create a plan on how to manage your pain.    Talk about ways to help manage your pain that do not involve prescription opioids    Talk about all concerns and side effects    Help prevent misuse and abuse    Never sell or share prescription opioids    Never use another person's prescription opioids    Store prescription opioids in a secure place and out of reach of others (this may include visitors, children, friends, and family)    Visit www.cdc.gov/drugoverdose to learn about risks of opioid abuse and overdose    If you believe you may be struggling with addiction, tell your health care provider and ask for guidance or call [facility]'s National Helpline at 3-168-815-HELP    LEARN MORE / www.cdc.gov/drugoverdose/prescribing/guideline.html    Safely dispose of unused  prescription opioids: Find your local drug take-back programs and more information about the importance of safe disposal at www.doseofreality.[place].gov             Medication List: This is a list of all your medications and when to take them. Check marks below indicate your daily home schedule. Keep this list as a reference.      Medications           Morning Afternoon Evening Bedtime As Needed    acetaminophen 325 MG tablet   Commonly known as:  TYLENOL   Take 3 tablets (975 mg) by mouth every 8 hours   Last time this was given:  975 mg on 4/12/2018  8:03 AM                                albuterol 108 (90 Base) MCG/ACT Inhaler   Commonly known as:  PROAIR HFA/PROVENTIL HFA/VENTOLIN HFA   Inhale 2 puffs into the lungs every 4 hours as needed for shortness of breath / dyspnea or wheezing                                ALPRAZolam 0.5 MG tablet   Commonly known as:  XANAX   Take 1 tablet (0.5 mg) by mouth 3 times daily as needed for anxiety                                ASPIRIN PO   Take 81 mg by mouth   Last time this was given:  81 mg on 4/12/2018  8:04 AM                                enoxaparin 40 MG/0.4ML injection   Commonly known as:  LOVENOX   Inject 0.4 mLs (40 mg) Subcutaneous every 24 hours for 5 days   Start taking on:  4/13/2018   Last time this was given:  40 mg on 4/12/2018 10:03 AM                                EQL NICOTINE 2 MG lozenge   Place 2 mg inside cheek every hour as needed for smoking cessation   Generic drug:  nicotine polacrilex                                hydrochlorothiazide 25 MG tablet   Commonly known as:  HYDRODIURIL   Take 1 tablet (25 mg) by mouth daily                                losartan 50 MG tablet   Commonly known as:  COZAAR   Take 1 tablet (50 mg) by mouth daily                                metoprolol tartrate 50 MG tablet   Commonly known as:  LOPRESSOR   Take 1 tablet (50 mg) by mouth 2 times daily   Last time this was given:  50 mg on 4/12/2018  8:04 AM                                 oxyCODONE IR 5 MG tablet   Commonly known as:  ROXICODONE   Take 1-2 tablets (5-10 mg) by mouth every 6 hours as needed for moderate to severe pain   Last time this was given:  10 mg on 4/12/2018  5:42 AM                                rosuvastatin 5 MG tablet   Commonly known as:  CRESTOR   Take 1 tablet (5 mg) by mouth daily   Last time this was given:  5 mg on 4/12/2018  8:04 AM                                senna-docusate 8.6-50 MG per tablet   Commonly known as:  SENOKOT-S;PERICOLACE   Take 2 tablets by mouth 2 times daily   Last time this was given:  2 tablets on 4/12/2018  8:04 AM

## 2018-04-11 NOTE — OR NURSING
1700 Dr. Cherelle Munoz Pulled CT at Bedside. CXR done asfter CT pulled. Dr. Cherelle Munoz (Thoracic Surgeon) reviewed CXR and called back and stated CXR is fine. This RN noted a drop in SBP into the 90's after CT pulled and discussed BP with both Dr. Abdirizak Meyers (Anesthesiologist) and Dr. Dr. Cherelle Munoz (Thoracic Surgeon)  and Dr. Abdirizak Shine would like patient to sty in PACU for an additional 1/2 hour and would like to be called back regarding patients BP. 1715 report given to Douglas Cr RN. 1825 Dr. Abdirizak Meyers (Anesthesiologist) at bedside to assess patient's BP and stated patient can go up to 7B. Pt transported by NST Keven Mccartney.

## 2018-04-11 NOTE — BRIEF OP NOTE
[facility], [place]    Brief Operative Note    Pre-operative diagnosis: Malignant neoplasm of upper lobe of left lung   Post-operative diagnosis same  Procedure: Procedure(s):  subxiphoid left video assisted thorascopic surgery pleural biops, left lower lobe wedge biopsy  - Wound Class: I-Clean  Surgeon: Surgeon(s) and Role:     * Mega Moreno MD - Primary     * Shaun Garcia MD - Assisting     * Blanca Zapata MD - Resident - Assisting  Anesthesia: General   Estimated blood loss: 20 cc  Drains: L pleural chest tube  Specimens:   ID Type Source Tests Collected by Time Destination   A : left pleural biopsy Tissue Pleural/Thoracic Cavity SURGICAL PATHOLOGY EXAM Mega Moreno MD 4/11/2018  3:11 PM    B : left pleural biopsy Tissue Pleural/Thoracic Cavity SURGICAL PATHOLOGY EXAM Mega Moreno MD 4/11/2018  3:20 PM    C : left lower lobe wedge  Tissue Lung, Left Lower Lobe SURGICAL PATHOLOGY EXAM Mega Moreno MD 4/11/2018  3:22 PM      Findings:   Pleural biopsy positive for adenocarcinoma.  Complications: None.  Implants: None.

## 2018-04-11 NOTE — IP AVS SNAPSHOT
[address] [address]    Phone:  [phone]                                       After Visit Summary   4/11/2018    Chilo Oneil    MRN: [id number]           After Visit Summary Signature Page     I have received my discharge instructions, and my questions have been answered. I have discussed any challenges I see with this plan with the nurse or doctor.    ..........................................................................................................................................  Patient/Patient Representative Signature      ..........................................................................................................................................  Patient Representative Print Name and Relationship to Patient    ..................................................               ................................................  Date                                            Time    ..........................................................................................................................................  Reviewed by Signature/Title    ...................................................              ..............................................  Date                                                            Time

## 2018-04-11 NOTE — LETTER
Transition Communication Hand-off for Care Transitions to Next Level of Care Provider    Name: Chilo Oneil  : 1950  MRN #: [id number]  Primary Care Provider: Manish Plasencia     Primary Clinic: [address] [place] [address]     Reason for Hospitalization:  H/O: lung cancer [Z85.118]  Admit Date/Time: 2018  9:19 AM  Discharge Date: 2018  Payor Source: Payor: MEDICARE / Plan: MEDICARE / Product Type: Medicare /          Reason for Communication Hand-off Referral: Other continuity of care    Discharge Plan:  Discharge Plan:       Most Recent Value    Concerns To Be Addressed all concerns addressed in this encounter         Discharge Needs Assessment:  Needs       Most Recent Value    Anticipated Changes Related to Illness none    Equipment Currently Used at Home none    Transportation Available car, family or friend will provide        Follow-up plan:  Future Appointments  Date Time Provider Department Center   2018 1:45 PM oMr Mccauley MD [facility]   2018 2:45 PM Janny Rangel APRN [facility]     Jennifer Rodriguez, RNDEL  [phone]    AVS/Discharge Summary is the source of truth; this is a helpful guide for improved communication of patient story

## 2018-04-12 ENCOUNTER — APPOINTMENT (OUTPATIENT)
Dept: GENERAL RADIOLOGY | Facility: CLINIC | Age: 68
DRG: 165 | End: 2018-04-12
Attending: THORACIC SURGERY (CARDIOTHORACIC VASCULAR SURGERY)
Payer: MEDICARE

## 2018-04-12 VITALS
HEIGHT: 75 IN | TEMPERATURE: 97.6 F | OXYGEN SATURATION: 96 % | HEART RATE: 83 BPM | RESPIRATION RATE: 12 BRPM | WEIGHT: 165.12 LBS | DIASTOLIC BLOOD PRESSURE: 51 MMHG | SYSTOLIC BLOOD PRESSURE: 128 MMHG | BODY MASS INDEX: 20.53 KG/M2

## 2018-04-12 PROBLEM — C34.92: Status: ACTIVE | Noted: 2018-04-12

## 2018-04-12 LAB
ANION GAP SERPL CALCULATED.3IONS-SCNC: 11 MMOL/L (ref 3–14)
BUN SERPL-MCNC: 17 MG/DL (ref 7–30)
CALCIUM SERPL-MCNC: 9.2 MG/DL (ref 8.5–10.1)
CHLORIDE SERPL-SCNC: 95 MMOL/L (ref 94–109)
CO2 SERPL-SCNC: 26 MMOL/L (ref 20–32)
CREAT SERPL-MCNC: 1.73 MG/DL (ref 0.66–1.25)
GFR SERPL CREATININE-BSD FRML MDRD: 40 ML/MIN/1.7M2
GLUCOSE SERPL-MCNC: 123 MG/DL (ref 70–99)
INTERPRETATION ECG - MUSE: NORMAL
MAGNESIUM SERPL-MCNC: 1.9 MG/DL (ref 1.6–2.3)
POTASSIUM SERPL-SCNC: 3.8 MMOL/L (ref 3.4–5.3)
RADIOLOGIST FLAGS: ABNORMAL
SODIUM SERPL-SCNC: 132 MMOL/L (ref 133–144)

## 2018-04-12 PROCEDURE — 25000132 ZZH RX MED GY IP 250 OP 250 PS 637: Mod: GY | Performed by: THORACIC SURGERY (CARDIOTHORACIC VASCULAR SURGERY)

## 2018-04-12 PROCEDURE — 25000132 ZZH RX MED GY IP 250 OP 250 PS 637: Mod: GY | Performed by: STUDENT IN AN ORGANIZED HEALTH CARE EDUCATION/TRAINING PROGRAM

## 2018-04-12 PROCEDURE — A9270 NON-COVERED ITEM OR SERVICE: HCPCS | Mod: GY | Performed by: THORACIC SURGERY (CARDIOTHORACIC VASCULAR SURGERY)

## 2018-04-12 PROCEDURE — 36415 COLL VENOUS BLD VENIPUNCTURE: CPT | Performed by: STUDENT IN AN ORGANIZED HEALTH CARE EDUCATION/TRAINING PROGRAM

## 2018-04-12 PROCEDURE — G0378 HOSPITAL OBSERVATION PER HR: HCPCS

## 2018-04-12 PROCEDURE — 12000008 ZZH R&B INTERMEDIATE UMMC

## 2018-04-12 PROCEDURE — 80048 BASIC METABOLIC PNL TOTAL CA: CPT | Performed by: STUDENT IN AN ORGANIZED HEALTH CARE EDUCATION/TRAINING PROGRAM

## 2018-04-12 PROCEDURE — A9270 NON-COVERED ITEM OR SERVICE: HCPCS | Mod: GY | Performed by: STUDENT IN AN ORGANIZED HEALTH CARE EDUCATION/TRAINING PROGRAM

## 2018-04-12 PROCEDURE — 25000128 H RX IP 250 OP 636: Performed by: STUDENT IN AN ORGANIZED HEALTH CARE EDUCATION/TRAINING PROGRAM

## 2018-04-12 PROCEDURE — 96372 THER/PROPH/DIAG INJ SC/IM: CPT

## 2018-04-12 PROCEDURE — 83735 ASSAY OF MAGNESIUM: CPT | Performed by: STUDENT IN AN ORGANIZED HEALTH CARE EDUCATION/TRAINING PROGRAM

## 2018-04-12 PROCEDURE — 71046 X-RAY EXAM CHEST 2 VIEWS: CPT

## 2018-04-12 RX ORDER — CALCIUM CARBONATE 500 MG/1
500-1000 TABLET, CHEWABLE ORAL DAILY PRN
Status: DISCONTINUED | OUTPATIENT
Start: 2018-04-12 | End: 2018-04-12 | Stop reason: HOSPADM

## 2018-04-12 RX ORDER — ACETAMINOPHEN 325 MG/1
975 TABLET ORAL EVERY 8 HOURS
Qty: 100 TABLET | Refills: 0 | Status: ON HOLD | OUTPATIENT
Start: 2018-04-12 | End: 2018-01-01

## 2018-04-12 RX ORDER — OXYCODONE HYDROCHLORIDE 5 MG/1
5-10 TABLET ORAL EVERY 6 HOURS PRN
Qty: 14 TABLET | Refills: 0 | Status: SHIPPED | OUTPATIENT
Start: 2018-04-12 | End: 2018-04-17

## 2018-04-12 RX ORDER — AMOXICILLIN 250 MG
2 CAPSULE ORAL 2 TIMES DAILY
Qty: 100 TABLET | Refills: 0 | Status: SHIPPED | OUTPATIENT
Start: 2018-04-12 | End: 2019-01-01 | Stop reason: DRUGHIGH

## 2018-04-12 RX ORDER — NICOTINE 21 MG/24HR
1 PATCH, TRANSDERMAL 24 HOURS TRANSDERMAL DAILY
Status: DISCONTINUED | OUTPATIENT
Start: 2018-04-12 | End: 2018-04-12 | Stop reason: HOSPADM

## 2018-04-12 RX ADMIN — METOPROLOL TARTRATE 50 MG: 50 TABLET ORAL at 08:04

## 2018-04-12 RX ADMIN — ASPIRIN 81 MG CHEWABLE TABLET 81 MG: 81 TABLET CHEWABLE at 08:04

## 2018-04-12 RX ADMIN — NICOTINE 1 PATCH: 14 PATCH, EXTENDED RELEASE TRANSDERMAL at 10:03

## 2018-04-12 RX ADMIN — OXYCODONE HYDROCHLORIDE 5 MG: 5 TABLET ORAL at 01:54

## 2018-04-12 RX ADMIN — ENOXAPARIN SODIUM 40 MG: 40 INJECTION SUBCUTANEOUS at 10:03

## 2018-04-12 RX ADMIN — OXYCODONE HYDROCHLORIDE 10 MG: 5 TABLET ORAL at 05:42

## 2018-04-12 RX ADMIN — ROSUVASTATIN CALCIUM 5 MG: 5 TABLET, FILM COATED ORAL at 08:04

## 2018-04-12 RX ADMIN — SENNOSIDES AND DOCUSATE SODIUM 2 TABLET: 8.6; 5 TABLET ORAL at 08:04

## 2018-04-12 RX ADMIN — ACETAMINOPHEN 975 MG: 325 TABLET, FILM COATED ORAL at 08:03

## 2018-04-12 RX ADMIN — CALCIUM CARBONATE (ANTACID) CHEW TAB 500 MG 500 MG: 500 CHEW TAB at 03:47

## 2018-04-12 NOTE — PROGRESS NOTES
Observation goals:   Prior to discharge, patient must have chest tube removed: Chest tube removed already.  He must also be tolerating PO intake with pain controlled on oral pain meds: Gave scheduled tylenol. Pt declined PRN oxycodone.  He must be able to urinate: Has not voided since surgery.  Please page thoracic surgery (intern or PA) prior for evaluation prior to discharge.

## 2018-04-12 NOTE — PLAN OF CARE
"Problem: Patient Care Overview  Goal: Discharge Needs Assessment  Outcome: Adequate for Discharge Date Met: 04/12/18  /51 (BP Location: Left arm)  Pulse 83  Temp 97.6  F (36.4  C) (Oral)  Resp 12  Ht 1.905 m (6' 3\")  Wt 74.9 kg (165 lb 2 oz)  SpO2 96%  BMI 20.64 kg/m2     AVSS, pain managed well with current regime & waiting for discharge orders to finalized. Pt mid abd incision dressing intact, derma bonded, patria & cdi. Left chest-tube dx dx cdi. Lovenox instruction given. Pt A&O times 4. Family here to pick him up. Dressed & waiting for final discharge orders.       "

## 2018-04-12 NOTE — PROGRESS NOTES
Prior to discharge, patient must have chest tube removed: Chest tube removed already.  He must also be tolerating PO intake with pain controlled on oral pain meds: Received 5 mg oxycodone with some relief  He must be able to urinate: Has not voided since surgery. Has tried using the urinal 3 times. Bladder scan 358 mL, Provider aware.  Please page thoracic surgery (intern or PA) prior for evaluation prior to discharge.

## 2018-04-12 NOTE — PROGRESS NOTES
"SURGERY CROSS COVER  POST OP CHECK    Pain well controlled. No n/v, cp, sob. Has not voided since surgery.    /48 (BP Location: Left arm)  Pulse 83  Temp 95.6  F (35.3  C) (Axillary)  Resp 11  Ht 1.905 m (6' 3\")  Wt 74.9 kg (165 lb 2 oz)  SpO2 98%  BMI 20.64 kg/m2    Satting well on 1L O2 by NC  Nad  Nlb    No chest tubes    600 PO intake since OR    A/P: POD 0 wedge c/b brief ST elevations intraop, no concerns at this time.     --  Samuel Craig MD/PHD  Gen Surg PGY1    "

## 2018-04-12 NOTE — DISCHARGE SUMMARY
NAME: Chilo Oneil   MRN: [id number]   : 1950     DATE OF ADMISSION: 2018     PRE/POSTOPERATIVE DIAGNOSES:Left upper lobe NSCLC, PET positive Left Pleural Nodules    PROCEDURES PERFORMED: Subxiphoid LEFT thoracoscopic pleural biopsies and lower lobe wedge resection    PATHOLOGY RESULTS: None     CULTURE RESULTS: None    INTRAOPERATIVE COMPLICATIONS: None     POSTOPERATIVE COMPLICATIONS: None     DRAINS/TUBES PRESENT AT DISCHARGE: dressing changes    DATE OF DISCHARGE:  2018    HOSPITAL COURSE: Chilo Oneil is a 67 year old male who on 2018 underwent the above-named procedures.  He tolerated the operation well and postoperatively was transferred to the general post-surgical unit.  The remainder of his course was essentially uncomplicated.  Prior to discharge, his pain was controlled well, he was able to perform ADLs and ambulate independently without difficulty, and had full return of bowel and bladder function.  On 2018, he was discharged to home in stable condition.    DISCHARGE EXAM: Afebrile, vital signs stable  A&O, NAD  Resp non-labored  Distal extremities warm    Incisions well healed with dermabond, no erythema edema or exudate      DISCHARGE INSTRUCTIONS:    Discharge Procedure Orders  Reason for your hospital stay   Order Comments: Wedge resection .     Follow Up and recommended labs and tests   Order Comments: 1.) Follow up with primary care physician, Manish Plasencia, in 1-2 weeks.  2.) Follow up with a thoracic surgery Clinical Nurse Specialist in Thoracic Surgery clinic in 1 month, prior to which a chest xray should be performed.     Wound care and dressings   Order Comments: Instructions to care for your wound at home: leave your chest tube dressing on until . Then dermabond will fall off as needed.     Discharge Instructions     Diet   Order Comments: Follow this diet upon discharge: Regular Diet Adult   Order Specific Question Answer Comments   Is  discharge order? Yes        DISCHARGE MEDICATIONS:   Discharge Medication List as of 4/12/2018  1:26 PM      START taking these medications    Details   oxyCODONE IR (ROXICODONE) 5 MG tablet Take 1-2 tablets (5-10 mg) by mouth every 6 hours as needed for moderate to severe pain, Disp-14 tablet, R-0, Local Print      acetaminophen (TYLENOL) 325 MG tablet Take 3 tablets (975 mg) by mouth every 8 hours, Disp-100 tablet, R-0, E-Prescribe      enoxaparin (LOVENOX) 40 MG/0.4ML injection Inject 0.4 mLs (40 mg) Subcutaneous every 24 hours for 5 days, Disp-2 mL, R-0, E-Prescribe      senna-docusate (SENOKOT-S;PERICOLACE) 8.6-50 MG per tablet Take 2 tablets by mouth 2 times daily, Disp-100 tablet, R-0, E-Prescribe         CONTINUE these medications which have NOT CHANGED    Details   ALPRAZolam (XANAX) 0.5 MG tablet Take 1 tablet (0.5 mg) by mouth 3 times daily as needed for anxiety, Disp-20 tablet, R-0, Local PrintDO NOT DRIVE IF USING THIS MEDICATION!      albuterol (PROAIR HFA/PROVENTIL HFA/VENTOLIN HFA) 108 (90 BASE) MCG/ACT Inhaler Inhale 2 puffs into the lungs every 4 hours as needed for shortness of breath / dyspnea or wheezing, Disp-1 Inhaler, R-1, Local Print      ASPIRIN PO Take 81 mg by mouth, Historical      nicotine polacrilex (EQL NICOTINE) 2 MG lozenge Place 2 mg inside cheek every hour as needed for smoking cessation, Historical      rosuvastatin (CRESTOR) 5 MG tablet Take 1 tablet (5 mg) by mouth daily, Disp-30 tablet, R-3, E-PrescribeNew script      losartan (COZAAR) 50 MG tablet Take 1 tablet (50 mg) by mouth daily, Disp-90 tablet, R-3, E-Prescribe      metoprolol (LOPRESSOR) 50 MG tablet Take 1 tablet (50 mg) by mouth 2 times daily, Disp-60 tablet, R-1, E-Prescribe      hydrochlorothiazide (HYDRODIURIL) 25 MG tablet Take 1 tablet (25 mg) by mouth daily, Disp-90 tablet, R-3, E-Prescribe         STOP taking these medications       traMADol (ULTRAM) 50 MG tablet Comments:   Reason for Stopping:          indomethacin (INDOCIN) 25 MG capsule Comments:   Reason for Stopping:               Addendum: pleural and left lower lobe wedge biopsies returned as invasive adenocarcinoma . This is indicative of metastatic disease

## 2018-04-12 NOTE — PLAN OF CARE
Problem: Patient Care Overview  Goal: Plan of Care/Patient Progress Review  Outcome: Therapy, progress towards functional goals is fair  Vital signs:  Temp: 96.1  F (35.6  C) Temp src: Oral BP: 102/48 Pulse: 83 Heart Rate: 55 Resp: 9 SpO2: 98 % O2 Device: Nasal cannula Oxygen Delivery: 3 LPM    BP soft, denies dizziness/lightheadedness. O2 sat >95% on 3L O2 NC. Desat to 85% on 2L O2. Capno on. PIV in left SL. Old CT site dressing c/d/i. Pt c/o pain, oxy given x2 with some relief. Pt c/o heartburn, tums given x1. Pt requested nicotine patch, placed on right upper arm. Up to bathroom with SBA. Pt attempted to void multiple times overnight, however only able to void once and missed hat. Bladder scan 348 mL (PVR), Provider aware.  Hypoactive BS, not passing flatus, last BM 4/10/18. On regular diet. Denies nausea. Sleeping between cares.

## 2018-04-12 NOTE — PLAN OF CARE
Problem: Patient Care Overview  Goal: Plan of Care/Patient Progress Review  Outcome: Improving  Vitals:    04/11/18 1915 04/11/18 1946 04/11/18 2015 04/11/18 2045   BP: 116/52 116/52 109/51 108/44   BP Location:       Cuff Size:       Pulse:       Resp: 11 14 14 14   Temp:       TempSrc:       SpO2: 95% 95% 100% 98%   Weight:       Height:         Pt is alert.Refuse to  Move out of bed.Tolerating clear liquids.Capnography on.Periferal line TKO.Not voided. Bladder scan 202 ml.Refuse to try now.On 3 L oxygen.Will monitor.

## 2018-04-13 ENCOUNTER — PATIENT OUTREACH (OUTPATIENT)
Dept: CARE COORDINATION | Facility: CLINIC | Age: 68
End: 2018-04-13

## 2018-04-13 ENCOUNTER — CARE COORDINATION (OUTPATIENT)
Dept: CARE COORDINATION | Facility: CLINIC | Age: 68
End: 2018-04-13

## 2018-04-13 ENCOUNTER — TELEPHONE (OUTPATIENT)
Dept: FAMILY MEDICINE | Facility: CLINIC | Age: 68
End: 2018-04-13

## 2018-04-13 DIAGNOSIS — R11.2 NAUSEA AND VOMITING, INTRACTABILITY OF VOMITING NOT SPECIFIED, UNSPECIFIED VOMITING TYPE: Primary | ICD-10-CM

## 2018-04-13 RX ORDER — ONDANSETRON 8 MG/1
8 TABLET, ORALLY DISINTEGRATING ORAL EVERY 8 HOURS PRN
Qty: 30 TABLET | Refills: 1 | Status: SHIPPED | OUTPATIENT
Start: 2018-04-13 | End: 2018-05-10

## 2018-04-13 NOTE — PROGRESS NOTES
Reviewed RN note. That pt is having significant post op pain along with N/V.   At time of discharge, pt's pain was well managed and the patient was anxious to discharge.   Pt states he is taking his oxycodone and tylenol, which moderately help to manage pain.  He is nauseated and has vomited.  I rx zofran--which was sent to his pharm.  Pt instructed if pain unmanageable, despite his rx'd pain medication, he should go to the ED for further evaluation.    Pt acknowledges understanding.    Tonia Rangel, CNP  Thoracic Surgery.

## 2018-04-13 NOTE — PROGRESS NOTES
[facility]: Post-Discharge Note  SITUATION                                                      Admission:    Admission Date: 04/11/18   Reason for Admission: Left upper lobe NSCLC, PET positive Left Pleural Nodules  Discharge:    Discharge Date: 04/12/18   Discharge Diagnosis: Left upper lobe NSCLC, PET positive Left Pleural Nodules   Discharge Service: Thoracic surgery   Discharge Plan: 1.) Follow up with primary care physician, Manish Plasencia, in 1-2 weeks.  2.) Follow up with a thoracic surgery Clinical Nurse Specialist in Thoracic Surgery clinic in 1 month, prior to which a chest xray should be performed.     BACKGROUND                                                      Chilo Oneil is a 67 year old male who on 4/11/2018 underwent the above-named procedures.  He tolerated the operation well and postoperatively was transferred to the general post-surgical unit.  The remainder of his course was essentially uncomplicated.  Prior to discharge, his pain was controlled well, he was able to perform ADLs and ambulate independently without difficulty, and had full return of bowel and bladder function.  On 4/12/2018, he was discharged to home in stable condition.    ASSESSMENT      Patient reports symptoms are: Unchanged, Worsening (Patient reports 9/10 pain, a lot of pain at incision, patient also has a lot of nausea and vomitted twice last night but not this AM, patient says he probably should have stayed another day to recover)  Does the patient have all of their medications?: Yes  Does patient know what their new medications are for?: Yes  Does paient have a follow-up appointment scheduled?: Yes  Does patient have any other questions or concerns?: No      PLAN                                                      Outpatient Plan:  Routed f/u to thoracic surgery    Future Appointments  Date Time Provider Department Center   4/27/2018 1:45 PM Mor Mccauley MD [facility]    5/11/2018 2:45 PM Janny Rangel APRN [facility]           Dorothy Hernandez RN

## 2018-04-13 NOTE — TELEPHONE ENCOUNTER
"ED / Discharge Outreach Protocol    Patient Contact    Attempt # 1    Was call answered?  Yes.  \"May I please speak with <patient name>\"  Is patient available?   Yes    Hospital/TCU/ED for chronic condition Discharge Protocol    \"Hi, my name is Deepthi Love, a registered nurse, and I am calling from [facility].  I am calling to follow up and see how things are going for you after your recent emergency visit/hospital/TCU stay.\"    Tell me how you are doing now that you are home?\" In pain and poor appetite but doing ok otherwise.  Pt states he has metastatic lung cancer.  He says that they opened him up to do the wedge procedure but found more cancer in his ribs so they closed him back up.      Discharge Instructions    \"Let's review your discharge instructions.  What is/are the follow-up recommendations?  Pt. Response: Reviewed with pt.  He has no questions at this time.      \"Has an appointment with your primary care provider been scheduled?\"   Yes. (confirm)  4/17/18  \"When you see the provider, I would recommend that you bring your medications with you.\"    Medications    \"Tell me what changed about your medicines when you discharged?\"    Changes to chronic meds?    0-1    \"What questions do you have about your medications?\"    None     Pt received a call from care coordination but declines their assistance at this time.     New diagnoses of heart failure, COPD, diabetes, or MI?    No              Medication reconciliation completed? Yes  Was MTM referral placed (*Make sure to put transitions as reason for referral)?   No    Call Summary    \"What questions or concerns do you have about your recent visit and your follow-up care?\"     none  Pt has been instructed to present to the [place] if he worsens over the weekend.  He will see Dr Plasencia as planned on 4/17/18.    \"If you have questions or things don't continue to improve, we encourage you contact us through the main clinic number (give number).  " "Even if the clinic is not open, triage nurses are available 24/7 to help you.     We would like you to know that our clinic has extended hours (provide information).  We also have urgent care (provide details on closest location and hours/contact info)\"      \"Thank you for your time and take care!\"               "

## 2018-04-13 NOTE — TELEPHONE ENCOUNTER
ED/UC/IP follow up phone call:    RN please call to follow up.    Number of Inpatient & TCU Discharges in past 12 months: 1    Kelsey Burns   Clinic Station

## 2018-04-13 NOTE — PROGRESS NOTES
Clinic Care Coordination Contact    Situation: Patient chart reviewed by care coordinator.    Background: Patient had surgery at [facility] for Subxiphoid LEFT thoracoscopic pleural biopsies and lower lobe wedge resection on 4/11/18. Was discharged on 4/12/18. This RN CC had outreached to him prior to this admission (after an ED visit). He declined need for  CC at that time. A CTS was generated from IP admission. However, it was not sent to [facility] CC team. It was sent to Thoracic Surgery Nurses pool as this is where he will follow up.     Assessment: No new need for clinic CC    Plan/Recommendations: Will not outreach at this time as patient has declined need and cares will now be primarily through specialty.    Kaz WHITTAKERN,RN- BC  Clinic Care Coordinator  [facility]  Phone: [phone]

## 2018-04-17 ENCOUNTER — OFFICE VISIT (OUTPATIENT)
Dept: FAMILY MEDICINE | Facility: CLINIC | Age: 68
End: 2018-04-17
Payer: MEDICARE

## 2018-04-17 VITALS
WEIGHT: 166.8 LBS | SYSTOLIC BLOOD PRESSURE: 130 MMHG | DIASTOLIC BLOOD PRESSURE: 78 MMHG | HEART RATE: 90 BPM | BODY MASS INDEX: 20.74 KG/M2 | HEIGHT: 75 IN | TEMPERATURE: 99.3 F | OXYGEN SATURATION: 95 %

## 2018-04-17 DIAGNOSIS — C34.92 CARCINOMA, LUNG, LEFT (H): ICD-10-CM

## 2018-04-17 DIAGNOSIS — Z09 HOSPITAL DISCHARGE FOLLOW-UP: Primary | ICD-10-CM

## 2018-04-17 LAB — COPATH REPORT: NORMAL

## 2018-04-17 PROCEDURE — 99214 OFFICE O/P EST MOD 30 MIN: CPT | Performed by: FAMILY MEDICINE

## 2018-04-17 RX ORDER — OXYCODONE HYDROCHLORIDE 5 MG/1
5-10 TABLET ORAL 2 TIMES DAILY PRN
Qty: 60 TABLET | Refills: 0 | Status: SHIPPED | OUTPATIENT
Start: 2018-04-17 | End: 2018-04-30

## 2018-04-17 NOTE — PROGRESS NOTES
SUBJECTIVE:   Chilo Oneil is a 68 year old male who presents to clinic today for the following health issues:  Chief Complaint   Patient presents with     Hospital F/U     [facility], 4/11/18 - 4/12/18     Social Work Services     patient would like to speak w/ someone to help w/ forms and day to day activites         Hospital Follow-up Visit:    Hospital/Nursing Home/IP Rehab Facility: [facility]  Date of Admission: 4/11/18  Date of Discharge: 4/12/18  Reason(s) for Admission: metastatic lung cancer. Subxiphoid LEFT thoracoscopic pleural biopsies and lower lobe wedge resection            Problems taking medications regularly:  Yes, trying to - sometimes forgets       Medication changes since discharge: None       Problems adhering to non-medication therapy:  None        Summary of hospitalization:  [facility] discharge summary reviewed  Diagnostic Tests/Treatments reviewed.  Follow up needed: none  Other Healthcare Providers Involved in Patient s Care:         None  Update since discharge: stable.     Post Discharge Medication Reconciliation: discharge medications reconciled, continue medications without change.  Plan of care communicated with patient     Coding guidelines for this visit:  Type of Medical   Decision Making Face-to-Face Visit       within 7 Days of discharge Face-to-Face Visit        within 14 days of discharge   Moderate Complexity [id number]   High Complexity [id number]              68 yr old male here for a follow up from the hospital. He had a left lower lobe resection and lung biopsy  he reports that he is doing okay overall. He seems to be coming to terms with his diagnosis. Patient states that he was told he had maybe six months to live.      Problem list and histories reviewed & adjusted, as indicated.  Additional history: as documented    Patient Active Problem List   Diagnosis     Tobacco use disorder     Alcohol use, daily use     PAD (peripheral artery  disease) (H)     Benign essential hypertension     Hyperlipidemia LDL goal <100     Anxiety     Gastroesophageal reflux disease without esophagitis     CAD (coronary artery disease)     Lung cancer (H)     Uncomplicated asthma     Hyponatremia     SOB (shortness of breath)     Nausea with vomiting     Carcinoma, lung, left (H)     Past Surgical History:   Procedure Laterality Date     FRACTURE TX, ANKLE RT/LT  1975    Fracture TX Ankle, LT     OPEN REDUCTION INTERNAL FIXATION HIP NAILING  9/20/2013    Procedure: OPEN REDUCTION INTERNAL FIXATION HIP NAILING;  Left Hip Open Reduction Internal Fixation ;  Surgeon: Rosas Dennis MD;  Location: [facility] OR     THORACOSCOPIC BIOPSY LUNG Left 4/11/2018    Procedure: THORACOSCOPIC BIOPSY LUNG;  subxiphoid left video assisted thorascopic surgery pleural biops, left lower lobe wedge biopsy ;  Surgeon: Mega Moreno MD;  Location:  OR       Social History   Substance Use Topics     Smoking status: Current Every Day Smoker     Packs/day: 0.15     Years: 47.00     Types: Cigarettes     Start date: 12/26/1967     Smokeless tobacco: Never Used      Comment: 2-3 cigs daily     Alcohol use Yes      Comment: 6-8 beers per day, last 3/27 at 6pm     Family History   Problem Relation Age of Onset     DIABETES Brother      type 2     DIABETES Father          Current Outpatient Prescriptions   Medication Sig Dispense Refill     oxyCODONE IR (ROXICODONE) 5 MG tablet Take 1-2 tablets (5-10 mg) by mouth 2 times daily as needed for moderate to severe pain 60 tablet 0     enoxaparin (LOVENOX) 40 MG/0.4ML injection Inject 0.4 mLs (40 mg) Subcutaneous every 24 hours for 5 days 2 mL 0     senna-docusate (SENOKOT-S;PERICOLACE) 8.6-50 MG per tablet Take 2 tablets by mouth 2 times daily 100 tablet 0     losartan (COZAAR) 50 MG tablet Take 1 tablet (50 mg) by mouth daily (Patient taking differently: Take 50 mg by mouth every morning ) 90 tablet 3     metoprolol (LOPRESSOR) 50 MG  "tablet Take 1 tablet (50 mg) by mouth 2 times daily 60 tablet 1     hydrochlorothiazide (HYDRODIURIL) 25 MG tablet Take 1 tablet (25 mg) by mouth daily (Patient taking differently: Take 25 mg by mouth every morning ) 90 tablet 3     ondansetron (ZOFRAN-ODT) 8 MG ODT tab Take 1 tablet (8 mg) by mouth every 8 hours as needed for nausea 30 tablet 1     acetaminophen (TYLENOL) 325 MG tablet Take 3 tablets (975 mg) by mouth every 8 hours 100 tablet 0     ALPRAZolam (XANAX) 0.5 MG tablet Take 1 tablet (0.5 mg) by mouth 3 times daily as needed for anxiety 20 tablet 0     albuterol (PROAIR HFA/PROVENTIL HFA/VENTOLIN HFA) 108 (90 BASE) MCG/ACT Inhaler Inhale 2 puffs into the lungs every 4 hours as needed for shortness of breath / dyspnea or wheezing 1 Inhaler 1     ASPIRIN PO Take 81 mg by mouth       nicotine polacrilex (EQL NICOTINE) 2 MG lozenge Place 2 mg inside cheek every hour as needed for smoking cessation       rosuvastatin (CRESTOR) 5 MG tablet Take 1 tablet (5 mg) by mouth daily (Patient taking differently: Take 5 mg by mouth daily PICKING THIS RX UP TODAY FOR THE FIRST TIME.) 30 tablet 3     Allergies   Allergen Reactions     Cipro [Ciprofloxacin] Swelling     BP Readings from Last 3 Encounters:   04/17/18 130/78   04/12/18 128/51   04/08/18 180/90    Wt Readings from Last 3 Encounters:   04/17/18 166 lb 12.8 oz (75.7 kg)   04/11/18 165 lb 2 oz (74.9 kg)   04/08/18 170 lb (77.1 kg)                  Labs reviewed in EPIC    Reviewed and updated as needed this visit by clinical staff  Tobacco  Allergies  Med Hx  Surg Hx  Fam Hx  Soc Hx      Reviewed and updated as needed this visit by Provider         ROS:  Constitutional, HEENT, cardiovascular, pulmonary, gi and gu systems are negative, except as otherwise noted.    OBJECTIVE:     /78 (BP Location: Left arm, Patient Position: Sitting, Cuff Size: Adult Regular)  Pulse 90  Temp 99.3  F (37.4  C) (Tympanic)  Ht 6' 3\" (1.905 m)  Wt 166 lb 12.8 oz (75.7 " kg)  SpO2 95%  BMI 20.85 kg/m2  Body mass index is 20.85 kg/(m^2).  GENERAL: healthy, alert and no distress  EYES: Eyes grossly normal to inspection, PERRL and conjunctivae and sclerae normal  MS: no gross musculoskeletal defects noted, no edema  SKIN: no suspicious lesions or rashes  PSYCH: mentation appears normal, affect normal/bright    Diagnostic Test Results:  none     ASSESSMENT/PLAN:         1. Hospital discharge follow-up  Doing okay overall. Pain medication refilled, discussed living will with patient , he has designated his sister Charlene as his POA we need this in writing , forms given to him .     2. Carcinoma, lung, left (H)  Medication refilled  - oxyCODONE IR (ROXICODONE) 5 MG tablet; Take 1-2 tablets (5-10 mg) by mouth 2 times daily as needed for moderate to severe pain  Dispense: 60 tablet; Refill: 0  - CARE COORDINATION REFERRAL    FUTURE APPOINTMENTS:       - Follow-up visit as needed    Manish Plasencia MD  [facility]

## 2018-04-17 NOTE — NURSING NOTE
"Chief Complaint   Patient presents with     Hospital F/U     [facility], 4/11/18 - 4/12/18     Social Work Services     patient would like to speak w/ someone to help w/ forms and day to day activites       Initial /76 (BP Location: Left arm, Patient Position: Chair, Cuff Size: Adult Regular)  Pulse 90  Temp 99.3  F (37.4  C) (Tympanic)  Ht 6' 3\" (1.905 m)  Wt 166 lb 12.8 oz (75.7 kg)  SpO2 95%  BMI 20.85 kg/m2 Estimated body mass index is 20.85 kg/(m^2) as calculated from the following:    Height as of this encounter: 6' 3\" (1.905 m).    Weight as of this encounter: 166 lb 12.8 oz (75.7 kg).  Medication Reconciliation: complete   Thalia SMART CMA (AAMA)    "

## 2018-04-17 NOTE — MR AVS SNAPSHOT
After Visit Summary   4/17/2018    Chilo Oneil    MRN: [id number]           Patient Information     Date Of Birth          1950        Visit Information        Provider Department      4/17/2018 1:20 PM Manish Plasencia MD [facility]        Today's Diagnoses     Hospital discharge follow-up    -  1    Carcinoma, lung, left (H)           Follow-ups after your visit        Additional Services     CARE COORDINATION REFERRAL       Services are provided by a Care Coordinator for people with complex needs such as: medical, social, or financial troubles.  The Care Coordinator works with the patient and their Primary Care Provider to determine health goals, obtain resources, achieve outcomes, and develop care plans that help coordinate the patient's care.     Reason for Referral: Advanced Care Planning and Complex Medical Concerns/Education: Chronic diagnosis Lung Cancer    Additional pertinent details:  Diagnosed with lung cancer and dealing with a lot of forms for his social security     Clinical Staff have discussed the Care Coordination Referral with the patient and/or caregiver: yes                  Your next 10 appointments already scheduled     Apr 27, 2018  1:45 PM CDT   Return Visit with Mor Mccauley MD   [facility] ([facility])    [facility]  [address] [address] [phone]            May 11, 2018  2:45 PM CDT   (Arrive by 2:30 PM)   Return Visit with NATALIE Berg [facility] ([facility])    [address]  [address]  [facility] [address] [phone]              Who to contact     If you have questions or need follow up information about today's clinic visit or your schedule please contact [facility] directly at [phone].  Normal or non-critical lab and imaging results will be  "communicated to you by [employer], letter or phone within 4 business days after the clinic has received the results. If you do not hear from us within 7 days, please contact the clinic through Guidesly or phone. If you have a critical or abnormal lab result, we will notify you by phone as soon as possible.  Submit refill requests through Guidesly or call your pharmacy and they will forward the refill request to us. Please allow 3 business days for your refill to be completed.          Additional Information About Your Visit        Guidesly Information     Guidesly lets you send messages to your doctor, view your test results, renew your prescriptions, schedule appointments and more. To sign up, go to [email or web address] . Click on \"Log in\" on the left side of the screen, which will take you to the Welcome page. Then click on \"Sign up Now\" on the right side of the page.     You will be asked to enter the access code listed below, as well as some personal information. Please follow the directions to create your username and password.     Your access code is: [email or web address]  Expires: 2018 10:49 AM     Your access code will  in 90 days. If you need help or a new code, please call your [place] clinic or [phone].        Care EveryWhere ID     This is your Care EveryWhere ID. This could be used by other organizations to access your [place] medical records  [id number]        Your Vitals Were     Pulse Temperature Height Pulse Oximetry BMI (Body Mass Index)       90 99.3  F (37.4  C) (Tympanic) 6' 3\" (1.905 m) 95% 20.85 kg/m2        Blood Pressure from Last 3 Encounters:   18 130/78   18 128/51   18 180/90    Weight from Last 3 Encounters:   18 166 lb 12.8 oz (75.7 kg)   18 165 lb 2 oz (74.9 kg)   18 170 lb (77.1 kg)              We Performed the Following     CARE COORDINATION REFERRAL          Today's Medication Changes          These changes are accurate as of 18  " 2:10 PM.  If you have any questions, ask your nurse or doctor.               These medicines have changed or have updated prescriptions.        Dose/Directions    hydrochlorothiazide 25 MG tablet   Commonly known as:  HYDRODIURIL   This may have changed:  when to take this   Used for:  Benign essential hypertension        Dose:  25 mg   Take 1 tablet (25 mg) by mouth daily   Quantity:  90 tablet   Refills:  3       losartan 50 MG tablet   Commonly known as:  COZAAR   This may have changed:  when to take this   Used for:  Benign essential hypertension        Dose:  50 mg   Take 1 tablet (50 mg) by mouth daily   Quantity:  90 tablet   Refills:  3       oxyCODONE IR 5 MG tablet   Commonly known as:  ROXICODONE   This may have changed:  when to take this   Used for:  Carcinoma, lung, left (H)   Changed by:  Manish Plasencia MD        Dose:  5-10 mg   Take 1-2 tablets (5-10 mg) by mouth 2 times daily as needed for moderate to severe pain   Quantity:  60 tablet   Refills:  0       rosuvastatin 5 MG tablet   Commonly known as:  CRESTOR   This may have changed:  additional instructions        Dose:  5 mg   Take 1 tablet (5 mg) by mouth daily   Quantity:  30 tablet   Refills:  3            Where to get your medicines      Some of these will need a paper prescription and others can be bought over the counter.  Ask your nurse if you have questions.     Bring a paper prescription for each of these medications     oxyCODONE IR 5 MG tablet                Primary Care Provider Office Phone # Fax #    Manish Plasencia [phone] [address] [facility] [address]        Equal Access to Services     [facility]: Hadhasmukh garcia Soearl, waaxda luqadaha, qaybta kaalmada alma, celina fisher. So [facility] [phone].    ATENCIÓN: Si habla español, tiene a galaviz disposición servicios gratuitos de asistencia lingüística. Llame al [phone].    We comply  with applicable federal civil rights laws and [place] laws. We do not discriminate on the basis of race, color, national origin, age, disability, sex, sexual orientation, or gender identity.            Thank you!     Thank you for choosing [facility]  for your care. Our goal is always to provide you with excellent care. Hearing back from our patients is one way we can continue to improve our services. Please take a few minutes to complete the written survey that you may receive in the mail after your visit with us. Thank you!             Your Updated Medication List - Protect others around you: Learn how to safely use, store and throw away your medicines at www.disposemymeds.org.          This list is accurate as of 4/17/18  2:10 PM.  Always use your most recent med list.                   Brand Name Dispense Instructions for use Diagnosis    acetaminophen 325 MG tablet    TYLENOL    100 tablet    Take 3 tablets (975 mg) by mouth every 8 hours    Carcinoma, lung, left (H)       albuterol 108 (90 Base) MCG/ACT Inhaler    PROAIR HFA/PROVENTIL HFA/VENTOLIN HFA    1 Inhaler    Inhale 2 puffs into the lungs every 4 hours as needed for shortness of breath / dyspnea or wheezing        ALPRAZolam 0.5 MG tablet    XANAX    20 tablet    Take 1 tablet (0.5 mg) by mouth 3 times daily as needed for anxiety        ASPIRIN PO      Take 81 mg by mouth        enoxaparin 40 MG/0.4ML injection    LOVENOX    2 mL    Inject 0.4 mLs (40 mg) Subcutaneous every 24 hours for 5 days    Carcinoma, lung, left (H)       EQL NICOTINE 2 MG lozenge   Generic drug:  nicotine polacrilex      Place 2 mg inside cheek every hour as needed for smoking cessation        hydrochlorothiazide 25 MG tablet    HYDRODIURIL    90 tablet    Take 1 tablet (25 mg) by mouth daily    Benign essential hypertension       losartan 50 MG tablet    COZAAR    90 tablet    Take 1 tablet (50 mg) by mouth daily    Benign essential hypertension       metoprolol  tartrate 50 MG tablet    LOPRESSOR    60 tablet    Take 1 tablet (50 mg) by mouth 2 times daily    Benign essential hypertension       ondansetron 8 MG ODT tab    ZOFRAN-ODT    30 tablet    Take 1 tablet (8 mg) by mouth every 8 hours as needed for nausea    Nausea and vomiting, intractability of vomiting not specified, unspecified vomiting type       oxyCODONE IR 5 MG tablet    ROXICODONE    60 tablet    Take 1-2 tablets (5-10 mg) by mouth 2 times daily as needed for moderate to severe pain    Carcinoma, lung, left (H)       rosuvastatin 5 MG tablet    CRESTOR    30 tablet    Take 1 tablet (5 mg) by mouth daily        senna-docusate 8.6-50 MG per tablet    SENOKOT-S;PERICOLACE    100 tablet    Take 2 tablets by mouth 2 times daily    Carcinoma, lung, left (H)

## 2018-04-18 ENCOUNTER — PATIENT OUTREACH (OUTPATIENT)
Dept: CARE COORDINATION | Facility: CLINIC | Age: 68
End: 2018-04-18

## 2018-04-18 NOTE — LETTER
[facility]  [address] [place] [address]  [phone]      April 18, 2018    Chilo Oneil  [address]  [place] [address]      Dear Chilo,    I am a clinic care coordinator who works with Dr. Plasencia at the [facility] and I called and left you a message today.  I wanted to introduce myself and provide you with my contact information so that you can call me with questions or concerns about your health care. Below is a description of clinic care coordination and how I can further assist you.     The clinic care coordinator is a registered nurse and/or  who understand the health care system. The goal of clinic care coordination is to help you manage your health and improve access to the [facility] system in the most efficient manner. The registered nurse can assist you in meeting your health care goals by providing education, coordinating services, and strengthening the communication among your providers. The  can assist you with financial, behavioral, psychosocial, chemical dependency, counseling, and/or psychiatric resources.    Please feel free to contact me at [phone], with any questions or concerns. We at [facility] are focused on providing you with the highest-quality healthcare experience possible and that all starts with you.     Sincerely,     Sofya Paulino    Enclosed: I have enclosed a copy of a 24 Hour Access Plan. This has helpful phone numbers for you to call when needed. Please keep this in an easy to access place to use as needed.

## 2018-04-18 NOTE — PROGRESS NOTES
Clinic Care Coordination Contact  [facility]/Voicemail    Referral Source: PCP  Clinical Data: Care Coordinator Outreach  Outreach attempted x 1.  Left message on voicemail with call back information and requested return call.  Plan: Care Coordinator mailed out care coordination introduction letter on 4-18-18. Care Coordinator will try to reach patient again in 1-2 business days.    Sofya Unger  Social Work Care Coordinator  [facility]  [phone]

## 2018-04-18 NOTE — LETTER
Health Care Home - Access Care Plan    About Me  Patient Name:  Chilo Ahn    YOB: 1950  Age:                             68 year old   Adali MRN:            [id number] Telephone Information:     Home Phone [phone]   Mobile Not on file.       Address:    [address] [place], [place] [address] [place] [address] Email address:  No e-mail address on record      Emergency Contact(s)  Name Relationship Lgl Grd Work Phone Home Phone Mobile Phone   1. JACINTO AHN Brother   none [phone]   2. CHIN, PAT Relative   [phone]             Health Maintenance: Routine Health maintenance Reviewed by  Care Coordinator--yes    My Access Plan  Medical Emergency 911   Questions or concerns during clinic hours Primary Clinic Line, I will call the clinic directly: [facility] - [phone]   24 Hour Appointment Line [phone] or  [phone]) (toll free)   24 Hour Nurse Line [phone] (toll free)   Questions or concerns outside clinic hours 24 Hour Appointment Line, I will call the after-hours on-call line:   [facility] [phone] or [phone] ([phone]) (toll-free)   Preferred Urgent Care [facility], [phone]   Preferred [facility] [place] [place]  [phone]   Preferred Pharmacy [facility] [address]     Behavioral Health Crisis Line The National Suicide Prevention Lifeline at 1-815.491.8553 or 91     My Care Team Members  Patient Care Team       Relationship Specialty Notifications Start End    Manish Plasencia MD PCP - General Family Practice  2/8/18     Phone: [phone] Fax: [phone] [address] [facility] [address]    Nadia Leger MD MD Cardiology Admissions 3/30/18     Phone: [phone] Fax: [phone] [address] [place] [address]    Janny Rangel APRN CNP Nurse Practitioner Nurse  Practitioner - Family Admissions 3/30/18     Phone: [phone] Fax: [phone]         [address] [facility] [address] [facility] [address]    Mega Moreno MD MD Thoracic Diseases Admissions 3/30/18     Phone: [phone] Fax: [phone]         [address] [facility] [address] [facility] [address]    Mor Mccauley MD MD Hematology & Oncology Admissions 3/30/18     Phone: [phone] Fax: [phone]         [address] [facility] [address]    Erica Elliott, RN Clinic Care Coordinator Oncology Admissions 3/30/18     Comment:  [phone]    Sofya Paulino LSW Clinic Care Coordinator Primary Care -   4/18/18     Phone: [phone] Fax: [phone]               My Medical and Care Information  Problem List   Patient Active Problem List   Diagnosis     Tobacco use disorder     Alcohol use, daily use     PAD (peripheral artery disease) (H)     Benign essential hypertension     Hyperlipidemia LDL goal <100     Anxiety     Gastroesophageal reflux disease without esophagitis     CAD (coronary artery disease)     Lung cancer (H)     Uncomplicated asthma     Hyponatremia     SOB (shortness of breath)     Nausea with vomiting     Carcinoma, lung, left (H)      Current Medications and Allergies:  See printed Medication Report

## 2018-04-19 NOTE — PROGRESS NOTES
Clinic Care Coordination Contact  [facility]/Voicemail    Pt had returned call and left message for writer explaining that he is running low on his cell phone minutes.  Pt asked writer to call him back and to leave a message.  He planned on getting his minutes increased so that we could discuss his SSDI application.    Referral Source: PCP  Clinical Data: Care Coordinator Outreach  Outreach attempted x 2.  Left message on voicemail with call back information and requested return call.  Plan: Care Coordinator mailed out care coordination introduction letter on 4-18-19. Care Coordinator will try to reach patient again in 1-5 business days.    Sofya Unger  Social Work Care Coordinator  [facility]  [phone]

## 2018-04-20 ENCOUNTER — HOSPITAL ENCOUNTER (EMERGENCY)
Facility: CLINIC | Age: 68
Discharge: HOME | End: 2018-04-20
Attending: FAMILY MEDICINE | Admitting: FAMILY MEDICINE
Payer: MEDICARE

## 2018-04-20 VITALS
OXYGEN SATURATION: 95 % | SYSTOLIC BLOOD PRESSURE: 146 MMHG | TEMPERATURE: 98 F | WEIGHT: 162 LBS | BODY MASS INDEX: 20.25 KG/M2 | DIASTOLIC BLOOD PRESSURE: 84 MMHG | RESPIRATION RATE: 16 BRPM | HEART RATE: 108 BPM

## 2018-04-20 DIAGNOSIS — F41.9 ANXIETY: ICD-10-CM

## 2018-04-20 PROCEDURE — 99283 EMERGENCY DEPT VISIT LOW MDM: CPT | Mod: Z6 | Performed by: FAMILY MEDICINE

## 2018-04-20 PROCEDURE — 99283 EMERGENCY DEPT VISIT LOW MDM: CPT

## 2018-04-20 RX ORDER — LORAZEPAM 0.5 MG/1
0.5 TABLET ORAL EVERY 8 HOURS PRN
Qty: 20 TABLET | Refills: 0 | Status: SHIPPED | OUTPATIENT
Start: 2018-04-20 | End: 2018-05-09

## 2018-04-20 ASSESSMENT — ENCOUNTER SYMPTOMS
SHORTNESS OF BREATH: 1
NERVOUS/ANXIOUS: 1
COUGH: 1
WHEEZING: 1
APPETITE CHANGE: 1
BACK PAIN: 0
FEVER: 0
CHILLS: 0
UNEXPECTED WEIGHT CHANGE: 1

## 2018-04-20 NOTE — ED AVS SNAPSHOT
[facility]    [address] [facility] [address]    Phone:  [phone]    Fax:  [phone]                                       Chilo Oneil   MRN: [id number]    Department:  [facility]   Date of Visit:  4/20/2018           After Visit Summary Signature Page     I have received my discharge instructions, and my questions have been answered. I have discussed any challenges I see with this plan with the nurse or doctor.    ..........................................................................................................................................  Patient/Patient Representative Signature      ..........................................................................................................................................  Patient Representative Print Name and Relationship to Patient    ..................................................               ................................................  Date                                            Time    ..........................................................................................................................................  Reviewed by Signature/Title    ...................................................              ..............................................  Date                                                            Time

## 2018-04-20 NOTE — ED AVS SNAPSHOT
[facility] Emergency Department    [address] [address]    Phone:  [phone]    Fax:  [phone]                                       Chilo Oneil   MRN: [id number]    Department:  [facility] Emergency Department   Date of Visit:  4/20/2018           Patient Information     Date Of Birth          1950        Your diagnoses for this visit were:     Anxiety        You were seen by Kalin Zaragoza MD.      Follow-up Information     Follow up with Manish Plasencia MD In 1 week.    Specialty:  Family Practice    Contact information:    [address] [address]  [phone]          Discharge Instructions         Treating Anxiety Disorders with Medicine  An anxiety disorder can make you feel nervous or apprehensive, even without a clear reason. In people age 65 and older, generalized anxiety disorder is one of the most commonly diagnosed anxiety disorders. Many times it occurs with depression. Certain anxiety disorders can cause intense feelings of fear or panic. You may even have physical symptoms such as a racing heartbeat, sweating, or dizziness. If you have these feelings, you don t have to suffer anymore. Treatment to help you overcome your fears will likely include therapy (also called counseling). Medicine may also be prescribed to help control your symptoms.    Medicines  Certain medicines may be prescribed to help control your symptoms. So you may feel less anxious. You may also feel able to move forward with therapy. At first, medicines and dosages may need to be adjusted to find what works best for you. Try to be patient. Tell your healthcare provider how a medicine makes you feel. This way, you can work together to find the treatment that s best for you. Keep in mind that medicines can have side effects. Talk with your provider about any side effects that are bothering you. Changing the dose or type of medicine may help. Don t stop  taking medicine on your own. That can cause symptoms to come back.    Anti-anxiety medicine. This medicine eases symptoms and helps you relax. Your healthcare provider will explain when and how to use it. It may be prescribed for use before situations that make you anxious. You may also be told to take medicine on a regular schedule. Anti-anxiety medicine may make you feel a little sleepy or  out of it.  Don t drive a car or operate machinery while on this medicine, until you know how it affects you.  Caution  Never use alcohol or other drugs with anti-anxiety medicines. This could result in loss of muscular control, sedation, coma, or death. Also, use only the amount of medicine prescribed for you. If you think you may have taken too much, get emergency care right away.     Antidepressant medicine. This kind of medicine is often used to treat anxiety, even if you aren t depressed. An antidepressant helps balance out brain chemicals. This helps keep anxiety under control. This medicine is taken on a schedule. It takes a few weeks to start working. If you don t notice a change at first, you may just need more time. But if you don t notice results after the first few weeks, tell your provider.  Keep taking medicines as prescribed  Never change your dosage, share or use another person's medicine, or stop taking your medicines without talking to your healthcare provider first. Keep the following in mind:    Some medicines must be taken on a schedule. Make this part of your daily routine. For instance, always take your pill before brushing your teeth. A pillbox can help you remember if you ve taken your medicine each day.    Medicines are often taken for 6 to 12 months. Your healthcare provider will then evaluate whether you need to stay on them. Many people who have also had therapy may no longer need medicine to manage anxiety.    You may need to stop taking medicine slowly to give your body time to adjust. When it s  time to stop, your healthcare provider will tell you more. Remember: Never stop taking your medicine without talking to your provider first.    If symptoms return, you may need to start taking medicines again. This isn t your fault. It s just the nature of your anxiety disorder.  Special concerns    Side effects. Medicines may cause side effects. Ask your healthcare provider or pharmacist what you can expect. They may have ideas for avoiding some side effects.    Sexual problems. Some antidepressants can affect your desire for sex or your ability to have an orgasm. A change in dosage or medicine often solves the problem. If you have a sexual side effect that concerns you, tell your healthcare provider.    Addiction. If you ve never had a problem with drugs or alcohol, you may not have a problem with medicines used to treat anxiety disorders. But always discuss the medicines with your healthcare provider before taking them. If you have a history of addiction, you may not be able to use certain medicines used to treat anxiety disorders.    Medicine interactions. Always check with your pharmacist before using any over-the-counter medicines, including herbal supplements.   Date Last Reviewed: 5/1/2017 2000-2017 [employer]. [address] [place] [address]. All rights reserved. This information is not intended as a substitute for professional medical care. Always follow your healthcare professional's instructions.          Understanding Anxiety Disorders  Almost everyone gets nervous now and then. It s normal to have knots in your stomach before a test, or for your heart to race on a first date. But an anxiety disorder is much more than a case of nerves. In fact, its symptoms may be overwhelming. But treatment can relieve many of these symptoms. Talking to your healthcare provider is the first step.    What are anxiety disorders?  An anxiety disorder causes intense feelings of panic and fear.  These feelings may arise for no apparent reason. And they tend to recur again and again. They may prevent you from coping with life and cause you great distress. As a result, you may avoid anything that triggers your fear. In extreme cases, you may never leave the house. Anxiety disorders may cause other symptoms, such as:    Obsessive thoughts you can t control    Constant nightmares or painful thoughts of the past    Nausea, sweating, and muscle tension    Trouble sleeping or concentrating  What causes anxiety disorders?  Anxiety disorders tend to run in families. For some people, childhood abuse or neglect may play a role. For others, stressful life events or trauma may trigger anxiety disorders. Anxiety can trigger low self-esteem and poor coping skills.  Common anxiety disorders    Panic disorder. This causes an intense fear of being in danger.    Phobias. These are extreme fears of certain objects, places, or events.    Obsessive-compulsive disorder. This causes you to have unwanted thoughts and urges. You also may perform certain actions over and over.    Posttraumatic stress disorder. This occurs in people who have survived a terrible ordeal. It can cause nightmares and flashbacks about the event.    Generalized anxiety disorder. This causes constant worry that can greatly disrupt your life.   Getting better  You may believe that nothing can help you. Or, you might fear what others may think. But most anxiety symptoms can be eased. Having an anxiety disorder is nothing to be ashamed of. Most people do best with treatment that combines medicine and therapy. These aren t cures. But they can help you live a healthier life.  Date Last Reviewed: 2/1/2017 2000-2017 [employer]. [address] [place] [address], [place] [address]. All rights reserved. This information is not intended as a substitute for professional medical care. Always follow your healthcare professional's instructions.          Your Body s  Response to Anxiety    Normal anxiety is part of the body s natural defense system. It's an alert to a threat that is unknown, vague, or comes from your own internal fears. While you re in this state, your feelings can range from a vague sense of worry to physical sensations such as a pounding heartbeat. These feelings make you want to react to the threat. An anxiety response is normal in many situations. But when you have an anxiety disorder, the same response can occur at the wrong times.  Anxiety can be helpful  Normal anxiety is a signal from your brain that warns you of a threat and is a normal response to help you prevent something or decrease the bad effects of something you can't control. For example, anxiety is a normal response to situations that might damage your body, separate you from a loved one, or lose your job. The symptoms of anxiety can be physical and mental.  How does it feel?  At certain times, people with anxiety may have:    Dizziness    Muscle tension or pain    Restlessness    Sleeplessness    Trouble concentrating    Racing heartbeat    Fast breathing    Shaking or trembling    Stomachache    Diarrhea    Loss of energy    Sweating    Cold, clammy hands    Chest pain    Dry mouth  Anxiety can also be a problem  Anxiety can become a problem when it is hard to control, occurs for months, and interferes with important parts of your life. With an anxiety disorder, your body has the response described above, but in inappropriate ways. The response a person has depends on the anxiety disorder he or she has. With some disorders, the anxiety is way out of proportion to the threat that triggers it. With others, anxiety may occur even when there isn t a clear threat or trigger.  Who does it affect?  Some people are more prone to persistent anxiety than others. It tends to run in families, and it affects more younger people than older people, and more women than men. But no age, race, or gender is  "immune to anxiety problems.  Anxiety can be treated  The good news is that the anxiety that s disrupting your life can be treated. Check with your healthcare provider and rule out any physical problems that may be causing the anxiety symptoms. If an anxiety disorder is diagnosed seek mental healthcare. This is an illness and it can respond to treatment. Most types of anxiety disorders will respond to \"talk therapy\" and medicines. Working with your doctor or other healthcare provider, you can develop skills to help you cope with anxiety. You can also gain the perspective you need to overcome your fears. Note: Good sources of support or guidance can be found at your local hospital, mental health clinic, or an employee assistance program.  How to cope with anxiety  If anxiety is wearing you down, here are some things you can do to cope:    Keep in mind that you can t control everything about a situation. Change what you can and let the rest take its course.    Exercise--it s a great way to relieve tension and help your body feel relaxed.    Avoid caffeine and nicotine, which can make anxiety symptoms worse.    Fight the temptation to turn to alcohol or unprescribed drugs for relief. They only make things worse in the long run.    Educate yourself about anxiety disorders. Keep track of helpful online resources and books you can use during stressful periods.    Try stress management techniques such as meditation.    Consider online or in-person support groups.   Date Last Reviewed: 1/1/2017 2000-2017 [employer]. [address] [place] [address]. All rights reserved. This information is not intended as a substitute for professional medical care. Always follow your healthcare professional's instructions.          Your next 10 appointments already scheduled     Apr 27, 2018  1:45 PM CDT   Return Visit with Mor Mccauley MD   [facility] ([facility])    [facility] " [facility]  [address]  [facility] [address]   [phone]            May 11, 2018  2:45 PM CDT   (Arrive by 2:30 PM)   Return Visit with NATALIE Berg [facility] ([facility])    [address]  [address]  [facility] [address] [phone]              24 Hour Appointment Hotline       To make an appointment at any [facility] ([facility]), call [phone] ([phone]). If you don't have a family doctor or clinic, we will help you find one. [facility] are conveniently located to serve the needs of you and your family.             Review of your medicines      START taking        Dose / Directions Last dose taken    LORazepam 0.5 MG tablet   Commonly known as:  ATIVAN   Dose:  0.5 mg   Quantity:  20 tablet        Take 1 tablet (0.5 mg) by mouth every 8 hours as needed for anxiety   Refills:  0          Our records show that you are taking the medicines listed below. If these are incorrect, please call your family doctor or clinic.        Dose / Directions Last dose taken    acetaminophen 325 MG tablet   Commonly known as:  TYLENOL   Dose:  975 mg   Quantity:  100 tablet        Take 3 tablets (975 mg) by mouth every 8 hours   Refills:  0        albuterol 108 (90 Base) MCG/ACT Inhaler   Commonly known as:  PROAIR HFA/PROVENTIL HFA/VENTOLIN HFA   Dose:  2 puff   Quantity:  1 Inhaler        Inhale 2 puffs into the lungs every 4 hours as needed for shortness of breath / dyspnea or wheezing   Refills:  1        ALPRAZolam 0.5 MG tablet   Commonly known as:  XANAX   Dose:  0.5 mg   Quantity:  20 tablet        Take 1 tablet (0.5 mg) by mouth 3 times daily as needed for anxiety   Refills:  0        ASPIRIN PO   Dose:  81 mg        Take 81 mg by mouth daily   Refills:  0        EQL NICOTINE 2 MG lozenge   Dose:  2 mg   Generic drug:  nicotine polacrilex        Place 2 mg inside cheek every hour as needed for smoking  cessation   Refills:  0        hydrochlorothiazide 25 MG tablet   Commonly known as:  HYDRODIURIL   Dose:  25 mg   Quantity:  90 tablet        Take 1 tablet (25 mg) by mouth daily   Refills:  3        losartan 50 MG tablet   Commonly known as:  COZAAR   Dose:  50 mg   Quantity:  90 tablet        Take 1 tablet (50 mg) by mouth daily   Refills:  3        metoprolol tartrate 50 MG tablet   Commonly known as:  LOPRESSOR   Dose:  50 mg   Quantity:  60 tablet        Take 1 tablet (50 mg) by mouth 2 times daily   Refills:  1        ondansetron 8 MG ODT tab   Commonly known as:  ZOFRAN-ODT   Dose:  8 mg   Quantity:  30 tablet        Take 1 tablet (8 mg) by mouth every 8 hours as needed for nausea   Refills:  1        oxyCODONE IR 5 MG tablet   Commonly known as:  ROXICODONE   Dose:  5-10 mg   Quantity:  60 tablet        Take 1-2 tablets (5-10 mg) by mouth 2 times daily as needed for moderate to severe pain   Refills:  0        rosuvastatin 5 MG tablet   Commonly known as:  CRESTOR   Dose:  5 mg   Quantity:  30 tablet        Take 1 tablet (5 mg) by mouth daily   Refills:  3        senna-docusate 8.6-50 MG per tablet   Commonly known as:  SENOKOT-S;PERICOLACE   Dose:  2 tablet   Quantity:  100 tablet        Take 2 tablets by mouth 2 times daily   Refills:  0                Prescriptions were sent or printed at these locations (1 Prescription)                   Other Prescriptions                Printed at Department/Unit printer (1 of 1)         LORazepam (ATIVAN) 0.5 MG tablet                Orders Needing Specimen Collection     None      Pending Results     No orders found from 4/18/2018 to 4/21/2018.            Pending Culture Results     No orders found from 4/18/2018 to 4/21/2018.            Pending Results Instructions     If you had any lab results that were not finalized at the time of your Discharge, you can call the ED Lab Result RN at [phone]. You will be contacted by this team for any positive Lab results or  "changes in treatment. The nurses are available 7 days a week from 10A to 6:30P.  You can leave a message 24 hours per day and they will return your call.        Test Results From Your Hospital Stay               Thank you for choosing [facility]       Thank you for choosing [facility] for your care. Our goal is always to provide you with excellent care. Hearing back from our patients is one way we can continue to improve our services. Please take a few minutes to complete the written survey that you may receive in the mail after you visit with us. Thank you!        [email or web address] Information     [employer] lets you send messages to your doctor, view your test results, renew your prescriptions, schedule appointments and more. To sign up, go to [email or web address] . Click on \"Log in\" on the left side of the screen, which will take you to the Welcome page. Then click on \"Sign up Now\" on the right side of the page.     You will be asked to enter the access code listed below, as well as some personal information. Please follow the directions to create your username and password.     Your access code is: [email or web address]  Expires: 2018 10:49 AM     Your access code will  in 90 days. If you need help or a new code, please call your [facility] clinic or [phone].        Care EveryWhere ID     This is your Care EveryWhere ID. This could be used by other organizations to access your [facility] medical records  [id number]        Equal Access to Services     [place] : Hadii krysta walterso Soearl, waaxda luqadaha, qaybta kaalmada adeegyada, celina hewitt . So [facility] [phone].    ATENCIÓN: Si habla español, tiene a galaviz disposición servicios gratuitos de asistencia lingüística. Llame al [phone].    We comply with applicable federal civil rights laws and [place] laws. We do not discriminate on the basis of race, color, national origin, age, disability, sex, sexual orientation, or gender " identity.            After Visit Summary       This is your record. Keep this with you and show to your community pharmacist(s) and doctor(s) at your next visit.

## 2018-04-21 DIAGNOSIS — F41.9 ANXIETY: Primary | ICD-10-CM

## 2018-04-21 NOTE — DISCHARGE INSTRUCTIONS
Treating Anxiety Disorders with Medicine  An anxiety disorder can make you feel nervous or apprehensive, even without a clear reason. In people age 65 and older, generalized anxiety disorder is one of the most commonly diagnosed anxiety disorders. Many times it occurs with depression. Certain anxiety disorders can cause intense feelings of fear or panic. You may even have physical symptoms such as a racing heartbeat, sweating, or dizziness. If you have these feelings, you don t have to suffer anymore. Treatment to help you overcome your fears will likely include therapy (also called counseling). Medicine may also be prescribed to help control your symptoms.    Medicines  Certain medicines may be prescribed to help control your symptoms. So you may feel less anxious. You may also feel able to move forward with therapy. At first, medicines and dosages may need to be adjusted to find what works best for you. Try to be patient. Tell your healthcare provider how a medicine makes you feel. This way, you can work together to find the treatment that s best for you. Keep in mind that medicines can have side effects. Talk with your provider about any side effects that are bothering you. Changing the dose or type of medicine may help. Don t stop taking medicine on your own. That can cause symptoms to come back.    Anti-anxiety medicine. This medicine eases symptoms and helps you relax. Your healthcare provider will explain when and how to use it. It may be prescribed for use before situations that make you anxious. You may also be told to take medicine on a regular schedule. Anti-anxiety medicine may make you feel a little sleepy or  out of it.  Don t drive a car or operate machinery while on this medicine, until you know how it affects you.  Caution  Never use alcohol or other drugs with anti-anxiety medicines. This could result in loss of muscular control, sedation, coma, or death. Also, use only the amount of medicine  prescribed for you. If you think you may have taken too much, get emergency care right away.     Antidepressant medicine. This kind of medicine is often used to treat anxiety, even if you aren t depressed. An antidepressant helps balance out brain chemicals. This helps keep anxiety under control. This medicine is taken on a schedule. It takes a few weeks to start working. If you don t notice a change at first, you may just need more time. But if you don t notice results after the first few weeks, tell your provider.  Keep taking medicines as prescribed  Never change your dosage, share or use another person's medicine, or stop taking your medicines without talking to your healthcare provider first. Keep the following in mind:    Some medicines must be taken on a schedule. Make this part of your daily routine. For instance, always take your pill before brushing your teeth. A pillbox can help you remember if you ve taken your medicine each day.    Medicines are often taken for 6 to 12 months. Your healthcare provider will then evaluate whether you need to stay on them. Many people who have also had therapy may no longer need medicine to manage anxiety.    You may need to stop taking medicine slowly to give your body time to adjust. When it s time to stop, your healthcare provider will tell you more. Remember: Never stop taking your medicine without talking to your provider first.    If symptoms return, you may need to start taking medicines again. This isn t your fault. It s just the nature of your anxiety disorder.  Special concerns    Side effects. Medicines may cause side effects. Ask your healthcare provider or pharmacist what you can expect. They may have ideas for avoiding some side effects.    Sexual problems. Some antidepressants can affect your desire for sex or your ability to have an orgasm. A change in dosage or medicine often solves the problem. If you have a sexual side effect that concerns you, tell your  healthcare provider.    Addiction. If you ve never had a problem with drugs or alcohol, you may not have a problem with medicines used to treat anxiety disorders. But always discuss the medicines with your healthcare provider before taking them. If you have a history of addiction, you may not be able to use certain medicines used to treat anxiety disorders.    Medicine interactions. Always check with your pharmacist before using any over-the-counter medicines, including herbal supplements.   Date Last Reviewed: 5/1/2017 2000-2017 [employer]. [address] [place] [address]. All rights reserved. This information is not intended as a substitute for professional medical care. Always follow your healthcare professional's instructions.          Understanding Anxiety Disorders  Almost everyone gets nervous now and then. It s normal to have knots in your stomach before a test, or for your heart to race on a first date. But an anxiety disorder is much more than a case of nerves. In fact, its symptoms may be overwhelming. But treatment can relieve many of these symptoms. Talking to your healthcare provider is the first step.    What are anxiety disorders?  An anxiety disorder causes intense feelings of panic and fear. These feelings may arise for no apparent reason. And they tend to recur again and again. They may prevent you from coping with life and cause you great distress. As a result, you may avoid anything that triggers your fear. In extreme cases, you may never leave the house. Anxiety disorders may cause other symptoms, such as:    Obsessive thoughts you can t control    Constant nightmares or painful thoughts of the past    Nausea, sweating, and muscle tension    Trouble sleeping or concentrating  What causes anxiety disorders?  Anxiety disorders tend to run in families. For some people, childhood abuse or neglect may play a role. For others, stressful life events or trauma may trigger  anxiety disorders. Anxiety can trigger low self-esteem and poor coping skills.  Common anxiety disorders    Panic disorder. This causes an intense fear of being in danger.    Phobias. These are extreme fears of certain objects, places, or events.    Obsessive-compulsive disorder. This causes you to have unwanted thoughts and urges. You also may perform certain actions over and over.    Posttraumatic stress disorder. This occurs in people who have survived a terrible ordeal. It can cause nightmares and flashbacks about the event.    Generalized anxiety disorder. This causes constant worry that can greatly disrupt your life.   Getting better  You may believe that nothing can help you. Or, you might fear what others may think. But most anxiety symptoms can be eased. Having an anxiety disorder is nothing to be ashamed of. Most people do best with treatment that combines medicine and therapy. These aren t cures. But they can help you live a healthier life.  Date Last Reviewed: 2/1/2017 2000-2017 [employer]. [address] [place] [address]. All rights reserved. This information is not intended as a substitute for professional medical care. Always follow your healthcare professional's instructions.          Your Body s Response to Anxiety    Normal anxiety is part of the body s natural defense system. It's an alert to a threat that is unknown, vague, or comes from your own internal fears. While you re in this state, your feelings can range from a vague sense of worry to physical sensations such as a pounding heartbeat. These feelings make you want to react to the threat. An anxiety response is normal in many situations. But when you have an anxiety disorder, the same response can occur at the wrong times.  Anxiety can be helpful  Normal anxiety is a signal from your brain that warns you of a threat and is a normal response to help you prevent something or decrease the bad effects of something  "you can't control. For example, anxiety is a normal response to situations that might damage your body, separate you from a loved one, or lose your job. The symptoms of anxiety can be physical and mental.  How does it feel?  At certain times, people with anxiety may have:    Dizziness    Muscle tension or pain    Restlessness    Sleeplessness    Trouble concentrating    Racing heartbeat    Fast breathing    Shaking or trembling    Stomachache    Diarrhea    Loss of energy    Sweating    Cold, clammy hands    Chest pain    Dry mouth  Anxiety can also be a problem  Anxiety can become a problem when it is hard to control, occurs for months, and interferes with important parts of your life. With an anxiety disorder, your body has the response described above, but in inappropriate ways. The response a person has depends on the anxiety disorder he or she has. With some disorders, the anxiety is way out of proportion to the threat that triggers it. With others, anxiety may occur even when there isn t a clear threat or trigger.  Who does it affect?  Some people are more prone to persistent anxiety than others. It tends to run in families, and it affects more younger people than older people, and more women than men. But no age, race, or gender is immune to anxiety problems.  Anxiety can be treated  The good news is that the anxiety that s disrupting your life can be treated. Check with your healthcare provider and rule out any physical problems that may be causing the anxiety symptoms. If an anxiety disorder is diagnosed seek mental healthcare. This is an illness and it can respond to treatment. Most types of anxiety disorders will respond to \"talk therapy\" and medicines. Working with your doctor or other healthcare provider, you can develop skills to help you cope with anxiety. You can also gain the perspective you need to overcome your fears. Note: Good sources of support or guidance can be found at your local hospital, " mental health clinic, or an employee assistance program.  How to cope with anxiety  If anxiety is wearing you down, here are some things you can do to cope:    Keep in mind that you can t control everything about a situation. Change what you can and let the rest take its course.    Exercise--it s a great way to relieve tension and help your body feel relaxed.    Avoid caffeine and nicotine, which can make anxiety symptoms worse.    Fight the temptation to turn to alcohol or unprescribed drugs for relief. They only make things worse in the long run.    Educate yourself about anxiety disorders. Keep track of helpful online resources and books you can use during stressful periods.    Try stress management techniques such as meditation.    Consider online or in-person support groups.   Date Last Reviewed: 1/1/2017 2000-2017 [employer]. [address] [place] [address], [place] [address]. All rights reserved. This information is not intended as a substitute for professional medical care. Always follow your healthcare professional's instructions.

## 2018-04-21 NOTE — ED PROVIDER NOTES
History     Chief Complaint   Patient presents with     Medication Refill     desires refill of xanax 0.5mg tablets for anxiety.      HPI  Chilo Oneil is a 68 year old male who presents with concern about anxiety.  He has recently been diagnosed with lung cancer and that is causing him to get somewhat anxious.  He thinks that his intermittent breathing problems exacerbate this issue.  He is due to start chemotherapy treatment in the near future.  He states his breathing is actually pretty good right now he is not having any chest pain or serious shortness of breath.  He has been using some Xanax that he was prescribed and that works but it does not last very long.    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    He reports that he went for a resection operation and unfortunately the tumor had spread and when they went into his chest they are unable to do any resections of latest abandon the procedure at that time and that is why he is can be set up for chemotherapy.    Problem List:    Patient Active Problem List    Diagnosis Date Noted     Carcinoma, lung, left (H) 04/12/2018     Priority: Medium     CAD (coronary artery disease) 03/28/2018     Priority: Medium     No previous angiogram.  No previous stenting.    Atypical Stress Test consistent with possible CAD 8/2016: Myocardial perfusion imaging using single isotope technique demonstrated a small of inferior ischemia at the base to mid ventricle There is a second moderate area of ischemia in the anterior and anteroseptal wall from base through mid ventricle, involving the lateral base as well. There is a small fixed portion in the anteroseptal base consistent with prior infarction There is a fixed inferoseptal defect from apex to mid ventricle consistent with either prior nontransmural infarct or attenuation artifact. Gated images demonstrated inferior, inferoseptal, anterior and anteroseptal basal hypokinesis.  The left ventricular systolic function is  52% at rest and 47% post stress.       Hyponatremia 03/28/2018     Priority: Medium     SOB (shortness of breath) 03/28/2018     Priority: Medium     Nausea with vomiting 03/28/2018     Priority: Medium     Lung cancer (H)      Priority: Medium     Left shoulder pain, cough. Bx 12/2017- Non Small Cell. Resection planned 4/2018       Uncomplicated asthma      Priority: Medium     Anxiety 12/28/2017     Priority: Medium     Gastroesophageal reflux disease without esophagitis 12/28/2017     Priority: Medium     PAD (peripheral artery disease) (H) 06/13/2016     Priority: Medium     Benign essential hypertension 06/13/2016     Priority: Medium     Hyperlipidemia LDL goal <100 06/13/2016     Priority: Medium     Alcohol use, daily use 09/20/2013     Priority: Medium     Tobacco use disorder 10/16/2009     Priority: Medium        Past Medical History:    Past Medical History:   Diagnosis Date     GERD (gastroesophageal reflux disease)      HTN (hypertension)      Lung cancer (H)      MI (myocardial infarction)        Past Surgical History:    Past Surgical History:   Procedure Laterality Date     FRACTURE TX, ANKLE RT/LT  1975    Fracture TX Ankle, LT     OPEN REDUCTION INTERNAL FIXATION HIP NAILING  9/20/2013    Procedure: OPEN REDUCTION INTERNAL FIXATION HIP NAILING;  Left Hip Open Reduction Internal Fixation ;  Surgeon: Rosas Dennis MD;  Location: [facility] OR     THORACOSCOPIC BIOPSY LUNG Left 4/11/2018    Procedure: THORACOSCOPIC BIOPSY LUNG;  subxiphoid left video assisted thorascopic surgery pleural biops, left lower lobe wedge biopsy ;  Surgeon: Mega Moreno MD;  Location:  OR       Family History:    Family History   Problem Relation Age of Onset     DIABETES Brother      type 2     DIABETES Father        Social History:  Marital Status:  Single [1]  Social History   Substance Use Topics     Smoking status: Current Every Day Smoker     Packs/day: 0.15     Years: 47.00     Types: Cigarettes      Start date: 12/26/1967     Smokeless tobacco: Never Used      Comment: 2-3 cigs daily     Alcohol use Yes      Comment: 6-8 beers per day, last 3/27 at 6pm        Medications:      ALPRAZolam (XANAX) 0.5 MG tablet   LORazepam (ATIVAN) 0.5 MG tablet   acetaminophen (TYLENOL) 325 MG tablet   albuterol (PROAIR HFA/PROVENTIL HFA/VENTOLIN HFA) 108 (90 BASE) MCG/ACT Inhaler   ASPIRIN PO   hydrochlorothiazide (HYDRODIURIL) 25 MG tablet   losartan (COZAAR) 50 MG tablet   metoprolol (LOPRESSOR) 50 MG tablet   nicotine polacrilex (EQL NICOTINE) 2 MG lozenge   ondansetron (ZOFRAN-ODT) 8 MG ODT tab   oxyCODONE IR (ROXICODONE) 5 MG tablet   rosuvastatin (CRESTOR) 5 MG tablet   senna-docusate (SENOKOT-S;PERICOLACE) 8.6-50 MG per tablet         Review of Systems   Constitutional: Positive for appetite change and unexpected weight change. Negative for chills and fever.   Respiratory: Positive for cough, shortness of breath and wheezing.    Cardiovascular: Negative for chest pain.   Musculoskeletal: Negative for back pain.   Skin: Negative for rash.   Psychiatric/Behavioral: The patient is nervous/anxious.        Physical Exam   BP: 146/84  Pulse: 117  Temp: 98.5  F (36.9  C)  Resp: 16  Weight: 73.5 kg (162 lb)  SpO2: 95 %      Physical Exam   Constitutional: He appears well-developed and well-nourished. No distress.   HENT:   Head: Normocephalic and atraumatic.   Eyes: Pupils are equal, round, and reactive to light.   Neck: Normal range of motion. Neck supple.   Cardiovascular: Normal rate, regular rhythm and normal heart sounds.    Pulmonary/Chest: Effort normal. No respiratory distress. He has wheezes (mild exp.).   Musculoskeletal: Normal range of motion.   Neurological: He is alert.   Skin: Skin is warm. Rash (numerous dry scaly patches) noted. He is not diaphoretic.   Surgical wounds on abd healing well   Psychiatric: He has a normal mood and affect. His behavior is normal. Judgment and thought content normal.       ED Course      ED Course     Procedures               Critical Care time:  none               No results found for this or any previous visit (from the past 24 hour(s)).    Medications - No data to display    Assessments & Plan (with Medical Decision Making)     I have reviewed the nursing notes.    I have reviewed the findings, diagnosis, plan and need for follow up with the patient.  I talked about the management of anxiety and medications for this.  I think he might be better served with a low dose of the longer acting benzodiazepine and to that and will switch him to Ativan 0.5 mg.  I have also asked him to follow-up with his primary later this week and he is in agreement with that plan.  He also has oncology follow-up scheduled.    [facility] database was reviewed and no unusual or excessive prescribing was noted.    New Prescriptions    LORAZEPAM (ATIVAN) 0.5 MG TABLET    Take 1 tablet (0.5 mg) by mouth every 8 hours as needed for anxiety       Final diagnoses:   Anxiety       4/20/2018   [facility] EMERGENCY DEPARTMENT     Kalin Zaragoza MD  04/20/18 2524

## 2018-04-23 NOTE — PROGRESS NOTES
Clinic Care Coordination Contact  Care Team Conversations    SW received return call from the pt today.  Pt explained to me that he had been contacted by the Social Security Administration (SSA)  around the time he was dx with lung cancer and informed that he could earn up to an additional $200/month in disability benefits if he completed the proper documentation.  Pt has been working on this for awhile now & can't seem to figure out what else needs to be turn into the SSA to complete his application.  Pt shared he does not have enough minutes on his cell phone to wait for answers, and often hangs up.    SW and pt discussed that this writer will need a release of information signed for the SSA and any paperwork from SSA that would assist this writer in getting his application approved.  Pt stated he will be at the clinic next on 4-27-18 and can bring everything to me on that day.  This writer will be out of the office at a conference that day, however, will leave the SUNDAY and an envelope, the he pt to put his paperwork for me, in at the Oncology .    Referral Information:  Referral Source: PCP    Primary Diagnosis: Hematological Disorder (Lung Cancer with mets)    Chief Complaint   Patient presents with     Clinic Care Coordination - Initial     social work     Clinic Care Coordination - Follow-up     social work      Universal Utilization:   Utilization    Last refreshed: 4/21/2018  9:28 PM:  No Show Count (past year) 4       Last refreshed: 4/21/2018  9:28 PM:  ED visits 3       Last refreshed: 4/21/2018  9:28 PM:  Hospital admissions 2          Current as of: 4/21/2018  9:28 PM           Clinical Concerns:  Current Medical Concerns:  New dx of lung cancer; given 6 months of live  Current Behavioral Concerns: Anxiety; pt shares he gets very anxious with his new lung CA dx and with thinking of end of life cares    Education Provided to patient: SW and pt discussed what is most important to him at this time  & pt shared his Social Security application.  SW to assist with getting this in order.  Next we can focus on addressing his other issues   Clinical Pathway Name: Other  Health Maintenance Reviewed: Due/Overdue--pt has no need to address at this time, though.    Medication Management:  ED changed his anxiety meds to longer lasting ones & pt states they are helpful.    Functional Status:  Independent at this time.     Transportation:  Pt uses his roommates car, if he is home.  Otherwise, pt has to pay for transportation      Psychosocial:  Current living arrangement::  Has a roommate; lives in a home.     Financial/Insurance: Pt gets social security.  Medicare.  We will discuss if pt is [place] eligible soon, pt did not have many minutes on his phone.     Resources and Interventions:  Current Resources: friends       Goals        General    Financial Wellbeing (pt-stated)     Notes - Note created  4/23/2018 12:47 PM by Sofya Paulino LSW    Goal Statement: I want to complete my Social Security application  Measure of Success: My Social Security application will be completed  Supportive Steps to Achieve: SW and pt will finish the remaining paperwork that needs to be completed  Barriers: None identified at this time  Strengths: Pt is motivated as the income increase will assist him greatly  Date to Achieve By: 3 months            Patient/Caregiver understanding: Pt will bring his SSA paperwork to his next Dr. Pleitez appointment and will sign a SUNDAY so that this writer can call the SSA on his behalf and find out what is needed to complete his application    Outreach Frequency: weekly/monthly  Future Appointments              In 4 days Mor Mccauley MD [facility]    In 2 weeks Janny Rangel APRN CNP M [facility]           Plan: SW to continue to follow.    Sofya Unger  Social Work Care Coordinator  [facility] [place]  [facility]  [phone]

## 2018-04-24 ENCOUNTER — TELEPHONE (OUTPATIENT)
Dept: FAMILY MEDICINE | Facility: CLINIC | Age: 68
End: 2018-04-24

## 2018-04-24 NOTE — TELEPHONE ENCOUNTER
"Reason for call:  Patient reporting a symptom    Symptom or request: anxiety    Duration (how long have symptoms been present): on going    Have you been treated for this before? Yes    Additional comments: pt calling stating he has \"really bad left shoulder pain\". He says \"I think I'm bringing this on myself\", he's feeling very anxious and doesn't know what to do. He states that he was seen in the ER and they didn't do anything for him. Call transferred to Ashlie ADAMES.    Phone Number patient can be reached at:  Home number on file [phone] (home)    Best Time:  any    Can we leave a detailed message on this number:  YES    Call taken on 4/24/2018 at 2:20 PM by Imelda Robin    "

## 2018-04-24 NOTE — TELEPHONE ENCOUNTER
Spoke with pt and he states that he has constant in his L shoulder that shoot down his arm.  States he has only slept one hour in the last two nights.      States that the pain is constant.  Describes as aching with sharp shooting pains intermittently.  Denies decreased ROM in shoulder or strength in hand.  Denies SOB or chest pain.  States that the pain is not managed with oxycodone prescribed by Dr. Plasencia.      Recommended that pt schedule an appointment in clinic to address shoulder pain specifically as there does not appear to be imaging or assessment of this particular pain recently.  Pt states understanding and then states he needs to hang up phone for another call.    Ashlie WISDOM RN

## 2018-04-24 NOTE — TELEPHONE ENCOUNTER
ALPRAZolam (XANAX) 0.5 MG tablet      Last Written Prescription Date:  04/08/2018  Last Fill Quantity: 20,   # refills: 0  Last Office Visit: 04/17/2018  Future Office visit:    Next 5 appointments (look out 90 days)     Apr 27, 2018  1:45 PM CDT   Return Visit with Mor Mccauley MD   [facility] ([facility])    [facility]  [address]  [facility] [address]   [phone]                   Routing refill request to provider for review/approval because:  Drug not on the [facility], [facility] or [facility] refill protocol or controlled substance

## 2018-04-27 ENCOUNTER — ONCOLOGY VISIT (OUTPATIENT)
Dept: ONCOLOGY | Facility: CLINIC | Age: 68
End: 2018-04-27
Attending: INTERNAL MEDICINE
Payer: MEDICARE

## 2018-04-27 VITALS
HEIGHT: 75 IN | HEART RATE: 85 BPM | WEIGHT: 163.7 LBS | SYSTOLIC BLOOD PRESSURE: 146 MMHG | BODY MASS INDEX: 20.35 KG/M2 | RESPIRATION RATE: 18 BRPM | TEMPERATURE: 96.8 F | OXYGEN SATURATION: 96 % | DIASTOLIC BLOOD PRESSURE: 66 MMHG

## 2018-04-27 DIAGNOSIS — C34.92 CARCINOMA, LUNG, LEFT (H): Primary | ICD-10-CM

## 2018-04-27 DIAGNOSIS — E78.5 HYPERLIPIDEMIA LDL GOAL <100: Chronic | ICD-10-CM

## 2018-04-27 DIAGNOSIS — I10 BENIGN ESSENTIAL HYPERTENSION: Chronic | ICD-10-CM

## 2018-04-27 DIAGNOSIS — F17.200 TOBACCO USE DISORDER: ICD-10-CM

## 2018-04-27 DIAGNOSIS — C34.12 MALIGNANT NEOPLASM OF UPPER LOBE OF LEFT LUNG (H): Chronic | ICD-10-CM

## 2018-04-27 PROCEDURE — G0463 HOSPITAL OUTPT CLINIC VISIT: HCPCS

## 2018-04-27 PROCEDURE — 99215 OFFICE O/P EST HI 40 MIN: CPT | Performed by: INTERNAL MEDICINE

## 2018-04-27 ASSESSMENT — PAIN SCALES - GENERAL: PAINLEVEL: MODERATE PAIN (4)

## 2018-04-27 NOTE — PATIENT INSTRUCTIONS
We would like to have a port placed and to start chemotherapy 2 weeks. Dr. Mccauley would like to see you back in on C1D1 before starting chemotherapy. Please read over th information on the chemotherapy given to you today and Erica will do a more formal teach prior to you starting.        When you are in need of a refill, please call your pharmacy and they will send us a request.      Copy of appointments, and after visit summary (AVS) given to patient.      If you have any questions during business hours (M-F 8 AM- 4PM), please call Erica Elliott RN, BSN, OCN Oncology Hematology /Breast Cancer Navigator at [facility] [phone].       For questions after business hours, or on holidays/weekends, please call our after hours Nurse Triage line [phone]. Thank you.

## 2018-04-27 NOTE — NURSING NOTE
"Oncology Rooming Note    April 27, 2018 1:25 PM   Chilo Oneil is a 68 year old male who presents for:    Chief Complaint   Patient presents with     Oncology Clinic Visit     2 week follow up post surgery lung CA.      Initial Vitals: /66 (BP Location: Right arm, Patient Position: Sitting, Cuff Size: Adult Regular)  Pulse 85  Temp 96.8  F (36  C) (Tympanic)  Resp 18  Ht 1.905 m (6' 3\")  Wt 74.3 kg (163 lb 11.2 oz)  SpO2 96%  BMI 20.46 kg/m2 Estimated body mass index is 20.46 kg/(m^2) as calculated from the following:    Height as of this encounter: 1.905 m (6' 3\").    Weight as of this encounter: 74.3 kg (163 lb 11.2 oz). Body surface area is 1.98 meters squared.  Moderate Pain (4) Comment: left   No LMP for male patient.  Allergies reviewed: Yes  Medications reviewed: Yes    Medications: Medication refills not needed today.  Pharmacy name entered into [employer]: [facility] [place], [place] - [address]    Clinical concerns: 2 week follow up post surgery lung CA.     8 minutes for nursing intake (face to face time)     Dorothy Hart CMA            "

## 2018-04-27 NOTE — MR AVS SNAPSHOT
After Visit Summary   4/27/2018    Chilo Oneil    MRN: [id number]           Patient Information     Date Of Birth          1950        Visit Information        Provider Department      4/27/2018 1:45 PM Mor Mccauley MD [facility]        Today's Diagnoses     Carcinoma, lung, left (H)    -  1    Hyperlipidemia LDL goal <100        Benign essential hypertension        Tobacco use disorder        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)          Care Instructions    We would like to have a port placed and to start chemotherapy 2 weeks. Dr. Mccauley would like to see you back in on C1D1 before starting chemotherapy. Please read over th information on the chemotherapy given to you today and Erica will do a more formal teach prior to you starting.        When you are in need of a refill, please call your pharmacy and they will send us a request.      Copy of appointments, and after visit summary (AVS) given to patient.      If you have any questions during business hours (M-F 8 AM- 4PM), please call Erica Elliott RN, BSN, OCN Oncology Hematology /Breast Cancer Navigator at [facility] [phone].       For questions after business hours, or on holidays/weekends, please call our after hours Nurse Triage line [phone]. Thank you.            Follow-ups after your visit        Additional Services     GENERAL SURG ADULT REFERRAL       Your provider has referred you to: [facility]: [facility] [phone]   [email or web address]    Please be aware that coverage of these services is subject to the terms and limitations of your health insurance plan.  Call member services at your health plan with any benefit or coverage questions.      Please bring the following with you to your appointment:    (1) Any X-Rays, CTs or MRIs which have been performed.  Contact the facility where they were done  to arrange for  prior to your scheduled appointment.   (2) List of current medications   (3) This referral request   (4) Any documents/labs given to you for this referral    Port consult                  Your next 10 appointments already scheduled     May 04, 2018  9:00 AM CDT   [facility]jit Fox [facility]   [facility]    [address] [facility] [address]   [phone]            May 10, 2018  8:30 AM CDT   Level 5 with ROOM 1 [facility] ([facility])    [facility]  [address]  [facility] [address]   [phone]            May 10, 2018  9:30 AM CDT   Return Visit with Mor Mccauley MD   [facility] ([facility])    [facility]  [address]  [facility] [address]   [phone]            May 11, 2018  2:45 PM CDT   (Arrive by 2:30 PM)   Return Visit with NATALIE Berg [facility] ([facility])    [address] [place] [address]  [address] [place] [address] [phone]              Who to contact     If you have questions or need follow up information about today's clinic visit or your schedule please contact [facility] directly at [phone].  Normal or non-critical lab and imaging results will be communicated to you by MyChart, letter or phone within 4 business days after the clinic has received the results. If you do not hear from us within 7 days, please contact the clinic through MyChart or phone. If you have a critical or abnormal lab result, we will notify you by phone as soon as possible.  Submit refill requests through [employer] or call your pharmacy and they will forward the refill request to us. Please allow 3 business days for your refill to be completed.          Additional Information About Your Visit        MyChart Information      "[email or web address] lets you send messages to your doctor, view your test results, renew your prescriptions, schedule appointments and more. To sign up, go to [email or web address] . Click on \"Log in\" on the left side of the screen, which will take you to the Welcome page. Then click on \"Sign up Now\" on the right side of the page.     You will be asked to enter the access code listed below, as well as some personal information. Please follow the directions to create your username and password.     Your access code is: [email or web address]  Expires: 2018 10:49 AM     Your access code will  in 90 days. If you need help or a new code, please call your [facility] clinic or [phone].        Care EveryWhere ID     This is your Care EveryWhere ID. This could be used by other organizations to access your [facility] medical records  [id number]        Your Vitals Were     Pulse Temperature Respirations Height Pulse Oximetry BMI (Body Mass Index)    85 96.8  F (36  C) (Tympanic) 18 1.905 m (6' 3\") 96% 20.46 kg/m2       Blood Pressure from Last 3 Encounters:   18 146/66   18 146/84   18 130/78    Weight from Last 3 Encounters:   18 74.3 kg (163 lb 11.2 oz)   18 73.5 kg (162 lb)   18 75.7 kg (166 lb 12.8 oz)              We Performed the Following     GENERAL SURG ADULT REFERRAL          Today's Medication Changes          These changes are accurate as of 18  2:39 PM.  If you have any questions, ask your nurse or doctor.               These medicines have changed or have updated prescriptions.        Dose/Directions    hydrochlorothiazide 25 MG tablet   Commonly known as:  HYDRODIURIL   This may have changed:  when to take this   Used for:  Benign essential hypertension        Dose:  25 mg   Take 1 tablet (25 mg) by mouth daily   Quantity:  90 tablet   Refills:  3       losartan 50 MG tablet   Commonly known as:  COZAAR   This may have changed:  when to take this   Used for:  Benign essential " hypertension        Dose:  50 mg   Take 1 tablet (50 mg) by mouth daily   Quantity:  90 tablet   Refills:  3       rosuvastatin 5 MG tablet   Commonly known as:  CRESTOR   This may have changed:  additional instructions        Dose:  5 mg   Take 1 tablet (5 mg) by mouth daily   Quantity:  30 tablet   Refills:  3                Primary Care Provider Office Phone # Fax #    Omerrolly Jyoti Plasencia [phone] [address] [facility] [address]        Goals        General    Financial Wellbeing (pt-stated)     Notes - Note created  4/23/2018 12:47 PM by Sofya Paulino LSW    Goal Statement: I want to complete my Social Security application  Measure of Success: My Social Security application will be completed  Supportive Steps to Achieve: SW and pt will finish the remaining paperwork that needs to be completed  Barriers: None identified at this time  Strengths: Pt is motivated as the income increase will assist him greatly  Date to Achieve By: 3 months            Equal Access to Services     [place] AH: Hadii krysta walterso Soearl, waaxda luqadaha, qaybta kaalmada adeegyada, celina hewitt . So [facility] [phone].    ATENCIÓN: Si habla español, tiene a galaviz disposición servicios gratuitos de asistencia lingüística. Llame al [phone].    We comply with applicable federal civil rights laws and [place] laws. We do not discriminate on the basis of race, color, national origin, age, disability, sex, sexual orientation, or gender identity.            Thank you!     Thank you for choosing [facility]  for your care. Our goal is always to provide you with excellent care. Hearing back from our patients is one way we can continue to improve our services. Please take a few minutes to complete the written survey that you may receive in the mail after your visit with us. Thank you!             Your Updated Medication List - Protect others around you: Learn  how to safely use, store and throw away your medicines at www.disposemymeds.org.          This list is accurate as of 4/27/18  2:39 PM.  Always use your most recent med list.                   Brand Name Dispense Instructions for use Diagnosis    acetaminophen 325 MG tablet    TYLENOL    100 tablet    Take 3 tablets (975 mg) by mouth every 8 hours    Carcinoma, lung, left (H)       albuterol 108 (90 Base) MCG/ACT Inhaler    PROAIR HFA/PROVENTIL HFA/VENTOLIN HFA    1 Inhaler    Inhale 2 puffs into the lungs every 4 hours as needed for shortness of breath / dyspnea or wheezing        ALPRAZolam 0.5 MG tablet    XANAX    20 tablet    Take 1 tablet (0.5 mg) by mouth 3 times daily as needed for anxiety        ASPIRIN PO      Take 81 mg by mouth daily        EQL NICOTINE 2 MG lozenge   Generic drug:  nicotine polacrilex      Place 2 mg inside cheek every hour as needed for smoking cessation        hydrochlorothiazide 25 MG tablet    HYDRODIURIL    90 tablet    Take 1 tablet (25 mg) by mouth daily    Benign essential hypertension       LORazepam 0.5 MG tablet    ATIVAN    20 tablet    Take 1 tablet (0.5 mg) by mouth every 8 hours as needed for anxiety        losartan 50 MG tablet    COZAAR    90 tablet    Take 1 tablet (50 mg) by mouth daily    Benign essential hypertension       metoprolol tartrate 50 MG tablet    LOPRESSOR    60 tablet    Take 1 tablet (50 mg) by mouth 2 times daily    Benign essential hypertension       ondansetron 8 MG ODT tab    ZOFRAN-ODT    30 tablet    Take 1 tablet (8 mg) by mouth every 8 hours as needed for nausea    Nausea and vomiting, intractability of vomiting not specified, unspecified vomiting type       oxyCODONE IR 5 MG tablet    ROXICODONE    60 tablet    Take 1-2 tablets (5-10 mg) by mouth 2 times daily as needed for moderate to severe pain    Carcinoma, lung, left (H)       rosuvastatin 5 MG tablet    CRESTOR    30 tablet    Take 1 tablet (5 mg) by mouth daily        senna-docusate  8.6-50 MG per tablet    SENOKOT-S;PERICOLACE    100 tablet    Take 2 tablets by mouth 2 times daily    Carcinoma, lung, left (H)

## 2018-04-27 NOTE — LETTER
4/27/2018         RE: Chilo Oneil  [address]  [place] [address]        Dear Colleague,    Thank you for referring your patient, Chilo Oneil, to the [facility]. Please see a copy of my visit note below.    Hematology/ Oncology Follow-up Visit:  Apr 27, 2018    Reason for Visit:   Chief Complaint   Patient presents with     Oncology Clinic Visit     2 week follow up post surgery lung CA.        Oncologic History:  Carcinoma, lung, left (H)  Patient started with L side shoulder and has been traveling  to L/side about  to the whole L/side upper back and L side of chest for about 3 week he has had the pain has been taking aleve.  Subsequently went on to have a CT scan done.  Which showed 1.6 cm nodular infiltrate in the left upper lobe.  The lesion appear spiculated and worrisome for lung cancer.  There is a second 0.7 cm indeterminate nodule in the lingular portion of the left lung.  Subsequently the patient went on to have CT-guided biopsy.  The pathology came back showing moderately differentiated adenocarcinoma.       Interval History:  Patient is here today following his surgery.  He had subsequent noted left thoracoscopic left pleural biopsies and left lobe which resection on April 11, 2018.  The surgical pathology came back with pleural biopsies came back positive for invasive adenocarcinoma with parietal pleural invasion.  The wedge resection came back with adenocarcinoma with acinar patterns of growth moderately differentiated the tumor size is 1.1 and 0.3 cm into 2 separate tumor nodules.  Visceropleural invasion was identified the margins were negative lymphovascular invasion was not identified large venous artery involvement was not identified as well.  The pathologic staging of pT3N0.  Patient is here today to discuss further management.    Review Of Systems:  Constitutional: Negative for fever, chills, and night sweats.  Skin: negative.  Eyes: negative.  Ears/Nose/Throat:  negative.  Respiratory: No shortness of breath, dyspnea on exertion, cough, or hemoptysis.  Cardiovascular: negative.  Gastrointestinal: negative.  Genitourinary: negative.  Musculoskeletal: negative.  Neurologic: negative.  Psychiatric: negative.  Hematologic/Lymphatic/Immunologic: negative.  Endocrine: negative.    All other ROS negative unless mentioned in interval history.    Past medical, social, surgical, and family histories reviewed.    Allergies:  Allergies as of 04/27/2018 - Alcides as Reviewed 04/27/2018   Allergen Reaction Noted     Cipro [ciprofloxacin] Swelling 06/22/2009       Current Medications:  Current Outpatient Prescriptions   Medication Sig Dispense Refill     acetaminophen (TYLENOL) 325 MG tablet Take 3 tablets (975 mg) by mouth every 8 hours 100 tablet 0     albuterol (PROAIR HFA/PROVENTIL HFA/VENTOLIN HFA) 108 (90 BASE) MCG/ACT Inhaler Inhale 2 puffs into the lungs every 4 hours as needed for shortness of breath / dyspnea or wheezing 1 Inhaler 1     ALPRAZolam (XANAX) 0.5 MG tablet Take 1 tablet (0.5 mg) by mouth 3 times daily as needed for anxiety 20 tablet 0     ASPIRIN PO Take 81 mg by mouth daily        hydrochlorothiazide (HYDRODIURIL) 25 MG tablet Take 1 tablet (25 mg) by mouth daily (Patient taking differently: Take 25 mg by mouth every morning ) 90 tablet 3     LORazepam (ATIVAN) 0.5 MG tablet Take 1 tablet (0.5 mg) by mouth every 8 hours as needed for anxiety 20 tablet 0     losartan (COZAAR) 50 MG tablet Take 1 tablet (50 mg) by mouth daily (Patient taking differently: Take 50 mg by mouth every morning ) 90 tablet 3     metoprolol (LOPRESSOR) 50 MG tablet Take 1 tablet (50 mg) by mouth 2 times daily 60 tablet 1     nicotine polacrilex (EQL NICOTINE) 2 MG lozenge Place 2 mg inside cheek every hour as needed for smoking cessation       oxyCODONE IR (ROXICODONE) 5 MG tablet Take 1-2 tablets (5-10 mg) by mouth 2 times daily as needed for moderate to severe pain 60 tablet 0      "rosuvastatin (CRESTOR) 5 MG tablet Take 1 tablet (5 mg) by mouth daily (Patient taking differently: Take 5 mg by mouth daily PICKING THIS RX UP TODAY FOR THE FIRST TIME.) 30 tablet 3     senna-docusate (SENOKOT-S;PERICOLACE) 8.6-50 MG per tablet Take 2 tablets by mouth 2 times daily 100 tablet 0     ondansetron (ZOFRAN-ODT) 8 MG ODT tab Take 1 tablet (8 mg) by mouth every 8 hours as needed for nausea (Patient not taking: Reported on 4/27/2018) 30 tablet 1        Physical Exam:  /66 (BP Location: Right arm, Patient Position: Sitting, Cuff Size: Adult Regular)  Pulse 85  Temp 96.8  F (36  C) (Tympanic)  Resp 18  Ht 1.905 m (6' 3\")  Wt 74.3 kg (163 lb 11.2 oz)  SpO2 96%  BMI 20.46 kg/m2  Wt Readings from Last 12 Encounters:   04/27/18 74.3 kg (163 lb 11.2 oz)   04/20/18 73.5 kg (162 lb)   04/17/18 75.7 kg (166 lb 12.8 oz)   04/11/18 74.9 kg (165 lb 2 oz)   04/08/18 77.1 kg (170 lb)   04/04/18 77.1 kg (170 lb)   04/04/18 77.4 kg (170 lb 9.6 oz)   03/28/18 77.2 kg (170 lb 3.1 oz)   03/20/18 79.3 kg (174 lb 12.8 oz)   03/09/18 84.4 kg (186 lb)   03/07/18 84.6 kg (186 lb 8.2 oz)   02/09/18 88.5 kg (195 lb)     ECOG performance status: 1  GENERAL APPEARANCE: Healthy, alert and in no acute distress.  HEENT: Sclerae anicteric. PERRLA. Oropharynx without ulcers, lesions, or thrush.  NECK: Supple. No asymmetry or masses.  LYMPHATICS: No palpable cervical, supraclavicular, axillary, or inguinal lymphadenopathy.  RESP: Lungs clear to auscultation bilaterally without rales, rhonchi or wheezes.  CARDIOVASCULAR: Regular rate and rhythm. Normal S1, S2; no S3 or S4. No murmur, gallop, or rub.  ABDOMEN: Soft, nontender. Bowel sounds normal. No palpable organomegaly or masses.  MUSCULOSKELETAL: Extremities without gross deformities noted. No edema of bilateral lower extremities.  SKIN: No suspicious lesions or rashes.  NEURO: Alert and oriented x 3. Cranial nerves II-XII grossly intact.  PSYCHIATRIC: Mentation and affect " appear normal.    Laboratory/Imaging Studies:  No visits with results within 2 Week(s) from this visit.  Latest known visit with results is:    Orders Only on 04/04/2018   Component Date Value Ref Range Status     ABO 04/04/2018 O   Final     RH(D) 04/04/2018 Pos   Final     Antibody Screen 04/04/2018 Neg   Final     Test Valid Only At 04/04/2018 [facility]      Final     Specimen Expires 04/04/2018 04/14/2018   Final     Blood Bank Comment 04/04/2018 [facility]SC FOR[facility] [id number] [facility]   Final     Sodium 04/04/2018 130* 133 - 144 mmol/L Final     Potassium 04/04/2018 3.8  3.4 - 5.3 mmol/L Final     Chloride 04/04/2018 97  94 - 109 mmol/L Final     Carbon Dioxide 04/04/2018 28  20 - 32 mmol/L Final     Anion Gap 04/04/2018 5  3 - 14 mmol/L Final     Glucose 04/04/2018 97  70 - 99 mg/dL Final     Urea Nitrogen 04/04/2018 10  7 - 30 mg/dL Final     Creatinine 04/04/2018 0.79  0.66 - 1.25 mg/dL Final     GFR Estimate 04/04/2018 >90  >60 mL/min/1.7m2 Final    Non  GFR Calc     GFR Estimate If Black 04/04/2018 >90  >60 mL/min/1.7m2 Final    African American GFR Calc     Calcium 04/04/2018 9.2  8.5 - 10.1 mg/dL Final     Bilirubin Total 04/04/2018 0.4  0.2 - 1.3 mg/dL Final     Albumin 04/04/2018 3.7  3.4 - 5.0 g/dL Final     Protein Total 04/04/2018 7.2  6.8 - 8.8 g/dL Final     Alkaline Phosphatase 04/04/2018 76  40 - 150 U/L Final     ALT 04/04/2018 71* 0 - 70 U/L Final     AST 04/04/2018 51* 0 - 45 U/L Final     WBC 04/04/2018 8.6  4.0 - 11.0 10e9/L Final     RBC Count 04/04/2018 4.39* 4.4 - 5.9 10e12/L Final     Hemoglobin 04/04/2018 14.5  13.3 - 17.7 g/dL Final     Hematocrit 04/04/2018 41.0  40.0 - 53.0 % Final     MCV 04/04/2018 93  78 - 100 fl Final     MCH 04/04/2018 33.0  26.5 - 33.0 pg Final     MCHC 04/04/2018 35.4  31.5 - 36.5 g/dL Final     RDW 04/04/2018 12.5  10.0 - 15.0 % Final     Platelet Count 04/04/2018 359  150 - 450 10e9/L Final      Hemoglobin A1C 04/04/2018 4.7  0 - 6.4 % Final    Comment: Normal <5.7% Prediabetes 5.7-6.4%  Diabetes 6.5% or higher - adopted from ADA   consensus guidelines.       INR 04/04/2018 0.96  0.86 - 1.14 Final        Recent Results (from the past 744 hour(s))   PET Oncology Whole Body    Addendum: 4/6/2018    Addendum:    For the new FDG uptake associated with left second rib, focal brown  fat activation is also in differential.     I have personally reviewed the examination and initial interpretation  and I agree with the findings.    ROSHAN HOANG MD      Narrative    Combined Report of:    PET and CT on  4/4/2018 10:53 AM :    1. PET of the neck, chest, abdomen, and pelvis.  2. PET CT Fusion for Attenuation Correction and Anatomical  Localization:    3. Diagnostic CT scan of the chest, abdomen, and pelvis with  intravenous contrast for interpretation.  3. CT of the chest, abdomen and pelvis obtained for diagnostic  interpretation.  4. 3D MIP and PET-CT fused images were processed on an independent  workstation and archived to PACS and reviewed by a radiologist.    Technique:    1. PET: The patient received 10.8 mCi of F-18-FDG; the serum glucose  was 103 prior to administration, body weight was 72.7 kg. Images were  evaluated in the axial, sagittal, and coronal planes as well as the  rotational whole body MIP. Images were acquired from the Vertex to the  Feet.    UPTAKE WAS MEASURED AT 64 MINUTES.     BACKGROUND:  Liver SUV max= 3.51,   Aorta Blood SUV Max: 3.24.     2. CT: Volumetric acquisition for clinical interpretation of the  chest, abdomen, and pelvis acquired at 3 mm sections . The chest,  abdomen, and pelvis were evaluated at 5 mm sections in bone, soft  tissue, and lung windows.      The patient received 104 cc. Of Isovue 370 intravenously for the  examination.      3. 3D MIP and PET-CT fused images were processed on an independent  workstation and archived to PACS and reviewed by a  radiologist.    INDICATION: ; 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)    ADDITIONAL INFORMATION OBTAINED FROM EMR: none    COMPARISON: PET CT 1/3/2018    FINDINGS:     HEAD/NECK:  There is no  suspicious FDG uptake in the neck.     Mucosal thickening in the left maxillary sinus in the left frontal  sinus. The mastoid air cells are clear.     The mucosal pharyngeal space, the , prevertebral and carotid  spaces are within normal limits.     No masses, mass effect or pathologically enlarged lymph nodes are  evident. The thyroid gland is normal.    CHEST:  There is no significant change in 2.4 x 1.6 cm FDG avid mass in the  left upper lobe with max SUV of 12. It abuts the anterior chest wall  without definite invasion.    Increased size and FDG uptake of  pleural nodule in the left upper  lobe with a thickness of 5 mm and max SUV of 5.2, previously 3 mm with  max SUV of 2.6. Series 6 image 52, left upper lobe ill-defined  semisolid nodule.     There are several nonenlarged FDG avid mediastinal and hilar lymph  nodes. Compared to the prior study from 1/3/2018, the FDG uptake is  new while size wise there is no significant change. For example, 1.7  cm left hilar node with max SUV of, 1.2 cm right hilar node with max  SUV of 4.0 and 1.2 cm subcarinal node with max SUV of 3.8.    There is also new FDG uptake in some of bilateral axillary lymph nodes  which are not pathologic by size criteria and did not change in size  since 1/3/2018. Representatives may include 1.0 cm left axillary node  with max SUV of 3.9 and 1.5 cm right axillary node with max SUV of  3.3. These are probably reactive.    New FDG uptake in the left second rib with max SUV of 7.0.    No significant change in 7 mm left upper lobe calcific granuloma.    Subpleural reticular densities in both lungs.    There is no significant pericardial or plural effusions.    ABDOMEN AND PELVIS:  There is no suspicious FDG uptake in the abdomen or  pelvis.    There are no suspicious hepatic lesions. There is no splenomegaly or  evidence for splenic or pancreatic mass lesion.  There are no suspicious adrenal mass lesions or opaque gallbladder  calculi.  Thickening of the abdomen and glands left greater than  right. There is symmetric nephrographic renal phase without  hydronephrosis. Several simple renal cortical cyst on the left.    Prostatomegaly.    Diverticulosis without diverticulitis.    LOWER EXTREMITIES:   No abnormal masses or hypermetabolic lesions.    BONES:   There are no suspicious lytic or blastic osseous lesions.  There is no  abnormal FDG uptake in the skeleton. Old left femoral neck fracture  with screw fixation.      Impression    IMPRESSION: In this patient with history of recently diagnosed left  lung adenocarcinoma;  1. No significant change in 2.4 x 1.6 cm primary mass in the left  upper lobe. The mass abuts the anterior chest wall without invasion.   1a. Second smaller suspicious semisolid nodule left upper lobe.  2. New mild FDG uptake in several mediastinal and hilar lymph nodes  which are not pathologic by size criteria and not changed in size  since 1/3/2018. These are indeterminant, favor inflammatory.  3. Increased size and FDG uptake of pleural nodule in the left upper  lobe. This is probably metastatic.  4. New FDG uptake in the left second rib without CT abnormality,  suspicious for metastasis.    I have personally reviewed the examination and initial interpretation  and I agree with the findings.    ROSHAN HOANG MD   XR Chest Port 1 View    Narrative    Exam: XR CHEST PORT 1 VW, 4/11/2018 4:49 PM    Indication: s/p lung wedge;     Comparison: 3/29/2018    Findings:   Single AP view of the chest. Postsurgical changes of left lung wedge  resection. Left-sided chest tube. Minimal left perihilar opacities,  presumably postsurgical. The left apex is obscured by patient  positioning. No pleural effusion. The right lung is  clear. Heart size  normal. Calcifications of the aorta.      Impression    Impression:   1. Postoperative changes of left lung wedge resection with minimal  left perihilar opacities, likely postprocedural. No definite  pneumothorax is identified though the left apex is obscured by patient  positioning.  2. The right lung is clear.    I have personally reviewed the examination and initial interpretation  and I agree with the findings.    ROSHAN HOANG MD   XR Chest Port 1 View    Narrative    Exam: XR CHEST PORT 1 VW, 4/11/2018 5:24 PM    Indication: s/p chest tube;     Comparison: Earlier same day radiograph    Findings:   Single AP view the chest. The cardiac silhouette is stable. Left  perihilar opacities, similar to prior. Streaky left basilar  atelectasis. No pleural effusion. Tiny pneumothorax along the left  lateral midlung. Removal of left-sided chest tube.      Impression    Impression:   1. Removal of left-sided chest tube. No significant pneumothorax  identified.  2. Minimal left perihilar opacity, likely postprocedural.    I have personally reviewed the examination and initial interpretation  and I agree with the findings.    ROSHAN HOANG MD   XR Chest 2 Views   Result Value    Radiologist flags Pneumoperitoneum. (Urgent)    Narrative    Exam: XR CHEST 2 VW, 4/12/2018 10:01 AM    Indication: s/p lung resection;  subxiphoid approach.    Comparison: Chest radiograph dated 4/11/2018, CT of the chest dated  3/28/2018.    Findings:   PA and lateral views of the chest. Patchy opacities noted in the left  mid lung and base. Small left pleural effusion. Small bilateral  pneumothoraces with an air-fluid level. Air density is also noted  under the right diaphragm and in the anterior mediastinum. The cardiac  silhouette is within normal limits. Visualized portion of the upper  abdomen is unremarkable. No acute osseous pathology.         Impression    Impression:   1. Unchanged patchy opacities in the  left midlung and lung base.  2. Pneumothoraces, pneumomediastinum, abdomen and small left pleural  effusion likely related to same day procedure.    [Urgent Result: Pneumoperitoneum.]    Finding was identified on 4/12/2018 1:25 PM.     Shaun Garcia was contacted by Dr. Jose Nick at 4/12/2018 1:29 PM  and verbalized understanding of the urgent finding.     I have personally reviewed the examination and initial interpretation  and I agree with the findings.    AURELIANO ADAMS MD       Assessment and plan:  (C34.92) Carcinoma, lung, left (H)  (primary encounter diagnosis)  This is a 68-year-old male with adenocarcinoma of the lung.  Status post wedge resection and biopsies of the pleural nodules which came back positive.  I reviewed with the patient today the natural history of adenocarcinoma of the lung.  We talked about staging, biology, management, follow-up and prognosis.  We talked about systemic therapy including chemotherapy.  I would recommend patient to start on chemotherapy with cisplatin and Alimta.  The patient will be receiving Alimta 500 mg/m  on day 1 and cisplatin 75 mg/m  day 1 on a 21 day cycle.We discussed the rationale behind using cisplatin and Alimta in detail as well as major side effects including, but not limited to immediate/short term side effects of acute infusion reaction/anaphylaxis, life-threatening infection, peripheral neuropathy, arthralgias/myalgias, headache, nausea/vomiting, diarrhea/constipation, mucositis, appetite changes, alopecia , neutropenia, anemia, and thrombocytopenia, and weakness and fatigue.  Patient instructed to call with signs or symptoms of infection including, but not limited to temperature greater than or equal to 100.5 degrees Fahrenheit, chills, severe sore throat, ear or sinus pain, mouth sores, cough, painful urination.  Handouts were given to the patient to review.    (E78.5) Hyperlipidemia LDL goal <100  Patient currently on Crestor 5 mg orally  daily.    (I10) Benign essential hypertension  Patient currently on hydrochlorothiazide 25 mg orally daily.  Patient also on Lopressor 50 mg orally daily.    (F17.200) Tobacco use disorder  I strongly emphasized the importance of quitting smoking.    The patient is ready to learn, no apparent learning barriers were identified.  Diagnosis and treatment plans were explained to the patient. The patient expressed understanding of the content. The patient asked appropriate questions. The patient questions were answered to his satisfaction.    Time spent 45 minutes more than 50% of the time in counseling coordination of care including discussion of the natural history of lung cancer, management, follow-up and prognosis.  We also discussed potential side effects and benefits of chemotherapy.    Chart documentation with Dragon Voice recognition Software. Although reviewed after completion, some words and grammatical errors may remain.    Again, thank you for allowing me to participate in the care of your patient.        Sincerely,        Mor Mccauley MD

## 2018-04-30 ENCOUNTER — APPOINTMENT (OUTPATIENT)
Dept: CT IMAGING | Facility: CLINIC | Age: 68
End: 2018-04-30
Attending: STUDENT IN AN ORGANIZED HEALTH CARE EDUCATION/TRAINING PROGRAM
Payer: MEDICARE

## 2018-04-30 ENCOUNTER — HOSPITAL ENCOUNTER (EMERGENCY)
Facility: CLINIC | Age: 68
Discharge: HOME | End: 2018-04-30
Attending: STUDENT IN AN ORGANIZED HEALTH CARE EDUCATION/TRAINING PROGRAM | Admitting: STUDENT IN AN ORGANIZED HEALTH CARE EDUCATION/TRAINING PROGRAM
Payer: MEDICARE

## 2018-04-30 VITALS
HEIGHT: 75 IN | DIASTOLIC BLOOD PRESSURE: 63 MMHG | BODY MASS INDEX: 20.27 KG/M2 | WEIGHT: 163 LBS | RESPIRATION RATE: 20 BRPM | OXYGEN SATURATION: 93 % | SYSTOLIC BLOOD PRESSURE: 139 MMHG | TEMPERATURE: 98.4 F

## 2018-04-30 DIAGNOSIS — G89.29 CHRONIC LEFT-SIDED THORACIC BACK PAIN: ICD-10-CM

## 2018-04-30 DIAGNOSIS — C34.92 CARCINOMA, LUNG, LEFT (H): ICD-10-CM

## 2018-04-30 DIAGNOSIS — M54.6 CHRONIC LEFT-SIDED THORACIC BACK PAIN: ICD-10-CM

## 2018-04-30 LAB
ALBUMIN SERPL-MCNC: 3.3 G/DL (ref 3.4–5)
ALP SERPL-CCNC: 82 U/L (ref 40–150)
ALT SERPL W P-5'-P-CCNC: 59 U/L (ref 0–70)
ANION GAP SERPL CALCULATED.3IONS-SCNC: 8 MMOL/L (ref 3–14)
AST SERPL W P-5'-P-CCNC: 79 U/L (ref 0–45)
BASOPHILS # BLD AUTO: 0 10E9/L (ref 0–0.2)
BASOPHILS NFR BLD AUTO: 0.4 %
BILIRUB SERPL-MCNC: 0.5 MG/DL (ref 0.2–1.3)
BUN SERPL-MCNC: 6 MG/DL (ref 7–30)
CALCIUM SERPL-MCNC: 8.6 MG/DL (ref 8.5–10.1)
CHLORIDE SERPL-SCNC: 98 MMOL/L (ref 94–109)
CO2 SERPL-SCNC: 26 MMOL/L (ref 20–32)
CREAT SERPL-MCNC: 0.6 MG/DL (ref 0.66–1.25)
DIFFERENTIAL METHOD BLD: ABNORMAL
EOSINOPHIL # BLD AUTO: 0.1 10E9/L (ref 0–0.7)
EOSINOPHIL NFR BLD AUTO: 0.7 %
ERYTHROCYTE [DISTWIDTH] IN BLOOD BY AUTOMATED COUNT: 12.9 % (ref 10–15)
ETHANOL SERPL-MCNC: 0.21 G/DL
GFR SERPL CREATININE-BSD FRML MDRD: >90 ML/MIN/1.7M2
GLUCOSE SERPL-MCNC: 99 MG/DL (ref 70–99)
HCT VFR BLD AUTO: 36.5 % (ref 40–53)
HGB BLD-MCNC: 13.4 G/DL (ref 13.3–17.7)
IMM GRANULOCYTES # BLD: 0 10E9/L (ref 0–0.4)
IMM GRANULOCYTES NFR BLD: 0.1 %
LYMPHOCYTES # BLD AUTO: 2.1 10E9/L (ref 0.8–5.3)
LYMPHOCYTES NFR BLD AUTO: 26.4 %
MCH RBC QN AUTO: 33.3 PG (ref 26.5–33)
MCHC RBC AUTO-ENTMCNC: 36.7 G/DL (ref 31.5–36.5)
MCV RBC AUTO: 91 FL (ref 78–100)
MONOCYTES # BLD AUTO: 1 10E9/L (ref 0–1.3)
MONOCYTES NFR BLD AUTO: 12.5 %
NEUTROPHILS # BLD AUTO: 4.9 10E9/L (ref 1.6–8.3)
NEUTROPHILS NFR BLD AUTO: 59.9 %
PLATELET # BLD AUTO: 408 10E9/L (ref 150–450)
POTASSIUM SERPL-SCNC: 3.3 MMOL/L (ref 3.4–5.3)
PROT SERPL-MCNC: 6.5 G/DL (ref 6.8–8.8)
RBC # BLD AUTO: 4.03 10E12/L (ref 4.4–5.9)
SODIUM SERPL-SCNC: 132 MMOL/L (ref 133–144)
TROPONIN I SERPL-MCNC: <0.015 UG/L (ref 0–0.04)
WBC # BLD AUTO: 8.1 10E9/L (ref 4–11)

## 2018-04-30 PROCEDURE — 93010 ELECTROCARDIOGRAM REPORT: CPT | Mod: Z6 | Performed by: STUDENT IN AN ORGANIZED HEALTH CARE EDUCATION/TRAINING PROGRAM

## 2018-04-30 PROCEDURE — 80053 COMPREHEN METABOLIC PANEL: CPT | Performed by: EMERGENCY MEDICINE

## 2018-04-30 PROCEDURE — 93005 ELECTROCARDIOGRAM TRACING: CPT | Performed by: STUDENT IN AN ORGANIZED HEALTH CARE EDUCATION/TRAINING PROGRAM

## 2018-04-30 PROCEDURE — 25000125 ZZHC RX 250: Performed by: STUDENT IN AN ORGANIZED HEALTH CARE EDUCATION/TRAINING PROGRAM

## 2018-04-30 PROCEDURE — 96374 THER/PROPH/DIAG INJ IV PUSH: CPT | Mod: 59 | Performed by: STUDENT IN AN ORGANIZED HEALTH CARE EDUCATION/TRAINING PROGRAM

## 2018-04-30 PROCEDURE — 96361 HYDRATE IV INFUSION ADD-ON: CPT | Performed by: EMERGENCY MEDICINE

## 2018-04-30 PROCEDURE — 99285 EMERGENCY DEPT VISIT HI MDM: CPT | Mod: 25 | Performed by: STUDENT IN AN ORGANIZED HEALTH CARE EDUCATION/TRAINING PROGRAM

## 2018-04-30 PROCEDURE — 93005 ELECTROCARDIOGRAM TRACING: CPT | Performed by: EMERGENCY MEDICINE

## 2018-04-30 PROCEDURE — 85025 COMPLETE CBC W/AUTO DIFF WBC: CPT | Performed by: EMERGENCY MEDICINE

## 2018-04-30 PROCEDURE — 80320 DRUG SCREEN QUANTALCOHOLS: CPT | Performed by: EMERGENCY MEDICINE

## 2018-04-30 PROCEDURE — 71260 CT THORAX DX C+: CPT

## 2018-04-30 PROCEDURE — 84484 ASSAY OF TROPONIN QUANT: CPT | Performed by: EMERGENCY MEDICINE

## 2018-04-30 PROCEDURE — 25000128 H RX IP 250 OP 636: Performed by: STUDENT IN AN ORGANIZED HEALTH CARE EDUCATION/TRAINING PROGRAM

## 2018-04-30 PROCEDURE — 99285 EMERGENCY DEPT VISIT HI MDM: CPT | Mod: 25 | Performed by: EMERGENCY MEDICINE

## 2018-04-30 PROCEDURE — 96361 HYDRATE IV INFUSION ADD-ON: CPT | Performed by: STUDENT IN AN ORGANIZED HEALTH CARE EDUCATION/TRAINING PROGRAM

## 2018-04-30 PROCEDURE — 96374 THER/PROPH/DIAG INJ IV PUSH: CPT | Mod: 59 | Performed by: EMERGENCY MEDICINE

## 2018-04-30 RX ORDER — CYANOCOBALAMIN 1000 UG/ML
1000 INJECTION, SOLUTION INTRAMUSCULAR; SUBCUTANEOUS ONCE
Status: CANCELLED
Start: 2018-04-30 | End: 2018-04-30

## 2018-04-30 RX ORDER — OXYCODONE HYDROCHLORIDE 5 MG/1
5-10 TABLET ORAL EVERY 4 HOURS PRN
Qty: 6 TABLET | Refills: 0 | Status: SHIPPED | OUTPATIENT
Start: 2018-04-30 | End: 2018-05-09

## 2018-04-30 RX ORDER — ALBUTEROL SULFATE 90 UG/1
1-2 AEROSOL, METERED RESPIRATORY (INHALATION)
Status: CANCELLED
Start: 2018-05-03

## 2018-04-30 RX ORDER — SODIUM CHLORIDE 9 MG/ML
1000 INJECTION, SOLUTION INTRAVENOUS CONTINUOUS PRN
Status: CANCELLED
Start: 2018-05-03

## 2018-04-30 RX ORDER — IOPAMIDOL 755 MG/ML
80 INJECTION, SOLUTION INTRAVASCULAR ONCE
Status: COMPLETED | OUTPATIENT
Start: 2018-04-30 | End: 2018-04-30

## 2018-04-30 RX ORDER — EPINEPHRINE 0.3 MG/.3ML
0.3 INJECTION SUBCUTANEOUS EVERY 5 MIN PRN
Status: CANCELLED | OUTPATIENT
Start: 2018-05-03

## 2018-04-30 RX ORDER — ALBUTEROL SULFATE 0.83 MG/ML
2.5 SOLUTION RESPIRATORY (INHALATION)
Status: CANCELLED | OUTPATIENT
Start: 2018-05-03

## 2018-04-30 RX ORDER — EPINEPHRINE 1 MG/ML
0.3 INJECTION, SOLUTION, CONCENTRATE INTRAVENOUS EVERY 5 MIN PRN
Status: CANCELLED | OUTPATIENT
Start: 2018-05-03

## 2018-04-30 RX ORDER — METHYLPREDNISOLONE SODIUM SUCCINATE 125 MG/2ML
125 INJECTION, POWDER, LYOPHILIZED, FOR SOLUTION INTRAMUSCULAR; INTRAVENOUS
Status: CANCELLED
Start: 2018-05-03

## 2018-04-30 RX ORDER — MEPERIDINE HYDROCHLORIDE 25 MG/ML
25 INJECTION INTRAMUSCULAR; INTRAVENOUS; SUBCUTANEOUS EVERY 30 MIN PRN
Status: CANCELLED | OUTPATIENT
Start: 2018-05-03

## 2018-04-30 RX ORDER — PALONOSETRON 0.05 MG/ML
0.25 INJECTION, SOLUTION INTRAVENOUS ONCE
Status: CANCELLED
Start: 2018-05-03

## 2018-04-30 RX ORDER — LORAZEPAM 2 MG/ML
0.5 INJECTION INTRAMUSCULAR EVERY 4 HOURS PRN
Status: CANCELLED
Start: 2018-05-03

## 2018-04-30 RX ORDER — DIPHENHYDRAMINE HYDROCHLORIDE 50 MG/ML
50 INJECTION INTRAMUSCULAR; INTRAVENOUS
Status: CANCELLED
Start: 2018-05-03

## 2018-04-30 RX ADMIN — HYDROMORPHONE HYDROCHLORIDE 1 MG: 1 INJECTION, SOLUTION INTRAMUSCULAR; INTRAVENOUS; SUBCUTANEOUS at 20:30

## 2018-04-30 RX ADMIN — IOPAMIDOL 80 ML: 755 INJECTION, SOLUTION INTRAVENOUS at 20:35

## 2018-04-30 RX ADMIN — SODIUM CHLORIDE 1000 ML: 9 INJECTION, SOLUTION INTRAVENOUS at 20:47

## 2018-04-30 RX ADMIN — SODIUM CHLORIDE 99 ML: 9 INJECTION, SOLUTION INTRAVENOUS at 20:36

## 2018-04-30 NOTE — ASSESSMENT & PLAN NOTE
Patient started with L side shoulder and has been traveling  to L/side about  to the whole L/side upper back and L side of chest for about 3 week he has had the pain has been taking aleve.  Subsequently went on to have a CT scan done.  Which showed 1.6 cm nodular infiltrate in the left upper lobe.  The lesion appear spiculated and worrisome for lung cancer.  There is a second 0.7 cm indeterminate nodule in the lingular portion of the left lung.  Subsequently the patient went on to have CT-guided biopsy.  The pathology came back showing moderately differentiated adenocarcinoma.

## 2018-04-30 NOTE — PROGRESS NOTES
Hematology/ Oncology Follow-up Visit:  Apr 27, 2018    Reason for Visit:   Chief Complaint   Patient presents with     Oncology Clinic Visit     2 week follow up post surgery lung CA.        Oncologic History:  Carcinoma, lung, left (H)  Patient started with L side shoulder and has been traveling  to L/side about  to the whole L/side upper back and L side of chest for about 3 week he has had the pain has been taking aleve.  Subsequently went on to have a CT scan done.  Which showed 1.6 cm nodular infiltrate in the left upper lobe.  The lesion appear spiculated and worrisome for lung cancer.  There is a second 0.7 cm indeterminate nodule in the lingular portion of the left lung.  Subsequently the patient went on to have CT-guided biopsy.  The pathology came back showing moderately differentiated adenocarcinoma.       Interval History:  Patient is here today following his surgery.  He had subsequent noted left thoracoscopic left pleural biopsies and left lobe which resection on April 11, 2018.  The surgical pathology came back with pleural biopsies came back positive for invasive adenocarcinoma with parietal pleural invasion.  The wedge resection came back with adenocarcinoma with acinar patterns of growth moderately differentiated the tumor size is 1.1 and 0.3 cm into 2 separate tumor nodules.  Visceropleural invasion was identified the margins were negative lymphovascular invasion was not identified large venous artery involvement was not identified as well.  The pathologic staging of pT3N0.  Patient is here today to discuss further management.    Review Of Systems:  Constitutional: Negative for fever, chills, and night sweats.  Skin: negative.  Eyes: negative.  Ears/Nose/Throat: negative.  Respiratory: No shortness of breath, dyspnea on exertion, cough, or hemoptysis.  Cardiovascular: negative.  Gastrointestinal: negative.  Genitourinary: negative.  Musculoskeletal: negative.  Neurologic: negative.  Psychiatric:  negative.  Hematologic/Lymphatic/Immunologic: negative.  Endocrine: negative.    All other ROS negative unless mentioned in interval history.    Past medical, social, surgical, and family histories reviewed.    Allergies:  Allergies as of 04/27/2018 - Alcides as Reviewed 04/27/2018   Allergen Reaction Noted     Cipro [ciprofloxacin] Swelling 06/22/2009       Current Medications:  Current Outpatient Prescriptions   Medication Sig Dispense Refill     acetaminophen (TYLENOL) 325 MG tablet Take 3 tablets (975 mg) by mouth every 8 hours 100 tablet 0     albuterol (PROAIR HFA/PROVENTIL HFA/VENTOLIN HFA) 108 (90 BASE) MCG/ACT Inhaler Inhale 2 puffs into the lungs every 4 hours as needed for shortness of breath / dyspnea or wheezing 1 Inhaler 1     ALPRAZolam (XANAX) 0.5 MG tablet Take 1 tablet (0.5 mg) by mouth 3 times daily as needed for anxiety 20 tablet 0     ASPIRIN PO Take 81 mg by mouth daily        hydrochlorothiazide (HYDRODIURIL) 25 MG tablet Take 1 tablet (25 mg) by mouth daily (Patient taking differently: Take 25 mg by mouth every morning ) 90 tablet 3     LORazepam (ATIVAN) 0.5 MG tablet Take 1 tablet (0.5 mg) by mouth every 8 hours as needed for anxiety 20 tablet 0     losartan (COZAAR) 50 MG tablet Take 1 tablet (50 mg) by mouth daily (Patient taking differently: Take 50 mg by mouth every morning ) 90 tablet 3     metoprolol (LOPRESSOR) 50 MG tablet Take 1 tablet (50 mg) by mouth 2 times daily 60 tablet 1     nicotine polacrilex (EQL NICOTINE) 2 MG lozenge Place 2 mg inside cheek every hour as needed for smoking cessation       oxyCODONE IR (ROXICODONE) 5 MG tablet Take 1-2 tablets (5-10 mg) by mouth 2 times daily as needed for moderate to severe pain 60 tablet 0     rosuvastatin (CRESTOR) 5 MG tablet Take 1 tablet (5 mg) by mouth daily (Patient taking differently: Take 5 mg by mouth daily PICKING THIS RX UP TODAY FOR THE FIRST TIME.) 30 tablet 3     senna-docusate (SENOKOT-S;PERICOLACE) 8.6-50 MG per tablet  "Take 2 tablets by mouth 2 times daily 100 tablet 0     ondansetron (ZOFRAN-ODT) 8 MG ODT tab Take 1 tablet (8 mg) by mouth every 8 hours as needed for nausea (Patient not taking: Reported on 4/27/2018) 30 tablet 1        Physical Exam:  /66 (BP Location: Right arm, Patient Position: Sitting, Cuff Size: Adult Regular)  Pulse 85  Temp 96.8  F (36  C) (Tympanic)  Resp 18  Ht 1.905 m (6' 3\")  Wt 74.3 kg (163 lb 11.2 oz)  SpO2 96%  BMI 20.46 kg/m2  Wt Readings from Last 12 Encounters:   04/27/18 74.3 kg (163 lb 11.2 oz)   04/20/18 73.5 kg (162 lb)   04/17/18 75.7 kg (166 lb 12.8 oz)   04/11/18 74.9 kg (165 lb 2 oz)   04/08/18 77.1 kg (170 lb)   04/04/18 77.1 kg (170 lb)   04/04/18 77.4 kg (170 lb 9.6 oz)   03/28/18 77.2 kg (170 lb 3.1 oz)   03/20/18 79.3 kg (174 lb 12.8 oz)   03/09/18 84.4 kg (186 lb)   03/07/18 84.6 kg (186 lb 8.2 oz)   02/09/18 88.5 kg (195 lb)     ECOG performance status: 1  GENERAL APPEARANCE: Healthy, alert and in no acute distress.  HEENT: Sclerae anicteric. PERRLA. Oropharynx without ulcers, lesions, or thrush.  NECK: Supple. No asymmetry or masses.  LYMPHATICS: No palpable cervical, supraclavicular, axillary, or inguinal lymphadenopathy.  RESP: Lungs clear to auscultation bilaterally without rales, rhonchi or wheezes.  CARDIOVASCULAR: Regular rate and rhythm. Normal S1, S2; no S3 or S4. No murmur, gallop, or rub.  ABDOMEN: Soft, nontender. Bowel sounds normal. No palpable organomegaly or masses.  MUSCULOSKELETAL: Extremities without gross deformities noted. No edema of bilateral lower extremities.  SKIN: No suspicious lesions or rashes.  NEURO: Alert and oriented x 3. Cranial nerves II-XII grossly intact.  PSYCHIATRIC: Mentation and affect appear normal.    Laboratory/Imaging Studies:  No visits with results within 2 Week(s) from this visit.  Latest known visit with results is:    Orders Only on 04/04/2018   Component Date Value Ref Range Status     ABO 04/04/2018 O   Final     RH(D) " 04/04/2018 Pos   Final     Antibody Screen 04/04/2018 Neg   Final     Test Valid Only At 04/04/2018 [facility]      Final     Specimen Expires 04/04/2018 04/14/2018   Final     Blood Bank Comment 04/04/2018 PREADMIT TYSC FORM RCVD [id number] [facility]   Final     Sodium 04/04/2018 130* 133 - 144 mmol/L Final     Potassium 04/04/2018 3.8  3.4 - 5.3 mmol/L Final     Chloride 04/04/2018 97  94 - 109 mmol/L Final     Carbon Dioxide 04/04/2018 28  20 - 32 mmol/L Final     Anion Gap 04/04/2018 5  3 - 14 mmol/L Final     Glucose 04/04/2018 97  70 - 99 mg/dL Final     Urea Nitrogen 04/04/2018 10  7 - 30 mg/dL Final     Creatinine 04/04/2018 0.79  0.66 - 1.25 mg/dL Final     GFR Estimate 04/04/2018 >90  >60 mL/min/1.7m2 Final    Non  GFR Calc     GFR Estimate If Black 04/04/2018 >90  >60 mL/min/1.7m2 Final    African American GFR Calc     Calcium 04/04/2018 9.2  8.5 - 10.1 mg/dL Final     Bilirubin Total 04/04/2018 0.4  0.2 - 1.3 mg/dL Final     Albumin 04/04/2018 3.7  3.4 - 5.0 g/dL Final     Protein Total 04/04/2018 7.2  6.8 - 8.8 g/dL Final     Alkaline Phosphatase 04/04/2018 76  40 - 150 U/L Final     ALT 04/04/2018 71* 0 - 70 U/L Final     AST 04/04/2018 51* 0 - 45 U/L Final     WBC 04/04/2018 8.6  4.0 - 11.0 10e9/L Final     RBC Count 04/04/2018 4.39* 4.4 - 5.9 10e12/L Final     Hemoglobin 04/04/2018 14.5  13.3 - 17.7 g/dL Final     Hematocrit 04/04/2018 41.0  40.0 - 53.0 % Final     MCV 04/04/2018 93  78 - 100 fl Final     MCH 04/04/2018 33.0  26.5 - 33.0 pg Final     MCHC 04/04/2018 35.4  31.5 - 36.5 g/dL Final     RDW 04/04/2018 12.5  10.0 - 15.0 % Final     Platelet Count 04/04/2018 359  150 - 450 10e9/L Final     Hemoglobin A1C 04/04/2018 4.7  0 - 6.4 % Final    Comment: Normal <5.7% Prediabetes 5.7-6.4%  Diabetes 6.5% or higher - adopted from ADA   consensus guidelines.       INR 04/04/2018 0.96  0.86 - 1.14 Final        Recent Results (from the past 744 hour(s))    PET Oncology Whole Body    Addendum: 4/6/2018    Addendum:    For the new FDG uptake associated with left second rib, focal brown  fat activation is also in differential.     I have personally reviewed the examination and initial interpretation  and I agree with the findings.    ROSHAN HOANG MD      Narrative    Combined Report of:    PET and CT on  4/4/2018 10:53 AM :    1. PET of the neck, chest, abdomen, and pelvis.  2. PET CT Fusion for Attenuation Correction and Anatomical  Localization:    3. Diagnostic CT scan of the chest, abdomen, and pelvis with  intravenous contrast for interpretation.  3. CT of the chest, abdomen and pelvis obtained for diagnostic  interpretation.  4. 3D MIP and PET-CT fused images were processed on an independent  workstation and archived to PACS and reviewed by a radiologist.    Technique:    1. PET: The patient received 10.8 mCi of F-18-FDG; the serum glucose  was 103 prior to administration, body weight was 72.7 kg. Images were  evaluated in the axial, sagittal, and coronal planes as well as the  rotational whole body MIP. Images were acquired from the Vertex to the  Feet.    UPTAKE WAS MEASURED AT 64 MINUTES.     BACKGROUND:  Liver SUV max= 3.51,   Aorta Blood SUV Max: 3.24.     2. CT: Volumetric acquisition for clinical interpretation of the  chest, abdomen, and pelvis acquired at 3 mm sections . The chest,  abdomen, and pelvis were evaluated at 5 mm sections in bone, soft  tissue, and lung windows.      The patient received 104 cc. Of Isovue 370 intravenously for the  examination.      3. 3D MIP and PET-CT fused images were processed on an independent  workstation and archived to PACS and reviewed by a radiologist.    INDICATION: ; 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)    ADDITIONAL INFORMATION OBTAINED FROM EMR: none    COMPARISON: PET CT 1/3/2018    FINDINGS:     HEAD/NECK:  There is no  suspicious FDG uptake in the neck.     Mucosal thickening in the left  maxillary sinus in the left frontal  sinus. The mastoid air cells are clear.     The mucosal pharyngeal space, the , prevertebral and carotid  spaces are within normal limits.     No masses, mass effect or pathologically enlarged lymph nodes are  evident. The thyroid gland is normal.    CHEST:  There is no significant change in 2.4 x 1.6 cm FDG avid mass in the  left upper lobe with max SUV of 12. It abuts the anterior chest wall  without definite invasion.    Increased size and FDG uptake of  pleural nodule in the left upper  lobe with a thickness of 5 mm and max SUV of 5.2, previously 3 mm with  max SUV of 2.6. Series 6 image 52, left upper lobe ill-defined  semisolid nodule.     There are several nonenlarged FDG avid mediastinal and hilar lymph  nodes. Compared to the prior study from 1/3/2018, the FDG uptake is  new while size wise there is no significant change. For example, 1.7  cm left hilar node with max SUV of, 1.2 cm right hilar node with max  SUV of 4.0 and 1.2 cm subcarinal node with max SUV of 3.8.    There is also new FDG uptake in some of bilateral axillary lymph nodes  which are not pathologic by size criteria and did not change in size  since 1/3/2018. Representatives may include 1.0 cm left axillary node  with max SUV of 3.9 and 1.5 cm right axillary node with max SUV of  3.3. These are probably reactive.    New FDG uptake in the left second rib with max SUV of 7.0.    No significant change in 7 mm left upper lobe calcific granuloma.    Subpleural reticular densities in both lungs.    There is no significant pericardial or plural effusions.    ABDOMEN AND PELVIS:  There is no suspicious FDG uptake in the abdomen or pelvis.    There are no suspicious hepatic lesions. There is no splenomegaly or  evidence for splenic or pancreatic mass lesion.  There are no suspicious adrenal mass lesions or opaque gallbladder  calculi.  Thickening of the abdomen and glands left greater than  right. There  is symmetric nephrographic renal phase without  hydronephrosis. Several simple renal cortical cyst on the left.    Prostatomegaly.    Diverticulosis without diverticulitis.    LOWER EXTREMITIES:   No abnormal masses or hypermetabolic lesions.    BONES:   There are no suspicious lytic or blastic osseous lesions.  There is no  abnormal FDG uptake in the skeleton. Old left femoral neck fracture  with screw fixation.      Impression    IMPRESSION: In this patient with history of recently diagnosed left  lung adenocarcinoma;  1. No significant change in 2.4 x 1.6 cm primary mass in the left  upper lobe. The mass abuts the anterior chest wall without invasion.   1a. Second smaller suspicious semisolid nodule left upper lobe.  2. New mild FDG uptake in several mediastinal and hilar lymph nodes  which are not pathologic by size criteria and not changed in size  since 1/3/2018. These are indeterminant, favor inflammatory.  3. Increased size and FDG uptake of pleural nodule in the left upper  lobe. This is probably metastatic.  4. New FDG uptake in the left second rib without CT abnormality,  suspicious for metastasis.    I have personally reviewed the examination and initial interpretation  and I agree with the findings.    ROSHAN HOANG MD   XR Chest Port 1 View    Narrative    Exam: XR CHEST PORT 1 VW, 4/11/2018 4:49 PM    Indication: s/p lung wedge;     Comparison: 3/29/2018    Findings:   Single AP view of the chest. Postsurgical changes of left lung wedge  resection. Left-sided chest tube. Minimal left perihilar opacities,  presumably postsurgical. The left apex is obscured by patient  positioning. No pleural effusion. The right lung is clear. Heart size  normal. Calcifications of the aorta.      Impression    Impression:   1. Postoperative changes of left lung wedge resection with minimal  left perihilar opacities, likely postprocedural. No definite  pneumothorax is identified though the left apex is obscured by  patient  positioning.  2. The right lung is clear.    I have personally reviewed the examination and initial interpretation  and I agree with the findings.    ROSHAN HOANG MD   XR Chest Port 1 View    Narrative    Exam: XR CHEST PORT 1 VW, 4/11/2018 5:24 PM    Indication: s/p chest tube;     Comparison: Earlier same day radiograph    Findings:   Single AP view the chest. The cardiac silhouette is stable. Left  perihilar opacities, similar to prior. Streaky left basilar  atelectasis. No pleural effusion. Tiny pneumothorax along the left  lateral midlung. Removal of left-sided chest tube.      Impression    Impression:   1. Removal of left-sided chest tube. No significant pneumothorax  identified.  2. Minimal left perihilar opacity, likely postprocedural.    I have personally reviewed the examination and initial interpretation  and I agree with the findings.    ROSHAN HOANG MD   XR Chest 2 Views   Result Value    Radiologist flags Pneumoperitoneum. (Urgent)    Narrative    Exam: XR CHEST 2 VW, 4/12/2018 10:01 AM    Indication: s/p lung resection;  subxiphoid approach.    Comparison: Chest radiograph dated 4/11/2018, CT of the chest dated  3/28/2018.    Findings:   PA and lateral views of the chest. Patchy opacities noted in the left  mid lung and base. Small left pleural effusion. Small bilateral  pneumothoraces with an air-fluid level. Air density is also noted  under the right diaphragm and in the anterior mediastinum. The cardiac  silhouette is within normal limits. Visualized portion of the upper  abdomen is unremarkable. No acute osseous pathology.         Impression    Impression:   1. Unchanged patchy opacities in the left midlung and lung base.  2. Pneumothoraces, pneumomediastinum, abdomen and small left pleural  effusion likely related to same day procedure.    [Urgent Result: Pneumoperitoneum.]    Finding was identified on 4/12/2018 1:25 PM.     Shaun Garcia was contacted by Dr. Jose Nick  at 4/12/2018 1:29 PM  and verbalized understanding of the urgent finding.     I have personally reviewed the examination and initial interpretation  and I agree with the findings.    AURELIANO ADAMS MD       Assessment and plan:  (C34.92) Carcinoma, lung, left (H)  (primary encounter diagnosis)  This is a 68-year-old male with adenocarcinoma of the lung.  Status post wedge resection and biopsies of the pleural nodules which came back positive.  I reviewed with the patient today the natural history of adenocarcinoma of the lung.  We talked about staging, biology, management, follow-up and prognosis.  We talked about systemic therapy including chemotherapy.  I would recommend patient to start on chemotherapy with cisplatin and Alimta.  The patient will be receiving Alimta 500 mg/m  on day 1 and cisplatin 75 mg/m  day 1 on a 21 day cycle.We discussed the rationale behind using cisplatin and Alimta in detail as well as major side effects including, but not limited to immediate/short term side effects of acute infusion reaction/anaphylaxis, life-threatening infection, peripheral neuropathy, arthralgias/myalgias, headache, nausea/vomiting, diarrhea/constipation, mucositis, appetite changes, alopecia , neutropenia, anemia, and thrombocytopenia, and weakness and fatigue.  Patient instructed to call with signs or symptoms of infection including, but not limited to temperature greater than or equal to 100.5 degrees Fahrenheit, chills, severe sore throat, ear or sinus pain, mouth sores, cough, painful urination.  Handouts were given to the patient to review.    (E78.5) Hyperlipidemia LDL goal <100  Patient currently on Crestor 5 mg orally daily.    (I10) Benign essential hypertension  Patient currently on hydrochlorothiazide 25 mg orally daily.  Patient also on Lopressor 50 mg orally daily.    (F17.200) Tobacco use disorder  I strongly emphasized the importance of quitting smoking.    The patient is ready to learn, no apparent  learning barriers were identified.  Diagnosis and treatment plans were explained to the patient. The patient expressed understanding of the content. The patient asked appropriate questions. The patient questions were answered to his satisfaction.    Time spent 45 minutes more than 50% of the time in counseling coordination of care including discussion of the natural history of lung cancer, management, follow-up and prognosis.  We also discussed potential side effects and benefits of chemotherapy.    Chart documentation with Dragon Voice recognition Software. Although reviewed after completion, some words and grammatical errors may remain.

## 2018-04-30 NOTE — ED AVS SNAPSHOT
[facility] Emergency Department    [address] [address]    Phone:  [phone]    Fax:  [phone]                                       Chilo Oneil   MRN: [id number]    Department:  [facility] Emergency Department   Date of Visit:  4/30/2018           Patient Information     Date Of Birth          1950        Your diagnoses for this visit were:     Carcinoma, lung, left (H)     Chronic left-sided thoracic back pain        You were seen by Bradley Elliott DO.      Follow-up Information     Follow up with Manish Plasencia MD. Go in 1 day.    Specialty:  Family Practice    Why:  Followup at your scheduled appointment tomorrow for evaluation and pain management plan.    Contact information:    [address] [place] [address]  [phone]        Discharge References/Attachments     (S) COPING WITH CANCER PAIN (ENGLISH)      Your next 10 appointments already scheduled     May 01, 2018 10:40 AM CDT   SHORT with Manish Plasencia MD   [facility]    [address] [address]   [phone]            May 04, 2018  9:00 AM CDT   New Visit with Gurjit Fox MD   [facility] ([facility]    [address] [address]   [phone]            May 10, 2018  8:30 AM CDT   Level 5 with ROOM 1 [facility] ([facility])    [facility]  [address]  [facility] [address]   [phone]            May 10, 2018  9:30 AM CDT   Return Visit with Mor Mccauley MD   [facility] ([facility])    [facility]  [address]  [facility] [address]   [phone]            May 11, 2018  2:45 PM CDT   (Arrive by 2:30 PM)   Return Visit with NATALIE Berg [facility] ([facility])     [address]  [address]  [facility] [address] [phone]              24 Hour Appointment Hotline       To make an appointment at any [facility] clinic, call [phone] ([phone]). If you don't have a family doctor or clinic, we will help you find one. [facility] clinics are conveniently located to serve the needs of you and your family.          ED Discharge Orders     PALLIATIVE CARE REFERRAL       Your provider has referred you to Palliative Care Services.    To schedule an Outpatient Palliative Care Referral appointment, please call: [facility]: [facility] [phone]   [email or web address]    Please be aware that coverage of these services is subject to the terms and limitations of your health insurance plan.  Call member services at your health plan with any benefit or coverage questions.      Please bring the following with you to your appointment:    (1) Any X-Rays, CTs or MRIs which have been performed.  Contact the facility where they were done to arrange for  prior to your scheduled appointment.   (2) If you have recently seen a provider outside of the [facility], please bring your most recent clinic note and/or imaging results  (3) List of current medications - please bring ALL of the medications that you are currently taking (in their original bottles) to your appointment  (4) This referral request  (5) Any documents/labs given to you for this referral    Services Requested: Consult and make recommendations    Please complete the following questions:  1. What is patient's life-limiting diagnosis?  Adenocarcinoma of the lungs  2. What is the reason for the referral?  Chronic and increasing pain  3. What is the patient's prognosis?  Unknown    Palliative Care Definition:    Palliative Care is specialized medical care for people with serious illness.  This type of care is focused on providing patients with relief from  symptoms, pain and stresses of serious illness - whatever the diagnosis may be.  The goal of Palliative Care is to improve quality of life for both the patient and the family.  Palliative Care is provided by a team of doctors, nurses and other specialists who work with the patient's other doctors to provide an extra layer of support.  Palliative Care is appropriate at any age and at any stage in a serious illness, and can be provided together with curative treatment.                     Review of your medicines      CONTINUE these medicines which may have CHANGED, or have new prescriptions. If we are uncertain of the size of tablets/capsules you have at home, strength may be listed as something that might have changed.        Dose / Directions Last dose taken    oxyCODONE IR 5 MG tablet   Commonly known as:  ROXICODONE   Dose:  5-10 mg   What changed:    - when to take this  - reasons to take this   Quantity:  6 tablet        Take 1-2 tablets (5-10 mg) by mouth every 4 hours as needed for severe pain   Refills:  0          Our records show that you are taking the medicines listed below. If these are incorrect, please call your family doctor or clinic.        Dose / Directions Last dose taken    acetaminophen 325 MG tablet   Commonly known as:  TYLENOL   Dose:  975 mg   Quantity:  100 tablet        Take 3 tablets (975 mg) by mouth every 8 hours   Refills:  0        albuterol 108 (90 Base) MCG/ACT Inhaler   Commonly known as:  PROAIR HFA/PROVENTIL HFA/VENTOLIN HFA   Dose:  2 puff   Quantity:  1 Inhaler        Inhale 2 puffs into the lungs every 4 hours as needed for shortness of breath / dyspnea or wheezing   Refills:  1        ALEVE PO   Dose:  220 mg        Take 220 mg by mouth every 12 hours as needed for moderate pain   Refills:  0        ALPRAZolam 0.5 MG tablet   Commonly known as:  XANAX   Dose:  0.5 mg   Quantity:  20 tablet        Take 1 tablet (0.5 mg) by mouth 3 times daily as needed for anxiety   Refills:   0        ASPIRIN PO   Dose:  81 mg        Take 81 mg by mouth daily   Refills:  0        EQL NICOTINE 2 MG lozenge   Dose:  2 mg   Generic drug:  nicotine polacrilex        Place 2 mg inside cheek every hour as needed for smoking cessation   Refills:  0        hydrochlorothiazide 25 MG tablet   Commonly known as:  HYDRODIURIL   Dose:  25 mg   Quantity:  90 tablet        Take 1 tablet (25 mg) by mouth daily   Refills:  3        LORazepam 0.5 MG tablet   Commonly known as:  ATIVAN   Dose:  0.5 mg   Quantity:  20 tablet        Take 1 tablet (0.5 mg) by mouth every 8 hours as needed for anxiety   Refills:  0        losartan 50 MG tablet   Commonly known as:  COZAAR   Dose:  50 mg   Quantity:  90 tablet        Take 1 tablet (50 mg) by mouth daily   Refills:  3        metoprolol tartrate 50 MG tablet   Commonly known as:  LOPRESSOR   Dose:  50 mg   Quantity:  60 tablet        Take 1 tablet (50 mg) by mouth 2 times daily   Refills:  1        ondansetron 8 MG ODT tab   Commonly known as:  ZOFRAN-ODT   Dose:  8 mg   Quantity:  30 tablet        Take 1 tablet (8 mg) by mouth every 8 hours as needed for nausea   Refills:  1        rosuvastatin 5 MG tablet   Commonly known as:  CRESTOR   Dose:  5 mg   Quantity:  30 tablet        Take 1 tablet (5 mg) by mouth daily   Refills:  3        senna-docusate 8.6-50 MG per tablet   Commonly known as:  SENOKOT-S;PERICOLACE   Dose:  2 tablet   Quantity:  100 tablet        Take 2 tablets by mouth 2 times daily   Refills:  0                Information about OPIOIDS     PRESCRIPTION OPIOIDS: WHAT YOU NEED TO KNOW   You have a prescription for an opioid (narcotic) pain medicine. Opioids can cause addiction. If you have a history of chemical dependency of any type, you are at a higher risk of becoming addicted to opioids. Only take this medicine after all other options have been tried. Take it for as short a time and as few doses as possible.     Do not:    Drive. If you drive while taking these  medicines, you could be arrested for driving under the influence (DUI).    Operate heavy machinery    Do any other dangerous activities while taking these medicines.     Drink any alcohol while taking these medicines.      Take with any other medicines that contain acetaminophen. Read all labels carefully. Look for the word  acetaminophen  or  Tylenol.  Ask your pharmacist if you have questions or are unsure.    Store your pills in a secure place, locked if possible. We will not replace any lost or stolen medicine. If you don t finish your medicine, please throw away (dispose) as directed by your pharmacist. The [place] [employer]ution [employer] has more information about safe disposal: https://www.pca.[facility].[place].us/living-green/managing-unwanted-medications    All opioids tend to cause constipation. Drink plenty of water and eat foods that have a lot of fiber, such as fruits, vegetables, prune juice, apple juice and high-fiber cereal. Take a laxative (Miralax, milk of magnesia, Colace, Senna) if you don t move your bowels at least every other day.         Prescriptions were sent or printed at these locations (1 Prescription)                   Other Prescriptions                Printed at Department/Unit printer (1 of 1)         oxyCODONE IR (ROXICODONE) 5 MG tablet                Procedures and tests performed during your visit     Alcohol ethyl    CBC with platelets differential    Chest CT - IV contrast only - PE protocol    Comprehensive metabolic panel    EKG 12-lead, tracing only    Troponin I      Orders Needing Specimen Collection     None      Pending Results     No orders found from 4/28/2018 to 5/1/2018.            Pending Culture Results     No orders found from 4/28/2018 to 5/1/2018.            Pending Results Instructions     If you had any lab results that were not finalized at the time of your Discharge, you can call the ED Lab Result RN at [phone]. You will be contacted by this team for any  positive Lab results or changes in treatment. The nurses are available 7 days a week from 10A to 6:30P.  You can leave a message 24 hours per day and they will return your call.        Test Results From Your Hospital Stay        4/30/2018  7:50 PM      Component Results     Component Value Ref Range & Units Status    WBC 8.1 4.0 - 11.0 10e9/L Final    RBC Count 4.03 (L) 4.4 - 5.9 10e12/L Final    Hemoglobin 13.4 13.3 - 17.7 g/dL Final    Hematocrit 36.5 (L) 40.0 - 53.0 % Final    MCV 91 78 - 100 fl Final    MCH 33.3 (H) 26.5 - 33.0 pg Final    MCHC 36.7 (H) 31.5 - 36.5 g/dL Final    RDW 12.9 10.0 - 15.0 % Final    Platelet Count 408 150 - 450 10e9/L Final    Diff Method Automated Method  Final    % Neutrophils 59.9 % Final    % Lymphocytes 26.4 % Final    % Monocytes 12.5 % Final    % Eosinophils 0.7 % Final    % Basophils 0.4 % Final    % Immature Granulocytes 0.1 % Final    Absolute Neutrophil 4.9 1.6 - 8.3 10e9/L Final    Absolute Lymphocytes 2.1 0.8 - 5.3 10e9/L Final    Absolute Monocytes 1.0 0.0 - 1.3 10e9/L Final    Absolute Eosinophils 0.1 0.0 - 0.7 10e9/L Final    Absolute Basophils 0.0 0.0 - 0.2 10e9/L Final    Abs Immature Granulocytes 0.0 0 - 0.4 10e9/L Final         4/30/2018  8:09 PM      Component Results     Component Value Ref Range & Units Status    Sodium 132 (L) 133 - 144 mmol/L Final    Potassium 3.3 (L) 3.4 - 5.3 mmol/L Final    Chloride 98 94 - 109 mmol/L Final    Carbon Dioxide 26 20 - 32 mmol/L Final    Anion Gap 8 3 - 14 mmol/L Final    Glucose 99 70 - 99 mg/dL Final    Urea Nitrogen 6 (L) 7 - 30 mg/dL Final    Creatinine 0.60 (L) 0.66 - 1.25 mg/dL Final    GFR Estimate >90 >60 mL/min/1.7m2 Final    Non  GFR Calc    GFR Estimate If Black >90 >60 mL/min/1.7m2 Final    African American GFR Calc    Calcium 8.6 8.5 - 10.1 mg/dL Final    Bilirubin Total 0.5 0.2 - 1.3 mg/dL Final    Albumin 3.3 (L) 3.4 - 5.0 g/dL Final    Protein Total 6.5 (L) 6.8 - 8.8 g/dL Final    Alkaline  Phosphatase 82 40 - 150 U/L Final    ALT 59 0 - 70 U/L Final    AST 79 (H) 0 - 45 U/L Final         4/30/2018  8:09 PM      Component Results     Component Value Ref Range & Units Status    Troponin I ES <0.015 0.000 - 0.045 ug/L Final    The 99th percentile for upper reference range is 0.045 ug/L.  Troponin values   in the range of 0.045 - 0.120 ug/L may be associated with risks of adverse   clinical events.           4/30/2018  8:09 PM      Component Results     Component Value Ref Range & Units Status    Ethanol g/dL 0.21 (H) <0.01 g/dL Final         4/30/2018  9:03 PM      Narrative     CT CHEST WITH CONTRAST  4/30/2018 8:42 PM    HISTORY: Left-sided pain. Evaluate for pulmonary embolism.    COMPARISON: A CT on 3/28/2018.    TECHNIQUE: Routine transverse CT imaging of the chest was performed  following the uneventful intravenous administration of Isovue 370-  80mL contrast. A pulmonary embolism protocol was utilized. Radiation  dose for this scan was reduced using automated exposure control,  adjustment of the mA and/or kV according to patient size, or iterative  reconstruction technique.    FINDINGS: The heart size is normal. No enlarged lymph node or other  abnormal mediastinal mass is seen. There is calcification of the  thoracic aorta. There is very good opacification of the pulmonary  arteries with contrast. No pulmonary embolism is seen. The lungs are  clear. There has been no definite change in a 2.2 cm somewhat  spiculated mass in the lateral aspect of the left upper lobe. There  has been development of consolidation or capping of the left lung  apex. The lungs are otherwise clear. There has been development of a  small left pleural effusion. No right effusion is seen. There are  degenerative changes in the spine. No other osseous abnormalities  identified. No chest wall pathology is seen. The visualized upper  abdomen is unremarkable.        Impression     IMPRESSION:   1. No pulmonary embolism is  "seen.  2. No change in a 2.2 cm spiculated mass of the left upper lobe. On  previous imaging this was suspicious for malignancy.  3. Development of consolidation or capping of the left lung apex.  4. Small left pleural effusion.    ADILIA ALSTON MD                Thank you for choosing [facility]       Thank you for choosing [facility] for your care. Our goal is always to provide you with excellent care. Hearing back from our patients is one way we can continue to improve our services. Please take a few minutes to complete the written survey that you may receive in the mail after you visit with us. Thank you!        [employer] Information     [employer] lets you send messages to your doctor, view your test results, renew your prescriptions, schedule appointments and more. To sign up, go to [email or web address] . Click on \"Log in\" on the left side of the screen, which will take you to the Welcome page. Then click on \"Sign up Now\" on the right side of the page.     You will be asked to enter the access code listed below, as well as some personal information. Please follow the directions to create your username and password.     Your access code is: [email or web address]  Expires: 2018 10:49 AM     Your access code will  in 90 days. If you need help or a new code, please call your [facility] clinic or [phone].        Care EveryWhere ID     This is your Care EveryWhere ID. This could be used by other organizations to access your [facility] medical records  [id number]        Equal Access to Services     [place] : Hadii aad ku hadasho Soomaali, waaxda luqadaha, qaybta kaalmada adeegyada, celina hewitt . So [facility] [phone].    ATENCIÓN: Si habla español, tiene a galaviz disposición servicios gratuitos de asistencia lingüística. Llame al [phone].    We comply with applicable federal civil rights laws and [place] laws. We do not discriminate on the basis of race, color, national origin, " age, disability, sex, sexual orientation, or gender identity.            After Visit Summary       This is your record. Keep this with you and show to your community pharmacist(s) and doctor(s) at your next visit.

## 2018-04-30 NOTE — ED AVS SNAPSHOT
[facility] Emergency Department    [address] [place] [address]    Phone:  [phone]    Fax:  [phone]                                       Chilo Oneil   MRN: [id number]    Department:  [facility] Emergency Department   Date of Visit:  4/30/2018           After Visit Summary Signature Page     I have received my discharge instructions, and my questions have been answered. I have discussed any challenges I see with this plan with the nurse or doctor.    ..........................................................................................................................................  Patient/Patient Representative Signature      ..........................................................................................................................................  Patient Representative Print Name and Relationship to Patient    ..................................................               ................................................  Date                                            Time    ..........................................................................................................................................  Reviewed by Signature/Title    ...................................................              ..............................................  Date                                                            Time

## 2018-05-01 ENCOUNTER — PATIENT OUTREACH (OUTPATIENT)
Dept: CARE COORDINATION | Facility: CLINIC | Age: 68
End: 2018-05-01

## 2018-05-01 NOTE — ED NOTES
Pt states that he has had a dull back pain that radiates to his chest . He states the pain is intermittently sharp. He states he has had it for 2 months. He was Dx with lung cancer recently and is to start Chemo soon after a port is placed. He states he cannot sleep and the pain does get worse at tomes if he takes in a deep breath. He has pain to the L thorax by palpation. He smokes daily and drinks daily stating that he had 3 drinks prior to coming to the ED as well as Oxycodone. He is anxious. Possible MI 4-5 yrs go. Has HTN.

## 2018-05-01 NOTE — ED NOTES
Pt drove self to ER, says he doesn't have anyone to come pick him up. Pt has an appt with Dr Plasencia tomorrow morning at 1030.

## 2018-05-01 NOTE — ED PROVIDER NOTES
History     Chief Complaint   Patient presents with     Back Pain     states radiates to chest and L arm/ has had for minimum 2 month     HPI  Chilo Oneil is a 68 year old male with past medical history which includes alcohol use, tobacco use, coronary artery disease, and adenocarcinoma of the lung status post left lower lobe wedge resection and biopsies performed on 4/11/18 who presents for evaluation of left-sided chest pain.  Patient explains that over the past 1-2 months he has had achy persistent left posterior neck and chest pain, symptoms were present prior to his recent procedure.  He describes it as achy, exacerbated with breathing and movement, radiating towards the left shoulder and upper arm, mild initially but growing gradually more severe and refractory to his home dosing of oxycodone.  Patient has a baseline cough which is unchanged, no productivity or hemoptysis.  He denies fever, chills, abdominal pain, nausea/vomiting, injury, or other concerns.  No other associated symptoms, contextual features, exacerbating or alleviating factors.    Problem List:    Patient Active Problem List    Diagnosis Date Noted     Carcinoma, lung, left (H) 04/12/2018     Priority: Medium     CAD (coronary artery disease) 03/28/2018     Priority: Medium     No previous angiogram.  No previous stenting.    Atypical Stress Test consistent with possible CAD 8/2016: Myocardial perfusion imaging using single isotope technique demonstrated a small of inferior ischemia at the base to mid ventricle There is a second moderate area of ischemia in the anterior and anteroseptal wall from base through mid ventricle, involving the lateral base as well. There is a small fixed portion in the anteroseptal base consistent with prior infarction There is a fixed inferoseptal defect from apex to mid ventricle consistent with either prior nontransmural infarct or attenuation artifact. Gated images demonstrated inferior, inferoseptal,  anterior and anteroseptal basal hypokinesis.  The left ventricular systolic function is 52% at rest and 47% post stress.       Hyponatremia 03/28/2018     Priority: Medium     SOB (shortness of breath) 03/28/2018     Priority: Medium     Nausea with vomiting 03/28/2018     Priority: Medium     Lung cancer (H)      Priority: Medium     Left shoulder pain, cough. Bx 12/2017- Non Small Cell. Resection planned 4/2018       Uncomplicated asthma      Priority: Medium     Anxiety 12/28/2017     Priority: Medium     Gastroesophageal reflux disease without esophagitis 12/28/2017     Priority: Medium     PAD (peripheral artery disease) (H) 06/13/2016     Priority: Medium     Benign essential hypertension 06/13/2016     Priority: Medium     Hyperlipidemia LDL goal <100 06/13/2016     Priority: Medium     Alcohol use, daily use 09/20/2013     Priority: Medium     Tobacco use disorder 10/16/2009     Priority: Medium        Past Medical History:    Past Medical History:   Diagnosis Date     GERD (gastroesophageal reflux disease)      HTN (hypertension)      Lung cancer (H)      MI (myocardial infarction)        Past Surgical History:    Past Surgical History:   Procedure Laterality Date     FRACTURE TX, ANKLE RT/LT  1975    Fracture TX Ankle, LT     OPEN REDUCTION INTERNAL FIXATION HIP NAILING  9/20/2013    Procedure: OPEN REDUCTION INTERNAL FIXATION HIP NAILING;  Left Hip Open Reduction Internal Fixation ;  Surgeon: Rosas Dennis MD;  Location: [facility] OR     THORACOSCOPIC BIOPSY LUNG Left 4/11/2018    Procedure: THORACOSCOPIC BIOPSY LUNG;  subxiphoid left video assisted thorascopic surgery pleural biops, left lower lobe wedge biopsy ;  Surgeon: Mega Moreno MD;  Location:  OR       Family History:    Family History   Problem Relation Age of Onset     DIABETES Brother      type 2     DIABETES Father        Social History:  Marital Status:  Single [1]  Social History   Substance Use Topics     Smoking status:  "Current Every Day Smoker     Packs/day: 0.15     Years: 47.00     Types: Cigarettes     Start date: 12/26/1967     Smokeless tobacco: Never Used      Comment: 2-3 cigs daily     Alcohol use Yes      Comment: 6-8 beers per day, last 3/27 at 6pm        Medications:      acetaminophen (TYLENOL) 325 MG tablet   ALPRAZolam (XANAX) 0.5 MG tablet   ASPIRIN PO   hydrochlorothiazide (HYDRODIURIL) 25 MG tablet   LORazepam (ATIVAN) 0.5 MG tablet   losartan (COZAAR) 50 MG tablet   Naproxen Sodium (ALEVE PO)   nicotine polacrilex (EQL NICOTINE) 2 MG lozenge   oxyCODONE IR (ROXICODONE) 5 MG tablet   rosuvastatin (CRESTOR) 5 MG tablet   senna-docusate (SENOKOT-S;PERICOLACE) 8.6-50 MG per tablet   albuterol (PROAIR HFA/PROVENTIL HFA/VENTOLIN HFA) 108 (90 BASE) MCG/ACT Inhaler   metoprolol (LOPRESSOR) 50 MG tablet   ondansetron (ZOFRAN-ODT) 8 MG ODT tab         Review of Systems  Constitutional:  Negative for fever or recent illness.  Cardiovascular: Positive for achy left-sided chest pain and back pain.  Respiratory:  Negative for metastasis or shortness of breath.  Gastrointestinal:  Negative for abdominal pain, vomiting, or diarrhea.   Musculoskeletal:  Negative for midline back pain or recent injuries.    All others reviewed and are negative.      Physical Exam   BP: 146/67  Heart Rate: 98  Temp: 98.4  F (36.9  C)  Resp: 20  Height: 190.5 cm (6' 3\")  Weight: 73.9 kg (163 lb)  SpO2: 96 %      Physical Exam  Constitutional:  Well developed, well nourished.  Appears nontoxic but moderately uncomfortable secondary to pain.  HENT:  Normocephalic and atraumatic.  Symmetric in appearance.  Eyes:  Conjunctivae are normal.  Neck:  Neck supple.  Cardiovascular:  No cyanosis.  RRR.  No audible murmurs noted.  No lower extremity edema or asymmetry.   Respiratory:  Effort normal, no respiratory distress.  CTAB without diminished regions.  No wheezing, rhonchi, or crackles.   Gastrointestinal:  Soft, nondistended abdomen.  Nontender and " without guarding.  No rigidity or rebound tenderness.  Negative De La Rosa's sign.  Negative McBurney's point.    Musculoskeletal:  Moves extremities spontaneously.  Neurological:  Patient is alert.  Skin:  Skin is warm and dry.  Psychiatric:  Normal mood and affect.      ED Course     ED Course     Procedures                 EKG Interpretation:      Interpreted by: Bradley Elliott  Time reviewed: Upon arrival     Symptoms at time of EKG: Left-sided chest discomfort  Rhythm: Sinus  Rate: Normal  Axis: Normal  Conduction: First-degree AV block with RBBB  ST Segments/ T Waves: No pathologic ST-elevations or T-wave abnormalities.  Q Waves: None  Comparison to prior: New RBBB    Clinical Impression: No sign of ischemia         Critical Care time:  none               Results for orders placed or performed during the hospital encounter of 04/30/18 (from the past 24 hour(s))   CBC with platelets differential   Result Value Ref Range    WBC 8.1 4.0 - 11.0 10e9/L    RBC Count 4.03 (L) 4.4 - 5.9 10e12/L    Hemoglobin 13.4 13.3 - 17.7 g/dL    Hematocrit 36.5 (L) 40.0 - 53.0 %    MCV 91 78 - 100 fl    MCH 33.3 (H) 26.5 - 33.0 pg    MCHC 36.7 (H) 31.5 - 36.5 g/dL    RDW 12.9 10.0 - 15.0 %    Platelet Count 408 150 - 450 10e9/L    Diff Method Automated Method     % Neutrophils 59.9 %    % Lymphocytes 26.4 %    % Monocytes 12.5 %    % Eosinophils 0.7 %    % Basophils 0.4 %    % Immature Granulocytes 0.1 %    Absolute Neutrophil 4.9 1.6 - 8.3 10e9/L    Absolute Lymphocytes 2.1 0.8 - 5.3 10e9/L    Absolute Monocytes 1.0 0.0 - 1.3 10e9/L    Absolute Eosinophils 0.1 0.0 - 0.7 10e9/L    Absolute Basophils 0.0 0.0 - 0.2 10e9/L    Abs Immature Granulocytes 0.0 0 - 0.4 10e9/L   Comprehensive metabolic panel   Result Value Ref Range    Sodium 132 (L) 133 - 144 mmol/L    Potassium 3.3 (L) 3.4 - 5.3 mmol/L    Chloride 98 94 - 109 mmol/L    Carbon Dioxide 26 20 - 32 mmol/L    Anion Gap 8 3 - 14 mmol/L    Glucose 99 70 - 99 mg/dL    Urea Nitrogen 6  (L) 7 - 30 mg/dL    Creatinine 0.60 (L) 0.66 - 1.25 mg/dL    GFR Estimate >90 >60 mL/min/1.7m2    GFR Estimate If Black >90 >60 mL/min/1.7m2    Calcium 8.6 8.5 - 10.1 mg/dL    Bilirubin Total 0.5 0.2 - 1.3 mg/dL    Albumin 3.3 (L) 3.4 - 5.0 g/dL    Protein Total 6.5 (L) 6.8 - 8.8 g/dL    Alkaline Phosphatase 82 40 - 150 U/L    ALT 59 0 - 70 U/L    AST 79 (H) 0 - 45 U/L   Troponin I   Result Value Ref Range    Troponin I ES <0.015 0.000 - 0.045 ug/L   Alcohol ethyl   Result Value Ref Range    Ethanol g/dL 0.21 (H) <0.01 g/dL   Chest CT - IV contrast only - PE protocol    Narrative    CT CHEST WITH CONTRAST  4/30/2018 8:42 PM    HISTORY: Left-sided pain. Evaluate for pulmonary embolism.    COMPARISON: A CT on 3/28/2018.    TECHNIQUE: Routine transverse CT imaging of the chest was performed  following the uneventful intravenous administration of Isovue 370-  80mL contrast. A pulmonary embolism protocol was utilized. Radiation  dose for this scan was reduced using automated exposure control,  adjustment of the mA and/or kV according to patient size, or iterative  reconstruction technique.    FINDINGS: The heart size is normal. No enlarged lymph node or other  abnormal mediastinal mass is seen. There is calcification of the  thoracic aorta. There is very good opacification of the pulmonary  arteries with contrast. No pulmonary embolism is seen. The lungs are  clear. There has been no definite change in a 2.2 cm somewhat  spiculated mass in the lateral aspect of the left upper lobe. There  has been development of consolidation or capping of the left lung  apex. The lungs are otherwise clear. There has been development of a  small left pleural effusion. No right effusion is seen. There are  degenerative changes in the spine. No other osseous abnormalities  identified. No chest wall pathology is seen. The visualized upper  abdomen is unremarkable.      Impression    IMPRESSION:   1. No pulmonary embolism is seen.  2. No  change in a 2.2 cm spiculated mass of the left upper lobe. On  previous imaging this was suspicious for malignancy.  3. Development of consolidation or capping of the left lung apex.  4. Small left pleural effusion.    ADILIA ALSTON MD       Medications   HYDROmorphone (DILAUDID) injection 1 mg (1 mg Intravenous Given 4/30/18 2030)   0.9% sodium chloride BOLUS (0 mLs Intravenous Stopped 4/30/18 2222)   iopamidol (ISOVUE-370) solution 80 mL (80 mLs Intravenous Given 4/30/18 2035)   sodium chloride 0.9 % bag 500mL for CT scan flush use (99 mLs As instructed Given 4/30/18 2036)       Assessments & Plan (with Medical Decision Making)   Chilo Oneil is a 68 year old male who presented to the department for increasing left-sided thoracic pain which has been present for several months.  He admits the pain is growing much more severe and refractory to his occasional oral oxycodone prescribed by primary care.  He has been unable to sleep the last 3 nights secondary to left-sided thoracic pain.  Comorbidities include history of known adenocarcinoma of the left lung status post left lower lobe wedge resection earlier in the month.  Differential diagnosis included ACS, PE, pneumothorax, pneumonia, hemothorax and empyema.  His CBC is without leukocytosis or anemia.  EKG reveals a right bundle branch block but troponin within reference range and no typical anginal equivalent symptoms.  CT pulmonary angiogram was performed and independently reviewed, agree with radiologist read of small left-sided pleural effusion but no pulmonary embolism.  The patient's pain gradually improved during stay in the department with minimal analgesic medication.  Impression is that his symptoms are likely secondary to his malignancy.  He will be given a prescription for a few tablets of oxycodone to aid symptoms overnight, he has an appointment scheduled primary care provider tomorrow for which he should discuss analgesic management plan.   Palliative care referral order was also placed.  He seems comfortable with the discharge plan we discussed including follow up.    Disclaimer:  This note consists of symbols derived from keyboarding, dictation, and/or voice recognition software.  As a result, there may be errors in the script that have gone undetected.  Please consider this when interpreting information found in the chart.        I have reviewed the nursing notes.    I have reviewed the findings, diagnosis, plan and need for follow up with the patient.       Discharge Medication List as of 4/30/2018 11:01 PM          Final diagnoses:   Carcinoma, lung, left (H)   Chronic left-sided thoracic back pain       4/30/2018   [facility] EMERGENCY DEPARTMENT     Bradley Elliott DO  04/30/18 [id number]

## 2018-05-01 NOTE — ED NOTES
Pt says dilaudid has lessened his pain, the percocet he was taking at home was not helping at all. Pt says he doesn't want to go home without better pain control medications.

## 2018-05-01 NOTE — PROGRESS NOTES
Addendum:  [facility] spoke with Nancy, pt's Financial worker at [facility], who shared that despite pt not getting bank statements from his SSDI debit card, he can always go online and get statements printed out from the card's bank and return to her.  Once she receives these statements, she will authorize pt's medical assistance and pay the pt's back bills to 1-1-2018.    SW placed call to the pt and left a message stating that IF the pt wants assistance with his past due medical bills going back to January 1, 2018, then we need to submit documentation showing what his balances were on 1-1-18, 2-1-18, and 3-1-18.   shared with pt that this writer is happy to assist the pt with creating an online account where he can print these statements off of the print and we can fax them to the Nancy at [facility] at his next appointment, OR, he can go to the debit machine and print previous statements there.  Awaiting a return call from the pt.    Sofya Unger, Clinic Care Coordination-Social Work  [phone]      Clinic Care Coordination Contact    OUTREACH    Referral Information:  Referral Source: ED Follow-Up; pt reports he is still in pain, but it is manageable today.  Had to cancel his appointment today due to being so tired from last night  Primary Diagnosis: Hematological Disorder (Lung Cancer with mets)    Chief Complaint   Patient presents with     Clinic Care Coordination - Post Hospital     ED follow up--soci      Universal Utilization:   Utilization    Last refreshed: 5/1/2018  1:17 AM:  No Show Count (past year) 4       Last refreshed: 5/1/2018  1:17 AM:  ED visits 4       Last refreshed: 5/1/2018  1:17 AM:  Hospital admissions 2          Current as of: 5/1/2018  1:17 AM         Clinical Concerns:  Current Medical Concerns:  Pt has lung cancer with mets; states he is in a great amout of pain  Current Behavioral Concerns: Pt is an alcoholic and lives with increased anxiety due to his medical condition     Education Provided to patient: SW and pt discussed that pt can discuss his dx and symptom management with Palliative Care provider for optimization of his pain and anxiety, however, his drinking makes this difficult to manage.     Health Maintenance Reviewed:      Medication Management:  Pt manages his medications on his own     Functional Status:  Pt is independent with all I/ADLS at this time.      Transportation:  Pt drives his roommates vehicle, otherwise has difficulty with transportation.      Psychosocial:  Current living arrangement:: I live in a private home (has a roommate)     Financial/Insurance: SSDI and Medicare only.  SW is assisting pt with [facility] MA application process.     Resources and Interventions:  Current Resources: Friends     Goals        General    Financial Wellbeing (pt-stated)     Notes - Note edited  5/1/2018  8:50 AM by Sofya Paulino LSW    Goal Statement: I want to complete my Medical Assistance application  Measure of Success: My Medical Assistance application will be completed  Supportive Steps to Achieve: SW and pt will finish the remaining paperwork that needs to be completed  Barriers: None identified at this time  Strengths: Pt is motivated as the income increase will assist him greatly  Date to Achieve By: 3 months              Patient/Caregiver understanding: Pt is aware that this writer is contacting Sonam at [facility] for his MA application.    Outreach Frequency: PRN, monthly  Future Appointments              In 3 days [facility] Fox [facility]    In 1 week ROOM 1 [facility], [facility]    In 1 week Mor Mccauley MD [facility] Cancer [facility], [facility]    In 1 week Janny Rangel APRN CNP [facility], [facility]        Plan: SW to continue to follow.    Sofya Unger  Social Work Care Coordinator  [facility]  [phone]

## 2018-05-01 NOTE — LETTER
[facility]  Complex Care Plan  About Me:  Patient Name:  Chilo Oneil    YOB: 1950  Age: 68 year old   [place] MRN: [id number]  Telephone Information:    Home Phone [phone]   Mobile Not on file.       Address:    [address] [place], [place] [address]e [place] [address] Email address:  No e-mail address on record      Emergency Contact(s)  Name Relationship Lgl Grd Work Phone Home Phone Mobile Phone   1JACINTO MCDOWELL Brother    [phone]   2. CHIN, PAT Relative   [phone]           Primary language:  English     needed? No   [place] [facility]:  [phone] op. 1  Other communication barriers:    Preferred Method of Communication:  Mail  Current living arrangement: I live in a private home with a roommate   Mobility Status/ Medical Equipment: Independent    Health Maintenance  Health Maintenance Reviewed: Due/Overdue on several items    My Access Plan  Medical Emergency 911   Primary Clinic Line [facility] - [phone]   24 Hour Appointment Line [phone] or  [phone] ([phone]) (toll-free)   24 Hour Nurse Line [phone] (toll-free)   Preferred Urgent Care [facility] [place], [phone]   Preferred Hospital [place], [place]  [phone]   Preferred Pharmacy [facility] [place], [place] - [address]     Behavioral Health Crisis Line The National Suicide Prevention Lifeline at 1-512.301.6285 or 911     My Care Team Members    Patient Care Team       Relationship Specialty Notifications Start End    Manish Plasencia MD PCP - General Family Practice  2/8/18     Phone: [phone] Fax: [phone] [address] [facility] [address]    Nadia Leger MD MD Cardiology Admissions 3/30/18     Phone: [phone] Fax: [phone] [address] [place] [address]    Janny Rangel APRN CNP Nurse Practitioner Nurse Practitioner -  Family Admissions 3/30/18     Phone: [phone] Fax: [phone]         [address] [facility] [address]    Mega Moreno MD MD Thoracic Diseases Admissions 3/30/18     Phone: [phone] Fax: [phone]         [address]  [facility] [address]    Mor Mccauley MD MD Hematology & Oncology Admissions 3/30/18     Phone: [phone] Fax: [phone]         [address] [facility] [address]    Erica Elliott, RN Clinic Care Coordinator Oncology Admissions 3/30/18     Comment:  [phone]    Sofya Paulino LSW Clinic Care Coordinator Primary Care - [facility]  4/18/18     Phone: [phone] Fax: [phone]                My Care Plans  Self Management and Treatment Plan, Goals and (Comments)  Goals        General    Financial Wellbeing (pt-stated)     Notes - Note edited  5/1/2018  8:50 AM by Sofya Paulino LSW    Goal Statement: I want to complete my Medical Assistance application  Measure of Success: My Medical Assistance application will be completed  Supportive Steps to Achieve: SW and pt will finish the remaining paperwork that needs to be completed  Barriers: None identified at this time  Strengths: Pt is motivated as the income increase will assist him greatly  Date to Achieve By: 3 months          Pain Management (pt-stated)     Notes - Note created  5/1/2018 10:59 AM by Sofya Paulino LSW    [facility] to discuss with PCP, but would recommend a Palliative Care consult for symptom management.             Action Plans on File:  None   Advance Care Plans/Directives Type:  None       My Medical and Care Information  Problem List   Patient Active Problem List   Diagnosis     Tobacco use disorder     Alcohol use, daily use     PAD (peripheral artery disease) (H)     Benign essential hypertension     Hyperlipidemia LDL goal <100     Anxiety     Gastroesophageal reflux disease without esophagitis     CAD (coronary artery disease)     Lung cancer (H)      Uncomplicated asthma     Hyponatremia     SOB (shortness of breath)     Nausea with vomiting     Carcinoma, lung, left (H)      Current Medications and Allergies:  See printed Medication Report.    Care Coordination Start Date: No linked episodes   Frequency of Care Coordination: monthly   Form Last Updated: 05/01/2018

## 2018-05-01 NOTE — ED NOTES
Pain was tolerable after dilaudid given but pain is increasing. Pt says he hasn't slept much the last 3 days due to pain, only will take 2-3 oxycodone a day as they don't help with the pain.

## 2018-05-02 ENCOUNTER — INFUSION THERAPY VISIT (OUTPATIENT)
Dept: INFUSION THERAPY | Facility: CLINIC | Age: 68
End: 2018-05-02
Attending: INTERNAL MEDICINE
Payer: MEDICARE

## 2018-05-02 VITALS — SYSTOLIC BLOOD PRESSURE: 161 MMHG | DIASTOLIC BLOOD PRESSURE: 61 MMHG | RESPIRATION RATE: 16 BRPM

## 2018-05-02 DIAGNOSIS — C34.92 CARCINOMA, LUNG, LEFT (H): Primary | ICD-10-CM

## 2018-05-02 PROCEDURE — 25000128 H RX IP 250 OP 636: Performed by: INTERNAL MEDICINE

## 2018-05-02 PROCEDURE — 96372 THER/PROPH/DIAG INJ SC/IM: CPT

## 2018-05-02 RX ORDER — CYANOCOBALAMIN 1000 UG/ML
1000 INJECTION, SOLUTION INTRAMUSCULAR; SUBCUTANEOUS ONCE
Status: COMPLETED | OUTPATIENT
Start: 2018-05-02 | End: 2018-05-02

## 2018-05-02 RX ORDER — ALPRAZOLAM 0.5 MG
0.5 TABLET ORAL 3 TIMES DAILY PRN
Qty: 20 TABLET | Refills: 0 | Status: SHIPPED | OUTPATIENT
Start: 2018-05-02 | End: 2018-05-10

## 2018-05-02 RX ADMIN — CYANOCOBALAMIN 1000 MCG: 1000 INJECTION, SOLUTION INTRAMUSCULAR; SUBCUTANEOUS at 11:59

## 2018-05-02 ASSESSMENT — PAIN SCALES - GENERAL: PAINLEVEL: WORST PAIN (10)

## 2018-05-02 NOTE — MR AVS SNAPSHOT
After Visit Summary   5/2/2018    Chilo Oneil    MRN: [id number]           Patient Information     Date Of Birth          1950        Visit Information        Provider Department      5/2/2018 11:30 AM ROOM 1 [facility]        Today's Diagnoses     Carcinoma, lung, left (H)    -  1       Follow-ups after your visit        Your next 10 appointments already scheduled     May 03, 2018 11:20 AM CDT   SHORT with Manish Plasencia MD   [facility] ([facility])    [address] [facility] [address]   [phone]            May 04, 2018  9:00 AM CDT   New Visit with Gurjit Fox MD   [facility] ([facility])    [address] [address]   [phone]            May 09, 2018  1:00 PM CDT   New Visit with Shay Harris MD   [facility] ([facility])    [facility]  [address]  [facility] [address]   [phone]            May 10, 2018  8:30 AM CDT   Level 5 with ROOM 1 [facility] ([facility])    [facility]  [address]  [facility] [address]   [phone]            May 10, 2018  9:30 AM CDT   Return Visit with Mor Mccauley MD   [facility] ([facility])    [facility]  [address]  [facility] [address]   [phone]            May 11, 2018  2:45 PM CDT   (Arrive by 2:30 PM)   Return Visit with NATALIE Berg [facility] ([facility])    [address] [place] [address]  [address] [place] [address] [phone]            Jul 05, 2018 11:30 AM CDT   Level O with ROOM 5 [facility] ([facility])    [facility]  [address]  [facility] [address]   [phone]              Who  "to contact     If you have questions or need follow up information about today's clinic visit or your schedule please contact [facility] directly at [phone].  Normal or non-critical lab and imaging results will be communicated to you by MyChart, letter or phone within 4 business days after the clinic has received the results. If you do not hear from us within 7 days, please contact the clinic through [facility] [employer] or phone. If you have a critical or abnormal lab result, we will notify you by phone as soon as possible.  Submit refill requests through [email or web address] or call your pharmacy and they will forward the refill request to us. Please allow 3 business days for your refill to be completed.          Additional Information About Your Visit        [facility] Information     [email or web address] lets you send messages to your doctor, view your test results, renew your prescriptions, schedule appointments and more. To sign up, go to [email or web address] . Click on \"Log in\" on the left side of the screen, which will take you to the Welcome page. Then click on \"Sign up Now\" on the right side of the page.     You will be asked to enter the access code listed below, as well as some personal information. Please follow the directions to create your username and password.     Your access code is: [email or web address]  Expires: 2018 10:49 AM     Your access code will  in 90 days. If you need help or a new code, please call your [place] clinic or [phone].        Care EveryWhere ID     This is your Care EveryWhere ID. This could be used by other organizations to access your [place] medical records  [id number]        Your Vitals Were     Respirations                   16            Blood Pressure from Last 3 Encounters:   18 161/61   18 139/63   18 146/66    Weight from Last 3 Encounters:   18 73.9 kg (163 lb)   18 74.3 kg (163 lb 11.2 oz)   18 73.5 kg (162 lb)              Today, you had " the following     No orders found for display         Today's Medication Changes          These changes are accurate as of 5/2/18  5:20 PM.  If you have any questions, ask your nurse or doctor.               These medicines have changed or have updated prescriptions.        Dose/Directions    hydrochlorothiazide 25 MG tablet   Commonly known as:  HYDRODIURIL   This may have changed:  when to take this   Used for:  Benign essential hypertension        Dose:  25 mg   Take 1 tablet (25 mg) by mouth daily   Quantity:  90 tablet   Refills:  3       losartan 50 MG tablet   Commonly known as:  COZAAR   This may have changed:  when to take this   Used for:  Benign essential hypertension        Dose:  50 mg   Take 1 tablet (50 mg) by mouth daily   Quantity:  90 tablet   Refills:  3       rosuvastatin 5 MG tablet   Commonly known as:  CRESTOR   This may have changed:  additional instructions        Dose:  5 mg   Take 1 tablet (5 mg) by mouth daily   Quantity:  30 tablet   Refills:  3                Primary Care Provider Office Phone # Fax #    Omerrolly Jyoti Plasencia -[phone] [address] [facility] [address] [facility] [address]        Goals        General    Financial Wellbeing (pt-stated)     Notes - Note edited  5/1/2018  8:50 AM by Sofya Paulino LSW    Goal Statement: I want to complete my Medical Assistance application  Measure of Success: My Medical Assistance application will be completed  Supportive Steps to Achieve: SW and pt will finish the remaining paperwork that needs to be completed  Barriers: None identified at this time  Strengths: Pt is motivated as the income increase will assist him greatly  Date to Achieve By: 3 months          Pain Management (pt-stated)     Notes - Note created  5/1/2018 10:59 AM by Sofya Paulino LSW    [facility] to discuss with PCP, but would recommend a Palliative Care consult for symptom management.        Equal Access to Services     [facility]: Kevin jordan  jose Vidales, watylerda luqadaha, qaybta kaalmada alma, celina erastoin hayaan rachelpilo nyasiadaniel lacarmelaalmaz natalia. So [facility] [phone].    ATENCIÓN: Si habla goyoañol, tiene a galaviz disposición servicios gratuitos de asistencia lingüística. Yecenia al [phone].    We comply with applicable federal civil rights laws and [place] laws. We do not discriminate on the basis of race, color, national origin, age, disability, sex, sexual orientation, or gender identity.            Thank you!     Thank you for choosing [facility]  for your care. Our goal is always to provide you with excellent care. Hearing back from our patients is one way we can continue to improve our services. Please take a few minutes to complete the written survey that you may receive in the mail after your visit with us. Thank you!             Your Updated Medication List - Protect others around you: Learn how to safely use, store and throw away your medicines at www.disposemymeds.org.          This list is accurate as of 5/2/18  5:20 PM.  Always use your most recent med list.                   Brand Name Dispense Instructions for use Diagnosis    acetaminophen 325 MG tablet    TYLENOL    100 tablet    Take 3 tablets (975 mg) by mouth every 8 hours    Carcinoma, lung, left (H)       albuterol 108 (90 Base) MCG/ACT Inhaler    PROAIR HFA/PROVENTIL HFA/VENTOLIN HFA    1 Inhaler    Inhale 2 puffs into the lungs every 4 hours as needed for shortness of breath / dyspnea or wheezing        ALEVE PO      Take 220 mg by mouth every 12 hours as needed for moderate pain        ALPRAZolam 0.5 MG tablet    XANAX    20 tablet    Take 1 tablet (0.5 mg) by mouth 3 times daily as needed for anxiety    Anxiety       ASPIRIN PO      Take 81 mg by mouth daily        EQL NICOTINE 2 MG lozenge   Generic drug:  nicotine polacrilex      Place 2 mg inside cheek every hour as needed for smoking cessation        hydrochlorothiazide 25 MG tablet    HYDRODIURIL    90 tablet     Take 1 tablet (25 mg) by mouth daily    Benign essential hypertension       LORazepam 0.5 MG tablet    ATIVAN    20 tablet    Take 1 tablet (0.5 mg) by mouth every 8 hours as needed for anxiety        losartan 50 MG tablet    COZAAR    90 tablet    Take 1 tablet (50 mg) by mouth daily    Benign essential hypertension       metoprolol tartrate 50 MG tablet    LOPRESSOR    60 tablet    Take 1 tablet (50 mg) by mouth 2 times daily    Benign essential hypertension       ondansetron 8 MG ODT tab    ZOFRAN-ODT    30 tablet    Take 1 tablet (8 mg) by mouth every 8 hours as needed for nausea    Nausea and vomiting, intractability of vomiting not specified, unspecified vomiting type       oxyCODONE IR 5 MG tablet    ROXICODONE    6 tablet    Take 1-2 tablets (5-10 mg) by mouth every 4 hours as needed for severe pain        rosuvastatin 5 MG tablet    CRESTOR    30 tablet    Take 1 tablet (5 mg) by mouth daily        senna-docusate 8.6-50 MG per tablet    SENOKOT-S;PERICOLACE    100 tablet    Take 2 tablets by mouth 2 times daily    Carcinoma, lung, left (H)

## 2018-05-02 NOTE — PROGRESS NOTES
Infusion Nursing Note:  Chilo Oneil presents today for Vit B12.    Patient seen by provider today: No   present during visit today: Not Applicable.    Note: N/A.    Intravenous Access:  No Intravenous access/labs at this visit.    Treatment Conditions:  Not Applicable.      Post Infusion Assessment:  Patient tolerated injection without incident.    Discharge Plan:   Patient discharged in stable condition accompanied by: self.  Departure Mode: Ambulatory.    Mayco Torres RN

## 2018-05-07 ENCOUNTER — TELEPHONE (OUTPATIENT)
Dept: ONCOLOGY | Facility: CLINIC | Age: 68
End: 2018-05-07

## 2018-05-07 NOTE — TELEPHONE ENCOUNTER
Pt is in need of a port a cath for chemotherapy regimen per Dr. Mccauley.  Pt was scheduled for port consult with Dr. Fox 05.04.18 and was a No show.  Surgery department was able to reach patient and schedule for today 05.07.18 with Dr. Tai.    Our 's left a reminder message on Friday 05.04.18 as well as this morning 05.07.18 with appt information for Port consult.   Pt was again a No show. Surgery department has message out, as well as our department, for patient to return call to schedule port a cath consult. Direct line for both myself, and surgery department, were provided.

## 2018-05-09 ENCOUNTER — ONCOLOGY VISIT (OUTPATIENT)
Dept: ONCOLOGY | Facility: CLINIC | Age: 68
End: 2018-05-09
Attending: INTERNAL MEDICINE
Payer: MEDICARE

## 2018-05-09 ENCOUNTER — APPOINTMENT (OUTPATIENT)
Dept: GENERAL RADIOLOGY | Facility: CLINIC | Age: 68
End: 2018-05-09
Attending: EMERGENCY MEDICINE
Payer: MEDICARE

## 2018-05-09 ENCOUNTER — HOSPITAL ENCOUNTER (EMERGENCY)
Facility: CLINIC | Age: 68
Discharge: HOME | End: 2018-05-09
Attending: EMERGENCY MEDICINE | Admitting: EMERGENCY MEDICINE
Payer: MEDICARE

## 2018-05-09 ENCOUNTER — OFFICE VISIT (OUTPATIENT)
Dept: SURGERY | Facility: CLINIC | Age: 68
End: 2018-05-09
Payer: MEDICARE

## 2018-05-09 ENCOUNTER — TELEPHONE (OUTPATIENT)
Dept: SURGERY | Facility: CLINIC | Age: 68
End: 2018-05-09

## 2018-05-09 ENCOUNTER — HOSPITAL ENCOUNTER (EMERGENCY)
Facility: CLINIC | Age: 68
Discharge: HOME | End: 2018-05-09
Attending: STUDENT IN AN ORGANIZED HEALTH CARE EDUCATION/TRAINING PROGRAM | Admitting: STUDENT IN AN ORGANIZED HEALTH CARE EDUCATION/TRAINING PROGRAM
Payer: MEDICARE

## 2018-05-09 VITALS
RESPIRATION RATE: 24 BRPM | DIASTOLIC BLOOD PRESSURE: 66 MMHG | HEART RATE: 70 BPM | SYSTOLIC BLOOD PRESSURE: 168 MMHG | WEIGHT: 162.8 LBS | BODY MASS INDEX: 20.24 KG/M2 | OXYGEN SATURATION: 95 % | HEIGHT: 75 IN | TEMPERATURE: 98.2 F

## 2018-05-09 VITALS
SYSTOLIC BLOOD PRESSURE: 158 MMHG | WEIGHT: 162 LBS | BODY MASS INDEX: 20.14 KG/M2 | TEMPERATURE: 98.2 F | HEIGHT: 75 IN | DIASTOLIC BLOOD PRESSURE: 81 MMHG | HEART RATE: 85 BPM

## 2018-05-09 VITALS
WEIGHT: 155 LBS | OXYGEN SATURATION: 99 % | BODY MASS INDEX: 19.27 KG/M2 | HEART RATE: 74 BPM | RESPIRATION RATE: 22 BRPM | SYSTOLIC BLOOD PRESSURE: 176 MMHG | TEMPERATURE: 99.8 F | DIASTOLIC BLOOD PRESSURE: 70 MMHG | HEIGHT: 75 IN

## 2018-05-09 VITALS
OXYGEN SATURATION: 98 % | SYSTOLIC BLOOD PRESSURE: 162 MMHG | TEMPERATURE: 98 F | RESPIRATION RATE: 13 BRPM | DIASTOLIC BLOOD PRESSURE: 67 MMHG

## 2018-05-09 DIAGNOSIS — M25.512 CHRONIC LEFT SHOULDER PAIN: ICD-10-CM

## 2018-05-09 DIAGNOSIS — R52 PAIN: ICD-10-CM

## 2018-05-09 DIAGNOSIS — F17.200 TOBACCO USE DISORDER: ICD-10-CM

## 2018-05-09 DIAGNOSIS — F10.10 ALCOHOL ABUSE, DAILY USE: Chronic | ICD-10-CM

## 2018-05-09 DIAGNOSIS — C34.12 MALIGNANT NEOPLASM OF UPPER LOBE OF LEFT LUNG (H): Chronic | ICD-10-CM

## 2018-05-09 DIAGNOSIS — R06.02 SHORTNESS OF BREATH: ICD-10-CM

## 2018-05-09 DIAGNOSIS — C34.12 MALIGNANT NEOPLASM OF UPPER LOBE OF LEFT LUNG (H): Primary | Chronic | ICD-10-CM

## 2018-05-09 DIAGNOSIS — E16.2 HYPOGLYCEMIA: ICD-10-CM

## 2018-05-09 DIAGNOSIS — F41.9 ANXIETY: ICD-10-CM

## 2018-05-09 DIAGNOSIS — C34.90 MALIGNANT NEOPLASM OF LUNG, UNSPECIFIED LATERALITY, UNSPECIFIED PART OF LUNG (H): Primary | ICD-10-CM

## 2018-05-09 DIAGNOSIS — G89.29 CHRONIC LEFT SHOULDER PAIN: ICD-10-CM

## 2018-05-09 LAB
ALBUMIN SERPL-MCNC: 3.3 G/DL (ref 3.4–5)
ALBUMIN SERPL-MCNC: NORMAL G/DL (ref 3.4–5)
ALP SERPL-CCNC: 64 U/L (ref 40–150)
ALP SERPL-CCNC: NORMAL U/L (ref 40–150)
ALT SERPL W P-5'-P-CCNC: 48 U/L (ref 0–70)
ALT SERPL W P-5'-P-CCNC: NORMAL U/L (ref 0–70)
ANION GAP SERPL CALCULATED.3IONS-SCNC: 15 MMOL/L (ref 3–14)
ANION GAP SERPL CALCULATED.3IONS-SCNC: NORMAL MMOL/L (ref 6–17)
AST SERPL W P-5'-P-CCNC: 77 U/L (ref 0–45)
AST SERPL W P-5'-P-CCNC: NORMAL U/L (ref 0–45)
BASOPHILS # BLD AUTO: 0 10E9/L (ref 0–0.2)
BASOPHILS NFR BLD AUTO: 0.6 %
BILIRUB SERPL-MCNC: 0.7 MG/DL (ref 0.2–1.3)
BILIRUB SERPL-MCNC: NORMAL MG/DL (ref 0.2–1.3)
BUN SERPL-MCNC: 13 MG/DL (ref 7–30)
BUN SERPL-MCNC: NORMAL MG/DL (ref 7–30)
CALCIUM SERPL-MCNC: 7.9 MG/DL (ref 8.5–10.1)
CALCIUM SERPL-MCNC: NORMAL MG/DL (ref 8.5–10.1)
CHLORIDE SERPL-SCNC: 106 MMOL/L (ref 94–109)
CHLORIDE SERPL-SCNC: NORMAL MMOL/L (ref 94–109)
CO2 SERPL-SCNC: 19 MMOL/L (ref 20–32)
CO2 SERPL-SCNC: NORMAL MMOL/L (ref 20–32)
CREAT SERPL-MCNC: 0.56 MG/DL (ref 0.66–1.25)
CREAT SERPL-MCNC: NORMAL MG/DL (ref 0.66–1.25)
DIFFERENTIAL METHOD BLD: ABNORMAL
EOSINOPHIL # BLD AUTO: 0 10E9/L (ref 0–0.7)
EOSINOPHIL NFR BLD AUTO: 0.1 %
ERYTHROCYTE [DISTWIDTH] IN BLOOD BY AUTOMATED COUNT: 14.4 % (ref 10–15)
ETHANOL SERPL-MCNC: <0.01 G/DL
GFR SERPL CREATININE-BSD FRML MDRD: >90 ML/MIN/1.7M2
GFR SERPL CREATININE-BSD FRML MDRD: NORMAL ML/MIN/1.7M2
GLUCOSE BLDC GLUCOMTR-MCNC: 101 MG/DL (ref 70–99)
GLUCOSE BLDC GLUCOMTR-MCNC: 67 MG/DL (ref 70–99)
GLUCOSE SERPL-MCNC: 58 MG/DL (ref 70–99)
GLUCOSE SERPL-MCNC: NORMAL MG/DL (ref 70–99)
HCT VFR BLD AUTO: 43.9 % (ref 40–53)
HGB BLD-MCNC: 16.2 G/DL (ref 13.3–17.7)
IMM GRANULOCYTES # BLD: 0 10E9/L (ref 0–0.4)
IMM GRANULOCYTES NFR BLD: 0.1 %
LIPASE SERPL-CCNC: 187 U/L (ref 73–393)
LIPASE SERPL-CCNC: NORMAL U/L (ref 73–393)
LYMPHOCYTES # BLD AUTO: 1.6 10E9/L (ref 0.8–5.3)
LYMPHOCYTES NFR BLD AUTO: 21.7 %
MCH RBC QN AUTO: 33.3 PG (ref 26.5–33)
MCHC RBC AUTO-ENTMCNC: 36.9 G/DL (ref 31.5–36.5)
MCV RBC AUTO: 90 FL (ref 78–100)
MONOCYTES # BLD AUTO: 0.5 10E9/L (ref 0–1.3)
MONOCYTES NFR BLD AUTO: 7.1 %
NEUTROPHILS # BLD AUTO: 5.1 10E9/L (ref 1.6–8.3)
NEUTROPHILS NFR BLD AUTO: 70.4 %
PLATELET # BLD AUTO: 245 10E9/L (ref 150–450)
POTASSIUM SERPL-SCNC: 3.8 MMOL/L (ref 3.4–5.3)
POTASSIUM SERPL-SCNC: NORMAL MMOL/L (ref 3.4–5.3)
PROT SERPL-MCNC: 6.5 G/DL (ref 6.8–8.8)
PROT SERPL-MCNC: NORMAL G/DL (ref 6.8–8.8)
RBC # BLD AUTO: 4.87 10E12/L (ref 4.4–5.9)
SODIUM SERPL-SCNC: 140 MMOL/L (ref 133–144)
SODIUM SERPL-SCNC: NORMAL MMOL/L (ref 133–144)
TROPONIN I SERPL-MCNC: 0.02 UG/L (ref 0–0.04)
TROPONIN I SERPL-MCNC: 0.03 UG/L (ref 0–0.04)
TROPONIN I SERPL-MCNC: NORMAL UG/L (ref 0–0.04)
WBC # BLD AUTO: 7.2 10E9/L (ref 4–11)

## 2018-05-09 PROCEDURE — 96375 TX/PRO/DX INJ NEW DRUG ADDON: CPT

## 2018-05-09 PROCEDURE — 96374 THER/PROPH/DIAG INJ IV PUSH: CPT

## 2018-05-09 PROCEDURE — 25000132 ZZH RX MED GY IP 250 OP 250 PS 637: Mod: GY | Performed by: STUDENT IN AN ORGANIZED HEALTH CARE EDUCATION/TRAINING PROGRAM

## 2018-05-09 PROCEDURE — G0463 HOSPITAL OUTPT CLINIC VISIT: HCPCS

## 2018-05-09 PROCEDURE — 94640 AIRWAY INHALATION TREATMENT: CPT

## 2018-05-09 PROCEDURE — 80320 DRUG SCREEN QUANTALCOHOLS: CPT | Performed by: STUDENT IN AN ORGANIZED HEALTH CARE EDUCATION/TRAINING PROGRAM

## 2018-05-09 PROCEDURE — 80053 COMPREHEN METABOLIC PANEL: CPT | Performed by: EMERGENCY MEDICINE

## 2018-05-09 PROCEDURE — 83690 ASSAY OF LIPASE: CPT | Performed by: EMERGENCY MEDICINE

## 2018-05-09 PROCEDURE — 71046 X-RAY EXAM CHEST 2 VIEWS: CPT

## 2018-05-09 PROCEDURE — 93010 ELECTROCARDIOGRAM REPORT: CPT | Mod: Z6 | Performed by: EMERGENCY MEDICINE

## 2018-05-09 PROCEDURE — 96361 HYDRATE IV INFUSION ADD-ON: CPT

## 2018-05-09 PROCEDURE — 85025 COMPLETE CBC W/AUTO DIFF WBC: CPT | Performed by: EMERGENCY MEDICINE

## 2018-05-09 PROCEDURE — 99215 OFFICE O/P EST HI 40 MIN: CPT | Performed by: FAMILY MEDICINE

## 2018-05-09 PROCEDURE — 99202 OFFICE O/P NEW SF 15 MIN: CPT | Performed by: SURGERY

## 2018-05-09 PROCEDURE — 84484 ASSAY OF TROPONIN QUANT: CPT | Performed by: STUDENT IN AN ORGANIZED HEALTH CARE EDUCATION/TRAINING PROGRAM

## 2018-05-09 PROCEDURE — 84484 ASSAY OF TROPONIN QUANT: CPT | Performed by: EMERGENCY MEDICINE

## 2018-05-09 PROCEDURE — 25000128 H RX IP 250 OP 636: Performed by: STUDENT IN AN ORGANIZED HEALTH CARE EDUCATION/TRAINING PROGRAM

## 2018-05-09 PROCEDURE — 99285 EMERGENCY DEPT VISIT HI MDM: CPT | Mod: 25 | Performed by: STUDENT IN AN ORGANIZED HEALTH CARE EDUCATION/TRAINING PROGRAM

## 2018-05-09 PROCEDURE — 99285 EMERGENCY DEPT VISIT HI MDM: CPT | Mod: 25,27

## 2018-05-09 PROCEDURE — 99285 EMERGENCY DEPT VISIT HI MDM: CPT | Mod: 25

## 2018-05-09 PROCEDURE — 93005 ELECTROCARDIOGRAM TRACING: CPT

## 2018-05-09 PROCEDURE — 25000128 H RX IP 250 OP 636: Performed by: EMERGENCY MEDICINE

## 2018-05-09 PROCEDURE — 25000125 ZZHC RX 250: Mod: GY | Performed by: STUDENT IN AN ORGANIZED HEALTH CARE EDUCATION/TRAINING PROGRAM

## 2018-05-09 PROCEDURE — 93010 ELECTROCARDIOGRAM REPORT: CPT | Mod: Z6 | Performed by: STUDENT IN AN ORGANIZED HEALTH CARE EDUCATION/TRAINING PROGRAM

## 2018-05-09 PROCEDURE — 96376 TX/PRO/DX INJ SAME DRUG ADON: CPT

## 2018-05-09 PROCEDURE — A9270 NON-COVERED ITEM OR SERVICE: HCPCS | Mod: GY | Performed by: STUDENT IN AN ORGANIZED HEALTH CARE EDUCATION/TRAINING PROGRAM

## 2018-05-09 PROCEDURE — 99285 EMERGENCY DEPT VISIT HI MDM: CPT | Mod: 25 | Performed by: EMERGENCY MEDICINE

## 2018-05-09 PROCEDURE — 00000146 ZZHCL STATISTIC GLUCOSE BY METER IP

## 2018-05-09 RX ORDER — ALBUTEROL SULFATE 90 UG/1
AEROSOL, METERED RESPIRATORY (INHALATION)
COMMUNITY
Start: 2018-04-08 | End: 2018-05-09

## 2018-05-09 RX ORDER — ALBUTEROL SULFATE 90 UG/1
2 AEROSOL, METERED RESPIRATORY (INHALATION) EVERY 4 HOURS PRN
Qty: 1 INHALER | Refills: 1 | Status: SHIPPED | OUTPATIENT
Start: 2018-05-09

## 2018-05-09 RX ORDER — MORPHINE SULFATE 15 MG/1
15 TABLET, FILM COATED, EXTENDED RELEASE ORAL EVERY 12 HOURS
Qty: 30 TABLET | Refills: 0 | Status: SHIPPED | OUTPATIENT
Start: 2018-05-09 | End: 2018-01-01 | Stop reason: SINTOL

## 2018-05-09 RX ORDER — MORPHINE SULFATE 15 MG/1
15 TABLET ORAL EVERY 6 HOURS PRN
Qty: 60 TABLET | Refills: 0 | Status: SHIPPED | OUTPATIENT
Start: 2018-05-09 | End: 2018-01-01

## 2018-05-09 RX ORDER — IPRATROPIUM BROMIDE AND ALBUTEROL SULFATE 2.5; .5 MG/3ML; MG/3ML
3 SOLUTION RESPIRATORY (INHALATION) ONCE
Status: COMPLETED | OUTPATIENT
Start: 2018-05-09 | End: 2018-05-09

## 2018-05-09 RX ORDER — METOPROLOL TARTRATE 50 MG
50 TABLET ORAL ONCE
Status: COMPLETED | OUTPATIENT
Start: 2018-05-09 | End: 2018-05-09

## 2018-05-09 RX ORDER — HYDROMORPHONE HYDROCHLORIDE 1 MG/ML
0.3 INJECTION, SOLUTION INTRAMUSCULAR; INTRAVENOUS; SUBCUTANEOUS ONCE
Status: COMPLETED | OUTPATIENT
Start: 2018-05-09 | End: 2018-05-09

## 2018-05-09 RX ORDER — DIAZEPAM 5 MG
5 TABLET ORAL ONCE
Status: COMPLETED | OUTPATIENT
Start: 2018-05-09 | End: 2018-05-09

## 2018-05-09 RX ORDER — OXYCODONE HYDROCHLORIDE 5 MG/1
5 TABLET ORAL EVERY 8 HOURS PRN
COMMUNITY
Start: 2018-04-12 | End: 2018-05-10

## 2018-05-09 RX ORDER — ONDANSETRON 2 MG/ML
8 INJECTION INTRAMUSCULAR; INTRAVENOUS ONCE
Status: COMPLETED | OUTPATIENT
Start: 2018-05-09 | End: 2018-05-09

## 2018-05-09 RX ORDER — HYDROMORPHONE HYDROCHLORIDE 1 MG/ML
0.5 INJECTION, SOLUTION INTRAMUSCULAR; INTRAVENOUS; SUBCUTANEOUS ONCE
Status: COMPLETED | OUTPATIENT
Start: 2018-05-09 | End: 2018-05-09

## 2018-05-09 RX ADMIN — METOPROLOL TARTRATE 50 MG: 50 TABLET ORAL at 19:44

## 2018-05-09 RX ADMIN — HYDROMORPHONE HYDROCHLORIDE 0.5 MG: 1 INJECTION, SOLUTION INTRAMUSCULAR; INTRAVENOUS; SUBCUTANEOUS at 11:26

## 2018-05-09 RX ADMIN — SODIUM CHLORIDE 500 ML: 9 INJECTION, SOLUTION INTRAVENOUS at 18:40

## 2018-05-09 RX ADMIN — HYDROMORPHONE HYDROCHLORIDE 0.3 MG: 1 INJECTION, SOLUTION INTRAMUSCULAR; INTRAVENOUS; SUBCUTANEOUS at 13:08

## 2018-05-09 RX ADMIN — IPRATROPIUM BROMIDE AND ALBUTEROL SULFATE 3 ML: .5; 3 SOLUTION RESPIRATORY (INHALATION) at 20:25

## 2018-05-09 RX ADMIN — HYDROMORPHONE HYDROCHLORIDE 1 MG: 1 INJECTION, SOLUTION INTRAMUSCULAR; INTRAVENOUS; SUBCUTANEOUS at 18:41

## 2018-05-09 RX ADMIN — DIAZEPAM 5 MG: 5 TABLET ORAL at 18:42

## 2018-05-09 RX ADMIN — ONDANSETRON 4 MG: 2 INJECTION INTRAMUSCULAR; INTRAVENOUS at 18:53

## 2018-05-09 RX ADMIN — ONDANSETRON 4 MG: 2 INJECTION INTRAMUSCULAR; INTRAVENOUS at 18:41

## 2018-05-09 ASSESSMENT — ENCOUNTER SYMPTOMS
NERVOUS/ANXIOUS: 1
FATIGUE: 1
SHORTNESS OF BREATH: 1
COUGH: 0
SLEEP DISTURBANCE: 1
BACK PAIN: 1
AGITATION: 1
NECK PAIN: 1

## 2018-05-09 ASSESSMENT — PAIN SCALES - GENERAL: PAINLEVEL: SEVERE PAIN (6)

## 2018-05-09 NOTE — ED AVS SNAPSHOT
[facility]    [address] [address]    Phone:  [phone]    Fax:  [phone]                                       Chilo Oneil   MRN: [id number]    Department:  [facility]   Date of Visit:  5/9/2018           After Visit Summary Signature Page     I have received my discharge instructions, and my questions have been answered. I have discussed any challenges I see with this plan with the nurse or doctor.    ..........................................................................................................................................  Patient/Patient Representative Signature      ..........................................................................................................................................  Patient Representative Print Name and Relationship to Patient    ..................................................               ................................................  Date                                            Time    ..........................................................................................................................................  Reviewed by Signature/Title    ...................................................              ..............................................  Date                                                            Time

## 2018-05-09 NOTE — MR AVS SNAPSHOT
After Visit Summary   5/9/2018    Chilo Oneil    MRN: [id number]           Patient Information     Date Of Birth          1950        Visit Information        Provider Department      5/9/2018 2:30 PM Gurjit Fox MD [facility]        Today's Diagnoses     Malignant neoplasm of lung, unspecified laterality, unspecified part of lung (H)    -  1      Care Instructions    Per Dr. Fox's instructions          Follow-ups after your visit        Your next 10 appointments already scheduled     May 10, 2018  8:30 AM CDT   Level 5 with ROOM 1 [facility] ([facility])    [facility]  [address]  [facility] [address]   [phone]            May 10, 2018  9:30 AM CDT   Return Visit with Mor Mccauley MD   [facility] ([facility])    [facility]  [address]  [facility] [address]   [phone]            May 11, 2018  2:45 PM CDT   (Arrive by 2:30 PM)   Return Visit with NATALIE Berg [facility] ([facility])    [address] [place] [address]  [address] [place] [address] [phone]            Jul 05, 2018 11:30 AM CDT   Level O with ROOM 5 [facility] ([facility])    [facility]  [address]  [facility] [address]   [phone]              Who to contact     If you have questions or need follow up information about today's clinic visit or your schedule please contact [facility] directly at [phone].  Normal or non-critical lab and imaging results will be communicated to you by MyChart, letter or phone within 4 business days after the clinic has received the results. If you do not hear from us within 7 days, please contact the clinic through MyChart or phone. If you have a critical or abnormal lab result, we  "will notify you by phone as soon as possible.  Submit refill requests through [employer] or call your pharmacy and they will forward the refill request to us. Please allow 3 business days for your refill to be completed.          Additional Information About Your Visit        [employer] Information     [employer] lets you send messages to your doctor, view your test results, renew your prescriptions, schedule appointments and more. To sign up, go to [email or web address] . Click on \"Log in\" on the left side of the screen, which will take you to the Welcome page. Then click on \"Sign up Now\" on the right side of the page.     You will be asked to enter the access code listed below, as well as some personal information. Please follow the directions to create your username and password.     Your access code is: [email or web address]  Expires: 2018 10:49 AM     Your access code will  in 90 days. If you need help or a new code, please call your [place] clinic or [phone].        Care EveryWhere ID     This is your Care EveryWhere ID. This could be used by other organizations to access your [place] medical records  [id number]        Your Vitals Were     Pulse Temperature Height BMI (Body Mass Index)          85 98.2  F (36.8  C) (Tympanic) 1.905 m (6' 3\") 20.25 kg/m2         Blood Pressure from Last 3 Encounters:   18 158/81   18 162/67   18 168/66    Weight from Last 3 Encounters:   18 73.5 kg (162 lb)   18 73.8 kg (162 lb 12.8 oz)   18 73.9 kg (163 lb)              We Performed the Following     Meli-Operative Worksheet          Today's Medication Changes          These changes are accurate as of 18  2:32 PM.  If you have any questions, ask your nurse or doctor.               Start taking these medicines.        Dose/Directions    * morphine 15 MG 12 hr tablet   Commonly known as:  MS CONTIN   Used for:  Pain   Started by:  Shay Harris MD        Dose:  15 mg   Take 1 tablet " (15 mg) by mouth every 12 hours maximum 2 tablet(s) per day   Quantity:  30 tablet   Refills:  0       * morphine 15 MG IR tablet   Commonly known as:  MSIR   Used for:  Pain   Started by:  Shay Harris MD        Dose:  15 mg   Take 1 tablet (15 mg) by mouth every 6 hours as needed for moderate to severe pain   Quantity:  60 tablet   Refills:  0       * Notice:  This list has 2 medication(s) that are the same as other medications prescribed for you. Read the directions carefully, and ask your doctor or other care provider to review them with you.         Where to get your medicines      Some of these will need a paper prescription and others can be bought over the counter.  Ask your nurse if you have questions.     Bring a paper prescription for each of these medications     morphine 15 MG 12 hr tablet    morphine 15 MG IR tablet                Primary Care Provider Office Phone # Fax #    Christalin Jyoti Plasencia -[phone] [address] [facility] [address]        Goals        General    Financial Wellbeing (pt-stated)     Notes - Note edited  5/1/2018  8:50 AM by Sofya Paulino LSW    Goal Statement: I want to complete my Medical Assistance application  Measure of Success: My Medical Assistance application will be completed  Supportive Steps to Achieve: SW and pt will finish the remaining paperwork that needs to be completed  Barriers: None identified at this time  Strengths: Pt is motivated as the income increase will assist him greatly  Date to Achieve By: 3 months          Pain Management (pt-stated)     Notes - Note created  5/1/2018 10:59 AM by Sofya Paulino LSW    [facility] to discuss with PCP, but would recommend a Palliative Care consult for symptom management.        Equal Access to Services     [facility]: Kevin garcia Soearl, waaxda luqadaha, qaybta celina echols. [facility] [phone].    ATENCIÓN: Si  mt carrera, tiene a galaviz disposición servicios gratuitos de asistencia lingüística. Yecenia posada [phone].    We comply with applicable federal civil rights laws and [place] laws. We do not discriminate on the basis of race, color, national origin, age, disability, sex, sexual orientation, or gender identity.            Thank you!     Thank you for choosing [facility]  for your care. Our goal is always to provide you with excellent care. Hearing back from our patients is one way we can continue to improve our services. Please take a few minutes to complete the written survey that you may receive in the mail after your visit with us. Thank you!             Your Updated Medication List - Protect others around you: Learn how to safely use, store and throw away your medicines at www.disposemymeds.org.          This list is accurate as of 5/9/18  2:32 PM.  Always use your most recent med list.                   Brand Name Dispense Instructions for use Diagnosis    acetaminophen 325 MG tablet    TYLENOL    100 tablet    Take 3 tablets (975 mg) by mouth every 8 hours    Carcinoma, lung, left (H)       ALEVE PO      Take 220 mg by mouth every 12 hours as needed for moderate pain        ALPRAZolam 0.5 MG tablet    XANAX    20 tablet    Take 1 tablet (0.5 mg) by mouth 3 times daily as needed for anxiety    Anxiety       ASPIRIN PO      Take 81 mg by mouth daily        EQL NICOTINE 2 MG lozenge   Generic drug:  nicotine polacrilex      Place 2 mg inside cheek every hour as needed for smoking cessation        hydrochlorothiazide 25 MG tablet    HYDRODIURIL    90 tablet    Take 1 tablet (25 mg) by mouth daily    Benign essential hypertension       losartan 50 MG tablet    COZAAR    90 tablet    Take 1 tablet (50 mg) by mouth daily    Benign essential hypertension       metoprolol tartrate 50 MG tablet    LOPRESSOR    60 tablet    Take 1 tablet (50 mg) by mouth 2 times daily    Benign essential hypertension        * morphine 15 MG 12 hr tablet    MS CONTIN    30 tablet    Take 1 tablet (15 mg) by mouth every 12 hours maximum 2 tablet(s) per day    Pain       * morphine 15 MG IR tablet    MSIR    60 tablet    Take 1 tablet (15 mg) by mouth every 6 hours as needed for moderate to severe pain    Pain       ondansetron 8 MG ODT tab    ZOFRAN-ODT    30 tablet    Take 1 tablet (8 mg) by mouth every 8 hours as needed for nausea    Nausea and vomiting, intractability of vomiting not specified, unspecified vomiting type       oxyCODONE IR 5 MG tablet    ROXICODONE          rosuvastatin 5 MG tablet    CRESTOR    30 tablet    Take 1 tablet (5 mg) by mouth daily        senna-docusate 8.6-50 MG per tablet    SENOKOT-S;PERICOLACE    100 tablet    Take 2 tablets by mouth 2 times daily    Carcinoma, lung, left (H)       VENTOLIN  (90 Base) MCG/ACT Inhaler   Generic drug:  albuterol           * Notice:  This list has 2 medication(s) that are the same as other medications prescribed for you. Read the directions carefully, and ask your doctor or other care provider to review them with you.

## 2018-05-09 NOTE — LETTER
5/9/2018         RE: Chilo Oneil  [address]  [place] [address]        Dear Colleague,    Thank you for referring your patient, Chilo Oneil, to the [facility]. Please see a copy of my visit note below.    68-year-old male here for Port-A-Cath placement. Patient has never had a central line before. Patient is starting chemotherapy tomorrow and surgery will be scheduled for next Tuesday. Patient's primary cancer and is in his left lung. There is no plans to do radiation to this area.    Patient Active Problem List   Diagnosis     Tobacco use disorder     Alcohol use, daily use     PAD (peripheral artery disease) (H)     Benign essential hypertension     Hyperlipidemia LDL goal <100     Anxiety     Gastroesophageal reflux disease without esophagitis     CAD (coronary artery disease)     Lung cancer (H)     Uncomplicated asthma     Hyponatremia     SOB (shortness of breath)     Nausea with vomiting     Carcinoma, lung, left (H)       Past Medical History:   Diagnosis Date     GERD (gastroesophageal reflux disease)      HTN (hypertension)      Lung cancer (H)      MI (myocardial infarction)     2016, medically managed       Past Surgical History:   Procedure Laterality Date     FRACTURE TX, ANKLE RT/LT  1975    Fracture TX Ankle, LT     OPEN REDUCTION INTERNAL FIXATION HIP NAILING  9/20/2013    Procedure: OPEN REDUCTION INTERNAL FIXATION HIP NAILING;  Left Hip Open Reduction Internal Fixation ;  Surgeon: Rosas Dennis MD;  Location: [place] OR     THORACOSCOPIC BIOPSY LUNG Left 4/11/2018    Procedure: THORACOSCOPIC BIOPSY LUNG;  subxiphoid left video assisted thorascopic surgery pleural biops, left lower lobe wedge biopsy ;  Surgeon: Mega Moreno MD;  Location:  OR       Family History   Problem Relation Age of Onset     DIABETES Brother      type 2     DIABETES Father        Social History   Substance Use Topics     Smoking status: Current Every Day Smoker      Packs/day: 0.15     Years: 47.00     Types: Cigarettes     Start date: 12/26/1967     Smokeless tobacco: Never Used      Comment: 2-3 cigs daily     Alcohol use Yes      Comment: 6-8 beers per day, last 3/27 at 6pm        History   Drug Use     Yes     Special: Marijuana     Comment: 3 times a day, 2-3 times/week for pain       Current Outpatient Prescriptions   Medication Sig Dispense Refill     acetaminophen (TYLENOL) 325 MG tablet Take 3 tablets (975 mg) by mouth every 8 hours 100 tablet 0     ALPRAZolam (XANAX) 0.5 MG tablet Take 1 tablet (0.5 mg) by mouth 3 times daily as needed for anxiety 20 tablet 0     ASPIRIN PO Take 81 mg by mouth daily        hydrochlorothiazide (HYDRODIURIL) 25 MG tablet Take 1 tablet (25 mg) by mouth daily (Patient not taking: Reported on 5/9/2018) 90 tablet 3     losartan (COZAAR) 50 MG tablet Take 1 tablet (50 mg) by mouth daily (Patient not taking: Reported on 5/9/2018) 90 tablet 3     metoprolol (LOPRESSOR) 50 MG tablet Take 1 tablet (50 mg) by mouth 2 times daily (Patient not taking: Reported on 5/9/2018) 60 tablet 1     morphine (MS CONTIN) 15 MG 12 hr tablet Take 1 tablet (15 mg) by mouth every 12 hours maximum 2 tablet(s) per day 30 tablet 0     morphine (MSIR) 15 MG IR tablet Take 1 tablet (15 mg) by mouth every 6 hours as needed for moderate to severe pain 60 tablet 0     Naproxen Sodium (ALEVE PO) Take 220 mg by mouth every 12 hours as needed for moderate pain       nicotine polacrilex (EQL NICOTINE) 2 MG lozenge Place 2 mg inside cheek every hour as needed for smoking cessation       ondansetron (ZOFRAN-ODT) 8 MG ODT tab Take 1 tablet (8 mg) by mouth every 8 hours as needed for nausea (Patient not taking: Reported on 5/9/2018) 30 tablet 1     oxyCODONE IR (ROXICODONE) 5 MG tablet        rosuvastatin (CRESTOR) 5 MG tablet Take 1 tablet (5 mg) by mouth daily (Patient not taking: Reported on 5/9/2018) 30 tablet 3     senna-docusate (SENOKOT-S;PERICOLACE) 8.6-50 MG per tablet  "Take 2 tablets by mouth 2 times daily (Patient not taking: Reported on 5/9/2018) 100 tablet 0     VENTOLIN  (90 Base) MCG/ACT Inhaler          Allergies   Allergen Reactions     Cipro [Ciprofloxacin] Swelling       ROS  Constitutional - Denies fevers, weight loss, malaise, lethargy  Neuro - Denies tremors or seizures  Pulmon - new diagnosis left lung cancer  CV - Denies CP, SOB, lower extremity edema, difficulty w/ stairs, has never used NTG  GI - Denies hematemesis, BRBPR, melena, chronic diarrhea or epigastric pain   - Denies hematuria, difficulty voiding, h/o STDs  Hematology - Denies blood clotting disorders, chronic anemias  Dermatology - No melanomas or skin cancers  Rheumatology - No h/o RA  Pysch - Denies depression, bipolar d/o or schizophrenia    Exam:  Patient Vitals for the past 24 hrs:   BP Temp Temp src Pulse Height Weight   05/09/18 1419 158/81 98.2  F (36.8  C) Tympanic 85 1.905 m (6' 3\") 73.5 kg (162 lb)     General - Alert and Oriented X4, NAD, well nourished  HEENT - Normocephalic, atraumatic, PERRLA, Nose  Lungs - decreased breath sounds left greater than right  CV - Heart RRR, no lift's, thrills, murmurs, rubs, or gallops. Carotid, radial, and femoral pulses 2+ bilaterally  Extremities - No cyanosis, clubbing or edema    Assessment and plan: 68-year-old male with left lung cancer. Patient is good candidate for Port-A-Cath placement. Risks benefits alternatives and complications were discussed with the patient including the possibility of infection bleeding or pneumothorax. Patient understood and wished to proceed. PATIENT IS CLEARED FOR SURGERY.    Gurjit Fox MD     Again, thank you for allowing me to participate in the care of your patient.        Sincerely,        Gurjit Fox MD    "

## 2018-05-09 NOTE — ED AVS SNAPSHOT
[facility] Emergency Department    [address] [address]    Phone:  [phone]    Fax:  [phone]                                       Chilo Oneil   MRN: [id number]    Department:  [facility] Emergency Department   Date of Visit:  5/9/2018           Patient Information     Date Of Birth          1950        Your diagnoses for this visit were:     Chronic left shoulder pain     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)     Hypoglycemia        You were seen by Asad Stone MD.      Follow-up Information     Follow up with Manish Plasencia MD.    Specialty:  Family Practice    Why:  Later this week for recheck    Contact information:    [address] [place] [address]  [phone]          Follow up with [facility] Emergency Department.    Specialty:  EMERGENCY MEDICINE    Why:  If symptoms worsen    Contact information:    [address] [place] [id number] [phone]    Additional information:    The medical center is located at   [address] [place] [address] (between [facility] and   [facility] in [place], [place] miles north   of [place]).        Discharge Instructions       Return if symptoms worsen or new symptoms develop.  Follow-up with oncology clinic immediately after discharge.  Have them address your pain issues.  Drink plenty of fluids and continue with orange juice today.  Have your blood sugar rechecked tomorrow.  If any altered mental status worsening pain shortness of breath or other symptoms present please return for further evaluation and care.    Your next 10 appointments already scheduled     May 10, 2018  8:30 AM CDT   Level 5 with ROOM 1 [facility] ([facility])    n Medical Ctr [address]  [facility] [place] [place] [address] [phone]            May 10, 2018  9:30 AM CDT   Return Visit with Mor Mccauley MD   [facility] ([facility]  [facility])    [facility]  [address] [place]  [facility] [address]   [phone]            May 11, 2018  2:45 PM CDT   (Arrive by 2:30 PM)   Return Visit with NATALIE Berg [facility] ([facility])    [address]  [address]  [facility] [address] [phone]            Jul 05, 2018 11:30 AM CDT   Level O with ROOM 5 [facility] ([facility])    [facility]  [address]  [facility] [address]   [phone]              24 Hour Appointment Hotline       To make an appointment at any [facility], call [phone] ([phone]). If you don't have a family doctor or clinic, we will help you find one. [facility] clinics are conveniently located to serve the needs of you and your family.             Review of your medicines      Our records show that you are taking the medicines listed below. If these are incorrect, please call your family doctor or clinic.        Dose / Directions Last dose taken    acetaminophen 325 MG tablet   Commonly known as:  TYLENOL   Dose:  975 mg   Quantity:  100 tablet        Take 3 tablets (975 mg) by mouth every 8 hours   Refills:  0        ALEVE PO   Dose:  220 mg        Take 220 mg by mouth every 12 hours as needed for moderate pain   Refills:  0        ALPRAZolam 0.5 MG tablet   Commonly known as:  XANAX   Dose:  0.5 mg   Quantity:  20 tablet        Take 1 tablet (0.5 mg) by mouth 3 times daily as needed for anxiety   Refills:  0        ASPIRIN PO   Dose:  81 mg        Take 81 mg by mouth daily   Refills:  0        EQL NICOTINE 2 MG lozenge   Dose:  2 mg   Generic drug:  nicotine polacrilex        Place 2 mg inside cheek every hour as needed for smoking cessation   Refills:  0        hydrochlorothiazide 25 MG tablet   Commonly known as:  HYDRODIURIL   Dose:  25 mg   Quantity:  90 tablet        Take 1  tablet (25 mg) by mouth daily   Refills:  3        losartan 50 MG tablet   Commonly known as:  COZAAR   Dose:  50 mg   Quantity:  90 tablet        Take 1 tablet (50 mg) by mouth daily   Refills:  3        metoprolol tartrate 50 MG tablet   Commonly known as:  LOPRESSOR   Dose:  50 mg   Quantity:  60 tablet        Take 1 tablet (50 mg) by mouth 2 times daily   Refills:  1        ondansetron 8 MG ODT tab   Commonly known as:  ZOFRAN-ODT   Dose:  8 mg   Quantity:  30 tablet        Take 1 tablet (8 mg) by mouth every 8 hours as needed for nausea   Refills:  1        rosuvastatin 5 MG tablet   Commonly known as:  CRESTOR   Dose:  5 mg   Quantity:  30 tablet        Take 1 tablet (5 mg) by mouth daily   Refills:  3        senna-docusate 8.6-50 MG per tablet   Commonly known as:  SENOKOT-S;PERICOLACE   Dose:  2 tablet   Quantity:  100 tablet        Take 2 tablets by mouth 2 times daily   Refills:  0                Procedures and tests performed during your visit     Procedure/Test Number of Times Performed    CBC with platelets, differential 1    Chest XR,  PA & LAT 1    Comprehensive metabolic panel 3    EKG 12 lead 1    Lipase 3    Troponin I 3      Orders Needing Specimen Collection     Ordered          05/09/18 1055  UA reflex to Microscopic - STAT, Prio: STAT, Needs to be Collected     Scheduled Task Status   05/09/18 1055 Collect UA reflex to Microscopic Open   Order Class:  PCU Collect                  Pending Results     No orders found from 5/7/2018 to 5/10/2018.            Pending Culture Results     No orders found from 5/7/2018 to 5/10/2018.            Pending Results Instructions     If you had any lab results that were not finalized at the time of your Discharge, you can call the ED Lab Result RN at [phone]. You will be contacted by this team for any positive Lab results or changes in treatment. The nurses are available 7 days a week from 10A to 6:30P.  You can leave a message 24 hours per day and they  will return your call.        Test Results From Your Hospital Stay        5/9/2018 11:20 AM      Component Results     Component Value Ref Range & Units Status    WBC 7.2 4.0 - 11.0 10e9/L Final    RBC Count 4.87 4.4 - 5.9 10e12/L Final    Hemoglobin 16.2 13.3 - 17.7 g/dL Final    Hematocrit 43.9 40.0 - 53.0 % Final    MCV 90 78 - 100 fl Final    MCH 33.3 (H) 26.5 - 33.0 pg Final    MCHC 36.9 (H) 31.5 - 36.5 g/dL Final    RDW 14.4 10.0 - 15.0 % Final    Platelet Count 245 150 - 450 10e9/L Final    Diff Method Automated Method  Final    % Neutrophils 70.4 % Final    % Lymphocytes 21.7 % Final    % Monocytes 7.1 % Final    % Eosinophils 0.1 % Final    % Basophils 0.6 % Final    % Immature Granulocytes 0.1 % Final    Absolute Neutrophil 5.1 1.6 - 8.3 10e9/L Final    Absolute Lymphocytes 1.6 0.8 - 5.3 10e9/L Final    Absolute Monocytes 0.5 0.0 - 1.3 10e9/L Final    Absolute Eosinophils 0.0 0.0 - 0.7 10e9/L Final    Absolute Basophils 0.0 0.0 - 0.2 10e9/L Final    Abs Immature Granulocytes 0.0 0 - 0.4 10e9/L Final         5/9/2018 11:46 AM      Component Results     Component Value Ref Range & Units Status    Sodium Canceled, Test credited 133 - 144 mmol/L Final    Unsatisfactory specimen - hemolyzed  RN IN ER WILL REORDER AND REDRAW 1144 5.9.18 JW      Potassium Canceled, Test credited 3.4 - 5.3 mmol/L Final    Unsatisfactory specimen - hemolyzed  RN IN ER WILL REORDER AND REDRAW 1144 5.9.18 JW      Chloride Canceled, Test credited 94 - 109 mmol/L Final    Unsatisfactory specimen - hemolyzed  RN IN ER WILL REORDER AND REDRAW 1144 5.9.18 JW      Carbon Dioxide Canceled, Test credited 20 - 32 mmol/L Final    Unsatisfactory specimen - hemolyzed  RN IN ER WILL REORDER AND REDRAW 1144 5.9.18 JW      Anion Gap Canceled, Test credited 6 - 17 mmol/L Final    Unsatisfactory specimen - hemolyzed  RN IN ER WILL REORDER AND REDRAW 1144 5.9.18 JW      Glucose Canceled, Test credited 70 - 99 mg/dL Final    Unsatisfactory specimen -  hemolyzed  RN IN ER WILL REORDER AND REDRAW 1144 5.9.18 JW      Urea Nitrogen Canceled, Test credited 7 - 30 mg/dL Final    Unsatisfactory specimen - hemolyzed  RN IN ER WILL REORDER AND REDRAW 1144 5.9.18 JW      Creatinine Canceled, Test credited 0.66 - 1.25 mg/dL Final    Unsatisfactory specimen - hemolyzed  RN IN ER WILL REORDER AND REDRAW 1144 5.9.18 JW      GFR Estimate Canceled, Test credited >60 mL/min/1.7m2 Final    Unsatisfactory specimen - hemolyzed  RN IN ER WILL REORDER AND REDRAW 1144 5.9.18 JW      GFR Estimate If Black Canceled, Test credited >60 mL/min/1.7m2 Final    Unsatisfactory specimen - hemolyzed  RN IN ER WILL REORDER AND REDRAW 1144 5.9.18 JW      Calcium Canceled, Test credited 8.5 - 10.1 mg/dL Final    Unsatisfactory specimen - hemolyzed  RN IN ER WILL REORDER AND REDRAW 1144 5.9.18 JW      Bilirubin Total Canceled, Test credited 0.2 - 1.3 mg/dL Final    Unsatisfactory specimen - hemolyzed  RN IN ER WILL REORDER AND REDRAW 1144 5.9.18 JW      Albumin Canceled, Test credited 3.4 - 5.0 g/dL Final    Unsatisfactory specimen - hemolyzed  RN IN ER WILL REORDER AND REDRAW 1144 5.9.18 JW      Protein Total Canceled, Test credited 6.8 - 8.8 g/dL Final    Unsatisfactory specimen - hemolyzed  RN IN ER WILL REORDER AND REDRAW 1144 5.9.18 JW      Alkaline Phosphatase Canceled, Test credited 40 - 150 U/L Final    Unsatisfactory specimen - hemolyzed  RN IN ER WILL REORDER AND REDRAW 1144 5.9.18 JW      ALT Canceled, Test credited 0 - 70 U/L Final    Unsatisfactory specimen - hemolyzed  RN IN ER WILL REORDER AND REDRAW 1144 5.9.18 JW      AST Canceled, Test credited 0 - 45 U/L Final    Unsatisfactory specimen - hemolyzed  RN IN ER WILL REORDER AND REDRAW 1144 5.9.18 JW           5/9/2018 11:46 AM      Component Results     Component Value Ref Range & Units Status    Lipase Canceled, Test credited 73 - 393 U/L Final    Unsatisfactory specimen - hemolyzed  RN IN ER WILL REORDER AND REDRAW 1144 5.9.18 JW            5/9/2018 12:06 PM      Narrative     XR CHEST 2 VW 5/9/2018 11:37 AM    HISTORY: Pain.    COMPARISON: 4/12/2018.        Impression     IMPRESSION: 2 views of the chest show interval improvement in  previously seen patchy and nodular parenchymal abnormalities in the  left perihilar region. An area of nodularity remains that measures 1.6  cm. The previously seen left pleural effusion has resolved in the  interim. No new findings.     VERONICA HINTON MD         5/9/2018 11:46 AM      Component Results     Component Value Ref Range & Units Status    Troponin I ES Canceled, Test credited 0.000 - 0.045 ug/L Final    Unsatisfactory specimen - hemolyzed  RN IN ER WILL REORDER AND REDRAW 1144 5.9.18 JW           5/9/2018 12:41 PM      Component Results     Component Value Ref Range & Units Status    Sodium 140 133 - 144 mmol/L Final    Potassium 3.8 3.4 - 5.3 mmol/L Final    Chloride 106 94 - 109 mmol/L Final    Carbon Dioxide 19 (L) 20 - 32 mmol/L Final    Anion Gap 15 (H) 3 - 14 mmol/L Final    Glucose 58 (L) 70 - 99 mg/dL Final    Urea Nitrogen 13 7 - 30 mg/dL Final    Creatinine 0.56 (L) 0.66 - 1.25 mg/dL Final    GFR Estimate >90 >60 mL/min/1.7m2 Final    Non  GFR Calc    GFR Estimate If Black >90 >60 mL/min/1.7m2 Final    African American GFR Calc    Calcium 7.9 (L) 8.5 - 10.1 mg/dL Final    Bilirubin Total 0.7 0.2 - 1.3 mg/dL Final    Albumin 3.3 (L) 3.4 - 5.0 g/dL Final    Protein Total 6.5 (L) 6.8 - 8.8 g/dL Final    Alkaline Phosphatase 64 40 - 150 U/L Final    ALT 48 0 - 70 U/L Final    AST 77 (H) 0 - 45 U/L Final         5/9/2018 12:41 PM      Component Results     Component Value Ref Range & Units Status    Lipase 187 73 - 393 U/L Final         5/9/2018 12:41 PM      Component Results     Component Value Ref Range & Units Status    Troponin I ES 0.022 0.000 - 0.045 ug/L Final    The 99th percentile for upper reference range is 0.045 ug/L.  Troponin values   in the range of 0.045 - 0.120 ug/L  "may be associated with risks of adverse   clinical events.                  Thank you for choosing [facility]       Thank you for choosing [facility] for your care. Our goal is always to provide you with excellent care. Hearing back from our patients is one way we can continue to improve our services. Please take a few minutes to complete the written survey that you may receive in the mail after you visit with us. Thank you!        [employer] Information     [employer] lets you send messages to your doctor, view your test results, renew your prescriptions, schedule appointments and more. To sign up, go to [email or web address] . Click on \"Log in\" on the left side of the screen, which will take you to the Welcome page. Then click on \"Sign up Now\" on the right side of the page.     You will be asked to enter the access code listed below, as well as some personal information. Please follow the directions to create your username and password.     Your access code is: [email or web address]  Expires: 2018 10:49 AM     Your access code will  in 90 days. If you need help or a new code, please call your [facility] clinic or [phone].        Care EveryWhere ID     This is your Care EveryWhere ID. This could be used by other organizations to access your [facility] medical records  [id number]        Equal Access to Services     [place] : Hadii krysta garcia Soearl, waaxda luqadaha, qaybta kaalmada aderaul, celina fisher. So [facility] [phone].    ATENCIÓN: Si habla español, tiene a galaviz disposición servicios gratuitos de asistencia lingüística. Llame al [phone].    We comply with applicable federal civil rights laws and [place] laws. We do not discriminate on the basis of race, color, national origin, age, disability, sex, sexual orientation, or gender identity.            After Visit Summary       This is your record. Keep this with you and show to your community pharmacist(s) and doctor(s) " at your next visit.

## 2018-05-09 NOTE — ED AVS SNAPSHOT
[facility] Emergency Department    [address] [address]    Phone:  [phone]    Fax:  [phone]                                       Chilo Oneil   MRN: [id number]    Department:  [facility] Emergency Department   Date of Visit:  5/9/2018           After Visit Summary Signature Page     I have received my discharge instructions, and my questions have been answered. I have discussed any challenges I see with this plan with the nurse or doctor.    ..........................................................................................................................................  Patient/Patient Representative Signature      ..........................................................................................................................................  Patient Representative Print Name and Relationship to Patient    ..................................................               ................................................  Date                                            Time    ..........................................................................................................................................  Reviewed by Signature/Title    ...................................................              ..............................................  Date                                                            Time

## 2018-05-09 NOTE — PATIENT INSTRUCTIONS
Long acting morphine (MS Contin) 15 mg, one tablet each morning    Short acting morphine (MS IR) 15 mg, on tablet every 6 hours if needed for pain    Keep track of how many pain pills you take each day    No alcohol    Palliative clinic again in 4 weeks

## 2018-05-09 NOTE — LETTER
5/9/2018         RE: Chilo Oneil  [address]  [place] [address]        Dear Colleague,    Thank you for referring your patient, Chilo Oneil, to the [facility] [place] [facility]. Please see a copy of my visit note below.    Palliative Care Outpatient Clinic Consultation Note    Patient:  Chilo Oneil    Chief Complaint:   Chilo Oneil 68 year old male who is presenting to the palliative medicine clinic today at the request of Dr Elliott from ED for a palliative care consultation secondary to Lung Ca.   The patient's primary care provider is:  Manish Plasencia.     History of Present Illness:  This gentleman has been diagnosed with lung cancer on by biopsy in December 2017 to be moderately differentiated adenocarcinoma.  He has not had any definitive treatment and has actually been somewhat remiss in getting follow-up or scheduled appointments.  He has been to the emergency room of couple of times because of significant pain.  He is now set up to get port placement and then consultation with oncology for possible chemotherapy within the next week.  He has no specific complaints other than ongoing pain for which oxycodone has not been helping.      Distressing Symptom/s:  Location: Left upper back  Chronicity: Onset 2 months ago, gradually worsening since  Duration: Daily and fairly constant  Quality: burning  Quantity: 7/10 in intensity  Aggravating Factors: None  Relieving Factors: None  Prior & Current Treatments: Oxycodone    Patient's Disease Understanding: He is somewhat ignorant to the prognosis as he has not had a lot of discussion with physicians but he realizes that this is a serious illness    Coping: He says he is handling this fairly well however he has been coping by using alcohol which he says he can stop.  He has just been using it for the significant pain.    Social History  Living Situation: He lives alone in a 1 level house  Children: None  Actual/Potential Caregiver(s):  None  Support System: He has a sister and some friends who have offered to take him in or help as needed  Occupation: Cook  Hobbies: Fishing  Substance Use/History of misuse: Occasional marijuana, alcohol  Financial Concerns: He anticipates struggles going forward  Spiritual Background: None  Spiritual Concerns/Needs: None  Social History   Substance Use Topics     Smoking status: Current Every Day Smoker     Packs/day: 0.15     Years: 47.00     Types: Cigarettes     Start date: 12/26/1967     Smokeless tobacco: Never Used      Comment: 2-3 cigs daily     Alcohol use Yes      Comment: 6-8 beers per day, last 3/27 at 6pm       Family History  Family History   Problem Relation Age of Onset     DIABETES Brother      type 2     DIABETES Father      Patient's Involvement with Prior History of Serious Illness in Family: None    Advance Care Planning:  Advance Directive:      Where is written copy located:   Health Care Agent Contact Information:   POLST:       Allergies   Allergen Reactions     Cipro [Ciprofloxacin] Swelling     Current Outpatient Prescriptions   Medication Sig Dispense Refill     acetaminophen (TYLENOL) 325 MG tablet Take 3 tablets (975 mg) by mouth every 8 hours 100 tablet 0     ALPRAZolam (XANAX) 0.5 MG tablet Take 1 tablet (0.5 mg) by mouth 3 times daily as needed for anxiety 20 tablet 0     ASPIRIN PO Take 81 mg by mouth daily        morphine (MS CONTIN) 15 MG 12 hr tablet Take 1 tablet (15 mg) by mouth every 12 hours maximum 2 tablet(s) per day 30 tablet 0     morphine (MSIR) 15 MG IR tablet Take 1 tablet (15 mg) by mouth every 6 hours as needed for moderate to severe pain 60 tablet 0     Naproxen Sodium (ALEVE PO) Take 220 mg by mouth every 12 hours as needed for moderate pain       nicotine polacrilex (EQL NICOTINE) 2 MG lozenge Place 2 mg inside cheek every hour as needed for smoking cessation       hydrochlorothiazide (HYDRODIURIL) 25 MG tablet Take 1 tablet (25 mg) by mouth daily (Patient not  taking: Reported on 5/9/2018) 90 tablet 3     losartan (COZAAR) 50 MG tablet Take 1 tablet (50 mg) by mouth daily (Patient not taking: Reported on 5/9/2018) 90 tablet 3     metoprolol (LOPRESSOR) 50 MG tablet Take 1 tablet (50 mg) by mouth 2 times daily (Patient not taking: Reported on 5/9/2018) 60 tablet 1     ondansetron (ZOFRAN-ODT) 8 MG ODT tab Take 1 tablet (8 mg) by mouth every 8 hours as needed for nausea (Patient not taking: Reported on 5/9/2018) 30 tablet 1     oxyCODONE IR (ROXICODONE) 5 MG tablet        rosuvastatin (CRESTOR) 5 MG tablet Take 1 tablet (5 mg) by mouth daily (Patient not taking: Reported on 5/9/2018) 30 tablet 3     senna-docusate (SENOKOT-S;PERICOLACE) 8.6-50 MG per tablet Take 2 tablets by mouth 2 times daily (Patient not taking: Reported on 5/9/2018) 100 tablet 0     VENTOLIN  (90 Base) MCG/ACT Inhaler        Past Medical History:   Diagnosis Date     GERD (gastroesophageal reflux disease)      HTN (hypertension)      Lung cancer (H)      MI (myocardial infarction)     2016, medically managed     Past Surgical History:   Procedure Laterality Date     FRACTURE TX, ANKLE RT/LT  1975    Fracture TX Ankle, LT     OPEN REDUCTION INTERNAL FIXATION HIP NAILING  9/20/2013    Procedure: OPEN REDUCTION INTERNAL FIXATION HIP NAILING;  Left Hip Open Reduction Internal Fixation ;  Surgeon: Rosas Dennis MD;  Location: [facility] OR     THORACOSCOPIC BIOPSY LUNG Left 4/11/2018    Procedure: THORACOSCOPIC BIOPSY LUNG;  subxiphoid left video assisted thorascopic surgery pleural biops, left lower lobe wedge biopsy ;  Surgeon: Mega Moreno MD;  Location:  OR       REVIEW OF SYSTEMS:   ROS: 10 point ROS neg other than the symptoms noted above in the HPI and here:  Palliative Symptom Review (0=no symptom/no concern, 1=mild, 2=moderate, 3=severe):      Pain: 3      Fatigue: 0      Nausea: 0      Constipation: 1      Diarrhea: 0      Depressive Symptoms: 0      Anxiety: 0       "Drowsiness: 0      Poor Appetite: 0      Shortness of Breath: 0      Insomnia: 0      Other: 0      Overall (0 good/no concerns, 3 very poor):  2      /66 (BP Location: Right arm, Patient Position: Sitting, Cuff Size: Adult Regular)  Pulse 70  Temp 98.2  F (36.8  C) (Tympanic)  Resp 24  Ht 1.905 m (6' 3\")  Wt 73.8 kg (162 lb 12.8 oz)  SpO2 95%  BMI 20.35 kg/m2  HEAD: Atraumatic, normocephalic  EYES: PERRLA, EOMI, Sclerae clear, Fundi normal with sharp discs  EARS: TMs clear, canals normal  NOSE & THROAT: clear  NECK: Supple without adenopathy or thyromegaly  LUNGS: clear to auscultation, normal breath sounds  CV: RRR without murmur, no carotid bruits  ABD: BS+, soft, nontender, no masses, no hepatosplenomegaly  EXTREMITIES: without joint tenderness, swelling or erythema.  No muscle tenderness or abnormality.  BACK: straight, full ROM, no tenderness, no spasm  SKIN: No rashes or abnormalities  NEURO: Alert and oriented X 3, non focal exam, DTRs normal, motor and sensation normal, coordination and gait without abnormality.            Impressions:  Palliative Performance Score:  90  Decision Making Capacity:  intact             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)  Pain  Alcohol abuse, daily use  Tobacco use disorder    1.  We will use long-acting and short-acting morphine for analgesia  2.  He is set up for consultation with surgery for port placement  3.  Follow-up with oncology as scheduled  4.  No alcohol  5.  Follow-up with palliative care in 4 weeks.    Patient Instructions   Long acting morphine (MS Contin) 15 mg, one tablet each morning    Short acting morphine (MS IR) 15 mg, on tablet every 6 hours if needed for pain    Keep track of how many pain pills you take each day    No alcohol    Palliative clinic again in 4 weeks                Again, thank you for allowing me to participate in the care of your patient.        Sincerely,        Shay Harris MD    "

## 2018-05-09 NOTE — NURSING NOTE
"Initial /81 (BP Location: Right arm, Patient Position: Sitting, Cuff Size: Adult Regular)  Pulse 85  Temp 98.2  F (36.8  C) (Tympanic)  Ht 1.905 m (6' 3\")  Wt 73.5 kg (162 lb)  BMI 20.25 kg/m2 Estimated body mass index is 20.25 kg/(m^2) as calculated from the following:    Height as of this encounter: 1.905 m (6' 3\").    Weight as of this encounter: 73.5 kg (162 lb). .    Sonam Angeles MA    "

## 2018-05-09 NOTE — TELEPHONE ENCOUNTER
Type of surgery: Port-a-cath placement  Location of surgery: [facility]  Date and time of surgery: 5/15/18 @ 10:30  Surgeon: Dr. Fox  Pre-Op Appt Date: 5/9/18  Post-Op Appt Date: None needed   Packet sent out: Yes  Pre-cert/Authorization completed:  Not Applicable  Date: 5/9/18  Sonam Angeles MA

## 2018-05-09 NOTE — PROGRESS NOTES
68-year-old male here for Port-A-Cath placement. Patient has never had a central line before. Patient is starting chemotherapy tomorrow and surgery will be scheduled for next Tuesday. Patient's primary cancer and is in his left lung. There is no plans to do radiation to this area.    Patient Active Problem List   Diagnosis     Tobacco use disorder     Alcohol use, daily use     PAD (peripheral artery disease) (H)     Benign essential hypertension     Hyperlipidemia LDL goal <100     Anxiety     Gastroesophageal reflux disease without esophagitis     CAD (coronary artery disease)     Lung cancer (H)     Uncomplicated asthma     Hyponatremia     SOB (shortness of breath)     Nausea with vomiting     Carcinoma, lung, left (H)       Past Medical History:   Diagnosis Date     GERD (gastroesophageal reflux disease)      HTN (hypertension)      Lung cancer (H)      MI (myocardial infarction)     2016, medically managed       Past Surgical History:   Procedure Laterality Date     FRACTURE TX, ANKLE RT/LT  1975    Fracture TX Ankle, LT     OPEN REDUCTION INTERNAL FIXATION HIP NAILING  9/20/2013    Procedure: OPEN REDUCTION INTERNAL FIXATION HIP NAILING;  Left Hip Open Reduction Internal Fixation ;  Surgeon: Rosas Dennis MD;  Location: [facility] OR     THORACOSCOPIC BIOPSY LUNG Left 4/11/2018    Procedure: THORACOSCOPIC BIOPSY LUNG;  subxiphoid left video assisted thorascopic surgery pleural biops, left lower lobe wedge biopsy ;  Surgeon: Mega Moreno MD;  Location:  OR       Family History   Problem Relation Age of Onset     DIABETES Brother      type 2     DIABETES Father        Social History   Substance Use Topics     Smoking status: Current Every Day Smoker     Packs/day: 0.15     Years: 47.00     Types: Cigarettes     Start date: 12/26/1967     Smokeless tobacco: Never Used      Comment: 2-3 cigs daily     Alcohol use Yes      Comment: 6-8 beers per day, last 3/27 at 6pm        History   Drug Use      Yes     Special: Marijuana     Comment: 3 times a day, 2-3 times/week for pain       Current Outpatient Prescriptions   Medication Sig Dispense Refill     acetaminophen (TYLENOL) 325 MG tablet Take 3 tablets (975 mg) by mouth every 8 hours 100 tablet 0     ALPRAZolam (XANAX) 0.5 MG tablet Take 1 tablet (0.5 mg) by mouth 3 times daily as needed for anxiety 20 tablet 0     ASPIRIN PO Take 81 mg by mouth daily        hydrochlorothiazide (HYDRODIURIL) 25 MG tablet Take 1 tablet (25 mg) by mouth daily (Patient not taking: Reported on 5/9/2018) 90 tablet 3     losartan (COZAAR) 50 MG tablet Take 1 tablet (50 mg) by mouth daily (Patient not taking: Reported on 5/9/2018) 90 tablet 3     metoprolol (LOPRESSOR) 50 MG tablet Take 1 tablet (50 mg) by mouth 2 times daily (Patient not taking: Reported on 5/9/2018) 60 tablet 1     morphine (MS CONTIN) 15 MG 12 hr tablet Take 1 tablet (15 mg) by mouth every 12 hours maximum 2 tablet(s) per day 30 tablet 0     morphine (MSIR) 15 MG IR tablet Take 1 tablet (15 mg) by mouth every 6 hours as needed for moderate to severe pain 60 tablet 0     Naproxen Sodium (ALEVE PO) Take 220 mg by mouth every 12 hours as needed for moderate pain       nicotine polacrilex (EQL NICOTINE) 2 MG lozenge Place 2 mg inside cheek every hour as needed for smoking cessation       ondansetron (ZOFRAN-ODT) 8 MG ODT tab Take 1 tablet (8 mg) by mouth every 8 hours as needed for nausea (Patient not taking: Reported on 5/9/2018) 30 tablet 1     oxyCODONE IR (ROXICODONE) 5 MG tablet        rosuvastatin (CRESTOR) 5 MG tablet Take 1 tablet (5 mg) by mouth daily (Patient not taking: Reported on 5/9/2018) 30 tablet 3     senna-docusate (SENOKOT-S;PERICOLACE) 8.6-50 MG per tablet Take 2 tablets by mouth 2 times daily (Patient not taking: Reported on 5/9/2018) 100 tablet 0     VENTOLIN  (90 Base) MCG/ACT Inhaler          Allergies   Allergen Reactions     Cipro [Ciprofloxacin] Swelling       ROS  Constitutional -  "Denies fevers, weight loss, malaise, lethargy  Neuro - Denies tremors or seizures  Pulmon - new diagnosis left lung cancer  CV - Denies CP, SOB, lower extremity edema, difficulty w/ stairs, has never used NTG  GI - Denies hematemesis, BRBPR, melena, chronic diarrhea or epigastric pain   - Denies hematuria, difficulty voiding, h/o STDs  Hematology - Denies blood clotting disorders, chronic anemias  Dermatology - No melanomas or skin cancers  Rheumatology - No h/o RA  Pysch - Denies depression, bipolar d/o or schizophrenia    Exam:  Patient Vitals for the past 24 hrs:   BP Temp Temp src Pulse Height Weight   05/09/18 1419 158/81 98.2  F (36.8  C) Tympanic 85 1.905 m (6' 3\") 73.5 kg (162 lb)     General - Alert and Oriented X4, NAD, well nourished  HEENT - Normocephalic, atraumatic, PERRLA, Nose  Lungs - decreased breath sounds left greater than right  CV - Heart RRR, no lift's, thrills, murmurs, rubs, or gallops. Carotid, radial, and femoral pulses 2+ bilaterally  Extremities - No cyanosis, clubbing or edema    Assessment and plan: 68-year-old male with left lung cancer. Patient is good candidate for Port-A-Cath placement. Risks benefits alternatives and complications were discussed with the patient including the possibility of infection bleeding or pneumothorax. Patient understood and wished to proceed. PATIENT IS CLEARED FOR SURGERY.    Gurjit Fox MD   "

## 2018-05-09 NOTE — DISCHARGE INSTRUCTIONS
Return if symptoms worsen or new symptoms develop.  Follow-up with oncology clinic immediately after discharge.  Have them address your pain issues.  Drink plenty of fluids and continue with orange juice today.  Have your blood sugar rechecked tomorrow.  If any altered mental status worsening pain shortness of breath or other symptoms present please return for further evaluation and care.

## 2018-05-09 NOTE — ED AVS SNAPSHOT
[facility] Emergency Department    [address] [facility] [address]    Phone:  [phone]    Fax:  [phone]                                       Chilo Oneil   MRN: [id number]    Department:  [facility] Emergency Department   Date of Visit:  5/9/2018           Patient Information     Date Of Birth          1950        Your diagnoses for this visit were:     Shortness of breath     Anxiety        You were seen by Bradley Elliott DO.      Follow-up Information     Follow up with Mor Mccauley MD. Go in 1 day.    Specialty:  Hematology & Oncology    Why:  Followup tomorrow at your 8:30am appointment.     Contact information:    [address] [place] [address]  [phone]        Discharge References/Attachments     SHORTNESS OF BREATH (DYSPNEA) (ENGLISH)    ANXIETY REACTION (ENGLISH)    COPD, WHAT IS (ENGLISH)      Your next 10 appointments already scheduled     May 10, 2018  8:30 AM CDT   Level 5 with ROOM 1 [facility] ([facility])    [facility]  [address] [place] [address]   [phone]            May 10, 2018  9:30 AM CDT   Return Visit with Mor Mccauley MD   [facility] ([facility])    [facility]  [address] [address]   [phone]            May 11, 2018  2:45 PM CDT   (Arrive by 2:30 PM)   Return Visit with NATALIE Berg [facility] ([facility])    [address] [place] [address]  [address]  [facility] [id number] [phone]            May 15, 2018   Procedure with Gurjit Fox MD   [facility] (--)    [address] [place] [address]   [phone]           The medical center is located at [address] [place] [address] (between [address] in [place], [place] north of [place]).            Jul 05, 2018 11:30 AM CDT   Level  O with ROOM 5 [facility] ([facility])    n [facility]tr [facility]  [address]  [facility] [address] [phone]              24 Hour Appointment Hotline       To make an appointment at any [facility] clinic, call [phone]). If you don't have a family doctor or clinic, we will help you find one. [facility] clinics are conveniently located to serve the needs of you and your family.             Review of your medicines      CONTINUE these medicines which may have CHANGED, or have new prescriptions. If we are uncertain of the size of tablets/capsules you have at home, strength may be listed as something that might have changed.        Dose / Directions Last dose taken    VENTOLIN  (90 Base) MCG/ACT Inhaler   Dose:  2 puff   What changed:    - how much to take  - how to take this  - when to take this  - reasons to take this   Quantity:  1 Inhaler   Generic drug:  albuterol        Inhale 2 puffs into the lungs every 4 hours as needed for shortness of breath / dyspnea or wheezing   Refills:  1          Our records show that you are taking the medicines listed below. If these are incorrect, please call your family doctor or clinic.        Dose / Directions Last dose taken    acetaminophen 325 MG tablet   Commonly known as:  TYLENOL   Dose:  975 mg   Quantity:  100 tablet        Take 3 tablets (975 mg) by mouth every 8 hours   Refills:  0        ALEVE PO   Dose:  220 mg        Take 220 mg by mouth every 12 hours as needed for moderate pain   Refills:  0        ALPRAZolam 0.5 MG tablet   Commonly known as:  XANAX   Dose:  0.5 mg   Quantity:  20 tablet        Take 1 tablet (0.5 mg) by mouth 3 times daily as needed for anxiety   Refills:  0        ASPIRIN PO   Dose:  81 mg        Take 81 mg by mouth daily   Refills:  0        EQL NICOTINE 2 MG lozenge   Dose:  2 mg   Generic drug:  nicotine polacrilex        Place 2 mg inside cheek every hour as  needed for smoking cessation   Refills:  0        hydrochlorothiazide 25 MG tablet   Commonly known as:  HYDRODIURIL   Dose:  25 mg   Quantity:  90 tablet        Take 1 tablet (25 mg) by mouth daily   Refills:  3        losartan 50 MG tablet   Commonly known as:  COZAAR   Dose:  50 mg   Quantity:  90 tablet        Take 1 tablet (50 mg) by mouth daily   Refills:  3        metoprolol tartrate 50 MG tablet   Commonly known as:  LOPRESSOR   Dose:  50 mg   Quantity:  60 tablet        Take 1 tablet (50 mg) by mouth 2 times daily   Refills:  1        * morphine 15 MG 12 hr tablet   Commonly known as:  MS CONTIN   Dose:  15 mg   Quantity:  30 tablet        Take 1 tablet (15 mg) by mouth every 12 hours maximum 2 tablet(s) per day   Refills:  0        * morphine 15 MG IR tablet   Commonly known as:  MSIR   Dose:  15 mg   Quantity:  60 tablet        Take 1 tablet (15 mg) by mouth every 6 hours as needed for moderate to severe pain   Refills:  0        ondansetron 8 MG ODT tab   Commonly known as:  ZOFRAN-ODT   Dose:  8 mg   Quantity:  30 tablet        Take 1 tablet (8 mg) by mouth every 8 hours as needed for nausea   Refills:  1        oxyCODONE IR 5 MG tablet   Commonly known as:  ROXICODONE   Dose:  5 mg        Take 5 mg by mouth every 8 hours as needed   Refills:  0        rosuvastatin 5 MG tablet   Commonly known as:  CRESTOR   Dose:  5 mg   Quantity:  30 tablet        Take 1 tablet (5 mg) by mouth daily   Refills:  3        senna-docusate 8.6-50 MG per tablet   Commonly known as:  SENOKOT-S;PERICOLACE   Dose:  2 tablet   Quantity:  100 tablet        Take 2 tablets by mouth 2 times daily   Refills:  0        * Notice:  This list has 2 medication(s) that are the same as other medications prescribed for you. Read the directions carefully, and ask your doctor or other care provider to review them with you.            Prescriptions were sent or printed at these locations (1 Prescription)                   [facility]  [facility] - [place], [place] - [address] [facility]   [address] [facility] [place] [address]    Telephone:  [phone]   Fax:  [phone]   Hours:                  E-Prescribed (1 of 1)         VENTOLIN  (90 Base) MCG/ACT Inhaler                Procedures and tests performed during your visit     Alcohol ethyl    EKG 12 lead    Glucose by meter    Troponin I      Orders Needing Specimen Collection     None      Pending Results     No orders found from 5/7/2018 to 5/10/2018.            Pending Culture Results     No orders found from 5/7/2018 to 5/10/2018.            Pending Results Instructions     If you had any lab results that were not finalized at the time of your Discharge, you can call the ED Lab Result RN at [phone]. You will be contacted by this team for any positive Lab results or changes in treatment. The nurses are available 7 days a week from 10A to 6:30P.  You can leave a message 24 hours per day and they will return your call.        Test Results From Your Hospital Stay        5/9/2018  7:56 PM      Component Results     Component Value Ref Range & Units Status    Ethanol g/dL <0.01 <0.01 g/dL Final         5/9/2018  7:56 PM      Component Results     Component Value Ref Range & Units Status    Troponin I ES 0.025 0.000 - 0.045 ug/L Final    The 99th percentile for upper reference range is 0.045 ug/L.  Troponin values   in the range of 0.045 - 0.120 ug/L may be associated with risks of adverse   clinical events.           5/9/2018  8:16 PM      Component Results     Component Value Ref Range & Units Status    Glucose 101 (H) 70 - 99 mg/dL Final                Thank you for choosing [facility]       Thank you for choosing [facility] for your care. Our goal is always to provide you with excellent care. Hearing back from our patients is one way we can continue to improve our services. Please take a few minutes to complete the written survey that you may receive in the mail after you visit with us. Thank  "you!        [employer] Information     [facility] lets you send messages to your doctor, view your test results, renew your prescriptions, schedule appointments and more. To sign up, go to [email or web address] . Click on \"Log in\" on the left side of the screen, which will take you to the Welcome page. Then click on \"Sign up Now\" on the right side of the page.     You will be asked to enter the access code listed below, as well as some personal information. Please follow the directions to create your username and password.     Your access code is: [email or web address]  Expires: 2018 10:49 AM     Your access code will  in 90 days. If you need help or a new code, please call your [place] clinic or [phone].        Care EveryWhere ID     This is your Care EveryWhere ID. This could be used by other organizations to access your [place] medical records  [id number]        Equal Access to Services     TRISTAN SNOW : Hadii krysta walterso Soearl, waaxda luqadaha, qaybta kaalmada adeegyakirit, celina hewitt . So [facility] [phone].    ATENCIÓN: Si habla español, tiene a galaviz disposición servicios gratuitos de asistencia lingüística. Llame al [phone].    We comply with applicable federal civil rights laws and [place] laws. We do not discriminate on the basis of race, color, national origin, age, disability, sex, sexual orientation, or gender identity.            After Visit Summary       This is your record. Keep this with you and show to your community pharmacist(s) and doctor(s) at your next visit.                  "

## 2018-05-09 NOTE — NURSING NOTE
"Oncology Rooming Note    May 9, 2018 1:41 PM   Chilo Oneil is a 68 year old male who presents for:    Chief Complaint   Patient presents with     Palliative     New Patient -  Pain Manangement      Initial Vitals: /75 (BP Location: Right arm, Patient Position: Sitting, Cuff Size: Adult Regular)  Pulse 70  Temp 98.2  F (36.8  C) (Tympanic)  Resp 24  Ht 1.905 m (6' 3\")  Wt 73.8 kg (162 lb 12.8 oz)  SpO2 95%  BMI 20.35 kg/m2 Estimated body mass index is 20.35 kg/(m^2) as calculated from the following:    Height as of this encounter: 1.905 m (6' 3\").    Weight as of this encounter: 73.8 kg (162 lb 12.8 oz). Body surface area is 1.98 meters squared.  Severe Pain (6) Comment: Left shoulder & Left arm    No LMP for male patient.  Allergies reviewed: Yes  Medications reviewed: Yes    Medications: Medication refills not needed today.  Pharmacy name entered into [facility]: [facility] [place], [place] - [address] [place] [address]    Clinical concerns:  New Patient -  Pain Management.     Patient reports he has run out of most of his medications and has not sought refills. Patient reports he drinks alcohol to help with pain.         7  minutes for nursing intake (face to face time)     Michela Hirsch [facility]              "

## 2018-05-09 NOTE — ED PROVIDER NOTES
History     Chief Complaint   Patient presents with     Arm Pain     Pt having upper L back pain and pain into L arm for almost 1 month, pain getting worse.  Pt distressed with pain.       Shortness of Breath     HPI  Chilo Oneil is a 68 year old male with a history of hypertension, hyperlipidemia, anxiety, GERD, coronary artery disease, and carcinoma of the left lung who presents to the ED for evaluation of arm pain and shortness of breath.  He has severe pain in the left shoulder on his back that radiated down his left arm to his elbow.  He denies recent fall or injury to site.  He is having trouble sleeping as the pain is keeping him awake at night.  He was prescribed OxyCodone for his pain on 4/30/2018, although this has not helped and he no longer has the medication.  Patient states he has been drinking alcohol to dull the pain.  He knows this is not how to treat the pain and understands that it makes him feel worse in the end. He denies edema in the lower extremities.    Patient was seen on 4/30/2018 in this ED.  He had a CT of his chest with IV contrast, impression below.    Chest CT- IV contrast 4/30/2018  IMPRESSION:   1. No pulmonary embolism is seen.  2. No change in a 2.2 cm spiculated mass of the left upper lobe. On  previous imaging this was suspicious for malignancy.  3. Development of consolidation or capping of the left lung apex.  4. Small left pleural effusion.    Problem List:    Patient Active Problem List    Diagnosis Date Noted     Carcinoma, lung, left (H) 04/12/2018     Priority: Medium     CAD (coronary artery disease) 03/28/2018     Priority: Medium     No previous angiogram.  No previous stenting.    Atypical Stress Test consistent with possible CAD 8/2016: Myocardial perfusion imaging using single isotope technique demonstrated a small of inferior ischemia at the base to mid ventricle There is a second moderate area of ischemia in the anterior and anteroseptal wall from base  through mid ventricle, involving the lateral base as well. There is a small fixed portion in the anteroseptal base consistent with prior infarction There is a fixed inferoseptal defect from apex to mid ventricle consistent with either prior nontransmural infarct or attenuation artifact. Gated images demonstrated inferior, inferoseptal, anterior and anteroseptal basal hypokinesis.  The left ventricular systolic function is 52% at rest and 47% post stress.       Hyponatremia 03/28/2018     Priority: Medium     SOB (shortness of breath) 03/28/2018     Priority: Medium     Nausea with vomiting 03/28/2018     Priority: Medium     Lung cancer (H)      Priority: Medium     Left shoulder pain, cough. Bx 12/2017- Non Small Cell. Resection planned 4/2018       Uncomplicated asthma      Priority: Medium     Anxiety 12/28/2017     Priority: Medium     Gastroesophageal reflux disease without esophagitis 12/28/2017     Priority: Medium     PAD (peripheral artery disease) (H) 06/13/2016     Priority: Medium     Benign essential hypertension 06/13/2016     Priority: Medium     Hyperlipidemia LDL goal <100 06/13/2016     Priority: Medium     Alcohol use, daily use 09/20/2013     Priority: Medium     Tobacco use disorder 10/16/2009     Priority: Medium        Past Medical History:    Past Medical History:   Diagnosis Date     GERD (gastroesophageal reflux disease)      HTN (hypertension)      Lung cancer (H)      MI (myocardial infarction)        Past Surgical History:    Past Surgical History:   Procedure Laterality Date     FRACTURE TX, ANKLE RT/LT  1975    Fracture TX Ankle, LT     OPEN REDUCTION INTERNAL FIXATION HIP NAILING  9/20/2013    Procedure: OPEN REDUCTION INTERNAL FIXATION HIP NAILING;  Left Hip Open Reduction Internal Fixation ;  Surgeon: Rosas Dennis MD;  Location: [facility] OR     THORACOSCOPIC BIOPSY LUNG Left 4/11/2018    Procedure: THORACOSCOPIC BIOPSY LUNG;  subxiphoid left video assisted thorascopic surgery  pleural biops, left lower lobe wedge biopsy ;  Surgeon: Mgea Moreno MD;  Location: [facility] OR       Family History:    Family History   Problem Relation Age of Onset     DIABETES Brother      type 2     DIABETES Father        Social History:  Marital Status:  Single [1]  Social History   Substance Use Topics     Smoking status: Current Every Day Smoker     Packs/day: 0.15     Years: 47.00     Types: Cigarettes     Start date: 12/26/1967     Smokeless tobacco: Never Used      Comment: 2-3 cigs daily     Alcohol use Yes      Comment: 6-8 beers per day, last 3/27 at 6pm        Medications:      acetaminophen (TYLENOL) 325 MG tablet   albuterol (PROAIR HFA/PROVENTIL HFA/VENTOLIN HFA) 108 (90 BASE) MCG/ACT Inhaler   ALPRAZolam (XANAX) 0.5 MG tablet   ASPIRIN PO   hydrochlorothiazide (HYDRODIURIL) 25 MG tablet   LORazepam (ATIVAN) 0.5 MG tablet   losartan (COZAAR) 50 MG tablet   metoprolol (LOPRESSOR) 50 MG tablet   Naproxen Sodium (ALEVE PO)   nicotine polacrilex (EQL NICOTINE) 2 MG lozenge   ondansetron (ZOFRAN-ODT) 8 MG ODT tab   oxyCODONE IR (ROXICODONE) 5 MG tablet   rosuvastatin (CRESTOR) 5 MG tablet   senna-docusate (SENOKOT-S;PERICOLACE) 8.6-50 MG per tablet         Review of Systems   Constitutional: Positive for fatigue.   Respiratory: Positive for shortness of breath. Negative for cough.    Cardiovascular: Negative for leg swelling.   Musculoskeletal: Positive for back pain and neck pain.   Psychiatric/Behavioral: Positive for agitation and sleep disturbance. The patient is nervous/anxious.        Physical Exam   BP: 165/62  Heart Rate: 77  Temp: 98  F (36.7  C)  Resp: 20  SpO2: 100 %      Physical Exam  HENT: Oral mucosa moist. No lesions.  Neck: Supple  Pulmonary/Chest: Lungs are clear to auscultation bilaterally.  Decreased at left base  Cardiovascular: Heart is regular rate and rhythm. No murmur.  Abdomen: Soft, non-distended, non-tender.   Musculoskeletal: Moving all extremities well. No  peripheral edema. Tenderness to palpataion to left shoulder region. Palpitations reproduces pain  Neurological: Alert. No focal neurologic deficit.   Skin: No rash.   ED Course     ED Course     Procedures               EKG Interpretation:      Interpreted by Asad Stone  Rhythm: normal sinus   Rate: normal  Axis: normal  Ectopy: none  Conduction: normal  ST Segments/ T Waves: No ST-T wave changes  Q Waves: none  Comparison to prior: Unchanged from 4/30/18.    Clinical Impression: normal EKG          Critical Care time:  none               Results for orders placed or performed during the hospital encounter of 05/09/18 (from the past 24 hour(s))   CBC with platelets, differential   Result Value Ref Range    WBC 7.2 4.0 - 11.0 10e9/L    RBC Count 4.87 4.4 - 5.9 10e12/L    Hemoglobin 16.2 13.3 - 17.7 g/dL    Hematocrit 43.9 40.0 - 53.0 %    MCV 90 78 - 100 fl    MCH 33.3 (H) 26.5 - 33.0 pg    MCHC 36.9 (H) 31.5 - 36.5 g/dL    RDW 14.4 10.0 - 15.0 %    Platelet Count 245 150 - 450 10e9/L    Diff Method Automated Method     % Neutrophils 70.4 %    % Lymphocytes 21.7 %    % Monocytes 7.1 %    % Eosinophils 0.1 %    % Basophils 0.6 %    % Immature Granulocytes 0.1 %    Absolute Neutrophil 5.1 1.6 - 8.3 10e9/L    Absolute Lymphocytes 1.6 0.8 - 5.3 10e9/L    Absolute Monocytes 0.5 0.0 - 1.3 10e9/L    Absolute Eosinophils 0.0 0.0 - 0.7 10e9/L    Absolute Basophils 0.0 0.0 - 0.2 10e9/L    Abs Immature Granulocytes 0.0 0 - 0.4 10e9/L   Comprehensive metabolic panel   Result Value Ref Range    Sodium Canceled, Test credited 133 - 144 mmol/L    Potassium Canceled, Test credited 3.4 - 5.3 mmol/L    Chloride Canceled, Test credited 94 - 109 mmol/L    Carbon Dioxide Canceled, Test credited 20 - 32 mmol/L    Anion Gap Canceled, Test credited 6 - 17 mmol/L    Glucose Canceled, Test credited 70 - 99 mg/dL    Urea Nitrogen Canceled, Test credited 7 - 30 mg/dL    Creatinine Canceled, Test credited 0.66 - 1.25 mg/dL    GFR  Estimate Canceled, Test credited >60 mL/min/1.7m2    GFR Estimate If Black Canceled, Test credited >60 mL/min/1.7m2    Calcium Canceled, Test credited 8.5 - 10.1 mg/dL    Bilirubin Total Canceled, Test credited 0.2 - 1.3 mg/dL    Albumin Canceled, Test credited 3.4 - 5.0 g/dL    Protein Total Canceled, Test credited 6.8 - 8.8 g/dL    Alkaline Phosphatase Canceled, Test credited 40 - 150 U/L    ALT Canceled, Test credited 0 - 70 U/L    AST Canceled, Test credited 0 - 45 U/L   Lipase   Result Value Ref Range    Lipase Canceled, Test credited 73 - 393 U/L   Troponin I   Result Value Ref Range    Troponin I ES Canceled, Test credited 0.000 - 0.045 ug/L   Chest XR,  PA & LAT    Narrative    XR CHEST 2 VW 5/9/2018 11:37 AM    HISTORY: Pain.    COMPARISON: 4/12/2018.      Impression    IMPRESSION: 2 views of the chest show interval improvement in  previously seen patchy and nodular parenchymal abnormalities in the  left perihilar region. An area of nodularity remains that measures 1.6  cm. The previously seen left pleural effusion has resolved in the  interim. No new findings.     VERONICA HINTON MD   Comprehensive metabolic panel   Result Value Ref Range    Sodium 140 133 - 144 mmol/L    Potassium 3.8 3.4 - 5.3 mmol/L    Chloride 106 94 - 109 mmol/L    Carbon Dioxide 19 (L) 20 - 32 mmol/L    Anion Gap 15 (H) 3 - 14 mmol/L    Glucose 58 (L) 70 - 99 mg/dL    Urea Nitrogen 13 7 - 30 mg/dL    Creatinine 0.56 (L) 0.66 - 1.25 mg/dL    GFR Estimate >90 >60 mL/min/1.7m2    GFR Estimate If Black >90 >60 mL/min/1.7m2    Calcium 7.9 (L) 8.5 - 10.1 mg/dL    Bilirubin Total 0.7 0.2 - 1.3 mg/dL    Albumin 3.3 (L) 3.4 - 5.0 g/dL    Protein Total 6.5 (L) 6.8 - 8.8 g/dL    Alkaline Phosphatase 64 40 - 150 U/L    ALT 48 0 - 70 U/L    AST 77 (H) 0 - 45 U/L   Lipase   Result Value Ref Range    Lipase 187 73 - 393 U/L   Troponin I   Result Value Ref Range    Troponin I ES 0.022 0.000 - 0.045 ug/L   Glucose by meter   Result Value Ref Range     Glucose 67 (L) 70 - 99 mg/dL       Medications   HYDROmorphone (PF) (DILAUDID) injection 0.5 mg (0.5 mg Intravenous Given 5/9/18 1126)   HYDROmorphone (PF) (DILAUDID) injection 0.3 mg (0.3 mg Intravenous Given 5/9/18 1308)     10:45 AM Patient Assessed.  Assessments & Plan (with Medical Decision Making) records were reviewed.  Labs were obtained.  Patient was given Dilaudid for pain.  Patient's white count was 7.2 hemoglobin 16.2 and platelet count was 245.  There is no left shift.  Comprehensive metabolic panel significant for carbon dioxide of 19 and anion gap of 15 and glucose of 58.  Patient was given several juices on on recheck his glucose was 67.  Patient is missed 2 appointments to have his port placed to begin chemotherapy and also is scheduled for a oncology appointment at 1:00 this afternoon.  I discussed the need to see oncology at this point patient feels comfortable going to his appointment at this time.  His pain is improved with pain medication.  A chest x-ray had revealed improvement in previous patchy and nodular parenchymal abnormalities and resolution of left pleural effusion.  Patient received a second dose of Dilaudid we called oncology clinic and they were able to see the patient at 130 and therefore we did discharge the patient to his primary appointment.  He was advised to see if he could have his glucose checked there.  He is to return if symptoms worsen or new symptoms develop.  He understands he needs to follow-up with surgery for port placement.     I have reviewed the nursing notes.    I have reviewed the findings, diagnosis, plan and need for follow up with the patient.       Discharge Medication List as of 5/9/2018  1:24 PM          Final diagnoses:   Chronic left shoulder pain   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)   Hypoglycemia     This document serves as a record of the services and decisions personally performed and made by Asad Stone MD. It was created on  HIS/HER behalf by Danielle Brian, a trained medical scribe. The creation of this document is based the provider's statements to the medical scribe.  Danielle Brian 10:37 AM 5/9/2018    Provider:   The information in this document, created by the medical scribe for me, accurately reflects the services I personally performed and the decisions made by me. I have reviewed and approved this document for accuracy prior to leaving the patient care area.  Asad Stone MD 10:37 AM 5/9/2018 5/9/2018   [facility] EMERGENCY DEPARTMENT     Asad Stone MD  05/11/18 0710

## 2018-05-09 NOTE — PROGRESS NOTES
Palliative Care Outpatient Clinic Consultation Note    Patient:  Chilo Oneil    Chief Complaint:   Chilo Oneil 68 year old male who is presenting to the palliative medicine clinic today at the request of Dr Elliott from ED for a palliative care consultation secondary to Lung Ca.   The patient's primary care provider is:  Manish Plasencia.     History of Present Illness:  This gentleman has been diagnosed with lung cancer on by biopsy in December 2017 to be moderately differentiated adenocarcinoma.  He has not had any definitive treatment and has actually been somewhat remiss in getting follow-up or scheduled appointments.  He has been to the emergency room of couple of times because of significant pain.  He is now set up to get port placement and then consultation with oncology for possible chemotherapy within the next week.  He has no specific complaints other than ongoing pain for which oxycodone has not been helping.      Distressing Symptom/s:  Location: Left upper back  Chronicity: Onset 2 months ago, gradually worsening since  Duration: Daily and fairly constant  Quality: burning  Quantity: 7/10 in intensity  Aggravating Factors: None  Relieving Factors: None  Prior & Current Treatments: Oxycodone    Patient's Disease Understanding: He is somewhat ignorant to the prognosis as he has not had a lot of discussion with physicians but he realizes that this is a serious illness    Coping: He says he is handling this fairly well however he has been coping by using alcohol which he says he can stop.  He has just been using it for the significant pain.    Social History  Living Situation: He lives alone in a 1 level house  Children: None  Actual/Potential Caregiver(s): None  Support System: He has a sister and some friends who have offered to take him in or help as needed  Occupation: Cook  Hobbies: Fishing  Substance Use/History of misuse: Occasional marijuana, alcohol  Financial Concerns: He  anticipates struggles going forward  Spiritual Background: None  Spiritual Concerns/Needs: None  Social History   Substance Use Topics     Smoking status: Current Every Day Smoker     Packs/day: 0.15     Years: 47.00     Types: Cigarettes     Start date: 12/26/1967     Smokeless tobacco: Never Used      Comment: 2-3 cigs daily     Alcohol use Yes      Comment: 6-8 beers per day, last 3/27 at 6pm       Family History  Family History   Problem Relation Age of Onset     DIABETES Brother      type 2     DIABETES Father      Patient's Involvement with Prior History of Serious Illness in Family: None    Advance Care Planning:  Advance Directive:      Where is written copy located:   Health Care Agent Contact Information:   POLST:       Allergies   Allergen Reactions     Cipro [Ciprofloxacin] Swelling     Current Outpatient Prescriptions   Medication Sig Dispense Refill     acetaminophen (TYLENOL) 325 MG tablet Take 3 tablets (975 mg) by mouth every 8 hours 100 tablet 0     ALPRAZolam (XANAX) 0.5 MG tablet Take 1 tablet (0.5 mg) by mouth 3 times daily as needed for anxiety 20 tablet 0     ASPIRIN PO Take 81 mg by mouth daily        morphine (MS CONTIN) 15 MG 12 hr tablet Take 1 tablet (15 mg) by mouth every 12 hours maximum 2 tablet(s) per day 30 tablet 0     morphine (MSIR) 15 MG IR tablet Take 1 tablet (15 mg) by mouth every 6 hours as needed for moderate to severe pain 60 tablet 0     Naproxen Sodium (ALEVE PO) Take 220 mg by mouth every 12 hours as needed for moderate pain       nicotine polacrilex (EQL NICOTINE) 2 MG lozenge Place 2 mg inside cheek every hour as needed for smoking cessation       hydrochlorothiazide (HYDRODIURIL) 25 MG tablet Take 1 tablet (25 mg) by mouth daily (Patient not taking: Reported on 5/9/2018) 90 tablet 3     losartan (COZAAR) 50 MG tablet Take 1 tablet (50 mg) by mouth daily (Patient not taking: Reported on 5/9/2018) 90 tablet 3     metoprolol (LOPRESSOR) 50 MG tablet Take 1 tablet (50 mg)  by mouth 2 times daily (Patient not taking: Reported on 5/9/2018) 60 tablet 1     ondansetron (ZOFRAN-ODT) 8 MG ODT tab Take 1 tablet (8 mg) by mouth every 8 hours as needed for nausea (Patient not taking: Reported on 5/9/2018) 30 tablet 1     oxyCODONE IR (ROXICODONE) 5 MG tablet        rosuvastatin (CRESTOR) 5 MG tablet Take 1 tablet (5 mg) by mouth daily (Patient not taking: Reported on 5/9/2018) 30 tablet 3     senna-docusate (SENOKOT-S;PERICOLACE) 8.6-50 MG per tablet Take 2 tablets by mouth 2 times daily (Patient not taking: Reported on 5/9/2018) 100 tablet 0     VENTOLIN  (90 Base) MCG/ACT Inhaler        Past Medical History:   Diagnosis Date     GERD (gastroesophageal reflux disease)      HTN (hypertension)      Lung cancer (H)      MI (myocardial infarction)     2016, medically managed     Past Surgical History:   Procedure Laterality Date     FRACTURE TX, ANKLE RT/LT  1975    Fracture TX Ankle, LT     OPEN REDUCTION INTERNAL FIXATION HIP NAILING  9/20/2013    Procedure: OPEN REDUCTION INTERNAL FIXATION HIP NAILING;  Left Hip Open Reduction Internal Fixation ;  Surgeon: Rosas Dennis MD;  Location: [facility]     THORACOSCOPIC BIOPSY LUNG Left 4/11/2018    Procedure: THORACOSCOPIC BIOPSY LUNG;  subxiphoid left video assisted thorascopic surgery pleural biops, left lower lobe wedge biopsy ;  Surgeon: Mega Moreno MD;  Location: [facility]       REVIEW OF SYSTEMS:   ROS: 10 point ROS neg other than the symptoms noted above in the HPI and here:  Palliative Symptom Review (0=no symptom/no concern, 1=mild, 2=moderate, 3=severe):      Pain: 3      Fatigue: 0      Nausea: 0      Constipation: 1      Diarrhea: 0      Depressive Symptoms: 0      Anxiety: 0      Drowsiness: 0      Poor Appetite: 0      Shortness of Breath: 0      Insomnia: 0      Other: 0      Overall (0 good/no concerns, 3 very poor):  2      /66 (BP Location: Right arm, Patient Position: Sitting, Cuff Size: Adult Regular)   "Pulse 70  Temp 98.2  F (36.8  C) (Tympanic)  Resp 24  Ht 1.905 m (6' 3\")  Wt 73.8 kg (162 lb 12.8 oz)  SpO2 95%  BMI 20.35 kg/m2  HEAD: Atraumatic, normocephalic  EYES: PERRLA, EOMI, Sclerae clear, Fundi normal with sharp discs  EARS: TMs clear, canals normal  NOSE & THROAT: clear  NECK: Supple without adenopathy or thyromegaly  LUNGS: clear to auscultation, normal breath sounds  CV: RRR without murmur, no carotid bruits  ABD: BS+, soft, nontender, no masses, no hepatosplenomegaly  EXTREMITIES: without joint tenderness, swelling or erythema.  No muscle tenderness or abnormality.  BACK: straight, full ROM, no tenderness, no spasm  SKIN: No rashes or abnormalities  NEURO: Alert and oriented X 3, non focal exam, DTRs normal, motor and sensation normal, coordination and gait without abnormality.            Impressions:  Palliative Performance Score:  90  Decision Making Capacity:  intact             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)  Pain  Alcohol abuse, daily use  Tobacco use disorder    1.  We will use long-acting and short-acting morphine for analgesia  2.  He is set up for consultation with surgery for port placement  3.  Follow-up with oncology as scheduled  4.  No alcohol  5.  Follow-up with palliative care in 4 weeks.    Patient Instructions   Long acting morphine (MS Contin) 15 mg, one tablet each morning    Short acting morphine (MS IR) 15 mg, on tablet every 6 hours if needed for pain    Keep track of how many pain pills you take each day    No alcohol    Palliative clinic again in 4 weeks              "

## 2018-05-09 NOTE — MR AVS SNAPSHOT
After Visit Summary   5/9/2018    Chilo Oneil    MRN: [id number]           Patient Information     Date Of Birth          1950        Visit Information        Provider Department      5/9/2018 1:00 PM Shay Harris MD [facility]        Today's Diagnoses     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)    -  1    Pain        Alcohol use, daily use        Tobacco use disorder          Care Instructions    Long acting morphine (MS Contin) 15 mg, one tablet each morning    Short acting morphine (MS IR) 15 mg, on tablet every 6 hours if needed for pain    Keep track of how many pain pills you take each day    No alcohol    Palliative clinic again in 4 weeks          Follow-ups after your visit        Your next 10 appointments already scheduled     May 09, 2018  2:30 PM CDT   [facility] Gurjit Fox [facility]   [facility]    [address] [facility] [address]   [phone]            May 10, 2018  8:30 AM CDT   Level 5 with ROOM 1 [facility] ([facility])    [facility]  [address]  [facility] [address]   [phone]            May 10, 2018  9:30 AM CDT   Return Visit with Mor Mccauley MD   [facility] ([facility])    [facility]  [address]  [facility] [address]   [phone]            May 11, 2018  2:45 PM CDT   (Arrive by 2:30 PM)   Return Visit with NATALIE Berg [facility] ([facility])    [address] [place] [address]  [address] [place] [address] [phone]            Jul 05, 2018 11:30 AM CDT   Level O with ROOM 5 [facility] ([facility])    [facility]  [address]  [facility] [address]   [phone]              Who to contact     If you have questions or  "need follow up information about today's clinic visit or your schedule please contact [facility] directly at [phone].  Normal or non-critical lab and imaging results will be communicated to you by [employer], letter or phone within 4 business days after the clinic has received the results. If you do not hear from us within 7 days, please contact the clinic through [employer] or phone. If you have a critical or abnormal lab result, we will notify you by phone as soon as possible.  Submit refill requests through [employer] or call your pharmacy and they will forward the refill request to us. Please allow 3 business days for your refill to be completed.          Additional Information About Your Visit        [employer] Information     [employer] lets you send messages to your doctor, view your test results, renew your prescriptions, schedule appointments and more. To sign up, go to [email or web address] . Click on \"Log in\" on the left side of the screen, which will take you to the Welcome page. Then click on \"Sign up Now\" on the right side of the page.     You will be asked to enter the access code listed below, as well as some personal information. Please follow the directions to create your username and password.     Your access code is: [email or web address]  Expires: 2018 10:49 AM     Your access code will  in 90 days. If you need help or a new code, please call your [facility] clinic or [phone].        Care EveryWhere ID     This is your Care EveryWhere ID. This could be used by other organizations to access your [facility] medical records  [id number]        Your Vitals Were     Pulse Temperature Respirations Height Pulse Oximetry BMI (Body Mass Index)    70 98.2  F (36.8  C) (Tympanic) 24 1.905 m (6' 3\") 95% 20.35 kg/m2       Blood Pressure from Last 3 Encounters:   18 162/67   18 168/66   18 161/61    Weight from Last 3 Encounters:   18 73.8 kg (162 lb 12.8 oz)   18 73.9 kg " (163 lb)   04/27/18 74.3 kg (163 lb 11.2 oz)              Today, you had the following     No orders found for display         Today's Medication Changes          These changes are accurate as of 5/9/18  2:04 PM.  If you have any questions, ask your nurse or doctor.               Start taking these medicines.        Dose/Directions    * morphine 15 MG 12 hr tablet   Commonly known as:  MS CONTIN   Used for:  Pain   Started by:  Shay Harris MD        Dose:  15 mg   Take 1 tablet (15 mg) by mouth every 12 hours maximum 2 tablet(s) per day   Quantity:  30 tablet   Refills:  0       * morphine 15 MG IR tablet   Commonly known as:  MSIR   Used for:  Pain   Started by:  Shay Harris MD        Dose:  15 mg   Take 1 tablet (15 mg) by mouth every 6 hours as needed for moderate to severe pain   Quantity:  60 tablet   Refills:  0       * Notice:  This list has 2 medication(s) that are the same as other medications prescribed for you. Read the directions carefully, and ask your doctor or other care provider to review them with you.         Where to get your medicines      Some of these will need a paper prescription and others can be bought over the counter.  Ask your nurse if you have questions.     Bring a paper prescription for each of these medications     morphine 15 MG 12 hr tablet    morphine 15 MG IR tablet               Information about OPIOIDS     PRESCRIPTION OPIOIDS: WHAT YOU NEED TO KNOW   You have a prescription for an opioid (narcotic) pain medicine. Opioids can cause addiction. If you have a history of chemical dependency of any type, you are at a higher risk of becoming addicted to opioids. Only take this medicine after all other options have been tried. Take it for as short a time and as few doses as possible.     Do not:    Drive. If you drive while taking these medicines, you could be arrested for driving under the influence (DUI).    Operate heavy machinery    Do any other dangerous activities while  taking these medicines.     Drink any alcohol while taking these medicines.      Take with any other medicines that contain acetaminophen. Read all labels carefully. Look for the word  acetaminophen  or  Tylenol.  Ask your pharmacist if you have questions or are unsure.    Store your pills in a secure place, locked if possible. We will not replace any lost or stolen medicine. If you don t finish your medicine, please throw away (dispose) as directed by your pharmacist. The [place] Pollution Control Agency has more information about safe disposal: https://[email or web address].[email or web address].[place].us/living-green/managing-unwanted-medications    All opioids tend to cause constipation. Drink plenty of water and eat foods that have a lot of fiber, such as fruits, vegetables, prune juice, apple juice and high-fiber cereal. Take a laxative (Miralax, milk of magnesia, Colace, Senna) if you don t move your bowels at least every other day.          Primary Care Provider Office Phone # Fax #    Manish Plasencia [phone] [address] [facility] [address]        Goals        General    Financial Wellbeing (pt-stated)     Notes - Note edited  5/1/2018  8:50 AM by Sofya Paulino LSW    Goal Statement: I want to complete my Medical Assistance application  Measure of Success: My Medical Assistance application will be completed  Supportive Steps to Achieve: SW and pt will finish the remaining paperwork that needs to be completed  Barriers: None identified at this time  Strengths: Pt is motivated as the income increase will assist him greatly  Date to Achieve By: 3 months          Pain Management (pt-stated)     Notes - Note created  5/1/2018 10:59 AM by Sofya Paulino LSW    CC to discuss with PCP, but would recommend a Palliative Care consult for symptom management.        Equal Access to Services     [facility]: pepe Chou, celina jaime  casey rachelpilo nyasiadaniel hewitt ah. So [facility] [phone].    ATENCIÓN: Si leonala deandre, tiene a galaviz disposición servicios gratuitos de asistencia lingüística. Yecenia al [phone].    We comply with applicable federal civil rights laws and [place] laws. We do not discriminate on the basis of race, color, national origin, age, disability, sex, sexual orientation, or gender identity.            Thank you!     Thank you for choosing [facility]  for your care. Our goal is always to provide you with excellent care. Hearing back from our patients is one way we can continue to improve our services. Please take a few minutes to complete the written survey that you may receive in the mail after your visit with us. Thank you!             Your Updated Medication List - Protect others around you: Learn how to safely use, store and throw away your medicines at www.disposemymeds.org.          This list is accurate as of 5/9/18  2:04 PM.  Always use your most recent med list.                   Brand Name Dispense Instructions for use Diagnosis    acetaminophen 325 MG tablet    TYLENOL    100 tablet    Take 3 tablets (975 mg) by mouth every 8 hours    Carcinoma, lung, left (H)       ALEVE PO      Take 220 mg by mouth every 12 hours as needed for moderate pain        ALPRAZolam 0.5 MG tablet    XANAX    20 tablet    Take 1 tablet (0.5 mg) by mouth 3 times daily as needed for anxiety    Anxiety       ASPIRIN PO      Take 81 mg by mouth daily        EQL NICOTINE 2 MG lozenge   Generic drug:  nicotine polacrilex      Place 2 mg inside cheek every hour as needed for smoking cessation        hydrochlorothiazide 25 MG tablet    HYDRODIURIL    90 tablet    Take 1 tablet (25 mg) by mouth daily    Benign essential hypertension       losartan 50 MG tablet    COZAAR    90 tablet    Take 1 tablet (50 mg) by mouth daily    Benign essential hypertension       metoprolol tartrate 50 MG tablet    LOPRESSOR    60 tablet    Take 1 tablet (50  mg) by mouth 2 times daily    Benign essential hypertension       * morphine 15 MG 12 hr tablet    MS CONTIN    30 tablet    Take 1 tablet (15 mg) by mouth every 12 hours maximum 2 tablet(s) per day    Pain       * morphine 15 MG IR tablet    MSIR    60 tablet    Take 1 tablet (15 mg) by mouth every 6 hours as needed for moderate to severe pain    Pain       ondansetron 8 MG ODT tab    ZOFRAN-ODT    30 tablet    Take 1 tablet (8 mg) by mouth every 8 hours as needed for nausea    Nausea and vomiting, intractability of vomiting not specified, unspecified vomiting type       oxyCODONE IR 5 MG tablet    ROXICODONE          rosuvastatin 5 MG tablet    CRESTOR    30 tablet    Take 1 tablet (5 mg) by mouth daily        senna-docusate 8.6-50 MG per tablet    SENOKOT-S;PERICOLACE    100 tablet    Take 2 tablets by mouth 2 times daily    Carcinoma, lung, left (H)       VENTOLIN  (90 Base) MCG/ACT Inhaler   Generic drug:  albuterol           * Notice:  This list has 2 medication(s) that are the same as other medications prescribed for you. Read the directions carefully, and ask your doctor or other care provider to review them with you.

## 2018-05-09 NOTE — ED PROVIDER NOTES
"  History     Chief Complaint   Patient presents with     Shortness of Breath     was seen in ED, was DC'd a few hours ago, c/o feeling weak, has had emesis, c/o SOB, left shoulder pain cont, unable to sleep,      Nausea & Vomiting     HPI  Chilo Oneil is a 68 year old male with past medical history which includes daily alcohol use, tobacco use disorder, essential hypertension, PAD, anxiety, and recently diagnosed adenocarcinoma of the left lung status post resection but not yet having received chemotherapy re-presents to the emergency department today for shortness of breath and nausea.  Patient explains that he was seen earlier in the department for shoulder pain and discharged to his oncology clinic appointment.  Clinic records indicate that the patient met with palliative care consultant and started on oral morphine for his increasing pain.  He also states that he was taken off his \"anxiety pill\" because it could interact with the morphine.  After returning home today he gradually felt anxious and short of breath, he attempted to take the oral morphine pill but vomited immediately afterwards.  He denies recent fever, chills, chest pain, cough, hemoptysis, abdominal pain, diarrhea, or other associated symptoms.  No known exacerbating or alleviating factors.  He states that he has not drank alcohol in \"a couple of days\".    Problem List:    Patient Active Problem List    Diagnosis Date Noted     Carcinoma, lung, left (H) 04/12/2018     Priority: Medium     CAD (coronary artery disease) 03/28/2018     Priority: Medium     No previous angiogram.  No previous stenting.    Atypical Stress Test consistent with possible CAD 8/2016: Myocardial perfusion imaging using single isotope technique demonstrated a small of inferior ischemia at the base to mid ventricle There is a second moderate area of ischemia in the anterior and anteroseptal wall from base through mid ventricle, involving the lateral base as well. There " is a small fixed portion in the anteroseptal base consistent with prior infarction There is a fixed inferoseptal defect from apex to mid ventricle consistent with either prior nontransmural infarct or attenuation artifact. Gated images demonstrated inferior, inferoseptal, anterior and anteroseptal basal hypokinesis.  The left ventricular systolic function is 52% at rest and 47% post stress.       Hyponatremia 03/28/2018     Priority: Medium     SOB (shortness of breath) 03/28/2018     Priority: Medium     Nausea with vomiting 03/28/2018     Priority: Medium     Lung cancer (H)      Priority: Medium     Left shoulder pain, cough. Bx 12/2017- Non Small Cell. Resection planned 4/2018       Uncomplicated asthma      Priority: Medium     Anxiety 12/28/2017     Priority: Medium     Gastroesophageal reflux disease without esophagitis 12/28/2017     Priority: Medium     PAD (peripheral artery disease) (H) 06/13/2016     Priority: Medium     Benign essential hypertension 06/13/2016     Priority: Medium     Hyperlipidemia LDL goal <100 06/13/2016     Priority: Medium     Alcohol use, daily use 09/20/2013     Priority: Medium     Tobacco use disorder 10/16/2009     Priority: Medium        Past Medical History:    Past Medical History:   Diagnosis Date     GERD (gastroesophageal reflux disease)      HTN (hypertension)      Lung cancer (H)      MI (myocardial infarction)        Past Surgical History:    Past Surgical History:   Procedure Laterality Date     FRACTURE TX, ANKLE RT/LT  1975    Fracture TX Ankle, LT     OPEN REDUCTION INTERNAL FIXATION HIP NAILING  9/20/2013    Procedure: OPEN REDUCTION INTERNAL FIXATION HIP NAILING;  Left Hip Open Reduction Internal Fixation ;  Surgeon: Rosas Dennis MD;  Location: [facility] OR     THORACOSCOPIC BIOPSY LUNG Left 4/11/2018    Procedure: THORACOSCOPIC BIOPSY LUNG;  subxiphoid left video assisted thorascopic surgery pleural biops, left lower lobe wedge biopsy ;  Surgeon: Josh  "Mega Chaudhry MD;  Location:  OR       Family History:    Family History   Problem Relation Age of Onset     DIABETES Brother      type 2     DIABETES Father        Social History:  Marital Status:  Single [1]  Social History   Substance Use Topics     Smoking status: Current Every Day Smoker     Packs/day: 0.15     Years: 47.00     Types: Cigarettes     Start date: 12/26/1967     Smokeless tobacco: Never Used      Comment: 2-3 cigs daily     Alcohol use Yes      Comment: 6-8 beers per day, last 3/27 at 6pm        Medications:      VENTOLIN  (90 Base) MCG/ACT Inhaler   acetaminophen (TYLENOL) 325 MG tablet   ALPRAZolam (XANAX) 0.5 MG tablet   ASPIRIN PO   hydrochlorothiazide (HYDRODIURIL) 25 MG tablet   losartan (COZAAR) 50 MG tablet   metoprolol (LOPRESSOR) 50 MG tablet   morphine (MS CONTIN) 15 MG 12 hr tablet   morphine (MSIR) 15 MG IR tablet   Naproxen Sodium (ALEVE PO)   nicotine polacrilex (EQL NICOTINE) 2 MG lozenge   ondansetron (ZOFRAN-ODT) 8 MG ODT tab   oxyCODONE IR (ROXICODONE) 5 MG tablet   rosuvastatin (CRESTOR) 5 MG tablet   senna-docusate (SENOKOT-S;PERICOLACE) 8.6-50 MG per tablet   [DISCONTINUED] albuterol (PROAIR HFA/PROVENTIL HFA/VENTOLIN HFA) 108 (90 BASE) MCG/ACT Inhaler   [DISCONTINUED] VENTOLIN  (90 Base) MCG/ACT Inhaler         Review of Systems  Constitutional:  Negative for fever or recent illness.  HENT:  Negative oral or throat pain.  Cardiovascular:  Negative for chest pain.  Respiratory:  Positive for SOB.  Negative for cough or hemoptysis.  Gastrointestinal:  Positive for N/V.  Negative for abdominal pain, diarrhea, or blood with bowel movements.  Musculoskeletal:  Chronic shoulder pain.  Negative for recent fall or injury.  Neurological:  Negative for headache.    All others reviewed and are negative.      Physical Exam   BP: 183/86  Pulse: 93  Temp: 99.8  F (37.7  C)  Resp: 22  Height: 190.5 cm (6' 3\")  Weight: 70.3 kg (155 lb)  SpO2: 100 %      Physical " Exam  Constitutional:  Well developed, well nourished.  Appears anxious but nontoxic.  HENT:  Normocephalic and atraumatic.  Symmetric in appearance.  Eyes:  Conjunctivae are normal.  Neck:  Neck supple.  Cardiovascular:  No cyanosis.  RRR.  No audible murmurs noted.  No lower extremity edema or asymmetry.  Respiratory:  Tachypnea but without accessory muscle usage.  CTAB without diminished regions.  Gastrointestinal:  Soft, nondistended abdomen.  Nontender and without guarding.  No rigidity or rebound tenderness.  Negative De La Rosa's sign.  Negative McBurney's point.    Genitourinary:  Noncontributory.  Musculoskeletal:  Moves extremities spontaneously.  Neurological:  Patient is alert.  Skin:  Skin is warm and dry.  Psychiatric:  Normal mood and affect.      ED Course     ED Course     Procedures                 EKG Interpretation:      Interpreted by: Bradley Elliott  Time reviewed: Upon arrival     Symptoms at time of EKG: SOB  Rhythm: Sinus  Rate: Normal  Axis: Normal    Conduction: None atypical   ST Segments/ T Waves: No pathologic ST-elevations or T-wave abnormalities.  Q Waves: None  Comparison to prior: Similar morphology to previous     Clinical Impression: No sign of ischemia         Critical Care time:  none               Results for orders placed or performed during the hospital encounter of 05/09/18 (from the past 24 hour(s))   Alcohol ethyl   Result Value Ref Range    Ethanol g/dL <0.01 <0.01 g/dL   Troponin I   Result Value Ref Range    Troponin I ES 0.025 0.000 - 0.045 ug/L   Glucose by meter   Result Value Ref Range    Glucose 101 (H) 70 - 99 mg/dL       Medications   diazepam (VALIUM) tablet 5 mg (5 mg Oral Given 5/9/18 1842)   ondansetron (ZOFRAN) injection 8 mg (4 mg Intravenous Given 5/9/18 1853)   HYDROmorphone (DILAUDID) injection 1 mg (1 mg Intravenous Given 5/9/18 1841)   0.9% sodium chloride BOLUS (0 mLs Intravenous Stopped 5/9/18 2020)   metoprolol tartrate (LOPRESSOR) tablet 50 mg (50 mg  Oral Given 5/9/18 1944)   ipratropium - albuterol 0.5 mg/2.5 mg/3 mL (DUONEB) neb solution 3 mL (3 mLs Nebulization Given 5/9/18 2025)         Assessments & Plan (with Medical Decision Making)   Chilo Oneil is a 68 year old male who presents to the department for the second time today for evaluation of shortness of breath.  He had a chest x-ray performed during the first visit which appeared clear without sign of pneumothorax, infiltrate, effusion, or other acute findings.  He states that he returned home from his oncology and palliative care appointment with a plan for morphine analgesia but has not taken his previously prescribed anxiety medication, he does not recall the name but it appears as though he has been prescribed Xanax.  When he arrived in the department he is concerned that he may be having an anxiety reaction, tachypnea without hypoxia.  He was given an oral dose of Valium and gradually grew more comfortable during stay in the department.  He continues to have clear lung sounds to auscultation, admits that he ran out of his albuterol inhaler yesterday so DuoNeb was provided in the department and he obtained further relief.  Lung sounds remain clear to auscultation, low suspicion for pulmonary embolism or infectious etiology.  He will be prescribed another albuterol inhaler, offered to schedule follow-up appointment with primary care provider to discuss medication options for anxiety the patient has declined.  He was unaware of upcoming appointments, we printed a list for him including an appointment at [facility] tomorr and [facility] on Friday which she was previously unaware of.  At this time the patient seems comfortable with discharge plan including follow-up at scheduled appointments.      Disclaimer:  This note consists of symbols derived from keyboarding, dictation, and/or voice recognition software.  As a result, there may be errors in the script that have gone undetected.   Please consider this when interpreting information found in the chart.        I have reviewed the nursing notes.    I have reviewed the findings, diagnosis, plan and need for follow up with the patient.       New Prescriptions    No medications on file       Final diagnoses:   Shortness of breath   Anxiety       5/9/2018   [facility] EMERGENCY DEPARTMENT     Bradley Elliott DO  05/09/18 2045

## 2018-05-10 ENCOUNTER — PATIENT OUTREACH (OUTPATIENT)
Dept: CARE COORDINATION | Facility: CLINIC | Age: 68
End: 2018-05-10

## 2018-05-10 ENCOUNTER — INFUSION THERAPY VISIT (OUTPATIENT)
Dept: INFUSION THERAPY | Facility: CLINIC | Age: 68
End: 2018-05-10
Attending: INTERNAL MEDICINE
Payer: MEDICARE

## 2018-05-10 ENCOUNTER — ONCOLOGY VISIT (OUTPATIENT)
Dept: ONCOLOGY | Facility: CLINIC | Age: 68
End: 2018-05-10
Attending: INTERNAL MEDICINE
Payer: MEDICARE

## 2018-05-10 ENCOUNTER — HOSPITAL ENCOUNTER (OUTPATIENT)
Dept: LAB | Facility: CLINIC | Age: 68
Discharge: HOME | End: 2018-05-10
Attending: INTERNAL MEDICINE | Admitting: INTERNAL MEDICINE
Payer: MEDICARE

## 2018-05-10 VITALS
HEART RATE: 74 BPM | DIASTOLIC BLOOD PRESSURE: 56 MMHG | BODY MASS INDEX: 20.43 KG/M2 | OXYGEN SATURATION: 65 % | SYSTOLIC BLOOD PRESSURE: 142 MMHG | TEMPERATURE: 100.3 F | RESPIRATION RATE: 16 BRPM | HEIGHT: 75 IN | WEIGHT: 164.3 LBS

## 2018-05-10 VITALS — SYSTOLIC BLOOD PRESSURE: 157 MMHG | HEART RATE: 76 BPM | DIASTOLIC BLOOD PRESSURE: 67 MMHG

## 2018-05-10 DIAGNOSIS — C34.12 MALIGNANT NEOPLASM OF UPPER LOBE OF LEFT LUNG (H): Primary | Chronic | ICD-10-CM

## 2018-05-10 DIAGNOSIS — C34.92 CARCINOMA, LUNG, LEFT (H): ICD-10-CM

## 2018-05-10 DIAGNOSIS — R11.2 NAUSEA AND VOMITING, INTRACTABILITY OF VOMITING NOT SPECIFIED, UNSPECIFIED VOMITING TYPE: ICD-10-CM

## 2018-05-10 DIAGNOSIS — I10 BENIGN ESSENTIAL HYPERTENSION: Chronic | ICD-10-CM

## 2018-05-10 DIAGNOSIS — F10.10 ALCOHOL ABUSE, DAILY USE: Chronic | ICD-10-CM

## 2018-05-10 DIAGNOSIS — F17.200 TOBACCO USE DISORDER: ICD-10-CM

## 2018-05-10 DIAGNOSIS — C34.92 CARCINOMA, LUNG, LEFT (H): Primary | ICD-10-CM

## 2018-05-10 LAB
ANION GAP SERPL CALCULATED.3IONS-SCNC: 8 MMOL/L (ref 3–14)
ANION GAP SERPL CALCULATED.3IONS-SCNC: NORMAL MMOL/L (ref 6–17)
BASOPHILS # BLD AUTO: 0 10E9/L (ref 0–0.2)
BASOPHILS NFR BLD AUTO: 0.3 %
BUN SERPL-MCNC: 15 MG/DL (ref 7–30)
BUN SERPL-MCNC: NORMAL MG/DL (ref 7–30)
CALCIUM SERPL-MCNC: 8.6 MG/DL (ref 8.5–10.1)
CALCIUM SERPL-MCNC: NORMAL MG/DL (ref 8.5–10.1)
CHLORIDE SERPL-SCNC: 97 MMOL/L (ref 94–109)
CHLORIDE SERPL-SCNC: NORMAL MMOL/L (ref 94–109)
CO2 SERPL-SCNC: 30 MMOL/L (ref 20–32)
CO2 SERPL-SCNC: NORMAL MMOL/L (ref 20–32)
CREAT SERPL-MCNC: 0.67 MG/DL (ref 0.66–1.25)
CREAT SERPL-MCNC: NORMAL MG/DL (ref 0.66–1.25)
DIFFERENTIAL METHOD BLD: ABNORMAL
EOSINOPHIL # BLD AUTO: 0 10E9/L (ref 0–0.7)
EOSINOPHIL NFR BLD AUTO: 0.1 %
ERYTHROCYTE [DISTWIDTH] IN BLOOD BY AUTOMATED COUNT: 14.6 % (ref 10–15)
GFR SERPL CREATININE-BSD FRML MDRD: >90 ML/MIN/1.7M2
GFR SERPL CREATININE-BSD FRML MDRD: NORMAL ML/MIN/1.7M2
GLUCOSE SERPL-MCNC: 116 MG/DL (ref 70–99)
GLUCOSE SERPL-MCNC: NORMAL MG/DL (ref 70–99)
HCT VFR BLD AUTO: 38.5 % (ref 40–53)
HGB BLD-MCNC: 14 G/DL (ref 13.3–17.7)
IMM GRANULOCYTES # BLD: 0 10E9/L (ref 0–0.4)
IMM GRANULOCYTES NFR BLD: 0.3 %
LYMPHOCYTES # BLD AUTO: 1 10E9/L (ref 0.8–5.3)
LYMPHOCYTES NFR BLD AUTO: 14.3 %
MAGNESIUM SERPL-MCNC: 1.8 MG/DL (ref 1.6–2.3)
MAGNESIUM SERPL-MCNC: NORMAL MG/DL (ref 1.6–2.3)
MCH RBC QN AUTO: 33 PG (ref 26.5–33)
MCHC RBC AUTO-ENTMCNC: 36.4 G/DL (ref 31.5–36.5)
MCV RBC AUTO: 91 FL (ref 78–100)
MONOCYTES # BLD AUTO: 0.8 10E9/L (ref 0–1.3)
MONOCYTES NFR BLD AUTO: 10.4 %
NEUTROPHILS # BLD AUTO: 5.4 10E9/L (ref 1.6–8.3)
NEUTROPHILS NFR BLD AUTO: 74.6 %
PLATELET # BLD AUTO: 206 10E9/L (ref 150–450)
POTASSIUM SERPL-SCNC: 3.5 MMOL/L (ref 3.4–5.3)
POTASSIUM SERPL-SCNC: NORMAL MMOL/L (ref 3.4–5.3)
RBC # BLD AUTO: 4.24 10E12/L (ref 4.4–5.9)
SODIUM SERPL-SCNC: 135 MMOL/L (ref 133–144)
SODIUM SERPL-SCNC: NORMAL MMOL/L (ref 133–144)
WBC # BLD AUTO: 7.3 10E9/L (ref 4–11)

## 2018-05-10 PROCEDURE — 96413 CHEMO IV INFUSION 1 HR: CPT

## 2018-05-10 PROCEDURE — 99214 OFFICE O/P EST MOD 30 MIN: CPT | Performed by: INTERNAL MEDICINE

## 2018-05-10 PROCEDURE — 25000125 ZZHC RX 250: Performed by: INTERNAL MEDICINE

## 2018-05-10 PROCEDURE — 96415 CHEMO IV INFUSION ADDL HR: CPT

## 2018-05-10 PROCEDURE — 80048 BASIC METABOLIC PNL TOTAL CA: CPT | Performed by: INTERNAL MEDICINE

## 2018-05-10 PROCEDURE — 96368 THER/DIAG CONCURRENT INF: CPT

## 2018-05-10 PROCEDURE — 85025 COMPLETE CBC W/AUTO DIFF WBC: CPT | Performed by: INTERNAL MEDICINE

## 2018-05-10 PROCEDURE — 96367 TX/PROPH/DG ADDL SEQ IV INF: CPT

## 2018-05-10 PROCEDURE — 96375 TX/PRO/DX INJ NEW DRUG ADDON: CPT

## 2018-05-10 PROCEDURE — 96417 CHEMO IV INFUS EACH ADDL SEQ: CPT

## 2018-05-10 PROCEDURE — 25000128 H RX IP 250 OP 636: Performed by: INTERNAL MEDICINE

## 2018-05-10 PROCEDURE — 83735 ASSAY OF MAGNESIUM: CPT | Performed by: INTERNAL MEDICINE

## 2018-05-10 RX ORDER — PALONOSETRON 0.05 MG/ML
0.25 INJECTION, SOLUTION INTRAVENOUS ONCE
Status: COMPLETED | OUTPATIENT
Start: 2018-05-10 | End: 2018-05-10

## 2018-05-10 RX ORDER — LORAZEPAM 0.5 MG/1
TABLET ORAL
Qty: 30 TABLET | Refills: 5 | Status: SHIPPED | OUTPATIENT
Start: 2018-05-10 | End: 2018-01-01

## 2018-05-10 RX ORDER — FOLIC ACID 1 MG/1
1 TABLET ORAL DAILY
Qty: 90 TABLET | Refills: 3 | Status: SHIPPED | OUTPATIENT
Start: 2018-05-10 | End: 2018-05-10

## 2018-05-10 RX ORDER — DEXAMETHASONE 4 MG/1
TABLET ORAL
Qty: 6 TABLET | Refills: 3 | Status: SHIPPED | OUTPATIENT
Start: 2018-05-10 | End: 2018-01-01

## 2018-05-10 RX ORDER — PROCHLORPERAZINE MALEATE 10 MG
10 TABLET ORAL EVERY 6 HOURS PRN
Qty: 30 TABLET | Refills: 3 | Status: ON HOLD | OUTPATIENT
Start: 2018-05-10 | End: 2018-01-01

## 2018-05-10 RX ADMIN — SODIUM CHLORIDE 990 MG: 9 INJECTION, SOLUTION INTRAVENOUS at 11:50

## 2018-05-10 RX ADMIN — MANNITOL 149 MG: 250 INJECTION, SOLUTION INTRAVENOUS at 12:34

## 2018-05-10 RX ADMIN — FAMOTIDINE 20 MG: 20 INJECTION, SOLUTION INTRAVENOUS at 10:51

## 2018-05-10 RX ADMIN — DEXAMETHASONE SODIUM PHOSPHATE 20 MG: 10 INJECTION, SOLUTION INTRAMUSCULAR; INTRAVENOUS at 10:39

## 2018-05-10 RX ADMIN — MAGNESIUM SULFATE HEPTAHYDRATE: 500 INJECTION, SOLUTION INTRAMUSCULAR; INTRAVENOUS at 12:38

## 2018-05-10 RX ADMIN — SODIUM CHLORIDE 1000 ML: 9 INJECTION, SOLUTION INTRAVENOUS at 10:20

## 2018-05-10 RX ADMIN — PALONOSETRON HYDROCHLORIDE 0.25 MG: 0.25 INJECTION INTRAVENOUS at 11:06

## 2018-05-10 RX ADMIN — SODIUM CHLORIDE 150 MG: 9 INJECTION, SOLUTION INTRAVENOUS at 11:09

## 2018-05-10 ASSESSMENT — PAIN SCALES - GENERAL: PAINLEVEL: NO PAIN (0)

## 2018-05-10 NOTE — MR AVS SNAPSHOT
After Visit Summary   5/10/2018    Chilo Oneil    MRN: [id number]           Patient Information     Date Of Birth          1950        Visit Information        Provider Department      5/10/2018 9:30 AM Mor Mccauley MD [facility] ONCOLOGY      Today's Diagnoses     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)    -  1      Care Instructions    You will receive education today on Cisplatin, Alimta today. We will also put a referral in for a  to see if they are able to help you with transportation. Your port is scheduled to be placed Tuesday 05.15.18. We would like to see you back in clinic with Dr. Mccauley next week.      Your prescriptions have been sent to:   [facility] [place], [place] - [address]  [address] [address]  Phone: [phone] Fax: [phone] Alternate Fax: [phone], [phone]  When you are in need of a refill, please call your pharmacy and they will send us a request.      Copy of appointments, and after visit summary (AVS) given to patient.      If you have any questions during business hours (M-F 8 AM- 4PM), please call Erica Elliott RN, BSN, OCN Oncology Hematology /Breast Cancer Navigator at [facility] [phone].       For questions after business hours, or on holidays/weekends, please call our after hours Nurse Triage line [phone]. Thank you.            Follow-ups after your visit        Your next 10 appointments already scheduled     May 11, 2018  2:45 PM CDT   (Arrive by 2:30 PM)   Return Visit with NATALIE Berg [facility] ([facility] [place])    [address]  [address]  [facility] [address] [phone]            May 15, 2018   Procedure with Gurjit Fox MD   [facility] Services (--)    [address] [address] [phone]         "   The medical center is located at [address] (between [address] in [place], [place] miles north of [place]).            May 18, 2018  9:30 AM CDT   Return Visit with Mor Mccauley MD   [facility] ([facility])    [facility]  [address]  [facility] [address]   [phone]            May 31, 2018  9:00 AM CDT   Level 5 with ROOM 1 [facility] ([facility])    [facility]  [address]  [facility] [address]   [phone]            Jul 05, 2018 11:30 AM CDT   Level O with ROOM 5 [facility] ([facility])    [facility]  [address]  [facility] [address]   [phone]              Who to contact     If you have questions or need follow up information about today's clinic visit or your schedule please contact [facility] directly at [phone].  Normal or non-critical lab and imaging results will be communicated to you by [employer], letter or phone within 4 business days after the clinic has received the results. If you do not hear from us within 7 days, please contact the clinic through [employer] or phone. If you have a critical or abnormal lab result, we will notify you by phone as soon as possible.  Submit refill requests through [employer] or call your pharmacy and they will forward the refill request to us. Please allow 3 business days for your refill to be completed.          Additional Information About Your Visit        [employer] Medical Information     [employer] lets you send messages to your doctor, view your test results, renew your prescriptions, schedule appointments and more. To sign up, go to [email or web address] . Click on \"Log in\" on the left side of the screen, which will take you to the Welcome page. Then click on \"Sign up Now\" on the right side of the page.     You will be asked to enter " "the access code listed below, as well as some personal information. Please follow the directions to create your username and password.     Your access code is: [email or web address]  Expires: 2018 10:49 AM     Your access code will  in 90 days. If you need help or a new code, please call your [place] clinic or [phone].        Care EveryWhere ID     This is your Care EveryWhere ID. This could be used by other organizations to access your [place] medical records  [id number]        Your Vitals Were     Pulse Temperature Respirations Height Pulse Oximetry BMI (Body Mass Index)    74 100.3  F (37.9  C) (Tympanic) 16 1.905 m (6' 3\") 65% 20.54 kg/m2       Blood Pressure from Last 3 Encounters:   05/10/18 142/56   18 176/70   18 158/81    Weight from Last 3 Encounters:   05/10/18 74.5 kg (164 lb 4.8 oz)   18 70.3 kg (155 lb)   18 73.5 kg (162 lb)              Today, you had the following     No orders found for display         Today's Medication Changes          These changes are accurate as of 5/10/18  9:59 AM.  If you have any questions, ask your nurse or doctor.               Start taking these medicines.        Dose/Directions    dexamethasone 4 MG tablet   Commonly known as:  DECADRON   Used for:  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)   Started by:  Mor Mccauley MD        Take 4 mg (1 tab) by mouth twice daily for 6 doses. Start the evening prior to Infusion.   Quantity:  6 tablet   Refills:  3       folic acid 1 MG tablet   Commonly known as:  FOLVITE   Used for:  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)   Started by:  Mor Mccauley MD        Dose:  1 mg   Take 1 tablet (1 mg) by mouth daily   Quantity:  90 tablet   Refills:  3       LORazepam 0.5 MG tablet   Commonly known as:  ATIVAN   Used for:  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)   Started by:  Mor Mccauley MD        Take 0.5 mg by mouth every 4 hours as needed for nausea, " vomiting, anxiety, or sleep.   Quantity:  30 tablet   Refills:  5       prochlorperazine 10 MG tablet   Commonly known as:  COMPAZINE   Used for:  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)   Started by:  Mor Mccauley MD        Dose:  10 mg   Take 1 tablet (10 mg) by mouth every 6 hours as needed for nausea or vomiting   Quantity:  30 tablet   Refills:  3            Where to get your medicines      These medications were sent to [facility] [address]  [address] [address]     Phone:  [phone]     dexamethasone 4 MG tablet    folic acid 1 MG tablet    prochlorperazine 10 MG tablet         Some of these will need a paper prescription and others can be bought over the counter.  Ask your nurse if you have questions.     Bring a paper prescription for each of these medications     LORazepam 0.5 MG tablet                Primary Care Provider Office Phone # Fax #    Manish Plasencia [phone] [address] [address]        Goals        General    Financial Wellbeing (pt-stated)     Notes - Note edited  5/1/2018  8:50 AM by Sofya Paulino LSW    Goal Statement: I want to complete my Medical Assistance application  Measure of Success: My Medical Assistance application will be completed  Supportive Steps to Achieve: SW and pt will finish the remaining paperwork that needs to be completed  Barriers: None identified at this time  Strengths: Pt is motivated as the income increase will assist him greatly  Date to Achieve By: 3 months          Pain Management (pt-stated)     Notes - Note created  5/1/2018 10:59 AM by Sofya Paulino LSW    [facility] to discuss with PCP, but would recommend a Palliative Care consult for symptom management.        Equal Access to Services     SONY SNOW : Kevin Vidales, pepe camara, celina jaime .  So [facility] [phone].    ATENCIÓN: Si habla deandre, tiene a galaviz disposición servicios gratuitos de asistencia lingüística. Yecenia posada [phone].    We comply with applicable federal civil rights laws and [place] laws. We do not discriminate on the basis of race, color, national origin, age, disability, sex, sexual orientation, or gender identity.            Thank you!     Thank you for choosing [facility]  for your care. Our goal is always to provide you with excellent care. Hearing back from our patients is one way we can continue to improve our services. Please take a few minutes to complete the written survey that you may receive in the mail after your visit with us. Thank you!             Your Updated Medication List - Protect others around you: Learn how to safely use, store and throw away your medicines at www.disposemymeds.org.          This list is accurate as of 5/10/18  9:59 AM.  Always use your most recent med list.                   Brand Name Dispense Instructions for use Diagnosis    acetaminophen 325 MG tablet    TYLENOL    100 tablet    Take 3 tablets (975 mg) by mouth every 8 hours    Carcinoma, lung, left (H)       ALEVE PO      Take 220 mg by mouth every 12 hours as needed for moderate pain        ALPRAZolam 0.5 MG tablet    XANAX    20 tablet    Take 1 tablet (0.5 mg) by mouth 3 times daily as needed for anxiety    Anxiety       ASPIRIN PO      Take 81 mg by mouth daily        dexamethasone 4 MG tablet    DECADRON    6 tablet    Take 4 mg (1 tab) by mouth twice daily for 6 doses. Start the evening prior to Infusion.    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)       EQL NICOTINE 2 MG lozenge   Generic drug:  nicotine polacrilex      Place 2 mg inside cheek every hour as needed for smoking cessation        folic acid 1 MG tablet    FOLVITE    90 tablet    Take 1 tablet (1 mg) by mouth daily    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)       hydrochlorothiazide 25 MG  tablet    HYDRODIURIL    90 tablet    Take 1 tablet (25 mg) by mouth daily    Benign essential hypertension       LORazepam 0.5 MG tablet    ATIVAN    30 tablet    Take 0.5 mg by mouth every 4 hours as needed for nausea, vomiting, anxiety, or sleep.    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)       losartan 50 MG tablet    COZAAR    90 tablet    Take 1 tablet (50 mg) by mouth daily    Benign essential hypertension       metoprolol tartrate 50 MG tablet    LOPRESSOR    60 tablet    Take 1 tablet (50 mg) by mouth 2 times daily    Benign essential hypertension       * morphine 15 MG 12 hr tablet    MS CONTIN    30 tablet    Take 1 tablet (15 mg) by mouth every 12 hours maximum 2 tablet(s) per day    Pain       * morphine 15 MG IR tablet    MSIR    60 tablet    Take 1 tablet (15 mg) by mouth every 6 hours as needed for moderate to severe pain    Pain       ondansetron 8 MG ODT tab    ZOFRAN-ODT    30 tablet    Take 1 tablet (8 mg) by mouth every 8 hours as needed for nausea    Nausea and vomiting, intractability of vomiting not specified, unspecified vomiting type       oxyCODONE IR 5 MG tablet    ROXICODONE     Take 5 mg by mouth every 8 hours as needed        prochlorperazine 10 MG tablet    COMPAZINE    30 tablet    Take 1 tablet (10 mg) by mouth every 6 hours as needed for nausea or vomiting    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)       rosuvastatin 5 MG tablet    CRESTOR    30 tablet    Take 1 tablet (5 mg) by mouth daily        senna-docusate 8.6-50 MG per tablet    SENOKOT-S;PERICOLACE    100 tablet    Take 2 tablets by mouth 2 times daily    Carcinoma, lung, left (H)       VENTOLIN  (90 Base) MCG/ACT Inhaler   Generic drug:  albuterol     1 Inhaler    Inhale 2 puffs into the lungs every 4 hours as needed for shortness of breath / dyspnea or wheezing        * Notice:  This list has 2 medication(s) that are the same as other medications prescribed for you. Read the directions  carefully, and ask your doctor or other care provider to review them with you.

## 2018-05-10 NOTE — PROGRESS NOTES
Clinic Care Coordination Contact  Care Team Conversations    SW received [employer] notification that pt was in the ED 2 times yesterday due to anxiety and SOB.  SW was going to call the pt, but when opened his EMR, SW realized pt was at the Oncology Department receiving chemotherapy today.    SW met with pt face to face to discuss several items:    1.  Pt's MA application:  YASMIN explained to the pt that this writer had spoken with Sonam, his [facility] Financial worker and learned that IF the pt wants assistance with his past due medical bills going back to January 1, 2018, then we need to submit documentation showing what his balances were on 1-1-18, 2-1-18, and 3-1-18.  Sw and pt attempted to set up an online account for the pt's debit card, attempted to call the debit card's customer service center, and after several hours of working on this issue, were able to speak with a  who shared the bank will mail the statements to the pt's home in 5-10 calendar days.  SW and pt discussed that once the pt gets these statements he will call this writer and bring them to the clinic so that we can fax them directly to [facility].      2.  Transportation Resources:  Pt lives in [facility] and there are very minimal resources that will cross the [facility], except for a Taxi cab, unless he gets on MA.  YASMIN stressed this is why getting the bank statements above is so very important for his medical cares & needs.  YASMIN was able to speak with an [facility] SW who shared that there is a program called [facility], [phone].  YASMIN called and left a message for the program asking for information, hours of operation and a brochure to be sent to my office.      3.  Follow up Medical Appointments:  Sw and pt discussed the importance of pt following up for his medical appointments now that he has begun his chemotherapy.  SW and pt also discussed pt's Palliative care  appointment yesterday.  Pt shared he is pleased with the fact that he now has pain medication that will help him, however, he also told this writer he threw up his medications last night.  Pt said the Oncology RN told him to eat food, drink WATER before taking medications.    4.  SW discussed the [place] and explained that in addition to applying for MA, this writer would also like to assist the pt in applying for the Elderly Waiver, which once the pt is eligible for MA, he will then be able to have a variety of services available to him, such as Meals on Wheels, [employer], homemaking services, transportation, and various long term care planning needs.  Pt is in agreement with having this writer apply for the [place] program on his behalf.  SW completed the [place] form and faxed it to [facility] at 4:34 PM today.    SW to continue to work with the pt for resources for his home, transportation and medical health.    Sofya Ungre  Social Work Care Coordinator  [facility]  [phone]

## 2018-05-10 NOTE — PROGRESS NOTES
Infusion Nursing Note:  Chilo Oneil presents today for C1D1 Alimta/Cisplatin    Patient seen by provider today: Yes: Dr. Mccauley    present during visit today: Not Applicable.    Note: Went over side effects with pt as well as the importance of taking Decadron the evening before and the morning of chemo followed by 4 more doses.  Pt did not take the oral decadron yesterday or today so he is to start it tonight for a total of 6 doses per Dr. Mccauley.  Went over this information with pt as well as wrote it down for him.  Pt verbalized understanding but appeared forgetful so reminded pt several times of this information.    Intravenous Access:  Labs drawn without difficulty.  Peripheral IV placed.    Treatment Conditions:  Lab Results   Component Value Date    HGB 14.0 05/10/2018     Lab Results   Component Value Date    WBC 7.3 05/10/2018      Lab Results   Component Value Date    ANEU 5.4 05/10/2018     Lab Results   Component Value Date     05/10/2018      Lab Results   Component Value Date     05/10/2018                   Lab Results   Component Value Date    POTASSIUM 3.5 05/10/2018           Lab Results   Component Value Date    MAG 1.8 05/10/2018            Lab Results   Component Value Date    CR 0.67 05/10/2018                   Lab Results   Component Value Date    DAMARI 8.6 05/10/2018                Lab Results   Component Value Date    BILITOTAL 0.7 05/09/2018           Lab Results   Component Value Date    ALBUMIN 3.3 05/09/2018                    Lab Results   Component Value Date    ALT 48 05/09/2018           Lab Results   Component Value Date    AST 77 05/09/2018       Results reviewed, labs MET treatment parameters, ok to proceed with treatment.      Post Infusion Assessment:  Patient tolerated infusions without incident.  Blood return noted pre and post infusion.  Site patent and intact, free from redness, edema or discomfort.  No evidence of extravasations.  Access  discontinued per protocol.    Discharge Plan:   Patient discharged in stable condition accompanied by: self.  Departure Mode: Ambulatory.  Pt to return on 5/18/18 at 9:30 am for clinic visit with Dr. Mccauley.     Toña Harris RN

## 2018-05-10 NOTE — PROGRESS NOTES
"Late entry:  Chemotherapy education attempted today.  Patient declined stating, \"just give me that shot so I can go home\".  Patient expressed fatigue and desire to leave.    Written information was provided to patient with plan for patient to come see writer day of port consult for indepth education.   Erica Elliott RN, BSN, OCN  Oncology Hematology   Breast Cancer Navigator  [facility]  [email or web address]  [phone]  Phone   [phone]  Fax        "

## 2018-05-10 NOTE — PROGRESS NOTES
Spoke with Kate RUBIO, . She is able to come here to cancer clinic to meet with patient and discuss MA, transportation, etc.  Pt and infusion nurse aware.

## 2018-05-10 NOTE — PROGRESS NOTES
"Chemotherapy Education    Patient is a 68 year old male here today for chemotherapy education, accompanied by self.  Pt has a cancer diagnosis of   Encounter Diagnoses   Name Primary?     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) Yes     Carcinoma, lung, left (H)      Tobacco use disorder      Nausea and vomiting, intractability of vomiting not specified, unspecified vomiting type      Benign essential hypertension      Alcohol use, daily use    and their main concern is \"just getting everything straight\".  Their Oncologist is Dr. KAYLEE Mccauley, and PCP is Manish Plasencia.  Reviewed the following with the patient and their support person:  Treatment goal: Adjuvant  Treatment regimen & duration: Cisplatin, Alimta day 1 every 21 days x 6; and rationale for strict adherence to this schedule, including specific medication names including pre-treatment medications and at home scheduled or as needed medications, delivery methods,.  Potential side effects: Side effects and management; including skin changes/hand-foot syndrome, anemia, neutropenia, thrombocytopenia, diarrhea/constipation, hair loss syndrome, memory changes/ \"chemobrain\", mouth sores, taste changes, neuropathy, fatigue, myelosuppression, sexuality/infertility, and risk of extravasation or infiltration.  Infection prevention, and monitoring of lab values, what lab tests and what changes of these values meant, along with the possibility of hydration or blood product transfusion, or the need to defer or hold treatment.    Chemotherapy information, including ways it is excreted from the body and cleaning and containment of vomitus or other bodily fluid, use of the bathroom, sexual health and intimacy, what to do if needing to miss a treatment, when to call a provider and the need for staff to wear protective equipment.  Importance of Central line care (port) or IV site care.  Written information:  Written information including the \"Getting Ready " "for Chemotherapy: What to Expect, Before, During, and After your Treatment\" booklets in the cancer new patient binder, specific drug information guides printed from [facility],  Also, information on when to contact the provider, various programs offered at [facility], and our business card with contact information given; Oncology Clinic, RN Case Manager, and the after hours Nurse Advise Line.  General orientation to the Medical Oncology department, Infusion Services department, Huc/scheduling, bathrooms and usual flow of the treatment day provided as well as introduction to the Infusion nurses.  No barriers to learning identified. Patient and family verbalized understanding of all written and verbal information. All questions answered to patients satisfaction.   Other concerns: Reviewed take home medications and importance of taking dexamethasone and folic acid as directed.  Also, discussed transportation issues and  referral.  Pt instructed to call with further questions or concerns.  Patient states understanding and is in agreement with this plan.  Copy of appointments, and after visit summary (AVS) given to patient. Patient discharged amulatory.    Face to Face time with patient: 40min    Erica Elliott RN, BSN, OCN  Oncology Hematology   Breast Cancer Navigator  [facility]  [email or web address]  Phone [phone]    "

## 2018-05-10 NOTE — PROGRESS NOTES
Hematology/ Oncology Follow-up Visit:  May 10, 2018    Reason for Visit:   Chief Complaint   Patient presents with     Oncology Clinic Visit     2 week recheck Malignant neoplasm of upper lobe of left lung, Labs & Chemo today       Oncologic History:  Carcinoma, lung, left (H)  Patient presented with L side shoulder and has been traveling  to L/side about  to the whole L/side upper back and L side of chest for about 3 week he has had the pain has been taking aleve.  Subsequently went on to have a CT scan done.  Which showed 1.6 cm nodular infiltrate in the left upper lobe.  The lesion appear spiculated and worrisome for lung cancer.  There is a second 0.7 cm indeterminate nodule in the lingular portion of the left lung.  Subsequently the patient went on to have CT-guided biopsy.  The pathology came back showing moderately differentiated adenocarcinoma. He had subsequent noted left thoracoscopic left pleural biopsies and left lobe which resection on April 11, 2018.  The surgical pathology came back with pleural biopsies came back positive for invasive adenocarcinoma with parietal pleural invasion.  The wedge resection came back with adenocarcinoma with acinar patterns of growth moderately differentiated the tumor size is 1.1 and 0.3 cm into 2 separate tumor nodules.  Visceropleural invasion was identified the margins were negative lymphovascular invasion was not identified large venous artery involvement was not identified as well.  The pathologic staging of pT3N0.        Interval History:  Patient is here today to start chemotherapy.  He continues to smoke and he drinks alcohol daily.  He has been forgetting his appointments and unable to come on time because of lack of transportation.  He denies any chest pain or shortness of breath or cough or wheezing.    Review Of Systems:  Constitutional: Negative for fever, chills, and night sweats.  Skin: negative.  Eyes: negative.  Ears/Nose/Throat: negative.  Respiratory:  No shortness of breath, dyspnea on exertion, cough, or hemoptysis.  Cardiovascular: negative.  Gastrointestinal: negative.  Genitourinary: negative.  Musculoskeletal: negative.  Neurologic: negative.  Psychiatric: negative.  Hematologic/Lymphatic/Immunologic: negative.  Endocrine: negative.    All other ROS negative unless mentioned in interval history.    Past medical, social, surgical, and family histories reviewed.    Allergies:  Allergies as of 05/10/2018 - Alcides as Reviewed 05/10/2018   Allergen Reaction Noted     Cipro [ciprofloxacin] Swelling 06/22/2009       Current Medications:  Current Outpatient Prescriptions   Medication Sig Dispense Refill     acetaminophen (TYLENOL) 325 MG tablet Take 3 tablets (975 mg) by mouth every 8 hours 100 tablet 0     ALPRAZolam (XANAX) 0.5 MG tablet Take 1 tablet (0.5 mg) by mouth 3 times daily as needed for anxiety 20 tablet 0     ASPIRIN PO Take 81 mg by mouth daily        dexamethasone (DECADRON) 4 MG tablet Take 4 mg (1 tab) by mouth twice daily for 6 doses. Start the evening prior to Infusion. 6 tablet 3     folic acid (FOLVITE) 1 MG tablet Take 1 tablet (1 mg) by mouth daily 90 tablet 3     hydrochlorothiazide (HYDRODIURIL) 25 MG tablet Take 1 tablet (25 mg) by mouth daily 90 tablet 3     LORazepam (ATIVAN) 0.5 MG tablet Take 0.5 mg by mouth every 4 hours as needed for nausea, vomiting, anxiety, or sleep. 30 tablet 5     losartan (COZAAR) 50 MG tablet Take 1 tablet (50 mg) by mouth daily 90 tablet 3     metoprolol (LOPRESSOR) 50 MG tablet Take 1 tablet (50 mg) by mouth 2 times daily 60 tablet 1     morphine (MS CONTIN) 15 MG 12 hr tablet Take 1 tablet (15 mg) by mouth every 12 hours maximum 2 tablet(s) per day 30 tablet 0     morphine (MSIR) 15 MG IR tablet Take 1 tablet (15 mg) by mouth every 6 hours as needed for moderate to severe pain 60 tablet 0     Naproxen Sodium (ALEVE PO) Take 220 mg by mouth every 12 hours as needed for moderate pain       nicotine polacrilex  "(EQL NICOTINE) 2 MG lozenge Place 2 mg inside cheek every hour as needed for smoking cessation       ondansetron (ZOFRAN-ODT) 8 MG ODT tab Take 1 tablet (8 mg) by mouth every 8 hours as needed for nausea 30 tablet 1     oxyCODONE IR (ROXICODONE) 5 MG tablet Take 5 mg by mouth every 8 hours as needed        prochlorperazine (COMPAZINE) 10 MG tablet Take 1 tablet (10 mg) by mouth every 6 hours as needed for nausea or vomiting 30 tablet 3     rosuvastatin (CRESTOR) 5 MG tablet Take 1 tablet (5 mg) by mouth daily 30 tablet 3     senna-docusate (SENOKOT-S;PERICOLACE) 8.6-50 MG per tablet Take 2 tablets by mouth 2 times daily 100 tablet 0     VENTOLIN  (90 Base) MCG/ACT Inhaler Inhale 2 puffs into the lungs every 4 hours as needed for shortness of breath / dyspnea or wheezing 1 Inhaler 1        Physical Exam:  /56 (BP Location: Right arm, Patient Position: Sitting, Cuff Size: Adult Regular)  Pulse 74  Temp 100.3  F (37.9  C) (Tympanic)  Resp 16  Ht 1.905 m (6' 3\")  Wt 74.5 kg (164 lb 4.8 oz)  SpO2 (!) 65%  BMI 20.54 kg/m2  Wt Readings from Last 12 Encounters:   05/10/18 74.5 kg (164 lb 4.8 oz)   05/09/18 70.3 kg (155 lb)   05/09/18 73.5 kg (162 lb)   05/09/18 73.8 kg (162 lb 12.8 oz)   04/30/18 73.9 kg (163 lb)   04/27/18 74.3 kg (163 lb 11.2 oz)   04/20/18 73.5 kg (162 lb)   04/17/18 75.7 kg (166 lb 12.8 oz)   04/11/18 74.9 kg (165 lb 2 oz)   04/08/18 77.1 kg (170 lb)   04/04/18 77.1 kg (170 lb)   04/04/18 77.4 kg (170 lb 9.6 oz)     ECOG performance status: 1  GENERAL APPEARANCE: Healthy, alert and in no acute distress.  HEENT: Sclerae anicteric. PERRLA. Oropharynx without ulcers, lesions, or thrush.  NECK: Supple. No asymmetry or masses.  LYMPHATICS: No palpable cervical, supraclavicular, axillary, or inguinal lymphadenopathy.  RESP: Lungs clear to auscultation bilaterally without rales, rhonchi or wheezes.  CARDIOVASCULAR: Regular rate and rhythm. Normal S1, S2; no S3 or S4. No murmur, gallop, or " rub.  ABDOMEN: Soft, nontender. Bowel sounds normal. No palpable organomegaly or masses.  MUSCULOSKELETAL: Extremities without gross deformities noted. No edema of bilateral lower extremities.  SKIN: No suspicious lesions or rashes.  NEURO: Alert and oriented x 3. Cranial nerves II-XII grossly intact.  PSYCHIATRIC: Mentation and affect appear normal.    Laboratory/Imaging Studies:  Infusion Therapy Visit on 05/10/2018   Component Date Value Ref Range Status     WBC 05/10/2018 7.3  4.0 - 11.0 10e9/L Final     RBC Count 05/10/2018 4.24* 4.4 - 5.9 10e12/L Final     Hemoglobin 05/10/2018 14.0  13.3 - 17.7 g/dL Final     Hematocrit 05/10/2018 38.5* 40.0 - 53.0 % Final     MCV 05/10/2018 91  78 - 100 fl Final     MCH 05/10/2018 33.0  26.5 - 33.0 pg Final     MCHC 05/10/2018 36.4  31.5 - 36.5 g/dL Final     RDW 05/10/2018 14.6  10.0 - 15.0 % Final     Platelet Count 05/10/2018 206  150 - 450 10e9/L Final     Diff Method 05/10/2018 Automated Method   Final     % Neutrophils 05/10/2018 74.6  % Final     % Lymphocytes 05/10/2018 14.3  % Final     % Monocytes 05/10/2018 10.4  % Final     % Eosinophils 05/10/2018 0.1  % Final     % Basophils 05/10/2018 0.3  % Final     % Immature Granulocytes 05/10/2018 0.3  % Final     Absolute Neutrophil 05/10/2018 5.4  1.6 - 8.3 10e9/L Final     Absolute Lymphocytes 05/10/2018 1.0  0.8 - 5.3 10e9/L Final     Absolute Monocytes 05/10/2018 0.8  0.0 - 1.3 10e9/L Final     Absolute Eosinophils 05/10/2018 0.0  0.0 - 0.7 10e9/L Final     Absolute Basophils 05/10/2018 0.0  0.0 - 0.2 10e9/L Final     Abs Immature Granulocytes 05/10/2018 0.0  0 - 0.4 10e9/L Final     Sodium 05/10/2018 Canceled, Test credited  133 - 144 mmol/L Final    Comment: Unsatisfactory specimen - hemolyzed  PAT IN INFUSION TO REORDER AND HAVE REDRAWN 0845 5.10.18 JW       Potassium 05/10/2018 Canceled, Test credited  3.4 - 5.3 mmol/L Final    Comment: Unsatisfactory specimen - hemolyzed  PAT IN INFUSION TO REORDER AND HAVE  REDRAWN 0845 5.10.18        Chloride 05/10/2018 Canceled, Test credited  94 - 109 mmol/L Final    Comment: Unsatisfactory specimen - hemolyzed  PAT IN INFUSION TO REORDER AND HAVE REDRAWN 0845 5.10.18        Carbon Dioxide 05/10/2018 Canceled, Test credited  20 - 32 mmol/L Final    Comment: Unsatisfactory specimen - hemolyzed  PAT IN INFUSION TO REORDER AND HAVE REDRAWN 0845 5.10.18        Anion Gap 05/10/2018 Canceled, Test credited  6 - 17 mmol/L Final    Comment: Unsatisfactory specimen - hemolyzed  PAT IN INFUSION TO REORDER AND HAVE REDRAWN 0845 5.10.18        Glucose 05/10/2018 Canceled, Test credited  70 - 99 mg/dL Final    Comment: Unsatisfactory specimen - hemolyzed  PAT IN INFUSION TO REORDER AND HAVE REDRAWN 0845 5.10.18        Urea Nitrogen 05/10/2018 Canceled, Test credited  7 - 30 mg/dL Final    Comment: Unsatisfactory specimen - hemolyzed  PAT IN INFUSION TO REORDER AND HAVE REDRAWN 0845 5.10.18 JW       Creatinine 05/10/2018 Canceled, Test credited  0.66 - 1.25 mg/dL Final    Comment: Unsatisfactory specimen - hemolyzed  PAT IN INFUSION TO REORDER AND HAVE REDRAWN 0845 5.10.18        GFR Estimate 05/10/2018 Canceled, Test credited  >60 mL/min/1.7m2 Final    Comment: Unsatisfactory specimen - hemolyzed  PAT IN INFUSION TO REORDER AND HAVE REDRAWN 0845 5.10.18        GFR Estimate If Black 05/10/2018 Canceled, Test credited  >60 mL/min/1.7m2 Final    Comment: Unsatisfactory specimen - hemolyzed  PAT IN INFUSION TO REORDER AND HAVE REDRAWN 0845 5.10.18 JW       Calcium 05/10/2018 Canceled, Test credited  8.5 - 10.1 mg/dL Final    Comment: Unsatisfactory specimen - hemolyzed  PAT IN INFUSION TO REORDER AND HAVE REDRAWN 0845 5.10.18 JW       Magnesium 05/10/2018 Canceled, Test credited  1.6 - 2.3 mg/dL Final    Comment: Unsatisfactory specimen - hemolyzed  PAT IN INFUSION TO REORDER AND HAVE REDRAWN 0845 5.10.18        Sodium 05/10/2018 135  133 - 144 mmol/L Final     Potassium 05/10/2018  3.5  3.4 - 5.3 mmol/L Final     Chloride 05/10/2018 97  94 - 109 mmol/L Final     Carbon Dioxide 05/10/2018 30  20 - 32 mmol/L Final     Anion Gap 05/10/2018 8  3 - 14 mmol/L Final     Glucose 05/10/2018 116* 70 - 99 mg/dL Final     Urea Nitrogen 05/10/2018 15  7 - 30 mg/dL Final     Creatinine 05/10/2018 0.67  0.66 - 1.25 mg/dL Final     GFR Estimate 05/10/2018 >90  >60 mL/min/1.7m2 Final    Non  GFR Calc     GFR Estimate If Black 05/10/2018 >90  >60 mL/min/1.7m2 Final    African American GFR Calc     Calcium 05/10/2018 8.6  8.5 - 10.1 mg/dL Final     Magnesium 05/10/2018 1.8  1.6 - 2.3 mg/dL Final        Recent Results (from the past 744 hour(s))   XR Chest Port 1 View    Narrative    Exam: XR CHEST PORT 1 VW, 4/11/2018 4:49 PM    Indication: s/p lung wedge;     Comparison: 3/29/2018    Findings:   Single AP view of the chest. Postsurgical changes of left lung wedge  resection. Left-sided chest tube. Minimal left perihilar opacities,  presumably postsurgical. The left apex is obscured by patient  positioning. No pleural effusion. The right lung is clear. Heart size  normal. Calcifications of the aorta.      Impression    Impression:   1. Postoperative changes of left lung wedge resection with minimal  left perihilar opacities, likely postprocedural. No definite  pneumothorax is identified though the left apex is obscured by patient  positioning.  2. The right lung is clear.    I have personally reviewed the examination and initial interpretation  and I agree with the findings.    ROSHAN HOANG MD   XR Chest Port 1 View    Narrative    Exam: XR CHEST PORT 1 VW, 4/11/2018 5:24 PM    Indication: s/p chest tube;     Comparison: Earlier same day radiograph    Findings:   Single AP view the chest. The cardiac silhouette is stable. Left  perihilar opacities, similar to prior. Streaky left basilar  atelectasis. No pleural effusion. Tiny pneumothorax along the left  lateral midlung. Removal of  left-sided chest tube.      Impression    Impression:   1. Removal of left-sided chest tube. No significant pneumothorax  identified.  2. Minimal left perihilar opacity, likely postprocedural.    I have personally reviewed the examination and initial interpretation  and I agree with the findings.    ROSHAN HOANG MD   XR Chest 2 Views   Result Value    Radiologist flags Pneumoperitoneum. (Urgent)    Narrative    Exam: XR CHEST 2 VW, 4/12/2018 10:01 AM    Indication: s/p lung resection;  subxiphoid approach.    Comparison: Chest radiograph dated 4/11/2018, CT of the chest dated  3/28/2018.    Findings:   PA and lateral views of the chest. Patchy opacities noted in the left  mid lung and base. Small left pleural effusion. Small bilateral  pneumothoraces with an air-fluid level. Air density is also noted  under the right diaphragm and in the anterior mediastinum. The cardiac  silhouette is within normal limits. Visualized portion of the upper  abdomen is unremarkable. No acute osseous pathology.         Impression    Impression:   1. Unchanged patchy opacities in the left midlung and lung base.  2. Pneumothoraces, pneumomediastinum, abdomen and small left pleural  effusion likely related to same day procedure.    [Urgent Result: Pneumoperitoneum.]    Finding was identified on 4/12/2018 1:25 PM.     Shaun Garcia was contacted by Dr. Jose Nick at 4/12/2018 1:29 PM  and verbalized understanding of the urgent finding.     I have personally reviewed the examination and initial interpretation  and I agree with the findings.    AURELIANO ADAMS MD   Chest CT - IV contrast only - PE protocol    Narrative    CT CHEST WITH CONTRAST  4/30/2018 8:42 PM    HISTORY: Left-sided pain. Evaluate for pulmonary embolism.    COMPARISON: A CT on 3/28/2018.    TECHNIQUE: Routine transverse CT imaging of the chest was performed  following the uneventful intravenous administration of Isovue 370-  80mL contrast. A pulmonary embolism  protocol was utilized. Radiation  dose for this scan was reduced using automated exposure control,  adjustment of the mA and/or kV according to patient size, or iterative  reconstruction technique.    FINDINGS: The heart size is normal. No enlarged lymph node or other  abnormal mediastinal mass is seen. There is calcification of the  thoracic aorta. There is very good opacification of the pulmonary  arteries with contrast. No pulmonary embolism is seen. The lungs are  clear. There has been no definite change in a 2.2 cm somewhat  spiculated mass in the lateral aspect of the left upper lobe. There  has been development of consolidation or capping of the left lung  apex. The lungs are otherwise clear. There has been development of a  small left pleural effusion. No right effusion is seen. There are  degenerative changes in the spine. No other osseous abnormalities  identified. No chest wall pathology is seen. The visualized upper  abdomen is unremarkable.      Impression    IMPRESSION:   1. No pulmonary embolism is seen.  2. No change in a 2.2 cm spiculated mass of the left upper lobe. On  previous imaging this was suspicious for malignancy.  3. Development of consolidation or capping of the left lung apex.  4. Small left pleural effusion.    ADILIA ALSTON MD   Chest XR,  PA & LAT    Narrative    XR CHEST 2 VW 5/9/2018 11:37 AM    HISTORY: Pain.    COMPARISON: 4/12/2018.      Impression    IMPRESSION: 2 views of the chest show interval improvement in  previously seen patchy and nodular parenchymal abnormalities in the  left perihilar region. An area of nodularity remains that measures 1.6  cm. The previously seen left pleural effusion has resolved in the  interim. No new findings.     VERONICA HINTON MD       Assessment and plan:    (C34.12) 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)  (primary encounter diagnosis)  I reviewed with the patient today the management plan in details.  We talked about  chemotherapy regimen potential benefits and side effects in details.  Patient will be starting his first cycle of chemotherapy today.  I will see the patient again in 1 week or sooner if there are new developments or concerns.    (F17.200) Tobacco use disorder  I strongly emphasized the importance of quitting smoking    (R11.2) Nausea and vomiting, intractability of vomiting not specified, unspecified vomiting type  P management of nausea was reviewed with the patient today.    (I10) Benign essential hypertension  Patient currently on Lopressor 50 mg daily.  He is also on Cozaar 50 mg orally daily.    (F10.10) Alcohol use, daily use  I strongly emphasized importance of abstaining from alcohol during chemotherapy    The patient is ready to learn, no apparent learning barriers were identified.  Diagnosis and treatment plans were explained to the patient. The patient expressed understanding of the content. The patient asked appropriate questions. The patient questions were answered to his satisfaction.    Chart documentation with Dragon Voice recognition Software. Although reviewed after completion, some words and grammatical errors may remain.

## 2018-05-10 NOTE — MR AVS SNAPSHOT
After Visit Summary   5/10/2018    Chilo Oneil    MRN: [id number]           Patient Information     Date Of Birth          1950        Visit Information        Provider Department      5/10/2018 8:30 AM ROOM 1 [facility]        Today's Diagnoses     Carcinoma, lung, left (H)    -  1      Care Instructions    Damián Hayes~      Take your Dexamethasone prescription tonight, Friday morning and evening, Saturday morning and evening, and then again on Sunday.     When you come back for your next chemotherapy on 5/31/18, take the Dexamethasone the night before and morning of your chemo as it says on your prescription bottle, and then for the next few days.    Please feel free to call us with any questions or concerns at [phone].  Thank you.    Sincerely,    Toña Harris RN           Follow-ups after your visit        Your next 10 appointments already scheduled     May 15, 2018   Procedure with Gurjit Fox MD   [facility] (--)    [address] [place] [id number]   [phone]           The medical center is located at [address] [place] [address]. (between [facility] [address] [facility] in [place], [place] miles north of [place]).            May 18, 2018  9:30 AM CDT   Return Visit with Mor Mccauley MD   [facility] ([facility])    [facility] [address]  [facility] [address]   [phone]            May 31, 2018  9:00 AM CDT   Level 5 with ROOM 1 [facility] ([facility])    [facility]  [address] [place] [address] [address]  [facility] [place] [address]   [phone]            Jul 05, 2018 11:30 AM CDT   Level O with ROOM 5 [facility] ([facility])    [facility]  [address] [place] [address] [address]  [facility] [address]   [phone]              Who to contact     If you have questions or need follow up information about today's  "clinic visit or your schedule please contact [facility] directly at [phone].  Normal or non-critical lab and imaging results will be communicated to you by MyChart, letter or phone within 4 business days after the clinic has received the results. If you do not hear from us within 7 days, please contact the clinic through [employer] or phone. If you have a critical or abnormal lab result, we will notify you by phone as soon as possible.  Submit refill requests through Tamion or call your pharmacy and they will forward the refill request to us. Please allow 3 business days for your refill to be completed.          Additional Information About Your Visit        MyChart Information     Tamion lets you send messages to your doctor, view your test results, renew your prescriptions, schedule appointments and more. To sign up, go to [email or web address] . Click on \"Log in\" on the left side of the screen, which will take you to the Welcome page. Then click on \"Sign up Now\" on the right side of the page.     You will be asked to enter the access code listed below, as well as some personal information. Please follow the directions to create your username and password.     Your access code is: [email or web address]  Expires: 2018 10:49 AM     Your access code will  in 90 days. If you need help or a new code, please call your [place] clinic or [phone].        Care EveryWhere ID     This is your Care EveryWhere ID. This could be used by other organizations to access your [place] medical records  [id number]         Blood Pressure from Last 3 Encounters:   05/10/18 142/56   18 176/70   18 158/81    Weight from Last 3 Encounters:   05/10/18 74.5 kg (164 lb 4.8 oz)   18 70.3 kg (155 lb)   18 73.5 kg (162 lb)              We Performed the Following     Basic metabolic panel     Basic metabolic panel     CBC with platelets differential     Magnesium     Magnesium          Today's " Medication Changes          These changes are accurate as of 5/10/18  3:30 PM.  If you have any questions, ask your nurse or doctor.               Start taking these medicines.        Dose/Directions    dexamethasone 4 MG tablet   Commonly known as:  DECADRON   Used for:  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)   Started by:  Mor Mccauley MD        Take 4 mg (1 tab) by mouth twice daily for 6 doses. Start the evening prior to Infusion.   Quantity:  6 tablet   Refills:  3       folic acid 1 MG tablet   Commonly known as:  FOLVITE   Used for:  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)   Started by:  Mor Mccauley MD        Dose:  1 mg   Take 1 tablet (1 mg) by mouth daily   Quantity:  90 tablet   Refills:  3       LORazepam 0.5 MG tablet   Commonly known as:  ATIVAN   Used for:  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)   Started by:  Mor Mccauley MD        Take 0.5 mg by mouth every 4 hours as needed for nausea, vomiting, anxiety, or sleep.   Quantity:  30 tablet   Refills:  5       prochlorperazine 10 MG tablet   Commonly known as:  COMPAZINE   Used for:  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)   Started by:  Mor Mccauley MD        Dose:  10 mg   Take 1 tablet (10 mg) by mouth every 6 hours as needed for nausea or vomiting   Quantity:  30 tablet   Refills:  3            Where to get your medicines      These medications were sent to [facility] [address] [facility]  [address] [facility] [address]     Phone:  [phone]     dexamethasone 4 MG tablet    folic acid 1 MG tablet    prochlorperazine 10 MG tablet         Some of these will need a paper prescription and others can be bought over the counter.  Ask your nurse if you have questions.     Bring a paper prescription for each of these medications     LORazepam 0.5 MG tablet                Primary Care Provider Office Phone # Fax #    Manish Plasencia MD  [phone]       [address]  [facility] [address]        Goals        General    Financial Wellbeing (pt-stated)     Notes - Note edited  5/1/2018  8:50 AM by Sofya Paulino LSW    Goal Statement: I want to complete my Medical Assistance application  Measure of Success: My Medical Assistance application will be completed  Supportive Steps to Achieve: SW and pt will finish the remaining paperwork that needs to be completed  Barriers: None identified at this time  Strengths: Pt is motivated as the income increase will assist him greatly  Date to Achieve By: 3 months          Pain Management (pt-stated)     Notes - Note edited  5/10/2018  1:35 PM by Sofya Paulino LSW    CC to discuss with PCP, but would recommend a Palliative Care consult for symptom management.    Pt met with Palliative Care MD yesterday, 5-9-18, and will continue to do so.    Sofya Unger  Social Work Care Coordinator  [facility]  [phone]          Transportation (pt-stated)       Equal Access to Services     [place] AH: Hadii aad ku hadasho Soomaali, waaxda luqadaha, qaybta kaalmada adeegyada, waxay idiin hayevette hewitt . So [facility] [phone].    ATENCIÓN: Si habla español, tiene a galaviz disposición servicios gratuitos de asistencia lingüística. Llame al [phone].    We comply with applicable federal civil rights laws and [place] laws. We do not discriminate on the basis of race, color, national origin, age, disability, sex, sexual orientation, or gender identity.            Thank you!     Thank you for choosing [facility]  for your care. Our goal is always to provide you with excellent care. Hearing back from our patients is one way we can continue to improve our services. Please take a few minutes to complete the written survey that you may receive in the mail after your visit with us. Thank you!             Your Updated Medication List - Protect  others around you: Learn how to safely use, store and throw away your medicines at www.disposemymeds.org.          This list is accurate as of 5/10/18  3:30 PM.  Always use your most recent med list.                   Brand Name Dispense Instructions for use Diagnosis    acetaminophen 325 MG tablet    TYLENOL    100 tablet    Take 3 tablets (975 mg) by mouth every 8 hours    Carcinoma, lung, left (H)       ALEVE PO      Take 220 mg by mouth every 12 hours as needed for moderate pain        ALPRAZolam 0.5 MG tablet    XANAX    20 tablet    Take 1 tablet (0.5 mg) by mouth 3 times daily as needed for anxiety    Anxiety       ASPIRIN PO      Take 81 mg by mouth daily        dexamethasone 4 MG tablet    DECADRON    6 tablet    Take 4 mg (1 tab) by mouth twice daily for 6 doses. Start the evening prior to Infusion.    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)       EQL NICOTINE 2 MG lozenge   Generic drug:  nicotine polacrilex      Place 2 mg inside cheek every hour as needed for smoking cessation        folic acid 1 MG tablet    FOLVITE    90 tablet    Take 1 tablet (1 mg) by mouth daily    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)       hydrochlorothiazide 25 MG tablet    HYDRODIURIL    90 tablet    Take 1 tablet (25 mg) by mouth daily    Benign essential hypertension       LORazepam 0.5 MG tablet    ATIVAN    30 tablet    Take 0.5 mg by mouth every 4 hours as needed for nausea, vomiting, anxiety, or sleep.    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)       losartan 50 MG tablet    COZAAR    90 tablet    Take 1 tablet (50 mg) by mouth daily    Benign essential hypertension       metoprolol tartrate 50 MG tablet    LOPRESSOR    60 tablet    Take 1 tablet (50 mg) by mouth 2 times daily    Benign essential hypertension       * morphine 15 MG 12 hr tablet    MS CONTIN    30 tablet    Take 1 tablet (15 mg) by mouth every 12 hours maximum 2 tablet(s) per day    Pain       * morphine 15 MG IR tablet     MSIR    60 tablet    Take 1 tablet (15 mg) by mouth every 6 hours as needed for moderate to severe pain    Pain       ondansetron 8 MG ODT tab    ZOFRAN-ODT    30 tablet    Take 1 tablet (8 mg) by mouth every 8 hours as needed for nausea    Nausea and vomiting, intractability of vomiting not specified, unspecified vomiting type       oxyCODONE IR 5 MG tablet    ROXICODONE     Take 5 mg by mouth every 8 hours as needed        prochlorperazine 10 MG tablet    COMPAZINE    30 tablet    Take 1 tablet (10 mg) by mouth every 6 hours as needed for nausea or vomiting    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)       rosuvastatin 5 MG tablet    CRESTOR    30 tablet    Take 1 tablet (5 mg) by mouth daily        senna-docusate 8.6-50 MG per tablet    SENOKOT-S;PERICOLACE    100 tablet    Take 2 tablets by mouth 2 times daily    Carcinoma, lung, left (H)       VENTOLIN  (90 Base) MCG/ACT Inhaler   Generic drug:  albuterol     1 Inhaler    Inhale 2 puffs into the lungs every 4 hours as needed for shortness of breath / dyspnea or wheezing        * Notice:  This list has 2 medication(s) that are the same as other medications prescribed for you. Read the directions carefully, and ask your doctor or other care provider to review them with you.

## 2018-05-10 NOTE — PATIENT INSTRUCTIONS
Damián Hayes~      Take your Dexamethasone prescription tonight, Friday morning and evening, Saturday morning and evening, and then again on Sunday.     When you come back for your next chemotherapy on 5/31/18, take the Dexamethasone the night before and morning of your chemo as it says on your prescription bottle, and then for the next few days.    Please feel free to call us with any questions or concerns at [phone].  Thank you.    Sincerely,    Toña Harris RN

## 2018-05-10 NOTE — ASSESSMENT & PLAN NOTE
Patient presented with L side shoulder and has been traveling  to L/side about  to the whole L/side upper back and L side of chest for about 3 week he has had the pain has been taking aleve.  Subsequently went on to have a CT scan done.  Which showed 1.6 cm nodular infiltrate in the left upper lobe.  The lesion appear spiculated and worrisome for lung cancer.  There is a second 0.7 cm indeterminate nodule in the lingular portion of the left lung.  Subsequently the patient went on to have CT-guided biopsy.  The pathology came back showing moderately differentiated adenocarcinoma. He had subsequent noted left thoracoscopic left pleural biopsies and left lobe which resection on April 11, 2018.  The surgical pathology came back with pleural biopsies came back positive for invasive adenocarcinoma with parietal pleural invasion.  The wedge resection came back with adenocarcinoma with acinar patterns of growth moderately differentiated the tumor size is 1.1 and 0.3 cm into 2 separate tumor nodules.  Visceropleural invasion was identified the margins were negative lymphovascular invasion was not identified large venous artery involvement was not identified as well.  The pathologic staging of pT3N0.

## 2018-05-10 NOTE — LETTER
"    5/10/2018         RE: Chilo Oneil  [address]  [place] [address]        Dear Colleague,    Thank you for referring your patient, Chilo Oneil, to the [facility]. Please see a copy of my visit note below.    Chemotherapy Education    Patient is a 68 year old male here today for chemotherapy education, accompanied by self.  Pt has a cancer diagnosis of   Encounter Diagnoses   Name Primary?     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) Yes     Carcinoma, lung, left (H)      Tobacco use disorder      Nausea and vomiting, intractability of vomiting not specified, unspecified vomiting type      Benign essential hypertension      Alcohol use, daily use    and their main concern is \"just getting everything straight\".  Their Oncologist is Dr. KAYLEE Mccauley, and PCP is Manish Plasencia.  Reviewed the following with the patient and their support person:  Treatment goal: Adjuvant  Treatment regimen & duration: Cisplatin, Alimta day 1 every 21 days x 6; and rationale for strict adherence to this schedule, including specific medication names including pre-treatment medications and at home scheduled or as needed medications, delivery methods,.  Potential side effects: Side effects and management; including skin changes/hand-foot syndrome, anemia, neutropenia, thrombocytopenia, diarrhea/constipation, hair loss syndrome, memory changes/ \"chemobrain\", mouth sores, taste changes, neuropathy, fatigue, myelosuppression, sexuality/infertility, and risk of extravasation or infiltration.  Infection prevention, and monitoring of lab values, what lab tests and what changes of these values meant, along with the possibility of hydration or blood product transfusion, or the need to defer or hold treatment.    Chemotherapy information, including ways it is excreted from the body and cleaning and containment of vomitus or other bodily fluid, use of the bathroom, sexual health and intimacy, what to do if " "needing to miss a treatment, when to call a provider and the need for staff to wear protective equipment.  Importance of Central line care (port) or IV site care.  Written information:  Written information including the \"Getting Ready for Chemotherapy: What to Expect, Before, During, and After your Treatment\" booklets in the cancer new patient binder, specific drug information guides printed from [employer],  Also, information on when to contact the provider, various programs offered at [facility], and our business card with contact information given; Oncology Clinic, RN Case Manager, and the after hours Nurse Advise Line.  General orientation to the Medical Oncology department, Infusion Services department, Huc/scheduling, bathrooms and usual flow of the treatment day provided as well as introduction to the Infusion nurses.  No barriers to learning identified. Patient and family verbalized understanding of all written and verbal information. All questions answered to patients satisfaction.   Other concerns: Reviewed take home medications and importance of taking dexamethasone and folic acid as directed.  Also, discussed transportation issues and  referral.  Pt instructed to call with further questions or concerns.  Patient states understanding and is in agreement with this plan.  Copy of appointments, and after visit summary (AVS) given to patient. Patient discharged amulatory.    Face to Face time with patient: 40min    Erica Elliott RN, BSN, OCN  Oncology Hematology   Breast Cancer Navigator  [facility]  [email or web address], [email or web address]  Phone [phone]      Spoke with Kate RUBIO, . She is able to come here to cancer clinic to meet with patient and discuss MA, transportation, etc.  Pt and infusion nurse aware.    Hematology/ Oncology Follow-up Visit:  May 10, 2018    Reason for Visit:   Chief Complaint   Patient presents with     Oncology Clinic Visit "     2 week recheck Malignant neoplasm of upper lobe of left lung, Labs & Chemo today       Oncologic History:  Carcinoma, lung, left (H)  Patient presented with L side shoulder and has been traveling  to L/side about  to the whole L/side upper back and L side of chest for about 3 week he has had the pain has been taking aleve.  Subsequently went on to have a CT scan done.  Which showed 1.6 cm nodular infiltrate in the left upper lobe.  The lesion appear spiculated and worrisome for lung cancer.  There is a second 0.7 cm indeterminate nodule in the lingular portion of the left lung.  Subsequently the patient went on to have CT-guided biopsy.  The pathology came back showing moderately differentiated adenocarcinoma. He had subsequent noted left thoracoscopic left pleural biopsies and left lobe which resection on April 11, 2018.  The surgical pathology came back with pleural biopsies came back positive for invasive adenocarcinoma with parietal pleural invasion.  The wedge resection came back with adenocarcinoma with acinar patterns of growth moderately differentiated the tumor size is 1.1 and 0.3 cm into 2 separate tumor nodules.  Visceropleural invasion was identified the margins were negative lymphovascular invasion was not identified large venous artery involvement was not identified as well.  The pathologic staging of pT3N0.        Interval History:  Patient is here today to start chemotherapy.  He continues to smoke and he drinks alcohol daily.  He has been forgetting his appointments and unable to come on time because of lack of transportation.  He denies any chest pain or shortness of breath or cough or wheezing.    Review Of Systems:  Constitutional: Negative for fever, chills, and night sweats.  Skin: negative.  Eyes: negative.  Ears/Nose/Throat: negative.  Respiratory: No shortness of breath, dyspnea on exertion, cough, or hemoptysis.  Cardiovascular: negative.  Gastrointestinal: negative.  Genitourinary:  negative.  Musculoskeletal: negative.  Neurologic: negative.  Psychiatric: negative.  Hematologic/Lymphatic/Immunologic: negative.  Endocrine: negative.    All other ROS negative unless mentioned in interval history.    Past medical, social, surgical, and family histories reviewed.    Allergies:  Allergies as of 05/10/2018 - Alcides as Reviewed 05/10/2018   Allergen Reaction Noted     Cipro [ciprofloxacin] Swelling 06/22/2009       Current Medications:  Current Outpatient Prescriptions   Medication Sig Dispense Refill     acetaminophen (TYLENOL) 325 MG tablet Take 3 tablets (975 mg) by mouth every 8 hours 100 tablet 0     ALPRAZolam (XANAX) 0.5 MG tablet Take 1 tablet (0.5 mg) by mouth 3 times daily as needed for anxiety 20 tablet 0     ASPIRIN PO Take 81 mg by mouth daily        dexamethasone (DECADRON) 4 MG tablet Take 4 mg (1 tab) by mouth twice daily for 6 doses. Start the evening prior to Infusion. 6 tablet 3     folic acid (FOLVITE) 1 MG tablet Take 1 tablet (1 mg) by mouth daily 90 tablet 3     hydrochlorothiazide (HYDRODIURIL) 25 MG tablet Take 1 tablet (25 mg) by mouth daily 90 tablet 3     LORazepam (ATIVAN) 0.5 MG tablet Take 0.5 mg by mouth every 4 hours as needed for nausea, vomiting, anxiety, or sleep. 30 tablet 5     losartan (COZAAR) 50 MG tablet Take 1 tablet (50 mg) by mouth daily 90 tablet 3     metoprolol (LOPRESSOR) 50 MG tablet Take 1 tablet (50 mg) by mouth 2 times daily 60 tablet 1     morphine (MS CONTIN) 15 MG 12 hr tablet Take 1 tablet (15 mg) by mouth every 12 hours maximum 2 tablet(s) per day 30 tablet 0     morphine (MSIR) 15 MG IR tablet Take 1 tablet (15 mg) by mouth every 6 hours as needed for moderate to severe pain 60 tablet 0     Naproxen Sodium (ALEVE PO) Take 220 mg by mouth every 12 hours as needed for moderate pain       nicotine polacrilex (EQL NICOTINE) 2 MG lozenge Place 2 mg inside cheek every hour as needed for smoking cessation       ondansetron (ZOFRAN-ODT) 8 MG ODT tab  "Take 1 tablet (8 mg) by mouth every 8 hours as needed for nausea 30 tablet 1     oxyCODONE IR (ROXICODONE) 5 MG tablet Take 5 mg by mouth every 8 hours as needed        prochlorperazine (COMPAZINE) 10 MG tablet Take 1 tablet (10 mg) by mouth every 6 hours as needed for nausea or vomiting 30 tablet 3     rosuvastatin (CRESTOR) 5 MG tablet Take 1 tablet (5 mg) by mouth daily 30 tablet 3     senna-docusate (SENOKOT-S;PERICOLACE) 8.6-50 MG per tablet Take 2 tablets by mouth 2 times daily 100 tablet 0     VENTOLIN  (90 Base) MCG/ACT Inhaler Inhale 2 puffs into the lungs every 4 hours as needed for shortness of breath / dyspnea or wheezing 1 Inhaler 1        Physical Exam:  /56 (BP Location: Right arm, Patient Position: Sitting, Cuff Size: Adult Regular)  Pulse 74  Temp 100.3  F (37.9  C) (Tympanic)  Resp 16  Ht 1.905 m (6' 3\")  Wt 74.5 kg (164 lb 4.8 oz)  SpO2 (!) 65%  BMI 20.54 kg/m2  Wt Readings from Last 12 Encounters:   05/10/18 74.5 kg (164 lb 4.8 oz)   05/09/18 70.3 kg (155 lb)   05/09/18 73.5 kg (162 lb)   05/09/18 73.8 kg (162 lb 12.8 oz)   04/30/18 73.9 kg (163 lb)   04/27/18 74.3 kg (163 lb 11.2 oz)   04/20/18 73.5 kg (162 lb)   04/17/18 75.7 kg (166 lb 12.8 oz)   04/11/18 74.9 kg (165 lb 2 oz)   04/08/18 77.1 kg (170 lb)   04/04/18 77.1 kg (170 lb)   04/04/18 77.4 kg (170 lb 9.6 oz)     ECOG performance status: 1  GENERAL APPEARANCE: Healthy, alert and in no acute distress.  HEENT: Sclerae anicteric. PERRLA. Oropharynx without ulcers, lesions, or thrush.  NECK: Supple. No asymmetry or masses.  LYMPHATICS: No palpable cervical, supraclavicular, axillary, or inguinal lymphadenopathy.  RESP: Lungs clear to auscultation bilaterally without rales, rhonchi or wheezes.  CARDIOVASCULAR: Regular rate and rhythm. Normal S1, S2; no S3 or S4. No murmur, gallop, or rub.  ABDOMEN: Soft, nontender. Bowel sounds normal. No palpable organomegaly or masses.  MUSCULOSKELETAL: Extremities without gross " deformities noted. No edema of bilateral lower extremities.  SKIN: No suspicious lesions or rashes.  NEURO: Alert and oriented x 3. Cranial nerves II-XII grossly intact.  PSYCHIATRIC: Mentation and affect appear normal.    Laboratory/Imaging Studies:  Infusion Therapy Visit on 05/10/2018   Component Date Value Ref Range Status     WBC 05/10/2018 7.3  4.0 - 11.0 10e9/L Final     RBC Count 05/10/2018 4.24* 4.4 - 5.9 10e12/L Final     Hemoglobin 05/10/2018 14.0  13.3 - 17.7 g/dL Final     Hematocrit 05/10/2018 38.5* 40.0 - 53.0 % Final     MCV 05/10/2018 91  78 - 100 fl Final     MCH 05/10/2018 33.0  26.5 - 33.0 pg Final     MCHC 05/10/2018 36.4  31.5 - 36.5 g/dL Final     RDW 05/10/2018 14.6  10.0 - 15.0 % Final     Platelet Count 05/10/2018 206  150 - 450 10e9/L Final     Diff Method 05/10/2018 Automated Method   Final     % Neutrophils 05/10/2018 74.6  % Final     % Lymphocytes 05/10/2018 14.3  % Final     % Monocytes 05/10/2018 10.4  % Final     % Eosinophils 05/10/2018 0.1  % Final     % Basophils 05/10/2018 0.3  % Final     % Immature Granulocytes 05/10/2018 0.3  % Final     Absolute Neutrophil 05/10/2018 5.4  1.6 - 8.3 10e9/L Final     Absolute Lymphocytes 05/10/2018 1.0  0.8 - 5.3 10e9/L Final     Absolute Monocytes 05/10/2018 0.8  0.0 - 1.3 10e9/L Final     Absolute Eosinophils 05/10/2018 0.0  0.0 - 0.7 10e9/L Final     Absolute Basophils 05/10/2018 0.0  0.0 - 0.2 10e9/L Final     Abs Immature Granulocytes 05/10/2018 0.0  0 - 0.4 10e9/L Final     Sodium 05/10/2018 Canceled, Test credited  133 - 144 mmol/L Final    Comment: Unsatisfactory specimen - hemolyzed  PAT IN INFUSION TO REORDER AND HAVE REDRAWN 0845 5.10.18 JW       Potassium 05/10/2018 Canceled, Test credited  3.4 - 5.3 mmol/L Final    Comment: Unsatisfactory specimen - hemolyzed  PAT IN INFUSION TO REORDER AND HAVE REDRAWN 0845 5.10.18        Chloride 05/10/2018 Canceled, Test credited  94 - 109 mmol/L Final    Comment: Unsatisfactory specimen -  hemolyzed  PAT IN INFUSION TO REORDER AND HAVE REDRAWN 0845 5.10.18        Carbon Dioxide 05/10/2018 Canceled, Test credited  20 - 32 mmol/L Final    Comment: Unsatisfactory specimen - hemolyzed  PAT IN INFUSION TO REORDER AND HAVE REDRAWN 0845 5.10.18        Anion Gap 05/10/2018 Canceled, Test credited  6 - 17 mmol/L Final    Comment: Unsatisfactory specimen - hemolyzed  PAT IN INFUSION TO REORDER AND HAVE REDRAWN 0845 5.10.18        Glucose 05/10/2018 Canceled, Test credited  70 - 99 mg/dL Final    Comment: Unsatisfactory specimen - hemolyzed  PAT IN INFUSION TO REORDER AND HAVE REDRAWN 0845 5.10.18        Urea Nitrogen 05/10/2018 Canceled, Test credited  7 - 30 mg/dL Final    Comment: Unsatisfactory specimen - hemolyzed  PAT IN INFUSION TO REORDER AND HAVE REDRAWN 0845 5.10.18        Creatinine 05/10/2018 Canceled, Test credited  0.66 - 1.25 mg/dL Final    Comment: Unsatisfactory specimen - hemolyzed  PAT IN INFUSION TO REORDER AND HAVE REDRAWN 0845 5.10.18        GFR Estimate 05/10/2018 Canceled, Test credited  >60 mL/min/1.7m2 Final    Comment: Unsatisfactory specimen - hemolyzed  PAT IN INFUSION TO REORDER AND HAVE REDRAWN 0845 5.10.18        GFR Estimate If Black 05/10/2018 Canceled, Test credited  >60 mL/min/1.7m2 Final    Comment: Unsatisfactory specimen - hemolyzed  PAT IN INFUSION TO REORDER AND HAVE REDRAWN 0845 5.10.18        Calcium 05/10/2018 Canceled, Test credited  8.5 - 10.1 mg/dL Final    Comment: Unsatisfactory specimen - hemolyzed  PAT IN INFUSION TO REORDER AND HAVE REDRAWN 0845 5.10.18        Magnesium 05/10/2018 Canceled, Test credited  1.6 - 2.3 mg/dL Final    Comment: Unsatisfactory specimen - hemolyzed  PAT IN INFUSION TO REORDER AND HAVE REDRAWN 0845 5.10.18        Sodium 05/10/2018 135  133 - 144 mmol/L Final     Potassium 05/10/2018 3.5  3.4 - 5.3 mmol/L Final     Chloride 05/10/2018 97  94 - 109 mmol/L Final     Carbon Dioxide 05/10/2018 30  20 - 32 mmol/L Final      Anion Gap 05/10/2018 8  3 - 14 mmol/L Final     Glucose 05/10/2018 116* 70 - 99 mg/dL Final     Urea Nitrogen 05/10/2018 15  7 - 30 mg/dL Final     Creatinine 05/10/2018 0.67  0.66 - 1.25 mg/dL Final     GFR Estimate 05/10/2018 >90  >60 mL/min/1.7m2 Final    Non  GFR Calc     GFR Estimate If Black 05/10/2018 >90  >60 mL/min/1.7m2 Final    African American GFR Calc     Calcium 05/10/2018 8.6  8.5 - 10.1 mg/dL Final     Magnesium 05/10/2018 1.8  1.6 - 2.3 mg/dL Final        Recent Results (from the past 744 hour(s))   XR Chest Port 1 View    Narrative    Exam: XR CHEST PORT 1 VW, 4/11/2018 4:49 PM    Indication: s/p lung wedge;     Comparison: 3/29/2018    Findings:   Single AP view of the chest. Postsurgical changes of left lung wedge  resection. Left-sided chest tube. Minimal left perihilar opacities,  presumably postsurgical. The left apex is obscured by patient  positioning. No pleural effusion. The right lung is clear. Heart size  normal. Calcifications of the aorta.      Impression    Impression:   1. Postoperative changes of left lung wedge resection with minimal  left perihilar opacities, likely postprocedural. No definite  pneumothorax is identified though the left apex is obscured by patient  positioning.  2. The right lung is clear.    I have personally reviewed the examination and initial interpretation  and I agree with the findings.    ROSHAN HOANG MD   XR Chest Port 1 View    Narrative    Exam: XR CHEST PORT 1 VW, 4/11/2018 5:24 PM    Indication: s/p chest tube;     Comparison: Earlier same day radiograph    Findings:   Single AP view the chest. The cardiac silhouette is stable. Left  perihilar opacities, similar to prior. Streaky left basilar  atelectasis. No pleural effusion. Tiny pneumothorax along the left  lateral midlung. Removal of left-sided chest tube.      Impression    Impression:   1. Removal of left-sided chest tube. No significant pneumothorax  identified.  2.  Minimal left perihilar opacity, likely postprocedural.    I have personally reviewed the examination and initial interpretation  and I agree with the findings.    ROSHAN HOANG MD   XR Chest 2 Views   Result Value    Radiologist flags Pneumoperitoneum. (Urgent)    Narrative    Exam: XR CHEST 2 VW, 4/12/2018 10:01 AM    Indication: s/p lung resection;  subxiphoid approach.    Comparison: Chest radiograph dated 4/11/2018, CT of the chest dated  3/28/2018.    Findings:   PA and lateral views of the chest. Patchy opacities noted in the left  mid lung and base. Small left pleural effusion. Small bilateral  pneumothoraces with an air-fluid level. Air density is also noted  under the right diaphragm and in the anterior mediastinum. The cardiac  silhouette is within normal limits. Visualized portion of the upper  abdomen is unremarkable. No acute osseous pathology.         Impression    Impression:   1. Unchanged patchy opacities in the left midlung and lung base.  2. Pneumothoraces, pneumomediastinum, abdomen and small left pleural  effusion likely related to same day procedure.    [Urgent Result: Pneumoperitoneum.]    Finding was identified on 4/12/2018 1:25 PM.     Shaun Garcia was contacted by Dr. Jose Nick at 4/12/2018 1:29 PM  and verbalized understanding of the urgent finding.     I have personally reviewed the examination and initial interpretation  and I agree with the findings.    AURELIANO ADAMS MD   Chest CT - IV contrast only - PE protocol    Narrative    CT CHEST WITH CONTRAST  4/30/2018 8:42 PM    HISTORY: Left-sided pain. Evaluate for pulmonary embolism.    COMPARISON: A CT on 3/28/2018.    TECHNIQUE: Routine transverse CT imaging of the chest was performed  following the uneventful intravenous administration of Isovue 370-  80mL contrast. A pulmonary embolism protocol was utilized. Radiation  dose for this scan was reduced using automated exposure control,  adjustment of the mA and/or kV according  to patient size, or iterative  reconstruction technique.    FINDINGS: The heart size is normal. No enlarged lymph node or other  abnormal mediastinal mass is seen. There is calcification of the  thoracic aorta. There is very good opacification of the pulmonary  arteries with contrast. No pulmonary embolism is seen. The lungs are  clear. There has been no definite change in a 2.2 cm somewhat  spiculated mass in the lateral aspect of the left upper lobe. There  has been development of consolidation or capping of the left lung  apex. The lungs are otherwise clear. There has been development of a  small left pleural effusion. No right effusion is seen. There are  degenerative changes in the spine. No other osseous abnormalities  identified. No chest wall pathology is seen. The visualized upper  abdomen is unremarkable.      Impression    IMPRESSION:   1. No pulmonary embolism is seen.  2. No change in a 2.2 cm spiculated mass of the left upper lobe. On  previous imaging this was suspicious for malignancy.  3. Development of consolidation or capping of the left lung apex.  4. Small left pleural effusion.    ADILIA ALSTON MD   Chest XR,  PA & LAT    Narrative    XR CHEST 2 VW 5/9/2018 11:37 AM    HISTORY: Pain.    COMPARISON: 4/12/2018.      Impression    IMPRESSION: 2 views of the chest show interval improvement in  previously seen patchy and nodular parenchymal abnormalities in the  left perihilar region. An area of nodularity remains that measures 1.6  cm. The previously seen left pleural effusion has resolved in the  interim. No new findings.     VERONICA HINTON MD       Assessment and plan:    (C34.12) 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)  (primary encounter diagnosis)  I reviewed with the patient today the management plan in details.  We talked about chemotherapy regimen potential benefits and side effects in details.  Patient will be starting his first cycle of chemotherapy today.  I will see the  patient again in 1 week or sooner if there are new developments or concerns.    (F17.200) Tobacco use disorder  I strongly emphasized the importance of quitting smoking    (R11.2) Nausea and vomiting, intractability of vomiting not specified, unspecified vomiting type  P management of nausea was reviewed with the patient today.    (I10) Benign essential hypertension  Patient currently on Lopressor 50 mg daily.  He is also on Cozaar 50 mg orally daily.    (F10.10) Alcohol use, daily use  I strongly emphasized importance of abstaining from alcohol during chemotherapy    The patient is ready to learn, no apparent learning barriers were identified.  Diagnosis and treatment plans were explained to the patient. The patient expressed understanding of the content. The patient asked appropriate questions. The patient questions were answered to his satisfaction.    Chart documentation with Dragon Voice recognition Software. Although reviewed after completion, some words and grammatical errors may remain.    Again, thank you for allowing me to participate in the care of your patient.        Sincerely,        Mor Mccauley MD

## 2018-05-10 NOTE — NURSING NOTE
"Oncology Rooming Note    May 10, 2018 9:20 AM   Chilo Oneil is a 68 year old male who presents for:    Chief Complaint   Patient presents with     Oncology Clinic Visit     2 week recheck Malignant neoplasm of upper lobe of left lung, Labs & Chemo today     Initial Vitals: /56 (BP Location: Right arm, Patient Position: Sitting, Cuff Size: Adult Regular)  Pulse 74  Temp 100.3  F (37.9  C) (Tympanic)  Resp 16  Ht 1.905 m (6' 3\")  Wt 74.5 kg (164 lb 4.8 oz)  SpO2 (!) 65%  BMI 20.54 kg/m2 Estimated body mass index is 20.54 kg/(m^2) as calculated from the following:    Height as of this encounter: 1.905 m (6' 3\").    Weight as of this encounter: 74.5 kg (164 lb 4.8 oz). Body surface area is 1.99 meters squared.  No Pain (0) Comment: Data Unavailable   No LMP for male patient.  Allergies reviewed: Yes  Medications reviewed: Yes    Medications: Medication refills not needed today.  Pharmacy name entered into [employer] Program: [facility] [place], [place] - [address]    Clinical concerns: 2 week recheck Malignant neoplasm of upper lobe of left lung, Labs & Chemo today    7 minutes for nursing intake (face to face time)     Michela Hirsch CMA              "

## 2018-05-11 ENCOUNTER — TELEPHONE (OUTPATIENT)
Dept: SURGERY | Facility: CLINIC | Age: 68
End: 2018-05-11

## 2018-05-11 NOTE — TELEPHONE ENCOUNTER
Pt states he took a pill out of the bottle this morning and took it but cannot find the bottle any longer.  While checking pt's medications, he noted he has a bottle of Decadron from oncology that he is to start before his INFUSION.  Asked pt how many pills are in the bottle?  = 5  Asked how many according to the label are supposed to be in the bottle= 6.    Pt will now hold that med until the proper time to start it and let his oncologist know he will be short one pill.  Melody Madison RN  [facility]

## 2018-05-11 NOTE — TELEPHONE ENCOUNTER
Pt called stating that he had been put on a medication that he needs to take daily prior to his surgery on 05/15/18 with Dr. Fox. Pt states that he may have accidentally threw them away because he cannot find them. Pt would like another RX. Please advise.    Waleska Stone  Clinic Station

## 2018-05-11 NOTE — PROGRESS NOTES
Clinic Care Coordination Contact  Care Team Conversations    SW received return call from Nancy, pt's financial worker, [phone].  Nancy explained that Sonam had transferred the case to her so she is now the pt's financial worker.  Per Nancy, once the bank statements are received, she will process the pt's approval for MA and will be able to pay for the pt's Medicare payments each month.  Nancy shared that [facility] will pay all back medical bills from January 1, 2018 to present date.  She asked if [place] is pursing any programs to assist the pt with paying for his medical from September 2017 through December 2017?  YASMIN will address this issue with Patient , Gianfranco Boyle.  ADDENDUM AT 10:07:  YASMIN spoke with Gianfranco who shared that once pt gets his MA, his past bills from Sept-Dec will be forgiven via [employer], so pt will not have to worry about these bills.    YASMIN also learned of another transportation resource for the pt:  [employer] at [phone].  YASMIN called and left a message to gather more information about this program & to see if it may be helpful for the pt.    ADDENDUM at 11:24AM:  SW received phone call from the [facility] 's office stating they can see the pt for an intake on 5-16-18 at 1PM.  They called and left the pt a message, but he has not returned their phone call and contacted me for assistance in reaching the pt.  YASMIN accepted the appointment on the pt's behalf and said that if this writer cannot get in touch with the pt by Wednesday morning, this writer will call and cancel the appointment.    YASMIN placed 2 calls to the pt, leaving messages (pt states he often does not listen to his messages) telling the pt to call this writer back.  YASMIN will continue to try and call the pt before I leave for the day.    SW to continue to assist.    Sofya Unger  Social Work Care Coordinator  [facility]  [facility]  [phone]

## 2018-05-14 ENCOUNTER — TELEPHONE (OUTPATIENT)
Dept: ONCOLOGY | Facility: CLINIC | Age: 68
End: 2018-05-14

## 2018-05-14 ENCOUNTER — ANESTHESIA EVENT (OUTPATIENT)
Dept: SURGERY | Facility: CLINIC | Age: 68
End: 2018-05-14
Payer: MEDICARE

## 2018-05-14 ENCOUNTER — INFUSION THERAPY VISIT (OUTPATIENT)
Dept: INFUSION THERAPY | Facility: CLINIC | Age: 68
End: 2018-05-14
Attending: INTERNAL MEDICINE
Payer: MEDICARE

## 2018-05-14 ENCOUNTER — PATIENT OUTREACH (OUTPATIENT)
Dept: CARE COORDINATION | Facility: CLINIC | Age: 68
End: 2018-05-14

## 2018-05-14 VITALS — HEART RATE: 84 BPM | SYSTOLIC BLOOD PRESSURE: 146 MMHG | DIASTOLIC BLOOD PRESSURE: 60 MMHG

## 2018-05-14 DIAGNOSIS — R11.2 NAUSEA AND VOMITING, INTRACTABILITY OF VOMITING NOT SPECIFIED, UNSPECIFIED VOMITING TYPE: ICD-10-CM

## 2018-05-14 DIAGNOSIS — C34.92 CARCINOMA, LUNG, LEFT (H): ICD-10-CM

## 2018-05-14 DIAGNOSIS — C34.12 MALIGNANT NEOPLASM OF UPPER LOBE OF LEFT LUNG (H): Primary | Chronic | ICD-10-CM

## 2018-05-14 DIAGNOSIS — C34.92 CARCINOMA, LUNG, LEFT (H): Primary | ICD-10-CM

## 2018-05-14 PROCEDURE — 25000128 H RX IP 250 OP 636: Performed by: INTERNAL MEDICINE

## 2018-05-14 PROCEDURE — 96374 THER/PROPH/DIAG INJ IV PUSH: CPT

## 2018-05-14 PROCEDURE — 96361 HYDRATE IV INFUSION ADD-ON: CPT

## 2018-05-14 RX ADMIN — DEXAMETHASONE SODIUM PHOSPHATE: 10 INJECTION, SOLUTION INTRAMUSCULAR; INTRAVENOUS at 13:11

## 2018-05-14 RX ADMIN — SODIUM CHLORIDE 1000 ML: 9 INJECTION, SOLUTION INTRAVENOUS at 13:11

## 2018-05-14 NOTE — TELEPHONE ENCOUNTER
"Status Check:    Pt is a 68 year old male, recently in clinic for C1D1 Cisplatin, Alim[place] 05.10.18.  Call placed for status check.    Primary care provider is: Manish Plasencia    Diagnosis:   Encounter Diagnosis   Name Primary?     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) Yes     Oncology provider is:  Dr. KAYLEE Mccauley    How are you doing/feeling?: \"not good\". Reports he has not been able to keep anything down all weekend, but \"I'm up urinating all the time\".  Has only had \"a drink or 2 of alcohol\". Is taking the dexamethasone as ordered and the PRN antiemetics \"but nothing's helping\".   Denies fever nor chills, pain nor discomfort. Is voiding, but per patient report, excessive. Denies issues with bowel movements.  Denies lightheadedness.  Has c/o increased fatigue. Is unsure if this is \"from the chemo or lack of booze\".  Any further issues?: denies  Next Follow up/Recommendations: Advised patient to come to clinic today for assessment and IVF with antiemetics.     Patient states is in agreement with this plan and can \"come anytime that you can get me in\".  Infusion charge nurse is able to schedule patient today at 1 pm. Added to schedule per ROSANGELA Gonzalez charge nurse. Pt states agreement with this time.    Approximately 7 minutes spent on telephone with patient reviewing assessment and symptom(s) and providing symptom management.    Erica Elliott RN, BSN, OCN  Oncology Hematology   Breast Cancer Navigator  [facility]  [email or web address]  [phone]    "

## 2018-05-14 NOTE — PROGRESS NOTES
Clinic Care Coordination Contact  Care Team Conversations    SW received return call from pt today.  Pt shared that he is not feeling well at all & has been feeling unwell all weekend.  When asked what is going on, pt states he is feeling nauseous, has been urinating frequently, cannot keep fluids down due to his upset stomach, and is feeling just gross.  Pt shared he does not have his Oncolgy RN's Erica's phone number, so asked this writer to contact Erica and inform her of this information.  (SW contacted Erica and informed her of above.  Erica shared this is a normal side affect of pt's chemotherapy & she said she plans on calling the pt this morning to discuss with him.)    SW and pt discussed transportation resources and this writer provided the pt phone numbers for both resources:  [employer]:  [phone]  [employer]:  [phone]    Pt said that he will call [place] when we hang up the phone so that he can call and ask for a ride for tomorrow for his port placement.     SW and pt discussed the [place] [facility] intake appointment on 5-16-18 at 1PM.  Pt is happy to have the assessment, but does not want to have the meeting at his home.  SW and pt discussed that the meeting could take place at a public space & pt requested it take place at the hospital.  SW contacted [place] [facility] team and left a message with this information.  Awaiting return call to verify appointment has been changed to the clinic setting.    Pt called the pt again and shared that he is going to be coming into Oncology center today for IV fluids and antiemetics.  SW also received notification from Erica pt's Oncology RNCC, informing of the same.      Pt also informed SW that [email or web address] will bring the pt to his appointment tomorrow for his port placement.    SW and pt discussed a plan to keep track of all of his appointments--SW will make a monthly calendar that he will  carry in his 3 ring binder that the Oncology Department provided to him.  SW will print up a calendar and bring it to the pt today in the Oncolgy Department.  SW will also make a list of all important phone numbers that the pt will need, Erica's, YASMIN, Transportation, etc.,  Pt said he likes that idea so that everything will be in 1 spot.    Pt thanked this writer and care team.    Sofya Unger  Social Work Care Coordinator  [facility]  [phone]

## 2018-05-14 NOTE — Clinical Note
I had him schedule an appt for the Monday after his next chemo to have IV fluids and antiemetics.  Seems like he will need the support.  Would you put in standing orders for him to have this?  Thanks Nancy

## 2018-05-14 NOTE — MR AVS SNAPSHOT
After Visit Summary   5/14/2018    Chilo Oneil    MRN: [id number]           Patient Information     Date Of Birth          1950        Visit Information        Provider Department      5/14/2018 1:00 PM ROOM 8 [facility]        Today's Diagnoses     Carcinoma, lung, left (H)    -  1    Nausea and vomiting, intractability of vomiting not specified, unspecified vomiting type           Follow-ups after your visit        Your next 10 appointments already scheduled     May 15, 2018   Procedure with Gurjit Fox MD   [facility] (--)    [address] [address]   [phone]           The medical center is located at [address] (between [address] in [place], [place] north of [place]).            May 18, 2018  9:30 AM CDT   Return Visit with Mor Mccauley MD   [facility] ([facility])    [facility]  [address]  [facility] [address]   [phone]            May 31, 2018  9:00 AM CDT   Level 5 with ROOM 1 [facility] ([facility])    [facility]  [address]  [facility] [address]   [phone]            Jun 04, 2018  1:30 PM CDT   Level 1 with ROOM 4 [facility] ([facility])    [facility]  [address]  [facility] [address]   [phone]            Jul 05, 2018 11:30 AM CDT   Level O with ROOM 5 [facility] ([facility])    [facility]  [address]  [place] [address]   [phone]              Who to contact     If you have questions or need follow up information about today's clinic visit or your schedule please contact [facility] directly at [phone].  Normal or non-critical lab and imaging results will be communicated to you by [employer],  "letter or phone within 4 business days after the clinic has received the results. If you do not hear from us within 7 days, please contact the clinic through [employer] ([employer] or phone. If you have a critical or abnormal lab result, we will notify you by phone as soon as possible.  Submit refill requests through [employer] ([employer]) or call your pharmacy and they will forward the refill request to us. Please allow 3 business days for your refill to be completed.          Additional Information About Your Visit        [employer] Information     [employer] lets you send messages to your doctor, view your test results, renew your prescriptions, schedule appointments and more. To sign up, go to [email or web address] ([email or web address]) . Click on \"Log in\" on the left side of the screen, which will take you to the Welcome page. Then click on \"Sign up Now\" on the right side of the page.     You will be asked to enter the access code listed below, as well as some personal information. Please follow the directions to create your username and password.     Your access code is: [email or web address]  Expires: 2018 10:49 AM     Your access code will  in 90 days. If you need help or a new code, please call your [facility] clinic or [phone].        Care EveryWhere ID     This is your Care EveryWhere ID. This could be used by other organizations to access your [facility] medical records  [id number]        Your Vitals Were     Pulse                   84            Blood Pressure from Last 3 Encounters:   18 146/60   05/10/18 142/56   05/10/18 157/67    Weight from Last 3 Encounters:   05/10/18 74.5 kg (164 lb 4.8 oz)   18 70.3 kg (155 lb)   18 73.5 kg (162 lb)              Today, you had the following     No orders found for display       Primary Care Provider Office Phone # Fax #    Manish Plasencia [phone] [address] [address]        Goals        General    Financial Wellbeing (pt-stated)     Notes - " Note edited  5/1/2018  8:50 AM by Sofya Paulino LSW    Goal Statement: I want to complete my Medical Assistance application  Measure of Success: My Medical Assistance application will be completed  Supportive Steps to Achieve: SW and pt will finish the remaining paperwork that needs to be completed  Barriers: None identified at this time  Strengths: Pt is motivated as the income increase will assist him greatly  Date to Achieve By: 3 months          Pain Management (pt-stated)     Notes - Note edited  5/10/2018  1:35 PM by Sofya Paulino LSW    [facility] to discuss with PCP, but would recommend a Palliative Care consult for symptom management.    Pt met with Palliative Care MD yesterday, 5-9-18, and will continue to do so.    Sofya Unger  Social Work Care Coordinator  [place]  [phone]          Transportation (pt-stated)       Equal Access to Services     [facility]: Hadii aad ku hadasho Soomaali, waaxda luqadaha, qaybta kaalmada adeegyada, waxay erastoin hayiamn kaylen hweitt . So [facility] [phone].    ATENCIÓN: Si habla español, tiene a galaviz disposición servicios gratuitos de asistencia lingüística. Llame al [phone].    We comply with applicable federal civil rights laws and [place] laws. We do not discriminate on the basis of race, color, national origin, age, disability, sex, sexual orientation, or gender identity.            Thank you!     Thank you for choosing [facility]  for your care. Our goal is always to provide you with excellent care. Hearing back from our patients is one way we can continue to improve our services. Please take a few minutes to complete the written survey that you may receive in the mail after your visit with us. Thank you!             Your Updated Medication List - Protect others around you: Learn how to safely use, store and throw away your medicines at www.disposemymeds.org.          This list is accurate as of  5/14/18  3:22 PM.  Always use your most recent med list.                   Brand Name Dispense Instructions for use Diagnosis    acetaminophen 325 MG tablet    TYLENOL    100 tablet    Take 3 tablets (975 mg) by mouth every 8 hours    Carcinoma, lung, left (H)       ASPIRIN PO      Take 81 mg by mouth daily        dexamethasone 4 MG tablet    DECADRON    6 tablet    Take 4 mg (1 tab) by mouth twice daily for 6 doses. Start the evening prior to Infusion.    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)       EQL NICOTINE 2 MG lozenge   Generic drug:  nicotine polacrilex      Place 2 mg inside cheek every hour as needed for smoking cessation        FOLIC ACID PO      Take 1 mg by mouth daily        LORazepam 0.5 MG tablet    ATIVAN    30 tablet    Take 0.5 mg by mouth every 4 hours as needed for nausea, vomiting, anxiety, or sleep.    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)       losartan 50 MG tablet    COZAAR    90 tablet    Take 1 tablet (50 mg) by mouth daily    Benign essential hypertension       metoprolol tartrate 50 MG tablet    LOPRESSOR    60 tablet    Take 1 tablet (50 mg) by mouth 2 times daily    Benign essential hypertension       * morphine 15 MG 12 hr tablet    MS CONTIN    30 tablet    Take 1 tablet (15 mg) by mouth every 12 hours maximum 2 tablet(s) per day    Pain       * morphine 15 MG IR tablet    MSIR    60 tablet    Take 1 tablet (15 mg) by mouth every 6 hours as needed for moderate to severe pain    Pain       prochlorperazine 10 MG tablet    COMPAZINE    30 tablet    Take 1 tablet (10 mg) by mouth every 6 hours as needed for nausea or vomiting    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)       rosuvastatin 5 MG tablet    CRESTOR    30 tablet    Take 1 tablet (5 mg) by mouth daily        senna-docusate 8.6-50 MG per tablet    SENOKOT-S;PERICOLACE    100 tablet    Take 2 tablets by mouth 2 times daily    Carcinoma, lung, left (H)       VENTOLIN  (90 Base) MCG/ACT  Inhaler   Generic drug:  albuterol     1 Inhaler    Inhale 2 puffs into the lungs every 4 hours as needed for shortness of breath / dyspnea or wheezing        * Notice:  This list has 2 medication(s) that are the same as other medications prescribed for you. Read the directions carefully, and ask your doctor or other care provider to review them with you.

## 2018-05-14 NOTE — PROGRESS NOTES
Infusion Nursing Note:  Chilo Oneil presents today for IV fluid and antiemetic support.    Patient seen by provider today: No   present during visit today: Not Applicable.    Note: N/A.    Intravenous Access:  Peripheral IV placed.    Treatment Conditions:  Not applicable.      Post Infusion Assessment:  Patient tolerated infusion without incident.  Access discontinued per protocol.    Discharge Plan:   Patient discharged in stable condition accompanied by: self.  Departure Mode: Ambulatory.  Pt is scheduled to see Dr. Mccauley this Friday 5/18.  Suggested that he make appt for the Monday following each chemo for IV support.    Sofya Costello RN

## 2018-05-15 ENCOUNTER — HOSPITAL ENCOUNTER (OUTPATIENT)
Facility: CLINIC | Age: 68
Discharge: HOME | End: 2018-05-15
Attending: SURGERY | Admitting: SURGERY
Payer: MEDICARE

## 2018-05-15 ENCOUNTER — APPOINTMENT (OUTPATIENT)
Dept: GENERAL RADIOLOGY | Facility: CLINIC | Age: 68
End: 2018-05-15
Attending: SURGERY
Payer: MEDICARE

## 2018-05-15 ENCOUNTER — ANESTHESIA (OUTPATIENT)
Dept: SURGERY | Facility: CLINIC | Age: 68
End: 2018-05-15
Payer: MEDICARE

## 2018-05-15 VITALS
DIASTOLIC BLOOD PRESSURE: 62 MMHG | WEIGHT: 155 LBS | RESPIRATION RATE: 16 BRPM | TEMPERATURE: 98.2 F | HEIGHT: 75 IN | BODY MASS INDEX: 19.27 KG/M2 | SYSTOLIC BLOOD PRESSURE: 136 MMHG | OXYGEN SATURATION: 96 %

## 2018-05-15 DIAGNOSIS — G89.18 POST-OP PAIN: Primary | ICD-10-CM

## 2018-05-15 PROCEDURE — 25000128 H RX IP 250 OP 636: Performed by: SURGERY

## 2018-05-15 PROCEDURE — 37000008 ZZH ANESTHESIA TECHNICAL FEE, 1ST 30 MIN: Performed by: SURGERY

## 2018-05-15 PROCEDURE — 25000125 ZZHC RX 250: Performed by: NURSE ANESTHETIST, CERTIFIED REGISTERED

## 2018-05-15 PROCEDURE — 40000305 ZZH STATISTIC PRE PROC ASSESS I: Performed by: SURGERY

## 2018-05-15 PROCEDURE — 71045 X-RAY EXAM CHEST 1 VIEW: CPT

## 2018-05-15 PROCEDURE — C1788 PORT, INDWELLING, IMP: HCPCS | Performed by: SURGERY

## 2018-05-15 PROCEDURE — 25000125 ZZHC RX 250: Performed by: SURGERY

## 2018-05-15 PROCEDURE — 27210794 ZZH OR GENERAL SUPPLY STERILE: Performed by: SURGERY

## 2018-05-15 PROCEDURE — 25000128 H RX IP 250 OP 636: Performed by: NURSE ANESTHETIST, CERTIFIED REGISTERED

## 2018-05-15 PROCEDURE — 77001 FLUOROGUIDE FOR VEIN DEVICE: CPT | Mod: 26 | Performed by: SURGERY

## 2018-05-15 PROCEDURE — 40000277 XR SURGERY CARM FLUORO LESS THAN 5 MIN W STILLS

## 2018-05-15 PROCEDURE — 36561 INSERT TUNNELED CV CATH: CPT | Performed by: SURGERY

## 2018-05-15 PROCEDURE — 36000052 ZZH SURGERY LEVEL 2 EA 15 ADDTL MIN: Performed by: SURGERY

## 2018-05-15 PROCEDURE — 37000009 ZZH ANESTHESIA TECHNICAL FEE, EACH ADDTL 15 MIN: Performed by: SURGERY

## 2018-05-15 PROCEDURE — 71000027 ZZH RECOVERY PHASE 2 EACH 15 MINS: Performed by: SURGERY

## 2018-05-15 PROCEDURE — 36000054 ZZH SURGERY LEVEL 2 W FLUORO 1ST 30 MIN: Performed by: SURGERY

## 2018-05-15 DEVICE — CATH PORT MRI POWERPORT 8FR SL 1808000: Type: IMPLANTABLE DEVICE | Site: CHEST | Status: FUNCTIONAL

## 2018-05-15 RX ORDER — ONDANSETRON 2 MG/ML
4 INJECTION INTRAMUSCULAR; INTRAVENOUS EVERY 30 MIN PRN
Status: DISCONTINUED | OUTPATIENT
Start: 2018-05-15 | End: 2018-05-15 | Stop reason: HOSPADM

## 2018-05-15 RX ORDER — HEPARIN SODIUM 1000 [USP'U]/ML
INJECTION, SOLUTION INTRAVENOUS; SUBCUTANEOUS PRN
Status: DISCONTINUED | OUTPATIENT
Start: 2018-05-15 | End: 2018-05-15 | Stop reason: HOSPADM

## 2018-05-15 RX ORDER — SODIUM CHLORIDE, SODIUM LACTATE, POTASSIUM CHLORIDE, CALCIUM CHLORIDE 600; 310; 30; 20 MG/100ML; MG/100ML; MG/100ML; MG/100ML
INJECTION, SOLUTION INTRAVENOUS CONTINUOUS
Status: DISCONTINUED | OUTPATIENT
Start: 2018-05-15 | End: 2018-05-15 | Stop reason: HOSPADM

## 2018-05-15 RX ORDER — BUPIVACAINE HYDROCHLORIDE AND EPINEPHRINE 5; 5 MG/ML; UG/ML
INJECTION, SOLUTION PERINEURAL PRN
Status: DISCONTINUED | OUTPATIENT
Start: 2018-05-15 | End: 2018-05-15 | Stop reason: HOSPADM

## 2018-05-15 RX ORDER — CEFAZOLIN SODIUM 2 G/100ML
2 INJECTION, SOLUTION INTRAVENOUS
Status: COMPLETED | OUTPATIENT
Start: 2018-05-15 | End: 2018-05-15

## 2018-05-15 RX ORDER — CEFAZOLIN SODIUM 1 G/50ML
1 INJECTION, SOLUTION INTRAVENOUS SEE ADMIN INSTRUCTIONS
Status: DISCONTINUED | OUTPATIENT
Start: 2018-05-15 | End: 2018-05-15 | Stop reason: HOSPADM

## 2018-05-15 RX ORDER — FENTANYL CITRATE 50 UG/ML
INJECTION, SOLUTION INTRAMUSCULAR; INTRAVENOUS PRN
Status: DISCONTINUED | OUTPATIENT
Start: 2018-05-15 | End: 2018-05-15

## 2018-05-15 RX ORDER — NALOXONE HYDROCHLORIDE 0.4 MG/ML
.1-.4 INJECTION, SOLUTION INTRAMUSCULAR; INTRAVENOUS; SUBCUTANEOUS
Status: DISCONTINUED | OUTPATIENT
Start: 2018-05-15 | End: 2018-05-15 | Stop reason: HOSPADM

## 2018-05-15 RX ORDER — HYDROCODONE BITARTRATE AND ACETAMINOPHEN 5; 325 MG/1; MG/1
1-2 TABLET ORAL EVERY 6 HOURS PRN
Qty: 20 TABLET | Refills: 0 | Status: SHIPPED | OUTPATIENT
Start: 2018-05-15 | End: 2018-01-01 | Stop reason: SINTOL

## 2018-05-15 RX ORDER — HYDROMORPHONE HYDROCHLORIDE 1 MG/ML
.3-.5 INJECTION, SOLUTION INTRAMUSCULAR; INTRAVENOUS; SUBCUTANEOUS EVERY 10 MIN PRN
Status: DISCONTINUED | OUTPATIENT
Start: 2018-05-15 | End: 2018-05-15 | Stop reason: HOSPADM

## 2018-05-15 RX ORDER — HYDROCODONE BITARTRATE AND ACETAMINOPHEN 5; 325 MG/1; MG/1
1-2 TABLET ORAL EVERY 4 HOURS PRN
Status: DISCONTINUED | OUTPATIENT
Start: 2018-05-15 | End: 2018-05-15 | Stop reason: HOSPADM

## 2018-05-15 RX ORDER — LIDOCAINE 40 MG/G
CREAM TOPICAL
Status: DISCONTINUED | OUTPATIENT
Start: 2018-05-15 | End: 2018-05-15 | Stop reason: HOSPADM

## 2018-05-15 RX ORDER — FENTANYL CITRATE 50 UG/ML
25-50 INJECTION, SOLUTION INTRAMUSCULAR; INTRAVENOUS
Status: DISCONTINUED | OUTPATIENT
Start: 2018-05-15 | End: 2018-05-15 | Stop reason: HOSPADM

## 2018-05-15 RX ORDER — MEPERIDINE HYDROCHLORIDE 50 MG/ML
12.5 INJECTION INTRAMUSCULAR; INTRAVENOUS; SUBCUTANEOUS
Status: DISCONTINUED | OUTPATIENT
Start: 2018-05-15 | End: 2018-05-15 | Stop reason: HOSPADM

## 2018-05-15 RX ORDER — ONDANSETRON 4 MG/1
4 TABLET, ORALLY DISINTEGRATING ORAL EVERY 30 MIN PRN
Status: DISCONTINUED | OUTPATIENT
Start: 2018-05-15 | End: 2018-05-15 | Stop reason: HOSPADM

## 2018-05-15 RX ORDER — LIDOCAINE HYDROCHLORIDE 10 MG/ML
INJECTION, SOLUTION INFILTRATION; PERINEURAL PRN
Status: DISCONTINUED | OUTPATIENT
Start: 2018-05-15 | End: 2018-05-15 | Stop reason: HOSPADM

## 2018-05-15 RX ADMIN — SODIUM CHLORIDE, POTASSIUM CHLORIDE, SODIUM LACTATE AND CALCIUM CHLORIDE: 600; 310; 30; 20 INJECTION, SOLUTION INTRAVENOUS at 09:49

## 2018-05-15 RX ADMIN — CEFAZOLIN SODIUM 2 G: 2 INJECTION, SOLUTION INTRAVENOUS at 09:56

## 2018-05-15 RX ADMIN — MIDAZOLAM 2 MG: 1 INJECTION INTRAMUSCULAR; INTRAVENOUS at 10:01

## 2018-05-15 RX ADMIN — MIDAZOLAM 1 MG: 1 INJECTION INTRAMUSCULAR; INTRAVENOUS at 10:02

## 2018-05-15 RX ADMIN — FENTANYL CITRATE 100 MCG: 50 INJECTION, SOLUTION INTRAMUSCULAR; INTRAVENOUS at 10:02

## 2018-05-15 RX ADMIN — MIDAZOLAM 2 MG: 1 INJECTION INTRAMUSCULAR; INTRAVENOUS at 09:59

## 2018-05-15 RX ADMIN — LIDOCAINE HYDROCHLORIDE 1 ML: 10 INJECTION, SOLUTION EPIDURAL; INFILTRATION; INTRACAUDAL; PERINEURAL at 09:50

## 2018-05-15 RX ADMIN — MIDAZOLAM 2 MG: 1 INJECTION INTRAMUSCULAR; INTRAVENOUS at 10:05

## 2018-05-15 NOTE — BRIEF OP NOTE
PreOp Dx: lung cancer    PostOp Dx: Same    Procedure: portacath placement    Surgeon: SOL Fox    Anesthesia: MAC Tabatha CRNA    Findings: cath tip in SVC on fluoro    IVF: 250ml    UOP: n/a    EBL: 1ml      Gurjit Fox MD

## 2018-05-15 NOTE — ANESTHESIA PREPROCEDURE EVALUATION
Anesthesia Evaluation     . Pt has had prior anesthetic. Type: General and MAC    No history of anesthetic complications          ROS/MED HX    ENT/Pulmonary: Comment: Left upper lobe adenocarcinoma    (+)asthma , . .    Neurologic:  - neg neurologic ROS     Cardiovascular:     (+) hypertension--CAD, -past MI,-. : . . [place], . :. .       METS/Exercise Tolerance:  3 - Able to walk 1-2 blocks without stopping   Hematologic:  - neg hematologic  ROS       Musculoskeletal: Comment: Deconditioning        GI/Hepatic: Comment: Daily etoh, weekly marijuana    (+) GERD       Renal/Genitourinary:  - ROS Renal section negative       Endo:  - neg endo ROS       Psychiatric:  - neg psychiatric ROS       Infectious Disease:  - neg infectious disease ROS       Malignancy:   (+) Malignancy History of Lung  Lung CA Active status post Chemo and Radiation.         Other:    (+) H/O Chronic Pain,H/O chronic opiod use ,                    Physical Exam  Normal systems: cardiovascular    Airway   Mallampati: II  TM distance: >3 FB  Neck ROM: full    Dental     Cardiovascular   Rhythm and rate: regular and normal      Pulmonary (+) decreased breath sounds       Other findings: Decreased left                Anesthesia Plan      History & Physical Review  History and physical reviewed and following examination; no interval change.    ASA Status:  4 .    NPO Status:  > 8 hours    Plan for MAC Reason for MAC:  Chronic cardiopulmonary disease (G9), Deep or markedly invasive procedure (G8) and Procedure to face, neck, head or breast         Postoperative Care  Postoperative pain management:  IV analgesics and Oral pain medications.      Consents  Anesthetic plan, risks, benefits and alternatives discussed with:  Patient..                          .

## 2018-05-15 NOTE — DISCHARGE INSTRUCTIONS
Same Day Surgery Discharge Instructions  Special Precautions After Surgery - Adult    1. It is not unusual to feel lightheaded or faint, up to 24 hours after surgery or while taking pain medication.  If you have these symptoms; sit for a few minutes before standing and have someone assist you when getting up.  2. You should rest and relax for the next 24 hours and must have someone stay with you for at least 24 hours after your discharge.  3. DO NOT DRIVE any vehicle or operate mechanical equipment for 24 hours following the end of your surgery.  DO NOT DRIVE while taking narcotic pain medications that have been prescribed by your physician.  If you had a limb operated on, you must be able to use it fully to drive.  4. DO NOT drink alcoholic beverages for 24 hours following surgery or while taking prescription pain medication.  5. Drink clear liquids (apple juice, ginger ale, broth, 7-Up, etc.).  Progress to your regular diet as you feel able.  6. Any questions call your physician and do not make important decisions for 24 hours.  __________________________________________________________________________________________________________________________________  IMPORTANT NUMBERS:    [facility] Main Number:  [phone], [phone]  Pharmacy:  [phone]  Same Day Surgery:  [phone], Monday - Friday until 8:30 p.m.  Urgent Care:  [phone]  Emergency Room:  [phone]      [facility]:  [phone]                                                                             [facility]:  [phone] option 1  [facility]:  [phone]     [facility] Clinic:  [phone]   [facility]ty Clinic:  [phone]   Home Medical Equipment: [phone]  [facility]:  [phone]    DISCHARGE INSTRUCTIONS     1. You may resume your regular diet when you feel you are ready    2. Limit your activities for the first 48 hours. Gradually, increase  them as tolerated. You may use stairs. I encourage you to walk as tolerated.     3. You will have some discomfort at the incision sites. This is expected. This should improve over the next 2-3 days. Ice and pain medication will help with this pain. Use prescribed pain medication as instructed.     4. Some bruising and mild swelling is normal after surgery. The area below and around the incision(s) may be hard and elevated. This is part of the normal healing process. This will resolve slowly over the next several months.     5. Your wounds are covered with glue. The glue is water tight and so you can shower or bathe immediately following surgery.     6. Use the following medications (in addition to your normal meds) as shown:      Tylenol (acetaminophen) 500 mg every 6 hours as needed for pain.    Do not take more than 1000 mg every 6 hours -OR- 4g in a day    Motrin (ibuprophen) 600 mg every 6 hours as needed for pain. Take with food.     8. Notify Clinic at [phone] if:     Your discomfort is not relieved by your pain medication     You have signs of infection such as temperature above 100.4 degrees orally,  chills, or increasing daily discomfort.     Incision site is becoming more red and/or there is purulent drainage.      9. Please  follow up with Dr Fox or infusion nurses in 1-2 weeks.    10. When taking narcotics it is important to keep your stools soft to avoid constipation and pain with straining. This is best done by drinking and taking a stool softener (Metamucil or milk of magnesia).

## 2018-05-15 NOTE — OP NOTE
Procedure Date: 05/15/2018      DATE OF PROCEDURE:  05/15/2018      PREOPERATIVE DIAGNOSIS:  Lung cancer.      POSTOPERATIVE DIAGNOSIS:  Lung cancer.      PROCEDURE:  Port-A-Cath placement.      SURGEON:  Gurjit Fox MD      ANESTHESIA:  MAC.      ANESTHESIOLOGIST:  Tabatha      FINDINGS:  Catheter tip in superior vena cava on fluoroscopy.      PROCEDURE:  The patient was taken to the operating room and placed in supine position.  Monitored anesthesia care was initiated and the patient's anterior chest and neck were cleaned and draped in a sterile manner.  Marcaine 0.5% with epinephrine was used to anesthetize the skin, subcutaneous tissue and clavicle on the left anterior chest.  We placed the patient in true Trendelenburg position and a needle was inserted into the left subclavian vein, with a flashback of dark red blood.  A wire guided smoothly over the needle and there was no ectopy.  On fluoroscopy, the wire was noted to be in the superior vena cava.  Subcutaneous pocket was then anesthetized on the patient's left anterior chest and a 4 cm transverse incision made.  The pocket was dissected out bluntly and a small amount of bleeding was controlled with electrocautery.  A catheter was then tunneled from the wire exit point to the catheter pocket.  A Port-A-Cath preflushed with saline was stitched into the pocket using 2 interrupted 0 Prolene sutures.  The patient was placed back into true Trendelenburg position and a subcutaneous dilator and introducer sheath were placed over the wire.  The wire and dilator were removed, leaving only the introducer sheath.  The catheter threaded smoothly into the sheath, which was broken free and removed from the body.  On fluoroscopy, the catheter tip was noted to be in the right ventricle.  It was pulled back until the tip was at the right atrial superior vena cava junction.  It was then cut and attached to the port.  The port moni back dark red blood and flushed with ease.   A final flush solution consisting of 1000 units of heparin per mL was then injected into the port.  Final fluoroscopy showed the catheter tip at the superior vena cava along with a smooth trajectory back to the port.  Finally, the skin incision was closed using interrupted deep dermal 3-0 Vicryl sutures and a 4-0 Vicryl running subcuticular stitch.  The wire exit point skin defect was also closed using the 4-0 Vicryl.  Finally, all skin incisions were covered with Dermabond.  The patient tolerated the procedure well, was transferred back to same day surgery for recovery.      PLAN:  Following a stable recovery, he will be discharged home with a regular diet, activity as tolerated.      DISABILITY:  None.      MEDICATIONS:  Prescription written for Vicodin.      INSTRUCTIONS:  The patient is advised to wash his wounds daily with soap and water and to follow up with me or the infusion nurses in 1-2 weeks.      ESTIMATED BLOOD LOSS:  1 mL.      IV FLUIDS:  250 mL.         NAMITA PERKINS             D: 05/15/2018   T: 05/15/2018   MT: CC      Name:     SERENE AHN   MRN:      [id number]        Account:        [id number]   :      1950           Procedure Date: 05/15/2018      Document: [id number]       cc: Mor Plasencia MD

## 2018-05-15 NOTE — ANESTHESIA CARE TRANSFER NOTE
Patient: Chilo Oneil    Procedure(s):  Left chest Port-a-Cath Placement - Wound Class: I-Clean    Diagnosis: vascular access  Diagnosis Additional Information: No value filed.    Anesthesia Type:   MAC     Note:    Patient transferred to:Phase II  Handoff Report: Identifed the Patient, Identified the Reponsible Provider, Reviewed the pertinent medical history, Discussed the surgical course, Reviewed Intra-OP anesthesia mangement and issues during anesthesia, Set expectations for post-procedure period and Allowed opportunity for questions and acknowledgement of understanding      Vitals: (Last set prior to Anesthesia Care Transfer)    CRNA VITALS  5/15/2018 1005 - 5/15/2018 1035      5/15/2018             Pulse: 77    SpO2: 96 %                Electronically Signed By: Rosales Mays CRNA, APRN CRNA  May 15, 2018  10:35 AM

## 2018-05-15 NOTE — IP AVS SNAPSHOT
MRN:[id number]                      After Visit Summary   5/15/2018    Chilo Oneil    MRN: [id number]           Thank you!     Thank you for choosing [facility] for your care. Our goal is always to provide you with excellent care. Hearing back from our patients is one way we can continue to improve our services. Please take a few minutes to complete the written survey that you may receive in the mail after you visit with us. Thank you!        Patient Information     Date Of Birth          1950        About your hospital stay     You were admitted on:  May 15, 2018 You last received care in the:  [facility] PreOP/Phase II    You were discharged on:  May 15, 2018       Who to Call     For medical emergencies, please call 911.  For non-urgent questions about your medical care, please call your primary care provider or clinic, [phone]  For questions related to your surgery, please call your surgery clinic        Attending Provider     Provider Specialty    Gurjit Fox MD Surgery       Primary Care Provider Office Phone # Fax #    Omerrolly Jyoti Plasencia [phone]      Your next 10 appointments already scheduled     May 18, 2018  9:30 AM CDT   Return Visit with Mor Mccauley MD   [facility] ([facility])    [facility]  [address]  [facility] [place] [place] [address]   [phone]            May 31, 2018  9:00 AM CDT   Level 5 with ROOM 1 [facility] ([facility])    [facility]  [address]  [place] [address]   [phone]            Jun 04, 2018  1:30 PM CDT   Level 1 with ROOM 4 [facility] ([facility])    [facility]  [address]  [place] [address]   [phone]            Jul 05, 2018 11:30 AM CDT   Level O with ROOM 5 [facility] ([facility])     [facility]  [address] [facility] [address]  [facility] [address]   [phone]              Further instructions from your care team                           Same Day Surgery Discharge Instructions  Special Precautions After Surgery - Adult    1. It is not unusual to feel lightheaded or faint, up to 24 hours after surgery or while taking pain medication.  If you have these symptoms; sit for a few minutes before standing and have someone assist you when getting up.  2. You should rest and relax for the next 24 hours and must have someone stay with you for at least 24 hours after your discharge.  3. DO NOT DRIVE any vehicle or operate mechanical equipment for 24 hours following the end of your surgery.  DO NOT DRIVE while taking narcotic pain medications that have been prescribed by your physician.  If you had a limb operated on, you must be able to use it fully to drive.  4. DO NOT drink alcoholic beverages for 24 hours following surgery or while taking prescription pain medication.  5. Drink clear liquids (apple juice, ginger ale, broth, 7-Up, etc.).  Progress to your regular diet as you feel able.  6. Any questions call your physician and do not make important decisions for 24 hours.  __________________________________________________________________________________________________________________________________  IMPORTANT NUMBERS:    [facility] [place]:  [phone], [phone]  Pharmacy:  [phone]  Same Day Surgery:  [phone], Monday - Friday until 8:30 p.m.  Urgent Care:  [phone]  Emergency Room:  [phone]      [facility]:  [phone]                                                                             [facility]:  [phone] option 1  [facility]:  [phone]     [facility]:  [phone]   [facility] Specialty Clinic:  [phone]   Home Medical Equipment: [phone]  [facility]:  [phone]     DISCHARGE INSTRUCTIONS     1. You may resume your regular diet when you feel you are ready    2. Limit your activities for the first 48 hours. Gradually, increase them as tolerated. You may use stairs. I encourage you to walk as tolerated.     3. You will have some discomfort at the incision sites. This is expected. This should improve over the next 2-3 days. Ice and pain medication will help with this pain. Use prescribed pain medication as instructed.     4. Some bruising and mild swelling is normal after surgery. The area below and around the incision(s) may be hard and elevated. This is part of the normal healing process. This will resolve slowly over the next several months.     5. Your wounds are covered with glue. The glue is water tight and so you can shower or bathe immediately following surgery.     6. Use the following medications (in addition to your normal meds) as shown:      Tylenol (acetaminophen) 500 mg every 6 hours as needed for pain.    Do not take more than 1000 mg every 6 hours -OR- 4g in a day    Motrin (ibuprophen) 600 mg every 6 hours as needed for pain. Take with food.     8. Notify Clinic at [phone] if:     Your discomfort is not relieved by your pain medication     You have signs of infection such as temperature above 100.4 degrees orally,  chills, or increasing daily discomfort.     Incision site is becoming more red and/or there is purulent drainage.      9. Please  follow up with Dr Fox or infusion nurses in 1-2 weeks.    10. When taking narcotics it is important to keep your stools soft to avoid constipation and pain with straining. This is best done by drinking and taking a stool softener (Metamucil or milk of magnesia).        Pending Results     Date and Time Order Name Status Description    5/15/2018 1051 XR Chest Port 1 View In process     5/15/2018 0249 XR Surgery SHAQUILLE Fluoro L/T 5 Min w Stills In process             Admission Information     Date & Time Provider  "Department Dept. Phone    5/15/2018 Gurjit Fox MD [facility] PreOP/Phase [phone]      Your Vitals Were     Blood Pressure Temperature Respirations Height Weight Pulse Oximetry    144/73 98.2  F (36.8  C) (Oral) 16 1.905 m (6' 3\") 70.3 kg (155 lb) 94%    BMI (Body Mass Index)                   19.37 kg/m2           [employer] Information     [employer] lets you send messages to your doctor, view your test results, renew your prescriptions, schedule appointments and more. To sign up, go to [email or web address] . Click on \"Log in\" on the left side of the screen, which will take you to the Welcome page. Then click on \"Sign up Now\" on the right side of the page.     You will be asked to enter the access code listed below, as well as some personal information. Please follow the directions to create your username and password.     Your access code is: [email or web address]  Expires: 2018 10:49 AM     Your access code will  in 90 days. If you need help or a new code, please call your [facility] [place] clinic or [phone].        Care EveryWhere ID     This is your Care EveryWhere ID. This could be used by other organizations to access your [facility] medical records  [id number]        Equal Access to Services     [place] AH: Hadhasmukh Vidales, waaxda luqadaha, qaybta kaalcelina greene. So [facility] [phone].    ATENCIÓN: Si habla español, tiene a galaviz disposición servicios gratuitos de asistencia lingüística. Yecenia al [phone].    We comply with applicable federal civil rights laws and [place] laws. We do not discriminate on the basis of race, color, national origin, age, disability, sex, sexual orientation, or gender identity.               Review of your medicines      START taking        Dose / Directions    HYDROcodone-acetaminophen 5-325 MG per tablet   Commonly known as:  NORCO   Used for:  Post-op pain        Dose:  1-2 tablet   Take 1-2 tablets by " mouth every 6 hours as needed for pain   Quantity:  20 tablet   Refills:  0         CONTINUE these medicines which have NOT CHANGED        Dose / Directions    acetaminophen 325 MG tablet   Commonly known as:  TYLENOL   Used for:  Carcinoma, lung, left (H)        Dose:  975 mg   Take 3 tablets (975 mg) by mouth every 8 hours   Quantity:  100 tablet   Refills:  0       ASPIRIN PO        Dose:  81 mg   Take 81 mg by mouth daily   Refills:  0       dexamethasone 4 MG tablet   Commonly known as:  DECADRON   Used for:  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)        Take 4 mg (1 tab) by mouth twice daily for 6 doses. Start the evening prior to Infusion.   Quantity:  6 tablet   Refills:  3       EQL NICOTINE 2 MG lozenge   Generic drug:  nicotine polacrilex        Dose:  2 mg   Place 2 mg inside cheek every hour as needed for smoking cessation   Refills:  0       FOLIC ACID PO        Dose:  1 mg   Take 1 mg by mouth daily   Refills:  0       LORazepam 0.5 MG tablet   Commonly known as:  ATIVAN   Used for:  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)        Take 0.5 mg by mouth every 4 hours as needed for nausea, vomiting, anxiety, or sleep.   Quantity:  30 tablet   Refills:  5       losartan 50 MG tablet   Commonly known as:  COZAAR   Used for:  Benign essential hypertension        Dose:  50 mg   Take 1 tablet (50 mg) by mouth daily   Quantity:  90 tablet   Refills:  3       metoprolol tartrate 50 MG tablet   Commonly known as:  LOPRESSOR   Used for:  Benign essential hypertension        Dose:  50 mg   Take 1 tablet (50 mg) by mouth 2 times daily   Quantity:  60 tablet   Refills:  1       * morphine 15 MG 12 hr tablet   Commonly known as:  MS CONTIN   Used for:  Pain        Dose:  15 mg   Take 1 tablet (15 mg) by mouth every 12 hours maximum 2 tablet(s) per day   Quantity:  30 tablet   Refills:  0       * morphine 15 MG IR tablet   Commonly known as:  MSIR   Used for:  Pain        Dose:  15 mg   Take 1  tablet (15 mg) by mouth every 6 hours as needed for moderate to severe pain   Quantity:  60 tablet   Refills:  0       prochlorperazine 10 MG tablet   Commonly known as:  COMPAZINE   Used for:  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)        Dose:  10 mg   Take 1 tablet (10 mg) by mouth every 6 hours as needed for nausea or vomiting   Quantity:  30 tablet   Refills:  3       rosuvastatin 5 MG tablet   Commonly known as:  CRESTOR        Dose:  5 mg   Take 1 tablet (5 mg) by mouth daily   Quantity:  30 tablet   Refills:  3       senna-docusate 8.6-50 MG per tablet   Commonly known as:  SENOKOT-S;PERICOLACE   Used for:  Carcinoma, lung, left (H)        Dose:  2 tablet   Take 2 tablets by mouth 2 times daily   Quantity:  100 tablet   Refills:  0       VENTOLIN  (90 Base) MCG/ACT Inhaler   Generic drug:  albuterol        Dose:  2 puff   Inhale 2 puffs into the lungs every 4 hours as needed for shortness of breath / dyspnea or wheezing   Quantity:  1 Inhaler   Refills:  1       * Notice:  This list has 2 medication(s) that are the same as other medications prescribed for you. Read the directions carefully, and ask your doctor or other care provider to review them with you.         Where to get your medicines      Some of these will need a paper prescription and others can be bought over the counter. Ask your nurse if you have questions.     Bring a paper prescription for each of these medications     HYDROcodone-acetaminophen 5-325 MG per tablet                Protect others around you: Learn how to safely use, store and throw away your medicines at www.disposemymeds.org.        Information about OPIOIDS     PRESCRIPTION OPIOIDS: WHAT YOU NEED TO KNOW   You have a prescription for an opioid (narcotic) pain medicine. Opioids can cause addiction. If you have a history of chemical dependency of any type, you are at a higher risk of becoming addicted to opioids. Only take this medicine after all other options  have been tried. Take it for as short a time and as few doses as possible.     Do not:    Drive. If you drive while taking these medicines, you could be arrested for driving under the influence (DUI).    Operate heavy machinery    Do any other dangerous activities while taking these medicines.     Drink any alcohol while taking these medicines.      Take with any other medicines that contain acetaminophen. Read all labels carefully. Look for the word  acetaminophen  or  Tylenol.  Ask your pharmacist if you have questions or are unsure.    Store your pills in a secure place, locked if possible. We will not replace any lost or stolen medicine. If you don t finish your medicine, please throw away (dispose) as directed by your pharmacist. The [place] Pollution Control Agency has more information about safe disposal: https://www.pca.[facility].[place].us/living-green/managing-unwanted-medications    All opioids tend to cause constipation. Drink plenty of water and eat foods that have a lot of fiber, such as fruits, vegetables, prune juice, apple juice and high-fiber cereal. Take a laxative (Miralax, milk of magnesia, Colace, Senna) if you don t move your bowels at least every other day.              Medication List: This is a list of all your medications and when to take them. Check marks below indicate your daily home schedule. Keep this list as a reference.      Medications           Morning Afternoon Evening Bedtime As Needed    acetaminophen 325 MG tablet   Commonly known as:  TYLENOL   Take 3 tablets (975 mg) by mouth every 8 hours                                ASPIRIN PO   Take 81 mg by mouth daily                                dexamethasone 4 MG tablet   Commonly known as:  DECADRON   Take 4 mg (1 tab) by mouth twice daily for 6 doses. Start the evening prior to Infusion.                                EQL NICOTINE 2 MG lozenge   Place 2 mg inside cheek every hour as needed for smoking cessation   Generic drug:   nicotine polacrilex                                FOLIC ACID PO   Take 1 mg by mouth daily                                HYDROcodone-acetaminophen 5-325 MG per tablet   Commonly known as:  NORCO   Take 1-2 tablets by mouth every 6 hours as needed for pain                                LORazepam 0.5 MG tablet   Commonly known as:  ATIVAN   Take 0.5 mg by mouth every 4 hours as needed for nausea, vomiting, anxiety, or sleep.                                losartan 50 MG tablet   Commonly known as:  COZAAR   Take 1 tablet (50 mg) by mouth daily                                metoprolol tartrate 50 MG tablet   Commonly known as:  LOPRESSOR   Take 1 tablet (50 mg) by mouth 2 times daily                                * morphine 15 MG 12 hr tablet   Commonly known as:  MS CONTIN   Take 1 tablet (15 mg) by mouth every 12 hours maximum 2 tablet(s) per day                                * morphine 15 MG IR tablet   Commonly known as:  MSIR   Take 1 tablet (15 mg) by mouth every 6 hours as needed for moderate to severe pain                                prochlorperazine 10 MG tablet   Commonly known as:  COMPAZINE   Take 1 tablet (10 mg) by mouth every 6 hours as needed for nausea or vomiting                                rosuvastatin 5 MG tablet   Commonly known as:  CRESTOR   Take 1 tablet (5 mg) by mouth daily                                senna-docusate 8.6-50 MG per tablet   Commonly known as:  SENOKOT-S;PERICOLACE   Take 2 tablets by mouth 2 times daily                                VENTOLIN  (90 Base) MCG/ACT Inhaler   Inhale 2 puffs into the lungs every 4 hours as needed for shortness of breath / dyspnea or wheezing   Generic drug:  albuterol                                * Notice:  This list has 2 medication(s) that are the same as other medications prescribed for you. Read the directions carefully, and ask your doctor or other care provider to review them with you.

## 2018-05-15 NOTE — ANESTHESIA POSTPROCEDURE EVALUATION
Patient: Chilo Oneil    Procedure(s):  Left chest Port-a-Cath Placement - Wound Class: I-Clean    Diagnosis:vascular access  Diagnosis Additional Information: No value filed.    Anesthesia Type:  MAC    Note:  Anesthesia Post Evaluation    Patient location during evaluation: Bedside  Patient participation: Able to fully participate in evaluation  Level of consciousness: awake and alert  Pain management: adequate  Airway patency: patent  Cardiovascular status: acceptable  Respiratory status: acceptable  Hydration status: acceptable  PONV: none     Anesthetic complications: None          Last vitals:  Vitals:    05/15/18 0911   BP: 144/73   Resp: 16   Temp: 36.8  C (98.2  F)   SpO2: 94%         Electronically Signed By: Rosales Mays CRNA, APRN CRNA  May 15, 2018  10:35 AM

## 2018-05-15 NOTE — PROGRESS NOTES
SPIRITUAL HEALTH SERVICES  SPIRITUAL ASSESSMENT Progress Note  [facility] [facility]    I had received  request for prayer prior to surgery for pt, Freddy.  He was moved up the schedule so I did not arrive in time to pray with him.  I spoke with one of the staff and asked them to convey that I had come and was praying for him even though I hadn't been able to meet him in person.  I will watch for Freddy's name in the Infusion Clinic's scheduling to make a contact in the future.    Alcides Huffman M.A., [facility]  Staff   [facility]  Office: [phone]  Cell: [phone]  Pager [phone]

## 2018-05-15 NOTE — IP AVS SNAPSHOT
[facility] PreOP/Phase II    [address] [address] [facility] [facility] [address]    Phone:  [phone]    Fax:  [phone]                                       After Visit Summary   5/15/2018    Chilo Oneil    MRN: [id number]           After Visit Summary Signature Page     I have received my discharge instructions, and my questions have been answered. I have discussed any challenges I see with this plan with the nurse or doctor.    ..........................................................................................................................................  Patient/Patient Representative Signature      ..........................................................................................................................................  Patient Representative Print Name and Relationship to Patient    ..................................................               ................................................  Date                                            Time    ..........................................................................................................................................  Reviewed by Signature/Title    ...................................................              ..............................................  Date                                                            Time

## 2018-05-16 ENCOUNTER — TELEPHONE (OUTPATIENT)
Dept: ONCOLOGY | Facility: CLINIC | Age: 68
End: 2018-05-16

## 2018-05-16 ENCOUNTER — PATIENT OUTREACH (OUTPATIENT)
Dept: CARE COORDINATION | Facility: CLINIC | Age: 68
End: 2018-05-16

## 2018-05-16 DIAGNOSIS — C34.92 CARCINOMA, LUNG, LEFT (H): ICD-10-CM

## 2018-05-16 NOTE — PROGRESS NOTES
"Clinic Care Coordination Contact  Care Team Conversations    SW and pt spoke earlier in the day as this writer called to remind the pt of his [place] intake today at 1PM at the hospital.  Pt shared he was awake, sitting outside and drinking his coffee.  Pt shared he is feeling good, \"the best I have in days!\" and he is ready for the appointment today at 1PM.  SW agreed to meet with pt and Claudette from [facility] at 1PM in the lobby.    1PM--SW met with pt, provided him with a May 2018 calendar that has all of his medical appointments (from Epic) and a list of all of the important phone numbers that he will need for contacting his medical care team.    Claudette Rawls, YASMIN with [facility] Intake, arrived.  Pt, Claudette and this writer went to conference room A to meet to discuss the intake assessment with the pt.  Pt is aware of this process and agreed to meet with Claudette today.    Claudette requested pt's problem list, medication list, and vitals.  Pt stated it was OK for this information to be provided to Claudette.  SUNDAY was signed during the intake; Claudette has the original and will be faxing it to this writer.    SW and pt discussed that if pt receives his bank statements before his medical appointment on 5-18-18, please bring them to the Oncology appointment & give them to his Oncology RN Erica, as this writer will gather them from her on Monday, 5-21-18.  Pt verbalized understanding.     Per discussion afterwards with Claudette, pt should be eligible for Meals on Wheels, Home Making services, bathroom adaptation needs such as grab bars & a shower chair, and food stamps.    SW to continue to follow.    Sofya Unger  Social Work Care Coordinator  [facility]  [phone]        "

## 2018-05-16 NOTE — TELEPHONE ENCOUNTER
"Spoke with Freddy today.  He is feeling great and is \"very happy\" he decided to get the chemo.  "

## 2018-05-18 ENCOUNTER — ONCOLOGY VISIT (OUTPATIENT)
Dept: ONCOLOGY | Facility: CLINIC | Age: 68
End: 2018-05-18
Attending: INTERNAL MEDICINE
Payer: MEDICARE

## 2018-05-18 VITALS
RESPIRATION RATE: 24 BRPM | OXYGEN SATURATION: 94 % | SYSTOLIC BLOOD PRESSURE: 163 MMHG | BODY MASS INDEX: 19.41 KG/M2 | TEMPERATURE: 97.1 F | DIASTOLIC BLOOD PRESSURE: 77 MMHG | HEART RATE: 90 BPM | HEIGHT: 75 IN | WEIGHT: 156.1 LBS

## 2018-05-18 DIAGNOSIS — C34.92 CARCINOMA, LUNG, LEFT (H): Primary | ICD-10-CM

## 2018-05-18 DIAGNOSIS — T45.1X5A CHEMOTHERAPY INDUCED NAUSEA AND VOMITING: ICD-10-CM

## 2018-05-18 DIAGNOSIS — R11.2 CHEMOTHERAPY INDUCED NAUSEA AND VOMITING: ICD-10-CM

## 2018-05-18 DIAGNOSIS — I10 BENIGN ESSENTIAL HYPERTENSION: Chronic | ICD-10-CM

## 2018-05-18 DIAGNOSIS — F17.200 TOBACCO USE DISORDER: ICD-10-CM

## 2018-05-18 DIAGNOSIS — J45.909 MODERATE ASTHMA WITHOUT COMPLICATION, UNSPECIFIED WHETHER PERSISTENT: ICD-10-CM

## 2018-05-18 DIAGNOSIS — K21.9 GASTROESOPHAGEAL REFLUX DISEASE WITHOUT ESOPHAGITIS: Chronic | ICD-10-CM

## 2018-05-18 DIAGNOSIS — I20.89 ATYPICAL ANGINA (H): ICD-10-CM

## 2018-05-18 DIAGNOSIS — C34.12 MALIGNANT NEOPLASM OF UPPER LOBE OF LEFT LUNG (H): Chronic | ICD-10-CM

## 2018-05-18 DIAGNOSIS — E78.5 HYPERLIPIDEMIA LDL GOAL <100: Chronic | ICD-10-CM

## 2018-05-18 PROCEDURE — 99214 OFFICE O/P EST MOD 30 MIN: CPT | Performed by: INTERNAL MEDICINE

## 2018-05-18 PROCEDURE — G0463 HOSPITAL OUTPT CLINIC VISIT: HCPCS

## 2018-05-18 ASSESSMENT — PAIN SCALES - GENERAL: PAINLEVEL: NO PAIN (0)

## 2018-05-18 NOTE — MR AVS SNAPSHOT
After Visit Summary   5/18/2018    Chilo Oneil    MRN: [id number]           Patient Information     Date Of Birth          1950        Visit Information        Provider Department      5/18/2018 9:30 AM Mor Mccauley MD [facility] ONCOLOGY      Today's Diagnoses     Carcinoma, lung, left (H)    -  1    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)        Gastroesophageal reflux disease without esophagitis        Hyperlipidemia LDL goal <100        Tobacco use disorder        Moderate asthma without complication, unspecified whether persistent        Chemotherapy induced nausea and vomiting        Atypical angina (H)        Benign essential hypertension          Care Instructions    We would like to see you back in clinic with Dr. Mccauley in 2 weeks with chemotherapy to be scheduled per treatment plan.  Order has been placed for IV fluids with antiemetics, so if you are feeling nauseous or dehydrated, please call and schedule an appointment.      When you are in need of a refill, please call your pharmacy and they will send us a request.      Copy of appointments, and after visit summary (AVS) given to patient.      If you have any questions during business hours (M-F 8 AM- 4PM), please call Erica Elliott RN, BSN, OCN Oncology Hematology /Breast Cancer Navigator at [facility] [phone].       For questions after business hours, or on holidays/weekends, please call our after hours Nurse Triage line [phone]. Thank you.            Follow-ups after your visit        Follow-up notes from your care team     Return in about 2 weeks (around 6/1/2018) for Schedule for chemotherapy as per treatment plan.      Your next 10 appointments already scheduled     May 31, 2018  9:00 AM CDT   Level 5 with ROOM 1 [facility] ([facility])    [facility]  [address]  [facility]  [address]   [phone]            May 31, 2018  9:45 AM CDT   Return Visit with Mor Mccauley MD   [facility] ([facility])    [facility]  [address]  [facility] [address]   [phone]            Jun 04, 2018 11:30 AM CDT   Level 1 with ROOM 4 [facility] ([facility])    [facility]  [address]  [facility] [address]   [phone]            Jun 21, 2018  9:00 AM CDT   Level 5 with ROOM 2 [facility] ([facility])    [facility]  [address]  [facility] [address]   [phone]            Jun 25, 2018 11:00 AM CDT   Level 1 with ROOM 7 [facility] ([facility])    [facility]  [address]  [facility] [address]   [phone]            Jul 12, 2018  9:00 AM CDT   Level 5 with ROOM 6 [facility] ([facility])    [facility]  [address]  [facility] [address]   [phone]            Jul 16, 2018 11:00 AM CDT   Level 1 with ROOM 10 [facility] ([facility])    [facility]  [address]  [facility] [address]   [phone]              Who to contact     If you have questions or need follow up information about today's clinic visit or your schedule please contact [facility] directly at [phone].  Normal or non-critical lab and imaging results will be communicated to you by MyChart, letter or phone within 4 business days after the clinic has received the results. If you do not hear from us within 7 days, please contact the clinic through MyChart or phone. If you have a critical or abnormal lab result, we will notify you by phone as soon as possible.  Submit refill requests through [facility] or call your pharmacy and  "they will forward the refill request to us. Please allow 3 business days for your refill to be completed.          Additional Information About Your Visit        MyChart Information     [employer] lets you send messages to your doctor, view your test results, renew your prescriptions, schedule appointments and more. To sign up, go to [email or web address] . Click on \"Log in\" on the left side of the screen, which will take you to the Welcome page. Then click on \"Sign up Now\" on the right side of the page.     You will be asked to enter the access code listed below, as well as some personal information. Please follow the directions to create your username and password.     Your access code is: [email or web address]  Expires: 2018 10:49 AM     Your access code will  in 90 days. If you need help or a new code, please call your [facility] [place] clinic or [phone].        Care EveryWhere ID     This is your [facility] EveryWhere ID. This could be used by other organizations to access your [facility] medical records  [id number]        Your Vitals Were     Pulse Temperature Respirations Height Pulse Oximetry BMI (Body Mass Index)    90 97.1  F (36.2  C) (Tympanic) 24 1.905 m (6' 3\") 94% 19.51 kg/m2       Blood Pressure from Last 3 Encounters:   18 163/77   05/15/18 136/62   18 146/60    Weight from Last 3 Encounters:   18 70.8 kg (156 lb 1.6 oz)   05/15/18 70.3 kg (155 lb)   05/10/18 74.5 kg (164 lb 4.8 oz)              Today, you had the following     No orders found for display       Primary Care Provider Office Phone # Fax #    Manish Plasencia [phone] [address] [address]        Goals        General    Financial Wellbeing (pt-stated)     Notes - Note edited  2018  8:50 AM by Sofya Paulino LSW    Goal Statement: I want to complete my Medical Assistance application  Measure of Success: My Medical Assistance application will be completed  Supportive " Steps to Achieve: SW and pt will finish the remaining paperwork that needs to be completed  Barriers: None identified at this time  Strengths: Pt is motivated as the income increase will assist him greatly  Date to Achieve By: 3 months          Pain Management (pt-stated)     Notes - Note edited  5/10/2018  1:35 PM by Sofya Paulino LSW    [facility] to discuss with PCP, but would recommend a Palliative Care consult for symptom management.    Pt met with Palliative Care MD yesterday, 5-9-18, and will continue to do so.    Sofya Unger  Social Work Care Coordinator  [facility]  [phone]          Transportation (pt-stated)       Equal Access to Services     [place] : Hadii krysta jordan hadsimoneo Sochuyali, waaxda luqadaha, qaybta kaalmada adeegyada, celina fisher. So [facility] [phone].    ATENCIÓN: Si habla español, tiene a galaviz disposición servicios gratuitos de asistencia lingüística. Llame al [phone].    We comply with applicable federal civil rights laws and [place] laws. We do not discriminate on the basis of race, color, national origin, age, disability, sex, sexual orientation, or gender identity.            Thank you!     Thank you for choosing [facility]  for your care. Our goal is always to provide you with excellent care. Hearing back from our patients is one way we can continue to improve our services. Please take a few minutes to complete the written survey that you may receive in the mail after your visit with us. Thank you!             Your Updated Medication List - Protect others around you: Learn how to safely use, store and throw away your medicines at www.disposemymeds.org.          This list is accurate as of 5/18/18 10:25 AM.  Always use your most recent med list.                   Brand Name Dispense Instructions for use Diagnosis    acetaminophen 325 MG tablet    TYLENOL    100 tablet    Take 3 tablets (975 mg) by mouth  every 8 hours    Carcinoma, lung, left (H)       ASPIRIN PO      Take 81 mg by mouth daily        dexamethasone 4 MG tablet    DECADRON    6 tablet    Take 4 mg (1 tab) by mouth twice daily for 6 doses. Start the evening prior to Infusion.    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)       EQL NICOTINE 2 MG lozenge   Generic drug:  nicotine polacrilex      Place 2 mg inside cheek every hour as needed for smoking cessation        FOLIC ACID PO      Take 1 mg by mouth daily        HYDROcodone-acetaminophen 5-325 MG per tablet    NORCO    20 tablet    Take 1-2 tablets by mouth every 6 hours as needed for pain    Post-op pain       LORazepam 0.5 MG tablet    ATIVAN    30 tablet    Take 0.5 mg by mouth every 4 hours as needed for nausea, vomiting, anxiety, or sleep.    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)       losartan 50 MG tablet    COZAAR    90 tablet    Take 1 tablet (50 mg) by mouth daily    Benign essential hypertension       metoprolol tartrate 50 MG tablet    LOPRESSOR    60 tablet    Take 1 tablet (50 mg) by mouth 2 times daily    Benign essential hypertension       * morphine 15 MG 12 hr tablet    MS CONTIN    30 tablet    Take 1 tablet (15 mg) by mouth every 12 hours maximum 2 tablet(s) per day    Pain       * morphine 15 MG IR tablet    MSIR    60 tablet    Take 1 tablet (15 mg) by mouth every 6 hours as needed for moderate to severe pain    Pain       prochlorperazine 10 MG tablet    COMPAZINE    30 tablet    Take 1 tablet (10 mg) by mouth every 6 hours as needed for nausea or vomiting    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)       rosuvastatin 5 MG tablet    CRESTOR    30 tablet    Take 1 tablet (5 mg) by mouth daily        senna-docusate 8.6-50 MG per tablet    SENOKOT-S;PERICOLACE    100 tablet    Take 2 tablets by mouth 2 times daily    Carcinoma, lung, left (H)       VENTOLIN  (90 Base) MCG/ACT Inhaler   Generic drug:  albuterol     1 Inhaler    Inhale 2 puffs  into the lungs every 4 hours as needed for shortness of breath / dyspnea or wheezing        * Notice:  This list has 2 medication(s) that are the same as other medications prescribed for you. Read the directions carefully, and ask your doctor or other care provider to review them with you.

## 2018-05-18 NOTE — LETTER
5/18/2018         RE: Chilo Oneil  [address]  [place] [address]        Dear Colleague,    Thank you for referring your patient, Chilo Oneil, to the [facility]. Please see a copy of my visit note below.    Hematology/ Oncology Follow-up Visit:  May 18, 2018    Reason for Visit:   Chief Complaint   Patient presents with     Oncology Clinic Visit     1 week recheck Malignant neoplasm of upper lobe of left lung, no labs       Oncologic History:  Carcinoma, lung, left (H)  Patient presented with L side shoulder and has been traveling  to L/side about  to the whole L/side upper back and L side of chest for about 3 week he has had the pain has been taking aleve.  Subsequently went on to have a CT scan done.  Which showed 1.6 cm nodular infiltrate in the left upper lobe.  The lesion appear spiculated and worrisome for lung cancer.  There is a second 0.7 cm indeterminate nodule in the lingular portion of the left lung.  Subsequently the patient went on to have CT-guided biopsy.  The pathology came back showing moderately differentiated adenocarcinoma. He had subsequent noted left thoracoscopic left pleural biopsies and left lobe which resection on April 11, 2018.  The surgical pathology came back with pleural biopsies came back positive for invasive adenocarcinoma with parietal pleural invasion.  The wedge resection came back with adenocarcinoma with acinar patterns of growth moderately differentiated the tumor size is 1.1 and 0.3 cm into 2 separate tumor nodules.  Visceropleural invasion was identified the margins were negative lymphovascular invasion was not identified large venous artery involvement was not identified as well.  The pathologic staging of pT3N0.        Interval History:  Patient returning today following cycle #1 of chemotherapy with cisplatin and Alimta.  He had severe nausea and repeated vomiting for 2 days following chemotherapy.  Today his nausea and vomiting has improved.   He denies any fever or chills.  He denies any diarrhea or abdominal pain.  He denies any shortness of breath or cough or wheezing.  He continues to smoke half a pack of cigarettes a day.  He quit drinking about 2 weeks ago.    Review Of Systems:  Constitutional: Negative for fever, chills, and night sweats.  Skin: negative.  Eyes: negative.  Ears/Nose/Throat: negative.  Respiratory: No shortness of breath, dyspnea on exertion, cough, or hemoptysis.  Cardiovascular: negative.  Gastrointestinal: negative.  Genitourinary: negative.  Musculoskeletal: negative.  Neurologic: negative.  Psychiatric: negative.  Hematologic/Lymphatic/Immunologic: negative.  Endocrine: negative.    All other ROS negative unless mentioned in interval history.    Past medical, social, surgical, and family histories reviewed.    Allergies:  Allergies as of 05/18/2018 - Alcides as Reviewed 05/18/2018   Allergen Reaction Noted     Cipro [ciprofloxacin] Swelling 06/22/2009       Current Medications:  Current Outpatient Prescriptions   Medication Sig Dispense Refill     acetaminophen (TYLENOL) 325 MG tablet Take 3 tablets (975 mg) by mouth every 8 hours 100 tablet 0     ASPIRIN PO Take 81 mg by mouth daily        dexamethasone (DECADRON) 4 MG tablet Take 4 mg (1 tab) by mouth twice daily for 6 doses. Start the evening prior to Infusion. 6 tablet 3     FOLIC ACID PO Take 1 mg by mouth daily       HYDROcodone-acetaminophen (NORCO) 5-325 MG per tablet Take 1-2 tablets by mouth every 6 hours as needed for pain 20 tablet 0     LORazepam (ATIVAN) 0.5 MG tablet Take 0.5 mg by mouth every 4 hours as needed for nausea, vomiting, anxiety, or sleep. 30 tablet 5     losartan (COZAAR) 50 MG tablet Take 1 tablet (50 mg) by mouth daily 90 tablet 3     metoprolol (LOPRESSOR) 50 MG tablet Take 1 tablet (50 mg) by mouth 2 times daily 60 tablet 1     morphine (MS CONTIN) 15 MG 12 hr tablet Take 1 tablet (15 mg) by mouth every 12 hours maximum 2 tablet(s) per day 30  "tablet 0     morphine (MSIR) 15 MG IR tablet Take 1 tablet (15 mg) by mouth every 6 hours as needed for moderate to severe pain 60 tablet 0     nicotine polacrilex (EQL NICOTINE) 2 MG lozenge Place 2 mg inside cheek every hour as needed for smoking cessation       prochlorperazine (COMPAZINE) 10 MG tablet Take 1 tablet (10 mg) by mouth every 6 hours as needed for nausea or vomiting 30 tablet 3     rosuvastatin (CRESTOR) 5 MG tablet Take 1 tablet (5 mg) by mouth daily 30 tablet 3     senna-docusate (SENOKOT-S;PERICOLACE) 8.6-50 MG per tablet Take 2 tablets by mouth 2 times daily 100 tablet 0     VENTOLIN  (90 Base) MCG/ACT Inhaler Inhale 2 puffs into the lungs every 4 hours as needed for shortness of breath / dyspnea or wheezing 1 Inhaler 1        Physical Exam:  /77 (BP Location: Right arm, Patient Position: Sitting, Cuff Size: Adult Regular)  Pulse 90  Temp 97.1  F (36.2  C) (Tympanic)  Resp 24  Ht 1.905 m (6' 3\")  Wt 70.8 kg (156 lb 1.6 oz)  SpO2 94%  BMI 19.51 kg/m2  Wt Readings from Last 12 Encounters:   05/18/18 70.8 kg (156 lb 1.6 oz)   05/15/18 70.3 kg (155 lb)   05/10/18 74.5 kg (164 lb 4.8 oz)   05/09/18 70.3 kg (155 lb)   05/09/18 73.5 kg (162 lb)   05/09/18 73.8 kg (162 lb 12.8 oz)   04/30/18 73.9 kg (163 lb)   04/27/18 74.3 kg (163 lb 11.2 oz)   04/20/18 73.5 kg (162 lb)   04/17/18 75.7 kg (166 lb 12.8 oz)   04/11/18 74.9 kg (165 lb 2 oz)   04/08/18 77.1 kg (170 lb)     ECOG performance status: 1  GENERAL APPEARANCE: Healthy, alert and in no acute distress.  HEENT: Sclerae anicteric. PERRLA. Oropharynx without ulcers, lesions, or thrush.  NECK: Supple. No asymmetry or masses.  LYMPHATICS: No palpable cervical, supraclavicular, axillary, or inguinal lymphadenopathy.  RESP: Lungs clear to auscultation bilaterally without rales, rhonchi or wheezes.  CARDIOVASCULAR: Regular rate and rhythm. Normal S1, S2; no S3 or S4. No murmur, gallop, or rub.  ABDOMEN: Soft, nontender. Bowel sounds " normal. No palpable organomegaly or masses.  MUSCULOSKELETAL: Extremities without gross deformities noted. No edema of bilateral lower extremities.  SKIN: No suspicious lesions or rashes.  NEURO: Alert and oriented x 3. Cranial nerves II-XII grossly intact.  PSYCHIATRIC: Mentation and affect appear normal.    Laboratory/Imaging Studies:  Infusion Therapy Visit on 05/10/2018   Component Date Value Ref Range Status     WBC 05/10/2018 7.3  4.0 - 11.0 10e9/L Final     RBC Count 05/10/2018 4.24* 4.4 - 5.9 10e12/L Final     Hemoglobin 05/10/2018 14.0  13.3 - 17.7 g/dL Final     Hematocrit 05/10/2018 38.5* 40.0 - 53.0 % Final     MCV 05/10/2018 91  78 - 100 fl Final     MCH 05/10/2018 33.0  26.5 - 33.0 pg Final     MCHC 05/10/2018 36.4  31.5 - 36.5 g/dL Final     RDW 05/10/2018 14.6  10.0 - 15.0 % Final     Platelet Count 05/10/2018 206  150 - 450 10e9/L Final     Diff Method 05/10/2018 Automated Method   Final     % Neutrophils 05/10/2018 74.6  % Final     % Lymphocytes 05/10/2018 14.3  % Final     % Monocytes 05/10/2018 10.4  % Final     % Eosinophils 05/10/2018 0.1  % Final     % Basophils 05/10/2018 0.3  % Final     % Immature Granulocytes 05/10/2018 0.3  % Final     Absolute Neutrophil 05/10/2018 5.4  1.6 - 8.3 10e9/L Final     Absolute Lymphocytes 05/10/2018 1.0  0.8 - 5.3 10e9/L Final     Absolute Monocytes 05/10/2018 0.8  0.0 - 1.3 10e9/L Final     Absolute Eosinophils 05/10/2018 0.0  0.0 - 0.7 10e9/L Final     Absolute Basophils 05/10/2018 0.0  0.0 - 0.2 10e9/L Final     Abs Immature Granulocytes 05/10/2018 0.0  0 - 0.4 10e9/L Final     Sodium 05/10/2018 Canceled, Test credited  133 - 144 mmol/L Final    Comment: Unsatisfactory specimen - hemolyzed  PAT IN INFUSION TO REORDER AND HAVE REDRAWN 0845 5.10.18 JW       Potassium 05/10/2018 Canceled, Test credited  3.4 - 5.3 mmol/L Final    Comment: Unsatisfactory specimen - hemolyzed  PAT IN INFUSION TO REORDER AND HAVE REDRAWN 0845 5.10.18 JW       Chloride 05/10/2018  Canceled, Test credited  94 - 109 mmol/L Final    Comment: Unsatisfactory specimen - hemolyzed  PAT IN INFUSION TO REORDER AND HAVE REDRAWN 0845 5.10.18        Carbon Dioxide 05/10/2018 Canceled, Test credited  20 - 32 mmol/L Final    Comment: Unsatisfactory specimen - hemolyzed  PAT IN INFUSION TO REORDER AND HAVE REDRAWN 0845 5.10.18        Anion Gap 05/10/2018 Canceled, Test credited  6 - 17 mmol/L Final    Comment: Unsatisfactory specimen - hemolyzed  PAT IN INFUSION TO REORDER AND HAVE REDRAWN 0845 5.10.18        Glucose 05/10/2018 Canceled, Test credited  70 - 99 mg/dL Final    Comment: Unsatisfactory specimen - hemolyzed  PAT IN INFUSION TO REORDER AND HAVE REDRAWN 0845 5.10.18        Urea Nitrogen 05/10/2018 Canceled, Test credited  7 - 30 mg/dL Final    Comment: Unsatisfactory specimen - hemolyzed  PAT IN INFUSION TO REORDER AND HAVE REDRAWN 0845 5.10.18        Creatinine 05/10/2018 Canceled, Test credited  0.66 - 1.25 mg/dL Final    Comment: Unsatisfactory specimen - hemolyzed  PAT IN INFUSION TO REORDER AND HAVE REDRAWN 0845 5.10.18        GFR Estimate 05/10/2018 Canceled, Test credited  >60 mL/min/1.7m2 Final    Comment: Unsatisfactory specimen - hemolyzed  PAT IN INFUSION TO REORDER AND HAVE REDRAWN 0845 5.10.18        GFR Estimate If Black 05/10/2018 Canceled, Test credited  >60 mL/min/1.7m2 Final    Comment: Unsatisfactory specimen - hemolyzed  PAT IN INFUSION TO REORDER AND HAVE REDRAWN 0845 5.10.18        Calcium 05/10/2018 Canceled, Test credited  8.5 - 10.1 mg/dL Final    Comment: Unsatisfactory specimen - hemolyzed  PAT IN INFUSION TO REORDER AND HAVE REDRAWN 0845 5.10.18        Magnesium 05/10/2018 Canceled, Test credited  1.6 - 2.3 mg/dL Final    Comment: Unsatisfactory specimen - hemolyzed  PAT IN INFUSION TO REORDER AND HAVE REDRAWN 0845 5.10.18 JW       Sodium 05/10/2018 135  133 - 144 mmol/L Final     Potassium 05/10/2018 3.5  3.4 - 5.3 mmol/L Final     Chloride 05/10/2018  97  94 - 109 mmol/L Final     Carbon Dioxide 05/10/2018 30  20 - 32 mmol/L Final     Anion Gap 05/10/2018 8  3 - 14 mmol/L Final     Glucose 05/10/2018 116* 70 - 99 mg/dL Final     Urea Nitrogen 05/10/2018 15  7 - 30 mg/dL Final     Creatinine 05/10/2018 0.67  0.66 - 1.25 mg/dL Final     GFR Estimate 05/10/2018 >90  >60 mL/min/1.7m2 Final    Non  GFR Calc     GFR Estimate If Black 05/10/2018 >90  >60 mL/min/1.7m2 Final    African American GFR Calc     Calcium 05/10/2018 8.6  8.5 - 10.1 mg/dL Final     Magnesium 05/10/2018 1.8  1.6 - 2.3 mg/dL Final        Recent Results (from the past 744 hour(s))   Chest CT - IV contrast only - PE protocol    Narrative    CT CHEST WITH CONTRAST  4/30/2018 8:42 PM    HISTORY: Left-sided pain. Evaluate for pulmonary embolism.    COMPARISON: A CT on 3/28/2018.    TECHNIQUE: Routine transverse CT imaging of the chest was performed  following the uneventful intravenous administration of Isovue 370-  80mL contrast. A pulmonary embolism protocol was utilized. Radiation  dose for this scan was reduced using automated exposure control,  adjustment of the mA and/or kV according to patient size, or iterative  reconstruction technique.    FINDINGS: The heart size is normal. No enlarged lymph node or other  abnormal mediastinal mass is seen. There is calcification of the  thoracic aorta. There is very good opacification of the pulmonary  arteries with contrast. No pulmonary embolism is seen. The lungs are  clear. There has been no definite change in a 2.2 cm somewhat  spiculated mass in the lateral aspect of the left upper lobe. There  has been development of consolidation or capping of the left lung  apex. The lungs are otherwise clear. There has been development of a  small left pleural effusion. No right effusion is seen. There are  degenerative changes in the spine. No other osseous abnormalities  identified. No chest wall pathology is seen. The visualized upper  abdomen is  unremarkable.      Impression    IMPRESSION:   1. No pulmonary embolism is seen.  2. No change in a 2.2 cm spiculated mass of the left upper lobe. On  previous imaging this was suspicious for malignancy.  3. Development of consolidation or capping of the left lung apex.  4. Small left pleural effusion.    ADILIA ALSTON MD   Chest XR,  PA & LAT    Narrative    XR CHEST 2 VW 5/9/2018 11:37 AM    HISTORY: Pain.    COMPARISON: 4/12/2018.      Impression    IMPRESSION: 2 views of the chest show interval improvement in  previously seen patchy and nodular parenchymal abnormalities in the  left perihilar region. An area of nodularity remains that measures 1.6  cm. The previously seen left pleural effusion has resolved in the  interim. No new findings.     VERONICA HINTON MD   XR Surgery [facility] Fluoro L/T 5 Min w Stills    Narrative    SURGERY C-ARM FLUOROSCOPY LESS THAN FIVE MINUTES WITH STILLS 5/15/2018  10:32 AM     COMPARISON: 5/9/2018    HISTORY: Port-a-Cath.    NUMBER OF IMAGES ACQUIRED: 2    VIEWS: AP    FLUOROSCOPY TIME: 17      Impression    IMPRESSION: Two coned-down intraoperative views of the chest  demonstrate placement of a left-sided subclavian port with the tip  projected over the distal SVC.    GIL TONY MD   XR Chest Port 1 View    Narrative    XR CHEST PORT 1 VW 5/15/2018 11:25 AM    HISTORY: PowerPort catheter placement.    COMPARISON: 5/9/2018.      Impression    IMPRESSION: Single view of the chair shows interval placement of a  left-sided power port catheter. The tip is in the expected region of  the distal SVC. No acute cardiopulmonary disease is demonstrated. The  patient's known the left upper lobe mass and left apical pleural-based  opacity are unchanged.    VERONICA HINTON MD       Assessment and plan:    (C34.92) Carcinoma, lung, left (H)  (primary encounter diagnosis)  I reviewed with the patient today the toxicity of chemotherapy and potential side effects.  We reviewed the management of  nausea in details.  I will see the patient again in 2 weeks time before his next cycle of chemotherapy.  Patient will receive 6 cycles of chemotherapy according to treatment plan.    (E78.5) Hyperlipidemia LDL goal <100  C patient currently on Crestor 5 mg orally daily.    (F17.200) Tobacco use disorder  Emphasized the importance of quitting smoking.  I asked the patient to contact his primary care physician to help him with smoking cessation.    (J45.909) Moderate asthma without complication, unspecified whether persistent  Patient symptoms has been stable.    (R11.2,  T45.1X5A) Chemotherapy induced nausea and vomiting  Reviewed with the patient to the nausea regimen in details.  I would recommend patient to take dexamethasone 8 mg twice daily for 2 days following his chemotherapy.  The patient will continue on Zofran 8 mg twice daily for 3 days following chemotherapy.  He can use Compazine if needed    (I10) Benign essential hypertension  Patient continue on Cozaar 50 mg orally daily.  Metoprolol 50 mg orally daily.    The patient is ready to learn, no apparent learning barriers were identified.  Diagnosis and treatment plans were explained to the patient. The patient expressed understanding of the content. The patient asked appropriate questions. The patient questions were answered to his satisfaction.    Chart documentation with Dragon Voice recognition Software. Although reviewed after completion, some words and grammatical errors may remain.    Again, thank you for allowing me to participate in the care of your patient.        Sincerely,        Mor Mccauley MD

## 2018-05-18 NOTE — PATIENT INSTRUCTIONS
We would like to see you back in clinic with Dr. Mccauley in 2 weeks with chemotherapy to be scheduled per treatment plan.  Order has been placed for IV fluids with antiemetics, so if you are feeling nauseous or dehydrated, please call and schedule an appointment.      When you are in need of a refill, please call your pharmacy and they will send us a request.      Copy of appointments, and after visit summary (AVS) given to patient.      If you have any questions during business hours (M-F 8 AM- 4PM), please call Erica Elliott RN, BSN, OCN Oncology Hematology /Breast Cancer Navigator at [facility] [phone].       For questions after business hours, or on holidays/weekends, please call our after hours Nurse Triage line [phone]. Thank you.

## 2018-05-18 NOTE — PROGRESS NOTES
Hematology/ Oncology Follow-up Visit:  May 18, 2018    Reason for Visit:   Chief Complaint   Patient presents with     Oncology Clinic Visit     1 week recheck Malignant neoplasm of upper lobe of left lung, no labs       Oncologic History:  Carcinoma, lung, left (H)  Patient presented with L side shoulder and has been traveling  to L/side about  to the whole L/side upper back and L side of chest for about 3 week he has had the pain has been taking aleve.  Subsequently went on to have a CT scan done.  Which showed 1.6 cm nodular infiltrate in the left upper lobe.  The lesion appear spiculated and worrisome for lung cancer.  There is a second 0.7 cm indeterminate nodule in the lingular portion of the left lung.  Subsequently the patient went on to have CT-guided biopsy.  The pathology came back showing moderately differentiated adenocarcinoma. He had subsequent noted left thoracoscopic left pleural biopsies and left lobe which resection on April 11, 2018.  The surgical pathology came back with pleural biopsies came back positive for invasive adenocarcinoma with parietal pleural invasion.  The wedge resection came back with adenocarcinoma with acinar patterns of growth moderately differentiated the tumor size is 1.1 and 0.3 cm into 2 separate tumor nodules.  Visceropleural invasion was identified the margins were negative lymphovascular invasion was not identified large venous artery involvement was not identified as well.  The pathologic staging of pT3N0.        Interval History:  Patient returning today following cycle #1 of chemotherapy with cisplatin and Alimta.  He had severe nausea and repeated vomiting for 2 days following chemotherapy.  Today his nausea and vomiting has improved.  He denies any fever or chills.  He denies any diarrhea or abdominal pain.  He denies any shortness of breath or cough or wheezing.  He continues to smoke half a pack of cigarettes a day.  He quit drinking about 2 weeks ago.    Review  Of Systems:  Constitutional: Negative for fever, chills, and night sweats.  Skin: negative.  Eyes: negative.  Ears/Nose/Throat: negative.  Respiratory: No shortness of breath, dyspnea on exertion, cough, or hemoptysis.  Cardiovascular: negative.  Gastrointestinal: negative.  Genitourinary: negative.  Musculoskeletal: negative.  Neurologic: negative.  Psychiatric: negative.  Hematologic/Lymphatic/Immunologic: negative.  Endocrine: negative.    All other ROS negative unless mentioned in interval history.    Past medical, social, surgical, and family histories reviewed.    Allergies:  Allergies as of 05/18/2018 - Alcides as Reviewed 05/18/2018   Allergen Reaction Noted     Cipro [ciprofloxacin] Swelling 06/22/2009       Current Medications:  Current Outpatient Prescriptions   Medication Sig Dispense Refill     acetaminophen (TYLENOL) 325 MG tablet Take 3 tablets (975 mg) by mouth every 8 hours 100 tablet 0     ASPIRIN PO Take 81 mg by mouth daily        dexamethasone (DECADRON) 4 MG tablet Take 4 mg (1 tab) by mouth twice daily for 6 doses. Start the evening prior to Infusion. 6 tablet 3     FOLIC ACID PO Take 1 mg by mouth daily       HYDROcodone-acetaminophen (NORCO) 5-325 MG per tablet Take 1-2 tablets by mouth every 6 hours as needed for pain 20 tablet 0     LORazepam (ATIVAN) 0.5 MG tablet Take 0.5 mg by mouth every 4 hours as needed for nausea, vomiting, anxiety, or sleep. 30 tablet 5     losartan (COZAAR) 50 MG tablet Take 1 tablet (50 mg) by mouth daily 90 tablet 3     metoprolol (LOPRESSOR) 50 MG tablet Take 1 tablet (50 mg) by mouth 2 times daily 60 tablet 1     morphine (MS CONTIN) 15 MG 12 hr tablet Take 1 tablet (15 mg) by mouth every 12 hours maximum 2 tablet(s) per day 30 tablet 0     morphine (MSIR) 15 MG IR tablet Take 1 tablet (15 mg) by mouth every 6 hours as needed for moderate to severe pain 60 tablet 0     nicotine polacrilex (EQL NICOTINE) 2 MG lozenge Place 2 mg inside cheek every hour as needed  "for smoking cessation       prochlorperazine (COMPAZINE) 10 MG tablet Take 1 tablet (10 mg) by mouth every 6 hours as needed for nausea or vomiting 30 tablet 3     rosuvastatin (CRESTOR) 5 MG tablet Take 1 tablet (5 mg) by mouth daily 30 tablet 3     senna-docusate (SENOKOT-S;PERICOLACE) 8.6-50 MG per tablet Take 2 tablets by mouth 2 times daily 100 tablet 0     VENTOLIN  (90 Base) MCG/ACT Inhaler Inhale 2 puffs into the lungs every 4 hours as needed for shortness of breath / dyspnea or wheezing 1 Inhaler 1        Physical Exam:  /77 (BP Location: Right arm, Patient Position: Sitting, Cuff Size: Adult Regular)  Pulse 90  Temp 97.1  F (36.2  C) (Tympanic)  Resp 24  Ht 1.905 m (6' 3\")  Wt 70.8 kg (156 lb 1.6 oz)  SpO2 94%  BMI 19.51 kg/m2  Wt Readings from Last 12 Encounters:   05/18/18 70.8 kg (156 lb 1.6 oz)   05/15/18 70.3 kg (155 lb)   05/10/18 74.5 kg (164 lb 4.8 oz)   05/09/18 70.3 kg (155 lb)   05/09/18 73.5 kg (162 lb)   05/09/18 73.8 kg (162 lb 12.8 oz)   04/30/18 73.9 kg (163 lb)   04/27/18 74.3 kg (163 lb 11.2 oz)   04/20/18 73.5 kg (162 lb)   04/17/18 75.7 kg (166 lb 12.8 oz)   04/11/18 74.9 kg (165 lb 2 oz)   04/08/18 77.1 kg (170 lb)     ECOG performance status: 1  GENERAL APPEARANCE: Healthy, alert and in no acute distress.  HEENT: Sclerae anicteric. PERRLA. Oropharynx without ulcers, lesions, or thrush.  NECK: Supple. No asymmetry or masses.  LYMPHATICS: No palpable cervical, supraclavicular, axillary, or inguinal lymphadenopathy.  RESP: Lungs clear to auscultation bilaterally without rales, rhonchi or wheezes.  CARDIOVASCULAR: Regular rate and rhythm. Normal S1, S2; no S3 or S4. No murmur, gallop, or rub.  ABDOMEN: Soft, nontender. Bowel sounds normal. No palpable organomegaly or masses.  MUSCULOSKELETAL: Extremities without gross deformities noted. No edema of bilateral lower extremities.  SKIN: No suspicious lesions or rashes.  NEURO: Alert and oriented x 3. Cranial nerves II-XII " grossly intact.  PSYCHIATRIC: Mentation and affect appear normal.    Laboratory/Imaging Studies:  Infusion Therapy Visit on 05/10/2018   Component Date Value Ref Range Status     WBC 05/10/2018 7.3  4.0 - 11.0 10e9/L Final     RBC Count 05/10/2018 4.24* 4.4 - 5.9 10e12/L Final     Hemoglobin 05/10/2018 14.0  13.3 - 17.7 g/dL Final     Hematocrit 05/10/2018 38.5* 40.0 - 53.0 % Final     MCV 05/10/2018 91  78 - 100 fl Final     MCH 05/10/2018 33.0  26.5 - 33.0 pg Final     MCHC 05/10/2018 36.4  31.5 - 36.5 g/dL Final     RDW 05/10/2018 14.6  10.0 - 15.0 % Final     Platelet Count 05/10/2018 206  150 - 450 10e9/L Final     Diff Method 05/10/2018 Automated Method   Final     % Neutrophils 05/10/2018 74.6  % Final     % Lymphocytes 05/10/2018 14.3  % Final     % Monocytes 05/10/2018 10.4  % Final     % Eosinophils 05/10/2018 0.1  % Final     % Basophils 05/10/2018 0.3  % Final     % Immature Granulocytes 05/10/2018 0.3  % Final     Absolute Neutrophil 05/10/2018 5.4  1.6 - 8.3 10e9/L Final     Absolute Lymphocytes 05/10/2018 1.0  0.8 - 5.3 10e9/L Final     Absolute Monocytes 05/10/2018 0.8  0.0 - 1.3 10e9/L Final     Absolute Eosinophils 05/10/2018 0.0  0.0 - 0.7 10e9/L Final     Absolute Basophils 05/10/2018 0.0  0.0 - 0.2 10e9/L Final     Abs Immature Granulocytes 05/10/2018 0.0  0 - 0.4 10e9/L Final     Sodium 05/10/2018 Canceled, Test credited  133 - 144 mmol/L Final    Comment: Unsatisfactory specimen - hemolyzed  PAT IN INFUSION TO REORDER AND HAVE REDRAWN 0845 5.10.18 JW       Potassium 05/10/2018 Canceled, Test credited  3.4 - 5.3 mmol/L Final    Comment: Unsatisfactory specimen - hemolyzed  PAT IN INFUSION TO REORDER AND HAVE REDRAWN 0845 5.10.18 JW       Chloride 05/10/2018 Canceled, Test credited  94 - 109 mmol/L Final    Comment: Unsatisfactory specimen - hemolyzed  PAT IN INFUSION TO REORDER AND HAVE REDRAWN 0845 5.10.18 JW       Carbon Dioxide 05/10/2018 Canceled, Test credited  20 - 32 mmol/L Final     Comment: Unsatisfactory specimen - hemolyzed  PAT IN INFUSION TO REORDER AND HAVE REDRAWN 0845 5.10.18        Anion Gap 05/10/2018 Canceled, Test credited  6 - 17 mmol/L Final    Comment: Unsatisfactory specimen - hemolyzed  PAT IN INFUSION TO REORDER AND HAVE REDRAWN 0845 5.10.18        Glucose 05/10/2018 Canceled, Test credited  70 - 99 mg/dL Final    Comment: Unsatisfactory specimen - hemolyzed  PAT IN INFUSION TO REORDER AND HAVE REDRAWN 0845 5.10.18        Urea Nitrogen 05/10/2018 Canceled, Test credited  7 - 30 mg/dL Final    Comment: Unsatisfactory specimen - hemolyzed  PAT IN INFUSION TO REORDER AND HAVE REDRAWN 0845 5.10.18        Creatinine 05/10/2018 Canceled, Test credited  0.66 - 1.25 mg/dL Final    Comment: Unsatisfactory specimen - hemolyzed  PAT IN INFUSION TO REORDER AND HAVE REDRAWN 0845 5.10.18        GFR Estimate 05/10/2018 Canceled, Test credited  >60 mL/min/1.7m2 Final    Comment: Unsatisfactory specimen - hemolyzed  PAT IN INFUSION TO REORDER AND HAVE REDRAWN 0845 5.10.18        GFR Estimate If Black 05/10/2018 Canceled, Test credited  >60 mL/min/1.7m2 Final    Comment: Unsatisfactory specimen - hemolyzed  PAT IN INFUSION TO REORDER AND HAVE REDRAWN 0845 5.10.18        Calcium 05/10/2018 Canceled, Test credited  8.5 - 10.1 mg/dL Final    Comment: Unsatisfactory specimen - hemolyzed  PAT IN INFUSION TO REORDER AND HAVE REDRAWN 0845 5.10.18        Magnesium 05/10/2018 Canceled, Test credited  1.6 - 2.3 mg/dL Final    Comment: Unsatisfactory specimen - hemolyzed  PAT IN INFUSION TO REORDER AND HAVE REDRAWN 0845 5.10.18        Sodium 05/10/2018 135  133 - 144 mmol/L Final     Potassium 05/10/2018 3.5  3.4 - 5.3 mmol/L Final     Chloride 05/10/2018 97  94 - 109 mmol/L Final     Carbon Dioxide 05/10/2018 30  20 - 32 mmol/L Final     Anion Gap 05/10/2018 8  3 - 14 mmol/L Final     Glucose 05/10/2018 116* 70 - 99 mg/dL Final     Urea Nitrogen 05/10/2018 15  7 - 30 mg/dL Final      Creatinine 05/10/2018 0.67  0.66 - 1.25 mg/dL Final     GFR Estimate 05/10/2018 >90  >60 mL/min/1.7m2 Final    Non  GFR Calc     GFR Estimate If Black 05/10/2018 >90  >60 mL/min/1.7m2 Final    African American GFR Calc     Calcium 05/10/2018 8.6  8.5 - 10.1 mg/dL Final     Magnesium 05/10/2018 1.8  1.6 - 2.3 mg/dL Final        Recent Results (from the past 744 hour(s))   Chest CT - IV contrast only - PE protocol    Narrative    CT CHEST WITH CONTRAST  4/30/2018 8:42 PM    HISTORY: Left-sided pain. Evaluate for pulmonary embolism.    COMPARISON: A CT on 3/28/2018.    TECHNIQUE: Routine transverse CT imaging of the chest was performed  following the uneventful intravenous administration of Isovue 370-  80mL contrast. A pulmonary embolism protocol was utilized. Radiation  dose for this scan was reduced using automated exposure control,  adjustment of the mA and/or kV according to patient size, or iterative  reconstruction technique.    FINDINGS: The heart size is normal. No enlarged lymph node or other  abnormal mediastinal mass is seen. There is calcification of the  thoracic aorta. There is very good opacification of the pulmonary  arteries with contrast. No pulmonary embolism is seen. The lungs are  clear. There has been no definite change in a 2.2 cm somewhat  spiculated mass in the lateral aspect of the left upper lobe. There  has been development of consolidation or capping of the left lung  apex. The lungs are otherwise clear. There has been development of a  small left pleural effusion. No right effusion is seen. There are  degenerative changes in the spine. No other osseous abnormalities  identified. No chest wall pathology is seen. The visualized upper  abdomen is unremarkable.      Impression    IMPRESSION:   1. No pulmonary embolism is seen.  2. No change in a 2.2 cm spiculated mass of the left upper lobe. On  previous imaging this was suspicious for malignancy.  3. Development of  consolidation or capping of the left lung apex.  4. Small left pleural effusion.    ADILIA ALSTON MD   Chest XR,  PA & LAT    Narrative    XR CHEST 2 VW 5/9/2018 11:37 AM    HISTORY: Pain.    COMPARISON: 4/12/2018.      Impression    IMPRESSION: 2 views of the chest show interval improvement in  previously seen patchy and nodular parenchymal abnormalities in the  left perihilar region. An area of nodularity remains that measures 1.6  cm. The previously seen left pleural effusion has resolved in the  interim. No new findings.     VERONICA HINTON MD   XR Surgery [facility] Fluoro L/T 5 Min w Stills    Narrative    SURGERY C-ARM FLUOROSCOPY LESS THAN FIVE MINUTES WITH STILLS 5/15/2018  10:32 AM     COMPARISON: 5/9/2018    HISTORY: Port-a-Cath.    NUMBER OF IMAGES ACQUIRED: 2    VIEWS: AP    FLUOROSCOPY TIME: 17      Impression    IMPRESSION: Two coned-down intraoperative views of the chest  demonstrate placement of a left-sided subclavian port with the tip  projected over the distal SVC.    GIL TONY MD   XR Chest Port 1 View    Narrative    XR CHEST PORT 1 VW 5/15/2018 11:25 AM    HISTORY: PowerPort catheter placement.    COMPARISON: 5/9/2018.      Impression    IMPRESSION: Single view of the chair shows interval placement of a  left-sided power port catheter. The tip is in the expected region of  the distal SVC. No acute cardiopulmonary disease is demonstrated. The  patient's known the left upper lobe mass and left apical pleural-based  opacity are unchanged.    VERONICA HINTON MD       Assessment and plan:    (C34.92) Carcinoma, lung, left (H)  (primary encounter diagnosis)  I reviewed with the patient today the toxicity of chemotherapy and potential side effects.  We reviewed the management of nausea in details.  I will see the patient again in 2 weeks time before his next cycle of chemotherapy.  Patient will receive 6 cycles of chemotherapy according to treatment plan.    (E78.5) Hyperlipidemia LDL goal <100  C  patient currently on Crestor 5 mg orally daily.    (F17.200) Tobacco use disorder  Emphasized the importance of quitting smoking.  I asked the patient to contact his primary care physician to help him with smoking cessation.    (J45.199) Moderate asthma without complication, unspecified whether persistent  Patient symptoms has been stable.    (R11.2,  T45.1X5A) Chemotherapy induced nausea and vomiting  Reviewed with the patient to the nausea regimen in details.  I would recommend patient to take dexamethasone 8 mg twice daily for 2 days following his chemotherapy.  The patient will continue on Zofran 8 mg twice daily for 3 days following chemotherapy.  He can use Compazine if needed    (I10) Benign essential hypertension  Patient continue on Cozaar 50 mg orally daily.  Metoprolol 50 mg orally daily.    The patient is ready to learn, no apparent learning barriers were identified.  Diagnosis and treatment plans were explained to the patient. The patient expressed understanding of the content. The patient asked appropriate questions. The patient questions were answered to his satisfaction.    Chart documentation with Dragon Voice recognition Software. Although reviewed after completion, some words and grammatical errors may remain.

## 2018-05-18 NOTE — NURSING NOTE
"Oncology Rooming Note    May 18, 2018 9:32 AM   Chilo Oneil is a 68 year old male who presents for:    Chief Complaint   Patient presents with     Oncology Clinic Visit     1 week recheck Malignant neoplasm of upper lobe of left lung, no labs     Initial Vitals: /77 (BP Location: Right arm, Patient Position: Sitting, Cuff Size: Adult Regular)  Pulse 90  Temp 97.1  F (36.2  C) (Tympanic)  Resp 24  Ht 1.905 m (6' 3\")  Wt 70.8 kg (156 lb 1.6 oz)  SpO2 94%  BMI 19.51 kg/m2 Estimated body mass index is 19.51 kg/(m^2) as calculated from the following:    Height as of this encounter: 1.905 m (6' 3\").    Weight as of this encounter: 70.8 kg (156 lb 1.6 oz). Body surface area is 1.94 meters squared.  No Pain (0) Comment: Data Unavailable   No LMP for male patient.  Allergies reviewed: Yes  Medications reviewed: Yes    Medications: Medication refills not needed today.  Pharmacy name entered into [facility]: [facility] [place], [place] - [address]    Clinical concerns: 1 week recheck Malignant neoplasm of upper lobe of left lung, no labs    7 minutes for nursing intake (face to face time)     Michela Hirsch CMA              "

## 2018-05-23 ENCOUNTER — TRANSFERRED RECORDS (OUTPATIENT)
Dept: HEALTH INFORMATION MANAGEMENT | Facility: CLINIC | Age: 68
End: 2018-05-23

## 2018-05-24 ENCOUNTER — PATIENT OUTREACH (OUTPATIENT)
Dept: CARE COORDINATION | Facility: CLINIC | Age: 68
End: 2018-05-24

## 2018-05-24 NOTE — PROGRESS NOTES
Clinic Care Coordination Contact  Care Team Conversations    SW received notification from Claudette Rawls,  with [facility]'s Long Term Services and Support team, that pt needs to complete a Long Term Care Services MA application, not just an MA application.    SW placed call to pt and exchanged voice messages with him several times and eventually spoke with the pt.      Pt and SW will meet on Thursday, May 31 around 9:00 AM when pt is in the Oncology Center and complete the necessary paperwork needed for [facility].  SW has completed most of the application from previous documents that pt and work have done previously.      Pt also has the bank statements needed for his initial MA applications and will bring them with to his appointment on 5-31-18.    SW to continue to work with the pt.    Sofya Unger  Social Work Care Coordinator  [facility]  [phone]

## 2018-05-31 PROBLEM — E87.6 HYPOKALEMIA: Status: ACTIVE | Noted: 2018-01-01

## 2018-05-31 PROBLEM — E83.42 HYPOMAGNESEMIA: Status: ACTIVE | Noted: 2018-01-01

## 2018-05-31 NOTE — PROGRESS NOTES
Hematology/ Oncology Follow-up Visit:  May 31, 2018    Reason for Visit:   Chief Complaint   Patient presents with     Chemotherapy     2 week follow up for lung CA.        Oncologic History:  Carcinoma, lung, left (H)  Patient presented with L side shoulder and has been traveling  to L/side about  to the whole L/side upper back and L side of chest for about 3 week he has had the pain has been taking aleve.  Subsequently went on to have a CT scan done.  Which showed 1.6 cm nodular infiltrate in the left upper lobe.  The lesion appear spiculated and worrisome for lung cancer.  There is a second 0.7 cm indeterminate nodule in the lingular portion of the left lung.  Subsequently the patient went on to have CT-guided biopsy.  The pathology came back showing moderately differentiated adenocarcinoma. He had subsequent noted left thoracoscopic left pleural biopsies and left lobe which resection on April 11, 2018.  The surgical pathology came back with pleural biopsies came back positive for invasive adenocarcinoma with parietal pleural invasion.  The wedge resection came back with adenocarcinoma with acinar patterns of growth moderately differentiated the tumor size is 1.1 and 0.3 cm into 2 separate tumor nodules.  Visceropleural invasion was identified the margins were negative lymphovascular invasion was not identified large venous artery involvement was not identified as well.  The pathologic staging of pT3N0.        Interval History:  Patient is today for cycle #2 of chemotherapy with Alimta and cisplatin.  Chemotherapy was complicated with increasing nausea or vomiting.  The patient continues to have nausea.  He continues to have poor appetite.  He denies any shortness of breath or cough or wheezing    Review Of Systems:  Constitutional: Negative for fever, chills, and night sweats.  Skin: negative.  Eyes: negative.  Ears/Nose/Throat: negative.  Respiratory: No shortness of breath, dyspnea on exertion, cough, or  hemoptysis.  Cardiovascular: negative.  Gastrointestinal: negative.  Genitourinary: negative.  Musculoskeletal: negative.  Neurologic: negative.  Psychiatric: negative.  Hematologic/Lymphatic/Immunologic: negative.  Endocrine: negative.    All other ROS negative unless mentioned in interval history.    Past medical, social, surgical, and family histories reviewed.    Allergies:  Allergies as of 05/31/2018 - Alcides as Reviewed 05/31/2018   Allergen Reaction Noted     Cipro [ciprofloxacin] Swelling 06/22/2009       Current Medications:  Current Outpatient Prescriptions   Medication Sig Dispense Refill     acetaminophen (TYLENOL) 325 MG tablet Take 3 tablets (975 mg) by mouth every 8 hours 100 tablet 0     ASPIRIN PO Take 81 mg by mouth daily        dexamethasone (DECADRON) 4 MG tablet Take 4 mg (1 tab) by mouth twice daily for 6 doses. Start the evening prior to Infusion. 6 tablet 3     FOLIC ACID PO Take 1 mg by mouth daily       LORazepam (ATIVAN) 0.5 MG tablet Take 0.5 mg by mouth every 4 hours as needed for nausea, vomiting, anxiety, or sleep. 30 tablet 5     losartan (COZAAR) 50 MG tablet Take 1 tablet (50 mg) by mouth daily 90 tablet 3     magnesium oxide (MAG-OX) 400 (241.3 Mg) MG tablet Take 1 tablet (400 mg) by mouth daily 60 tablet 3     metoprolol (LOPRESSOR) 50 MG tablet Take 1 tablet (50 mg) by mouth 2 times daily 60 tablet 1     omeprazole (PRILOSEC) 20 MG CR capsule Take 1 capsule (20 mg) by mouth daily 90 capsule 3     potassium chloride SA (K-DUR/KLOR-CON M) 10 MEQ CR tablet Take 1 tablet (10 mEq) by mouth daily 30 tablet 1     prochlorperazine (COMPAZINE) 10 MG tablet Take 1 tablet (10 mg) by mouth every 6 hours as needed for nausea or vomiting 30 tablet 3     rosuvastatin (CRESTOR) 5 MG tablet Take 1 tablet (5 mg) by mouth daily 30 tablet 3     senna-docusate (SENOKOT-S;PERICOLACE) 8.6-50 MG per tablet Take 2 tablets by mouth 2 times daily 100 tablet 0     traMADol (ULTRAM) 50 MG tablet Take 1 tablet  "(50 mg) by mouth every 6 hours as needed for severe pain 20 tablet 0     VENTOLIN  (90 Base) MCG/ACT Inhaler Inhale 2 puffs into the lungs every 4 hours as needed for shortness of breath / dyspnea or wheezing 1 Inhaler 1     ALPRAZolam (XANAX) 0.5 MG tablet        nicotine polacrilex (EQL NICOTINE) 2 MG lozenge Place 2 mg inside cheek every hour as needed for smoking cessation          Physical Exam:  /52 (BP Location: Right arm, Patient Position: Sitting, Cuff Size: Adult Regular)  Pulse 71  Temp 97.3  F (36.3  C) (Tympanic)  Resp 18  Ht 1.905 m (6' 3\")  Wt 69.1 kg (152 lb 6.4 oz)  SpO2 96%  BMI 19.05 kg/m2  Wt Readings from Last 12 Encounters:   05/31/18 69.1 kg (152 lb 6.4 oz)   05/18/18 70.8 kg (156 lb 1.6 oz)   05/15/18 70.3 kg (155 lb)   05/10/18 74.5 kg (164 lb 4.8 oz)   05/09/18 70.3 kg (155 lb)   05/09/18 73.5 kg (162 lb)   05/09/18 73.8 kg (162 lb 12.8 oz)   04/30/18 73.9 kg (163 lb)   04/27/18 74.3 kg (163 lb 11.2 oz)   04/20/18 73.5 kg (162 lb)   04/17/18 75.7 kg (166 lb 12.8 oz)   04/11/18 74.9 kg (165 lb 2 oz)     ECOG performance status: 1  GENERAL APPEARANCE: Healthy, alert and in no acute distress.  HEENT: Sclerae anicteric. PERRLA. Oropharynx without ulcers, lesions, or thrush.  NECK: Supple. No asymmetry or masses.  LYMPHATICS: No palpable cervical, supraclavicular, axillary, or inguinal lymphadenopathy.  RESP: Lungs clear to auscultation bilaterally without rales, rhonchi or wheezes.  CARDIOVASCULAR: Regular rate and rhythm. Normal S1, S2; no S3 or S4. No murmur, gallop, or rub.  ABDOMEN: Soft, nontender. Bowel sounds normal. No palpable organomegaly or masses.  MUSCULOSKELETAL: Extremities without gross deformities noted. No edema of bilateral lower extremities.  SKIN: No suspicious lesions or rashes.  NEURO: Alert and oriented x 3. Cranial nerves II-XII grossly intact.  PSYCHIATRIC: Mentation and affect appear normal.    Laboratory/Imaging Studies:  Infusion Therapy Visit on " 05/31/2018   Component Date Value Ref Range Status     WBC 05/31/2018 7.7  4.0 - 11.0 10e9/L Final     RBC Count 05/31/2018 3.59* 4.4 - 5.9 10e12/L Final     Hemoglobin 05/31/2018 11.8* 13.3 - 17.7 g/dL Final     Hematocrit 05/31/2018 32.8* 40.0 - 53.0 % Final     MCV 05/31/2018 91  78 - 100 fl Final     MCH 05/31/2018 32.9  26.5 - 33.0 pg Final     MCHC 05/31/2018 36.0  31.5 - 36.5 g/dL Final     RDW 05/31/2018 14.7  10.0 - 15.0 % Final     Platelet Count 05/31/2018 778* 150 - 450 10e9/L Final     Diff Method 05/31/2018 Automated Method   Final     % Neutrophils 05/31/2018 63.3  % Final     % Lymphocytes 05/31/2018 17.1  % Final     % Monocytes 05/31/2018 19.5  % Final     % Eosinophils 05/31/2018 0.0  % Final     % Basophils 05/31/2018 0.1  % Final     Absolute Neutrophil 05/31/2018 4.9  1.6 - 8.3 10e9/L Final     Absolute Lymphocytes 05/31/2018 1.3  0.8 - 5.3 10e9/L Final     Absolute Monocytes 05/31/2018 1.5* 0.0 - 1.3 10e9/L Final     Absolute Eosinophils 05/31/2018 0.0  0.0 - 0.7 10e9/L Final     Absolute Basophils 05/31/2018 0.0  0.0 - 0.2 10e9/L Final     Sodium 05/31/2018 133  133 - 144 mmol/L Final     Potassium 05/31/2018 3.0* 3.4 - 5.3 mmol/L Final     Chloride 05/31/2018 93* 94 - 109 mmol/L Final     Carbon Dioxide 05/31/2018 34* 20 - 32 mmol/L Final     Anion Gap 05/31/2018 6  3 - 14 mmol/L Final     Glucose 05/31/2018 142* 70 - 99 mg/dL Final     Urea Nitrogen 05/31/2018 12  7 - 30 mg/dL Final     Creatinine 05/31/2018 0.82  0.66 - 1.25 mg/dL Final     GFR Estimate 05/31/2018 >90  >60 mL/min/1.7m2 Final    Non  GFR Calc     GFR Estimate If Black 05/31/2018 >90  >60 mL/min/1.7m2 Final    African American GFR Calc     Calcium 05/31/2018 8.1* 8.5 - 10.1 mg/dL Final     Magnesium 05/31/2018 1.5* 1.6 - 2.3 mg/dL Final        Recent Results (from the past 744 hour(s))   Chest CT - IV contrast only - PE protocol    Narrative    CT CHEST WITH CONTRAST  4/30/2018 8:42 PM    HISTORY: Left-sided  pain. Evaluate for pulmonary embolism.    COMPARISON: A CT on 3/28/2018.    TECHNIQUE: Routine transverse CT imaging of the chest was performed  following the uneventful intravenous administration of Isovue 370-  80mL contrast. A pulmonary embolism protocol was utilized. Radiation  dose for this scan was reduced using automated exposure control,  adjustment of the mA and/or kV according to patient size, or iterative  reconstruction technique.    FINDINGS: The heart size is normal. No enlarged lymph node or other  abnormal mediastinal mass is seen. There is calcification of the  thoracic aorta. There is very good opacification of the pulmonary  arteries with contrast. No pulmonary embolism is seen. The lungs are  clear. There has been no definite change in a 2.2 cm somewhat  spiculated mass in the lateral aspect of the left upper lobe. There  has been development of consolidation or capping of the left lung  apex. The lungs are otherwise clear. There has been development of a  small left pleural effusion. No right effusion is seen. There are  degenerative changes in the spine. No other osseous abnormalities  identified. No chest wall pathology is seen. The visualized upper  abdomen is unremarkable.      Impression    IMPRESSION:   1. No pulmonary embolism is seen.  2. No change in a 2.2 cm spiculated mass of the left upper lobe. On  previous imaging this was suspicious for malignancy.  3. Development of consolidation or capping of the left lung apex.  4. Small left pleural effusion.    ADILIA ALSTON MD   Chest XR,  PA & LAT    Narrative    XR CHEST 2 VW 5/9/2018 11:37 AM    HISTORY: Pain.    COMPARISON: 4/12/2018.      Impression    IMPRESSION: 2 views of the chest show interval improvement in  previously seen patchy and nodular parenchymal abnormalities in the  left perihilar region. An area of nodularity remains that measures 1.6  cm. The previously seen left pleural effusion has resolved in the  interim. No  new findings.     VERONICA HINTON MD   XR Surgery [facility] Fluoro L/T 5 Min w Stills    Narrative    SURGERY C-ARM FLUOROSCOPY LESS THAN FIVE MINUTES WITH STILLS 5/15/2018  10:32 AM     COMPARISON: 5/9/2018    HISTORY: Port-a-Cath.    NUMBER OF IMAGES ACQUIRED: 2    VIEWS: AP    FLUOROSCOPY TIME: 17      Impression    IMPRESSION: Two coned-down intraoperative views of the chest  demonstrate placement of a left-sided subclavian port with the tip  projected over the distal SVC.    GIL TONY MD   XR Chest Port 1 View    Narrative    XR CHEST PORT 1 VW 5/15/2018 11:25 AM    HISTORY: PowerPort catheter placement.    COMPARISON: 5/9/2018.      Impression    IMPRESSION: Single view of the chair shows interval placement of a  left-sided power port catheter. The tip is in the expected region of  the distal SVC. No acute cardiopulmonary disease is demonstrated. The  patient's known the left upper lobe mass and left apical pleural-based  opacity are unchanged.    VERONICA HINTON MD       Assessment and plan:    (C34.92) Carcinoma, lung, left (H)  (primary encounter diagnosis)  We will proceed today with cycle #2 of chemotherapy.  I will see the patient again next week to review toxicities of the medication, nausea, and pain management    (F10.10) Alcohol use, daily use  Is drinking 2 drinks a couple of occasions weekly.    (I10) Benign essential hypertension  Patient currently on metoprolol 50 mg orally daily.  Is also on Cozaar 50 mg orally daily.    (E78.5) Hyperlipidemia LDL goal <100  Patient currently on Crestor 5 mg orally daily.    (K21.9) Gastroesophageal reflux disease without esophagitis  Patient currently on omeprazole 20 mg orally daily.    (R11.2,  T45.1X5A) Chemotherapy induced nausea and vomiting  Reviewed with the patient today causes of nausea.  I suspect hydrocodone has been contributing to nausea.  I would recommend tramadol instead.  Started the patient on Prilosec today.  Reviewed with him nausea medications  in details.    (F17.200) Tobacco use disorder  Strongly emphasized importance of quitting smoking.    (J45.909) Moderate asthma without complication, unspecified whether persistent  Patient symptoms has been stable.  He has been using albuterol inhaler intermittently.    (E83.42) Hypomagnesemia  Patient will start on supplements magnesium oxide 400 mg orally daily.    (E87.6) Hypokalemia  Patient will start with potassium chloride 10 mEq daily.    The patient is ready to learn, no apparent learning barriers were identified.  Diagnosis and treatment plans were explained to the patient. The patient expressed understanding of the content. The patient asked appropriate questions. The patient questions were answered to his satisfaction.    Time spent 40 minutes more than 50% of the time in counseling and coordination of care including discussion of management of chemotherapy side effects, management plan follow-up and prognosis.    Chart documentation with Dragon Voice recognition Software. Although reviewed after completion, some words and grammatical errors may remain.

## 2018-05-31 NOTE — PATIENT INSTRUCTIONS
We would like to see you back in clinic with Dr. Mccauley next week.      Your prescriptions have been sent to:   [facility], [place] - [address]  [address] [address]  Phone: [phone] Fax: [phone] Alternate Fax: [phone], [phone]   When you are in need of a refill, please call your pharmacy and they will send us a request.      Copy of appointments, and after visit summary (AVS) given to patient.      If you have any questions during business hours (M-F 8 AM- 4PM), please call Erica Elliott RN, BSN, OCN Oncology Hematology /Breast Cancer Navigator at [facility] [phone].       For questions after business hours, or on holidays/weekends, please call our after hours Nurse Triage line [phone]. Thank you.

## 2018-05-31 NOTE — NURSING NOTE
"Oncology Rooming Note    May 31, 2018 9:11 AM   Chilo Oneil is a 68 year old male who presents for:    No chief complaint on file.    Initial Vitals: /52 (BP Location: Right arm, Patient Position: Sitting, Cuff Size: Adult Regular)  Pulse 71  Temp 97.3  F (36.3  C) (Tympanic)  Resp 18  Ht 1.905 m (6' 3\")  Wt 69.1 kg (152 lb 6.4 oz)  SpO2 96%  BMI 19.05 kg/m2 Estimated body mass index is 19.05 kg/(m^2) as calculated from the following:    Height as of this encounter: 1.905 m (6' 3\").    Weight as of this encounter: 69.1 kg (152 lb 6.4 oz). Body surface area is 1.91 meters squared.  Mild Pain (3) Comment: left shoulder   No LMP for male patient.  Allergies reviewed: Yes  Medications reviewed: Yes    Medications: Medication refills not needed today.  Pharmacy name entered into Mirror[employer]: [facility] [address]    Clinical concerns: C/o 2 week follow up for lung CA. Noticing he is constipated bowel movements every 3 days. Also when urinating feels burning sensation x 1 week. 3/10 left shoulder pains.     8 minutes for nursing intake (face to face time)     Dorothy Hart, [facility]              "

## 2018-05-31 NOTE — MR AVS SNAPSHOT
After Visit Summary   5/31/2018    Chilo Oneil    MRN: [id number]           Patient Information     Date Of Birth          1950        Visit Information        Provider Department      5/31/2018 9:45 AM Mor Mccauley MD [facility] ONCOLOGY      Today's Diagnoses     Carcinoma, lung, left (H)    -  1    Alcohol use, daily use        Benign essential hypertension        Hyperlipidemia LDL goal <100        Gastroesophageal reflux disease without esophagitis        Chemotherapy induced nausea and vomiting        Tobacco use disorder        Moderate asthma without complication, unspecified whether persistent        Hypomagnesemia        Hyponatremia        Hypokalemia          Care Instructions    We would like to see you back in clinic with Dr. Mccauley next week.      Your prescriptions have been sent to:   [facility] [address]  [address] [facility] [address]  Phone: [phone] Fax: [phone] Alternate Fax: [phone], [phone]   When you are in need of a refill, please call your pharmacy and they will send us a request.      Copy of appointments, and after visit summary (AVS) given to patient.      If you have any questions during business hours (M-F 8 AM- 4PM), please call Erica Elliott RN, BSN, OCN Oncology Hematology /Breast Cancer Navigator at [facility] [phone].       For questions after business hours, or on holidays/weekends, please call our after hours Nurse Triage line [phone]. Thank you.            Follow-ups after your visit        Follow-up notes from your care team     Return in about 1 week (around 6/7/2018).      Your next 10 appointments already scheduled     Jun 04, 2018 10:20 AM CDT   SHORT with Manish Plasencia MD   [facility]    [address] [place] [address] [phone]             Jun 04, 2018 11:30 AM CDT   Level 1 with ROOM 4 [facility] ([facility])    [facility]  [address]  [place] [address]   [phone]            Jun 07, 2018  1:30 PM CDT   Return Visit with Mor Mccauley MD   [facility] ([facility])    [facility]  [address]  [place] [address]   [phone]            Jun 21, 2018  9:00 AM CDT   Level 5 with ROOM 2 [facility] ([facility])    [facility]  [address]  [place] [address]   [phone]            Jun 25, 2018 11:00 AM CDT   Level 1 with ROOM 7 [facility] ([facility])    [facility]  [address]  [facility] [address]   [phone]            Jul 12, 2018  9:00 AM CDT   Level 5 with ROOM 6 [facility] ([facility])    [facility]  [address]  [facility] [address]   [phone]            Jul 16, 2018 11:00 AM CDT   Level 1 with ROOM 10 [facility] ([facility])    [facility]  [address]  [facility] [address]   [phone]              Who to contact     If you have questions or need follow up information about today's clinic visit or your schedule please contact [facility] CANCER [facility] directly at [phone].  Normal or non-critical lab and imaging results will be communicated to you by MyChart, letter or phone within 4 business days after the clinic has received the results. If you do not hear from us within 7 days, please contact the clinic through MyChart or phone. If you have a critical or abnormal lab result, we will notify you by phone as soon as possible.  Submit refill requests through Banter! or call your pharmacy and they will forward the refill request to  "us. Please allow 3 business days for your refill to be completed.          Additional Information About Your Visit        Care EveryWhere ID     This is your Care EveryWhere ID. This could be used by other organizations to access your [place] medical records  [id number]        Your Vitals Were     Pulse Temperature Respirations Height Pulse Oximetry BMI (Body Mass Index)    71 97.3  F (36.3  C) (Tympanic) 18 1.905 m (6' 3\") 96% 19.05 kg/m2       Blood Pressure from Last 3 Encounters:   05/31/18 126/52   05/18/18 163/77   05/15/18 136/62    Weight from Last 3 Encounters:   05/31/18 69.1 kg (152 lb 6.4 oz)   05/18/18 70.8 kg (156 lb 1.6 oz)   05/15/18 70.3 kg (155 lb)              Today, you had the following     No orders found for display         Today's Medication Changes          These changes are accurate as of 5/31/18 12:36 PM.  If you have any questions, ask your nurse or doctor.               Start taking these medicines.        Dose/Directions    magnesium oxide 400 (241.3 Mg) MG tablet   Commonly known as:  MAG-OX   Used for:  Carcinoma, lung, left (H)   Started by:  Mor Mccauley MD        Dose:  400 mg   Take 1 tablet (400 mg) by mouth daily   Quantity:  60 tablet   Refills:  3       omeprazole 20 MG CR capsule   Commonly known as:  priLOSEC   Used for:  Carcinoma, lung, left (H)   Started by:  Mor Mccauley MD        Dose:  20 mg   Take 1 capsule (20 mg) by mouth daily   Quantity:  90 capsule   Refills:  3       potassium chloride SA 10 MEQ CR tablet   Commonly known as:  K-DUR/KLOR-CON M   Used for:  Carcinoma, lung, left (H)   Started by:  Mor Mccauley MD        Dose:  10 mEq   Take 1 tablet (10 mEq) by mouth daily   Quantity:  30 tablet   Refills:  1       traMADol 50 MG tablet   Commonly known as:  ULTRAM   Used for:  Carcinoma, lung, left (H)   Started by:  Mor Mccauley MD        Dose:  50 mg   Take 1 tablet (50 mg) by mouth every 6 hours as needed for severe pain "   Quantity:  20 tablet   Refills:  0         Stop taking these medicines if you haven't already. Please contact your care team if you have questions.     HYDROcodone-acetaminophen 5-325 MG per tablet   Commonly known as:  NORCO   Stopped by:  Mor Mccauley MD           morphine 15 MG 12 hr tablet   Commonly known as:  MS CONTIN   Stopped by:  Mor Mccauley MD                Where to get your medicines      These medications were sent to [facility] [place], [place] - [address]  [address] [facility] [facility] [address]     Phone:  [phone]     magnesium oxide 400 (241.3 Mg) MG tablet    omeprazole 20 MG CR capsule    potassium chloride SA 10 MEQ CR tablet         Some of these will need a paper prescription and others can be bought over the counter.  Ask your nurse if you have questions.     Bring a paper prescription for each of these medications     traMADol 50 MG tablet               Information about OPIOIDS     PRESCRIPTION OPIOIDS: WHAT YOU NEED TO KNOW   You have a prescription for an opioid (narcotic) pain medicine. Opioids can cause addiction. If you have a history of chemical dependency of any type, you are at a higher risk of becoming addicted to opioids. Only take this medicine after all other options have been tried. Take it for as short a time and as few doses as possible.     Do not:    Drive. If you drive while taking these medicines, you could be arrested for driving under the influence (DUI).    Operate heavy machinery    Do any other dangerous activities while taking these medicines.     Drink any alcohol while taking these medicines.      Take with any other medicines that contain acetaminophen. Read all labels carefully. Look for the word  acetaminophen  or  Tylenol.  Ask your pharmacist if you have questions or are unsure.    Store your pills in a secure place, locked if possible. We will not replace any lost or stolen medicine. If you don t finish your medicine,  please throw away (dispose) as directed by your pharmacist. The [place] Pollution [employer] has more information about safe disposal: https://www.pca.[facility].[place].us/living-green/managing-unwanted-medications    All opioids tend to cause constipation. Drink plenty of water and eat foods that have a lot of fiber, such as fruits, vegetables, prune juice, apple juice and high-fiber cereal. Take a laxative (Miralax, milk of magnesia, Colace, Senna) if you don t move your bowels at least every other day.          Primary Care Provider Office Phone # Fax #    Omerronaldnick Plasencia [phone] [address] [address]        Goals        General    Financial Wellbeing (pt-stated)     Notes - Note edited  5/1/2018  8:50 AM by Sofya Paulino LSW    Goal Statement: I want to complete my Medical Assistance application  Measure of Success: My Medical Assistance application will be completed  Supportive Steps to Achieve: SW and pt will finish the remaining paperwork that needs to be completed  Barriers: None identified at this time  Strengths: Pt is motivated as the income increase will assist him greatly  Date to Achieve By: 3 months          Pain Management (pt-stated)     Notes - Note edited  5/10/2018  1:35 PM by Sofya Paulino LSW    CC to discuss with PCP, but would recommend a Palliative Care consult for symptom management.    Pt met with Palliative Care MD yesterday, 5-9-18, and will continue to do so.    Sofya Unger  Social Work Care Coordinator  [facility]  [phone]          Transportation (pt-stated)       Equal Access to Services     [facility]: Hadii aad ku hadasho Soomaali, waaxda luqadaha, qaybta kaalmada adeegyada, celina fisher. So [facility] [phone].    ATENCIÓN: Si habla español, tiene a galaviz disposición servicios gratuitos de asistencia lingüística. Llame al [phone].    We comply with  applicable federal civil rights laws and [place] laws. We do not discriminate on the basis of race, color, national origin, age, disability, sex, sexual orientation, or gender identity.            Thank you!     Thank you for choosing [facility] CANCER CLINIC  for your care. Our goal is always to provide you with excellent care. Hearing back from our patients is one way we can continue to improve our services. Please take a few minutes to complete the written survey that you may receive in the mail after your visit with us. Thank you!             Your Updated Medication List - Protect others around you: Learn how to safely use, store and throw away your medicines at www.disposemymeds.org.          This list is accurate as of 5/31/18 12:36 PM.  Always use your most recent med list.                   Brand Name Dispense Instructions for use Diagnosis    acetaminophen 325 MG tablet    TYLENOL    100 tablet    Take 3 tablets (975 mg) by mouth every 8 hours    Carcinoma, lung, left (H)       ALPRAZolam 0.5 MG tablet    XANAX          ASPIRIN PO      Take 81 mg by mouth daily        dexamethasone 4 MG tablet    DECADRON    6 tablet    Take 4 mg (1 tab) by mouth twice daily for 6 doses. Start the evening prior to Infusion.    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)       EQL NICOTINE 2 MG lozenge   Generic drug:  nicotine polacrilex      Place 2 mg inside cheek every hour as needed for smoking cessation        FOLIC ACID PO      Take 1 mg by mouth daily        LORazepam 0.5 MG tablet    ATIVAN    30 tablet    Take 0.5 mg by mouth every 4 hours as needed for nausea, vomiting, anxiety, or sleep.    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)       losartan 50 MG tablet    COZAAR    90 tablet    Take 1 tablet (50 mg) by mouth daily    Benign essential hypertension       magnesium oxide 400 (241.3 Mg) MG tablet    MAG-OX    60 tablet    Take 1 tablet (400 mg) by mouth daily    Carcinoma, lung, left (H)        metoprolol tartrate 50 MG tablet    LOPRESSOR    60 tablet    Take 1 tablet (50 mg) by mouth 2 times daily    Benign essential hypertension       omeprazole 20 MG CR capsule    priLOSEC    90 capsule    Take 1 capsule (20 mg) by mouth daily    Carcinoma, lung, left (H)       potassium chloride SA 10 MEQ CR tablet    K-DUR/KLOR-CON M    30 tablet    Take 1 tablet (10 mEq) by mouth daily    Carcinoma, lung, left (H)       prochlorperazine 10 MG tablet    COMPAZINE    30 tablet    Take 1 tablet (10 mg) by mouth every 6 hours as needed for nausea or vomiting    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)       rosuvastatin 5 MG tablet    CRESTOR    30 tablet    Take 1 tablet (5 mg) by mouth daily        senna-docusate 8.6-50 MG per tablet    SENOKOT-S;PERICOLACE    100 tablet    Take 2 tablets by mouth 2 times daily    Carcinoma, lung, left (H)       traMADol 50 MG tablet    ULTRAM    20 tablet    Take 1 tablet (50 mg) by mouth every 6 hours as needed for severe pain    Carcinoma, lung, left (H)       VENTOLIN  (90 Base) MCG/ACT Inhaler   Generic drug:  albuterol     1 Inhaler    Inhale 2 puffs into the lungs every 4 hours as needed for shortness of breath / dyspnea or wheezing

## 2018-05-31 NOTE — MR AVS SNAPSHOT
After Visit Summary   5/31/2018    Chilo Oneil    MRN: [id number]           Patient Information     Date Of Birth          1950        Visit Information        Provider Department      5/31/2018 9:00 AM ROOM 1 [facility]        Today's Diagnoses     Carcinoma, lung, left (H)    -  1       Follow-ups after your visit        Your next 10 appointments already scheduled     Jun 04, 2018 10:20 AM CDT   SHORT with Manish Plasencia MD   [facility] ([facility]    [address] [facility] [place] [address]   [phone]            Jun 04, 2018 11:30 AM CDT   Level 1 with ROOM 4 [facility] ([facility])    [facility]  [address]  [place] [address]   [phone]            Jun 07, 2018  1:30 PM CDT   Return Visit with Mor Mccauley MD   [facility] ([facility])    [facility]  [address]  [place] [address]   [phone]            Jun 21, 2018  9:00 AM CDT   Level 5 with ROOM 2 [facility] ([facility])    [facility]  [address]  [place] [address]   [phone]            Jun 25, 2018 11:00 AM CDT   Level 1 with ROOM 7 [facility] ([facility])    [facility]  [address]  [place] [address]   [phone]            Jul 12, 2018  9:00 AM CDT   Level 5 with ROOM 6 [facility] ([facility])    [facility]  [address]  [place] [address]   [phone]            Jul 16, 2018 11:00 AM CDT   Level 1 with ROOM 10 [facility] ([facility])    [facility]  [address]  [place] [address]   [phone]              Who to contact     If you have  questions or need follow up information about today's clinic visit or your schedule please contact [facility] CANCER [facility] directly at [phone].  Normal or non-critical lab and imaging results will be communicated to you by MyChart, letter or phone within 4 business days after the clinic has received the results. If you do not hear from us within 7 days, please contact the clinic through MyChart or phone. If you have a critical or abnormal lab result, we will notify you by phone as soon as possible.  Submit refill requests through Zerto or call your pharmacy and they will forward the refill request to us. Please allow 3 business days for your refill to be completed.          Additional Information About Your Visit        Care EveryWhere ID     This is your Care EveryWhere ID. This could be used by other organizations to access your [place] medical records  [id number]         Blood Pressure from Last 3 Encounters:   05/31/18 126/52   05/18/18 163/77   05/15/18 136/62    Weight from Last 3 Encounters:   05/31/18 69.1 kg (152 lb 6.4 oz)   05/18/18 70.8 kg (156 lb 1.6 oz)   05/15/18 70.3 kg (155 lb)              We Performed the Following     Basic metabolic panel     CBC with platelets differential     Magnesium          Today's Medication Changes          These changes are accurate as of 5/31/18  2:58 PM.  If you have any questions, ask your nurse or doctor.               Start taking these medicines.        Dose/Directions    magnesium oxide 400 (241.3 Mg) MG tablet   Commonly known as:  MAG-OX   Used for:  Carcinoma, lung, left (H)   Started by:  Mor Mccauley MD        Dose:  400 mg   Take 1 tablet (400 mg) by mouth daily   Quantity:  60 tablet   Refills:  3       omeprazole 20 MG CR capsule   Commonly known as:  priLOSEC   Used for:  Carcinoma, lung, left (H)   Started by:  Mor Mccauley MD        Dose:  20 mg   Take 1 capsule (20 mg) by mouth daily   Quantity:  90 capsule   Refills:  3        potassium chloride SA 10 MEQ CR tablet   Commonly known as:  K-DUR/KLOR-CON M   Used for:  Carcinoma, lung, left (H)   Started by:  Mor Mccauley MD        Dose:  10 mEq   Take 1 tablet (10 mEq) by mouth daily   Quantity:  30 tablet   Refills:  1       traMADol 50 MG tablet   Commonly known as:  ULTRAM   Used for:  Carcinoma, lung, left (H)   Started by:  Mor Mccauley MD        Dose:  50 mg   Take 1 tablet (50 mg) by mouth every 6 hours as needed for severe pain   Quantity:  20 tablet   Refills:  0         Stop taking these medicines if you haven't already. Please contact your care team if you have questions.     HYDROcodone-acetaminophen 5-325 MG per tablet   Commonly known as:  NORCO   Stopped by:  Mor Mccauley MD           morphine 15 MG 12 hr tablet   Commonly known as:  MS CONTIN   Stopped by:  Mor Mccauley MD                Where to get your medicines      These medications were sent to [facility] [address] [address]     Phone:  [phone]     magnesium oxide 400 (241.3 Mg) MG tablet    omeprazole 20 MG CR capsule    potassium chloride SA 10 MEQ CR tablet         Some of these will need a paper prescription and others can be bought over the counter.  Ask your nurse if you have questions.     Bring a paper prescription for each of these medications     traMADol 50 MG tablet                Primary Care Provider Office Phone # Fax #    Manish Plasencia [phone] [address] [facility] [address]        Goals        General    Financial Wellbeing (pt-stated)     Notes - Note edited  5/1/2018  8:50 AM by Sofya Paulino LSW    Goal Statement: I want to complete my Medical Assistance application  Measure of Success: My Medical Assistance application will be completed  Supportive Steps to Achieve: SW and pt will finish the remaining paperwork that needs to be completed  Barriers:  None identified at this time  Strengths: Pt is motivated as the income increase will assist him greatly  Date to Achieve By: 3 months          Pain Management (pt-stated)     Notes - Note edited  5/10/2018  1:35 PM by Sofya Paulino LSW    [facility] to discuss with PCP, but would recommend a Palliative Care consult for symptom management.    Pt met with Palliative Care MD yesterday, 5-9-18, and will continue to do so.    Sofya Unger  Social Work Care Coordinator  [facility]  [phone]          Transportation (pt-stated)       Equal Access to Services     [facility]: Hadii aad ku hadasho Soomaali, waaxda luqadaha, qaybta kaalmada adeegyada, celina hewitt . So [facility] [phone].    ATENCIÓN: Si habla español, tiene a galaviz disposición servicios gratuitos de asistencia lingüística. LlSamaritan [facility] [phone].    We comply with applicable federal civil rights laws and [place] laws. We do not discriminate on the basis of race, color, national origin, age, disability, sex, sexual orientation, or gender identity.            Thank you!     Thank you for choosing [facility]  for your care. Our goal is always to provide you with excellent care. Hearing back from our patients is one way we can continue to improve our services. Please take a few minutes to complete the written survey that you may receive in the mail after your visit with us. Thank you!             Your Updated Medication List - Protect others around you: Learn how to safely use, store and throw away your medicines at www.disposemymeds.org.          This list is accurate as of 5/31/18  2:58 PM.  Always use your most recent med list.                   Brand Name Dispense Instructions for use Diagnosis    acetaminophen 325 MG tablet    TYLENOL    100 tablet    Take 3 tablets (975 mg) by mouth every 8 hours    Carcinoma, lung, left (H)       ALPRAZolam 0.5 MG tablet    XANAX          ASPIRIN PO       Take 81 mg by mouth daily        dexamethasone 4 MG tablet    DECADRON    6 tablet    Take 4 mg (1 tab) by mouth twice daily for 6 doses. Start the evening prior to Infusion.    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)       EQL NICOTINE 2 MG lozenge   Generic drug:  nicotine polacrilex      Place 2 mg inside cheek every hour as needed for smoking cessation        FOLIC ACID PO      Take 1 mg by mouth daily        LORazepam 0.5 MG tablet    ATIVAN    30 tablet    Take 0.5 mg by mouth every 4 hours as needed for nausea, vomiting, anxiety, or sleep.    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)       losartan 50 MG tablet    COZAAR    90 tablet    Take 1 tablet (50 mg) by mouth daily    Benign essential hypertension       magnesium oxide 400 (241.3 Mg) MG tablet    MAG-OX    60 tablet    Take 1 tablet (400 mg) by mouth daily    Carcinoma, lung, left (H)       metoprolol tartrate 50 MG tablet    LOPRESSOR    60 tablet    Take 1 tablet (50 mg) by mouth 2 times daily    Benign essential hypertension       omeprazole 20 MG CR capsule    priLOSEC    90 capsule    Take 1 capsule (20 mg) by mouth daily    Carcinoma, lung, left (H)       potassium chloride SA 10 MEQ CR tablet    K-DUR/KLOR-CON M    30 tablet    Take 1 tablet (10 mEq) by mouth daily    Carcinoma, lung, left (H)       prochlorperazine 10 MG tablet    COMPAZINE    30 tablet    Take 1 tablet (10 mg) by mouth every 6 hours as needed for nausea or vomiting    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)       rosuvastatin 5 MG tablet    CRESTOR    30 tablet    Take 1 tablet (5 mg) by mouth daily        senna-docusate 8.6-50 MG per tablet    SENOKOT-S;PERICOLACE    100 tablet    Take 2 tablets by mouth 2 times daily    Carcinoma, lung, left (H)       traMADol 50 MG tablet    ULTRAM    20 tablet    Take 1 tablet (50 mg) by mouth every 6 hours as needed for severe pain    Carcinoma, lung, left (H)       VENTOLIN  (90 Base) MCG/ACT  Inhaler   Generic drug:  albuterol     1 Inhaler    Inhale 2 puffs into the lungs every 4 hours as needed for shortness of breath / dyspnea or wheezing

## 2018-05-31 NOTE — LETTER
5/31/2018         RE: Chilo Oneil  [address]  [place] [address]        Dear Colleague,    Thank you for referring your patient, Chilo Oneil, to the [facility]. Please see a copy of my visit note below.    Hematology/ Oncology Follow-up Visit:  May 31, 2018    Reason for Visit:   Chief Complaint   Patient presents with     Chemotherapy     2 week follow up for lung CA.        Oncologic History:  Carcinoma, lung, left (H)  Patient presented with L side shoulder and has been traveling  to L/side about  to the whole L/side upper back and L side of chest for about 3 week he has had the pain has been taking aleve.  Subsequently went on to have a CT scan done.  Which showed 1.6 cm nodular infiltrate in the left upper lobe.  The lesion appear spiculated and worrisome for lung cancer.  There is a second 0.7 cm indeterminate nodule in the lingular portion of the left lung.  Subsequently the patient went on to have CT-guided biopsy.  The pathology came back showing moderately differentiated adenocarcinoma. He had subsequent noted left thoracoscopic left pleural biopsies and left lobe which resection on April 11, 2018.  The surgical pathology came back with pleural biopsies came back positive for invasive adenocarcinoma with parietal pleural invasion.  The wedge resection came back with adenocarcinoma with acinar patterns of growth moderately differentiated the tumor size is 1.1 and 0.3 cm into 2 separate tumor nodules.  Visceropleural invasion was identified the margins were negative lymphovascular invasion was not identified large venous artery involvement was not identified as well.  The pathologic staging of pT3N0.        Interval History:  Patient is today for cycle #2 of chemotherapy with Alimta and cisplatin.  Chemotherapy was complicated with increasing nausea or vomiting.  The patient continues to have nausea.  He continues to have poor appetite.  He denies any shortness of breath or cough  or wheezing    Review Of Systems:  Constitutional: Negative for fever, chills, and night sweats.  Skin: negative.  Eyes: negative.  Ears/Nose/Throat: negative.  Respiratory: No shortness of breath, dyspnea on exertion, cough, or hemoptysis.  Cardiovascular: negative.  Gastrointestinal: negative.  Genitourinary: negative.  Musculoskeletal: negative.  Neurologic: negative.  Psychiatric: negative.  Hematologic/Lymphatic/Immunologic: negative.  Endocrine: negative.    All other ROS negative unless mentioned in interval history.    Past medical, social, surgical, and family histories reviewed.    Allergies:  Allergies as of 05/31/2018 - Alcides as Reviewed 05/31/2018   Allergen Reaction Noted     Cipro [ciprofloxacin] Swelling 06/22/2009       Current Medications:  Current Outpatient Prescriptions   Medication Sig Dispense Refill     acetaminophen (TYLENOL) 325 MG tablet Take 3 tablets (975 mg) by mouth every 8 hours 100 tablet 0     ASPIRIN PO Take 81 mg by mouth daily        dexamethasone (DECADRON) 4 MG tablet Take 4 mg (1 tab) by mouth twice daily for 6 doses. Start the evening prior to Infusion. 6 tablet 3     FOLIC ACID PO Take 1 mg by mouth daily       LORazepam (ATIVAN) 0.5 MG tablet Take 0.5 mg by mouth every 4 hours as needed for nausea, vomiting, anxiety, or sleep. 30 tablet 5     losartan (COZAAR) 50 MG tablet Take 1 tablet (50 mg) by mouth daily 90 tablet 3     magnesium oxide (MAG-OX) 400 (241.3 Mg) MG tablet Take 1 tablet (400 mg) by mouth daily 60 tablet 3     metoprolol (LOPRESSOR) 50 MG tablet Take 1 tablet (50 mg) by mouth 2 times daily 60 tablet 1     omeprazole (PRILOSEC) 20 MG CR capsule Take 1 capsule (20 mg) by mouth daily 90 capsule 3     potassium chloride SA (K-DUR/KLOR-CON M) 10 MEQ CR tablet Take 1 tablet (10 mEq) by mouth daily 30 tablet 1     prochlorperazine (COMPAZINE) 10 MG tablet Take 1 tablet (10 mg) by mouth every 6 hours as needed for nausea or vomiting 30 tablet 3     rosuvastatin  "(CRESTOR) 5 MG tablet Take 1 tablet (5 mg) by mouth daily 30 tablet 3     senna-docusate (SENOKOT-S;PERICOLACE) 8.6-50 MG per tablet Take 2 tablets by mouth 2 times daily 100 tablet 0     traMADol (ULTRAM) 50 MG tablet Take 1 tablet (50 mg) by mouth every 6 hours as needed for severe pain 20 tablet 0     VENTOLIN  (90 Base) MCG/ACT Inhaler Inhale 2 puffs into the lungs every 4 hours as needed for shortness of breath / dyspnea or wheezing 1 Inhaler 1     ALPRAZolam (XANAX) 0.5 MG tablet        nicotine polacrilex (EQL NICOTINE) 2 MG lozenge Place 2 mg inside cheek every hour as needed for smoking cessation          Physical Exam:  /52 (BP Location: Right arm, Patient Position: Sitting, Cuff Size: Adult Regular)  Pulse 71  Temp 97.3  F (36.3  C) (Tympanic)  Resp 18  Ht 1.905 m (6' 3\")  Wt 69.1 kg (152 lb 6.4 oz)  SpO2 96%  BMI 19.05 kg/m2  Wt Readings from Last 12 Encounters:   05/31/18 69.1 kg (152 lb 6.4 oz)   05/18/18 70.8 kg (156 lb 1.6 oz)   05/15/18 70.3 kg (155 lb)   05/10/18 74.5 kg (164 lb 4.8 oz)   05/09/18 70.3 kg (155 lb)   05/09/18 73.5 kg (162 lb)   05/09/18 73.8 kg (162 lb 12.8 oz)   04/30/18 73.9 kg (163 lb)   04/27/18 74.3 kg (163 lb 11.2 oz)   04/20/18 73.5 kg (162 lb)   04/17/18 75.7 kg (166 lb 12.8 oz)   04/11/18 74.9 kg (165 lb 2 oz)     ECOG performance status: 1  GENERAL APPEARANCE: Healthy, alert and in no acute distress.  HEENT: Sclerae anicteric. PERRLA. Oropharynx without ulcers, lesions, or thrush.  NECK: Supple. No asymmetry or masses.  LYMPHATICS: No palpable cervical, supraclavicular, axillary, or inguinal lymphadenopathy.  RESP: Lungs clear to auscultation bilaterally without rales, rhonchi or wheezes.  CARDIOVASCULAR: Regular rate and rhythm. Normal S1, S2; no S3 or S4. No murmur, gallop, or rub.  ABDOMEN: Soft, nontender. Bowel sounds normal. No palpable organomegaly or masses.  MUSCULOSKELETAL: Extremities without gross deformities noted. No edema of bilateral " lower extremities.  SKIN: No suspicious lesions or rashes.  NEURO: Alert and oriented x 3. Cranial nerves II-XII grossly intact.  PSYCHIATRIC: Mentation and affect appear normal.    Laboratory/Imaging Studies:  Infusion Therapy Visit on 05/31/2018   Component Date Value Ref Range Status     WBC 05/31/2018 7.7  4.0 - 11.0 10e9/L Final     RBC Count 05/31/2018 3.59* 4.4 - 5.9 10e12/L Final     Hemoglobin 05/31/2018 11.8* 13.3 - 17.7 g/dL Final     Hematocrit 05/31/2018 32.8* 40.0 - 53.0 % Final     MCV 05/31/2018 91  78 - 100 fl Final     MCH 05/31/2018 32.9  26.5 - 33.0 pg Final     MCHC 05/31/2018 36.0  31.5 - 36.5 g/dL Final     RDW 05/31/2018 14.7  10.0 - 15.0 % Final     Platelet Count 05/31/2018 778* 150 - 450 10e9/L Final     Diff Method 05/31/2018 Automated Method   Final     % Neutrophils 05/31/2018 63.3  % Final     % Lymphocytes 05/31/2018 17.1  % Final     % Monocytes 05/31/2018 19.5  % Final     % Eosinophils 05/31/2018 0.0  % Final     % Basophils 05/31/2018 0.1  % Final     Absolute Neutrophil 05/31/2018 4.9  1.6 - 8.3 10e9/L Final     Absolute Lymphocytes 05/31/2018 1.3  0.8 - 5.3 10e9/L Final     Absolute Monocytes 05/31/2018 1.5* 0.0 - 1.3 10e9/L Final     Absolute Eosinophils 05/31/2018 0.0  0.0 - 0.7 10e9/L Final     Absolute Basophils 05/31/2018 0.0  0.0 - 0.2 10e9/L Final     Sodium 05/31/2018 133  133 - 144 mmol/L Final     Potassium 05/31/2018 3.0* 3.4 - 5.3 mmol/L Final     Chloride 05/31/2018 93* 94 - 109 mmol/L Final     Carbon Dioxide 05/31/2018 34* 20 - 32 mmol/L Final     Anion Gap 05/31/2018 6  3 - 14 mmol/L Final     Glucose 05/31/2018 142* 70 - 99 mg/dL Final     Urea Nitrogen 05/31/2018 12  7 - 30 mg/dL Final     Creatinine 05/31/2018 0.82  0.66 - 1.25 mg/dL Final     GFR Estimate 05/31/2018 >90  >60 mL/min/1.7m2 Final    Non  GFR Calc     GFR Estimate If Black 05/31/2018 >90  >60 mL/min/1.7m2 Final    African American GFR Calc     Calcium 05/31/2018 8.1* 8.5 - 10.1  mg/dL Final     Magnesium 05/31/2018 1.5* 1.6 - 2.3 mg/dL Final        Recent Results (from the past 744 hour(s))   Chest CT - IV contrast only - PE protocol    Narrative    CT CHEST WITH CONTRAST  4/30/2018 8:42 PM    HISTORY: Left-sided pain. Evaluate for pulmonary embolism.    COMPARISON: A CT on 3/28/2018.    TECHNIQUE: Routine transverse CT imaging of the chest was performed  following the uneventful intravenous administration of Isovue 370-  80mL contrast. A pulmonary embolism protocol was utilized. Radiation  dose for this scan was reduced using automated exposure control,  adjustment of the mA and/or kV according to patient size, or iterative  reconstruction technique.    FINDINGS: The heart size is normal. No enlarged lymph node or other  abnormal mediastinal mass is seen. There is calcification of the  thoracic aorta. There is very good opacification of the pulmonary  arteries with contrast. No pulmonary embolism is seen. The lungs are  clear. There has been no definite change in a 2.2 cm somewhat  spiculated mass in the lateral aspect of the left upper lobe. There  has been development of consolidation or capping of the left lung  apex. The lungs are otherwise clear. There has been development of a  small left pleural effusion. No right effusion is seen. There are  degenerative changes in the spine. No other osseous abnormalities  identified. No chest wall pathology is seen. The visualized upper  abdomen is unremarkable.      Impression    IMPRESSION:   1. No pulmonary embolism is seen.  2. No change in a 2.2 cm spiculated mass of the left upper lobe. On  previous imaging this was suspicious for malignancy.  3. Development of consolidation or capping of the left lung apex.  4. Small left pleural effusion.    ADILIA ALSTON MD   Chest XR,  PA & LAT    Narrative    XR CHEST 2 VW 5/9/2018 11:37 AM    HISTORY: Pain.    COMPARISON: 4/12/2018.      Impression    IMPRESSION: 2 views of the chest show  interval improvement in  previously seen patchy and nodular parenchymal abnormalities in the  left perihilar region. An area of nodularity remains that measures 1.6  cm. The previously seen left pleural effusion has resolved in the  interim. No new findings.     VERONICA HINTON MD   XR Surgery SHAQU[place]LE Fluoro L/T 5 Min w Stills    Narrative    SURGERY C-ARM FLUOROSCOPY LESS THAN FIVE MINUTES WITH STILLS 5/15/2018  10:32 AM     COMPARISON: 5/9/2018    HISTORY: Port-a-Cath.    NUMBER OF IMAGES ACQUIRED: 2    VIEWS: AP    FLUOROSCOPY TIME: 17      Impression    IMPRESSION: Two coned-down intraoperative views of the chest  demonstrate placement of a left-sided subclavian port with the tip  projected over the distal SVC.    GIL TONY MD   XR Chest Port 1 View    Narrative    XR CHEST PORT 1 VW 5/15/2018 11:25 AM    HISTORY: PowerPort catheter placement.    COMPARISON: 5/9/2018.      Impression    IMPRESSION: Single view of the chair shows interval placement of a  left-sided power port catheter. The tip is in the expected region of  the distal SVC. No acute cardiopulmonary disease is demonstrated. The  patient's known the left upper lobe mass and left apical pleural-based  opacity are unchanged.    VERONICA HINTON MD       Assessment and plan:    (C34.92) Carcinoma, lung, left (H)  (primary encounter diagnosis)  We will proceed today with cycle #2 of chemotherapy.  I will see the patient again next week to review toxicities of the medication, nausea, and pain management    (F10.10) Alcohol use, daily use  Is drinking 2 drinks a couple of occasions weekly.    (I10) Benign essential hypertension  Patient currently on metoprolol 50 mg orally daily.  Is also on Cozaar 50 mg orally daily.    (E78.5) Hyperlipidemia LDL goal <100  Patient currently on Crestor 5 mg orally daily.    (K21.9) Gastroesophageal reflux disease without esophagitis  Patient currently on omeprazole 20 mg orally daily.    (R11.2,  T45.1X5A) Chemotherapy  induced nausea and vomiting  Reviewed with the patient today causes of nausea.  I suspect hydrocodone has been contributing to nausea.  I would recommend tramadol instead.  Started the patient on Prilosec today.  Reviewed with him nausea medications in details.    (F17.200) Tobacco use disorder  Strongly emphasized importance of quitting smoking.    (J45.909) Moderate asthma without complication, unspecified whether persistent  Patient symptoms has been stable.  He has been using albuterol inhaler intermittently.    (E83.42) Hypomagnesemia  Patient will start on supplements magnesium oxide 400 mg orally daily.    (E87.6) Hypokalemia  Patient will start with potassium chloride 10 mEq daily.    The patient is ready to learn, no apparent learning barriers were identified.  Diagnosis and treatment plans were explained to the patient. The patient expressed understanding of the content. The patient asked appropriate questions. The patient questions were answered to his satisfaction.    Time spent 40 minutes more than 50% of the time in counseling and coordination of care including discussion of management of chemotherapy side effects, management plan follow-up and prognosis.    Chart documentation with Dragon Voice recognition Software. Although reviewed after completion, some words and grammatical errors may remain.    Again, thank you for allowing me to participate in the care of your patient.        Sincerely,        Mor Mccauley MD

## 2018-05-31 NOTE — PROGRESS NOTES
Infusion Nursing Note:  Chilo Oneil presents today for Cisplatin, Alimta.    Patient seen by provider today: Yes: Dr. Mccauley   present during visit today: Not Applicable.    Note: Electrolyte levels discussed with Dr. Mccauley. Pt will receive 20mEq potassium & 2gms Mag in second L of IVF. Script for oral K sent to our pharmacy. Pt aware. Foods high in these two elements discussed with Pt.     Intravenous Access:  Implanted Port.    Treatment Conditions:  Results reviewed, labs MET treatment parameters, ok to proceed with treatment.      Post Infusion Assessment:  Patient tolerated infusion without incident.    Discharge Plan:   Patient discharged in stable condition accompanied by: self.    Christian Meng RN

## 2018-06-04 NOTE — LETTER
[facility] Home  Complex Care Plan  About Me:  Patient Name:  Chilo Oneil    YOB: 1950  Age:  68 year old   [place] MRN: [id number] Telephone Information:  Home Phone [phone]   Mobile Not on file.       Address:    [address] [place], [place] [address]e [place] [address] Email address:  No e-mail address on record      Emergency Contact(s)  Name Relationship Lgl Grd Work Phone Home Phone Mobile Phone   1JACINTO MCDOWELL Brother    [phone]   2. CHIN, PAT Relative   [phone]           Primary language:  English     needed? No   [place] [facility]:  [phone] op. 1  Other communication barriers:    Preferred Method of Communication:  Mail  Current living arrangement: I live in a private home with a roommate  Mobility Status/ Medical Equipment: Independent    Health Maintenance  Health Maintenance Reviewed:  Yes    My Access Plan  Medical Emergency 911   Primary Clinic Line [facility] - [phone]   24 Hour Appointment Line [phone] or  [phone]) (toll-free)   24 Hour Nurse Line [phone] (toll-free)   Preferred Urgent Care [facility], [phone]   Preferred Hospital [place], [place]  [phone]   Preferred Pharmacy [place] [facility] [place] [place] [address] [facility]     Behavioral Health Crisis Line The National Suicide Prevention Lifeline at 1-583.326.8410 or 911     My Care Team Members    Patient Care Team       Relationship Specialty Notifications Start End    Manish Plasencia MD PCP - General Family Practice  2/8/18     Phone: [phone] Fax: [phone]         [address] [facility] [address]    Nadia Leger MD MD Cardiology Admissions 3/30/18     Phone: [phone] Fax: [phone] [address] [place] [address]    Janny Rangel APRN CNP Nurse Practitioner Nurse Practitioner - Family Admissions 3/30/18      Phone: [phone] Fax: [phone]         [address] [facility] [address]    Mega Moreno MD MD Thoracic Diseases Admissions 3/30/18     Phone: [phone] Fax: [phone]         [address]  [facility] [address]    Mor Mccauley MD MD Hematology & Oncology Admissions 3/30/18     Phone: [phone] Fax: [phone]         [address] [facility] [address]    Erica Elliott, RN Clinic Care Coordinator Oncology Admissions 3/30/18     Comment:  [phone]    Sofya Paulino LSW Clinic Care Coordinator Primary Care -   4/18/18     Phone: [phone] Fax: [phone]                My Care Plans  Self Management and Treatment Plan, Goals and (Comments)  Goals        General    Financial Wellbeing (pt-stated)     Notes - Note edited  5/1/2018  8:50 AM by Sofya Paulino LSW    Goal Statement: I want to complete my Medical Assistance application  Measure of Success: My Medical Assistance application will be completed  Supportive Steps to Achieve: SW and pt will finish the remaining paperwork that needs to be completed  Barriers: None identified at this time  Strengths: Pt is motivated as the income increase will assist him greatly  Date to Achieve By: 3 months          Pain Management (pt-stated)     Notes - Note edited  5/10/2018  1:35 PM by Sofya Paulino LSW    CC to discuss with PCP, but would recommend a Palliative Care consult for symptom management.    Pt met with Palliative Care MD yesterday, 5-9-18, and will continue to do so.    Sofya Unger  Social Work Care Coordinator  [facility]  [phone]          Transportation (pt-stated)     Notes - Note edited  6/4/2018  3:10 PM by Sofya Paulino LSW    Goal Statement: Pt needs dependable transportation to get him to his clinic appointments.  Measure of Success: Pt will attend appointments if he has dependable transportation  Supportive Steps to Achieve:  SW and pt will work together to secure his MA application will allow him to get transportation benefits.  SW to also provide pt with community resources.  Barriers: Access to pt's bank statements has been a barrier.    Strengths: Pt is motivated to complete the MA application  Date to Achieve By: 3 months               Action Plans on File: NONE    Advance Care Plans/Directives Type: None       My Medical and Care Information  Problem List   Patient Active Problem List   Diagnosis     Tobacco use disorder     Alcohol use, daily use     PAD (peripheral artery disease) (H)     Benign essential hypertension     Hyperlipidemia LDL goal <100     Anxiety     Gastroesophageal reflux disease without esophagitis     CAD (coronary artery disease)     Lung cancer (H)     Uncomplicated asthma     Hyponatremia     SOB (shortness of breath)     Chemotherapy induced nausea and vomiting     Carcinoma, lung, left (H)     Hypokalemia     Hypomagnesemia      Current Medications and Allergies:  See printed Medication Report.    Care Coordination Start Date: 4/18/2018   Frequency of Care Coordination: weekly (PRN)   Form Last Updated: 06/04/2018

## 2018-06-04 NOTE — LETTER
[facility] CARE COORDINATION  [address] [facility] [place] [address]      June 4, 2018    Chilo Oneil  [address]  [place] [address]      Dear Freddy,    I am a clinic  who works with Manish Plasencia MD at the [facility] &  I wanted to thank you for spending the time to talk with me.     I am just sending you a list of the goals that we had set when we began working together to show you how far we have come!!  Great job!!    Please feel free to contact me at [phone], with any questions or concerns. We at [facility] are focused on providing you with the highest-quality healthcare experience possible and that all starts with you.     Sincerely,     Sofya Paulino    Enclosed: I have enclosed a copy of the Complex Care Plan. This has helpful information and goals that we have talked about. Please keep this in an easy to access place to use as needed.

## 2018-06-04 NOTE — PROGRESS NOTES
"Clinic Care Coordination Contact  Care Team Conversations    Pt called this writer today to apologize for being a grump last Thursday during our last meeting.  This writer accepted pt's apology, but also stated that it was very obvious that pt was not feeling well during our meeting and that once pt took his pain medication and became comfortable and ate something, that he fell asleep.  Pt stated he has not been feeling well days prior to his chemo appointments, and is wondering if it is his anxiety, or if it is the chemo that makes him feel so very sick for days.    Pt shared that he is feeling wonderful today, but that he has an appointment on 6-7-18 with his Oncologist and wants to discuss quality vs quantity of life.  SW and pt discussed what is important to the pt & he said that he has been having many conversations with his siblings lately about this topic, so he wants to hear from Dr. Mccauley about his options:  Chemo, radiation, doing nothing, what will things look like for him if he chooses to stop chemo, etc.  SW strongly encouraged pt to write down his questions before his appointment on Thursday.  Pt thought that was a good idea and said he would.    Pt also asked this writer to reschedule his PCP appointment that was set for today.  SW was able to change the PCP appointment for 6-7-18 at 12:40 PM.  Pt to speak with PCP about Nicotine patches.    Pt shared that he has drastically reduced his drinking; now states he only drank 1-2 beers this weekend.    Pt also shared that he is behind in his rent.  Rent each month is $240.  He owes his landlord $400.  Pt said that his friend set up a GoFundMe page, but pt has no idea what that means; SW tried to explain it to the pt, but he just said \"forget it, sounds like a lot of work.\"    SW to see if there are resources that could assist pt with back rent.  SW to continue to work with pt for clinic care coordination services.    Sofya Unger  Social Work [facility] " Coordinator  [facility]  [phone]

## 2018-06-05 PROBLEM — I26.99 PULMONARY EMBOLISM (H): Status: ACTIVE | Noted: 2018-01-01

## 2018-06-05 PROBLEM — I26.99 PULMONARY EMBOLI (H): Status: ACTIVE | Noted: 2018-01-01

## 2018-06-05 NOTE — ED NOTES
Pt has lung CA going through chemo, has not ate in 3 days, SOA sats 97% RA, left sided chest pain, weak, prod cough with phlegm, occ smoker

## 2018-06-05 NOTE — LETTER
Transition Communication Hand-off for Care Transitions to Next Level of Care Provider    Name: Chilo Oneil  : 1950  MRN #: [id number]  Primary Care Provider: Manish Plasencia  Primary Care MD Name:  ([place])  Primary Clinic: [address] [facility] [address]  Primary Care Clinic Name:  ([facility])  Reason for Hospitalization:  Other acute pulmonary embolism without acute cor pulmonale (H) [I26.99]  Admit Date/Time: 2018  5:01 PM  Discharge Date: 18  Payor Source: Payor: MEDICARE / Plan: MEDICARE / Product Type: Medicare /     Readmission Assessment Measure (FARIDA) Risk Score/category: Elevated         Reason for Communication Hand-off Referral: Fragility    Discharge Plan:  Home     Concern for non-adherence with plan of care:   Y/N : No    Discharge Needs Assessment: Patient needs long term care planning    Follow-up plan:  Future Appointments  Date Time Provider Department Center   2018 10:20 AM Manish Plasencia MD [facility]   2018 9:00 AM ROOM 6 [facility]   2018 9:45 AM Mor Mccauley MD [facility]   2018 9:00 AM ROOM 2 [facility]   2018 11:00 AM ROOM 7 [facility]   2018 11:00 AM ROOM 10 [facility]       [place] Recommendations:  Patient plans to return home.  He declined home care due to home condition.  He wants to do another treatment of chemo and follow up with Dr. Mccauley.  Made follow up appt with PCP for 18 as patient is elevated risk for re-admission.  Patient is open to a long term care plan that includes an adult foster home.    ERIC Hernandez  [facility] [phone]/ [place] [phone]  AVS/Discharge Summary is the source of truth; this is a helpful guide for improved communication of patient story

## 2018-06-05 NOTE — IP AVS SNAPSHOT
MRN:[id number]                      After Visit Summary   6/5/2018    Chilo Oneil    MRN: [id number]           Thank you!     Thank you for choosing [facility] for your care. Our goal is always to provide you with excellent care. Hearing back from our patients is one way we can continue to improve our services. Please take a few minutes to complete the written survey that you may receive in the mail after you visit with us. Thank you!        Patient Information     Date Of Birth          1950        About your hospital stay     You were admitted on:  June 5, 2018 You last received care in the:  [facility]    You were discharged on:  June 9, 2018       Who to Call     For medical emergencies, please call 911.  For non-urgent questions about your medical care, please call your primary care provider or clinic, [phone]          Attending Provider     Provider Specialty    Brent Slade MD Emergency Medicine    [facility]       Primary Care Provider Office Phone # Fax #    Manish Plasencia [phone]      Your next 10 appointments already scheduled     Jun 11, 2018 10:20 AM CDT   Office Visit with Manish Plasencia MD   [facility]    [address] [address] [phone]           Bring a current list of meds and any records pertaining to this visit. For Physicals, please bring immunization records and any forms needing to be filled out. Please arrive 10 minutes early to complete paperwork.            Jun 12, 2018  9:00 AM CDT   Level 5 with ROOM 6 [facility]    [facility]  [address]  [facility] [place] [place] [address]   [phone]            Jun 14, 2018  9:45 AM CDT   Return Visit with Mor Mccauley MD   [facility]  ([facility])    [facility]  [address]  [facility] [address]   [phone]            Jun 21, 2018  9:00 AM CDT   Level 5 with ROOM 2 [facility] ([facility])    [facility]  [address]  [place] [address]   [phone]            Jun 25, 2018 11:00 AM CDT   Level 1 with ROOM 7 [facility] ([facility])    [facility]  [address]  [facility] [address]   [phone]            Jul 16, 2018 11:00 AM CDT   Level 1 with ROOM 10 [facility] ([facility])    [facility]  [address]  [facility] [address]   [phone]              Further instructions from your care team         Discharge Instructions and Follow-Up:   Discharge diet: Regular   Discharge activity: Activity as tolerated   Discharge follow-up: Follow up with primary care provider on Monday, recheck platelets, 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   Follow-up with hematology on Thursday as planned.       As a system [facility] wants to ensure that across the care continuum that you have support through care coordination services that include nurses and social workers in the outpatient setting.  Due to your pulmonary embolis I feel it is important you have this support when you discharge.  They will be calling you within 24-48 hours of your discharge.   A brochure describing the services was provided.  If you have questions you can reach out to your clinic directly and ask for the Care Coordinator assigned to your care      Pending Results     Date and Time Order Name Status Description    6/8/2018 1016 Heparin Induced Thrombocytopenia Screen In process             Statement of Approval     Ordered           06/09/18 0927  I have reviewed and agree with all the recommendations and orders detailed in this document.  EFFECTIVE NOW     Approved and electronically signed by:  Papa Sapp MD             Admission Information     Date & Time Provider Department Dept. Phone    6/5/2018 Papa Sapp MD [facility] [phone]      Your Vitals Were     Blood Pressure Pulse Temperature Respirations Pulse Oximetry       162/70 50 97.8  F (36.6  C) (Oral) 18 96%       Care EveryWhere ID     This is your Care EveryWhere ID. This could be used by other organizations to access your [facility] medical records  [id number]        Equal Access to Services     [facility]: Hadii krysta Vidales, pepe camara, maru marquez, celina fisher. So [facility] [phone].    ATENCIÓN: Si habla español, tiene a galaviz disposición servicios gratuitos de asistencia lingüística. Llame al [phone].    We comply with applicable federal civil rights laws and [place] laws. We do not discriminate on the basis of race, color, national origin, age, disability, sex, sexual orientation, or gender identity.               Review of your medicines      START taking        Dose / Directions    benzonatate 100 MG capsule   Commonly known as:  TESSALON   Used for:  Carcinoma, lung, left (H)        Dose:  100 mg   Take 1 capsule (100 mg) by mouth 3 times daily as needed for cough   Quantity:  42 capsule   Refills:  0       DULoxetine 30 MG EC capsule   Commonly known as:  CYMBALTA   Used for:  Single current episode of major depressive disorder, unspecified depression episode severity        Dose:  30 mg   Start taking on:  6/10/2018   Take 1 capsule (30 mg) by mouth daily   Quantity:  30 capsule   Refills:  1       enoxaparin 80 MG/0.8ML injection   Commonly known as:  LOVENOX        Dose:  1 mg/kg   Inject 0.69 mLs (69 mg) Subcutaneous every 12 hours   Quantity:  41.4 mL   Refills:  1        * fentaNYL 12 mcg/hr 72 hr patch   Commonly known as:  DURAGESIC        Dose:  1 patch   Start taking on:  6/10/2018   Place 1 patch onto the skin every 72 hours remove old patch.   Quantity:  30 patch   Refills:  0       * fentaNYL 25 mcg/hr 72 hr patch   Commonly known as:  DURAGESIC        Dose:  1 patch   Start taking on:  6/10/2018   Place 1 patch onto the skin every 72 hours remove old patch.   Quantity:  30 patch   Refills:  0       HYDROmorphone 2 MG tablet   Commonly known as:  DILAUDID   Used for:  Carcinoma, lung, left (H)        Dose:  2-4 mg   Take 1-2 tablets (2-4 mg) by mouth every 3 hours as needed for moderate to severe pain   Quantity:  80 tablet   Refills:  0       nicotine 14 MG/24HR 24 hr patch   Commonly known as:  NICODERM CQ   Used for:  Tobacco use disorder        Dose:  1 patch   Start taking on:  6/10/2018   Place 1 patch onto the skin daily   Quantity:  30 patch   Refills:  1       ondansetron 8 MG ODT tab   Commonly known as:  ZOFRAN-ODT   Used for:  Chemotherapy induced nausea and vomiting        Dose:  8 mg   Take 1 tablet (8 mg) by mouth every 8 hours   Quantity:  90 tablet   Refills:  1       * Notice:  This list has 2 medication(s) that are the same as other medications prescribed for you. Read the directions carefully, and ask your doctor or other care provider to review them with you.      CONTINUE these medicines which may have CHANGED, or have new prescriptions. If we are uncertain of the size of tablets/capsules you have at home, strength may be listed as something that might have changed.        Dose / Directions    acetaminophen 325 MG tablet   Commonly known as:  TYLENOL   This may have changed:    - how much to take  - when to take this  - reasons to take this   Used for:  Carcinoma, lung, left (H)        Dose:  650 mg   Take 2 tablets (650 mg) by mouth every 4 hours as needed for mild pain   Quantity:  100 tablet   Refills:  0       prochlorperazine 10 MG tablet    Commonly known as:  COMPAZINE   This may have changed:  how much to take   Used for:  Chemotherapy induced nausea and vomiting        Dose:  5 mg   Take 0.5 tablets (5 mg) by mouth every 6 hours as needed for nausea or vomiting   Quantity:  30 tablet   Refills:  1         CONTINUE these medicines which have NOT CHANGED        Dose / Directions    ASPIRIN PO        Dose:  81 mg   Take 81 mg by mouth daily   Refills:  0       dexamethasone 4 MG tablet   Commonly known as:  DECADRON   Used for:  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)        Take 4 mg (1 tab) by mouth twice daily for 6 doses. Start the evening prior to Infusion.   Quantity:  6 tablet   Refills:  3       EQL NICOTINE 2 MG lozenge   Generic drug:  nicotine polacrilex        Dose:  2 mg   Place 2 mg inside cheek every hour as needed for smoking cessation   Refills:  0       FOLIC ACID PO        Dose:  1 mg   Take 1 mg by mouth daily   Refills:  0       LORazepam 0.5 MG tablet   Commonly known as:  ATIVAN   Used for:  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)        Take 0.5 mg by mouth every 4 hours as needed for nausea, vomiting, anxiety, or sleep.   Quantity:  30 tablet   Refills:  5       losartan 50 MG tablet   Commonly known as:  COZAAR   Used for:  Benign essential hypertension        Dose:  50 mg   Take 1 tablet (50 mg) by mouth daily   Quantity:  90 tablet   Refills:  3       magnesium oxide 400 (241.3 Mg) MG tablet   Commonly known as:  MAG-OX   Used for:  Carcinoma, lung, left (H)        Dose:  400 mg   Take 1 tablet (400 mg) by mouth daily   Quantity:  60 tablet   Refills:  3       metoprolol tartrate 50 MG tablet   Commonly known as:  LOPRESSOR   Used for:  Benign essential hypertension        Dose:  50 mg   Take 1 tablet (50 mg) by mouth 2 times daily   Quantity:  60 tablet   Refills:  1       omeprazole 20 MG CR capsule   Commonly known as:  priLOSEC   Used for:  Carcinoma, lung, left (H)        Dose:  20 mg   Take 1  capsule (20 mg) by mouth daily   Quantity:  90 capsule   Refills:  3       potassium chloride SA 10 MEQ CR tablet   Commonly known as:  K-DUR/KLOR-CON M   Used for:  Carcinoma, lung, left (H)        Dose:  10 mEq   Take 1 tablet (10 mEq) by mouth daily   Quantity:  30 tablet   Refills:  1       rosuvastatin 5 MG tablet   Commonly known as:  CRESTOR        Dose:  5 mg   Take 1 tablet (5 mg) by mouth daily   Quantity:  30 tablet   Refills:  3       senna-docusate 8.6-50 MG per tablet   Commonly known as:  SENOKOT-S;PERICOLACE   Used for:  Carcinoma, lung, left (H)        Dose:  2 tablet   Take 2 tablets by mouth 2 times daily   Quantity:  100 tablet   Refills:  0       traMADol 50 MG tablet   Commonly known as:  ULTRAM   Used for:  Carcinoma, lung, left (H)        Dose:  50 mg   Take 1 tablet (50 mg) by mouth every 6 hours as needed for severe pain   Quantity:  20 tablet   Refills:  0       VENTOLIN  (90 Base) MCG/ACT Inhaler   Generic drug:  albuterol        Dose:  2 puff   Inhale 2 puffs into the lungs every 4 hours as needed for shortness of breath / dyspnea or wheezing   Quantity:  1 Inhaler   Refills:  1         STOP taking     ALPRAZolam 0.5 MG tablet   Commonly known as:  XANAX                Where to get your medicines      These medications were sent to [facility] [place], [place] - [address]  [address] [address]     Phone:  [phone]     benzonatate 100 MG capsule    DULoxetine 30 MG EC capsule    enoxaparin 80 MG/0.8ML injection    nicotine 14 MG/24HR 24 hr patch    ondansetron 8 MG ODT tab    prochlorperazine 10 MG tablet         Some of these will need a paper prescription and others can be bought over the counter. Ask your nurse if you have questions.     Bring a paper prescription for each of these medications     fentaNYL 12 mcg/hr 72 hr patch    fentaNYL 25 mcg/hr 72 hr patch    HYDROmorphone 2 MG tablet                Protect others around you: Learn  how to safely use, store and throw away your medicines at www.disposemymeds.org.        Information about OPIOIDS     PRESCRIPTION OPIOIDS: WHAT YOU NEED TO KNOW   You have a prescription for an opioid (narcotic) pain medicine. Opioids can cause addiction. If you have a history of chemical dependency of any type, you are at a higher risk of becoming addicted to opioids. Only take this medicine after all other options have been tried. Take it for as short a time and as few doses as possible.     Do not:    Drive. If you drive while taking these medicines, you could be arrested for driving under the influence (DUI).    Operate heavy machinery    Do any other dangerous activities while taking these medicines.     Drink any alcohol while taking these medicines.      Take with any other medicines that contain acetaminophen. Read all labels carefully. Look for the word  acetaminophen  or  Tylenol.  Ask your pharmacist if you have questions or are unsure.    Store your pills in a secure place, locked if possible. We will not replace any lost or stolen medicine. If you don t finish your medicine, please throw away (dispose) as directed by your pharmacist. The [place] Pollution [employer] has more information about safe disposal: https://www.pca.[facility].[place].us/living-green/managing-unwanted-medications    All opioids tend to cause constipation. Drink plenty of water and eat foods that have a lot of fiber, such as fruits, vegetables, prune juice, apple juice and high-fiber cereal. Take a laxative (Miralax, milk of magnesia, Colace, Senna) if you don t move your bowels at least every other day.              Medication List: This is a list of all your medications and when to take them. Check marks below indicate your daily home schedule. Keep this list as a reference.      Medications           Morning Afternoon Evening Bedtime As Needed    acetaminophen 325 MG tablet   Commonly known as:  TYLENOL   Take 2 tablets (650 mg)  by mouth every 4 hours as needed for mild pain                                   ASPIRIN PO   Take 81 mg by mouth daily   Last time this was given:  81 mg on 6/9/2018  8:24 AM                                   benzonatate 100 MG capsule   Commonly known as:  TESSALON   Take 1 capsule (100 mg) by mouth 3 times daily as needed for cough   Last time this was given:  100 mg on 6/9/2018  2:17 AM                                   dexamethasone 4 MG tablet   Commonly known as:  DECADRON   Take 4 mg (1 tab) by mouth twice daily for 6 doses. Start the evening prior to Infusion.                                   DULoxetine 30 MG EC capsule   Commonly known as:  CYMBALTA   Take 1 capsule (30 mg) by mouth daily   Start taking on:  6/10/2018   Last time this was given:  30 mg on 6/9/2018  8:23 AM                                   enoxaparin 80 MG/0.8ML injection   Commonly known as:  LOVENOX   Inject 0.69 mLs (69 mg) Subcutaneous every 12 hours   Last time this was given:  70 mg on 6/9/2018  9:49 AM                                   EQL NICOTINE 2 MG lozenge   Place 2 mg inside cheek every hour as needed for smoking cessation   Generic drug:  nicotine polacrilex                                   * fentaNYL 12 mcg/hr 72 hr patch   Commonly known as:  DURAGESIC   Place 1 patch onto the skin every 72 hours remove old patch.   Start taking on:  6/10/2018   Last time this was given:  1 patch on 6/7/2018 10:21 AM            Place new patch 6/10/2018                       * fentaNYL 25 mcg/hr 72 hr patch   Commonly known as:  DURAGESIC   Place 1 patch onto the skin every 72 hours remove old patch.   Start taking on:  6/10/2018   Last time this was given:  1 patch on 6/7/2018 10:21 AM            Remove patch from left upper back 6/10/2018 and apply the new one in a different area.                         FOLIC ACID PO   Take 1 mg by mouth daily                                   HYDROmorphone 2 MG tablet   Commonly known as:  DILAUDID    Take 1-2 tablets (2-4 mg) by mouth every 3 hours as needed for moderate to severe pain   Last time this was given:  4 mg on 6/9/2018  7:31 AM                                   LORazepam 0.5 MG tablet   Commonly known as:  ATIVAN   Take 0.5 mg by mouth every 4 hours as needed for nausea, vomiting, anxiety, or sleep.   Last time this was given:  0.5 mg on 6/9/2018  3:00 AM                                   losartan 50 MG tablet   Commonly known as:  COZAAR   Take 1 tablet (50 mg) by mouth daily   Last time this was given:  50 mg on 6/9/2018  8:24 AM                                   magnesium oxide 400 (241.3 Mg) MG tablet   Commonly known as:  MAG-OX   Take 1 tablet (400 mg) by mouth daily   Last time this was given:  400 mg on 6/9/2018  8:24 AM                                   metoprolol tartrate 50 MG tablet   Commonly known as:  LOPRESSOR   Take 1 tablet (50 mg) by mouth 2 times daily   Last time this was given:  50 mg on 6/9/2018  8:23 AM                                   nicotine 14 MG/24HR 24 hr patch   Commonly known as:  NICODERM CQ   Place 1 patch onto the skin daily   Start taking on:  6/10/2018   Last time this was given:  1 patch on 6/9/2018  8:24 AM                                   omeprazole 20 MG CR capsule   Commonly known as:  priLOSEC   Take 1 capsule (20 mg) by mouth daily   Last time this was given:  20 mg on 6/9/2018  8:24 AM                                   ondansetron 8 MG ODT tab   Commonly known as:  ZOFRAN-ODT   Take 1 tablet (8 mg) by mouth every 8 hours   Last time this was given:  8 mg on 6/9/2018  6:58 AM                                         potassium chloride SA 10 MEQ CR tablet   Commonly known as:  K-DUR/KLOR-CON M   Take 1 tablet (10 mEq) by mouth daily   Last time this was given:  20 mEq on 6/7/2018 10:22 AM                                   prochlorperazine 10 MG tablet   Commonly known as:  COMPAZINE   Take 0.5 tablets (5 mg) by mouth every 6 hours as needed for nausea or  vomiting   Last time this was given:  5 mg on 6/9/2018  4:28 AM                                   rosuvastatin 5 MG tablet   Commonly known as:  CRESTOR   Take 1 tablet (5 mg) by mouth daily   Last time this was given:  5 mg on 6/9/2018  8:27 AM                                   senna-docusate 8.6-50 MG per tablet   Commonly known as:  SENOKOT-S;PERICOLACE   Take 2 tablets by mouth 2 times daily   Last time this was given:  1 tablet on 6/9/2018  8:23 AM                                      traMADol 50 MG tablet   Commonly known as:  ULTRAM   Take 1 tablet (50 mg) by mouth every 6 hours as needed for severe pain                                   VENTOLIN  (90 Base) MCG/ACT Inhaler   Inhale 2 puffs into the lungs every 4 hours as needed for shortness of breath / dyspnea or wheezing   Generic drug:  albuterol                                   * Notice:  This list has 2 medication(s) that are the same as other medications prescribed for you. Read the directions carefully, and ask your doctor or other care provider to review them with you.

## 2018-06-05 NOTE — ED PROVIDER NOTES
History   No chief complaint on file.    HPI  Chilo Oneil is a 68 year old male with a history of adenocarcinoma of the left lung and coronary artery disease who presents to the emergency department for evaluation of shortness of breath with generalized weakness and shoulder pain.  Patient is currently receiving chemotherapy for his lung cancer.  He has had 2 cycles of chemotherapy with fiona and cisplatin. He states he has had shoulder pain since he was diagnosed with his cancer.  This is not new.  He took tramadol an hour prior to arrival.  This did not alleviate his pain..  Patient reports he is nauseous and has had no appetite for the last 3 days.  He was prescribed compazine for his nausea but feels this does not help.  He has had nausea since beginning chemotherapy.  He feels anxious.  He has been prescribed benzodiazepines for his anxiety in the past but he says he no longer has any and so has not taken any. Patient reports a headache for the past 3-4 days.  His headaches are causing him to stay awake.  He denies over-the-counter medication use to help alleviate his headaches.  Patient reports abdominal pain in the upper quadrants, stating he feels like it is bad gas.  His last bowel movement was yesterday, small in size.  He has difficulty passing urine.  He feels this is new but is unsure.  He has a cough producing clear phlegm.  Patient denies fever and edema of the lower extremities.    Patient is a current tobacco smoker.  He denies alcohol use.    Problem List:    Patient Active Problem List    Diagnosis Date Noted     Pulmonary emboli (H) 06/05/2018     Priority: Medium     Pulmonary embolism (H) 06/05/2018     Priority: Medium     Hypokalemia 05/31/2018     Priority: Medium     Hypomagnesemia 05/31/2018     Priority: Medium     Carcinoma, lung, left (H) 04/12/2018     Priority: Medium     CAD (coronary artery disease) 03/28/2018     Priority: Medium     No previous angiogram.  No previous  stenting.    Atypical Stress Test consistent with possible CAD 8/2016: Myocardial perfusion imaging using single isotope technique demonstrated a small of inferior ischemia at the base to mid ventricle There is a second moderate area of ischemia in the anterior and anteroseptal wall from base through mid ventricle, involving the lateral base as well. There is a small fixed portion in the anteroseptal base consistent with prior infarction There is a fixed inferoseptal defect from apex to mid ventricle consistent with either prior nontransmural infarct or attenuation artifact. Gated images demonstrated inferior, inferoseptal, anterior and anteroseptal basal hypokinesis.  The left ventricular systolic function is 52% at rest and 47% post stress.       Hyponatremia 03/28/2018     Priority: Medium     SOB (shortness of breath) 03/28/2018     Priority: Medium     Chemotherapy induced nausea and vomiting 03/28/2018     Priority: Medium     Lung cancer (H)      Priority: Medium     Left shoulder pain, cough. Bx 12/2017- Non Small Cell. Resection planned 4/2018       Uncomplicated asthma      Priority: Medium     Anxiety 12/28/2017     Priority: Medium     Gastroesophageal reflux disease without esophagitis 12/28/2017     Priority: Medium     PAD (peripheral artery disease) (H) 06/13/2016     Priority: Medium     Benign essential hypertension 06/13/2016     Priority: Medium     Hyperlipidemia LDL goal <100 06/13/2016     Priority: Medium     Alcohol use, daily use 09/20/2013     Priority: Medium     Tobacco use disorder 10/16/2009     Priority: Medium        Past Medical History:    Past Medical History:   Diagnosis Date     GERD (gastroesophageal reflux disease)      HTN (hypertension)      Lung cancer (H)      MI (myocardial infarction)        Past Surgical History:    Past Surgical History:   Procedure Laterality Date     FRACTURE TX, ANKLE RT/LT  1975    Fracture TX Ankle, LT     INSERT PORT VASCULAR ACCESS Left  5/15/2018    Procedure: INSERT PORT VASCULAR ACCESS;  Left chest Port-a-Cath Placement;  Surgeon: Gurjit Fox MD;  Location: [facility] OR     OPEN REDUCTION INTERNAL FIXATION HIP NAILING  9/20/2013    Procedure: OPEN REDUCTION INTERNAL FIXATION HIP NAILING;  Left Hip Open Reduction Internal Fixation ;  Surgeon: Rosas Dennis MD;  Location: [facility] OR     THORACOSCOPIC BIOPSY LUNG Left 4/11/2018    Procedure: THORACOSCOPIC BIOPSY LUNG;  subxiphoid left video assisted thorascopic surgery pleural biops, left lower lobe wedge biopsy ;  Surgeon: Mega Moreno MD;  Location: [facility] OR     VASCULAR SURGERY         Family History:    Family History   Problem Relation Age of Onset     DIABETES Brother      type 2     DIABETES Father        Social History:  Marital Status:  Single [1]  Social History   Substance Use Topics     Smoking status: Current Every Day Smoker     Packs/day: 0.15     Years: 47.00     Types: Cigarettes     Start date: 12/26/1967     Smokeless tobacco: Never Used      Comment: 2-3 cigs daily     Alcohol use Yes      Comment: 6-8 beers per day, last 3/27 at 6pm        Medications:      No current outpatient prescriptions on file.      Review of Systems  All other systems are reviewed and are negative    Physical Exam   BP: 135/90  Heart Rate: 80  Temp: 99  F (37.2  C)  Resp: 22  SpO2: 97 %      Physical Exam  Nursing note and vitals were reviewed.  Constitutional: Awake and alert, poorly nourished appearing 68-year-old in moderate discomfort, who does not appear acutely ill, and who answers questions appropriately and cooperates with examination.  HEENT: EOMI. PERRL.  EACs are clear.  TMs are normal.  Neck: Freely mobile.  Cardiovascular: Cardiac examination reveals normal heart rate and regular rhythm without murmur.  Pulmonary/Chest: Breathing is unlabored.  Breath sounds are clear and equal bilaterally.  There no retractions, tachypnea, rales, wheezes, or rhonchi.  Abdomen: Soft, nontender, no  HSM or masses rebound or guarding.  Musculoskeletal: Extremities are warm and well-perfused and without edema  Neurological: Alert, oriented, thought content logical, coherent   Skin: Warm, dry, no rashes.  Psychiatric: Affect blunted and restricted.      ED Course     ED Course     Procedures               EKG Interpretation:      Interpreted by Brent Slade  Time reviewed: 17:10  Symptoms at time of EKG: dyspnea   Rhythm: normal sinus   Rate: normal  Axis: normal  Ectopy: none  Conduction: normal  ST Segments/ T Waves: Very slight ST segment depression in the inferior leads  Q Waves: none  Comparison to prior: Unchanged from 5/9/18    Clinical Impression: abnormal, nondiagnostic EKG          Critical Care time:  none               Results for orders placed or performed during the hospital encounter of 06/05/18 (from the past 24 hour(s))   CBC with platelets differential   Result Value Ref Range    WBC 8.1 4.0 - 11.0 10e9/L    RBC Count 3.47 (L) 4.4 - 5.9 10e12/L    Hemoglobin 11.5 (L) 13.3 - 17.7 g/dL    Hematocrit 31.6 (L) 40.0 - 53.0 %    MCV 91 78 - 100 fl    MCH 33.1 (H) 26.5 - 33.0 pg    MCHC 36.4 31.5 - 36.5 g/dL    RDW 14.5 10.0 - 15.0 %    Platelet Count 243 150 - 450 10e9/L    Diff Method Manual Differential     % Neutrophils 90.0 %    % Lymphocytes 10.0 %    % Monocytes 0.0 %    % Eosinophils 0.0 %    % Basophils 0.0 %    Absolute Neutrophil 7.3 1.6 - 8.3 10e9/L    Absolute Lymphocytes 0.8 0.8 - 5.3 10e9/L    Absolute Monocytes 0.0 0.0 - 1.3 10e9/L    Absolute Eosinophils 0.0 0.0 - 0.7 10e9/L    Absolute Basophils 0.0 0.0 - 0.2 10e9/L    RBC Morphology Normal     Platelet Estimate       Automated count confirmed.  Platelet morphology is normal.   Basic metabolic panel   Result Value Ref Range    Sodium 131 (L) 133 - 144 mmol/L    Potassium 2.7 (L) 3.4 - 5.3 mmol/L    Chloride 89 (L) 94 - 109 mmol/L    Carbon Dioxide 31 20 - 32 mmol/L    Anion Gap 11 3 - 14 mmol/L    Glucose 72 70 - 99 mg/dL    Urea  Nitrogen 18 7 - 30 mg/dL    Creatinine 1.13 0.66 - 1.25 mg/dL    GFR Estimate 65 >60 mL/min/1.7m2    GFR Estimate If Black 78 >60 mL/min/1.7m2    Calcium 7.5 (L) 8.5 - 10.1 mg/dL   Troponin I   Result Value Ref Range    Troponin I ES 0.015 0.000 - 0.045 ug/L   Blood gas venous   Result Value Ref Range    Ph Venous 7.58 (H) 7.32 - 7.43 pH    PCO2 Venous 36 (L) 40 - 50 mm Hg    PO2 Venous 25 25 - 47 mm Hg    Bicarbonate Venous 34 (H) 21 - 28 mmol/L    Base Excess Venous 11.4 mmol/L   Hepatic panel   Result Value Ref Range    Bilirubin Direct 0.2 0.0 - 0.2 mg/dL    Bilirubin Total 0.8 0.2 - 1.3 mg/dL    Albumin 2.6 (L) 3.4 - 5.0 g/dL    Protein Total 5.8 (L) 6.8 - 8.8 g/dL    Alkaline Phosphatase 91 40 - 150 U/L    ALT 31 0 - 70 U/L    AST 41 0 - 45 U/L   Lipase   Result Value Ref Range    Lipase 81 73 - 393 U/L   CT Chest Pulmonary Embolism w Contrast   Result Value Ref Range    Radiologist flags Pulmonary embolism (AA)     Narrative    CT CHEST WITH CONTRAST   6/5/2018 6:53 PM     HISTORY: Dyspnea.    COMPARISON: 4/3/2018.    TECHNIQUE: Following the uneventful administration of 69 mL Isovue  -370 intravenous contrast, helical sections were acquired through the  lungs according to the pulmonary embolism protocol. Coronal  reconstructions were generated. Radiation dose for this scan was  reduced using automated exposure control, adjustment of the mA and/or  kV according to the patient's size, or iterative reconstruction  technique.    FINDINGS: A filling defect is present within the superior segmental  pulmonary artery of the right lower lobe (series 4 image 76),  consistent with an acute pulmonary embolus. No other visualized  pulmonary emboli. The thoracic aorta is normal in caliber without  dissection. Atherosclerotic calcification in the thoracic aorta and  coronary arteries.    Mild emphysematous changes in the lungs. Parenchymal scarring in the  left lung apex and left lung base. 2.3 x 1.3 cm irregular  nodular  opacity in the periphery of the anterior aspect of the mid left lung  (series 5 image 69), unchanged. A few nonspecific tiny nodular  opacities scattered within the lungs, not convincingly changed. No  pleural or pericardial effusion. A few nonspecific borderline enlarged  mediastinal and bilateral hilar lymph nodes, unchanged. Left anterior  chest wall port with catheter entering the left subclavian vein and  distal tip in the superior vena cava.    Scan through the upper abdomen is significant for nonspecific mild  diffuse bilateral adrenal thickening. 2 cm cyst in the upper pole of  the right kidney. Probable sludge in the gallbladder.      Impression    IMPRESSION:   1. Acute pulmonary embolus in the right lower lobe. No other  visualized pulmonary emboli.  2. 2.3 cm irregular nodule that is suspicious for neoplasm in the mid  left lung, unchanged since 4/3/2018.       [Critical Result: Pulmonary embolism]    Finding was identified on 6/5/2018 6:59 PM.     Dr. Slade was contacted by me on 6/5/2018 7:10 PM and verbalized  understanding of the critical result.     JACQUI NEGRO MD       Medications   lactated ringers BOLUS 1,000 mL (0 mLs Intravenous Stopped 6/5/18 2210)     Followed by   lactated ringers BOLUS 1,000 mL (0 mLs Intravenous Stopped 6/5/18 1947)     Followed by   lactated ringers infusion (1,000 mLs Intravenous New Bag 6/5/18 5145)   naloxone (NARCAN) injection 0.1-0.4 mg (not administered)   acetaminophen (TYLENOL) tablet 650 mg (not administered)   ibuprofen (ADVIL/MOTRIN) tablet 600 mg (not administered)   nicotine polacrilex (COMMIT) lozenge 2 mg (not administered)   aspirin chewable tablet 81 mg (not administered)   LORazepam (ATIVAN) tablet 0.5 mg (not administered)   losartan (COZAAR) tablet 50 mg (not administered)   magnesium oxide (MAG-OX) tablet 400 mg (not administered)   metoprolol tartrate (LOPRESSOR) tablet 50 mg (not administered)   omeprazole (priLOSEC) CR capsule 20 mg  (not administered)   potassium chloride (K-TAB,KLOR-CON) CR tablet 10 mEq (not administered)   rosuvastatin (CRESTOR) tablet 5 mg (not administered)   senna-docusate (SENOKOT-S;PERICOLACE) 8.6-50 MG per tablet 2 tablet (not administered)   traMADol (ULTRAM) tablet 50 mg (not administered)   albuterol (PROAIR HFA/PROVENTIL HFA/VENTOLIN HFA) Inhaler 2 puff (not administered)   melatonin tablet 1 mg (not administered)   Patient is already receiving anticoagulation with heparin, enoxaparin (LOVENOX), warfarin (COUMADIN)  or other anticoagulant medication (not administered)   magnesium hydroxide (MILK OF MAGNESIA) suspension 30 mL (not administered)   senna-docusate (SENOKOT-S;PERICOLACE) 8.6-50 MG per tablet 1 tablet (not administered)     Or   senna-docusate (SENOKOT-S;PERICOLACE) 8.6-50 MG per tablet 2 tablet (not administered)   ondansetron (ZOFRAN-ODT) ODT tab 4 mg (not administered)     Or   ondansetron (ZOFRAN) injection 4 mg (not administered)   prochlorperazine (COMPAZINE) injection 5 mg (not administered)     Or   prochlorperazine (COMPAZINE) tablet 5 mg (not administered)     Or   prochlorperazine (COMPAZINE) Suppository 12.5 mg (not administered)   metoclopramide (REGLAN) tablet 5 mg (not administered)     Or   metoclopramide (REGLAN) injection 5 mg (not administered)   potassium chloride SA (K-DUR/KLOR-CON M) CR tablet 20-40 mEq (40 mEq Oral Given 6/5/18 2359)   potassium chloride (KLOR-CON) Packet 20-40 mEq (not administered)   potassium chloride 10 mEq in 100 mL sterile water intermittent infusion (premix) (not administered)   potassium chloride 10 mEq in 100 mL intermittent infusion with 10 mg lidocaine (not administered)   potassium chloride 20 mEq in 50 mL intermittent infusion (not administered)   magnesium sulfate 4 g in 100 mL sterile water (premade) (not administered)   HYDROmorphone (PF) (DILAUDID) injection 0.3-0.5 mg (0.5 mg Intravenous Given 6/5/18 2359)   enoxaparin (LOVENOX) injection 70 mg  (70 mg Subcutaneous Given 6/5/18 4910)   HYDROmorphone (DILAUDID) tablet 2 mg (not administered)   iopamidol (ISOVUE-370) solution 69 mL (69 mLs Intravenous Given 6/5/18 1837)   Saline Flush (96 mLs Intravenous Given 6/5/18 1837)     3589 Patient assessed. Course of care outlined.  Assessments & Plan (with Medical Decision Making)     68-year-old male currently undergoing chemotherapy with previous wedge resection of an adenocarcinoma of the left upper lobe of the lung presents with anorexia and nausea and dyspnea.  Workup in the emergency department is significant for a pulmonary embolus present that is not subsegmental.  This may certainly be contributing to his dyspnea although I think this is also due to his current smoking and past smoking history with COPD.  In addition I think he is tolerating chemotherapy poorly and this accounts for his anorexia nausea.  He is undoubtedly moderately dehydrated.  I suspect this is why he has a low sodium and low potassium.  He will require hospitalization for hydration and to initiate treatment of his pulmonary embolus and for monitoring.  I discussed the findings with the patient and he is agreeable to the plan.  I discussed his case with Dr. Tubbs of the hospital service and they will assume care on admission.    I have reviewed the nursing notes.    I have reviewed the findings, diagnosis, plan and need for follow up with the patient.       Current Discharge Medication List          Final diagnoses:   Other acute pulmonary embolism without acute cor pulmonale (H)     This document serves as a record of the services and decisions personally performed and made by Brent Slade MD. It was created on HIS/HER behalf by Danielle Brain, a trained medical scribe. The creation of this document is based the provider's statements to the medical scribe.  Danielle Brian 5:05 PM 6/5/2018    Provider:   The information in this document, created by the medical scribe for me, accurately  reflects the services I personally performed and the decisions made by me. I have reviewed and approved this document for accuracy prior to leaving the patient care area.  Brent Slade MD 5:05 PM 6/5/2018 6/5/2018   [facility] EMERGENCY DEPARTMENT     Brent Slade MD  06/06/18 0013

## 2018-06-05 NOTE — IP AVS SNAPSHOT
[facility]    [address] [address]    Phone:  [phone]    Fax:  [phone]                                       After Visit Summary   6/5/2018    Chilo Oneil    MRN: [id number]           After Visit Summary Signature Page     I have received my discharge instructions, and my questions have been answered. I have discussed any challenges I see with this plan with the nurse or doctor.    ..........................................................................................................................................  Patient/Patient Representative Signature      ..........................................................................................................................................  Patient Representative Print Name and Relationship to Patient    ..................................................               ................................................  Date                                            Time    ..........................................................................................................................................  Reviewed by Signature/Title    ...................................................              ..............................................  Date                                                            Time

## 2018-06-06 NOTE — PROGRESS NOTES
Clinic Care Coordination Contact  Care Coordination Transition Communication    Referral Source: PCP    Clinical Data: Patient was hospitalized at [facility] from 5-5-18 to present with diagnosis of shortness of breath and was admitted for pulmonary embolism.     Per discussion with inpatient SWJoseph, pt to DC to home with new Lovenox teachings prior to leaving the hospital.     However, when this writer was doing a chart review much later in the day, this writer saw that pt is on new pain medications.  This writer called to speak with pt's floor RN, Taylor, and requested that pt have an inpatient Palliative Care consult in order to address his increase pain and to address pt's many questions regarding chemo, radiation, quality of life, etc., that he was going to address at his appointment with Dr. Mccauley on 6-7-18, which has been cancelled.   Pt is also interested in quitting smoking.      Plan: SW Care Coordinator will await notification from facility staff informing RN/SW Care Coordinator of patient's discharge plans/needs.  SW to continue to follow pt once he has discharged from the hospital.    Sofya Unger  Social Work Care Coordinator  [facility]  [phone]

## 2018-06-06 NOTE — PROGRESS NOTES
Pt is now taking 4mg of po dilaudid every 3 hrs, dose is 2-4mg but pt was not able to make it the three hours on 2mg. Continues to have nausea from chemo, but with prn ativan and prn zofran nausea was mildly improved this morning, pt did take a few bites of his pancakes. Pt ordered gatorade for each meal. Lovenox teaching completed, pt was able to administer his lovenox with minimal guidance.

## 2018-06-06 NOTE — PROGRESS NOTES
"[facility] Hospitalist Progress Note           Assessment and Plan:     # Pulmonary embolism  6/5/18 -- - In the setting of malignancy and patient is a smoker. Dyspnea started this morning abruptly at 10 am. Came to the ED. CTA showed acute pulmonary embolus of the right superior segmental pulmonary artery in the right lower lobe.   - Start Lovenox 0.5 mg/kg q12h, will need ongoing lovenox at home.    6/6/2018 -- pain control as below.  Plan to discharge on lovenox given underlying malignancy, follow-up with Dr. Mccauley as below.       Pain from lung cancer and now PE  6/5/18 -- Chilo is experiencing pain due to left sided lung cancer. Pain 3/10 after IV dilaudid. Pain management was discussed and the plan was created in a collaborative fashion.  Chilo's response to the current recommendations: engaged  6/6/2018 -- transition to oral dilaudid today if able - has 2-4 mg q3h as needed.  Patient says he felt \"weird\" with oxycodone in the past and doesn't think the tramadol he recently started at home would be strong enough for his current pain.        # Carcinoma of left lung, s/p chemotherapy last on 5/31/18 6/5/18 -- Diagnosed in December 2018. Had left sided shoulder pain which led to his diagnosis of cancer 3 weeks later. Is currently on 2nd round of chemotherapy. Last dose was on 5/31. Patient is a smoker.   - Follows with Oncology (Dr. Mccauley) here in [place].   6/6/2018 -- discussed with Dr. Mccauley, he would like to see him next week sometime for follow-up - rescheduling.         # Chemotherapy induced nausea and vomiting  6/5/18 -- N/V most likely due to chemotherapy. Patient has not been taking anti-emetics at home.   - Encouraged him to take Zofran or another anti-emetic as needed to help with this. Patient has an appetite on admission and will try eating some food.   6/6/2018 -- intake slowly improving.  Continue zofran as needed.        # Mixed Metabolic and Respiratory Alkalosis  6/5/18 -- - " pH 7.58, pCO2 36, pO2 25, Bicarb 34  - Most likely due to vomiting and dyspnea with pulmonary embolism.   6/6/2018 -- follow-up VBG pending.        # Coronary artery disease  6/5/18 -- - Follows with cardiology. Last had a Lexiscan on 3/28/18. Showed no new areas of ischemia. EF 47%. Had a previous abnormal stress testing in 2016, but did not pursue treatment at that time.   6/6/2018 -- no symptoms/ concerns.        # Anxiety, unspecified   6/5/18 --  Patient may continue PTA Ativan  6/6/2018 -- controlled with ativan, appears baseline currently.        # Hypokalemia due to chemo induced vomiting.   6/5/18 -- - Has been on oral K replacement. K 2.7 today. Will initiate replacement protocol.   6/6/2018 -- improving.     # pseudo-Hypocalcemia  6/5/18 -- - Ca 7.5 today. Will check ionized calcium. Corrected based on albumin of 2.6 is Ca 8.6.    6/6/2018 -- corrected calcium stable.      # Hyponatremia  6/5/18 -- Na 131. Baseline 130-140. Was in the upper 120's in March.    6/6/2018 -- improving.      # Hypomagnesia  6/5/18 -- Magnesium was 1.5 on 5/31. Does take an oral Mg replacement. Will recheck today.   6/6/2018 -- normalized.  Continue home magnesium.     # Normocytic Anemia - likely due to chronic disease and chemo  6/5/18 --  Hemoglobin 11.5 today. Baseline was in the normal range prior to 5/31 when it decreased to 11.8. Most likely anemia due to chronic disease with his history of cancer and worse with recent chemo   6/6/2018 -- down a bit more, but suspect dilution/rehydration - no apparent active blood loss, follow.     # Tobacco use disorder  - Nicotine replacement available     # Hypertension  6/5/18 -- BP stable Continue Losartan, Metoprolol  6/6/2018 -- no change     # Hyperlipidemia  - Continue Statin     # severe caloric malnutrition:   -has lost 40 lb, contributes to overall illness      Lines: Peripheral    Urinary Catheter: None needed    DVT Prophylaxis: on Lovenox for PE    Code Status: DNR / DNI   "    Disposition  Improving.  If electrolytes and intake continue to improve and if able to control pain and nausea with orals hope for discharge home tomorrow.              Interval History:   Improving but still having significant left shoulder/chest pain.  No dyspnea.  No fever or chills.  Pain \"does great\" after IV dilaudid but wears off after \"a couple of hours\".  Says he didn't tolerate oxycodone well in the past, made him \"weird feeling\" but doesn't think the dilaudid has done this so far.  Oral intake slowly improving as it typically does after chemo per his report.  Sill some intermittent nausea, zofran helps.  No vomiting since admission.    No other pain             Review of Systems:    ROS: 10 point ROS neg other than the symptoms noted above in the HPI.             Medications:   Current active medications and PTA medications reviewed, see medication list for details.            Physical Exam:   Vitals were reviewed  Patient Vitals for the past 24 hrs:   BP Temp Temp src Pulse Heart Rate Resp SpO2   18 0730 151/45 98.4  F (36.9  C) Oral 73 - 18 98 %   18 0404 139/52 98.7  F (37.1  C) Oral 64 - 18 91 %   18 2226 179/85 99.6  F (37.6  C) Oral - 72 18 92 %   18 2145 (!) 160/94 - - - 71 - 98 %   18 2130 130/52 - - - 72 - 91 %   18 2115 146/56 - - - 67 - 93 %   18 2100 144/55 - - - 67 - 95 %   185 (!) 121/97 - - - 73 11 96 %   18 157/76 - - - 69 - 98 %   18 141/68 - - - 71 13 97 %   18 - - - - 75 12 95 %   18 194 158/84 - - - 77 - -   18 1943 152/86 - - - - - -   18 1815 144/78 - - - 73 - (!) 87 %   18 1800 149/79 - - - 68 - 93 %   18 1745 148/72 - - - 73 20 94 %   18 1730 115/65 - - - 80 12 96 %   18 1711 135/90 99  F (37.2  C) Oral - 80 22 97 %       Temperatures:  Current - Temp: 98.4  F (36.9  C); Max - Temp  Av.9  F (37.2  C)  Min: 98.4  F (36.9  C)  Max: 99.6  F (37.6 "  C)  Respiration range: Resp  Av  Min: 11  Max: 22  Pulse range: Pulse  Av.5  Min: 64  Max: 73  Blood pressure range: Systolic (24hrs), Av , Min:115 , Max:179   ; Diastolic (24hrs), Av, Min:45, Max:97    Pulse oximetry range: SpO2  Av.4 %  Min: 87 %  Max: 98 %     No intake or output data in the 24 hours ending 18 0813  EXAM:  General: awake and alert, NAD, oriented x 3  Head: normocephalic  Neck: unremarkable, no lymphadenopathy   HEENT: oropharynx pink and moist    Heart: Regular rate and rhythm, no murmurs, rubs, or gallops  Lungs: clear to auscultation bilaterally with good air movement throughout  Abdomen: soft, non-tender, no masses or organomegaly  Extremities: no edema in lower extremities   Skin unremarkable.               Data:     Results for orders placed or performed during the hospital encounter of 18 (from the past 24 hour(s))   CBC with platelets differential   Result Value Ref Range    WBC 8.1 4.0 - 11.0 10e9/L    RBC Count 3.47 (L) 4.4 - 5.9 10e12/L    Hemoglobin 11.5 (L) 13.3 - 17.7 g/dL    Hematocrit 31.6 (L) 40.0 - 53.0 %    MCV 91 78 - 100 fl    MCH 33.1 (H) 26.5 - 33.0 pg    MCHC 36.4 31.5 - 36.5 g/dL    RDW 14.5 10.0 - 15.0 %    Platelet Count 243 150 - 450 10e9/L    Diff Method Manual Differential     % Neutrophils 90.0 %    % Lymphocytes 10.0 %    % Monocytes 0.0 %    % Eosinophils 0.0 %    % Basophils 0.0 %    Absolute Neutrophil 7.3 1.6 - 8.3 10e9/L    Absolute Lymphocytes 0.8 0.8 - 5.3 10e9/L    Absolute Monocytes 0.0 0.0 - 1.3 10e9/L    Absolute Eosinophils 0.0 0.0 - 0.7 10e9/L    Absolute Basophils 0.0 0.0 - 0.2 10e9/L    RBC Morphology Normal     Platelet Estimate       Automated count confirmed.  Platelet morphology is normal.   Basic metabolic panel   Result Value Ref Range    Sodium 131 (L) 133 - 144 mmol/L    Potassium 2.7 (L) 3.4 - 5.3 mmol/L    Chloride 89 (L) 94 - 109 mmol/L    Carbon Dioxide 31 20 - 32 mmol/L    Anion Gap 11 3 - 14 mmol/L     Glucose 72 70 - 99 mg/dL    Urea Nitrogen 18 7 - 30 mg/dL    Creatinine 1.13 0.66 - 1.25 mg/dL    GFR Estimate 65 >60 mL/min/1.7m2    GFR Estimate If Black 78 >60 mL/min/1.7m2    Calcium 7.5 (L) 8.5 - 10.1 mg/dL   Troponin I   Result Value Ref Range    Troponin I ES 0.015 0.000 - 0.045 ug/L   Blood gas venous   Result Value Ref Range    Ph Venous 7.58 (H) 7.32 - 7.43 pH    PCO2 Venous 36 (L) 40 - 50 mm Hg    PO2 Venous 25 25 - 47 mm Hg    Bicarbonate Venous 34 (H) 21 - 28 mmol/L    Base Excess Venous 11.4 mmol/L   Hepatic panel   Result Value Ref Range    Bilirubin Direct 0.2 0.0 - 0.2 mg/dL    Bilirubin Total 0.8 0.2 - 1.3 mg/dL    Albumin 2.6 (L) 3.4 - 5.0 g/dL    Protein Total 5.8 (L) 6.8 - 8.8 g/dL    Alkaline Phosphatase 91 40 - 150 U/L    ALT 31 0 - 70 U/L    AST 41 0 - 45 U/L   Lipase   Result Value Ref Range    Lipase 81 73 - 393 U/L   CT Chest Pulmonary Embolism w Contrast   Result Value Ref Range    Radiologist flags Pulmonary embolism (AA)     Narrative    CT CHEST WITH CONTRAST   6/5/2018 6:53 PM     HISTORY: Dyspnea.    COMPARISON: 4/3/2018.    TECHNIQUE: Following the uneventful administration of 69 mL Isovue  -370 intravenous contrast, helical sections were acquired through the  lungs according to the pulmonary embolism protocol. Coronal  reconstructions were generated. Radiation dose for this scan was  reduced using automated exposure control, adjustment of the mA and/or  kV according to the patient's size, or iterative reconstruction  technique.    FINDINGS: A filling defect is present within the superior segmental  pulmonary artery of the right lower lobe (series 4 image 76),  consistent with an acute pulmonary embolus. No other visualized  pulmonary emboli. The thoracic aorta is normal in caliber without  dissection. Atherosclerotic calcification in the thoracic aorta and  coronary arteries.    Mild emphysematous changes in the lungs. Parenchymal scarring in the  left lung apex and left lung base.  2.3 x 1.3 cm irregular nodular  opacity in the periphery of the anterior aspect of the mid left lung  (series 5 image 69), unchanged. A few nonspecific tiny nodular  opacities scattered within the lungs, not convincingly changed. No  pleural or pericardial effusion. A few nonspecific borderline enlarged  mediastinal and bilateral hilar lymph nodes, unchanged. Left anterior  chest wall port with catheter entering the left subclavian vein and  distal tip in the superior vena cava.    Scan through the upper abdomen is significant for nonspecific mild  diffuse bilateral adrenal thickening. 2 cm cyst in the upper pole of  the right kidney. Probable sludge in the gallbladder.      Impression    IMPRESSION:   1. Acute pulmonary embolus in the right lower lobe. No other  visualized pulmonary emboli.  2. 2.3 cm irregular nodule that is suspicious for neoplasm in the mid  left lung, unchanged since 4/3/2018.       [Critical Result: Pulmonary embolism]    Finding was identified on 6/5/2018 6:59 PM.     Dr. Slade was contacted by me on 6/5/2018 7:10 PM and verbalized  understanding of the critical result.     JACQUI NEGRO MD   Hepatic panel   Result Value Ref Range    Bilirubin Direct 0.2 0.0 - 0.2 mg/dL    Bilirubin Total 0.7 0.2 - 1.3 mg/dL    Albumin 2.3 (L) 3.4 - 5.0 g/dL    Protein Total 5.0 (L) 6.8 - 8.8 g/dL    Alkaline Phosphatase 79 40 - 150 U/L    ALT 24 0 - 70 U/L    AST 38 0 - 45 U/L   Magnesium   Result Value Ref Range    Magnesium 0.7 (L) 1.6 - 2.3 mg/dL   Comprehensive metabolic panel   Result Value Ref Range    Sodium 132 (L) 133 - 144 mmol/L    Potassium 3.1 (L) 3.4 - 5.3 mmol/L    Chloride 91 (L) 94 - 109 mmol/L    Carbon Dioxide 33 (H) 20 - 32 mmol/L    Anion Gap 8 3 - 14 mmol/L    Glucose 75 70 - 99 mg/dL    Urea Nitrogen 14 7 - 30 mg/dL    Creatinine 1.09 0.66 - 1.25 mg/dL    GFR Estimate 67 >60 mL/min/1.7m2    GFR Estimate If Black 82 >60 mL/min/1.7m2    Calcium 7.1 (L) 8.5 - 10.1 mg/dL    Bilirubin  Total 0.6 0.2 - 1.3 mg/dL    Albumin 2.0 (L) 3.4 - 5.0 g/dL    Protein Total 4.5 (L) 6.8 - 8.8 g/dL    Alkaline Phosphatase 70 40 - 150 U/L    ALT 22 0 - 70 U/L    AST 32 0 - 45 U/L   CBC with platelets   Result Value Ref Range    WBC 5.1 4.0 - 11.0 10e9/L    RBC Count 2.86 (L) 4.4 - 5.9 10e12/L    Hemoglobin 9.4 (L) 13.3 - 17.7 g/dL    Hematocrit 26.4 (L) 40.0 - 53.0 %    MCV 92 78 - 100 fl    MCH 32.9 26.5 - 33.0 pg    MCHC 35.6 31.5 - 36.5 g/dL    RDW 14.6 10.0 - 15.0 %    Platelet Count 171 150 - 450 10e9/L   Magnesium   Result Value Ref Range    Magnesium 2.1 1.6 - 2.3 mg/dL           Attestation:  I have reviewed today's vital signs, notes, medications, labs and imaging.  Amount of time performed on this daily note: 30 minutes.     Papa Sapp MD, MD

## 2018-06-06 NOTE — PLAN OF CARE
Problem: Patient Care Overview  Goal: Plan of Care/Patient Progress Review  Outcome: Improving  Started on Lovenox injections. Left shoulder pain relieved with Aqua-K and IV 0.5 dilaudid every 2 hours. Potassium replaced orally and Mg also replaced both recheck due at 0600. On RA w/ O2 sat's 92%. LS have crackles in RML and RLL with a dry infrequent cough. Pt has a suspected pressure injury on his coccyx covered with a foam mepilex. Pt educated on staying off his side; reminded to frequently change positions. Reports having loose stools that started yesterday. IV LR infusing at 125/hr. VSS.    Pt states that he lives with a roommate. No significant other and no children. Reports having as much help as he needs from friends.

## 2018-06-06 NOTE — PROGRESS NOTES
Zofran not effective for nausea, Ativan given, experienced some relief.  Oral intake continues to be poor.  Dilaudid effective for pain control, will monitor.

## 2018-06-06 NOTE — PROGRESS NOTES
CARE TRANSITION SOCIAL WORK INITIAL ASSESSMENT:      Met with: Patient.    DATA  Active Problems:    Pulmonary emboli (H)    Pulmonary embolism (H)       Primary Care Clinic Name:  ([facility])  Primary Care MD Name:  (Erinn)  Contact information and PCP information verified: Yes      ASSESSMENT  Cognitive Status: awake, alert and oriented.             Lives With: other (see comments) (room mate)  Living Arrangements: house     Description of Support System: Supportive, Involved   Who is your support system?:  (friends)   Support Assessment: Adequate family and caregiver support, Adequate social supports   Insurance Concerns: No Insurance issues identified        This writer met with pt introduced self and role. Discussed discharge planning and medicare guidelines in regards to home care and SNF benefits. Pt reports that he lives with a roommate. Pt recently met with clinic SW and completed all necessary applications for MA. He did have a MNChoice assessment at home. Pt feels confident in going home. No other CTS needs at this time. Pt was agreeable to [facility].    As a system [facility] wants to ensure that across the care continuum that you have support through care coordination services that include nurses and social workers in the outpatient setting.  Due to your pulmonary embolis I feel it is important you have this support when you discharge.  They will be calling you within 24-48 hours of your discharge.   A brochure describing the services was provided.  If you have questions you can reach out to your clinic directly and ask for the Care Coordinator assigned to your care      PLAN    home    Discharge Planner   Discharge Plans in progress: home  Barriers to discharge plan: medical stability  Follow up plan: CTS to follow       Entered by: Carissa Figueroa 06/06/2018 12:37 PM             Carissa Figueroa MSW, LICSW, [facility] [phone]

## 2018-06-06 NOTE — PROGRESS NOTES
Skin affirmation note    Admitting nurse completed full skin assessment, Luis Manuel score and Luis Manuel interventions. This writer agrees with the initial skin assessment findings.

## 2018-06-06 NOTE — PROGRESS NOTES
"SPIRITUAL HEALTH SERVICES  SPIRITUAL ASSESSMENT Progress Note  [facility] [place]    Referral Source: Pt request on admission    Primary Focus:     Assessment of emotional/spiritual/Faith distress    Support for coping    Illness Circumstances:   Reviewed documentation. Reflective conversation shared with patient which integrated elements of illness and family narratives.     Context of Serious Illness/Symptom(s) - Pain control; lung cancer    Resources for Support - Brother, Friends, no wife or children    Distress:     Emotional/Existential/Relational Distress - Freddy stated \"this came out of the blue\" and he never expected to have to be dealing with a diagnosis like this    Spiritual/Amish Distress - None indicated    Social/Cultural/Economic Distress - Freddy commented that he's been able to ride a [employer] to Dr appointments and chemotherapy.  That has been helpful for him and keeps from having to leave his truck parked outside for overnight, if he has to stay.  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    Spiritual / Amish Coping:     Orthodox/N[place] - Hindu    Spiritual Practice(s) - None indicated    Goals of Care:    Goals of Care - Get his pain under control and get back to his plan of care    Meaning/Sense-Making - Freddy stated that his family is supportive but at times kind of messed up.  He noted that ETOH is part of his family narrative and that he used to be more into \"that sort of thing.\"  He said it doesn't hold that much interest to him any more, even though he'll have a drink once in a while.  His connections with friends are important to him.    Plan: Freddy asked for a follow up visit so I will stop to see him again tomorrow.    Alcides Huffman M.A., [facility]  Staff   [facility]  Office: [phone]  Cell: [phone]  Pager [phone]    "

## 2018-06-06 NOTE — PROGRESS NOTES
[facility] [facility] ADMISSION NOTE    Patient admitted to room 2211 at approximately 2230 via wheel chair from emergency room. Patient was accompanied by transport tech.     Verbal SBAR report received from Rosmery prior to patient arrival.     Patient ambulated to bed with stand-by assist. Patient alert and oriented X 3. Pain is controlled with current analgesics.  Medication(s) being used: narcotic analgesics including Dilaudid. 0-10 Pain Scale: 5. Admission vital signs: Blood pressure 179/85, temperature 99.6  F (37.6  C), temperature source Oral, resp. rate 18, SpO2 92 %. Patient was oriented to plan of care, call light, bed controls, tv, telephone, bathroom and visiting hours.     Risk Assessment    The following safety risks were identified during admission: fall. Yellow risk band applied: YES.     Skin Initial Assessment    This writer admitted this patient and completed a full skin assessment and Luis Manuel score in the Adult PCS flowsheet. Appropriate interventions initiated as needed.     Secondary skin check completed by Christine RN.    Skin  Inspection of bony prominences: Full  Skin WDL:  WDL except, characteristics  Skin Temperature: warm  Skin Moisture: dry, flaky  Skin Elasticity: slow return to original state  Skin Integrity: scab(s), bruise(s), wound(s)  Additional Documentation: Wound (LDA)    Luis Manuel Risk Assessment  Sensory Perception: 4-->no impairment  Moisture: 4-->rarely moist  Activity: 3-->walks occasionally  Mobility: 3-->slightly limited  Nutrition: 2-->probably inadequate  Friction and Shear: 2-->potential problem  Luis Manuel Score: 18  Bed Support Surface: Atmos Air mattress  Reassessed using Bed Algorithm: No  Luis Manuel Intervention(s) Implemented: nutrition consult (for nutrition score less than 3), patient /family education on pressure injury prevention, patient education on pressure relief reinforced, pillows between bony prominences    Katja Doe

## 2018-06-06 NOTE — H&P
Physician Attestation   I, Alec Marin, was present with the medical student who participated in the service and in the documentation of the note.  I have verified the history and personally performed the physical exam and medical decision making.  I agree with the assessment and plan of care as documented in the note.      I personally reviewed vital signs, medications, labs and imaging.    See note below.   Segmental PE  Anorexia, nausea /vomiting  due to recent chemo with multiple electrolyte abnormalities.   Pain due to cancer, left shoulder blade.   Questionable home support.      Alec Marin MD  Date of Service (when I saw the patient): 06/05/18    [facility]    History and Physical - Hospitalist       Date of Admission:  6/5/2018    Chief Complaint   Shortness of breath    History is obtained from the patient    History of Present Illness   Chilo Oneil is a 68 year old male  who has a history of carcinoma of the left lung and coronary artery disease who presented with shortness of breath and is admitted for pulmonary embolism.     The patient presented to the ED today with dyspnea, generalized weakness and shoulder pain.     The dyspnea started abruptly at 10 am this morning. This made him very anxious as well which he feels contributed to his dyspnea. He has never had a DVT/PE before. He is currently receiving chemotherapy for carcinoma of the left lung. Last treatment 5/31/18. Shoulder pain has been present since he was diagnosed with cancer. Has been taking Aleve and has Tramadol. Rates pain as 3/10 after IV dilaudid in the ED. CT chest with contrast showed an acute pulmonary embolus of the right superior segmental pulmonary artery in the right lower lobe. Patient is a smoker.     He also reports nausea and vomiting with limited food intake for 3 days. Says this happens after his chemotherapy. He does feel hungry on admission now. Has not been taking any  anti-emetic medications. Last emesis was today before coming to the ED. Last BM happened today in the ED. Does not report any urinary problems.      Review of Systems   The 10 point Review of Systems is negative other than noted in the HPI or here.     Past Medical History    Past Medical History:   Diagnosis Date     GERD (gastroesophageal reflux disease)      HTN (hypertension)      Lung cancer (H)      MI (myocardial infarction)     2016, medically managed        Past Surgical History   Past Surgical History:   Procedure Laterality Date     FRACTURE TX, ANKLE RT/LT  1975    Fracture TX Ankle, LT     INSERT PORT VASCULAR ACCESS Left 5/15/2018    Procedure: INSERT PORT VASCULAR ACCESS;  Left chest Port-a-Cath Placement;  Surgeon: Gurjit Fox MD;  Location: [facility] OR     OPEN REDUCTION INTERNAL FIXATION HIP NAILING  9/20/2013    Procedure: OPEN REDUCTION INTERNAL FIXATION HIP NAILING;  Left Hip Open Reduction Internal Fixation ;  Surgeon: Rosas Dennis MD;  Location: [facility] OR     THORACOSCOPIC BIOPSY LUNG Left 4/11/2018    Procedure: THORACOSCOPIC BIOPSY LUNG;  subxiphoid left video assisted thorascopic surgery pleural biops, left lower lobe wedge biopsy ;  Surgeon: Mega Moreno MD;  Location:  OR     VASCULAR SURGERY          Social History   Social History   Substance Use Topics     Smoking status: Current Every Day Smoker     Packs/day: 0.15     Years: 47.00     Types: Cigarettes     Start date: 12/26/1967     Smokeless tobacco: Never Used      Comment: 2-3 cigs daily     Alcohol use Yes      Comment: 6-8 beers per day, last 3/27 at 6pm       Family History   Family History   Problem Relation Age of Onset     DIABETES Brother      type 2     DIABETES Father        Prior to Admission Medications   Prior to Admission Medications   Prescriptions Last Dose Informant Patient Reported? Taking?   ALPRAZolam (XANAX) 0.5 MG tablet Past Month at Unknown time Self Yes Yes   ASPIRIN PO 6/5/2018 at am Self  Yes Yes   Sig: Take 81 mg by mouth daily    FOLIC ACID PO Past Month at out Self Yes Yes   Sig: Take 1 mg by mouth daily   LORazepam (ATIVAN) 0.5 MG tablet Past Month at Unknown time Self No Yes   Sig: Take 0.5 mg by mouth every 4 hours as needed for nausea, vomiting, anxiety, or sleep.   VENTOLIN  (90 Base) MCG/ACT Inhaler 6/5/2018 at Unknown time Self No Yes   Sig: Inhale 2 puffs into the lungs every 4 hours as needed for shortness of breath / dyspnea or wheezing   acetaminophen (TYLENOL) 325 MG tablet More than a month at Unknown time Self No No   Sig: Take 3 tablets (975 mg) by mouth every 8 hours   dexamethasone (DECADRON) 4 MG tablet Past Month at Unknown time Self No Yes   Sig: Take 4 mg (1 tab) by mouth twice daily for 6 doses. Start the evening prior to Infusion.   losartan (COZAAR) 50 MG tablet Past Month at Unknown time Self No Yes   Sig: Take 1 tablet (50 mg) by mouth daily   magnesium oxide (MAG-OX) 400 (241.3 Mg) MG tablet 6/5/2018 at am Self No Yes   Sig: Take 1 tablet (400 mg) by mouth daily   metoprolol (LOPRESSOR) 50 MG tablet Past Month at Unknown time Self No Yes   Sig: Take 1 tablet (50 mg) by mouth 2 times daily   nicotine polacrilex (EQL NICOTINE) 2 MG lozenge Past Month at Unknown time Self Yes Yes   Sig: Place 2 mg inside cheek every hour as needed for smoking cessation   omeprazole (PRILOSEC) 20 MG CR capsule Past Month at Unknown time Self No Yes   Sig: Take 1 capsule (20 mg) by mouth daily   potassium chloride SA (K-DUR/KLOR-CON M) 10 MEQ CR tablet Past Month at Unknown time Self No Yes   Sig: Take 1 tablet (10 mEq) by mouth daily   prochlorperazine (COMPAZINE) 10 MG tablet Past Month at Unknown time Self No Yes   Sig: Take 1 tablet (10 mg) by mouth every 6 hours as needed for nausea or vomiting   rosuvastatin (CRESTOR) 5 MG tablet Past Month at Unknown time Self No Yes   Sig: Take 1 tablet (5 mg) by mouth daily   senna-docusate (SENOKOT-S;PERICOLACE) 8.6-50 MG per tablet Past  Month at Unknown time Self No Yes   Sig: Take 2 tablets by mouth 2 times daily   traMADol (ULTRAM) 50 MG tablet 6/5/2018 at Unknown time Self No Yes   Sig: Take 1 tablet (50 mg) by mouth every 6 hours as needed for severe pain      Facility-Administered Medications: None     Allergies   Allergies   Allergen Reactions     Cipro [Ciprofloxacin] Swelling       Physical Exam   Vital Signs: Temp: 99  F (37.2  C) Temp src: Oral BP: (!) 121/97   Heart Rate: 73 Resp: 11 SpO2: 96 % O2 Device: None (Room air)    Weight: 0 lbs 0 oz    General Appearance: Alert, tired, no acute distress  Eyes: PERRLA, no scleral icterus  HEENT: Atraumatic, normocephalic, normal external ears, no lesions of the oral mucosa  Respiratory: Lungs clear to auscultation bilaterally, no wheezes, rales or rhonchi  Cardiovascular: Regular rate and rhythm, normal S1 and S2, no murmurs, rubs or gallops  GI: Soft, non distended, non tender, no palpable organomegaly  Skin: No notable rashes or lesions  Musculoskeletal: Appropriate strength and ROM. No edema.   Neurologic: No focal neurological abnormalities. Appropriate insight and judgement.   Psychiatric: Mood appropriate.     Assessment & Plan   Chilo Oneil is a 68 year old male  who has a history of carcinoma of the left lung and coronary artery disease who presented with shortness of breath and is admitted for pulmonary embolism.       # Pulmonary embolism  - In the setting of malignancy and patient is a smoker. Dyspnea started this morning abruptly at 10 am. Came to the ED. CTA showed acute pulmonary embolus of the right superior segmental pulmonary artery in the right lower lobe.   - Start Lovenox 0.5 mg/kg q12h, will need ongoing lovenox at home.      # Carcinoma of left lung, s/p chemotherapy last on 5/31/18  - Diagnosed in December 2018. Had left sided shoulder pain which led to his diagnosis of cancer 3 weeks later. Is currently on 2nd round of chemotherapy. Last dose was on 5/31. Patient is  a smoker.   - Follows with Oncology here in [place].   - Pain medications available to the patient including Tylenol, oral and IV dilaudid.     # Chemotherapy induced nausea and vomiting  - N/V most likely due to chemotherapy. Patient has not been taking anti-emetics at home.   - Encouraged him to take Zofran or another anti-emetic as needed to help with this. Patient has an appetite on admission and will try eating some food.     # Mixed Metabolic and Respiratory Alkalosis  - pH 7.58, pCO2 36, pO2 25, Bicarb 34  - Most likely due to vomiting and dyspnea with pulmonary embolism.     # Coronary artery disease  - Follows with cardiology. Last had a Lexiscan on 3/28/18. Showed no new areas of ischemia. EF 47%. Had a previous abnormal stress testing in 2016, but did not pursue treatment at that time.     # Anxiety  - Patient may continue PTA Ativan    # Hypokalemia due to chemo induced vomiting.   - Has been on oral K replacement. K 2.7 today. Will initiate replacement protocol.     # pseudo-Hypocalcemia  - Ca 7.5 today. Will check ionized calcium. Corrected based on albumin of 2.6 is Ca 8.6.      # Hyponatremia  - Na 131. Baseline 130-140. Was in the upper 120's in March. Will monitor at this time.     # Hypomagnesia  - Magnesium was 1.5 on 5/31. Does take an oral Mg replacement. Will recheck today.     # Normocytic Anemia  - Hemoglobin 11.5 today. Baseline was in the normal range prior to 5/31 when it decreased to 11.8. Most likely anemia due to chronic disease with his history of cancer and worse with recent chemo     # Tobacco use disorder  - Nicotine replacement available    # Hypertension  - /97  - Continue Losartan, Metoprolol    # Hyperlipidemia  - Continue Statin    # severe caloric malnutrition:   -has lost 40 lb, contributes to overall illness     # Pain Assessment:  Current Pain Score 6/5/2018   Patient currently in pain? -   Pain score (0-10) 9   Pain location -   Pain descriptors -   - Chilo ward  experiencing pain due to left sided lung cancer. Pain 3/10 after IV dilaudid. Pain management was discussed and the plan was created in a collaborative fashion.  Chilo's response to the current recommendations: engaged  - Please see the plan for pain management as documented above        Diet:  Regular  Fluids: Received LR 1 L in ED. TKO  Lines: Peripheral  Urinary Catheter: None needed  DVT Prophylaxis: Patient will be on Lovenox for PE  Code Status: DNR / DNI      Disposition: Patient will be here for over 48 hours    [facility]  [facility]      Data   Data     Recent Labs  Lab 06/05/18  1710 05/31/18  0900   WBC 8.1 7.7   HGB 11.5* 11.8*   MCV 91 91    778*   * 133   POTASSIUM 2.7* 3.0*   CHLORIDE 89* 93*   CO2 31 34*   BUN 18 12   CR 1.13 0.82   ANIONGAP 11 6   DAMARI 7.5* 8.1*   GLC 72 142*   ALBUMIN 2.6*  --    PROTTOTAL 5.8*  --    BILITOTAL 0.8  --    ALKPHOS 91  --    ALT 31  --    AST 41  --    LIPASE 81  --    TROPI 0.015  --

## 2018-06-06 NOTE — PROGRESS NOTES
LATE NOTE for 5-30-18 at 10:00 AM:    SW met with pt while he was in the Oncology infusion room getting his chemo treatment.  Pt and SW completed the MA-LTC application that YASMIN Hill with [facility] had emailed and requested that this writer complete with the pt.    Pt had brought his 3-ring binder, which had included his bank statements from [employer], the financial institution whereDebit card is deposited.  SW was able to make copies of pt's January-May 2018 bank statements.    YASMIN faxed the [employer] bank statements and the completed MA-LTC application form to both staff at [facility] as this will complete both the MA application and the LTC MA application for the pt:    Claudette Rawls,   Long Term Services and Supports  [address] [place] [address]  Cell phone: [phone]    Ryann Garcia Financial Worker  Economic Assistance  [facility] Specialist  Ph: [phone]  Fx: [phone]    Per prior discussion with Claudette, once the bank statements and MA-LTC application were received, pt will be eligible for increased home services.    SW to continue to follow.    Sofya Unger  Social Work Care Coordinator  [facility]  [phone]

## 2018-06-06 NOTE — CONSULTS
CLINICAL NUTRITION SERVICES  -  ASSESSMENT NOTE     REASON FOR ASSESSMENT  Chilo Oneil is a 68 year old male seen by Registered Dietitian for Admission Nutrition Risk Screen - unintentional weight loss of 10#s or more in the past two months and RN Consult - [place]      NUTRITION HISTORY  - Information obtained from the patient. He reports that he is unable to eat and drinks very little for about a week after his chemotherapy treatments. The food has no taste and he is nauseated. He does not like Ensure, he will try chocolate Boost with meals and will try berry flavor Magic cup once a day. He did not want to talk about food any further, he does not feel well.      CURRENT NUTRITION ORDERS  Diet Order:     Regular     Current Intake/Tolerance:  Poor per patient      PHYSICAL FINDINGS  Observed  Unable to assess, the patient covered by blankets  Obtained from Chart/Interdisciplinary Team  None noted    ANTHROPOMETRICS  Height: Data Unavailable  Weight: 0 lbs 0 oz, his weight at his last oncology clinic visit on 5/31/2018 was 152#s so will use it to calculate nutrition needs  There is no height or weight on file to calculate BMI.  Weight Status:  Normal BMI  IBW: 196#s  % IBW: 78%  Weight History:   Wt Readings from Last 12 Encounters:   05/31/18 69.1 kg (152 lb 6.4 oz)   05/18/18 70.8 kg (156 lb 1.6 oz)   05/15/18 70.3 kg (155 lb)   05/10/18 74.5 kg (164 lb 4.8 oz)   05/09/18 70.3 kg (155 lb)   05/09/18 73.5 kg (162 lb)   05/09/18 73.8 kg (162 lb 12.8 oz)   04/30/18 73.9 kg (163 lb)   04/27/18 74.3 kg (163 lb 11.2 oz)   04/20/18 73.5 kg (162 lb)   04/17/18 75.7 kg (166 lb 12.8 oz)   04/11/18 74.9 kg (165 lb 2 oz)         LABS  Labs reviewed    MEDICATIONS  Medications reviewed      ASSESSED NUTRITION NEEDS PER APPROVED PRACTICE GUIDELINES:    Dosing Weight 69 kg  Estimated Energy Needs: 4215-7270 kcals (30-35 Kcal/Kg)  Justification: repletion, cancer treated with chemotherapy  Estimated Protein Needs:   grams protein (1.2-1.5 g pro/Kg)  Justification: Repletion, cancer treated with chemotherapy  Estimated Fluid Needs: 1 mL/Kcal  Justification: maintenance    MALNUTRITION:  % Weight Loss:  > 5% in 1 month (severe malnutrition), 11#s in one month(7%)  % Intake:  </= 50% for >/= 1 month (severe malnutrition)  Subcutaneous Fat Loss: unable to review, patient not feeling well  Muscle Loss:  Unable to review, patient not feeling well  Fluid Retention:  No edema in lower extremities per provider note    Malnutrition Diagnosis: Severe malnutrition  In Context of:  Chronic illness or disease    NUTRITION DIAGNOSIS:  Inadequate oral intake related to nausea, no appetite, taste changes from chemotherapy treatments as evidenced by weight loss of 11#s in the past month      NUTRITION INTERVENTIONS  Recommendations / Nutrition Prescription  High calorie, high protein oral nutrition supplement three times a day - chocolate Boost plus  Magic cup once a day  .      Implementation  Nutrition education: No education needs assessed at this time  General/healthful diet and Medical Food Supplement  .      Nutrition Goals  Patient to consume 50-75% of his meals in 2-3 days.  .      MONITORING AND EVALUATION:  Progress towards goals will be monitored and evaluated per protocol and Practice Guidelines, Food intake and Liquid meal replacement or supplement intake    Melany Awan RD,LD  Clinical Dietitian

## 2018-06-07 NOTE — PROGRESS NOTES
06/07/18 1100   Quick Adds   Type of Visit Initial Occupational Therapy Evaluation   Living Environment   Lives With other (see comments)  (roommate)   Living Arrangements house   Home Accessibility no concerns   Number of Stairs to Enter Home 3   Number of Stairs Within Home 0   Functional Level Prior   Ambulation 0-->independent  (SEC as needed)   Transferring 0-->independent   Toileting 0-->independent   Bathing 0-->independent   Dressing 0-->independent   Eating 0-->independent   Communication 0-->understands/communicates without difficulty   Swallowing 0-->swallows foods/liquids without difficulty   Cognition 0 - no cognition issues reported   Fall history within last six months no   General Information   Onset of Illness/Injury or Date of Surgery - Date 06/05/18   Referring Physician Papa Sapp MD   Patient/Family Goals Statement To return home.    Additional Occupational Profile Info/Pertinent History of Current Problem PE, lung cancer- Per MD pt wanting increased help at home.    Precautions/Limitations no known precautions/limitations   Cognitive Status Examination   Orientation orientation to person, place and time   Level of Consciousness alert   Pain Assessment   Patient Currently in Pain No   Range of Motion (ROM)   ROM Comment B UE ROM: WNL   Strength   Strength Comments did not formally assess B UE strength d/t recently shoulder pain- just resolved. Pt denies feeling weaker than baseline.    Hand Strength   Hand Strength Comments B  strength: WNL   Coordination   Upper Extremity Coordination No deficits were identified   Mobility   Bed Mobility Comments independent   Transfer Skill: Bed to Chair/Chair to Bed   Level of [place]: Bed to Chair independent   Transfer Skill: Sit to Stand   Level of [place]: Sit/Stand independent   Transfer Skill: Toilet Transfer   Level of [place]: Toilet independent   Bathing   Level of [place] independent   Upper Body Dressing   Level of  "[place]: Dress Upper Body independent   Lower Body Dressing   Level of [place]: Dress Lower Body independent   Toileting   Level of [place]: Toilet independent   Grooming   Level of [place]: Grooming independent   Eating/Self Feeding   Level of [place]: Eating independent   Instrumental Activities of Daily Living (IADL)   Previous Responsibilities meal prep;housekeeping;laundry;medication management   IADL Comments Pt denies any difficulty with this at home.    Clinical Impression   Criteria for Skilled Therapeutic Interventions Met evaluation only;no problems identified which require skilled intervention;current level of function same as previous level of function   OT Diagnosis assess ADLs, per rounds pt may need increased help at home   Anticipated Discharge Disposition Home   Risks and Benefits of Treatment have been explained. Yes   Patient, Family & other staff in agreement with plan of care Yes   [facility] AM-PAC  \"6 Clicks\" Daily Activity Inpatient Short Form   1. Putting on and taking off regular lower body clothing? 4 - None   2. Bathing (including washing, rinsing, drying)? 4 - None   3. Toileting, which includes using toilet, bedpan or urinal? 4 - None   4. Putting on and taking off regular upper body clothing? 4 - None   5. Taking care of personal grooming such as brushing teeth? 4 - None   6. Eating meals? 4 - None   Daily Activity Raw Score (Score out of 24.Lower scores equate to lower levels of function) 24   Total Evaluation Time   Total Evaluation Time (Minutes) 10       OT: Evaluation complete and no OT needs identified. Discussed IADL tasks at home such as laundry, cleaning and meals- pt denies having difficulty with this and denies wanting assistance with this. Discussed general strength- pt states he is at baseline. No IP PT needs identified as pt is up independently in room- denies LE weakness or change in balance.   "

## 2018-06-07 NOTE — PROGRESS NOTES
Plan remains for patient to discharge home.  He plans to work with the county and clinic care coordinator on a long term care plan.    ERIC Hernandez  [facility] [phone]/ [facility] [phone]

## 2018-06-07 NOTE — PROGRESS NOTES
Upon assessment, removed preventative dressing on buttocks. Noted admission wound on right cheek unchanged and findings documented. Noted 2 additional open areas on left buttock cheek. Triad Hydrophilic Wound Dressing applied, orders place in Epic per nursing. Woc consult ordered. Pulsate mattress ordered after reviewing bed algorithm. Chair cushion placed on chair. Patient educated on repositioning self, agrees and is now aware of open sores. Dr. Sapp notified per protocol.

## 2018-06-07 NOTE — PROGRESS NOTES
Physician Attestation   I, Papa Sapp MD, was present with the medical student who participated in the service and in the documentation of the note.  I have verified the history and personally performed the physical exam and medical decision making.  I agree with the assessment and plan of care as documented in the note.      I personally reviewed vital signs, medications, labs and imaging.    Reviewed history with med student and with patient consistent with documentation below.      EXAM:  General: awake and alert, NAD, oriented x 3  Head: normocephalic  Neck: unremarkable, no lymphadenopathy   HEENT: oropharynx pink and moist    Heart: Regular rate and rhythm, no murmurs, rubs, or gallops  Lungs: clear to auscultation bilaterally with good air movement throughout  Abdomen: soft, non-tender, no masses or organomegaly  Extremities: no edema in lower extremities   Skin unremarkable.       ASSESSMENT/PLAN:   Chilo Oneil is a 68 year old male  who has a history of carcinoma of the left lung and coronary artery disease who presented with shortness of breath and is admitted for pulmonary embolism.     # Pulmonary embolism  6/5/18 -- - In the setting of malignancy and patient is a smoker. Dyspnea started this morning abruptly at 10 am. Came to the ED. CTA showed acute pulmonary embolus of the right superior segmental pulmonary artery in the right lower lobe.   - Start Lovenox 0.5 mg/kg q12h, will need ongoing lovenox at home.    6/6/2018 -- pain control as below.  Plan to discharge on lovenox given underlying malignancy, follow-up with Dr. Mccauley as below.     6/7/18 - Continue lovenox     Pain from lung cancer and now PE  6/5/18 -- Chilo is experiencing pain due to left sided lung cancer. Pain 3/10 after IV dilaudid. Pain management was discussed and the plan was created in a collaborative fashion.  Chilo's response to the current recommendations: engaged  6/6/2018 -- transition to oral dilaudid today  "if able - has 2-4 mg q3h as needed.  Patient says he felt \"weird\" with oxycodone in the past and doesn't think the tramadol he recently started at home would be strong enough for his current pain.    6/7/18 Pain still not adequately managed to the patient. Palliative Care Consult placed. Patient received 85 oral equivalents of morphine yesterday. Recommended Fentanyl patch 25 and 12. Will try this today and see how he does with plan to hopefully discharge tomorrow.      # Carcinoma of left lung, s/p chemotherapy last on 5/31/18 6/5/18 -- Diagnosed in December 2018. Had left sided shoulder pain which led to his diagnosis of cancer 3 weeks later. Is currently on 2nd round of chemotherapy. Last dose was on 5/31. Patient is a smoker.   - Follows with Oncology (Dr. Mccauley) here in [place].   6/6/2018 -- discussed with Dr. Mccauley, he would like to see him next week sometime for follow-up - rescheduling.      6/7/18 same as above     # Chemotherapy induced nausea and vomiting  6/5/18 -- N/V most likely due to chemotherapy. Patient has not been taking anti-emetics at home.   - Encouraged him to take Zofran or another anti-emetic as needed to help with this. Patient has an appetite on admission and will try eating some food.   6/6/2018 -- intake slowly improving.  Continue zofran as needed.  6/7/18 - Still feeling nauseous. Zofran helps for 1-2 hours. Per palliative care, they recommend Zofran 8 mg q8h and Compazine 5mg q6h.      # Mixed Metabolic and Respiratory Alkalosis  6/5/18 -- - pH 7.58, pCO2 36, pO2 25, Bicarb 34  - Most likely due to vomiting and dyspnea with pulmonary embolism.   6/6/2018 -- follow-up VBG pending.   6/7/18 - pH 7.44, pCO2 49, pO2 34, Bicarb 33. Improved       # Coronary artery disease  6/5/18 -- - Follows with cardiology. Last had a Lexiscan on 3/28/18. Showed no new areas of ischemia. EF 47%. Had a previous abnormal stress testing in 2016, but did not pursue treatment at that time.   6/6/2018 " -- no symptoms/ concerns.   6/7/18 - unchanged       # Anxiety, unspecified   6/5/18 --  Patient may continue PTA Ativan  6/6/2018 -- controlled with ativan, appears baseline currently.   6/7/18 - stable       # Hypokalemia due to chemo induced vomiting.   6/5/18 -- - Has been on oral K replacement. K 2.7 today. Will initiate replacement protocol.   6/6/2018 -- improving.  6/7/18 - stable, continue replacement     # pseudo-Hypocalcemia  6/5/18 -- - Ca 7.5 today. Will check ionized calcium. Corrected based on albumin of 2.6 is Ca 8.6.    6/6/2018 -- corrected calcium stable.  6/7/18 - stable corrected      # Hyponatremia  6/5/18 -- Na 131. Baseline 130-140. Was in the upper 120's in March.    6/6/2018 -- improving.  6/7/18 - stable      # Hypomagnesia  6/5/18 -- Magnesium was 1.5 on 5/31. Does take an oral Mg replacement. Will recheck today.   6/6/2018 -- normalized.  Continue home magnesium.  6/7/18 - 1.1 today. Continue replacement and home magnesium.    # Hypophosphatemia  - Slightly low at 2.4. Replacement protocol initiated.      # Normocytic Anemia - likely due to chronic disease and chemo  6/5/18 --  Hemoglobin 11.5 today. Baseline was in the normal range prior to 5/31 when it decreased to 11.8. Most likely anemia due to chronic disease with his history of cancer and worse with recent chemo   6/6/2018 -- down a bit more, but suspect dilution/rehydration - no apparent active blood loss, follow.   6/7/18 - down to 7.9 from 9.4 yesterday.  Recheck at noon was 10  No signs of blood loss.      # Tobacco use disorder  - Nicotine replacement available  - Patient requested patch. Has been smoking 14 cigarettes a day with increased stress from current situation.       # Hypertension  6/5/18 -- BP stable Continue Losartan, Metoprolol  6/6/2018 -- no change  6/7/18 - unchanged     # Hyperlipidemia  - Continue Statin      # severe caloric malnutrition:   -has lost 40 lb, contributes to overall illness     # Major  "Depressive Disorder unspecified  6/7/2018 -- started on cymbalta by palliative care which I agree is reasonable, follow-up with primary care provider.      Papa Sapp MD, MD  Date of Service (when I saw the patient): 06/07/18    [facility]    Hospitalist Progress Note      Main Plans for Today   Discharge Home     Assessment & Plan   Chilo Oneil is a 68 year old male  who has a history of carcinoma of the left lung and coronary artery disease who presented with shortness of breath and is admitted for pulmonary embolism.     # Pulmonary embolism  6/5/18 -- - In the setting of malignancy and patient is a smoker. Dyspnea started this morning abruptly at 10 am. Came to the ED. CTA showed acute pulmonary embolus of the right superior segmental pulmonary artery in the right lower lobe.   - Start Lovenox 0.5 mg/kg q12h, will need ongoing lovenox at home.    6/6/2018 -- pain control as below.  Plan to discharge on lovenox given underlying malignancy, follow-up with Dr. Mccauley as below.     6/7/18 - Continue lovenox     Pain from lung cancer and now PE  6/5/18 -- Chilo is experiencing pain due to left sided lung cancer. Pain 3/10 after IV dilaudid. Pain management was discussed and the plan was created in a collaborative fashion.  Chilo's response to the current recommendations: engaged  6/6/2018 -- transition to oral dilaudid today if able - has 2-4 mg q3h as needed.  Patient says he felt \"weird\" with oxycodone in the past and doesn't think the tramadol he recently started at home would be strong enough for his current pain.    6/7/18 Pain still not adequately managed to the patient. Palliative Care Consult placed. Patient received 85 oral equivalents of morphine yesterday. Palliative care recommended Fentanyl patch 25 and 12 which we'll try today.  Continue dilaudid oral as needed.  Hopefully discharge on this tomorrow.       # Carcinoma of left lung, s/p chemotherapy last on " 5/31/18 6/5/18 -- Diagnosed in December 2018. Had left sided shoulder pain which led to his diagnosis of cancer 3 weeks later. Is currently on 2nd round of chemotherapy. Last dose was on 5/31. Patient is a smoker.   - Follows with Oncology (Dr. Mccauley) here in [place].   6/7/2018 -- discussed with Dr. Mccauley, he would like to see him next week sometime for follow-up - scheduled for 6/14/18.       # Chemotherapy induced nausea and vomiting  6/5/18 -- N/V most likely due to chemotherapy. Patient has not been taking anti-emetics at home.   - Encouraged him to take Zofran or another anti-emetic as needed to help with this. Patient has an appetite on admission and will try eating some food.   6/6/2018 -- intake slowly improving.  Continue zofran as needed.  6/7/18 - Still feeling nauseous. Zofran helps for 1-2 hours. Per palliative care, they recommend scheduled Zofran 8 mg q8h and Compazine 5mg every 6 hours - patient appears improved with this.      # Mixed Metabolic and Respiratory Alkalosis  6/5/18 -- - pH 7.58, pCO2 36, pO2 25, Bicarb 34  - Most likely due to vomiting and dyspnea with pulmonary embolism.   6/6/2018 -- follow-up VBG pending.   6/7/18 - pH 7.44, pCO2 49, pO2 34, Bicarb 33. Improved       # Coronary artery disease  6/5/18 -- - Follows with cardiology. Last had a Lexiscan on 3/28/18. Showed no new areas of ischemia. EF 47%. Had a previous abnormal stress testing in 2016, but did not pursue treatment at that time.   6/6/2018 -- no symptoms/ concerns.   6/7/18 - unchanged       # Anxiety, unspecified   6/5/18 --  Patient may continue PTA Ativan  6/6/2018 -- controlled with ativan, appears baseline currently.   6/7/18 - stable       # Hypokalemia due to chemo induced vomiting.   6/5/18 -- - Has been on oral K replacement. K 2.7 today. Will initiate replacement protocol.   6/6/2018 -- improving.  6/7/18 - stable, continue replacement     # pseudo-Hypocalcemia  6/5/18 -- - Ca 7.5 today. Will check ionized  calcium. Corrected based on albumin of 2.6 is Ca 8.6.    6/6/2018 -- corrected calcium stable.  6/7/18 - stable corrected      # Hyponatremia  6/5/18 -- Na 131. Baseline 130-140. Was in the upper 120's in March.    6/6/2018 -- improving.  6/7/18 - stable      # Hypomagnesia  6/5/18 -- Magnesium was 1.5 on 5/31. Does take an oral Mg replacement. Will recheck today.   6/6/2018 -- normalized.  Continue home magnesium.  6/7/18 - 1.1 today. Continue replacement and home magnesium.    # Hypophosphatemia  - Slightly low at 2.4. Replacement protocol initiated.      # Normocytic Anemia - likely due to chronic disease and chemo  6/5/18 --  Hemoglobin 11.5 today. Baseline was in the normal range prior to 5/31 when it decreased to 11.8. Most likely anemia due to chronic disease with his history of cancer and worse with recent chemo   6/6/2018 -- down a bit more, but suspect dilution/rehydration - no apparent active blood loss, follow.   6/7/18 - down to 7.9 from 9.4 yesterday.  Recheck at noon was 10  No signs of blood loss.      # Tobacco use disorder  - Nicotine replacement available  - Patient requested patch. Has been smoking 14 cigarettes a day with increased stress from current situation.       # Hypertension  6/5/18 -- BP stable Continue Losartan, Metoprolol  6/6/2018 -- no change  6/7/18 - unchanged     # Hyperlipidemia  - Continue Statin      # severe caloric malnutrition:   -has lost 40 lb, contributes to overall illness       Diet: Regular Diet Adult  Fluids:  ml/hr  Lines: Peripheral  Urinary Catheter: None needed  DVT Prophylaxis: Placed on Enoxaparin (Lovenox) SQ because of PE  Code Status: DNR / DNI   PCP Communication: N/A    Disposition: Patient was hoping to go home today, but feels his nausea and pain are still not managed enough for him to go home. Discussed with Palliative care and new plan as above. Will see how he progresses today and hopefully he can be discharged tomorrow.     Later on in the day  he brought up the idea of going to a group home/adult foster care setting, because he does not feel like he can take care of himself at home. However, he does not want home care because he feels his home is not well taken care of enough for someone to come out there. Social work involved. Discussed that this can take a long time to set up. Patient will likely need to be discharged home first and work on this as an outpatient if he wants to pursue an adult foster care situation, which is what he says he wants to do today. His outpatient care coordinator is working on his situation for when he leaves the hospital.     The patient's care was discussed with the Attending Physician, Dr. Sapp.    Pedro Johnstonosvaldo  Medical Student      Interval History   - No acute events overnight.  - Still having pain in his shoulder. 3/10 about an hour and a half after having oral Dilaudid. Pain is much worse before pain medication.  - Still having nausea. Zofran 4 mg helps for about 1-2 hours and then wears off. He has not been requesting compazine though this is available to him.   - Would like to go home today, but feels he would land back in the ED because of his nausea and pain at this point.    - No complaints with breathing. Does not feel short of breath during exam.       Physical Exam   Vital Signs: Temp: 98.5  F (36.9  C) Temp src: Oral BP: 137/58 Pulse: 65 Heart Rate: 57 Resp: 18 SpO2: 95 % O2 Device: None (Room air)    Weight: 0 lbs 0 oz  General Appearance: Alert, no acute distress  HEENT: No scleral icterus, dry oral mucosa, no pallor  Respiratory: Lungs clear to auscultation bilaterally, no wheezes, rales or rhonchi  Cardiovascular: Regular rate and rhythm, normal S1 and S2, no murmurs, rubs or gallops  GI: non tender, not distended, no palpable organomegaly,       Data   Data     Recent Labs  Lab 06/07/18  0647 06/06/18  1515 06/06/18  0620 06/06/18  0015 06/05/18  1710  05/31/18  0900   WBC 3.3*  --  5.1  --  8.1  --   7.7   HGB 7.9*  --  9.4*  --  11.5*  --  11.8*   MCV 94  --  92  --  91  --  91   *  --  171  --  243  --  778*   NA  --   --  132*  --  131*  --  133   POTASSIUM  --  3.7 3.1*  --  2.7*  --  3.0*   CHLORIDE  --   --  91*  --  89*  --  93*   CO2  --   --  33*  --  31  --  34*   BUN  --   --  14  --  18  --  12   CR  --   --  1.09  --  1.13  --  0.82   ANIONGAP  --   --  8  --  11  --  6   DAMARI  --   --  7.1*  --  7.5*  --  8.1*   GLC  --   --  75  --  72  --  142*   ALBUMIN  --   --  2.0* 2.3* 2.6*  < >  --    PROTTOTAL  --   --  4.5* 5.0* 5.8*  < >  --    BILITOTAL  --   --  0.6 0.7 0.8  < >  --    ALKPHOS  --   --  70 79 91  < >  --    ALT  --   --  22 24 31  < >  --    AST  --   --  32 38 41  < >  --    LIPASE  --   --   --   --  81  --   --    TROPI  --   --   --   --  0.015  --   --    < > = values in this interval not displayed.

## 2018-06-07 NOTE — PLAN OF CARE
PT-Order received, pt not seen. Spoke w/ pt who has no concerns re- mobility; will complete PT orders

## 2018-06-07 NOTE — PLAN OF CARE
Problem: VTE, DVT and PE (Adult)  Goal: Signs and Symptoms of Listed Potential Problems Will be Absent, Minimized or Managed (VTE, DVT and PE)  Signs and symptoms of listed potential problems will be absent, minimized or managed by discharge/transition of care (reference VTE, DVT and PE (Adult) CPG).   Outcome: No Change  Lung sounds diminished on left.  Some crackles on RML and RLL.  Has a frequent productive cough.  Admits to SOB with activity but this is normal for him.  Oxygen saturation level 92-95% on RA.  Using call light for needs.  Up in room independently.  Loose stools continue which is normal for him.  Complains of left shoulder pain and requests oral Dilaudid PRN, good relief.  Nausea continues and requests PRN Zofran.

## 2018-06-07 NOTE — PROGRESS NOTES
Clinic Care Coordination Contact  Care Team Conversations    YASMIN received phone call from bam Hoffman, requesting an update on why this SW requested an inpatient Palliative Care consult on the pt.  YASMIN updated Yasmin on pt's status regarding increase pain medication since admission and pt's conversation with this writer regarding questions on chemo status, quality vs quantity of life that we had on 6-4-18.  Yasmin informed SW that they had inpatient rounds this morning and pt would be discussed.    YASMIN received phone call from Palliative Care provider, Shay Mast, informing this writer that pt shared that after his chemo run, he is not comfortable returning home due to his living environment and how ill he becomes.  YASMIN explained that pt does not have active MA at this time, but that this worker will continue to work with the pt for his long term care plan.      Shay provided this writer with the name of an Adult Foster Home, Living [facility] with the [facility], [phone] or [phone].      YASMIN placed call to Ryann Garcia, Financial Worker with [facility], Ph: [phone]/Fx: [phone], left a message inquiring about the status of pt's MA status.  YASMIN also called and left a message Claudette MCKEON, [phone], also inquiring about pt's application status.  Awaiting return calls.    YASMIN placed call to bam Hoffman to discuss pt's DC plan of care and this writer learned that the pt wised to return home upon DC.  YASMIN asked Yasmin if it was permissible for this writer to come onto the floor to meet with pt face to face to discuss his long term care plans & needs, as we were going to meet today at the Oncology clinic, but that appointment was cancelled due to his hospitalization.    YASMIN briefly met with Shay Mast, Palliative Care, prior to meeting with pt.  Shay shared with this writer that the pt wishes to DC to home from the hospital and will not allow home care services at this time.  Pt will most  likely discharge to home tomorrow.    SW met with pt in his hospital room.  SW and pt completed a Short Form HCD, where pt indicated his sister, Charlene Dodge as his #1 Health Care Agent and his brother Samm at Health Care Agent #2.  HUC to get a Notary to sign HCD and scan into Epic.  POLST was completed by Shay Muse and SW discussed his depression and anxiety.  Pt shared he has most likely lived with depression & anxiety for years, but drank to cover them.  Pt now only drinks occasionally, not like before when he would drink a 12 pack to a case a day just to make the pain go away or to pass the time.  Pt shared that now he enjoys spending time watching the birds, drinking coffee and sitting outside if he can.  Pt is agreeable to taking an antidepressant, if Shay Mast feels it will help with his overall health condition.  Shay and YASMIN were able to discuss that it does take time for an antidepressant to work.  Pt will be followed by his PCP for increase in dosage.    SW and pt discussed pt's DC plans and long range plans for housing.  Pt owes $400 in back rent.  Rent each month is $220, but pt fell behind over the past year and now that he has stopped drinking with his landlord, the landlord is after him to pay the balance.  Pt wishes to return to his home and be there as long as he can remain independent.  Pt stated he wants to try Chemo at least 1 more round and will speak with his Oncologist after that regarding his choices.  He shared that he just gets so ill after his chemo that it makes it so difficult to think of keep going with it.  SW asked pt if he is taking his medication correctly at home & pt stated he is, but also asked for assistance with setting his medication up in a pill box to ensure that he is taking properly.  SW explained that this writer is unable to set up his medications, but either my partner, Kaz Pollard, ROSANGELACC or MTM can meet with the pt to set up his pills into a medication/pill  box.  Pt and SW will set up a time to meet next week with RNCC.    SW received voice messages from Ryann Garcia, Financial Worker with [facility], Ph: [phone]/Fx: [phone], who stated that she pushed through pt's MA, and made it effective 1-1-18.  She also stated that pt's Elderly Waiver program is now open, effective 5/16/18.  SW also received phone call from Claudette [phone], who shared that his case will go before the supervision team on 6-11-18 and be assigned an Elderly Waiver SW in order to get services started.  SW contacted Patient , Gianfranco, who updated his EMR and added his MA to his record.      SW and pt to continue to work with each other for long term care needs, his back rent and housing needs.    Sofya Unger  Social Work Care Coordinator  [facility]  [phone]

## 2018-06-08 NOTE — PROGRESS NOTES
Physician Attestation   I, Papa Sapp MD, was present with the medical student who participated in the service and in the documentation of the note.  I have verified the history and personally performed the physical exam and medical decision making.  I agree with the assessment and plan of care as documented in the note.      I personally reviewed vital signs, medications, labs and imaging.    Improving.  No leg pain, no leg swelling, chest pain improving.  Breathing doing well.    EXAM:  General: awake and alert, NAD, oriented x 3  Head: normocephalic  Neck: unremarkable, no lymphadenopathy   HEENT: oropharynx pink and moist    Heart: Regular rate and rhythm, no murmurs, rubs, or gallops  Lungs: clear to auscultation bilaterally with good air movement throughout  Abdomen: soft, non-tender, no masses or organomegaly  Extremities: no edema in lower extremities  Skin unremarkable.       Agree with plan as below - in particular- discussed thrombocytopenia with hematology today - they thought that this was due to chemo and were ok with discharge home today on lovenox and follow-up on Monday, however with platelets continuing to trend down and patient without much support at home and no way to recheck platelets prior to Monday we'll keep him overnight to confirm platelets remain reasonably stable overnight and hope for discharge home tomorrow.      Papa Sapp MD, MD  Date of Service (when I saw the patient): 06/08/18    [facility]    Hospitalist Progress Note      Main Plans for Today   Monitor Platelets.     Assessment & Plan   Chilo Oneil is a 68 year old male  who has a history of carcinoma of the left lung and coronary artery disease who presented with shortness of breath and is admitted for pulmonary embolism.      # Pulmonary embolism  6/5/18 -- - In the setting of malignancy and patient is a smoker. Dyspnea started this morning abruptly at 10 am. Came to the ED. CTA  showed acute pulmonary embolus of the right superior segmental pulmonary artery in the right lower lobe.   - Start Lovenox 0.5 mg/kg q12h, will need ongoing lovenox at home.    6/6/2018 -- pain control as below.  Plan to discharge on lovenox given underlying malignancy, follow-up with Dr. Mccauley as below.     6/7/18 -- Continue Lovenox  6/8/2018 -- Patient's platelets since admission: 243>171>146>95. Hgb and WBC are trending down as well. INR normal. Patient's chemo was 9 days ago. Patient has been on Lovenox for just under 72 hours. HIT Scoring: Gave score of 2 (meaning low risk for HIT) -  for having a decrease in plts over 50%. Timing wise he is under 4 days of Lovenox. He has not had any new thromboses that we know of and another cause for thrombocytopenia is definitely plausible with his recent chemotherapy on 5/31.   - Discussed with his Heme/Onc physician and he recommends follow up with PCP to check on Monday and then he has a follow up with Heme/Onc next Thursday. Will also send HIT antibodies and recheck platelets one more time this afternoon, but we don't suspect these to be positive. Suspect this is just a response to his chemotherapy given an overall decrease in all 3 counts. Will discharge the patient home on Lovenox today with close follow up.  - Platelets rechecked later today and were 85. Still suspect this is due to chemotherapy, but given it being Friday and Follow up would not be till Monday, we will keep him overnight and recheck in the morning.     # Thrombocytopenia  - Suspect due to chemo but at least some concern for HIT. See discussion above. Will follow up outpatient next Monday with PCP and next Thursday with Heme/Onc      Pain from lung cancer and now PE  6/5/18 -- Chilo is experiencing pain due to left sided lung cancer. Pain 3/10 after IV dilaudid. Pain management was discussed and the plan was created in a collaborative fashion.  Chilo's response to the current recommendations:  "engaged  6/6/2018 -- transition to oral dilaudid today if able - has 2-4 mg q3h as needed.  Patient says he felt \"weird\" with oxycodone in the past and doesn't think the tramadol he recently started at home would be strong enough for his current pain.    6/7/18 Pain still not adequately managed to the patient. Palliative Care Consult placed. Patient received 85 oral equivalents of morphine yesterday. Recommended Fentanyl patch 25 and 12. Will try this today and see how he does with plan to hopefully discharge tomorrow.   6/8/2018 -- Pain is improved and more manageable with fentanyl patches. Continue current plan.      # Carcinoma of left lung, s/p chemotherapy last on 5/31/18 6/5/18 -- Diagnosed in December 2018. Had left sided shoulder pain which led to his diagnosis of cancer 3 weeks later. Is currently on 2nd round of chemotherapy. Last dose was on 5/31. Patient is a smoker.   - Follows with Oncology (Dr. Mccauley) here in [place].   6/6/2018 -- discussed with Dr. Mccauley, he would like to see him next week sometime for follow-up - rescheduling.      6/7/18 same as above  6/8/2018 -- Unchanged.       # Chemotherapy induced nausea and vomiting  6/5/18 -- N/V most likely due to chemotherapy. Patient has not been taking anti-emetics at home.   - Encouraged him to take Zofran or another anti-emetic as needed to help with this. Patient has an appetite on admission and will try eating some food.   6/6/2018 -- intake slowly improving.  Continue zofran as needed.  6/7/18 - Still feeling nauseous. Zofran helps for 1-2 hours. Per palliative care, they recommend Zofran 8 mg q8h and Compazine 5mg q6h.   6/8/2018 -- N/V improved today with regimented anti-emetics. Patient is able to tolerate eating and drinking. Continue current plan.       # Mixed Metabolic and Respiratory Alkalosis  6/5/18 -- - pH 7.58, pCO2 36, pO2 25, Bicarb 34  - Most likely due to vomiting and dyspnea with pulmonary embolism.   6/6/2018 -- follow-up " [facility] pending.   6/7/18 - pH 7.44, pCO2 49, pO2 34, Bicarb 33. Improved.  No further evaluation needed.      # Coronary artery disease  6/5/18 -- - Follows with cardiology. Last had a Lexiscan on 3/28/18. Showed no new areas of ischemia. EF 47%. Had a previous abnormal stress testing in 2016, but did not pursue treatment at that time.   6/6/2018 -- no symptoms/ concerns.   6/8/18 - unchanged        # Anxiety, unspecified   6/5/18 --  Patient may continue PTA Ativan  6/6/2018 -- controlled with ativan, appears baseline currently.   6/8/18 - stable        # Hypokalemia due to chemo induced vomiting.   6/5/18 -- - Has been on oral K replacement. K 2.7 today. Will initiate replacement protocol.   6/6/2018 -- improving.  6/7/18 - stable, continue replacement  6/8/2018 -- Stable      # pseudo-Hypocalcemia  6/5/18 -- - Ca 7.5 today. Will check ionized calcium. Corrected based on albumin of 2.6 is Ca 8.6.    6/6/2018 -- corrected calcium stable.  6/7/18 - stable corrected       # Hyponatremia  6/5/18 -- Na 131. Baseline 130-140. Was in the upper 120's in March.    6/6/2018 -- improving.  6/7/18 - stable   6/8/2018 -- Na 132. Stable      # Hypomagnesia  6/5/18 -- Magnesium was 1.5 on 5/31. Does take an oral Mg replacement. Will recheck today.   6/6/2018 -- normalized.  Continue home magnesium.  6/7/18 - 1.1 today. Continue replacement and home magnesium.  6/8/2018 -- Mg 2.1 Normalized.      # Hypophosphatemia  6/7/2018 -- Slightly low at 2.4. Replacement protocol initiated.   6/8/2018 -- Normalized.       # Normocytic Anemia - likely due to chronic disease and chemo  6/5/18 --  Hemoglobin 11.5 today. Baseline was in the normal range prior to 5/31 when it decreased to 11.8. Most likely anemia due to chronic disease with his history of cancer and worse with recent chemo   6/6/2018 -- down a bit more, but suspect dilution/rehydration - no apparent active blood loss, follow.   6/7/18 - down to 7.9 from 9.4 yesterday.  Recheck at  noon was 10  No signs of blood loss.   6/8/2018 - Stable. 9.8 today.      # Tobacco use disorder  6/7/2018 -- Patient requested patch. Has been smoking 14 cigarettes a day with increased stress from current situation.   6/8/2018 No change      # Hypertension  6/5/18 -- BP stable Continue Losartan, Metoprolol  6/6/2018 -- no change  6/7/18 - unchanged  6/8/2018 -- unchanged      # Hyperlipidemia  - Continue Statin      # severe caloric malnutrition:   -has lost 40 lb, contributes to overall illness      # Major Depressive Disorder unspecified  6/7/2018 -- started on cymbalta by palliative care which I agree is reasonable, follow-up with primary care provider.    6/8/2018 -- Continue    # Pain Assessment:  Current Pain Score 6/7/2018   Patient currently in pain? denies   Pain score (0-10) -   Pain location -   Pain descriptors -   - Chilo is experiencing pain due to lung cancer, but curently reports pain is being managed and is tolerable. Pain management was discussed and the plan was created in a collaborative fashion.  Chilo's response to the current recommendations: engaged  - Please see the plan for pain management as documented above        Diet: Regular Diet Adult  Fluids: TKO  Lines: Peripheral  Urinary Catheter: None needed  DVT Prophylaxis: Placed on Enoxaparin (Lovenox) SQ because of PE  Code Status: DNR / DNI   PCP Communication: N/A  Disposition: Plan is to discharge to home tomorrow. Plan was for today, but given his decreasing platelet count we want to watch him for one more night. Is working with his outpatient  to find a place for him long term in a group home or an adult foster care situation as well. Patient has an appointment for Monday to recheck his platelets and Thursday with Heme/Onc. Will discharge home on Lovenox.      The patient's care was discussed with the Attending Physician, Dr. Sapp.    Pedro Maria  Medical Student      Interval History   No acute events  overnight.  Patient reports that his pain is improved and better managed. His nausea is also improved. He stills gets some nausea and pain in his shoulder at times, but overall it is better from yesterday and he would like to go home today if possible.     Physical Exam   Vital Signs: Temp: 98.2  F (36.8  C) Temp src: Oral BP: 165/70 Pulse: 52   Resp: 18 SpO2: 96 % O2 Device: None (Room air)    Weight: 0 lbs 0 oz  General Appearance: Alert, non toxic, no acute distress  Respiratory: Lungs clear to auscultation bilaterally, no wheezes, rales or rhonchi  Cardiovascular: Regular rate and rhythm, normal S1, S2, no murmurs, rubs or gallops. No peripheral edema.   GI: soft, non distended, non tender  Skin: Some bruising noted on arms and legs. No concerning lesions. Warm and dry.        Data   Data     Recent Labs  Lab 06/08/18  0635 06/07/18  1447 06/07/18  0647  06/06/18  0620  06/05/18  1710   WBC 3.2*  --  3.3*  --  5.1  --  8.1   HGB 9.8* 10.0* 7.9*  --  9.4*  --  11.5*   MCV 92  --  94  --  92  --  91   PLT 95*  --  146*  --  171  --  243   *  --  132*  --  132*  --  131*   POTASSIUM 3.7 4.3 3.3*  < > 3.1*  --  2.7*   CHLORIDE 95  --  94  --  91*  --  89*   CO2 30  --  32  --  33*  --  31   BUN 10  --  12  --  14  --  18   CR 1.07  --  1.13  --  1.09  --  1.13   ANIONGAP 7  --  6  --  8  --  11   DAMARI 7.3*  --  7.2*  --  7.1*  --  7.5*   GLC 76  --  71  --  75  --  72   ALBUMIN 2.1*  --  2.1*  --  2.0*  < > 2.6*   PROTTOTAL 4.8*  --  4.9*  --  4.5*  < > 5.8*   BILITOTAL 0.5  --  0.4  --  0.6  < > 0.8   ALKPHOS 83  --  76  --  70  < > 91   ALT 16  --  20  --  22  < > 31   AST 25  --  29  --  32  < > 41   LIPASE  --   --   --   --   --   --  81   TROPI  --   --   --   --   --   --  0.015   < > = values in this interval not displayed.

## 2018-06-08 NOTE — PLAN OF CARE
Problem: Patient Care Overview  Goal: Plan of Care/Patient Progress Review  Outcome: Improving  Patient A/O x4. Independent in room; uses call light appropriately. States does not need bowel meds as bowels moving now. Minimal pain; gave dilaudid one time this shift. Encouraged to reposition to help heal and avoid further pressure ulcer  On buttocks.     Temp: 98.4  F (36.9  C) Temp src: Oral BP: 136/58 Pulse: 51   Resp: 18 SpO2: 96 % O2 Device: None (Room air)      Continue to monitor and implement poc.

## 2018-06-08 NOTE — PLAN OF CARE
Problem: Patient Care Overview  Goal: Plan of Care/Patient Progress Review  Outcome: Improving  Patient independent in room, showered today. Port is hep locked, patient anxiously awaiting his discharge orders from MD. This writer sent note to MD regarding labs, will await final orders from MD. Patient has pain in his left shoulder, fentanyl patches are applied and patient using aqua K pad also. Gave one dose of dilaudid 4 mg for pain. Patient very pleasant and uses call light appropriately.

## 2018-06-08 NOTE — PROGRESS NOTES
"SPIRITUAL HEALTH SERVICES  SPIRITUAL ASSESSMENT Progress Note  [facility] [place] - Med/Surg    I provided a follow up visit at request of pt during initial visit.  He stated he would be going home today and he was \"very happy\" to be headed home.  He said he felt much better and was grateful for all that went into getting him to this point.  No spiritual needs were identified.  No follow up visit planned but patient is aware of the availability of spiritual support on request.    Alcides Huffman M.A., [facility]  Staff   [facility]  Office: [phone]  Cell: [phone]  Pager [phone]    "

## 2018-06-09 NOTE — PLAN OF CARE
Problem: Patient Care Overview  Goal: Plan of Care/Patient Progress Review  Outcome: No Change  Coughing phegm tonight, states it is white, but getting stuck in throat, took cough med at HS and Tessalon given at this time. Up indep. Has good, loose cough.

## 2018-06-09 NOTE — PROGRESS NOTES
[facility] DISCHARGE NOTE    Patient discharged to home at 10:30 AM via wheel chair. Accompanied by other: friend and staff. Discharge instructions reviewed with patient, opportunity offered to ask questions. Prescriptions sent to patients preferred pharmacy. All belongings sent with patient.    Jacqueline Brown

## 2018-06-09 NOTE — DISCHARGE INSTRUCTIONS
Discharge Instructions and Follow-Up:   Discharge diet: Regular   Discharge activity: Activity as tolerated   Discharge follow-up: Follow up with primary care provider on Monday, recheck platelets, 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   Follow-up with hematology on Thursday as planned.       As a system [facility] wants to ensure that across the care continuum that you have support through care coordination services that include nurses and social workers in the outpatient setting.  Due to your pulmonary embolis I feel it is important you have this support when you discharge.  They will be calling you within 24-48 hours of your discharge.   A brochure describing the services was provided.  If you have questions you can reach out to your clinic directly and ask for the Care Coordinator assigned to your care

## 2018-06-09 NOTE — DISCHARGE SUMMARY
[facility] Discharge Summary    Chilo Oneil MRN# [id number]   Age: 68 year old YOB: 1950     Date of Admission:  6/5/2018  Date of Discharge::  6/9/2018  Admitting Physician:  Alec Marin MD  Discharge Physician:  Papa Sapp MD, MD             Admission Diagnoses:   Other acute pulmonary embolism without acute cor pulmonale (H) [I26.99]          Principle Discharge Diagnosis:     # Thrombocytopenia as a part of pancytopenia due to recent chemo on 5/31      See hospital course for further active diagnoses addressed during this admission.            Procedures:   No procedures performed during this admission          Medications Prior to Admission:     Prescriptions Prior to Admission   Medication Sig Dispense Refill Last Dose     ASPIRIN PO Take 81 mg by mouth daily    6/5/2018 at am     dexamethasone (DECADRON) 4 MG tablet Take 4 mg (1 tab) by mouth twice daily for 6 doses. Start the evening prior to Infusion. 6 tablet 3 Past Month at Unknown time     FOLIC ACID PO Take 1 mg by mouth daily   Past Month at out     LORazepam (ATIVAN) 0.5 MG tablet Take 0.5 mg by mouth every 4 hours as needed for nausea, vomiting, anxiety, or sleep. 30 tablet 5 Past Month at Unknown time     losartan (COZAAR) 50 MG tablet Take 1 tablet (50 mg) by mouth daily 90 tablet 3 Past Month at Unknown time     magnesium oxide (MAG-OX) 400 (241.3 Mg) MG tablet Take 1 tablet (400 mg) by mouth daily 60 tablet 3 6/5/2018 at am     metoprolol (LOPRESSOR) 50 MG tablet Take 1 tablet (50 mg) by mouth 2 times daily 60 tablet 1 Past Month at Unknown time     nicotine polacrilex (EQL NICOTINE) 2 MG lozenge Place 2 mg inside cheek every hour as needed for smoking cessation   Past Month at Unknown time     omeprazole (PRILOSEC) 20 MG CR capsule Take 1 capsule (20 mg) by mouth daily 90 capsule 3 Past Month at Unknown time     potassium chloride SA (K-DUR/KLOR-CON M) 10 MEQ CR tablet Take 1 tablet (10 mEq) by mouth daily  30 tablet 1 Past Month at Unknown time     rosuvastatin (CRESTOR) 5 MG tablet Take 1 tablet (5 mg) by mouth daily 30 tablet 3 Past Month at Unknown time     senna-docusate (SENOKOT-S;PERICOLACE) 8.6-50 MG per tablet Take 2 tablets by mouth 2 times daily 100 tablet 0 Past Month at Unknown time     traMADol (ULTRAM) 50 MG tablet Take 1 tablet (50 mg) by mouth every 6 hours as needed for severe pain 20 tablet 0 6/5/2018 at Unknown time     VENTOLIN  (90 Base) MCG/ACT Inhaler Inhale 2 puffs into the lungs every 4 hours as needed for shortness of breath / dyspnea or wheezing 1 Inhaler 1 6/5/2018 at Unknown time     [DISCONTINUED] acetaminophen (TYLENOL) 325 MG tablet Take 3 tablets (975 mg) by mouth every 8 hours 100 tablet 0 More than a month at Unknown time     [DISCONTINUED] ALPRAZolam (XANAX) 0.5 MG tablet    Past Month at Unknown time             Discharge Medications:     Current Discharge Medication List      START taking these medications    Details   benzonatate (TESSALON) 100 MG capsule Take 1 capsule (100 mg) by mouth 3 times daily as needed for cough  Qty: 42 capsule, Refills: 0    Associated Diagnoses: Carcinoma, lung, left (H); Other acute pulmonary embolism without acute cor pulmonale (H)      DULoxetine (CYMBALTA) 30 MG EC capsule Take 1 capsule (30 mg) by mouth daily  Qty: 30 capsule, Refills: 1    Associated Diagnoses: Single current episode of major depressive disorder, unspecified depression episode severity      enoxaparin (LOVENOX) 80 MG/0.8ML injection Inject 0.69 mLs (69 mg) Subcutaneous every 12 hours  Qty: 41.4 mL, Refills: 1    Associated Diagnoses: Other acute pulmonary embolism without acute cor pulmonale (H)      fentaNYL (DURAGESIC) 12 mcg/hr 72 hr patch Place 1 patch onto the skin every 72 hours remove old patch.  Qty: 30 patch, Refills: 0    Associated Diagnoses: Other acute pulmonary embolism without acute cor pulmonale (H)      fentaNYL (DURAGESIC) 25 mcg/hr 72 hr patch Place 1  patch onto the skin every 72 hours remove old patch.  Qty: 30 patch, Refills: 0    Associated Diagnoses: Other acute pulmonary embolism without acute cor pulmonale (H)      HYDROmorphone (DILAUDID) 2 MG tablet Take 1-2 tablets (2-4 mg) by mouth every 3 hours as needed for moderate to severe pain  Qty: 80 tablet, Refills: 0    Associated Diagnoses: Other acute pulmonary embolism without acute cor pulmonale (H); Carcinoma, lung, left (H)      nicotine (NICODERM CQ) 14 MG/24HR 24 hr patch Place 1 patch onto the skin daily  Qty: 30 patch, Refills: 1    Associated Diagnoses: Tobacco use disorder      ondansetron (ZOFRAN-ODT) 8 MG ODT tab Take 1 tablet (8 mg) by mouth every 8 hours  Qty: 90 tablet, Refills: 1    Associated Diagnoses: Chemotherapy induced nausea and vomiting         CONTINUE these medications which have CHANGED    Details   acetaminophen (TYLENOL) 325 MG tablet Take 2 tablets (650 mg) by mouth every 4 hours as needed for mild pain  Qty: 100 tablet    Associated Diagnoses: Other acute pulmonary embolism without acute cor pulmonale (H); Carcinoma, lung, left (H)      prochlorperazine (COMPAZINE) 10 MG tablet Take 0.5 tablets (5 mg) by mouth every 6 hours as needed for nausea or vomiting  Qty: 30 tablet, Refills: 1    Associated Diagnoses: Chemotherapy induced nausea and vomiting         CONTINUE these medications which have NOT CHANGED    Details   ASPIRIN PO Take 81 mg by mouth daily       dexamethasone (DECADRON) 4 MG tablet Take 4 mg (1 tab) by mouth twice daily for 6 doses. Start the evening prior to Infusion.  Qty: 6 tablet, Refills: 3    Associated Diagnoses: 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)      FOLIC ACID PO Take 1 mg by mouth daily      LORazepam (ATIVAN) 0.5 MG tablet Take 0.5 mg by mouth every 4 hours as needed for nausea, vomiting, anxiety, or sleep.  Qty: 30 tablet, Refills: 5    Associated Diagnoses: 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)      losartan (COZAAR) 50 MG  tablet Take 1 tablet (50 mg) by mouth daily  Qty: 90 tablet, Refills: 3    Associated Diagnoses: Benign essential hypertension      magnesium oxide (MAG-OX) 400 (241.3 Mg) MG tablet Take 1 tablet (400 mg) by mouth daily  Qty: 60 tablet, Refills: 3    Associated Diagnoses: Carcinoma, lung, left (H)      metoprolol (LOPRESSOR) 50 MG tablet Take 1 tablet (50 mg) by mouth 2 times daily  Qty: 60 tablet, Refills: 1    Associated Diagnoses: Benign essential hypertension      nicotine polacrilex (EQL NICOTINE) 2 MG lozenge Place 2 mg inside cheek every hour as needed for smoking cessation      omeprazole (PRILOSEC) 20 MG CR capsule Take 1 capsule (20 mg) by mouth daily  Qty: 90 capsule, Refills: 3    Associated Diagnoses: Carcinoma, lung, left (H)      potassium chloride SA (K-DUR/KLOR-CON M) 10 MEQ CR tablet Take 1 tablet (10 mEq) by mouth daily  Qty: 30 tablet, Refills: 1    Associated Diagnoses: Carcinoma, lung, left (H)      rosuvastatin (CRESTOR) 5 MG tablet Take 1 tablet (5 mg) by mouth daily  Qty: 30 tablet, Refills: 3    Comments: New script      senna-docusate (SENOKOT-S;PERICOLACE) 8.6-50 MG per tablet Take 2 tablets by mouth 2 times daily  Qty: 100 tablet, Refills: 0    Associated Diagnoses: Carcinoma, lung, left (H)      traMADol (ULTRAM) 50 MG tablet Take 1 tablet (50 mg) by mouth every 6 hours as needed for severe pain  Qty: 20 tablet, Refills: 0    Associated Diagnoses: Carcinoma, lung, left (H)      VENTOLIN  (90 Base) MCG/ACT Inhaler Inhale 2 puffs into the lungs every 4 hours as needed for shortness of breath / dyspnea or wheezing  Qty: 1 Inhaler, Refills: 1         STOP taking these medications       ALPRAZolam (XANAX) 0.5 MG tablet Comments:   Reason for Stopping:                     Consultations:   Consultation during this admission received from palliative care as well as phone consultation with hem/onc.            Brief History of Illness:     From Admission H+P:   Chilo Oneil is a 68  year old male  who has a history of carcinoma of the left lung and coronary artery disease who presented with shortness of breath and is admitted for pulmonary embolism.      The patient presented to the ED today with dyspnea, generalized weakness and shoulder pain.      The dyspnea started abruptly at 10 am this morning. This made him very anxious as well which he feels contributed to his dyspnea. He has never had a DVT/PE before. He is currently receiving chemotherapy for carcinoma of the left lung. Last treatment 5/31/18. Shoulder pain has been present since he was diagnosed with cancer. Has been taking Aleve and has Tramadol. Rates pain as 3/10 after IV dilaudid in the ED. CT chest with contrast showed an acute pulmonary embolus of the right superior segmental pulmonary artery in the right lower lobe. Patient is a smoker.      He also reports nausea and vomiting with limited food intake for 3 days. Says this happens after his chemotherapy. He does feel hungry on admission now. Has not been taking any anti-emetic medications. Last emesis was today before coming to the ED. Last BM happened today in the ED. Does not report any urinary problems.                     TODAY:     Subjective:  Doing well, pain well-controlled as is nausea with current regimen.  Feels happy with how he's doing, taking orals, feels ready for discharge and requesting to go home today.  No black or bloody stools.  No vomiting.   No new pain      ROS:   ROS: 10 point ROS neg other than the symptoms noted above in the HPI.    /70  Pulse 50  Temp 97.8  F (36.6  C) (Oral)  Resp 18  SpO2 96%   EXAM:  General: awake and alert, NAD, oriented x 3  Head: normocephalic  Neck: unremarkable, no lymphadenopathy   HEENT: oropharynx pink and moist    Heart: Regular rate and rhythm, no murmurs, rubs, or gallops  Lungs: clear to auscultation bilaterally with good air movement throughout  Abdomen: soft, non-tender, no masses or  organomegaly  Extremities: no edema in lower extremities   Skin unremarkable.            Hospital Course:     Chilo Oneil is a 68 year old male  who has a history of carcinoma of the left lung and coronary artery disease who presented with shortness of breath and is admitted for pulmonary embolism.       Patient treated for new PE in context of lung cancer and smoking with difficulty with pain control initially, well controlled at time of discharge.  He did have a drop in his platelets, but looks like pancytopenia due to chemo and not HIT as discussed below.      # Pulmonary embolism - likely secondary to lung cancer and smoking  6/5/18 --Dyspnea started this morning abruptly at 10 am. CTA showed acute pulmonary embolus of the right superior segmental pulmonary artery in the right lower lobe.   6/6/2018 -- pain control as below.  Plan to discharge on lovenox given underlying malignancy, follow-up with Dr. Mccauley as below.     6/9/2018 -- ok to discharge on lovenox, see discussion of platelets as below - reassess if platelets dropping further as outpatient.         # Thrombocytopenia as a part of pancytopenia due to recent chemo on 5/31 6/8/18 -- Patient's platelets since admission: 243>171>146>95. Hgb and WBC are trending down as well. INR normal. Patient's chemo was 9 days ago. Patient has been on Lovenox for just under 72 hours. HIT Scoring: Gave score of 2 (meaning low risk for HIT) -  for having a decrease in plts over 50%. Timing wise he is under 4 days of Lovenox. He has not had any new thromboses that we know of and another cause for thrombocytopenia is definitely plausible with his recent chemotherapy on 5/31. Discussed with his Heme/Onc physician and he recommends follow up with PCP to check on Monday and then he has a follow up with Heme/Onc next Thursday. Will also send HIT antibodies  but we don't suspect these to be positive.  Suspect this is just a response to his chemotherapy given gradual  "decline, timing and an overall decrease in blood counts. Will discharge the patient home on Lovenox today with close follow up.  6/9/2018 -- platelets slightly down to 75 today - no evidence of bleeding, gradual decline remains consistent with being due to chemo as above and not HIT - HIT antibodies pending and will hopefully be back by his follow-up with his primary care provider on Monday, but again does not look like this.  Anticipate these will stabilize/start to improve over the next few days.  Recheck platelets on Monday, continue on lovenox for now.  Follow-up with hematology on Thursday as planned.         # Pain from lung cancer and now PE  6/5/18 -- Chilo is experiencing pain due to left sided lung cancer. Pain 3/10 after IV dilaudid.   6/6/2018 -- transition to oral dilaudid today if able - has 2-4 mg q3h as needed.  Patient says he felt \"weird\" with oxycodone in the past and doesn't think the tramadol he recently started at home would be strong enough for his current pain.    6/7/18 Pain still not adequately managed to the patient. Palliative Care Consult placed. Patient received 85 oral equivalents of morphine yesterday. Palliative Care recommended Fentanyl patch 25 and 12.   6/9/2018 -- doing well with 37 mcg of fentanyl patches, oral dialudid as needed and cymbalta - patient happy with current pain control/regimen - will discharge on this, reassess at follow-up with primary care provider.         # Carcinoma of left lung, s/p chemotherapy last on 5/31/18 6/5/18 -- Diagnosed in December 2018. Had left sided shoulder pain which led to his diagnosis of cancer 3 weeks later. Is currently on 2nd round of chemotherapy. Last dose was on 5/31. Patient is a smoker.   6/9/2018 -- has follow-up with Dr Mccauley this coming thurs.      # Chemotherapy induced nausea and vomiting complicated by pain from PE   6/5/18 -- N/V most likely due to chemotherapy. Patient has not been taking anti-emetics at home. "   6/7/18 - Still feeling nauseous. Zofran helps for 1-2 hours. Per palliative care, they recommended Zofran 8 mg q8h scheduled and Compazine 5mg every 6 hours as needed  On 6/7 -- patient was much improved after this and we'll continue this on discharge, can wean off these as nausea resolves.        # Mixed Metabolic and Respiratory Alkalosis  - Most likely due to vomiting and hyperventilation, normalized quickly.        # Coronary artery disease  6/5/18 -- - Follows with cardiology. Last had a Lexiscan on 3/28/18. Showed no new areas of ischemia. EF 47%. Had a previous abnormal stress testing in 2016, but did not pursue treatment at that time.   6/9/2018 -- no issues/changes this admission      # Anxiety, unspecified   6/9/2018 -- controlled with home ativan which we'll continue, appears baseline.         # Hypokalemia due to chemo induced vomiting.   Resolved after replacement and remained normal.        # pseudo-Hypocalcemia  6/5/18 -- - Ca 7.5 today. Corrected based on albumin of 2.6 is Ca 8.6.    6/9/2018 -- remained stable.        # Hyponatremia  6/5/18 -- Na 131. Baseline mostly low 130's. Was in the upper 120's in March.    6/9/2018 -- 130 on discharge - remained in baseline range.        # Hypomagnesia  6/9/2018 -- Does take an oral Mg replacement. But not keeping it down prior to admission - normalized with replacement.        # Hypophosphatemia  6/9/2018 -- normalized with replacement.        # Normocytic Anemia - likely due to chronic disease and chemo  6/5/18 --  Hemoglobin 11.5 today. Baseline was in the normal range prior to 5/31 when it decreased to 11.8. Most likely anemia due to chronic disease with his history of cancer and worse with recent chemo   6/9/2018 -- trended down to 9's but remained fairly stable there, anticipate this is his yakov and that this will slowly improve.      # Tobacco use disorder  6/7/2018 -- Patient requested patch. Has been smoking 14 cigarettes a day with increased  stress from current situation.   6/8/2018 No change, ordered patch on discharge but patient unfortunately still not very motivated to quit.        # Hypertension  6/9/2018 -- BP stable Continue Losartan, Metoprolol      # Hyperlipidemia  - Continue Statin      # severe caloric malnutrition:   -has lost 40 lb, contributes to overall illness       # Major Depressive Disorder unspecified  6/7/2018 -- started on Cymbalta this admission for this in context of pain from cancer/PE, follow-up with primary care provider.          Code Status: DNR / DNI               Discharge Instructions and Follow-Up:   Discharge diet: Regular   Discharge activity: Activity as tolerated   Discharge follow-up: Follow up with primary care provider on Monday, recheck platelets, 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   Follow-up with hematology on Thursday as planned.            Discharge Disposition:   Discharged to home      Attestation:  I have reviewed today's vital signs, notes, medications, labs and imaging.  Amount of time performed on this discharge summary: 60 minutes.    Papa Sapp MD, MD

## 2018-06-11 NOTE — MR AVS SNAPSHOT
After Visit Summary   6/11/2018    Chilo Oneil    MRN: [id number]           Patient Information     Date Of Birth          1950        Visit Information        Provider Department      6/11/2018 10:20 AM Manish Plasencia MD [facility]        Today's Diagnoses     Thrombocytopenia (H)    -  1      Care Instructions      Discharge Instructions for Pulmonary Embolism  A deep vein thrombosis (DVT) is a blood clot in a large vein deep in a leg, arm, or elsewhere in the body. The clot can separate from the vein, travel to the lungs and cut off blood flow. This is a pulmonary embolism (PE). Pulmonary embolism is very serious and may cause death.   Healthcare providers use the term venous thromboembolism (VTE) to describe both DVT and PE. They use the term VTE because the two conditions are very closely related. And, because their prevention and treatment are closely related.   Home care  Taking care of yourself is very important. To help prevent more blood clots from forming, follow your healthcare provider's instructions. Do the following:    Take your medicines exactly as instructed. Don t skip doses. If you miss a dose, call your healthcare provider and ask what you should do.      Have all lab tests as recommended. This is very important when you take medicines to prevent blood clots.     If your healthcare provider has instructed you to do so, wear elastic (compression stockings).    Get up and get moving.    While sitting for long periods of time, move your knees, ankles, feet, and toes.  Lifestyle changes  To help prevent problems with your heart and blood vessels, do the following:     If you smoke, get help to quit. Talk with your healthcare provider about medicines and programs that can help.    Stay at a healthy weight. Talk to your healthcare provider about losing weight, if you are overweight    Try to exercise at least 30 minutes on most days. Before starting an  exercise program, talk with your healthcare provider.     When traveling by car, make frequent stops to get up and move around.    On long airplane rides, get up and move around when possible. If you can t get up, wiggle your toes, move your ankles and tighten your calves to keep your blood moving.  Follow-up care  Make a follow-up appointment as directed. Have your lab work done as directed.     When to call your healthcare provider  Call your healthcare provider right away if you have:    Pain, swelling, and redness in your leg, arm, or other body area. These symptoms may mean another blood clot.    Blood in your urine    Bleeding with bowel movements    Bleeding from the nose, gums, a cut, or vagina  Call 911  Call 911 if you have symptoms of a blood clot in the lungs:     Chest pain    Trouble breathing    Coughing (may cough up blood)    Fast heartbeat    Sweating    Fainting  Also call 911 if you have:    Heavy or uncontrolled bleeding. If you are taking a blood thinner, you have an increased chance of bleeding.   Date Last Reviewed: 2/1/2017 2000-2017 [employer]. [address] [place] [address]. All rights reserved. This information is not intended as a substitute for professional medical care. Always follow your healthcare professional's instructions.                Follow-ups after your visit        Your next 10 appointments already scheduled     Jun 12, 2018  9:00 AM CDT   Level 5 with ROOM 6 [facility] ([facility])    [facility]  [address] [place] [address] [address]  [facility] [address]   [phone]            Jun 14, 2018  9:45 AM CDT   Return Visit with Mor Mccauley MD   [facility] ([facility])    [facility]  [address] [facility] [address]  [facility] [address]   [phone]            Jun 21, 2018  9:00 AM CDT   Level 5 with ROOM 2 [facility] ([facility]  [facility])    [facility]  [address]  [facility] [address]   [phone]            Jun 25, 2018 11:00 AM CDT   Level 1 with ROOM 7 [facility] ([facility])    [facility]  [address]  [facility] [address]   [phone]            Jul 16, 2018 11:00 AM CDT   Level 1 with ROOM 10 [facility] ([facility])    [facility]  [address]  [facility] [address]   [phone]              Who to contact     If you have questions or need follow up information about today's clinic visit or your schedule please contact [facility] directly at [phone].  Normal or non-critical lab and imaging results will be communicated to you by MyChart, letter or phone within 4 business days after the clinic has received the results. If you do not hear from us within 7 days, please contact the clinic through MyChart or phone. If you have a critical or abnormal lab result, we will notify you by phone as soon as possible.  Submit refill requests through [employer] or call your pharmacy and they will forward the refill request to us. Please allow 3 business days for your refill to be completed.          Additional Information About Your Visit        Care EveryWhere ID     This is your Care EveryWhere ID. This could be used by other organizations to access your [facility] medical records  [id number]        Your Vitals Were     Pulse Pulse Oximetry BMI (Body Mass Index)             86 99% 18.7 kg/m2          Blood Pressure from Last 3 Encounters:   06/11/18 146/74   06/09/18 162/70   05/31/18 126/52    Weight from Last 3 Encounters:   06/11/18 149 lb 9.6 oz (67.9 kg)   05/31/18 152 lb 6.4 oz (69.1 kg)   05/18/18 156 lb 1.6 oz (70.8 kg)              We Performed the Following     CBC with platelets differential        Primary Care Provider Office Phone # Fax #     Manish Plasencia [phone]       [address] [facility] [address]        Goals        General    Financial Wellbeing (pt-stated)     Notes - Note edited  5/1/2018  8:50 AM by Sofya Paulino LSW    Goal Statement: I want to complete my Medical Assistance application  Measure of Success: My Medical Assistance application will be completed  Supportive Steps to Achieve: SW and pt will finish the remaining paperwork that needs to be completed  Barriers: None identified at this time  Strengths: Pt is motivated as the income increase will assist him greatly  Date to Achieve By: 3 months          Pain Management (pt-stated)     Notes - Note edited  5/10/2018  1:35 PM by Sofya Paulino LSW    [facility] to discuss with PCP, but would recommend a Palliative Care consult for symptom management.    Pt met with Palliative Care MD yesterday, 5-9-18, and will continue to do so.    Sofya Unger  Social Work Care Coordinator  [facility]  [phone]          Transportation (pt-stated)     Notes - Note edited  6/4/2018  3:10 PM by Sofya Paulino LSW    Goal Statement: Pt needs dependable transportation to get him to his clinic appointments.  Measure of Success: Pt will attend appointments if he has dependable transportation  Supportive Steps to Achieve: SW and pt will work together to secure his MA application will allow him to get transportation benefits.  SW to also provide pt with community resources.  Barriers: Access to pt's bank statements has been a barrier.    Strengths: Pt is motivated to complete the MA application  Date to Achieve By: 3 months          Equal Access to Services     SONY SNOW : Hadii aad ku hadasho Soomaali, waaxda luqadaha, qaybta kaalmada adeegyakirit, celina hewitt . So [facility] [phone].    ATENCIÓN: Si habla español, tiene a galaviz disposición servicios gratuitos de asistencia lingüística. Llame al  [phone].    We comply with applicable federal civil rights laws and [place] laws. We do not discriminate on the basis of race, color, national origin, age, disability, sex, sexual orientation, or gender identity.            Thank you!     Thank you for choosing [facility]  for your care. Our goal is always to provide you with excellent care. Hearing back from our patients is one way we can continue to improve our services. Please take a few minutes to complete the written survey that you may receive in the mail after your visit with us. Thank you!             Your Updated Medication List - Protect others around you: Learn how to safely use, store and throw away your medicines at www.disposemymeds.org.          This list is accurate as of 6/11/18 10:35 AM.  Always use your most recent med list.                   Brand Name Dispense Instructions for use Diagnosis    acetaminophen 325 MG tablet    TYLENOL    100 tablet    Take 2 tablets (650 mg) by mouth every 4 hours as needed for mild pain    Other acute pulmonary embolism without acute cor pulmonale (H), Carcinoma, lung, left (H)       ASPIRIN PO      Take 81 mg by mouth daily        benzonatate 100 MG capsule    TESSALON    42 capsule    Take 1 capsule (100 mg) by mouth 3 times daily as needed for cough    Carcinoma, lung, left (H), Other acute pulmonary embolism without acute cor pulmonale (H)       dexamethasone 4 MG tablet    DECADRON    6 tablet    Take 4 mg (1 tab) by mouth twice daily for 6 doses. Start the evening prior to Infusion.    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)       DULoxetine 30 MG EC capsule    CYMBALTA    30 capsule    Take 1 capsule (30 mg) by mouth daily    Single current episode of major depressive disorder, unspecified depression episode severity       enoxaparin 80 MG/0.8ML injection    LOVENOX    41.4 mL    Inject 0.69 mLs (69 mg) Subcutaneous every 12 hours    Other acute pulmonary embolism without  acute cor pulmonale (H)       EQL NICOTINE 2 MG lozenge   Generic drug:  nicotine polacrilex      Place 2 mg inside cheek every hour as needed for smoking cessation        * fentaNYL 12 mcg/hr 72 hr patch    DURAGESIC    30 patch    Place 1 patch onto the skin every 72 hours remove old patch.    Other acute pulmonary embolism without acute cor pulmonale (H)       * fentaNYL 25 mcg/hr 72 hr patch    DURAGESIC    30 patch    Place 1 patch onto the skin every 72 hours remove old patch.    Other acute pulmonary embolism without acute cor pulmonale (H)       FOLIC ACID PO      Take 1 mg by mouth daily        HYDROmorphone 2 MG tablet    DILAUDID    80 tablet    Take 1-2 tablets (2-4 mg) by mouth every 3 hours as needed for moderate to severe pain    Other acute pulmonary embolism without acute cor pulmonale (H), Carcinoma, lung, left (H)       LORazepam 0.5 MG tablet    ATIVAN    30 tablet    Take 0.5 mg by mouth every 4 hours as needed for nausea, vomiting, anxiety, or sleep.    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)       losartan 50 MG tablet    COZAAR    90 tablet    Take 1 tablet (50 mg) by mouth daily    Benign essential hypertension       magnesium oxide 400 (241.3 Mg) MG tablet    MAG-OX    60 tablet    Take 1 tablet (400 mg) by mouth daily    Carcinoma, lung, left (H)       metoprolol tartrate 50 MG tablet    LOPRESSOR    60 tablet    Take 1 tablet (50 mg) by mouth 2 times daily    Benign essential hypertension       nicotine 14 MG/24HR 24 hr patch    NICODERM CQ    30 patch    Place 1 patch onto the skin daily    Tobacco use disorder       omeprazole 20 MG CR capsule    priLOSEC    90 capsule    Take 1 capsule (20 mg) by mouth daily    Carcinoma, lung, left (H)       ondansetron 8 MG ODT tab    ZOFRAN-ODT    90 tablet    Take 1 tablet (8 mg) by mouth every 8 hours    Chemotherapy induced nausea and vomiting       potassium chloride SA 10 MEQ CR tablet    K-DUR/KLOR-CON M    30 tablet    Take 1 tablet  (10 mEq) by mouth daily    Carcinoma, lung, left (H)       prochlorperazine 10 MG tablet    COMPAZINE    30 tablet    Take 0.5 tablets (5 mg) by mouth every 6 hours as needed for nausea or vomiting    Chemotherapy induced nausea and vomiting       rosuvastatin 5 MG tablet    CRESTOR    30 tablet    Take 1 tablet (5 mg) by mouth daily        senna-docusate 8.6-50 MG per tablet    SENOKOT-S;PERICOLACE    100 tablet    Take 2 tablets by mouth 2 times daily    Carcinoma, lung, left (H)       traMADol 50 MG tablet    ULTRAM    20 tablet    Take 1 tablet (50 mg) by mouth every 6 hours as needed for severe pain    Carcinoma, lung, left (H)       VENTOLIN  (90 Base) MCG/ACT Inhaler   Generic drug:  albuterol     1 Inhaler    Inhale 2 puffs into the lungs every 4 hours as needed for shortness of breath / dyspnea or wheezing        * Notice:  This list has 2 medication(s) that are the same as other medications prescribed for you. Read the directions carefully, and ask your doctor or other care provider to review them with you.

## 2018-06-11 NOTE — MR AVS SNAPSHOT
After Visit Summary   6/11/2018    Chilo Oneil    MRN: [id number]           Patient Information     Date Of Birth          1950        Visit Information        Provider Department      6/11/2018 11:00 AM ROOM 7 [facility]        Today's Diagnoses     Carcinoma, lung, left (H)    -  1       Follow-ups after your visit        Your next 10 appointments already scheduled     Jun 14, 2018  9:45 AM CDT   Return Visit with Mor Mccauley MD   [facility] ([facility])    [facility]  [address]  [place] [address]   [phone]            Jun 14, 2018 10:00 AM CDT   Level 1 with ROOM 4 [facility] ([facility])    [facility]  [address]  [place] [address]   [phone]            Jun 21, 2018  9:00 AM CDT   Level 5 with ROOM 2 [facility] ([facility])    [facility]  [address]  [place] [address]   [phone]            Jun 25, 2018 11:00 AM CDT   Level 1 with ROOM 7 [facility] ([facility])    [facility]  [address]  [place] [address]   [phone]            Jul 12, 2018  9:00 AM CDT   Level 5 with ROOM 10 [facility] ([facility])    n [facility]  [address]  [facility] [address]   [phone]            Jul 16, 2018 11:00 AM CDT   Level 1 with ROOM 10 [facility] ([facility])    n [facility]  [address]  [place] [address]   [phone]              Who to contact     If you have questions or need follow up information about today's clinic visit or your schedule please contact [facility] directly at [phone].  Normal or non-critical lab and imaging results  will be communicated to you by MyChart, letter or phone within 4 business days after the clinic has received the results. If you do not hear from us within 7 days, please contact the clinic through MyChart or phone. If you have a critical or abnormal lab result, we will notify you by phone as soon as possible.  Submit refill requests through [employer] or call your pharmacy and they will forward the refill request to us. Please allow 3 business days for your refill to be completed.          Additional Information About Your Visit        Care EveryWhere ID     This is your Care EveryWhere ID. This could be used by other organizations to access your [place] medical records  [id number]         Blood Pressure from Last 3 Encounters:   06/11/18 146/74   06/09/18 162/70   05/31/18 126/52    Weight from Last 3 Encounters:   06/11/18 67.9 kg (149 lb 9.6 oz)   05/31/18 69.1 kg (152 lb 6.4 oz)   05/18/18 70.8 kg (156 lb 1.6 oz)              Today, you had the following     No orders found for display       Primary Care Provider Office Phone # Fax #    Omerrolly Jyoti Plasencia [phone] [address] [facility] [address]        Goals        General    Financial Wellbeing (pt-stated)     Notes - Note edited  5/1/2018  8:50 AM by Sofya Paulino LSW    Goal Statement: I want to complete my Medical Assistance application  Measure of Success: My Medical Assistance application will be completed  Supportive Steps to Achieve: SW and pt will finish the remaining paperwork that needs to be completed  Barriers: None identified at this time  Strengths: Pt is motivated as the income increase will assist him greatly  Date to Achieve By: 3 months          Medication 1 (pt-stated)     Pain Management (pt-stated)     Notes - Note edited  5/10/2018  1:35 PM by Sofya Paulino LSW    CC to discuss with PCP, but would recommend a Palliative Care consult for symptom management.    Pt met with Palliative  Travis ELIAS yesterday, 5-9-18, and will continue to do so.    Sofya Unger  Social Work Care Coordinator  [facility]  [phone]          Transportation (pt-stated)     Notes - Note edited  6/4/2018  3:10 PM by Sofya Paulino LSW    Goal Statement: Pt needs dependable transportation to get him to his clinic appointments.  Measure of Success: Pt will attend appointments if he has dependable transportation  Supportive Steps to Achieve: SW and pt will work together to secure his MA application will allow him to get transportation benefits.  SW to also provide pt with community resources.  Barriers: Access to pt's bank statements has been a barrier.    Strengths: Pt is motivated to complete the MA application  Date to Achieve By: 3 months          Equal Access to Services     SONY SNOW : Kevin Vidales, waaxda luqadaha, qaybta kaalmada kaylenyakirit, celina [facility]. So [facility] [phone].    ATENCIÓN: Si habla español, tiene a galaviz disposición servicios gratuitos de asistencia lingüística. Llame al [phone].    We comply with applicable federal civil rights laws and [place] laws. We do not discriminate on the basis of race, color, national origin, age, disability, sex, sexual orientation, or gender identity.            Thank you!     Thank you for choosing [facility]  for your care. Our goal is always to provide you with excellent care. Hearing back from our patients is one way we can continue to improve our services. Please take a few minutes to complete the written survey that you may receive in the mail after your visit with us. Thank you!             Your Updated Medication List - Protect others around you: Learn how to safely use, store and throw away your medicines at www.disposemymeds.org.          This list is accurate as of 6/11/18 11:29 AM.  Always use your most recent med list.                   Brand Name Dispense  Instructions for use Diagnosis    acetaminophen 325 MG tablet    TYLENOL    100 tablet    Take 2 tablets (650 mg) by mouth every 4 hours as needed for mild pain    Other acute pulmonary embolism without acute cor pulmonale (H), Carcinoma, lung, left (H)       ASPIRIN PO      Take 81 mg by mouth daily        benzonatate 100 MG capsule    TESSALON    42 capsule    Take 1 capsule (100 mg) by mouth 3 times daily as needed for cough    Carcinoma, lung, left (H), Other acute pulmonary embolism without acute cor pulmonale (H)       dexamethasone 4 MG tablet    DECADRON    6 tablet    Take 4 mg (1 tab) by mouth twice daily for 6 doses. Start the evening prior to Infusion.    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)       DULoxetine 30 MG EC capsule    CYMBALTA    30 capsule    Take 1 capsule (30 mg) by mouth daily    Single current episode of major depressive disorder, unspecified depression episode severity       enoxaparin 80 MG/0.8ML injection    LOVENOX    41.4 mL    Inject 0.69 mLs (69 mg) Subcutaneous every 12 hours    Other acute pulmonary embolism without acute cor pulmonale (H)       EQL NICOTINE 2 MG lozenge   Generic drug:  nicotine polacrilex      Place 2 mg inside cheek every hour as needed for smoking cessation        * fentaNYL 12 mcg/hr 72 hr patch    DURAGESIC    30 patch    Place 1 patch onto the skin every 72 hours remove old patch.    Other acute pulmonary embolism without acute cor pulmonale (H)       * fentaNYL 25 mcg/hr 72 hr patch    DURAGESIC    30 patch    Place 1 patch onto the skin every 72 hours remove old patch.    Other acute pulmonary embolism without acute cor pulmonale (H)       FOLIC ACID PO      Take 1 mg by mouth daily        HYDROmorphone 2 MG tablet    DILAUDID    80 tablet    Take 1-2 tablets (2-4 mg) by mouth every 3 hours as needed for moderate to severe pain    Other acute pulmonary embolism without acute cor pulmonale (H), Carcinoma, lung, left (H)       LORazepam 0.5 MG  tablet    ATIVAN    30 tablet    Take 0.5 mg by mouth every 4 hours as needed for nausea, vomiting, anxiety, or sleep.    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)       losartan 50 MG tablet    COZAAR    90 tablet    Take 1 tablet (50 mg) by mouth daily    Benign essential hypertension       magnesium oxide 400 (241.3 Mg) MG tablet    MAG-OX    60 tablet    Take 1 tablet (400 mg) by mouth daily    Carcinoma, lung, left (H)       metoprolol tartrate 50 MG tablet    LOPRESSOR    60 tablet    Take 1 tablet (50 mg) by mouth 2 times daily    Benign essential hypertension       nicotine 14 MG/24HR 24 hr patch    NICODERM CQ    30 patch    Place 1 patch onto the skin daily    Tobacco use disorder       omeprazole 20 MG CR capsule    priLOSEC    90 capsule    Take 1 capsule (20 mg) by mouth daily    Carcinoma, lung, left (H)       ondansetron 8 MG ODT tab    ZOFRAN-ODT    90 tablet    Take 1 tablet (8 mg) by mouth every 8 hours    Chemotherapy induced nausea and vomiting       potassium chloride SA 10 MEQ CR tablet    K-DUR/KLOR-CON M    30 tablet    Take 1 tablet (10 mEq) by mouth daily    Carcinoma, lung, left (H)       prochlorperazine 10 MG tablet    COMPAZINE    30 tablet    Take 0.5 tablets (5 mg) by mouth every 6 hours as needed for nausea or vomiting    Chemotherapy induced nausea and vomiting       rosuvastatin 5 MG tablet    CRESTOR    30 tablet    Take 1 tablet (5 mg) by mouth daily        senna-docusate 8.6-50 MG per tablet    SENOKOT-S;PERICOLACE    100 tablet    Take 2 tablets by mouth 2 times daily    Carcinoma, lung, left (H)       traMADol 50 MG tablet    ULTRAM    20 tablet    Take 1 tablet (50 mg) by mouth every 6 hours as needed for severe pain    Carcinoma, lung, left (H)       VENTOLIN  (90 Base) MCG/ACT Inhaler   Generic drug:  albuterol     1 Inhaler    Inhale 2 puffs into the lungs every 4 hours as needed for shortness of breath / dyspnea or wheezing        * Notice:  This list has 2  medication(s) that are the same as other medications prescribed for you. Read the directions carefully, and ask your doctor or other care provider to review them with you.

## 2018-06-11 NOTE — PATIENT INSTRUCTIONS
Discharge Instructions for Pulmonary Embolism  A deep vein thrombosis (DVT) is a blood clot in a large vein deep in a leg, arm, or elsewhere in the body. The clot can separate from the vein, travel to the lungs and cut off blood flow. This is a pulmonary embolism (PE). Pulmonary embolism is very serious and may cause death.   Healthcare providers use the term venous thromboembolism (VTE) to describe both DVT and PE. They use the term VTE because the two conditions are very closely related. And, because their prevention and treatment are closely related.   Home care  Taking care of yourself is very important. To help prevent more blood clots from forming, follow your healthcare provider's instructions. Do the following:    Take your medicines exactly as instructed. Don t skip doses. If you miss a dose, call your healthcare provider and ask what you should do.      Have all lab tests as recommended. This is very important when you take medicines to prevent blood clots.     If your healthcare provider has instructed you to do so, wear elastic (compression stockings).    Get up and get moving.    While sitting for long periods of time, move your knees, ankles, feet, and toes.  Lifestyle changes  To help prevent problems with your heart and blood vessels, do the following:     If you smoke, get help to quit. Talk with your healthcare provider about medicines and programs that can help.    Stay at a healthy weight. Talk to your healthcare provider about losing weight, if you are overweight    Try to exercise at least 30 minutes on most days. Before starting an exercise program, talk with your healthcare provider.     When traveling by car, make frequent stops to get up and move around.    On long airplane rides, get up and move around when possible. If you can t get up, wiggle your toes, move your ankles and tighten your calves to keep your blood moving.  Follow-up care  Make a follow-up appointment as directed. Have  your lab work done as directed.     When to call your healthcare provider  Call your healthcare provider right away if you have:    Pain, swelling, and redness in your leg, arm, or other body area. These symptoms may mean another blood clot.    Blood in your urine    Bleeding with bowel movements    Bleeding from the nose, gums, a cut, or vagina  Call 911  Call 911 if you have symptoms of a blood clot in the lungs:     Chest pain    Trouble breathing    Coughing (may cough up blood)    Fast heartbeat    Sweating    Fainting  Also call 911 if you have:    Heavy or uncontrolled bleeding. If you are taking a blood thinner, you have an increased chance of bleeding.   Date Last Reviewed: 2/1/2017 2000-2017 [employer]. [address] [place] [address], [place] [address]. All rights reserved. This information is not intended as a substitute for professional medical care. Always follow your healthcare professional's instructions.

## 2018-06-11 NOTE — PROGRESS NOTES
Clinic Care Coordination Contact    Clinic Care Coordination Contact  OUTREACH    Referral Information:  Referral Source: IP Handoff    Primary Diagnosis: Oncology    Chief Complaint   Patient presents with     Clinic Care Coordination - Face To Face     social work   Universal Utilization: Clinic Utilization  Difficulty keeping appointments:: No  Utilization    Last refreshed: 6/11/2018 11:26 AM:  No Show Count (past year) 8       Last refreshed: 6/11/2018 11:26 AM:  ED visits 6       Last refreshed: 6/11/2018 11:26 AM:  Hospital admissions 3          Current as of: 6/11/2018 11:26 AM         Clinical Concerns:  Current Medical Concerns:  Per pt, he has no new concerns.  Pt shared he spent $84 on his discharge medications and that he was leaving his home soon as he had a PCP appointment this morning.  Current Behavioral Concerns: Pt pt, he is taking the Celexa prescribed in the hospital.        Education Provided to patient: Pt informed pt that his medications should not cost that much as this writer got his MA approved on Friday.  Pt had to drive to the clinic for his PCP appointment.  During this time, YASMIN called the [facility], spoke with staff and informed of pt's MA; staff was able to rerun pt's medications and was able to reduce his bill by $60.  YASMIN met pt in clinic later in the morning and informed him that the pharmacy will refund him $60.  Pt expressed gratitude.      Pain  Chronic pain (GOAL):: Yes  Location of chronic pain:: upper back, chest, ribcage area  Chronic pain timing:: Intermittent  Chronic pain severity:: 4  Limitation of routine activities due to chronic pain:: Yes    Health Maintenance Reviewed: Due/Overdue:   ASTHMA ACTION PLAN Q1 YR  [birth year]17/1955         ASTHMA CONTROL TEST Q6 MOS  [birth year]17/1955         TETANUS IMMUNIZATION (SYSTEM ASSIGNED)  4/17/1969         HEPATITIS C SCREENING  4/17/1969         COLON CANCER SCREEN (SYSTEM ASSIGNED)  4/17/2001         PNEUMOCOCCAL (1 of 2 - PCV13)   4/17/2016         AORTIC ANEURYSM SCREENING (SYSTEM ASSIGNED)  4/17/2016           Clinical Pathway: None    Medication Management:  Yes, however, pt shared during this hospitalization that he may not be taking his medications correctly.  Pt asked this SW if there is anyone who can assist with setting up his medications.  SW explained that is what the [place] was for; to get him assistance in his home, to pay for RN cares in his home, to pay for cares to come to his home to take care of him.  Pt shared he would prefer to bring his bag of medications to the clinic and meet with staff here to set up his meds.  SW did send and Epic in-box to [facility] to request an 11:30 AM meeting on 6-14-18 at 1130 AM.  If [facility] is unable to fit into her schedule, Kaz Pollard RNCC will meet with pt and I to set up his medications into a weekly pill box until a medication machine can be ordered from the [facility].  YASMIN also spoke with PCP and informed of this information today.    Functional Status:  Dependent ADLs:: Ambulation-cane  Dependent IADLs:: Independent, Medication Management  Bed or wheelchair confined:: No  Mobility Status: Independent w/Device  Fallen 2 or more times in the past year?: No  Any fall with injury in the past year?: No    Living Situation:  Pt rents a room in a home.  He has lived here for many years.  He pays $220/month in rent and is behind $400 in back rent.  Pt states his house is not clean, that his landlord is a functional alcoholic, and that he is embarrassed to have anyone come to his home, despite knowing that he will need this type of care eventually.     Diet/Exercise/Sleep:  Diet:: Regular  Inadequate nutrition (GOAL):: No  Food Insecurity: No  Tube Feeding: No  Exercise:: Unable to exercise  Inadequate activity/exercise (GOAL):: No  Significant changes in sleep pattern (GOAL): No    Transportation:  Transportation concerns (GOAL):: No  Transportation means:: Regular car      Psychosocial:  Jainism or spiritual beliefs that impact treatment:: Yes, pt is Congregation  Mental health DX:: Yes  Mental health DX how managed:: Medication  Mental health management concern (GOAL):: No  Informal Support system:: Friends     Financial/Insurance:  Pt receives $776/month from Social Security and now gets Medicare, Part D and MA  Financial/Insurance concerns (GOAL):: No     Resources and Interventions:  Current Resources:  [facility] [place] Waiver is getting approved today.  Pt will now be eligible for Meals on Wheels, and various other programs.  He will also get an [facility] [place] SW.      Equipment Currently Used at Home: cane, straight    Advanced Care Plans/Directives on file:: Yes  Type Advanced Care Plans/Directives: Advanced Directive - On File, POLST, DNR/DNI  Advanced Care Plan/Directive Status: Not Applicable    Goals:   Goals        General    Financial Wellbeing (pt-stated)     Notes - Note edited  5/1/2018  8:50 AM by Sofya Paulino LSW    Goal Statement: I want to complete my Medical Assistance application  Measure of Success: My Medical Assistance application will be completed  Supportive Steps to Achieve: SW and pt will finish the remaining paperwork that needs to be completed  Barriers: None identified at this time  Strengths: Pt is motivated as the income increase will assist him greatly  Date to Achieve By: 3 months          Medication 1 (pt-stated)     Notes - Note created  6/11/2018 11:34 AM by Sofya Paulino LSW    Goal Statement: I will learn to take my medications properly   Measure of Success: I will take my medications properly  Supportive Steps to Achieve: I will work with either MTM or RNCC to learn how to take my medications correctly  Barriers: I have so many medications to keep track of it gets confusing.  Strengths: I will use a pill box to organize my medications  Date to Achieve By: 3 months        Pain Management (pt-stated)     Notes - Note edited   5/10/2018  1:35 PM by Sofya Paulino LSW    CC to discuss with PCP, but would recommend a Palliative Care consult for symptom management.    Pt met with Palliative Care MD yesterday, 5-9-18, and will continue to do so.    Sofya Unger  Social Work Care Coordinator  [facility]  [phone]          Transportation (pt-stated)     Notes - Note edited  6/4/2018  3:10 PM by Sofya Paulino LSW    Goal Statement: Pt needs dependable transportation to get him to his clinic appointments.  Measure of Success: Pt will attend appointments if he has dependable transportation  Supportive Steps to Achieve: SW and pt will work together to secure his MA application will allow him to get transportation benefits.  SW to also provide pt with community resources.  Barriers: Access to pt's bank statements has been a barrier.    Strengths: Pt is motivated to complete the MA application  Date to Achieve By: 3 months            Patient/Caregiver understanding: Patient will continue to work with this SW and his medical care team.    Outreach Frequency: weekly (PRN)  Future Appointments              In 3 days Mor Mccauley MD [facility] [place]    In 3 days ROOM 4 [facility] [place]    In 1 week ROOM 2 [facility] [place]    In 2 weeks ROOM 7 [facility] [place]    In 1 month ROOM 10 [facility] [place]    In 1 month ROOM 10 [facility] [place]          Plan: SW to continue to assist as needed.    Sofya Unger  Social Work Care Coordinator  [facility]  [phone]

## 2018-06-11 NOTE — PROGRESS NOTES
SUBJECTIVE:   Chilo Oneil is a 68 year old male who presents to clinic today for the following health issues:      Hospital Follow-up Visit:    Hospital/Nursing Home/IP Rehab Facility: [facility]  Date of Admission: 06/05/2018  Date of Discharge: 06/09/2018  Reason(s) for Admission: PE            Problems taking medications regularly:  None       Medication changes since discharge: Lovenox       Problems adhering to non-medication therapy:  None    Summary of hospitalization:  [facility] discharge summary reviewed  Diagnostic Tests/Treatments reviewed.  Follow up needed: none  Other Healthcare Providers Involved in Patient s Care:         None  Update since discharge: stable.     Post Discharge Medication Reconciliation: discharge medications reconciled, continue medications without change.  Plan of care communicated with patient     Coding guidelines for this visit:  Type of Medical   Decision Making Face-to-Face Visit       within 7 Days of discharge Face-to-Face Visit        within 14 days of discharge   Moderate Complexity [id number]   High Complexity [id number]              Problem list and histories reviewed & adjusted, as indicated.  Additional history: as documented    Patient Active Problem List   Diagnosis     Tobacco use disorder     PAD (peripheral artery disease) (H)     Benign essential hypertension     Hyperlipidemia LDL goal <100     Anxiety     Gastroesophageal reflux disease without esophagitis     CAD (coronary artery disease)     Lung cancer (H)     Uncomplicated asthma     Hyponatremia     SOB (shortness of breath)     Chemotherapy induced nausea and vomiting     Carcinoma, lung, left (H)     Hypokalemia     Hypomagnesemia     Pulmonary emboli (H)     Pulmonary embolism (H)     Past Surgical History:   Procedure Laterality Date     FRACTURE TX, ANKLE RT/LT  1975    Fracture TX Ankle, LT     INSERT PORT VASCULAR ACCESS Left 5/15/2018    Procedure: INSERT PORT VASCULAR  ACCESS;  Left chest Port-a-Cath Placement;  Surgeon: Gurjit Fox MD;  Location: [facility] OR     OPEN REDUCTION INTERNAL FIXATION HIP NAILING  9/20/2013    Procedure: OPEN REDUCTION INTERNAL FIXATION HIP NAILING;  Left Hip Open Reduction Internal Fixation ;  Surgeon: Rosas Dennis MD;  Location: [facility] OR     THORACOSCOPIC BIOPSY LUNG Left 4/11/2018    Procedure: THORACOSCOPIC BIOPSY LUNG;  subxiphoid left video assisted thorascopic surgery pleural biops, left lower lobe wedge biopsy ;  Surgeon: Mega Moreno MD;  Location:  OR     VASCULAR SURGERY         Social History   Substance Use Topics     Smoking status: Current Every Day Smoker     Packs/day: 0.15     Years: 47.00     Types: Cigarettes     Start date: 12/26/1967     Smokeless tobacco: Never Used      Comment: 2-3 cigs daily     Alcohol use Yes      Comment: 6-8 beers per day, last 3/27 at 6pm     Family History   Problem Relation Age of Onset     DIABETES Brother      type 2     DIABETES Father          Current Outpatient Prescriptions   Medication Sig Dispense Refill     DULoxetine (CYMBALTA) 30 MG EC capsule Take 1 capsule (30 mg) by mouth daily 30 capsule 1     enoxaparin (LOVENOX) 80 MG/0.8ML injection Inject 0.69 mLs (69 mg) Subcutaneous every 12 hours 41.4 mL 1     fentaNYL (DURAGESIC) 12 mcg/hr 72 hr patch Place 1 patch onto the skin every 72 hours remove old patch. 30 patch 0     fentaNYL (DURAGESIC) 25 mcg/hr 72 hr patch Place 1 patch onto the skin every 72 hours remove old patch. 30 patch 0     HYDROmorphone (DILAUDID) 2 MG tablet Take 1-2 tablets (2-4 mg) by mouth every 3 hours as needed for moderate to severe pain 80 tablet 0     nicotine (NICODERM CQ) 14 MG/24HR 24 hr patch Place 1 patch onto the skin daily 30 patch 1     nicotine polacrilex (EQL NICOTINE) 2 MG lozenge Place 2 mg inside cheek every hour as needed for smoking cessation       omeprazole (PRILOSEC) 20 MG CR capsule Take 1 capsule (20 mg) by mouth daily 90 capsule 3      ondansetron (ZOFRAN-ODT) 8 MG ODT tab Take 1 tablet (8 mg) by mouth every 8 hours 90 tablet 1     potassium chloride SA (K-DUR/KLOR-CON M) 10 MEQ CR tablet Take 1 tablet (10 mEq) by mouth daily 30 tablet 1     prochlorperazine (COMPAZINE) 10 MG tablet Take 0.5 tablets (5 mg) by mouth every 6 hours as needed for nausea or vomiting 30 tablet 1     rosuvastatin (CRESTOR) 5 MG tablet Take 1 tablet (5 mg) by mouth daily 30 tablet 3     acetaminophen (TYLENOL) 325 MG tablet Take 2 tablets (650 mg) by mouth every 4 hours as needed for mild pain 100 tablet      ASPIRIN PO Take 81 mg by mouth daily        benzonatate (TESSALON) 100 MG capsule Take 1 capsule (100 mg) by mouth 3 times daily as needed for cough (Patient not taking: Reported on 6/11/2018) 42 capsule 0     dexamethasone (DECADRON) 4 MG tablet Take 4 mg (1 tab) by mouth twice daily for 6 doses. Start the evening prior to Infusion. (Patient not taking: Reported on 6/11/2018) 6 tablet 3     FOLIC ACID PO Take 1 mg by mouth daily       LORazepam (ATIVAN) 0.5 MG tablet Take 0.5 mg by mouth every 4 hours as needed for nausea, vomiting, anxiety, or sleep. (Patient not taking: Reported on 6/11/2018) 30 tablet 5     losartan (COZAAR) 50 MG tablet Take 1 tablet (50 mg) by mouth daily (Patient not taking: Reported on 6/11/2018) 90 tablet 3     magnesium oxide (MAG-OX) 400 (241.3 Mg) MG tablet Take 1 tablet (400 mg) by mouth daily (Patient not taking: Reported on 6/11/2018) 60 tablet 3     metoprolol (LOPRESSOR) 50 MG tablet Take 1 tablet (50 mg) by mouth 2 times daily (Patient not taking: Reported on 6/11/2018) 60 tablet 1     senna-docusate (SENOKOT-S;PERICOLACE) 8.6-50 MG per tablet Take 2 tablets by mouth 2 times daily (Patient not taking: Reported on 6/11/2018) 100 tablet 0     traMADol (ULTRAM) 50 MG tablet Take 1 tablet (50 mg) by mouth every 6 hours as needed for severe pain (Patient not taking: Reported on 6/11/2018) 20 tablet 0     VENTOLIN  (90 Base)  MCG/ACT Inhaler Inhale 2 puffs into the lungs every 4 hours as needed for shortness of breath / dyspnea or wheezing (Patient not taking: Reported on 6/11/2018) 1 Inhaler 1     Allergies   Allergen Reactions     Cipro [Ciprofloxacin] Swelling       Reviewed and updated as needed this visit by clinical staff       Reviewed and updated as needed this visit by Provider         ROS:  Constitutional, HEENT, cardiovascular, pulmonary, gi and gu systems are negative, except as otherwise noted.    OBJECTIVE:     /74 (BP Location: Left arm, Patient Position: Chair, Cuff Size: Adult Regular)  Pulse 86  Wt 149 lb 9.6 oz (67.9 kg)  SpO2 99%  BMI 18.7 kg/m2  Body mass index is 18.7 kg/(m^2).  GENERAL: healthy, alert and no distress  EYES: Eyes grossly normal to inspection, PERRL and conjunctivae and sclerae normal  HENT: ear canals and TM's normal, nose and mouth without ulcers or lesions  NECK: no adenopathy, no asymmetry, masses, or scars and thyroid normal to palpation  RESP: lungs clear to auscultation - no rales, rhonchi or wheezes  CV: regular rate and rhythm, normal S1 S2, no S3 or S4, no murmur, click or rub, no peripheral edema and peripheral pulses strong  MS: no gross musculoskeletal defects noted, no edema    Diagnostic Test Results:  Results for orders placed or performed in visit on 06/11/18   CBC with platelets differential   Result Value Ref Range    WBC 4.5 4.0 - 11.0 10e9/L    RBC Count 3.07 (L) 4.4 - 5.9 10e12/L    Hemoglobin 10.0 (L) 13.3 - 17.7 g/dL    Hematocrit 28.5 (L) 40.0 - 53.0 %    MCV 93 78 - 100 fl    MCH 32.6 26.5 - 33.0 pg    MCHC 35.1 31.5 - 36.5 g/dL    RDW 14.6 10.0 - 15.0 %    Platelet Count 108 (L) 150 - 450 10e9/L    Diff Method Automated Method     % Neutrophils 64.3 %    % Lymphocytes 26.3 %    % Monocytes 8.3 %    % Eosinophils 0.0 %    % Basophils 0.2 %    % Immature Granulocytes 0.9 %    Nucleated RBCs 0 0 /100    Absolute Neutrophil 2.9 1.6 - 8.3 10e9/L    Absolute Lymphocytes  1.2 0.8 - 5.3 10e9/L    Absolute Monocytes 0.4 0.0 - 1.3 10e9/L    Absolute Eosinophils 0.0 0.0 - 0.7 10e9/L    Absolute Basophils 0.0 0.0 - 0.2 10e9/L    Abs Immature Granulocytes 0.0 0 - 0.4 10e9/L    Absolute Nucleated RBC 0.0        ASSESSMENT/PLAN:   (Z09) Hospital discharge follow-up  (primary encounter diagnosis)  Comment: Patient here for hospital follow up following a PE. Doing fairly well  Plan:  Continue with medication     (D69.6) Thrombocytopenia (H)  Comment: Will recheck platelets today- improved from last check.    Plan: CBC with platelets differential            (I26.99) Other pulmonary embolism without acute cor pulmonale, unspecified chronicity (H)  Comment: Patient diagnosed with pulmonary embolism , had low platelets . On Lovenox, has appointment with Heme/onc on Thursday   Plan: As above     (C34.12) 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)  Comment: Patient dealing with a lot of nausea as related to chemotherapy   Plan: As above     FUTURE APPOINTMENTS:       - Follow-up visit as needed    Manish Plasencia MD  [facility]

## 2018-06-14 NOTE — MR AVS SNAPSHOT
After Visit Summary   6/14/2018    Chilo Oneil    MRN: [id number]           Patient Information     Date Of Birth          1950        Visit Information        Provider Department      6/14/2018 9:45 AM Mor Mccauley MD [facility] ONCOLOGY      Today's Diagnoses     Carcinoma, lung, left (H)    -  1      Care Instructions    We would like to see you back in one week prior to cycle 4 of chemotherapy.            Your prescription has been sent with you.       When you are in need of a refill, please call your pharmacy and they will send us a request.      Copy of appointments, and after visit summary (AVS) given to patient.      If you have any questions during business hours (M-F 8 AM- 4PM), please call Erica Elliott RN, BSN, OCN Oncology Hematology /Breast Cancer Navigator at [facility] [phone].       For questions after business hours, or on holidays/weekends, please call our after hours Nurse Triage line [phone]. Thank you.              Follow-ups after your visit        Follow-up notes from your care team     Return in about 1 week (around 6/21/2018) for Schedule for chemotherapy as per treatment plan.      Your next 10 appointments already scheduled     Jun 21, 2018  9:00 AM CDT   Level 5 with ROOM 2 [facility] ([facility])    [facility]  [address]  [facility] [address]   [phone]            Jun 21, 2018  9:30 AM CDT   Return Visit with Mor Mccauley MD   [facility] ([facility])    [facility]  [address]  [facility] [address]   [phone]            Jun 22, 2018 10:00 AM CDT   SHORT with Manish Plasencia MD   [facility]    [address] [address]   [phone]            Jun 25, 2018 11:00 AM CDT   Level  "1 with ROOM 7 [facility] ([facility])    [facility]  [address]  [facility] [address]   [phone]            Jul 12, 2018  9:00 AM CDT   Level 5 with ROOM 10 [facility] ([facility])    [facility]  [address]  [facility] [address]   [phone]            Jul 16, 2018 11:00 AM CDT   Level 1 with ROOM 10 [facility] ([facility])    [facility]  [address]  [facility] [address]   [phone]              Who to contact     If you have questions or need follow up information about today's clinic visit or your schedule please contact [facility] directly at [phone].  Normal or non-critical lab and imaging results will be communicated to you by MyChart, letter or phone within 4 business days after the clinic has received the results. If you do not hear from us within 7 days, please contact the clinic through MyChart or phone. If you have a critical or abnormal lab result, we will notify you by phone as soon as possible.  Submit refill requests through [employer] or call your pharmacy and they will forward the refill request to us. Please allow 3 business days for your refill to be completed.          Additional Information About Your Visit        Care EveryWhere ID     This is your Care EveryWhere ID. This could be used by other organizations to access your [facility] medical records  [id number]        Your Vitals Were     Pulse Temperature Respirations Height Pulse Oximetry BMI (Body Mass Index)    84 97.6  F (36.4  C) (Tympanic) 18 1.905 m (6' 3\") 96% 18.24 kg/m2       Blood Pressure from Last 3 Encounters:   06/14/18 165/80   06/11/18 146/74   06/09/18 162/70    Weight from Last 3 Encounters:   06/14/18 66.2 kg (145 lb 14.4 oz)   06/11/18 67.9 kg (149 lb 9.6 oz)   05/31/18 69.1 kg (152 lb 6.4 oz)    "           Today, you had the following     No orders found for display         Today's Medication Changes          These changes are accurate as of 6/14/18 10:02 AM.  If you have any questions, ask your nurse or doctor.               Start taking these medicines.        Dose/Directions    dronabinol 5 MG capsule   Commonly known as:  MARINOL   Used for:  Carcinoma, lung, left (H)   Started by:  Mor Mccauley MD        Dose:  5 mg   Take 1 capsule (5 mg) by mouth 2 times daily for 14 days   Quantity:  28 capsule   Refills:  0            Where to get your medicines      Some of these will need a paper prescription and others can be bought over the counter.  Ask your nurse if you have questions.     Bring a paper prescription for each of these medications     dronabinol 5 MG capsule                Primary Care Provider Office Phone # Fax #    Omerrolly Jyoti Plasencia [phone] [address] [facility] [address]        Goals        General    Financial Wellbeing (pt-stated)     Notes - Note edited  5/1/2018  8:50 AM by Sofya Paulino LSW    Goal Statement: I want to complete my Medical Assistance application  Measure of Success: My Medical Assistance application will be completed  Supportive Steps to Achieve: SW and pt will finish the remaining paperwork that needs to be completed  Barriers: None identified at this time  Strengths: Pt is motivated as the income increase will assist him greatly  Date to Achieve By: 3 months          Medication 1 (pt-stated)     Notes - Note created  6/11/2018 11:34 AM by Sofya Paulino LSW    Goal Statement: I will learn to take my medications properly   Measure of Success: I will take my medications properly  Supportive Steps to Achieve: I will work with either MTM or RNCC to learn how to take my medications correctly  Barriers: I have so many medications to keep track of it gets confusing.  Strengths: I will use a pill box to organize my  medications  Date to Achieve By: 3 months        Pain Management (pt-stated)     Notes - Note edited  5/10/2018  1:35 PM by Sofya Paulino LSW    CC to discuss with PCP, but would recommend a Palliative Care consult for symptom management.    Pt met with Palliative Care MD yesterday, 5-9-18, and will continue to do so.    Sofya Unger  Social Work Care Coordinator  [facility]  [phone]          Transportation (pt-stated)     Notes - Note edited  6/4/2018  3:10 PM by Sofya Paulino LSW    Goal Statement: Pt needs dependable transportation to get him to his clinic appointments.  Measure of Success: Pt will attend appointments if he has dependable transportation  Supportive Steps to Achieve: SW and pt will work together to secure his MA application will allow him to get transportation benefits.  SW to also provide pt with community resources.  Barriers: Access to pt's bank statements has been a barrier.    Strengths: Pt is motivated to complete the MA application  Date to Achieve By: 3 months          Equal Access to Services     [place] AH: Hadii krysta jordan hadasho Soearl, waaxda luqadaha, qaybta kaalmada alma, celina hewitt . So [facility] [phone].    ATENCIÓN: Si habla español, tiene a galaviz disposición servicios gratuitos de asistencia lingüística. Llame al [phone].    We comply with applicable federal civil rights laws and [place] laws. We do not discriminate on the basis of race, color, national origin, age, disability, sex, sexual orientation, or gender identity.            Thank you!     Thank you for choosing [facility]  for your care. Our goal is always to provide you with excellent care. Hearing back from our patients is one way we can continue to improve our services. Please take a few minutes to complete the written survey that you may receive in the mail after your visit with us. Thank you!             Your  Updated Medication List - Protect others around you: Learn how to safely use, store and throw away your medicines at www.disposemymeds.org.          This list is accurate as of 6/14/18 10:02 AM.  Always use your most recent med list.                   Brand Name Dispense Instructions for use Diagnosis    acetaminophen 325 MG tablet    TYLENOL    100 tablet    Take 2 tablets (650 mg) by mouth every 4 hours as needed for mild pain    Other acute pulmonary embolism without acute cor pulmonale (H), Carcinoma, lung, left (H)       ASPIRIN PO      Take 81 mg by mouth daily        benzonatate 100 MG capsule    TESSALON    42 capsule    Take 1 capsule (100 mg) by mouth 3 times daily as needed for cough    Carcinoma, lung, left (H), Other acute pulmonary embolism without acute cor pulmonale (H)       dexamethasone 4 MG tablet    DECADRON    6 tablet    Take 4 mg (1 tab) by mouth twice daily for 6 doses. Start the evening prior to Infusion.    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)       dronabinol 5 MG capsule    MARINOL    28 capsule    Take 1 capsule (5 mg) by mouth 2 times daily for 14 days    Carcinoma, lung, left (H)       DULoxetine 30 MG EC capsule    CYMBALTA    30 capsule    Take 1 capsule (30 mg) by mouth daily    Single current episode of major depressive disorder, unspecified depression episode severity       enoxaparin 80 MG/0.8ML injection    LOVENOX    41.4 mL    Inject 0.69 mLs (69 mg) Subcutaneous every 12 hours    Other acute pulmonary embolism without acute cor pulmonale (H)       fentaNYL 12 mcg/hr 72 hr patch    DURAGESIC    30 patch    Place 1 patch onto the skin every 72 hours remove old patch.    Other acute pulmonary embolism without acute cor pulmonale (H)       FOLIC ACID PO      Take 1 mg by mouth daily        HYDROmorphone 2 MG tablet    DILAUDID    80 tablet    Take 1-2 tablets (2-4 mg) by mouth every 3 hours as needed for moderate to severe pain    Other acute pulmonary embolism  without acute cor pulmonale (H), Carcinoma, lung, left (H)       LORazepam 0.5 MG tablet    ATIVAN    30 tablet    Take 0.5 mg by mouth every 4 hours as needed for nausea, vomiting, anxiety, or sleep.    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)       losartan 50 MG tablet    COZAAR    90 tablet    Take 1 tablet (50 mg) by mouth daily    Benign essential hypertension       nicotine 14 MG/24HR 24 hr patch    NICODERM CQ    30 patch    Place 1 patch onto the skin daily    Tobacco use disorder       omeprazole 20 MG CR capsule    priLOSEC    90 capsule    Take 1 capsule (20 mg) by mouth daily    Carcinoma, lung, left (H)       ondansetron 8 MG ODT tab    ZOFRAN-ODT    90 tablet    Take 1 tablet (8 mg) by mouth every 8 hours    Chemotherapy induced nausea and vomiting       potassium chloride SA 10 MEQ CR tablet    K-DUR/KLOR-CON M    30 tablet    Take 1 tablet (10 mEq) by mouth daily    Carcinoma, lung, left (H)       prochlorperazine 10 MG tablet    COMPAZINE    30 tablet    Take 0.5 tablets (5 mg) by mouth every 6 hours as needed for nausea or vomiting    Chemotherapy induced nausea and vomiting       rosuvastatin 5 MG tablet    CRESTOR    30 tablet    Take 1 tablet (5 mg) by mouth daily        senna-docusate 8.6-50 MG per tablet    SENOKOT-S;PERICOLACE    100 tablet    Take 2 tablets by mouth 2 times daily    Carcinoma, lung, left (H)       VENTOLIN  (90 Base) MCG/ACT Inhaler   Generic drug:  albuterol     1 Inhaler    Inhale 2 puffs into the lungs every 4 hours as needed for shortness of breath / dyspnea or wheezing

## 2018-06-14 NOTE — PROGRESS NOTES
Clinic Care Coordination Contact  Care Team Conversations    Pt called this writer and stated that he was to see MD Mccauley today and was prescribed Marinol for his nausea, but the cost of the medication was over $100 and he could not afford it.  Pt was not going to receive fluids today as he felt he did not need them.    SW asked pt to give her a few hours to research options and that I would call him back.  YASMIN placed call to  Pharmacy and spoke with staff member, Tata.  Per Tata, she reviewed pt's EMR and asked for some time to research options with pt's insurance.    Tata contacted this writer back and informed that with necessary paperwork and prior authorizations, she was able to get pt's medication cost to $11.27.  YASIMN expressed gratitude.    YASMIN placed call to pt and shared this news.  Pt was elated and expressed his thanks to the staff.    Sofya Unger  Social Work Care Coordinator  [facility]  [phone]

## 2018-06-14 NOTE — PATIENT INSTRUCTIONS
We would like to see you back in one week prior to cycle 4 of chemotherapy.            Your prescription has been sent with you.       When you are in need of a refill, please call your pharmacy and they will send us a request.      Copy of appointments, and after visit summary (AVS) given to patient.      If you have any questions during business hours (M-F 8 AM- 4PM), please call Erica Elliott RN, BSN, OCN Oncology Hematology /Breast Cancer Navigator at [facility] [phone].       For questions after business hours, or on holidays/weekends, please call our after hours Nurse Triage line [phone]. Thank you.

## 2018-06-14 NOTE — NURSING NOTE
"Oncology Rooming Note    June 14, 2018 9:37 AM   Chilo Oneil is a 68 year old male who presents for:    Chief Complaint   Patient presents with     Oncology Clinic Visit     1 week status check- lung CA and post hospital.      Initial Vitals: /82 (BP Location: Right arm, Patient Position: Sitting, Cuff Size: Adult Regular)  Pulse 84  Temp 97.6  F (36.4  C) (Tympanic)  Resp 18  Ht 1.905 m (6' 3\")  Wt 66.2 kg (145 lb 14.4 oz)  SpO2 96%  BMI 18.24 kg/m2 Estimated body mass index is 18.24 kg/(m^2) as calculated from the following:    Height as of this encounter: 1.905 m (6' 3\").    Weight as of this encounter: 66.2 kg (145 lb 14.4 oz). Body surface area is 1.87 meters squared.  No Pain (0) Comment: Data Unavailable   No LMP for male patient.  Allergies reviewed: Yes  Medications reviewed: Yes    Medications: Medication refills not needed today.  Pharmacy name entered into [employer]: [facility] [place], [place] - [address]    Clinical concerns: 1 week status check- lung CA and post hospital. Concerned with his weight loss.     8 minutes for nursing intake (face to face time)     Dorothy Hart [facility]            "

## 2018-06-14 NOTE — PROGRESS NOTES
Hematology/ Oncology Follow-up Visit:  Jun 14, 2018    Reason for Visit:   Chief Complaint   Patient presents with     Oncology Clinic Visit     1 week status check- lung CA and post hospital.        Oncologic History:    Carcinoma, lung, left (H)  Patient presented with L side shoulder and has been traveling  to L/side about  to the whole L/side upper back and L side of chest for about 3 week he has had the pain has been taking aleve.  Subsequently went on to have a CT scan done.  Which showed 1.6 cm nodular infiltrate in the left upper lobe.  The lesion appear spiculated and worrisome for lung cancer.  There is a second 0.7 cm indeterminate nodule in the lingular portion of the left lung.  Subsequently the patient went on to have CT-guided biopsy.  The pathology came back showing moderately differentiated adenocarcinoma. He had subsequent noted left thoracoscopic left pleural biopsies and left lobe which resection on April 11, 2018.  The surgical pathology came back with pleural biopsies came back positive for invasive adenocarcinoma with parietal pleural invasion.  The wedge resection came back with adenocarcinoma with acinar patterns of growth moderately differentiated the tumor size is 1.1 and 0.3 cm into 2 separate tumor nodules.  Visceropleural invasion was identified the margins were negative lymphovascular invasion was not identified large venous artery involvement was not identified as well.  The pathologic staging of pT3N0.        Interval History:  Patient returning today for follow-up.  He was hospitalized last week because of increasing shortness of breath.  Workup at that time revealed a pulmonary embolism.  Started on anticoagulation with Lovenox.  Otherwise, he has been feeling well without any recent complaints of nausea or vomiting.  He denies any pain.  He denies any shortness of breath or cough or wheezing.    Review Of Systems:  Constitutional: Negative for fever, chills, and night  sweats.  Skin: negative.  Eyes: negative.  Ears/Nose/Throat: negative.  Respiratory: No shortness of breath, dyspnea on exertion, cough, or hemoptysis.  Cardiovascular: negative.  Gastrointestinal: negative.  Genitourinary: negative.  Musculoskeletal: negative.  Neurologic: negative.  Psychiatric: negative.  Hematologic/Lymphatic/Immunologic: negative.  Endocrine: negative.    All other ROS negative unless mentioned in interval history.    Past medical, social, surgical, and family histories reviewed.    Allergies:  Allergies as of 06/14/2018 - Alcides as Reviewed 06/14/2018   Allergen Reaction Noted     Cipro [ciprofloxacin] Swelling 06/22/2009       Current Medications:  Current Outpatient Prescriptions   Medication Sig Dispense Refill     ASPIRIN PO Take 81 mg by mouth daily        dronabinol (MARINOL) 5 MG capsule Take 1 capsule (5 mg) by mouth 2 times daily for 14 days 28 capsule 0     DULoxetine (CYMBALTA) 30 MG EC capsule Take 1 capsule (30 mg) by mouth daily 30 capsule 1     enoxaparin (LOVENOX) 80 MG/0.8ML injection Inject 0.69 mLs (69 mg) Subcutaneous every 12 hours 41.4 mL 1     FOLIC ACID PO Take 1 mg by mouth daily       HYDROmorphone (DILAUDID) 2 MG tablet Take 1-2 tablets (2-4 mg) by mouth every 3 hours as needed for moderate to severe pain 80 tablet 0     LORazepam (ATIVAN) 0.5 MG tablet Take 0.5 mg by mouth every 4 hours as needed for nausea, vomiting, anxiety, or sleep. 30 tablet 5     losartan (COZAAR) 50 MG tablet Take 1 tablet (50 mg) by mouth daily 90 tablet 3     nicotine (NICODERM CQ) 14 MG/24HR 24 hr patch Place 1 patch onto the skin daily 30 patch 1     ondansetron (ZOFRAN-ODT) 8 MG ODT tab Take 1 tablet (8 mg) by mouth every 8 hours 90 tablet 1     potassium chloride SA (K-DUR/KLOR-CON M) 10 MEQ CR tablet Take 1 tablet (10 mEq) by mouth daily 30 tablet 1     rosuvastatin (CRESTOR) 5 MG tablet Take 1 tablet (5 mg) by mouth daily 30 tablet 3     VENTOLIN  (90 Base) MCG/ACT Inhaler Inhale  "2 puffs into the lungs every 4 hours as needed for shortness of breath / dyspnea or wheezing 1 Inhaler 1     acetaminophen (TYLENOL) 325 MG tablet Take 2 tablets (650 mg) by mouth every 4 hours as needed for mild pain 100 tablet      benzonatate (TESSALON) 100 MG capsule Take 1 capsule (100 mg) by mouth 3 times daily as needed for cough (Patient not taking: Reported on 6/11/2018) 42 capsule 0     dexamethasone (DECADRON) 4 MG tablet Take 4 mg (1 tab) by mouth twice daily for 6 doses. Start the evening prior to Infusion. (Patient not taking: Reported on 6/11/2018) 6 tablet 3     fentaNYL (DURAGESIC) 12 mcg/hr 72 hr patch Place 1 patch onto the skin every 72 hours remove old patch. (Patient not taking: Reported on 6/14/2018) 30 patch 0     omeprazole (PRILOSEC) 20 MG CR capsule Take 1 capsule (20 mg) by mouth daily (Patient not taking: Reported on 6/14/2018) 90 capsule 3     prochlorperazine (COMPAZINE) 10 MG tablet Take 0.5 tablets (5 mg) by mouth every 6 hours as needed for nausea or vomiting (Patient not taking: Reported on 6/14/2018) 30 tablet 1     senna-docusate (SENOKOT-S;PERICOLACE) 8.6-50 MG per tablet Take 2 tablets by mouth 2 times daily (Patient not taking: Reported on 6/11/2018) 100 tablet 0        Physical Exam:  /80 (BP Location: Right arm, Patient Position: Sitting, Cuff Size: Adult Regular)  Pulse 84  Temp 97.6  F (36.4  C) (Tympanic)  Resp 18  Ht 1.905 m (6' 3\")  Wt 66.2 kg (145 lb 14.4 oz)  SpO2 96%  BMI 18.24 kg/m2  Wt Readings from Last 12 Encounters:   06/14/18 66.2 kg (145 lb 14.4 oz)   06/11/18 67.9 kg (149 lb 9.6 oz)   05/31/18 69.1 kg (152 lb 6.4 oz)   05/18/18 70.8 kg (156 lb 1.6 oz)   05/15/18 70.3 kg (155 lb)   05/10/18 74.5 kg (164 lb 4.8 oz)   05/09/18 70.3 kg (155 lb)   05/09/18 73.5 kg (162 lb)   05/09/18 73.8 kg (162 lb 12.8 oz)   04/30/18 73.9 kg (163 lb)   04/27/18 74.3 kg (163 lb 11.2 oz)   04/20/18 73.5 kg (162 lb)     ECOG performance status: 1  GENERAL APPEARANCE: " Healthy, alert and in no acute distress.  HEENT: Sclerae anicteric. PERRLA. Oropharynx without ulcers, lesions, or thrush.  NECK: Supple. No asymmetry or masses.  LYMPHATICS: No palpable cervical, supraclavicular, axillary, or inguinal lymphadenopathy.  RESP: Lungs clear to auscultation bilaterally without rales, rhonchi or wheezes.  CARDIOVASCULAR: Regular rate and rhythm. Normal S1, S2; no S3 or S4. No murmur, gallop, or rub.  ABDOMEN: Soft, nontender. Bowel sounds normal. No palpable organomegaly or masses.  MUSCULOSKELETAL: Extremities without gross deformities noted. No edema of bilateral lower extremities.  SKIN: No suspicious lesions or rashes.  NEURO: Alert and oriented x 3. Cranial nerves II-XII grossly intact.  PSYCHIATRIC: Mentation and affect appear normal.    Laboratory/Imaging Studies:  Office Visit on 06/11/2018   Component Date Value Ref Range Status     WBC 06/11/2018 4.5  4.0 - 11.0 10e9/L Final     RBC Count 06/11/2018 3.07* 4.4 - 5.9 10e12/L Final     Hemoglobin 06/11/2018 10.0* 13.3 - 17.7 g/dL Final     Hematocrit 06/11/2018 28.5* 40.0 - 53.0 % Final     MCV 06/11/2018 93  78 - 100 fl Final     MCH 06/11/2018 32.6  26.5 - 33.0 pg Final     MCHC 06/11/2018 35.1  31.5 - 36.5 g/dL Final     RDW 06/11/2018 14.6  10.0 - 15.0 % Final     Platelet Count 06/11/2018 108* 150 - 450 10e9/L Final     Diff Method 06/11/2018 Automated Method   Final     % Neutrophils 06/11/2018 64.3  % Final     % Lymphocytes 06/11/2018 26.3  % Final     % Monocytes 06/11/2018 8.3  % Final     % Eosinophils 06/11/2018 0.0  % Final     % Basophils 06/11/2018 0.2  % Final     % Immature Granulocytes 06/11/2018 0.9  % Final     Nucleated RBCs 06/11/2018 0  0 /100 Final     Absolute Neutrophil 06/11/2018 2.9  1.6 - 8.3 10e9/L Final     Absolute Lymphocytes 06/11/2018 1.2  0.8 - 5.3 10e9/L Final     Absolute Monocytes 06/11/2018 0.4  0.0 - 1.3 10e9/L Final     Absolute Eosinophils 06/11/2018 0.0  0.0 - 0.7 10e9/L Final      Absolute Basophils 06/11/2018 0.0  0.0 - 0.2 10e9/L Final     Abs Immature Granulocytes 06/11/2018 0.0  0 - 0.4 10e9/L Final     Absolute Nucleated RBC 06/11/2018 0.0   Final        Recent Results (from the past 744 hour(s))   XR Surgery [facility] Fluoro L/T 5 Min w Stills    Narrative    SURGERY C-ARM FLUOROSCOPY LESS THAN FIVE MINUTES WITH STILLS 5/15/2018  10:32 AM     COMPARISON: 5/9/2018    HISTORY: Port-a-Cath.    NUMBER OF IMAGES ACQUIRED: 2    VIEWS: AP    FLUOROSCOPY TIME: 17      Impression    IMPRESSION: Two coned-down intraoperative views of the chest  demonstrate placement of a left-sided subclavian port with the tip  projected over the distal SVC.    GIL TONY MD   XR Chest Port 1 View    Narrative    XR CHEST PORT 1 VW 5/15/2018 11:25 AM    HISTORY: PowerPort catheter placement.    COMPARISON: 5/9/2018.      Impression    IMPRESSION: Single view of the chair shows interval placement of a  left-sided power port catheter. The tip is in the expected region of  the distal SVC. No acute cardiopulmonary disease is demonstrated. The  patient's known the left upper lobe mass and left apical pleural-based  opacity are unchanged.    VERONICA HINTON MD   CT Chest Pulmonary Embolism w Contrast   Result Value    Radiologist flags Pulmonary embolism (AA)    Narrative    CT CHEST WITH CONTRAST   6/5/2018 6:53 PM     HISTORY: Dyspnea.    COMPARISON: 4/3/2018.    TECHNIQUE: Following the uneventful administration of 69 mL Isovue  -370 intravenous contrast, helical sections were acquired through the  lungs according to the pulmonary embolism protocol. Coronal  reconstructions were generated. Radiation dose for this scan was  reduced using automated exposure control, adjustment of the mA and/or  kV according to the patient's size, or iterative reconstruction  technique.    FINDINGS: A filling defect is present within the superior segmental  pulmonary artery of the right lower lobe (series 4 image 76),  consistent with an  acute pulmonary embolus. No other visualized  pulmonary emboli. The thoracic aorta is normal in caliber without  dissection. Atherosclerotic calcification in the thoracic aorta and  coronary arteries.    Mild emphysematous changes in the lungs. Parenchymal scarring in the  left lung apex and left lung base. 2.3 x 1.3 cm irregular nodular  opacity in the periphery of the anterior aspect of the mid left lung  (series 5 image 69), unchanged. A few nonspecific tiny nodular  opacities scattered within the lungs, not convincingly changed. No  pleural or pericardial effusion. A few nonspecific borderline enlarged  mediastinal and bilateral hilar lymph nodes, unchanged. Left anterior  chest wall port with catheter entering the left subclavian vein and  distal tip in the superior vena cava.    Scan through the upper abdomen is significant for nonspecific mild  diffuse bilateral adrenal thickening. 2 cm cyst in the upper pole of  the right kidney. Probable sludge in the gallbladder.      Impression    IMPRESSION:   1. Acute pulmonary embolus in the right lower lobe. No other  visualized pulmonary emboli.  2. 2.3 cm irregular nodule that is suspicious for neoplasm in the mid  left lung, unchanged since 4/3/2018.       [Critical Result: Pulmonary embolism]    Finding was identified on 6/5/2018 6:59 PM.     Dr. Slade was contacted by me on 6/5/2018 7:10 PM and verbalized  understanding of the critical result.     JACQUI NEGRO MD       Assessment and plan:    (C34.92) Carcinoma, lung, left (H)  (primary encounter diagnosis)  Patient will be restarting his chemotherapy in 1 week.  He will be receiving his third cycle of chemotherapy with Alimta and platinum.  We will see the patient again in 1 week time or sooner if there are new developments or concerns.    (I10) Benign essential hypertension  Blood pressure is currently well-controlled patient currently on Cozaar 50 mg orally daily.    (R11.2,  T45.1X5A) Chemotherapy induced  nausea and vomiting  Patient poor appetite and nausea.  I will add Marinol 5 mg as needed twice daily.    (K21.9) Gastroesophageal reflux disease without esophagitis  I would recommend patient continue on omeprazole 20 mg orally daily.    (E78.5) Hyperlipidemia LDL goal <100  Patient currently on Crestor 5 mg orally daily.    (E83.42) Hypomagnesemia  Patient currently on magnesium supplements.    (I26.99) Other pulmonary embolism without acute cor pulmonale, unspecified chronicity (H)  Patient will continue anticoagulation with Lovenox.    The patient is ready to learn, no apparent learning barriers were identified.  Diagnosis and treatment plans were explained to the patient. The patient expressed understanding of the content. The patient asked appropriate questions. The patient questions were answered to his satisfaction.    Chart documentation with Dragon Voice recognition Software. Although reviewed after completion, some words and grammatical errors may remain.

## 2018-06-14 NOTE — LETTER
6/14/2018         RE: Chilo Oneil  [address]  [place] [address]        Dear Colleague,    Thank you for referring your patient, Chilo Oneil, to the [facility]. Please see a copy of my visit note below.    Hematology/ Oncology Follow-up Visit:  Jun 14, 2018    Reason for Visit:   Chief Complaint   Patient presents with     Oncology Clinic Visit     1 week status check- lung CA and post hospital.        Oncologic History:    Carcinoma, lung, left (H)  Patient presented with L side shoulder and has been traveling  to L/side about  to the whole L/side upper back and L side of chest for about 3 week he has had the pain has been taking aleve.  Subsequently went on to have a CT scan done.  Which showed 1.6 cm nodular infiltrate in the left upper lobe.  The lesion appear spiculated and worrisome for lung cancer.  There is a second 0.7 cm indeterminate nodule in the lingular portion of the left lung.  Subsequently the patient went on to have CT-guided biopsy.  The pathology came back showing moderately differentiated adenocarcinoma. He had subsequent noted left thoracoscopic left pleural biopsies and left lobe which resection on April 11, 2018.  The surgical pathology came back with pleural biopsies came back positive for invasive adenocarcinoma with parietal pleural invasion.  The wedge resection came back with adenocarcinoma with acinar patterns of growth moderately differentiated the tumor size is 1.1 and 0.3 cm into 2 separate tumor nodules.  Visceropleural invasion was identified the margins were negative lymphovascular invasion was not identified large venous artery involvement was not identified as well.  The pathologic staging of pT3N0.        Interval History:  Patient returning today for follow-up.  He was hospitalized last week because of increasing shortness of breath.  Workup at that time revealed a pulmonary embolism.  Started on anticoagulation with Lovenox.  Otherwise, he has been  feeling well without any recent complaints of nausea or vomiting.  He denies any pain.  He denies any shortness of breath or cough or wheezing.    Review Of Systems:  Constitutional: Negative for fever, chills, and night sweats.  Skin: negative.  Eyes: negative.  Ears/Nose/Throat: negative.  Respiratory: No shortness of breath, dyspnea on exertion, cough, or hemoptysis.  Cardiovascular: negative.  Gastrointestinal: negative.  Genitourinary: negative.  Musculoskeletal: negative.  Neurologic: negative.  Psychiatric: negative.  Hematologic/Lymphatic/Immunologic: negative.  Endocrine: negative.    All other ROS negative unless mentioned in interval history.    Past medical, social, surgical, and family histories reviewed.    Allergies:  Allergies as of 06/14/2018 - Alcides as Reviewed 06/14/2018   Allergen Reaction Noted     Cipro [ciprofloxacin] Swelling 06/22/2009       Current Medications:  Current Outpatient Prescriptions   Medication Sig Dispense Refill     ASPIRIN PO Take 81 mg by mouth daily        dronabinol (MARINOL) 5 MG capsule Take 1 capsule (5 mg) by mouth 2 times daily for 14 days 28 capsule 0     DULoxetine (CYMBALTA) 30 MG EC capsule Take 1 capsule (30 mg) by mouth daily 30 capsule 1     enoxaparin (LOVENOX) 80 MG/0.8ML injection Inject 0.69 mLs (69 mg) Subcutaneous every 12 hours 41.4 mL 1     FOLIC ACID PO Take 1 mg by mouth daily       HYDROmorphone (DILAUDID) 2 MG tablet Take 1-2 tablets (2-4 mg) by mouth every 3 hours as needed for moderate to severe pain 80 tablet 0     LORazepam (ATIVAN) 0.5 MG tablet Take 0.5 mg by mouth every 4 hours as needed for nausea, vomiting, anxiety, or sleep. 30 tablet 5     losartan (COZAAR) 50 MG tablet Take 1 tablet (50 mg) by mouth daily 90 tablet 3     nicotine (NICODERM CQ) 14 MG/24HR 24 hr patch Place 1 patch onto the skin daily 30 patch 1     ondansetron (ZOFRAN-ODT) 8 MG ODT tab Take 1 tablet (8 mg) by mouth every 8 hours 90 tablet 1     potassium chloride SA  "(K-DUR/KLOR-CON M) 10 MEQ CR tablet Take 1 tablet (10 mEq) by mouth daily 30 tablet 1     rosuvastatin (CRESTOR) 5 MG tablet Take 1 tablet (5 mg) by mouth daily 30 tablet 3     VENTOLIN  (90 Base) MCG/ACT Inhaler Inhale 2 puffs into the lungs every 4 hours as needed for shortness of breath / dyspnea or wheezing 1 Inhaler 1     acetaminophen (TYLENOL) 325 MG tablet Take 2 tablets (650 mg) by mouth every 4 hours as needed for mild pain 100 tablet      benzonatate (TESSALON) 100 MG capsule Take 1 capsule (100 mg) by mouth 3 times daily as needed for cough (Patient not taking: Reported on 6/11/2018) 42 capsule 0     dexamethasone (DECADRON) 4 MG tablet Take 4 mg (1 tab) by mouth twice daily for 6 doses. Start the evening prior to Infusion. (Patient not taking: Reported on 6/11/2018) 6 tablet 3     fentaNYL (DURAGESIC) 12 mcg/hr 72 hr patch Place 1 patch onto the skin every 72 hours remove old patch. (Patient not taking: Reported on 6/14/2018) 30 patch 0     omeprazole (PRILOSEC) 20 MG CR capsule Take 1 capsule (20 mg) by mouth daily (Patient not taking: Reported on 6/14/2018) 90 capsule 3     prochlorperazine (COMPAZINE) 10 MG tablet Take 0.5 tablets (5 mg) by mouth every 6 hours as needed for nausea or vomiting (Patient not taking: Reported on 6/14/2018) 30 tablet 1     senna-docusate (SENOKOT-S;PERICOLACE) 8.6-50 MG per tablet Take 2 tablets by mouth 2 times daily (Patient not taking: Reported on 6/11/2018) 100 tablet 0        Physical Exam:  /80 (BP Location: Right arm, Patient Position: Sitting, Cuff Size: Adult Regular)  Pulse 84  Temp 97.6  F (36.4  C) (Tympanic)  Resp 18  Ht 1.905 m (6' 3\")  Wt 66.2 kg (145 lb 14.4 oz)  SpO2 96%  BMI 18.24 kg/m2  Wt Readings from Last 12 Encounters:   06/14/18 66.2 kg (145 lb 14.4 oz)   06/11/18 67.9 kg (149 lb 9.6 oz)   05/31/18 69.1 kg (152 lb 6.4 oz)   05/18/18 70.8 kg (156 lb 1.6 oz)   05/15/18 70.3 kg (155 lb)   05/10/18 74.5 kg (164 lb 4.8 oz)   05/09/18 " 70.3 kg (155 lb)   05/09/18 73.5 kg (162 lb)   05/09/18 73.8 kg (162 lb 12.8 oz)   04/30/18 73.9 kg (163 lb)   04/27/18 74.3 kg (163 lb 11.2 oz)   04/20/18 73.5 kg (162 lb)     ECOG performance status: 1  GENERAL APPEARANCE: Healthy, alert and in no acute distress.  HEENT: Sclerae anicteric. PERRLA. Oropharynx without ulcers, lesions, or thrush.  NECK: Supple. No asymmetry or masses.  LYMPHATICS: No palpable cervical, supraclavicular, axillary, or inguinal lymphadenopathy.  RESP: Lungs clear to auscultation bilaterally without rales, rhonchi or wheezes.  CARDIOVASCULAR: Regular rate and rhythm. Normal S1, S2; no S3 or S4. No murmur, gallop, or rub.  ABDOMEN: Soft, nontender. Bowel sounds normal. No palpable organomegaly or masses.  MUSCULOSKELETAL: Extremities without gross deformities noted. No edema of bilateral lower extremities.  SKIN: No suspicious lesions or rashes.  NEURO: Alert and oriented x 3. Cranial nerves II-XII grossly intact.  PSYCHIATRIC: Mentation and affect appear normal.    Laboratory/Imaging Studies:  Office Visit on 06/11/2018   Component Date Value Ref Range Status     WBC 06/11/2018 4.5  4.0 - 11.0 10e9/L Final     RBC Count 06/11/2018 3.07* 4.4 - 5.9 10e12/L Final     Hemoglobin 06/11/2018 10.0* 13.3 - 17.7 g/dL Final     Hematocrit 06/11/2018 28.5* 40.0 - 53.0 % Final     MCV 06/11/2018 93  78 - 100 fl Final     MCH 06/11/2018 32.6  26.5 - 33.0 pg Final     MCHC 06/11/2018 35.1  31.5 - 36.5 g/dL Final     RDW 06/11/2018 14.6  10.0 - 15.0 % Final     Platelet Count 06/11/2018 108* 150 - 450 10e9/L Final     Diff Method 06/11/2018 Automated Method   Final     % Neutrophils 06/11/2018 64.3  % Final     % Lymphocytes 06/11/2018 26.3  % Final     % Monocytes 06/11/2018 8.3  % Final     % Eosinophils 06/11/2018 0.0  % Final     % Basophils 06/11/2018 0.2  % Final     % Immature Granulocytes 06/11/2018 0.9  % Final     Nucleated RBCs 06/11/2018 0  0 /100 Final     Absolute Neutrophil 06/11/2018 2.9   1.6 - 8.3 10e9/L Final     Absolute Lymphocytes 06/11/2018 1.2  0.8 - 5.3 10e9/L Final     Absolute Monocytes 06/11/2018 0.4  0.0 - 1.3 10e9/L Final     Absolute Eosinophils 06/11/2018 0.0  0.0 - 0.7 10e9/L Final     Absolute Basophils 06/11/2018 0.0  0.0 - 0.2 10e9/L Final     Abs Immature Granulocytes 06/11/2018 0.0  0 - 0.4 10e9/L Final     Absolute Nucleated RBC 06/11/2018 0.0   Final        Recent Results (from the past 744 hour(s))   XR Surgery [facility] Fluoro L/T 5 Min w Stills    Narrative    SURGERY C-ARM FLUOROSCOPY LESS THAN FIVE MINUTES WITH STILLS 5/15/2018  10:32 AM     COMPARISON: 5/9/2018    HISTORY: Port-a-Cath.    NUMBER OF IMAGES ACQUIRED: 2    VIEWS: AP    FLUOROSCOPY TIME: 17      Impression    IMPRESSION: Two coned-down intraoperative views of the chest  demonstrate placement of a left-sided subclavian port with the tip  projected over the distal SVC.    GIL TONY MD   XR Chest Port 1 View    Narrative    XR CHEST PORT 1 VW 5/15/2018 11:25 AM    HISTORY: PowerPort catheter placement.    COMPARISON: 5/9/2018.      Impression    IMPRESSION: Single view of the chair shows interval placement of a  left-sided power port catheter. The tip is in the expected region of  the distal SVC. No acute cardiopulmonary disease is demonstrated. The  patient's known the left upper lobe mass and left apical pleural-based  opacity are unchanged.    VERONICA HNITON MD   CT Chest Pulmonary Embolism w Contrast   Result Value    Radiologist flags Pulmonary embolism (AA)    Narrative    CT CHEST WITH CONTRAST   6/5/2018 6:53 PM     HISTORY: Dyspnea.    COMPARISON: 4/3/2018.    TECHNIQUE: Following the uneventful administration of 69 mL Isovue  -370 intravenous contrast, helical sections were acquired through the  lungs according to the pulmonary embolism protocol. Coronal  reconstructions were generated. Radiation dose for this scan was  reduced using automated exposure control, adjustment of the mA and/or  kV  according to the patient's size, or iterative reconstruction  technique.    FINDINGS: A filling defect is present within the superior segmental  pulmonary artery of the right lower lobe (series 4 image 76),  consistent with an acute pulmonary embolus. No other visualized  pulmonary emboli. The thoracic aorta is normal in caliber without  dissection. Atherosclerotic calcification in the thoracic aorta and  coronary arteries.    Mild emphysematous changes in the lungs. Parenchymal scarring in the  left lung apex and left lung base. 2.3 x 1.3 cm irregular nodular  opacity in the periphery of the anterior aspect of the mid left lung  (series 5 image 69), unchanged. A few nonspecific tiny nodular  opacities scattered within the lungs, not convincingly changed. No  pleural or pericardial effusion. A few nonspecific borderline enlarged  mediastinal and bilateral hilar lymph nodes, unchanged. Left anterior  chest wall port with catheter entering the left subclavian vein and  distal tip in the superior vena cava.    Scan through the upper abdomen is significant for nonspecific mild  diffuse bilateral adrenal thickening. 2 cm cyst in the upper pole of  the right kidney. Probable sludge in the gallbladder.      Impression    IMPRESSION:   1. Acute pulmonary embolus in the right lower lobe. No other  visualized pulmonary emboli.  2. 2.3 cm irregular nodule that is suspicious for neoplasm in the mid  left lung, unchanged since 4/3/2018.       [Critical Result: Pulmonary embolism]    Finding was identified on 6/5/2018 6:59 PM.     Dr. Slade was contacted by me on 6/5/2018 7:10 PM and verbalized  understanding of the critical result.     JACQUI NEGRO MD       Assessment and plan:    (C34.92) Carcinoma, lung, left (H)  (primary encounter diagnosis)  Patient will be restarting his chemotherapy in 1 week.  He will be receiving his third cycle of chemotherapy with Alimta and platinum.  We will see the patient again in 1 week time  or sooner if there are new developments or concerns.    (I10) Benign essential hypertension  Blood pressure is currently well-controlled patient currently on Cozaar 50 mg orally daily.    (R11.2,  T45.1X5A) Chemotherapy induced nausea and vomiting  Patient poor appetite and nausea.  I will add Marinol 5 mg as needed twice daily.    (K21.9) Gastroesophageal reflux disease without esophagitis  I would recommend patient continue on omeprazole 20 mg orally daily.    (E78.5) Hyperlipidemia LDL goal <100  Patient currently on Crestor 5 mg orally daily.    (E83.42) Hypomagnesemia  Patient currently on magnesium supplements.    (I26.99) Other pulmonary embolism without acute cor pulmonale, unspecified chronicity (H)  Patient will continue anticoagulation with Lovenox.    The patient is ready to learn, no apparent learning barriers were identified.  Diagnosis and treatment plans were explained to the patient. The patient expressed understanding of the content. The patient asked appropriate questions. The patient questions were answered to his satisfaction.    Chart documentation with Dragon Voice recognition Software. Although reviewed after completion, some words and grammatical errors may remain.    Again, thank you for allowing me to participate in the care of your patient.        Sincerely,        Mor Mccauley MD

## 2018-06-19 NOTE — ED AVS SNAPSHOT
[facility]    [address] [address]    Phone:  [phone]    Fax:  [phone]                                       Chilo Oneil   MRN: [id number]    Department:  [facility]   Date of Visit:  6/19/2018           After Visit Summary Signature Page     I have received my discharge instructions, and my questions have been answered. I have discussed any challenges I see with this plan with the nurse or doctor.    ..........................................................................................................................................  Patient/Patient Representative Signature      ..........................................................................................................................................  Patient Representative Print Name and Relationship to Patient    ..................................................               ................................................  Date                                            Time    ..........................................................................................................................................  Reviewed by Signature/Title    ...................................................              ..............................................  Date                                                            Time

## 2018-06-19 NOTE — TELEPHONE ENCOUNTER
Patient was contacted by phone due to a positive screen on the Oncology Distress Screening tool. Spoke with patient regarding his concern about his ability to eat. He is making shakes twice a day and that is going well. Will send him our smoothie recipe sheet. Registered Dietitian contact information provided to patient if further questions arise.    Melany Awan RD,LD  Clinical Dietitian

## 2018-06-19 NOTE — ED NOTES
Pt reports sob started 1.5 hours ago.   Pt took an anxiety medication with no relief.   Pt smokes 1/2 ppd.  Lung sounds very diminished.  Pt does not use oxygen at home.  Oxygen sats 97% room air.

## 2018-06-19 NOTE — ED AVS SNAPSHOT
[facility] Emergency Department    [address] [facility] [address]    Phone:  [phone]    Fax:  [phone]                                       Chilo Oneil   MRN: [id number]    Department:  [facility] Emergency Department   Date of Visit:  6/19/2018           Patient Information     Date Of Birth          1950        Your diagnoses for this visit were:     SOB (shortness of breath)     Anxiety        You were seen by Belkys Montano, APRN CNP.      Follow-up Information     Follow up with Manish Plasencia MD In 3 days.    Specialty:  Family Practice    Why:  follow up    Contact information:    [address] [facility] [address]  [phone]        Your next 10 appointments already scheduled     Jun 21, 2018  9:00 AM CDT   Level 5 with ROOM 2 [facility] ([facility])    [facility]  [address]  [facility] [address]   [phone]            Jun 21, 2018  9:30 AM CDT   Return Visit with Mor Mccauley MD   [facility] ([facility])    [facility]  [address]  [facility] [address]   [phone]            Jun 22, 2018 10:00 AM CDT   SHORT with Manish Plasencia MD   [facility] ([facility])    [address] [facility] [address]   [phone]            Jun 25, 2018 11:00 AM CDT   Level 1 with ROOM 7 [facility] ([facility])    [facility]  [address]  [facility] [address]   [phone]            Jul 12, 2018  9:00 AM CDT   Level 5 with ROOM 10 [facility] ([facility])    [facility]  [address]  [facility] [address]   [phone]            Jul 16, 2018 11:00 AM CDT   Level 1 with ROOM 10 [facility] ([facility])    [facility]  [address] [facility] [address]  [facility] [address]   [phone]              24 Hour Appointment Hotline       To make an appointment at any [place] clinic, call [phone] ([phone]). If you don't have a family doctor or clinic, we will help you find one. [place] clinics are conveniently located to serve the needs of you and your family.             Review of your medicines      Our records show that you are taking the medicines listed below. If these are incorrect, please call your family doctor or clinic.        Dose / Directions Last dose taken    acetaminophen 325 MG tablet   Commonly known as:  TYLENOL   Dose:  650 mg   Quantity:  100 tablet        Take 2 tablets (650 mg) by mouth every 4 hours as needed for mild pain   Refills:  0        ASPIRIN PO   Dose:  81 mg        Take 81 mg by mouth daily   Refills:  0        benzonatate 100 MG capsule   Commonly known as:  TESSALON   Dose:  100 mg   Quantity:  42 capsule        Take 1 capsule (100 mg) by mouth 3 times daily as needed for cough   Refills:  0        dexamethasone 4 MG tablet   Commonly known as:  DECADRON   Quantity:  6 tablet        Take 4 mg (1 tab) by mouth twice daily for 6 doses. Start the evening prior to Infusion.   Refills:  3        dronabinol 5 MG capsule   Commonly known as:  MARINOL   Dose:  5 mg   Quantity:  28 capsule        Take 1 capsule (5 mg) by mouth 2 times daily for 14 days   Refills:  0        DULoxetine 30 MG EC capsule   Commonly known as:  CYMBALTA   Dose:  30 mg   Quantity:  30 capsule        Take 1 capsule (30 mg) by mouth daily   Refills:  1        enoxaparin 80 MG/0.8ML injection   Commonly known as:  LOVENOX   Dose:  1 mg/kg   Quantity:  41.4 mL        Inject 0.69 mLs (69 mg) Subcutaneous every 12 hours   Refills:  1        fentaNYL 12 mcg/hr 72 hr patch   Commonly known as:  DURAGESIC   Dose:  1 patch   Quantity:  30 patch        Place 1 patch onto the skin every 72 hours remove  old patch.   Refills:  0        FOLIC ACID PO   Dose:  1 mg        Take 1 mg by mouth daily   Refills:  0        HYDROmorphone 2 MG tablet   Commonly known as:  DILAUDID   Dose:  2-4 mg   Quantity:  80 tablet        Take 1-2 tablets (2-4 mg) by mouth every 3 hours as needed for moderate to severe pain   Refills:  0        LORazepam 0.5 MG tablet   Commonly known as:  ATIVAN   Quantity:  30 tablet        Take 0.5 mg by mouth every 4 hours as needed for nausea, vomiting, anxiety, or sleep.   Refills:  5        losartan 50 MG tablet   Commonly known as:  COZAAR   Dose:  50 mg   Quantity:  90 tablet        Take 1 tablet (50 mg) by mouth daily   Refills:  3        nicotine 14 MG/24HR 24 hr patch   Commonly known as:  NICODERM CQ   Dose:  1 patch   Quantity:  30 patch        Place 1 patch onto the skin daily   Refills:  1        omeprazole 20 MG CR capsule   Commonly known as:  priLOSEC   Dose:  20 mg   Quantity:  90 capsule        Take 1 capsule (20 mg) by mouth daily   Refills:  3        ondansetron 8 MG ODT tab   Commonly known as:  ZOFRAN-ODT   Dose:  8 mg   Quantity:  90 tablet        Take 1 tablet (8 mg) by mouth every 8 hours   Refills:  1        potassium chloride SA 10 MEQ CR tablet   Commonly known as:  K-DUR/KLOR-CON M   Dose:  10 mEq   Quantity:  30 tablet        Take 1 tablet (10 mEq) by mouth daily   Refills:  1        prochlorperazine 10 MG tablet   Commonly known as:  COMPAZINE   Dose:  5 mg   Quantity:  30 tablet        Take 0.5 tablets (5 mg) by mouth every 6 hours as needed for nausea or vomiting   Refills:  1        rosuvastatin 5 MG tablet   Commonly known as:  CRESTOR   Dose:  5 mg   Quantity:  30 tablet        Take 1 tablet (5 mg) by mouth daily   Refills:  3        senna-docusate 8.6-50 MG per tablet   Commonly known as:  SENOKOT-S;PERICOLACE   Dose:  2 tablet   Quantity:  100 tablet        Take 2 tablets by mouth 2 times daily   Refills:  0        VENTOLIN  (90 Base) MCG/ACT Inhaler   Dose:   2 puff   Quantity:  1 Inhaler   Generic drug:  albuterol        Inhale 2 puffs into the lungs every 4 hours as needed for shortness of breath / dyspnea or wheezing   Refills:  1                Procedures and tests performed during your visit     Basic metabolic panel    CBC with platelets differential    EKG 12 lead    Troponin I      Orders Needing Specimen Collection     None      Pending Results     Date and Time Order Name Status Description    6/19/2018 1644 CBC with platelets differential In process             Pending Culture Results     No orders found from 6/17/2018 to 6/20/2018.            Pending Results Instructions     If you had any lab results that were not finalized at the time of your Discharge, you can call the ED Lab Result RN at [phone]. You will be contacted by this team for any positive Lab results or changes in treatment. The nurses are available 7 days a week from 10A to 6:30P.  You can leave a message 24 hours per day and they will return your call.        Test Results From Your Hospital Stay        6/19/2018  5:29 PM      Component Results     Component Value Ref Range & Units Status    WBC 4.4 4.0 - 11.0 10e9/L Final    RBC Count 3.05 (L) 4.4 - 5.9 10e12/L Final    Hemoglobin 10.0 (L) 13.3 - 17.7 g/dL Final    Hematocrit 28.7 (L) 40.0 - 53.0 % Final    MCV 94 78 - 100 fl Final    MCH 32.8 26.5 - 33.0 pg Final    MCHC 34.8 31.5 - 36.5 g/dL Final    RDW 16.0 (H) 10.0 - 15.0 % Final    Platelet Count 531 (H) 150 - 450 10e9/L Final    Diff Method PENDING  Incomplete         6/19/2018  5:46 PM      Component Results     Component Value Ref Range & Units Status    Sodium 134 133 - 144 mmol/L Final    Potassium 3.7 3.4 - 5.3 mmol/L Final    Chloride 97 94 - 109 mmol/L Final    Carbon Dioxide 29 20 - 32 mmol/L Final    Anion Gap 8 3 - 14 mmol/L Final    Glucose 103 (H) 70 - 99 mg/dL Final    Urea Nitrogen 13 7 - 30 mg/dL Final    Creatinine 1.08 0.66 - 1.25 mg/dL Final    GFR Estimate 68 >60  mL/min/1.7m2 Final    Non  GFR Calc    GFR Estimate If Black 82 >60 mL/min/1.7m2 Final    African American GFR Calc    Calcium 7.9 (L) 8.5 - 10.1 mg/dL Final         6/19/2018  5:46 PM      Component Results     Component Value Ref Range & Units Status    Troponin I ES <0.015 0.000 - 0.045 ug/L Final    The 99th percentile for upper reference range is 0.045 ug/L.  Troponin values   in the range of 0.045 - 0.120 ug/L may be associated with risks of adverse   clinical events.                  Thank you for choosing [facility]       Thank you for choosing [facility] for your care. Our goal is always to provide you with excellent care. Hearing back from our patients is one way we can continue to improve our services. Please take a few minutes to complete the written survey that you may receive in the mail after you visit with us. Thank you!        Care EveryWhere ID     This is your Care EveryWhere ID. This could be used by other organizations to access your [facility] medical records  [id number]        Equal Access to Services     [place] AH: Kevin Vidales, wawyatt luannelise, qaybta kaalmakirit marquez, celina fisher. So [facility] [phone].    ATENCIÓN: Si habla español, tiene a galaviz disposición servicios gratuitos de asistencia lingüística. Llame al [phone].    We comply with applicable federal civil rights laws and [place] laws. We do not discriminate on the basis of race, color, national origin, age, disability, sex, sexual orientation, or gender identity.            After Visit Summary       This is your record. Keep this with you and show to your community pharmacist(s) and doctor(s) at your next visit.

## 2018-06-19 NOTE — PROGRESS NOTES
Clinic Care Coordination Contact  Care Team Conversations    [facility] received phone call from Yamile [phone], SW with [facility] Waiver programs, introducing herself as the pt's new SW.  Yamile wanted to learn some background information regarding the patient before she met with him, hopefully tomorrow, at his home.    Yamile shared it is her intention to order Meals on Wheels, Ensure, and any other programs that the pt will allow, when she meets with him tomorrow.    Yamile also informed this writer that effective July 2019, the pt will have [employer], so he will change from her as a SW to having a Health [employer] Case Manger who will manage his EW funds.    SW to continue to follow.    Sofya Unger  Social Work Care Coordinator  [facility]  [phone]

## 2018-06-19 NOTE — ED PROVIDER NOTES
History     Chief Complaint   Patient presents with     Shortness of Breath     took anxiety med without relief     HPI  Chilo Oneil is a 68 year old male who presents with history of anxiety, coronary artery disease and lung cancer who presents with concerns of anxiety or shortness of breath.  Patient reports onset of symptoms was 2 hours ago and he feels like it might be anxiety but he just wanted to make sure that there is nothing going on his heart or lungs.  Patient states that he is symptoms are improving since onset but they are not completely gone.  Patient states with onset of symptoms he proceeded to the [facility] pharmacy and picked up a refill on his prescription for Ativan of 0.5 mg and took 1 pill approximately 1 hour ago.  Patient admits that he does have chemotherapy coming up and this does cause a great deal of stress for him as he reports he always gets very ill following chemotherapy.  Patient denies any fever, aches, chills, nausea, vomiting, abdominal pain, hematochezia, melena, dysuria, hematuria, difficulty breathing, or painful breathing.    Problem List:    Patient Active Problem List    Diagnosis Date Noted     Pulmonary emboli (H) 06/05/2018     Priority: Medium     Pulmonary embolism (H) 06/05/2018     Priority: Medium     Hypokalemia 05/31/2018     Priority: Medium     Hypomagnesemia 05/31/2018     Priority: Medium     Carcinoma, lung, left (H) 04/12/2018     Priority: Medium     CAD (coronary artery disease) 03/28/2018     Priority: Medium     No previous angiogram.  No previous stenting.    Atypical Stress Test consistent with possible CAD 8/2016: Myocardial perfusion imaging using single isotope technique demonstrated a small of inferior ischemia at the base to mid ventricle There is a second moderate area of ischemia in the anterior and anteroseptal wall from base through mid ventricle, involving the lateral base as well. There is a small fixed portion in the anteroseptal base  consistent with prior infarction There is a fixed inferoseptal defect from apex to mid ventricle consistent with either prior nontransmural infarct or attenuation artifact. Gated images demonstrated inferior, inferoseptal, anterior and anteroseptal basal hypokinesis.  The left ventricular systolic function is 52% at rest and 47% post stress.       Hyponatremia 03/28/2018     Priority: Medium     SOB (shortness of breath) 03/28/2018     Priority: Medium     Chemotherapy induced nausea and vomiting 03/28/2018     Priority: Medium     Lung cancer (H)      Priority: Medium     Left shoulder pain, cough. Bx 12/2017- Non Small Cell. Resection planned 4/2018       Uncomplicated asthma      Priority: Medium     Anxiety 12/28/2017     Priority: Medium     Gastroesophageal reflux disease without esophagitis 12/28/2017     Priority: Medium     PAD (peripheral artery disease) (H) 06/13/2016     Priority: Medium     Benign essential hypertension 06/13/2016     Priority: Medium     Hyperlipidemia LDL goal <100 06/13/2016     Priority: Medium     Tobacco use disorder 10/16/2009     Priority: Medium        Past Medical History:    Past Medical History:   Diagnosis Date     GERD (gastroesophageal reflux disease)      HTN (hypertension)      Lung cancer (H)      MI (myocardial infarction)        Past Surgical History:    Past Surgical History:   Procedure Laterality Date     FRACTURE TX, ANKLE RT/LT  1975    Fracture TX Ankle, LT     INSERT PORT VASCULAR ACCESS Left 5/15/2018    Procedure: INSERT PORT VASCULAR ACCESS;  Left chest Port-a-Cath Placement;  Surgeon: Gurjit Fox MD;  Location: [facility] OR     OPEN REDUCTION INTERNAL FIXATION HIP NAILING  9/20/2013    Procedure: OPEN REDUCTION INTERNAL FIXATION HIP NAILING;  Left Hip Open Reduction Internal Fixation ;  Surgeon: Rosas Dennis MD;  Location: [facility] OR     THORACOSCOPIC BIOPSY LUNG Left 4/11/2018    Procedure: THORACOSCOPIC BIOPSY LUNG;  subxiphoid left video assisted  thorascopic surgery pleural biops, left lower lobe wedge biopsy ;  Surgeon: Mega Moreno MD;  Location: [facility] OR     VASCULAR SURGERY         Family History:    Family History   Problem Relation Age of Onset     Diabetes Brother      type 2     Diabetes Father        Social History:  Marital Status:  Single [1]  Social History   Substance Use Topics     Smoking status: Current Every Day Smoker     Packs/day: 0.15     Years: 47.00     Types: Cigarettes     Start date: 12/26/1967     Smokeless tobacco: Never Used      Comment: 2-3 cigs daily     Alcohol use Yes      Comment: 6-8 beers per day, last 3/27 at 6pm        Medications:      acetaminophen (TYLENOL) 325 MG tablet   ASPIRIN PO   benzonatate (TESSALON) 100 MG capsule   dexamethasone (DECADRON) 4 MG tablet   dronabinol (MARINOL) 5 MG capsule   DULoxetine (CYMBALTA) 30 MG EC capsule   enoxaparin (LOVENOX) 80 MG/0.8ML injection   FOLIC ACID PO   HYDROmorphone (DILAUDID) 2 MG tablet   LORazepam (ATIVAN) 0.5 MG tablet   losartan (COZAAR) 50 MG tablet   nicotine (NICODERM CQ) 14 MG/24HR 24 hr patch   omeprazole (PRILOSEC) 20 MG CR capsule   ondansetron (ZOFRAN-ODT) 8 MG ODT tab   potassium chloride SA (K-DUR/KLOR-CON M) 10 MEQ CR tablet   prochlorperazine (COMPAZINE) 10 MG tablet   rosuvastatin (CRESTOR) 5 MG tablet   senna-docusate (SENOKOT-S;PERICOLACE) 8.6-50 MG per tablet   VENTOLIN  (90 Base) MCG/ACT Inhaler   fentaNYL (DURAGESIC) 12 mcg/hr 72 hr patch       Review of Systems   Constitutional: Negative for chills, diaphoresis and fever.   HENT: Negative for ear pain and sore throat.    Eyes: Negative for pain.   Respiratory: Positive for shortness of breath. Negative for cough, wheezing and stridor.    Cardiovascular: Negative for chest pain.   Gastrointestinal: Negative for abdominal pain, blood in stool, constipation, diarrhea, nausea and vomiting.   Genitourinary: Negative for difficulty urinating, dysuria, frequency and hematuria.    Musculoskeletal: Negative for myalgias.   Skin: Negative for rash and wound.   Psychiatric/Behavioral: The patient is nervous/anxious.    All other systems reviewed and are negative.      Physical Exam   BP: 144/88  Pulse: (!) 10  Heart Rate: 106  Temp: 98.4  F (36.9  C)  Resp: 16  Weight: 65.8 kg (145 lb)  SpO2: 95 %      Physical Exam   Constitutional: He appears well-developed and well-nourished. No distress.   HENT:   Head: Normocephalic and atraumatic.   Eyes: Conjunctivae are normal.   Cardiovascular: Normal rate, regular rhythm and normal heart sounds.  Exam reveals no gallop and no friction rub.    No murmur heard.  Pulmonary/Chest: Effort normal and breath sounds normal. No respiratory distress. He has no wheezes. He has no rales. He exhibits no tenderness.   Neurological: He is alert.   Skin: Skin is warm and dry. No rash noted. He is not diaphoretic. No erythema. No pallor.   Psychiatric: His speech is normal and behavior is normal. His mood appears anxious.   Nursing note and vitals reviewed.      ED Course     ED Course     Procedures         EKG Interpretation:      Interpreted by Bradley Harrison MD  Time reviewed: 1630   Symptoms at time of EKG: shortness of air   Rhythm: normal sinus   Rate: normal  Axis: normal  Ectopy: none  Conduction: normal  ST Segments/ T Waves: No ST-T wave changes  Q Waves: none  Comparison to prior: Unchanged from 06/05/2018    Clinical Impression: normal EKG    Results for orders placed or performed during the hospital encounter of 06/19/18 (from the past 24 hour(s))   CBC with platelets differential   Result Value Ref Range    WBC 4.4 4.0 - 11.0 10e9/L    RBC Count 3.05 (L) 4.4 - 5.9 10e12/L    Hemoglobin 10.0 (L) 13.3 - 17.7 g/dL    Hematocrit 28.7 (L) 40.0 - 53.0 %    MCV 94 78 - 100 fl    MCH 32.8 26.5 - 33.0 pg    MCHC 34.8 31.5 - 36.5 g/dL    RDW 16.0 (H) 10.0 - 15.0 %    Platelet Count 531 (H) 150 - 450 10e9/L    Diff Method Automated Method     %  Neutrophils 34.8 %    % Lymphocytes 34.2 %    % Monocytes 28.0 %    % Eosinophils 1.6 %    % Basophils 0.0 %    % Immature Granulocytes 1.4 %    Nucleated RBCs 0 0 /100    Absolute Neutrophil 1.5 (L) 1.6 - 8.3 10e9/L    Absolute Lymphocytes 1.5 0.8 - 5.3 10e9/L    Absolute Monocytes 1.2 0.0 - 1.3 10e9/L    Absolute Eosinophils 0.1 0.0 - 0.7 10e9/L    Absolute Basophils 0.0 0.0 - 0.2 10e9/L    Abs Immature Granulocytes 0.1 0 - 0.4 10e9/L    Absolute Nucleated RBC 0.0     RBC Morphology Normal     Platelet Estimate       Automated count confirmed.  Platelet morphology is normal.   Basic metabolic panel   Result Value Ref Range    Sodium 134 133 - 144 mmol/L    Potassium 3.7 3.4 - 5.3 mmol/L    Chloride 97 94 - 109 mmol/L    Carbon Dioxide 29 20 - 32 mmol/L    Anion Gap 8 3 - 14 mmol/L    Glucose 103 (H) 70 - 99 mg/dL    Urea Nitrogen 13 7 - 30 mg/dL    Creatinine 1.08 0.66 - 1.25 mg/dL    GFR Estimate 68 >60 mL/min/1.7m2    GFR Estimate If Black 82 >60 mL/min/1.7m2    Calcium 7.9 (L) 8.5 - 10.1 mg/dL   Troponin I   Result Value Ref Range    Troponin I ES <0.015 0.000 - 0.045 ug/L       Medications   ondansetron (ZOFRAN-ODT) ODT tab 4 mg (4 mg Oral Given 6/19/18 1653)   diazepam (VALIUM) tablet 5 mg (5 mg Oral Given 6/19/18 1654)       Assessments & Plan (with Medical Decision Making)     I have reviewed the nursing notes.    I have reviewed the findings, diagnosis, plan and need for follow up with the patient.  Chilo Oneil is a 68 year old male who presents with history of anxiety, coronary artery disease and lung cancer who presents with concerns of anxiety or shortness of breath.  Patient reports onset of symptoms was 2 hours ago and he feels like it might be anxiety but he just wanted to make sure that there is nothing going on his heart or lungs.  Exam as noted above.  Labs ordered including a troponin and this was negative.  Concern for possible acute coronary syndrome versus anxiety.  Unlikely to be  pneumothorax is present with normal vital signs.  Basic metabolic panel and reveals a mildly elevated glucose of 103 calcium slightly low at 7.9.  CBC ordered and reveal a white blood cell count of 4.4 and neutropenic due to chemotherapy.  EKG ordered and no evidence of ischemia.  Patient given a Valium and was responsive to this and reports his symptoms have resolved.  Patient discharged in stable condition and it was recommended that patient follow-up with primary care and oncology of ongoing symptoms of anxiety and how best to manage.    Discharge Medication List as of 6/19/2018  6:29 PM          Final diagnoses:   SOB (shortness of breath)   Anxiety       6/19/2018   [facility] EMERGENCY DEPARTMENT     Belkys Montano, NATALIE CNP  06/19/18 9516

## 2018-06-21 NOTE — PROGRESS NOTES
Clinic Care Coordination Contact  Care Team Conversations    Sw received phone call from Yamile [facility] Elderly Waiver program stating she has been trying to call the pt to set up an appointment with him, but he has not returned her call.  SW did explain that the was in the ED and often gets very anxious prior to his chemo appointments, so he may not be paying much attention to his phone, but that this writer will try to call him as well.    SW placed call to pt, but received a standard voice message stating that the pt is not accepting calls at this time.  SW concerned that pt is out of cell phone minutes, or has his phone shut off.  SW contacted Oncology RN, Rylee, and pt has left their clinic for the day also.    SW to attempt to reach pt tomorrow.    Sofya Unger  Social Work Care Coordinator  [facility]  [phone]

## 2018-06-21 NOTE — MR AVS SNAPSHOT
After Visit Summary   6/21/2018    Chilo Oneil    MRN: [id number]           Patient Information     Date Of Birth          1950        Visit Information        Provider Department      6/21/2018 9:00 AM ROOM 2 [facility]        Today's Diagnoses     Carcinoma, lung, left (H)    -  1       Follow-ups after your visit        Your next 10 appointments already scheduled     Jun 22, 2018 10:00 AM CDT   SHORT with Manish Plasencia MD   [facility] ([facility]    [address] [facility] [address] [facility] [place] [address]   [phone]            Jun 25, 2018 11:00 AM CDT   Level 1 with ROOM 7 [facility] ([facility])    [facility]  [address]  [place] [address]   [phone]            Jul 12, 2018  9:00 AM CDT   Level 5 with ROOM 10 [facility] ([facility])    [facility]  [address]  [facility] [address]   [phone]            Jul 12, 2018  9:30 AM CDT   Return Visit with Mor Mccauley MD   [facility] ([facility])    [facility]  [address]  [place] [address]   [phone]            Jul 16, 2018 11:00 AM CDT   Level 1 with ROOM 10 [facility] ([facility])    [facility]  [address]  [facility] [address]   [phone]            Aug 02, 2018  9:00 AM CDT   Level 5 with ROOM 10 [facility] ([facility])    [facility]  [address]  [place] [address]   [phone]            Aug 06, 2018 11:30 AM CDT   Level 1 with ROOM 3 [facility] ([facility])    [facility]  [address]  [place] [address]   [phone]              Who to contact     If you have  questions or need follow up information about today's clinic visit or your schedule please contact [facility] directly at [phone].  Normal or non-critical lab and imaging results will be communicated to you by MyChart, letter or phone within 4 business days after the clinic has received the results. If you do not hear from us within 7 days, please contact the clinic through MyChart or phone. If you have a critical or abnormal lab result, we will notify you by phone as soon as possible.  Submit refill requests through [facility] or call your pharmacy and they will forward the refill request to us. Please allow 3 business days for your refill to be completed.          Additional Information About Your Visit        Care EveryWhere ID     This is your Care EveryWhere ID. This could be used by other organizations to access your [facility] medical records  [id number]         Blood Pressure from Last 3 Encounters:   06/21/18 166/49   06/19/18 (!) 159/95   06/14/18 165/80    Weight from Last 3 Encounters:   06/21/18 64.5 kg (142 lb 3.2 oz)   06/19/18 65.8 kg (145 lb)   06/14/18 66.2 kg (145 lb 14.4 oz)              We Performed the Following     Basic metabolic panel     CBC with platelets differential     Magnesium          Where to get your medicines      These medications were sent to [facility] [address]  [address] [facility] [address]     Phone:  [phone]     ondansetron 8 MG ODT tab          Primary Care Provider Office Phone # Fax #    Manish Plasencia [phone] [address] [facility] [address]        Goals        General    Financial Wellbeing (pt-stated)     Notes - Note edited  5/1/2018  8:50 AM by Sofya Paulino LSW    Goal Statement: I want to complete my Medical Assistance application  Measure of Success: My Medical Assistance application will be completed  Supportive Steps to Achieve: SW and pt will  finish the remaining paperwork that needs to be completed  Barriers: None identified at this time  Strengths: Pt is motivated as the income increase will assist him greatly  Date to Achieve By: 3 months          Medication 1 (pt-stated)     Notes - Note created  6/11/2018 11:34 AM by Sofya Paulino LSW    Goal Statement: I will learn to take my medications properly   Measure of Success: I will take my medications properly  Supportive Steps to Achieve: I will work with either MTM or RNCC to learn how to take my medications correctly  Barriers: I have so many medications to keep track of it gets confusing.  Strengths: I will use a pill box to organize my medications  Date to Achieve By: 3 months        Pain Management (pt-stated)     Notes - Note edited  5/10/2018  1:35 PM by Sofya Paulino LSW [facility] to discuss with PCP, but would recommend a Palliative Care consult for symptom management.    Pt met with Palliative Care MD yesterday, 5-9-18, and will continue to do so.    Sofya Unger  Social Work Care Coordinator  [facility]  [phone]          Transportation (pt-stated)     Notes - Note edited  6/4/2018  3:10 PM by Sofya Paulino LSW    Goal Statement: Pt needs dependable transportation to get him to his clinic appointments.  Measure of Success: Pt will attend appointments if he has dependable transportation  Supportive Steps to Achieve: SW and pt will work together to secure his MA application will allow him to get transportation benefits.  SW to also provide pt with community resources.  Barriers: Access to pt's bank statements has been a barrier.    Strengths: Pt is motivated to complete the MA application  Date to Achieve By: 3 months          Equal Access to Services     [facility] : Hadii krysta jordan hadasho Soearl, waaxda luqadaha, qaybta kaalmakirit marquez, celina fisher. So [facility] [phone].    ATENCIÓN: Olvin carrera,  tiene a galaviz disposición servicios gratuitos de asistencia lingüística. Yecenia posada [phone].    We comply with applicable federal civil rights laws and [place] laws. We do not discriminate on the basis of race, color, national origin, age, disability, sex, sexual orientation, or gender identity.            Thank you!     Thank you for choosing [facility]  for your care. Our goal is always to provide you with excellent care. Hearing back from our patients is one way we can continue to improve our services. Please take a few minutes to complete the written survey that you may receive in the mail after your visit with us. Thank you!             Your Updated Medication List - Protect others around you: Learn how to safely use, store and throw away your medicines at www.disposemymeds.org.          This list is accurate as of 6/21/18  3:21 PM.  Always use your most recent med list.                   Brand Name Dispense Instructions for use Diagnosis    acetaminophen 325 MG tablet    TYLENOL    100 tablet    Take 2 tablets (650 mg) by mouth every 4 hours as needed for mild pain    Other acute pulmonary embolism without acute cor pulmonale (H), Carcinoma, lung, left (H)       ASPIRIN PO      Take 81 mg by mouth daily        benzonatate 100 MG capsule    TESSALON    42 capsule    Take 1 capsule (100 mg) by mouth 3 times daily as needed for cough    Carcinoma, lung, left (H), Other acute pulmonary embolism without acute cor pulmonale (H)       dexamethasone 4 MG tablet    DECADRON    6 tablet    Take 4 mg (1 tab) by mouth twice daily for 6 doses. Start the evening prior to Infusion.    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)       dronabinol 5 MG capsule    MARINOL    28 capsule    Take 1 capsule (5 mg) by mouth 2 times daily for 14 days    Carcinoma, lung, left (H)       DULoxetine 30 MG EC capsule    CYMBALTA    30 capsule    Take 1 capsule (30 mg) by mouth daily    Single current episode of major  depressive disorder, unspecified depression episode severity       enoxaparin 80 MG/0.8ML injection    LOVENOX    41.4 mL    Inject 0.69 mLs (69 mg) Subcutaneous every 12 hours    Other acute pulmonary embolism without acute cor pulmonale (H)       fentaNYL 12 mcg/hr 72 hr patch    DURAGESIC    30 patch    Place 1 patch onto the skin every 72 hours remove old patch.    Other acute pulmonary embolism without acute cor pulmonale (H)       FOLIC ACID PO      Take 1 mg by mouth daily        HYDROmorphone 2 MG tablet    DILAUDID    80 tablet    Take 1-2 tablets (2-4 mg) by mouth every 3 hours as needed for moderate to severe pain    Other acute pulmonary embolism without acute cor pulmonale (H), Carcinoma, lung, left (H)       LORazepam 0.5 MG tablet    ATIVAN    30 tablet    Take 0.5 mg by mouth every 4 hours as needed for nausea, vomiting, anxiety, or sleep.    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)       losartan 50 MG tablet    COZAAR    90 tablet    Take 1 tablet (50 mg) by mouth daily    Benign essential hypertension       nicotine 14 MG/24HR 24 hr patch    NICODERM CQ    30 patch    Place 1 patch onto the skin daily    Tobacco use disorder       omeprazole 20 MG CR capsule    priLOSEC    90 capsule    Take 1 capsule (20 mg) by mouth daily    Carcinoma, lung, left (H)       ondansetron 8 MG ODT tab    ZOFRAN-ODT    90 tablet    Take 1 tablet (8 mg) by mouth every 8 hours    Chemotherapy induced nausea and vomiting       potassium chloride SA 10 MEQ CR tablet    K-DUR/KLOR-CON M    30 tablet    Take 1 tablet (10 mEq) by mouth daily    Carcinoma, lung, left (H)       prochlorperazine 10 MG tablet    COMPAZINE    30 tablet    Take 0.5 tablets (5 mg) by mouth every 6 hours as needed for nausea or vomiting    Chemotherapy induced nausea and vomiting       rosuvastatin 5 MG tablet    CRESTOR    30 tablet    Take 1 tablet (5 mg) by mouth daily        senna-docusate 8.6-50 MG per tablet    SENOKOT-S;PERICOLACE     100 tablet    Take 2 tablets by mouth 2 times daily    Carcinoma, lung, left (H)       VENTOLIN  (90 Base) MCG/ACT Inhaler   Generic drug:  albuterol     1 Inhaler    Inhale 2 puffs into the lungs every 4 hours as needed for shortness of breath / dyspnea or wheezing

## 2018-06-21 NOTE — NURSING NOTE
"Oncology Rooming Note    June 21, 2018 9:35 AM   Chilo Oneil is a 68 year old male who presents for:    Chief Complaint   Patient presents with     Oncology Clinic Visit     1 week recheck Carcinoma, lung, left , Labs & Chemo today     Initial Vitals: /49 (BP Location: Right arm, Patient Position: Sitting, Cuff Size: Adult Regular)  Pulse 87  Temp 97.6  F (36.4  C) (Tympanic)  Resp 20  Ht 1.905 m (6' 3\")  Wt 64.5 kg (142 lb 3.2 oz)  SpO2 96%  BMI 17.77 kg/m2 Estimated body mass index is 17.77 kg/(m^2) as calculated from the following:    Height as of this encounter: 1.905 m (6' 3\").    Weight as of this encounter: 64.5 kg (142 lb 3.2 oz). Body surface area is 1.85 meters squared.  No Pain (0) Comment: Data Unavailable   No LMP for male patient.  Allergies reviewed: Yes  Medications reviewed: Yes    Medications: Medication refills not needed today.  Pharmacy name entered into [facility]: [facility] [place], [place] - [address]    Clinical concerns: 1 week recheck Carcinoma, lung, left , Labs & Chemo today.    PATIENT IN BAY 8.     7 minutes for nursing intake (face to face time)     Michela Hirsch CMA              "

## 2018-06-21 NOTE — PATIENT INSTRUCTIONS
We would like to see you back July 12th with provider visit/laba and chemotherapy.     Your prescription has been sent to:  When you are in need of a refill, please call your pharmacy and they will send us a request.      Copy of appointments, and after visit summary (AVS) given to patient.      If you have any questions during business hours (M-F 8 AM- 4PM), please call Erica Elliott RN, BSN, OCN Oncology Hematology /Breast Cancer Navigator at [facility] [phone].       For questions after business hours, or on holidays/weekends, please call our after hours Nurse Triage line [phone]. Thank you.

## 2018-06-21 NOTE — PROGRESS NOTES
Hematology/ Oncology Follow-up Visit:  Jun 21, 2018    Reason for Visit:   Chief Complaint   Patient presents with     Oncology Clinic Visit     1 week recheck Carcinoma, lung, left , Labs & Chemo today       Oncologic History:  Carcinoma, lung, left (H)  Patient presented with L side shoulder and has been traveling  to L/side about  to the whole L/side upper back and L side of chest for about 3 week he has had the pain has been taking aleve.  Subsequently went on to have a CT scan done.  Which showed 1.6 cm nodular infiltrate in the left upper lobe.  The lesion appear spiculated and worrisome for lung cancer.  There is a second 0.7 cm indeterminate nodule in the lingular portion of the left lung.  Subsequently the patient went on to have CT-guided biopsy.  The pathology came back showing moderately differentiated adenocarcinoma. He had subsequent noted left thoracoscopic left pleural biopsies and left lobe which resection on April 11, 2018.  The surgical pathology came back with pleural biopsies came back positive for invasive adenocarcinoma with parietal pleural invasion.  The wedge resection came back with adenocarcinoma with acinar patterns of growth moderately differentiated the tumor size is 1.1 and 0.3 cm into 2 separate tumor nodules.  Visceropleural invasion was identified the margins were negative lymphovascular invasion was not identified large venous artery involvement was not identified as well.  The pathologic staging of pT3N0.        Interval History:  Patient returning today for follow-up.  He concluded 2 cycles of cisplatin and Alimta.  He has been tolerating chemotherapy was difficulty because of nausea and vomiting.  He was recently diagnosed with pulmonary embolism and he has been on Lovenox injections.  Shortness of breath has been improving.  Continues to experience shortness of breath especially with minimal exertion.  He denies any chest pain or cough or wheezing.    Review Of  Systems:  Constitutional: Negative for fever, chills, and night sweats.  Skin: negative.  Eyes: negative.  Ears/Nose/Throat: negative.  Respiratory: No shortness of breath, dyspnea on exertion, cough, or hemoptysis.  Cardiovascular: negative.  Gastrointestinal: Nausea and vomiting as mentioned above  Genitourinary: negative.  Musculoskeletal: negative.  Neurologic: negative.  Psychiatric: negative.  Hematologic/Lymphatic/Immunologic: negative.  Endocrine: negative.    All other ROS negative unless mentioned in interval history.    Past medical, social, surgical, and family histories reviewed.    Allergies:  Allergies as of 06/21/2018 - Alcides as Reviewed 06/21/2018   Allergen Reaction Noted     Cipro [ciprofloxacin] Swelling 06/22/2009       Current Medications:  Current Outpatient Prescriptions   Medication Sig Dispense Refill     acetaminophen (TYLENOL) 325 MG tablet Take 2 tablets (650 mg) by mouth every 4 hours as needed for mild pain 100 tablet      ASPIRIN PO Take 81 mg by mouth daily        benzonatate (TESSALON) 100 MG capsule Take 1 capsule (100 mg) by mouth 3 times daily as needed for cough 42 capsule 0     dexamethasone (DECADRON) 4 MG tablet Take 4 mg (1 tab) by mouth twice daily for 6 doses. Start the evening prior to Infusion. 6 tablet 3     dronabinol (MARINOL) 5 MG capsule Take 1 capsule (5 mg) by mouth 2 times daily for 14 days 28 capsule 0     DULoxetine (CYMBALTA) 30 MG EC capsule Take 1 capsule (30 mg) by mouth daily 30 capsule 1     enoxaparin (LOVENOX) 80 MG/0.8ML injection Inject 0.69 mLs (69 mg) Subcutaneous every 12 hours 41.4 mL 1     fentaNYL (DURAGESIC) 12 mcg/hr 72 hr patch Place 1 patch onto the skin every 72 hours remove old patch. 30 patch 0     FOLIC ACID PO Take 1 mg by mouth daily       HYDROmorphone (DILAUDID) 2 MG tablet Take 1-2 tablets (2-4 mg) by mouth every 3 hours as needed for moderate to severe pain 80 tablet 0     LORazepam (ATIVAN) 0.5 MG tablet Take 0.5 mg by mouth every  "4 hours as needed for nausea, vomiting, anxiety, or sleep. 30 tablet 5     losartan (COZAAR) 50 MG tablet Take 1 tablet (50 mg) by mouth daily 90 tablet 3     nicotine (NICODERM CQ) 14 MG/24HR 24 hr patch Place 1 patch onto the skin daily 30 patch 1     omeprazole (PRILOSEC) 20 MG CR capsule Take 1 capsule (20 mg) by mouth daily 90 capsule 3     ondansetron (ZOFRAN-ODT) 8 MG ODT tab Take 1 tablet (8 mg) by mouth every 8 hours 90 tablet 1     potassium chloride SA (K-DUR/KLOR-CON M) 10 MEQ CR tablet Take 1 tablet (10 mEq) by mouth daily 30 tablet 1     prochlorperazine (COMPAZINE) 10 MG tablet Take 0.5 tablets (5 mg) by mouth every 6 hours as needed for nausea or vomiting 30 tablet 1     rosuvastatin (CRESTOR) 5 MG tablet Take 1 tablet (5 mg) by mouth daily 30 tablet 3     senna-docusate (SENOKOT-S;PERICOLACE) 8.6-50 MG per tablet Take 2 tablets by mouth 2 times daily 100 tablet 0     VENTOLIN  (90 Base) MCG/ACT Inhaler Inhale 2 puffs into the lungs every 4 hours as needed for shortness of breath / dyspnea or wheezing 1 Inhaler 1        Physical Exam:  /49 (BP Location: Right arm, Patient Position: Sitting, Cuff Size: Adult Regular)  Pulse 87  Temp 97.6  F (36.4  C) (Tympanic)  Resp 20  Ht 1.905 m (6' 3\")  Wt 64.5 kg (142 lb 3.2 oz)  SpO2 96%  BMI 17.77 kg/m2  Wt Readings from Last 12 Encounters:   06/21/18 64.5 kg (142 lb 3.2 oz)   06/19/18 65.8 kg (145 lb)   06/14/18 66.2 kg (145 lb 14.4 oz)   06/11/18 67.9 kg (149 lb 9.6 oz)   05/31/18 69.1 kg (152 lb 6.4 oz)   05/18/18 70.8 kg (156 lb 1.6 oz)   05/15/18 70.3 kg (155 lb)   05/10/18 74.5 kg (164 lb 4.8 oz)   05/09/18 70.3 kg (155 lb)   05/09/18 73.5 kg (162 lb)   05/09/18 73.8 kg (162 lb 12.8 oz)   04/30/18 73.9 kg (163 lb)     ECOG performance status: 1  GENERAL APPEARANCE: Healthy, alert and in no acute distress.  HEENT: Sclerae anicteric. PERRLA. Oropharynx without ulcers, lesions, or thrush.  NECK: Supple. No asymmetry or " masses.  LYMPHATICS: No palpable cervical, supraclavicular, axillary, or inguinal lymphadenopathy.  RESP: Lungs clear to auscultation bilaterally without rales, rhonchi or wheezes.  CARDIOVASCULAR: Regular rate and rhythm. Normal S1, S2; no S3 or S4. No murmur, gallop, or rub.  ABDOMEN: Soft, nontender. Bowel sounds normal. No palpable organomegaly or masses.  MUSCULOSKELETAL: Extremities without gross deformities noted. No edema of bilateral lower extremities.  SKIN: No suspicious lesions or rashes.  NEURO: Alert and oriented x 3. Cranial nerves II-XII grossly intact.  PSYCHIATRIC: Mentation and affect appear normal.    Laboratory/Imaging Studies:  Infusion Therapy Visit on 06/21/2018   Component Date Value Ref Range Status     WBC 06/21/2018 6.2  4.0 - 11.0 10e9/L Final     RBC Count 06/21/2018 3.10* 4.4 - 5.9 10e12/L Final     Hemoglobin 06/21/2018 10.1* 13.3 - 17.7 g/dL Final     Hematocrit 06/21/2018 29.3* 40.0 - 53.0 % Final     MCV 06/21/2018 95  78 - 100 fl Final     MCH 06/21/2018 32.6  26.5 - 33.0 pg Final     MCHC 06/21/2018 34.5  31.5 - 36.5 g/dL Final     RDW 06/21/2018 16.6* 10.0 - 15.0 % Final     Platelet Count 06/21/2018 607* 150 - 450 10e9/L Final     Diff Method 06/21/2018 Automated Method   Final     % Neutrophils 06/21/2018 48.9  % Final     % Lymphocytes 06/21/2018 25.0  % Final     % Monocytes 06/21/2018 23.6  % Final     % Eosinophils 06/21/2018 1.0  % Final     % Basophils 06/21/2018 0.2  % Final     % Immature Granulocytes 06/21/2018 1.3  % Final     Nucleated RBCs 06/21/2018 0  0 /100 Final     Absolute Neutrophil 06/21/2018 3.0  1.6 - 8.3 10e9/L Final     Absolute Lymphocytes 06/21/2018 1.5  0.8 - 5.3 10e9/L Final     Absolute Monocytes 06/21/2018 1.5* 0.0 - 1.3 10e9/L Final     Absolute Eosinophils 06/21/2018 0.1  0.0 - 0.7 10e9/L Final     Absolute Basophils 06/21/2018 0.0  0.0 - 0.2 10e9/L Final     Abs Immature Granulocytes 06/21/2018 0.1  0 - 0.4 10e9/L Final     Absolute Nucleated  RBC 06/21/2018 0.0   Final     RBC Morphology 06/21/2018 Normal   Final     Platelet Estimate 06/21/2018 Automated count confirmed.  Platelet morphology is normal.   Final     Sodium 06/21/2018 132* 133 - 144 mmol/L Final     Potassium 06/21/2018 3.7  3.4 - 5.3 mmol/L Final     Chloride 06/21/2018 96  94 - 109 mmol/L Final     Carbon Dioxide 06/21/2018 28  20 - 32 mmol/L Final     Anion Gap 06/21/2018 8  3 - 14 mmol/L Final     Glucose 06/21/2018 103* 70 - 99 mg/dL Final     Urea Nitrogen 06/21/2018 11  7 - 30 mg/dL Final     Creatinine 06/21/2018 1.10  0.66 - 1.25 mg/dL Final     GFR Estimate 06/21/2018 67  >60 mL/min/1.7m2 Final    Non  GFR Calc     GFR Estimate If Black 06/21/2018 81  >60 mL/min/1.7m2 Final    African American GFR Calc     Calcium 06/21/2018 8.5  8.5 - 10.1 mg/dL Final     Magnesium 06/21/2018 1.2* 1.6 - 2.3 mg/dL Final   Office Visit on 06/11/2018   Component Date Value Ref Range Status     WBC 06/11/2018 4.5  4.0 - 11.0 10e9/L Final     RBC Count 06/11/2018 3.07* 4.4 - 5.9 10e12/L Final     Hemoglobin 06/11/2018 10.0* 13.3 - 17.7 g/dL Final     Hematocrit 06/11/2018 28.5* 40.0 - 53.0 % Final     MCV 06/11/2018 93  78 - 100 fl Final     MCH 06/11/2018 32.6  26.5 - 33.0 pg Final     MCHC 06/11/2018 35.1  31.5 - 36.5 g/dL Final     RDW 06/11/2018 14.6  10.0 - 15.0 % Final     Platelet Count 06/11/2018 108* 150 - 450 10e9/L Final     Diff Method 06/11/2018 Automated Method   Final     % Neutrophils 06/11/2018 64.3  % Final     % Lymphocytes 06/11/2018 26.3  % Final     % Monocytes 06/11/2018 8.3  % Final     % Eosinophils 06/11/2018 0.0  % Final     % Basophils 06/11/2018 0.2  % Final     % Immature Granulocytes 06/11/2018 0.9  % Final     Nucleated RBCs 06/11/2018 0  0 /100 Final     Absolute Neutrophil 06/11/2018 2.9  1.6 - 8.3 10e9/L Final     Absolute Lymphocytes 06/11/2018 1.2  0.8 - 5.3 10e9/L Final     Absolute Monocytes 06/11/2018 0.4  0.0 - 1.3 10e9/L Final     Absolute  Eosinophils 06/11/2018 0.0  0.0 - 0.7 10e9/L Final     Absolute Basophils 06/11/2018 0.0  0.0 - 0.2 10e9/L Final     Abs Immature Granulocytes 06/11/2018 0.0  0 - 0.4 10e9/L Final     Absolute Nucleated RBC 06/11/2018 0.0   Final        Recent Results (from the past 744 hour(s))   CT Chest Pulmonary Embolism w Contrast   Result Value    Radiologist flags Pulmonary embolism (AA)    Narrative    CT CHEST WITH CONTRAST   6/5/2018 6:53 PM     HISTORY: Dyspnea.    COMPARISON: 4/3/2018.    TECHNIQUE: Following the uneventful administration of 69 mL Isovue  -370 intravenous contrast, helical sections were acquired through the  lungs according to the pulmonary embolism protocol. Coronal  reconstructions were generated. Radiation dose for this scan was  reduced using automated exposure control, adjustment of the mA and/or  kV according to the patient's size, or iterative reconstruction  technique.    FINDINGS: A filling defect is present within the superior segmental  pulmonary artery of the right lower lobe (series 4 image 76),  consistent with an acute pulmonary embolus. No other visualized  pulmonary emboli. The thoracic aorta is normal in caliber without  dissection. Atherosclerotic calcification in the thoracic aorta and  coronary arteries.    Mild emphysematous changes in the lungs. Parenchymal scarring in the  left lung apex and left lung base. 2.3 x 1.3 cm irregular nodular  opacity in the periphery of the anterior aspect of the mid left lung  (series 5 image 69), unchanged. A few nonspecific tiny nodular  opacities scattered within the lungs, not convincingly changed. No  pleural or pericardial effusion. A few nonspecific borderline enlarged  mediastinal and bilateral hilar lymph nodes, unchanged. Left anterior  chest wall port with catheter entering the left subclavian vein and  distal tip in the superior vena cava.    Scan through the upper abdomen is significant for nonspecific mild  diffuse bilateral adrenal  thickening. 2 cm cyst in the upper pole of  the right kidney. Probable sludge in the gallbladder.      Impression    IMPRESSION:   1. Acute pulmonary embolus in the right lower lobe. No other  visualized pulmonary emboli.  2. 2.3 cm irregular nodule that is suspicious for neoplasm in the mid  left lung, unchanged since 4/3/2018.       [Critical Result: Pulmonary embolism]    Finding was identified on 6/5/2018 6:59 PM.     Dr. Slade was contacted by me on 6/5/2018 7:10 PM and verbalized  understanding of the critical result.     JACQUI NEGRO MD       Assessment and plan:    (C34.92) Carcinoma, lung, left (H)  (primary encounter diagnosis)  Patient will proceed with cycle #3 of chemotherapy with cisplatin and Alimta according to the treatment plan.  I will see the patient again in 3 weeks or sooner if there are new developments or concerns.    (I10) Benign essential hypertension  Blood pressure is currently well-controlled on Cozaar 50 mg orally daily.    (K21.9) Gastroesophageal reflux disease without esophagitis  She currently on omeprazole 20 mg orally daily.    (I26.99) Other pulmonary embolism without acute cor pulmonale, unspecified chronicity (H)  Patient will continue on Lovenox twice daily injections.    (R11.2,  T45.1X5A) Chemotherapy induced nausea and vomiting  Nausea is currently well-controlled with the current regimen.    (F17.200) Tobacco use disorder  I strongly emphasized the importance of stopping smoking.      The patient is ready to learn, no apparent learning barriers were identified.  Diagnosis and treatment plans were explained to the patient. The patient expressed understanding of the content. The patient asked appropriate questions. The patient questions were answered to his satisfaction.    Chart documentation with Dragon Voice recognition Software. Although reviewed after completion, some words and grammatical errors may remain.

## 2018-06-21 NOTE — PROGRESS NOTES
Infusion Nursing Note:  Chilo Oneil presents today for Cisplatin, Alimta   Patient seen by provider today: Yes: Dr. Mccauley   present during visit today: Not Applicable.    Note: Cisplat dose reduced today due to wt. loss.  Mg replaced per electrolyte protocol.     Intravenous Access:  Implanted Port.    Treatment Conditions:  Results reviewed, labs MET treatment parameters, ok to proceed with treatment.      Post Infusion Assessment:  Patient tolerated infusion without incident.    Discharge Plan:   Patient discharged in stable condition accompanied by: edward Meng RN

## 2018-06-21 NOTE — LETTER
6/21/2018         RE: Chilo Oneil  [address]  [place] [address]        Dear Colleague,    Thank you for referring your patient, Chilo Oneil, to the [facility]. Please see a copy of my visit note below.    Hematology/ Oncology Follow-up Visit:  Jun 21, 2018    Reason for Visit:   Chief Complaint   Patient presents with     Oncology Clinic Visit     1 week recheck Carcinoma, lung, left , Labs & Chemo today       Oncologic History:  Carcinoma, lung, left (H)  Patient presented with L side shoulder and has been traveling  to L/side about  to the whole L/side upper back and L side of chest for about 3 week he has had the pain has been taking aleve.  Subsequently went on to have a CT scan done.  Which showed 1.6 cm nodular infiltrate in the left upper lobe.  The lesion appear spiculated and worrisome for lung cancer.  There is a second 0.7 cm indeterminate nodule in the lingular portion of the left lung.  Subsequently the patient went on to have CT-guided biopsy.  The pathology came back showing moderately differentiated adenocarcinoma. He had subsequent noted left thoracoscopic left pleural biopsies and left lobe which resection on April 11, 2018.  The surgical pathology came back with pleural biopsies came back positive for invasive adenocarcinoma with parietal pleural invasion.  The wedge resection came back with adenocarcinoma with acinar patterns of growth moderately differentiated the tumor size is 1.1 and 0.3 cm into 2 separate tumor nodules.  Visceropleural invasion was identified the margins were negative lymphovascular invasion was not identified large venous artery involvement was not identified as well.  The pathologic staging of pT3N0.        Interval History:  Patient returning today for follow-up.  He concluded 2 cycles of cisplatin and Alimta.  He has been tolerating chemotherapy was difficulty because of nausea and vomiting.  He was recently diagnosed with pulmonary embolism  and he has been on Lovenox injections.  Shortness of breath has been improving.  Continues to experience shortness of breath especially with minimal exertion.  He denies any chest pain or cough or wheezing.    Review Of Systems:  Constitutional: Negative for fever, chills, and night sweats.  Skin: negative.  Eyes: negative.  Ears/Nose/Throat: negative.  Respiratory: No shortness of breath, dyspnea on exertion, cough, or hemoptysis.  Cardiovascular: negative.  Gastrointestinal: Nausea and vomiting as mentioned above  Genitourinary: negative.  Musculoskeletal: negative.  Neurologic: negative.  Psychiatric: negative.  Hematologic/Lymphatic/Immunologic: negative.  Endocrine: negative.    All other ROS negative unless mentioned in interval history.    Past medical, social, surgical, and family histories reviewed.    Allergies:  Allergies as of 06/21/2018 - Alcides as Reviewed 06/21/2018   Allergen Reaction Noted     Cipro [ciprofloxacin] Swelling 06/22/2009       Current Medications:  Current Outpatient Prescriptions   Medication Sig Dispense Refill     acetaminophen (TYLENOL) 325 MG tablet Take 2 tablets (650 mg) by mouth every 4 hours as needed for mild pain 100 tablet      ASPIRIN PO Take 81 mg by mouth daily        benzonatate (TESSALON) 100 MG capsule Take 1 capsule (100 mg) by mouth 3 times daily as needed for cough 42 capsule 0     dexamethasone (DECADRON) 4 MG tablet Take 4 mg (1 tab) by mouth twice daily for 6 doses. Start the evening prior to Infusion. 6 tablet 3     dronabinol (MARINOL) 5 MG capsule Take 1 capsule (5 mg) by mouth 2 times daily for 14 days 28 capsule 0     DULoxetine (CYMBALTA) 30 MG EC capsule Take 1 capsule (30 mg) by mouth daily 30 capsule 1     enoxaparin (LOVENOX) 80 MG/0.8ML injection Inject 0.69 mLs (69 mg) Subcutaneous every 12 hours 41.4 mL 1     fentaNYL (DURAGESIC) 12 mcg/hr 72 hr patch Place 1 patch onto the skin every 72 hours remove old patch. 30 patch 0     FOLIC ACID PO Take 1 mg  "by mouth daily       HYDROmorphone (DILAUDID) 2 MG tablet Take 1-2 tablets (2-4 mg) by mouth every 3 hours as needed for moderate to severe pain 80 tablet 0     LORazepam (ATIVAN) 0.5 MG tablet Take 0.5 mg by mouth every 4 hours as needed for nausea, vomiting, anxiety, or sleep. 30 tablet 5     losartan (COZAAR) 50 MG tablet Take 1 tablet (50 mg) by mouth daily 90 tablet 3     nicotine (NICODERM CQ) 14 MG/24HR 24 hr patch Place 1 patch onto the skin daily 30 patch 1     omeprazole (PRILOSEC) 20 MG CR capsule Take 1 capsule (20 mg) by mouth daily 90 capsule 3     ondansetron (ZOFRAN-ODT) 8 MG ODT tab Take 1 tablet (8 mg) by mouth every 8 hours 90 tablet 1     potassium chloride SA (K-DUR/KLOR-CON M) 10 MEQ CR tablet Take 1 tablet (10 mEq) by mouth daily 30 tablet 1     prochlorperazine (COMPAZINE) 10 MG tablet Take 0.5 tablets (5 mg) by mouth every 6 hours as needed for nausea or vomiting 30 tablet 1     rosuvastatin (CRESTOR) 5 MG tablet Take 1 tablet (5 mg) by mouth daily 30 tablet 3     senna-docusate (SENOKOT-S;PERICOLACE) 8.6-50 MG per tablet Take 2 tablets by mouth 2 times daily 100 tablet 0     VENTOLIN  (90 Base) MCG/ACT Inhaler Inhale 2 puffs into the lungs every 4 hours as needed for shortness of breath / dyspnea or wheezing 1 Inhaler 1        Physical Exam:  /49 (BP Location: Right arm, Patient Position: Sitting, Cuff Size: Adult Regular)  Pulse 87  Temp 97.6  F (36.4  C) (Tympanic)  Resp 20  Ht 1.905 m (6' 3\")  Wt 64.5 kg (142 lb 3.2 oz)  SpO2 96%  BMI 17.77 kg/m2  Wt Readings from Last 12 Encounters:   06/21/18 64.5 kg (142 lb 3.2 oz)   06/19/18 65.8 kg (145 lb)   06/14/18 66.2 kg (145 lb 14.4 oz)   06/11/18 67.9 kg (149 lb 9.6 oz)   05/31/18 69.1 kg (152 lb 6.4 oz)   05/18/18 70.8 kg (156 lb 1.6 oz)   05/15/18 70.3 kg (155 lb)   05/10/18 74.5 kg (164 lb 4.8 oz)   05/09/18 70.3 kg (155 lb)   05/09/18 73.5 kg (162 lb)   05/09/18 73.8 kg (162 lb 12.8 oz)   04/30/18 73.9 kg (163 lb) "     ECOG performance status: 1  GENERAL APPEARANCE: Healthy, alert and in no acute distress.  HEENT: Sclerae anicteric. PERRLA. Oropharynx without ulcers, lesions, or thrush.  NECK: Supple. No asymmetry or masses.  LYMPHATICS: No palpable cervical, supraclavicular, axillary, or inguinal lymphadenopathy.  RESP: Lungs clear to auscultation bilaterally without rales, rhonchi or wheezes.  CARDIOVASCULAR: Regular rate and rhythm. Normal S1, S2; no S3 or S4. No murmur, gallop, or rub.  ABDOMEN: Soft, nontender. Bowel sounds normal. No palpable organomegaly or masses.  MUSCULOSKELETAL: Extremities without gross deformities noted. No edema of bilateral lower extremities.  SKIN: No suspicious lesions or rashes.  NEURO: Alert and oriented x 3. Cranial nerves II-XII grossly intact.  PSYCHIATRIC: Mentation and affect appear normal.    Laboratory/Imaging Studies:  Infusion Therapy Visit on 06/21/2018   Component Date Value Ref Range Status     WBC 06/21/2018 6.2  4.0 - 11.0 10e9/L Final     RBC Count 06/21/2018 3.10* 4.4 - 5.9 10e12/L Final     Hemoglobin 06/21/2018 10.1* 13.3 - 17.7 g/dL Final     Hematocrit 06/21/2018 29.3* 40.0 - 53.0 % Final     MCV 06/21/2018 95  78 - 100 fl Final     MCH 06/21/2018 32.6  26.5 - 33.0 pg Final     MCHC 06/21/2018 34.5  31.5 - 36.5 g/dL Final     RDW 06/21/2018 16.6* 10.0 - 15.0 % Final     Platelet Count 06/21/2018 607* 150 - 450 10e9/L Final     Diff Method 06/21/2018 Automated Method   Final     % Neutrophils 06/21/2018 48.9  % Final     % Lymphocytes 06/21/2018 25.0  % Final     % Monocytes 06/21/2018 23.6  % Final     % Eosinophils 06/21/2018 1.0  % Final     % Basophils 06/21/2018 0.2  % Final     % Immature Granulocytes 06/21/2018 1.3  % Final     Nucleated RBCs 06/21/2018 0  0 /100 Final     Absolute Neutrophil 06/21/2018 3.0  1.6 - 8.3 10e9/L Final     Absolute Lymphocytes 06/21/2018 1.5  0.8 - 5.3 10e9/L Final     Absolute Monocytes 06/21/2018 1.5* 0.0 - 1.3 10e9/L Final      Absolute Eosinophils 06/21/2018 0.1  0.0 - 0.7 10e9/L Final     Absolute Basophils 06/21/2018 0.0  0.0 - 0.2 10e9/L Final     Abs Immature Granulocytes 06/21/2018 0.1  0 - 0.4 10e9/L Final     Absolute Nucleated RBC 06/21/2018 0.0   Final     RBC Morphology 06/21/2018 Normal   Final     Platelet Estimate 06/21/2018 Automated count confirmed.  Platelet morphology is normal.   Final     Sodium 06/21/2018 132* 133 - 144 mmol/L Final     Potassium 06/21/2018 3.7  3.4 - 5.3 mmol/L Final     Chloride 06/21/2018 96  94 - 109 mmol/L Final     Carbon Dioxide 06/21/2018 28  20 - 32 mmol/L Final     Anion Gap 06/21/2018 8  3 - 14 mmol/L Final     Glucose 06/21/2018 103* 70 - 99 mg/dL Final     Urea Nitrogen 06/21/2018 11  7 - 30 mg/dL Final     Creatinine 06/21/2018 1.10  0.66 - 1.25 mg/dL Final     GFR Estimate 06/21/2018 67  >60 mL/min/1.7m2 Final    Non  GFR Calc     GFR Estimate If Black 06/21/2018 81  >60 mL/min/1.7m2 Final    African American GFR Calc     Calcium 06/21/2018 8.5  8.5 - 10.1 mg/dL Final     Magnesium 06/21/2018 1.2* 1.6 - 2.3 mg/dL Final   Office Visit on 06/11/2018   Component Date Value Ref Range Status     WBC 06/11/2018 4.5  4.0 - 11.0 10e9/L Final     RBC Count 06/11/2018 3.07* 4.4 - 5.9 10e12/L Final     Hemoglobin 06/11/2018 10.0* 13.3 - 17.7 g/dL Final     Hematocrit 06/11/2018 28.5* 40.0 - 53.0 % Final     MCV 06/11/2018 93  78 - 100 fl Final     MCH 06/11/2018 32.6  26.5 - 33.0 pg Final     MCHC 06/11/2018 35.1  31.5 - 36.5 g/dL Final     RDW 06/11/2018 14.6  10.0 - 15.0 % Final     Platelet Count 06/11/2018 108* 150 - 450 10e9/L Final     Diff Method 06/11/2018 Automated Method   Final     % Neutrophils 06/11/2018 64.3  % Final     % Lymphocytes 06/11/2018 26.3  % Final     % Monocytes 06/11/2018 8.3  % Final     % Eosinophils 06/11/2018 0.0  % Final     % Basophils 06/11/2018 0.2  % Final     % Immature Granulocytes 06/11/2018 0.9  % Final     Nucleated RBCs 06/11/2018 0  0 /100  Final     Absolute Neutrophil 06/11/2018 2.9  1.6 - 8.3 10e9/L Final     Absolute Lymphocytes 06/11/2018 1.2  0.8 - 5.3 10e9/L Final     Absolute Monocytes 06/11/2018 0.4  0.0 - 1.3 10e9/L Final     Absolute Eosinophils 06/11/2018 0.0  0.0 - 0.7 10e9/L Final     Absolute Basophils 06/11/2018 0.0  0.0 - 0.2 10e9/L Final     Abs Immature Granulocytes 06/11/2018 0.0  0 - 0.4 10e9/L Final     Absolute Nucleated RBC 06/11/2018 0.0   Final        Recent Results (from the past 744 hour(s))   CT Chest Pulmonary Embolism w Contrast   Result Value    Radiologist flags Pulmonary embolism (AA)    Narrative    CT CHEST WITH CONTRAST   6/5/2018 6:53 PM     HISTORY: Dyspnea.    COMPARISON: 4/3/2018.    TECHNIQUE: Following the uneventful administration of 69 mL Isovue  -370 intravenous contrast, helical sections were acquired through the  lungs according to the pulmonary embolism protocol. Coronal  reconstructions were generated. Radiation dose for this scan was  reduced using automated exposure control, adjustment of the mA and/or  kV according to the patient's size, or iterative reconstruction  technique.    FINDINGS: A filling defect is present within the superior segmental  pulmonary artery of the right lower lobe (series 4 image 76),  consistent with an acute pulmonary embolus. No other visualized  pulmonary emboli. The thoracic aorta is normal in caliber without  dissection. Atherosclerotic calcification in the thoracic aorta and  coronary arteries.    Mild emphysematous changes in the lungs. Parenchymal scarring in the  left lung apex and left lung base. 2.3 x 1.3 cm irregular nodular  opacity in the periphery of the anterior aspect of the mid left lung  (series 5 image 69), unchanged. A few nonspecific tiny nodular  opacities scattered within the lungs, not convincingly changed. No  pleural or pericardial effusion. A few nonspecific borderline enlarged  mediastinal and bilateral hilar lymph nodes, unchanged. Left  anterior  chest wall port with catheter entering the left subclavian vein and  distal tip in the superior vena cava.    Scan through the upper abdomen is significant for nonspecific mild  diffuse bilateral adrenal thickening. 2 cm cyst in the upper pole of  the right kidney. Probable sludge in the gallbladder.      Impression    IMPRESSION:   1. Acute pulmonary embolus in the right lower lobe. No other  visualized pulmonary emboli.  2. 2.3 cm irregular nodule that is suspicious for neoplasm in the mid  left lung, unchanged since 4/3/2018.       [Critical Result: Pulmonary embolism]    Finding was identified on 6/5/2018 6:59 PM.     Dr. Slade was contacted by me on 6/5/2018 7:10 PM and verbalized  understanding of the critical result.     JACQUI NEGRO MD       Assessment and plan:    (C34.92) Carcinoma, lung, left (H)  (primary encounter diagnosis)  Patient will proceed with cycle #3 of chemotherapy with cisplatin and Alimta according to the treatment plan.  I will see the patient again in 3 weeks or sooner if there are new developments or concerns.    (I10) Benign essential hypertension  Blood pressure is currently well-controlled on Cozaar 50 mg orally daily.    (K21.9) Gastroesophageal reflux disease without esophagitis  She currently on omeprazole 20 mg orally daily.    (I26.99) Other pulmonary embolism without acute cor pulmonale, unspecified chronicity (H)  Patient will continue on Lovenox twice daily injections.    (R11.2,  T45.1X5A) Chemotherapy induced nausea and vomiting  Nausea is currently well-controlled with the current regimen.    (F17.200) Tobacco use disorder  I strongly emphasized the importance of stopping smoking.      The patient is ready to learn, no apparent learning barriers were identified.  Diagnosis and treatment plans were explained to the patient. The patient expressed understanding of the content. The patient asked appropriate questions. The patient questions were answered to his  satisfaction.    Chart documentation with Dragon Voice recognition Software. Although reviewed after completion, some words and grammatical errors may remain.    Again, thank you for allowing me to participate in the care of your patient.        Sincerely,        Mor Mccauley MD

## 2018-06-21 NOTE — MR AVS SNAPSHOT
After Visit Summary   6/21/2018    Chilo Oneil    MRN: [id number]           Patient Information     Date Of Birth          1950        Visit Information        Provider Department      6/21/2018 9:30 AM Mor Mccauley MD [facility] ONCOLOGY      Today's Diagnoses     Carcinoma, lung, left (H)    -  1    Benign essential hypertension        Hyperlipidemia LDL goal <100        Gastroesophageal reflux disease without esophagitis        Other pulmonary embolism without acute cor pulmonale, unspecified chronicity (H)        Chemotherapy induced nausea and vomiting        Tobacco use disorder          Care Instructions    We would like to see you back July 12th with provider visit/laba and chemotherapy.     Your prescription has been sent to:  When you are in need of a refill, please call your pharmacy and they will send us a request.      Copy of appointments, and after visit summary (AVS) given to patient.      If you have any questions during business hours (M-F 8 AM- 4PM), please call Erica Elliott RN, BSN, OCN Oncology Hematology /Breast Cancer Navigator at [facility] [phone].       For questions after business hours, or on holidays/weekends, please call our after hours Nurse Triage line [phone]. Thank you.            Follow-ups after your visit        Follow-up notes from your care team     Return in about 3 weeks (around 7/12/2018) for Schedule for chemotherapy as per treatment plan.      Your next 10 appointments already scheduled     Jun 22, 2018 10:00 AM CDT   SHORT with Manish Plasencia MD   [facility]    [address]  [place] [address]   [phone]            Jun 25, 2018 11:00 AM CDT   Level 1 with ROOM 7 [facility] Infusion ([facility])    n [facility]  [address]  [place] [address]    [phone]            Jul 12, 2018  9:00 AM CDT   Level 5 with ROOM 10 [facility] Infusion ([facility])    [facility]  [address]  [facility] [address]   [phone]            Jul 12, 2018  9:30 AM CDT   Return Visit with Mor Mccauley MD   [facility] ([facility])    [facility]  [address]  [facility] [address]   [phone]            Jul 16, 2018 11:00 AM CDT   Level 1 with ROOM 10 [facility] ([facility])    [facility]  [address]  [facility] [address]   [phone]            Aug 02, 2018  9:00 AM CDT   Level 5 with ROOM 10 [facility] ([facility])    [facility]  [address]  [facility] [address]   [phone]            Aug 06, 2018 11:30 AM CDT   Level 1 with ROOM 3 [facility] ([facility])    [facility]  [address]  [facility] [address]   [phone]              Who to contact     If you have questions or need follow up information about today's clinic visit or your schedule please contact [facility] directly at [phone].  Normal or non-critical lab and imaging results will be communicated to you by MyChart, letter or phone within 4 business days after the clinic has received the results. If you do not hear from us within 7 days, please contact the clinic through MyChart or phone. If you have a critical or abnormal lab result, we will notify you by phone as soon as possible.  Submit refill requests through [employer] or call your pharmacy and they will forward the refill request to us. Please allow 3 business days for your refill to be completed.          Additional Information About Your Visit        Care EveryWhere ID     This is your Care EveryWhere ID. This  "could be used by other organizations to access your [place] medical records  [id number]        Your Vitals Were     Pulse Temperature Respirations Height Pulse Oximetry BMI (Body Mass Index)    87 97.6  F (36.4  C) (Tympanic) 20 1.905 m (6' 3\") 96% 17.77 kg/m2       Blood Pressure from Last 3 Encounters:   06/21/18 166/49   06/19/18 (!) 159/95   06/14/18 165/80    Weight from Last 3 Encounters:   06/21/18 64.5 kg (142 lb 3.2 oz)   06/19/18 65.8 kg (145 lb)   06/14/18 66.2 kg (145 lb 14.4 oz)              Today, you had the following     No orders found for display       Primary Care Provider Office Phone # Fax #    Manish Plasencia [phone]       [address] [address]        Goals        General    Financial Wellbeing (pt-stated)     Notes - Note edited  5/1/2018  8:50 AM by Sofya Paulino LSW    Goal Statement: I want to complete my Medical Assistance application  Measure of Success: My Medical Assistance application will be completed  Supportive Steps to Achieve: SW and pt will finish the remaining paperwork that needs to be completed  Barriers: None identified at this time  Strengths: Pt is motivated as the income increase will assist him greatly  Date to Achieve By: 3 months          Medication 1 (pt-stated)     Notes - Note created  6/11/2018 11:34 AM by Sofya Paulino LSW    Goal Statement: I will learn to take my medications properly   Measure of Success: I will take my medications properly  Supportive Steps to Achieve: I will work with either MTM or RNCC to learn how to take my medications correctly  Barriers: I have so many medications to keep track of it gets confusing.  Strengths: I will use a pill box to organize my medications  Date to Achieve By: 3 months        Pain Management (pt-stated)     Notes - Note edited  5/10/2018  1:35 PM by Sofya Paulino LSW    [facility] to discuss with PCP, but would recommend a Palliative Care consult for " symptom management.    Pt met with Palliative Care MD yesterday, 5-9-18, and will continue to do so.    Sofya Unger  Social Work Care Coordinator  [facility]  [phone]          Transportation (pt-stated)     Notes - Note edited  6/4/2018  3:10 PM by Sofya Paulino LSW    Goal Statement: Pt needs dependable transportation to get him to his clinic appointments.  Measure of Success: Pt will attend appointments if he has dependable transportation  Supportive Steps to Achieve: SW and pt will work together to secure his MA application will allow him to get transportation benefits.  SW to also provide pt with community resources.  Barriers: Access to pt's bank statements has been a barrier.    Strengths: Pt is motivated to complete the MA application  Date to Achieve By: 3 months          Equal Access to Services     SONY SNOW : Kevin Vidales, wawyatt camara, qaybrachele kaalmarques marquez, celina fisher. So [facility] [phone].    ATENCIÓN: Si habla español, tiene a galaviz disposición servicios gratuitos de asistencia lingüística. Llame al [phone].    We comply with applicable federal civil rights laws and [place] laws. We do not discriminate on the basis of race, color, national origin, age, disability, sex, sexual orientation, or gender identity.            Thank you!     Thank you for choosing [facility]  for your care. Our goal is always to provide you with excellent care. Hearing back from our patients is one way we can continue to improve our services. Please take a few minutes to complete the written survey that you may receive in the mail after your visit with us. Thank you!             Your Updated Medication List - Protect others around you: Learn how to safely use, store and throw away your medicines at www.disposemymeds.org.          This list is accurate as of 6/21/18 12:06 PM.  Always use your most recent med  list.                   Brand Name Dispense Instructions for use Diagnosis    acetaminophen 325 MG tablet    TYLENOL    100 tablet    Take 2 tablets (650 mg) by mouth every 4 hours as needed for mild pain    Other acute pulmonary embolism without acute cor pulmonale (H), Carcinoma, lung, left (H)       ASPIRIN PO      Take 81 mg by mouth daily        benzonatate 100 MG capsule    TESSALON    42 capsule    Take 1 capsule (100 mg) by mouth 3 times daily as needed for cough    Carcinoma, lung, left (H), Other acute pulmonary embolism without acute cor pulmonale (H)       dexamethasone 4 MG tablet    DECADRON    6 tablet    Take 4 mg (1 tab) by mouth twice daily for 6 doses. Start the evening prior to Infusion.    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)       dronabinol 5 MG capsule    MARINOL    28 capsule    Take 1 capsule (5 mg) by mouth 2 times daily for 14 days    Carcinoma, lung, left (H)       DULoxetine 30 MG EC capsule    CYMBALTA    30 capsule    Take 1 capsule (30 mg) by mouth daily    Single current episode of major depressive disorder, unspecified depression episode severity       enoxaparin 80 MG/0.8ML injection    LOVENOX    41.4 mL    Inject 0.69 mLs (69 mg) Subcutaneous every 12 hours    Other acute pulmonary embolism without acute cor pulmonale (H)       fentaNYL 12 mcg/hr 72 hr patch    DURAGESIC    30 patch    Place 1 patch onto the skin every 72 hours remove old patch.    Other acute pulmonary embolism without acute cor pulmonale (H)       FOLIC ACID PO      Take 1 mg by mouth daily        HYDROmorphone 2 MG tablet    DILAUDID    80 tablet    Take 1-2 tablets (2-4 mg) by mouth every 3 hours as needed for moderate to severe pain    Other acute pulmonary embolism without acute cor pulmonale (H), Carcinoma, lung, left (H)       LORazepam 0.5 MG tablet    ATIVAN    30 tablet    Take 0.5 mg by mouth every 4 hours as needed for nausea, vomiting, anxiety, or sleep.    Malignant neoplasm of upper  lobe of left lung (H)       losartan 50 MG tablet    COZAAR    90 tablet    Take 1 tablet (50 mg) by mouth daily    Benign essential hypertension       nicotine 14 MG/24HR 24 hr patch    NICODERM CQ    30 patch    Place 1 patch onto the skin daily    Tobacco use disorder       omeprazole 20 MG CR capsule    priLOSEC    90 capsule    Take 1 capsule (20 mg) by mouth daily    Carcinoma, lung, left (H)       ondansetron 8 MG ODT tab    ZOFRAN-ODT    90 tablet    Take 1 tablet (8 mg) by mouth every 8 hours    Chemotherapy induced nausea and vomiting       potassium chloride SA 10 MEQ CR tablet    K-DUR/KLOR-CON M    30 tablet    Take 1 tablet (10 mEq) by mouth daily    Carcinoma, lung, left (H)       prochlorperazine 10 MG tablet    COMPAZINE    30 tablet    Take 0.5 tablets (5 mg) by mouth every 6 hours as needed for nausea or vomiting    Chemotherapy induced nausea and vomiting       rosuvastatin 5 MG tablet    CRESTOR    30 tablet    Take 1 tablet (5 mg) by mouth daily        senna-docusate 8.6-50 MG per tablet    SENOKOT-S;PERICOLACE    100 tablet    Take 2 tablets by mouth 2 times daily    Carcinoma, lung, left (H)       VENTOLIN  (90 Base) MCG/ACT Inhaler   Generic drug:  albuterol     1 Inhaler    Inhale 2 puffs into the lungs every 4 hours as needed for shortness of breath / dyspnea or wheezing

## 2018-06-25 NOTE — MR AVS SNAPSHOT
After Visit Summary   6/25/2018    Chilo Oneil    MRN: [id number]           Patient Information     Date Of Birth          1950        Visit Information        Provider Department      6/25/2018 11:00 AM ROOM 7 [facility] Infusion        Today's Diagnoses     Carcinoma, lung, left (H)    -  1    Chemotherapy induced nausea and vomiting           Follow-ups after your visit        Your next 10 appointments already scheduled     Jul 12, 2018  9:00 AM CDT   Level 5 with ROOM 10 [facility] ([facility])    [facility]  [address]  [facility] [place] [address]   [phone]            Jul 12, 2018  9:30 AM CDT   Return Visit with Mor Mccauley MD   [facility] ([facility])    [facility]  [address]  [facility] [place] [address]   [phone]            Jul 16, 2018 11:00 AM CDT   Level 1 with ROOM 10 [facility] ([facility])    [facility]  [address]  [facility] [place] [address]   [phone]            Aug 02, 2018  9:00 AM CDT   Level 5 with ROOM 10 [facility] ([facility])    [facility]  [address]  [facility] [place] [place] [address]   [phone]            Aug 06, 2018 11:30 AM CDT   Level 1 with ROOM 3 [facility] ([facility])    [facility]  [address]  [facility] [place] [place] [address]   [phone]              Who to contact     If you have questions or need follow up information about today's clinic visit or your schedule please contact [facility] CANCER INFUSION directly at [phone].  Normal or non-critical lab and imaging results will be communicated to you by MyChart, letter or phone within 4 business days after the clinic has received the results. If you do not hear from us within 7 days,  please contact the clinic through [employer] or phone. If you have a critical or abnormal lab result, we will notify you by phone as soon as possible.  Submit refill requests through [employer] or call your pharmacy and they will forward the refill request to us. Please allow 3 business days for your refill to be completed.          Additional Information About Your Visit        Care EveryWhere ID     This is your Care EveryWhere ID. This could be used by other organizations to access your [place] medical records  [id number]        Your Vitals Were     Pulse                   72            Blood Pressure from Last 3 Encounters:   06/25/18 (!) 197/93   06/21/18 166/49   06/19/18 (!) 159/95    Weight from Last 3 Encounters:   06/21/18 64.5 kg (142 lb 3.2 oz)   06/19/18 65.8 kg (145 lb)   06/14/18 66.2 kg (145 lb 14.4 oz)              Today, you had the following     No orders found for display       Primary Care Provider Office Phone # Fax #    Maximinomarina Jyoti Plasencia [phone] [address] [address]        Goals        General    Financial Wellbeing (pt-stated)     Notes - Note edited  5/1/2018  8:50 AM by Sofya Paulino LSW    Goal Statement: I want to complete my Medical Assistance application  Measure of Success: My Medical Assistance application will be completed  Supportive Steps to Achieve: SW and pt will finish the remaining paperwork that needs to be completed  Barriers: None identified at this time  Strengths: Pt is motivated as the income increase will assist him greatly  Date to Achieve By: 3 months          Medication 1 (pt-stated)     Notes - Note created  6/11/2018 11:34 AM by Sofya Paulino LSW    Goal Statement: I will learn to take my medications properly   Measure of Success: I will take my medications properly  Supportive Steps to Achieve: I will work with either MTM or RNCC to learn how to take my medications correctly  Barriers: I have so many  medications to keep track of it gets confusing.  Strengths: I will use a pill box to organize my medications  Date to Achieve By: 3 months        Pain Management (pt-stated)     Notes - Note edited  5/10/2018  1:35 PM by Sofya Paulino LSW    [facility] to discuss with PCP, but would recommend a Palliative Care consult for symptom management.    Pt met with Palliative Care MD yesterday, 5-9-18, and will continue to do so.    Sofya Unger  Social Work Care Coordinator  [facility] [facility]  [phone]          Transportation (pt-stated)     Notes - Note edited  6/4/2018  3:10 PM by Sofya Paulino LSW    Goal Statement: Pt needs dependable transportation to get him to his clinic appointments.  Measure of Success: Pt will attend appointments if he has dependable transportation  Supportive Steps to Achieve: SW and pt will work together to secure his MA application will allow him to get transportation benefits.  SW to also provide pt with community resources.  Barriers: Access to pt's bank statements has been a barrier.    Strengths: Pt is motivated to complete the MA application  Date to Achieve By: 3 months          Equal Access to Services     [place] AH: Hadii krysta jordan haddeanna Soearl, waaxda luqadaha, qaybta kaalmada alma, celina hewitt . So [facility] [phone].    ATENCIÓN: Si habla español, tiene a galaviz disposición servicios gratuitos de asistencia lingüística. Llame al [phone].    We comply with applicable federal civil rights laws and [place] laws. We do not discriminate on the basis of race, color, national origin, age, disability, sex, sexual orientation, or gender identity.            Thank you!     Thank you for choosing [facility]  for your care. Our goal is always to provide you with excellent care. Hearing back from our patients is one way we can continue to improve our services. Please take a few minutes to complete the  written survey that you may receive in the mail after your visit with us. Thank you!             Your Updated Medication List - Protect others around you: Learn how to safely use, store and throw away your medicines at www.disposemymeds.org.          This list is accurate as of 6/25/18  3:27 PM.  Always use your most recent med list.                   Brand Name Dispense Instructions for use Diagnosis    acetaminophen 325 MG tablet    TYLENOL    100 tablet    Take 2 tablets (650 mg) by mouth every 4 hours as needed for mild pain    Other acute pulmonary embolism without acute cor pulmonale (H), Carcinoma, lung, left (H)       ASPIRIN PO      Take 81 mg by mouth daily        benzonatate 100 MG capsule    TESSALON    42 capsule    Take 1 capsule (100 mg) by mouth 3 times daily as needed for cough    Carcinoma, lung, left (H), Other acute pulmonary embolism without acute cor pulmonale (H)       dexamethasone 4 MG tablet    DECADRON    6 tablet    Take 4 mg (1 tab) by mouth twice daily for 6 doses. Start the evening prior to Infusion.    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)       dronabinol 5 MG capsule    MARINOL    28 capsule    Take 1 capsule (5 mg) by mouth 2 times daily for 14 days    Carcinoma, lung, left (H)       DULoxetine 30 MG EC capsule    CYMBALTA    30 capsule    Take 1 capsule (30 mg) by mouth daily    Single current episode of major depressive disorder, unspecified depression episode severity       enoxaparin 80 MG/0.8ML injection    LOVENOX    41.4 mL    Inject 0.69 mLs (69 mg) Subcutaneous every 12 hours    Other acute pulmonary embolism without acute cor pulmonale (H)       fentaNYL 12 mcg/hr 72 hr patch    DURAGESIC    30 patch    Place 1 patch onto the skin every 72 hours remove old patch.    Other acute pulmonary embolism without acute cor pulmonale (H)       FOLIC ACID PO      Take 1 mg by mouth daily        HYDROmorphone 2 MG tablet    DILAUDID    80 tablet    Take 1-2 tablets (2-4  mg) by mouth every 3 hours as needed for moderate to severe pain    Other acute pulmonary embolism without acute cor pulmonale (H), Carcinoma, lung, left (H)       LORazepam 0.5 MG tablet    ATIVAN    30 tablet    Take 0.5 mg by mouth every 4 hours as needed for nausea, vomiting, anxiety, or sleep.    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)       losartan 50 MG tablet    COZAAR    90 tablet    Take 1 tablet (50 mg) by mouth daily    Benign essential hypertension       nicotine 14 MG/24HR 24 hr patch    NICODERM CQ    30 patch    Place 1 patch onto the skin daily    Tobacco use disorder       omeprazole 20 MG CR capsule    priLOSEC    90 capsule    Take 1 capsule (20 mg) by mouth daily    Carcinoma, lung, left (H)       ondansetron 8 MG ODT tab    ZOFRAN-ODT    90 tablet    Take 1 tablet (8 mg) by mouth every 8 hours    Chemotherapy induced nausea and vomiting       potassium chloride SA 10 MEQ CR tablet    K-DUR/KLOR-CON M    30 tablet    Take 1 tablet (10 mEq) by mouth daily    Carcinoma, lung, left (H)       prochlorperazine 10 MG tablet    COMPAZINE    30 tablet    Take 0.5 tablets (5 mg) by mouth every 6 hours as needed for nausea or vomiting    Chemotherapy induced nausea and vomiting       rosuvastatin 5 MG tablet    CRESTOR    30 tablet    Take 1 tablet (5 mg) by mouth daily        senna-docusate 8.6-50 MG per tablet    SENOKOT-S;PERICOLACE    100 tablet    Take 2 tablets by mouth 2 times daily    Carcinoma, lung, left (H)       VENTOLIN  (90 Base) MCG/ACT Inhaler   Generic drug:  albuterol     1 Inhaler    Inhale 2 puffs into the lungs every 4 hours as needed for shortness of breath / dyspnea or wheezing

## 2018-06-25 NOTE — PROGRESS NOTES
Infusion Nursing Note:  Chilo Oneil presents today for IV support after chemo, fluid and antiemetics.    Patient seen by provider today: No   present during visit today: Not Applicable.    Note: Elevated BP.  Pt states that he does not have money to pay for his BP med Rx until next week.  Pt provided with the funds to  his medication today.      Intravenous Access:  Peripheral IV placed.    Treatment Conditions:  Not Applicable.      Post Infusion Assessment:  Patient tolerated infusion without incident.  Access discontinued per protocol.    Discharge Plan:   Patient discharged in stable condition accompanied by: self.  Pt encouraged to  Rx refill for Cozaar today.    Sofya Costello RN                                  good minus fair minus

## 2018-06-26 NOTE — ED AVS SNAPSHOT
[facility] Emergency Department    [address] [address]    Phone:  [phone]    Fax:  [phone]                                       Chilo Oneil   MRN: [id number]    Department:  [facility] Emergency Department   Date of Visit:  6/26/2018           After Visit Summary Signature Page     I have received my discharge instructions, and my questions have been answered. I have discussed any challenges I see with this plan with the nurse or doctor.    ..........................................................................................................................................  Patient/Patient Representative Signature      ..........................................................................................................................................  Patient Representative Print Name and Relationship to Patient    ..................................................               ................................................  Date                                            Time    ..........................................................................................................................................  Reviewed by Signature/Title    ...................................................              ..............................................  Date                                                            Time

## 2018-06-26 NOTE — LETTER
[facility]  Complex Care Plan  About Me  Patient Name:  Chilo Ahn    YOB: 1950  Age:   68 year old   [facility] MRN: [id number] Telephone Information:   Home Phone [phone]   Mobile Not on file.       Address:  [address] [place] [address] Email address:  No e-mail address on record      Emergency Contact(s)  Name Relationship Lgl Grd Work Phone Home Phone Mobile Phone   1. TAHIR NUNEZ (1O* Sister   [phone]   2. JACINTO AHN (2* Brother    [phone]           Primary language:  English     needed? No   [facility]:  [phone] op. 1  Other communication barriers:    Preferred Method of Communication:  Mail  Current living arrangement: I live in a private home  Mobility Status/ Medical Equipment: Independent w/Device    Health Maintenance:  Health Maintenance Reviewed: Due/Overdue    My Access Plan  Medical Emergency 911   Primary Clinic Line [facility] - [phone]   24 Hour Appointment Line [phone] or  [phone]) (toll-free)   24 Hour Nurse Line [phone] (toll-free)   Preferred Urgent Care [facility] [place], [phone]   Preferred Hospital [place], [place]  [phone]   Preferred Pharmacy [facility] [address]     Behavioral Health Crisis Line The National Suicide Prevention Lifeline at 1-955.794.9576 or 911       My Care Team Members    Patient Care Team       Relationship Specialty Notifications Start End    Manish Plasencia MD PCP - General Family Practice  2/8/18     Phone: [phone] Fax: [phone] [address] [address]    Nadia Leger MD MD Cardiology Admissions 3/30/18     Phone: [phone] Fax: [phone] [address] [place] [address]    Janny Rangel APRN CNP Nurse Practitioner Nurse Practitioner - Family Admissions  3/30/18     Phone: [phone] Fax: [phone]         [address] [facility] [address]    Mega Moreno MD MD Thoracic Diseases Admissions 3/30/18     Phone: [phone] Fax: [phone]         [address]  [facility] [address]    Mor Mccauley MD MD Hematology & Oncology Admissions 3/30/18     Phone: [phone] Fax: [phone]         [address] [facility] [address]    Erica Elliott, RN Clinic Care Coordinator Oncology Admissions 3/30/18     Comment:  [phone]    Sofya Paulino LSW Lead Care Coordinator Primary Care - CC Admissions 4/18/18     Phone: [phone] Fax: [phone]                My Care Plans:Self Management and Treatment Plan, Goals and (Comments)  Goals        General    Financial Wellbeing (pt-stated)     Notes - Note edited  5/1/2018  8:50 AM by Sofya Paulino LSW    Goal Statement: I want to complete my Medical Assistance application  Measure of Success: My Medical Assistance application will be completed  Supportive Steps to Achieve: SW and pt will finish the remaining paperwork that needs to be completed  Barriers: None identified at this time  Strengths: Pt is motivated as the income increase will assist him greatly  Date to Achieve By: 3 months          Medication 1 (pt-stated)     Notes - Note created  6/11/2018 11:34 AM by Sofya Paulino LSW    Goal Statement: I will learn to take my medications properly   Measure of Success: I will take my medications properly  Supportive Steps to Achieve: I will work with either MTM or RNCC to learn how to take my medications correctly  Barriers: I have so many medications to keep track of it gets confusing.  Strengths: I will use a pill box to organize my medications  Date to Achieve By: 3 months        Pain Management (pt-stated)     Notes - Note edited  5/10/2018  1:35 PM by Sofya Paulino LSW    SWCC to discuss with PCP, but would recommend a Palliative Care consult for  symptom management.    Pt met with Palliative Care MD yesterday, 5-9-18, and will continue to do so.    Sofya Unger  Social Work Care Coordinator  [facility]  [phone]          Transportation (pt-stated)     Notes - Note edited  6/4/2018  3:10 PM by Sofya Paulino LSW    Goal Statement: Pt needs dependable transportation to get him to his clinic appointments.  Measure of Success: Pt will attend appointments if he has dependable transportation  Supportive Steps to Achieve: SW and pt will work together to secure his MA application will allow him to get transportation benefits.  SW to also provide pt with community resources.  Barriers: Access to pt's bank statements has been a barrier.    Strengths: Pt is motivated to complete the MA application  Date to Achieve By: 3 months               Action Plans on File: None     Advance Care Plans/Directives Type:   Type Advanced Care Plans/Directives: Advanced Directive - On File    My Medical and Care Information  Problem List   Patient Active Problem List   Diagnosis     Tobacco use disorder     PAD (peripheral artery disease) (H)     Benign essential hypertension     Hyperlipidemia LDL goal <100     Anxiety     Gastroesophageal reflux disease without esophagitis     CAD (coronary artery disease)     Lung cancer (H)     Uncomplicated asthma     Hyponatremia     SOB (shortness of breath)     Chemotherapy induced nausea and vomiting     Carcinoma, lung, left (H)     Hypokalemia     Hypomagnesemia     Pulmonary emboli (H)     Pulmonary embolism (H)      Current Medications and Allergies:  See printed Medication Report.    Care Coordination Start Date: No linked episodes   Frequency of Care Coordination: weekly (PRN)   Form Last Updated: 06/26/2018

## 2018-06-26 NOTE — PROGRESS NOTES
Clinic Care Coordination Contact    Clinic Care Coordination Contact  OUTREACH    Referral Information:  SW received notification from [facility] that pt was in the ED.  SW placed call to [facility] ED and spoke with RN, Nicol, who shared that the pt was out of several medications and did not have the funds to fill them today.  Nicol also shared that the pt did NOT have on his Fentanyl patch as he felt it was ineffective; however, Nicol provided education to the pt about how to use the pt effectively.   Pt shared to this writer that he thought he needed to put the patch on where his pain was located; didn't know he could just put on anywhere.      SW went to the ED and met with the pt.  SW learned the pt had no money left for the month & was awaiting his SSDI check.  Pt and SW discussed that the pt was not feeling well and he didn't have his medications filled as he was out of money.  When asked which medicaitons the pt needed filled, pt gave this writer a whole bag of medications.  SW looked inside and found 3 empty bottles (Ativan, Diluadid, & Tramadol--no refills on Tramadol) and an empty cardboard container for his Zofran.        SW called [facility] and spoke with staff who stated the pt needed a prior authorization to fill his Zofran, otherwise the cost is $17.00.  SW called Osmani from the ED and was able to get a PA completed, bringing the cost of the Zofran down to $1.11.  SW & Pharmacist were able to get the pt's medications filled for less than $3.00 for 3 medications.  Rodney,  Pharmacist, stated he would allow the pt to  his medications and bill  Pharmacy funds for the $3.  YASMIN expressed gratitude to Rodney.       YASMIN also spoke with ED RN, Yumiko, to explain that pt was not using his medications correctly and requested that she address this upon DC from the ED.      Referral Source: ED notification from [facility].    Primary Diagnosis: Oncology    Pain  Chronic pain (GOAL):: Yes  Location of chronic  pain:: upper back, chest, ribcage area  Chronic pain timing:: Intermittent  Chronic pain severity:: 4  Limitation of routine activities due to chronic pain:: Yes    Medication Management:  Pt would greatly benefit from education on how and when to take his medications.       Functional Status:  Dependent ADLs:: Ambulation-cane  Dependent IADLs:: Independent, Medication Management  Bed or wheelchair confined:: No  Mobility Status: Independent w/Device  Fallen 2 or more times in the past year?: No  Any fall with injury in the past year?: No    Living Situation:  Current living arrangement:: I live in a private home; I rent a room from a landlord.  Type of residence:: Private home - stairs    Diet/Exercise/Sleep:  Diet:: Regular  Inadequate nutrition (GOAL):: No  Food Insecurity: No  Tube Feeding: No  Exercise:: Unable to exercise  Inadequate activity/exercise (GOAL):: No  Significant changes in sleep pattern (GOAL): No    Transportation:  Transportation concerns (GOAL):: No  Transportation means:: Regular car     Psychosocial:  Islam or spiritual beliefs that impact treatment:: Yes  Mental health DX:: Yes  Mental health DX how managed:: Medication  Mental health management concern (GOAL):: No  Informal Support system:: Friends, Family     Financial/Insurance: SSDI of $776.  On July 12th pt transition to [facility] MA.    Financial/Insurance concerns (GOAL):: no.  Pt is eligible for an Elderly Waiver, but has not utilized any of the services yet.  YASMIN Ovalle from [facility] has been calling the pt to get him engaged in services [phone]     Resources and Interventions:  Current Resources:  Hannah  Community Resources: County Worker, Palliative Care  Equipment Currently Used at Home: canhema tan    Advance Care Plan/Directive  Advanced Care Plans/Directives on file:: Yes  Type Advanced Care Plans/Directives: Advanced Directive - On File  Advanced Care Plan/Directive Status: Not Applicable    Referrals  Placed: Transportation, [facility] Resources     Goals:   Goals        General    Financial Wellbeing (pt-stated)     Notes - Note edited  5/1/2018  8:50 AM by Sofya Paulino LSW    Goal Statement: I want to complete my Medical Assistance application  Measure of Success: My Medical Assistance application will be completed  Supportive Steps to Achieve: SW and pt will finish the remaining paperwork that needs to be completed  Barriers: None identified at this time  Strengths: Pt is motivated as the income increase will assist him greatly  Date to Achieve By: 3 months          Medication 1 (pt-stated)     Notes - Note created  6/11/2018 11:34 AM by Sofya Paulino LSW    Goal Statement: I will learn to take my medications properly   Measure of Success: I will take my medications properly  Supportive Steps to Achieve: I will work with either MTM or RNCC to learn how to take my medications correctly  Barriers: I have so many medications to keep track of it gets confusing.  Strengths: I will use a pill box to organize my medications  Date to Achieve By: 3 months        Pain Management (pt-stated)     Notes - Note edited  5/10/2018  1:35 PM by Sofya Paulino LSW SWCC to discuss with PCP, but would recommend a Palliative Care consult for symptom management.    Pt met with Palliative Care MD yesterday, 5-9-18, and will continue to do so.    Sofya Unger  Social Work Care Coordinator  [facility]  [phone]          Transportation (pt-stated)     Notes - Note edited  6/4/2018  3:10 PM by Sofya Paulino LSW    Goal Statement: Pt needs dependable transportation to get him to his clinic appointments.  Measure of Success: Pt will attend appointments if he has dependable transportation  Supportive Steps to Achieve: SW and pt will work together to secure his MA application will allow him to get transportation benefits.  SW to also provide pt with community  resources.  Barriers: Access to pt's bank statements has been a barrier.    Strengths: Pt is motivated to complete the MA application  Date to Achieve By: 3 months            Patient/Caregiver understanding: Pt stated he will call Yamile, tomorrow, to engage with her and the services she has to offer.    Outreach Frequency: weekly (PRN)  Future Appointments              In 2 weeks ROOM 10 [facility] [place] [facility]    In 2 weeks Mor Mccauley MD [facility] [place] [facility]    In 2 weeks ROOM 10 [facility] [place] [facility]    In 1 month ROOM 10 [facility] [place] [facility]    In 1 month ROOM 3 [facility] [place] [facility]          Plan: SW to continue to follow pt and assist as he will allow.    Sofya Unger  Social Work Care Coordinator  [facility]  [phone]

## 2018-06-26 NOTE — ED PROVIDER NOTES
HPI  Patient is a 68-year-old male presenting with persistent left shoulder pain.  He has a known history of lung cancer.  He has a recent note from oncology describing this and describing his left shoulder/back/chest pain as being related to the cancer.  He has been receiving fentanyl patches and Dilaudid by mouth for pain control.  He was seen in the ED recently for anxiety and shortness of breath as well.  Please see that note for details.    The patient has ongoing left shoulder pain.  He tells me the plan for pain control is not helping.  However, he also tells me he does not use the fentanyl patches on a regular basis.  He tells me they just do not work.  He does use the Dilaudid 2 mg every 4 hours.  However, he tells me this does not seem to work either.  He did run out of his medication though yesterday and has not had any since.  He describes nausea today.  He does not have Zofran at home.  He denies new shortness of breath.  He denies new cough or fever.  He takes Lovenox for prior pulmonary embolism.  He denies recent changes in the dosing of this medication.  No new leg pain or swelling.  No trauma or injury.  No skin rash.    ROS: All other review of systems are negative other than that noted above.     Past Medical History:   Diagnosis Date     GERD (gastroesophageal reflux disease)      HTN (hypertension)      Lung cancer (H)      MI (myocardial infarction)     2016, medically managed     Past Surgical History:   Procedure Laterality Date     FRACTURE TX, ANKLE RT/LT  1975    Fracture TX Ankle, LT     INSERT PORT VASCULAR ACCESS Left 5/15/2018    Procedure: INSERT PORT VASCULAR ACCESS;  Left chest Port-a-Cath Placement;  Surgeon: Gurjit Fox MD;  Location: [facility] OR     OPEN REDUCTION INTERNAL FIXATION HIP NAILING  9/20/2013    Procedure: OPEN REDUCTION INTERNAL FIXATION HIP NAILING;  Left Hip Open Reduction Internal Fixation ;  Surgeon: Rosas Dennis MD;  Location: [facility] OR     THORACOSCOPIC  "BIOPSY LUNG Left 4/11/2018    Procedure: THORACOSCOPIC BIOPSY LUNG;  subxiphoid left video assisted thorascopic surgery pleural biops, left lower lobe wedge biopsy ;  Surgeon: Mega Moreno MD;  Location: [facility] OR     VASCULAR SURGERY       Family History   Problem Relation Age of Onset     Diabetes Brother      type 2     Diabetes Father      Social History   Substance Use Topics     Smoking status: Current Every Day Smoker     Packs/day: 0.15     Years: 47.00     Types: Cigarettes     Start date: 12/26/1967     Smokeless tobacco: Never Used      Comment: 2-3 cigs daily     Alcohol use Yes      Comment: 6-8 beers per day, last 3/27 at 6pm         PHYSICAL  /73  Temp 98.1  F (36.7  C) (Temporal)  Resp 18  Ht 1.905 m (6' 3\")  Wt 69.4 kg (153 lb)  SpO2 97%  BMI 19.12 kg/m2  General: Patient is alert and in mild distress.  Calm, cooperative.  Neurological: Alert.  Moving upper and lower extremities equally, bilaterally.  Head / Neck: Atraumatic.  Ears: Not done.  Eyes: Pupils are equal, round, and reactive.  Normal conjunctiva.  Nose: Midline.  No epistaxis.  Mouth / Throat: No ulcerations or lesions.  Upper pharynx is not erythematous.  Moist.  Respiratory: No respiratory distress. CTA B.  Cardiovascular: Regular rhythm.  Peripheral extremities are warm.  No edema.  No calf tenderness.  Abdomen / Pelvis: Not tender.  No distention.  Soft throughout.  Genitalia: Not done.  Musculoskeletal: Not done.  Skin: No evidence of rash or trauma.        ED COURSE  1350.  The patient has chronic pain related to his cancer.  This is felt in the left chest, back, shoulder.  He had a recent workup in the ED which was unremarkable for other obvious causes.  He ran out of his medication which is likely contributing to his exacerbated pain nausea.  I encouraged him to use the fentanyl on a regular basis.  I provided a prescription for both Zofran and Dilaudid at home.  Dilaudid and Zofran will be given here in the " ED.  Follow up recommended strongly.  Return for worsening as discussed.    Labs Ordered and Resulted from Time of ED Arrival Up to the Time of Departure from the ED - No data to display    Medications   HYDROmorphone (DILAUDID) injection 1 mg (not administered)   ondansetron (ZOFRAN-ODT) ODT tab 4 mg (not administered)       IMPRESSION    ICD-10-CM    1. Chronic left shoulder pain M25.512     G89.29     presumed to be cancer related pain, per oncology's note           Critical Care time:  none                    Alli Lopez MD  06/26/18 [id number]

## 2018-06-26 NOTE — ED NOTES
Pt comes in today with left shoulder/back pain, that he has been dealing with due to his Lung CA that he was DX with 6 months ago. He is out of his pain meds. Was seen yesterday for fluids but didn't mentioned his pain or being out of meds. His last chemo was Thursday. Pt is a/o x r. Still smoking

## 2018-06-26 NOTE — LETTER
[place] CARE COORDINATION  [address] [address]  [phone]    June 26, 2018    Chilo Oneil  [address]  [place] [address]      Dear Freddy,    I have been trying to reach you this past week & just want to make sure you are OK.  Give me a call when you get this letter, please & thanks!    The clinic care coordinator is a registered nurse and/or  who understand the health care system. The goal of clinic care coordination is to help you manage your health and improve access to the [facility] system in the most efficient manner. The registered nurse can assist you in meeting your health care goals by providing education, coordinating services, and strengthening the communication among your providers. The  can assist you with financial, behavioral, psychosocial, chemical dependency, counseling, and/or psychiatric resources.    Please feel free to contact me at [phone], with any questions or concerns. We at [facility] are focused on providing you with the highest-quality healthcare experience possible and that all starts with you.     Sincerely,     Sofya Paulino    Enclosed: I have enclosed a copy of the Complex Care Plan. This has helpful information and goals that we have talked about. Please keep this in an easy to access place to use as needed.

## 2018-06-26 NOTE — ED AVS SNAPSHOT
[facility] Emergency Department    [address] [address]    Phone:  [phone]    Fax:  [phone]                                       Chilo Oneil   MRN: [id number]    Department:  [facility] Emergency Department   Date of Visit:  6/26/2018           Patient Information     Date Of Birth          1950        Your diagnoses for this visit were:     Chronic left shoulder pain presumed to be cancer related pain, per oncology's note       You were seen by Alli Loepz MD.        Discharge Instructions       Return to the Emergency Room if the following occurs:     Worsened breathing, fever >101, vomiting/dehydration, or for any concern at anytime.    Or, follow-up with the following provider as we discussed:     Return to your primary doctor this next week for reevaluation.    Medications discussed:    Dilaudid prescription refill.  Zofran prescription refill.    If you received pain-relieving or sedating medication during your time in the ER, avoid alcohol, driving automobiles, or working with machinery.  Also, a responsible adult must stay with you.        Call the Nurse Advice Line at [phone] or [phone] for any concern at anytime.      Your next 10 appointments already scheduled     Jul 12, 2018  9:00 AM CDT   Level 5 with ROOM 10 [facility] ([facility])    [facility]  [address]  [facility] [address]   [phone]            Jul 12, 2018  9:30 AM CDT   Return Visit with Mor Mccauley MD   [facility] ([facility])    [facility]  [address]  [facility] [address]   [phone]            Jul 16, 2018 11:00 AM CDT   Level 1 with ROOM 10 [facility] ([facility])    [facility]  [address]  [facility] [address]   [phone]            Aug 02, 2018  9:00  AM CDT   Level 5 with ROOM 10 [facility] ([facility])    [facility]  [address]  [facility] [address]   [phone]            Aug 06, 2018 11:30 AM CDT   Level 1 with ROOM 3 [facility] ([facility])    [facility]  [address]  [facility] [address]   [phone]              24 Hour Appointment Hotline       To make an appointment at any [facility] clinic, call [phone] ([phone]). If you don't have a family doctor or clinic, we will help you find one. [place] clinics are conveniently located to serve the needs of you and your family.             Review of your medicines      Our records show that you are taking the medicines listed below. If these are incorrect, please call your family doctor or clinic.        Dose / Directions Last dose taken    acetaminophen 325 MG tablet   Commonly known as:  TYLENOL   Dose:  650 mg   Quantity:  100 tablet        Take 2 tablets (650 mg) by mouth every 4 hours as needed for mild pain   Refills:  0        ASPIRIN PO   Dose:  81 mg        Take 81 mg by mouth daily   Refills:  0        dexamethasone 4 MG tablet   Commonly known as:  DECADRON   Quantity:  6 tablet        Take 4 mg (1 tab) by mouth twice daily for 6 doses. Start the evening prior to Infusion.   Refills:  3        dronabinol 5 MG capsule   Commonly known as:  MARINOL   Dose:  5 mg   Quantity:  28 capsule        Take 1 capsule (5 mg) by mouth 2 times daily for 14 days   Refills:  0        DULoxetine 30 MG EC capsule   Commonly known as:  CYMBALTA   Dose:  30 mg   Quantity:  30 capsule        Take 1 capsule (30 mg) by mouth daily   Refills:  1        enoxaparin 80 MG/0.8ML injection   Commonly known as:  LOVENOX   Dose:  1 mg/kg   Quantity:  41.4 mL        Inject 0.69 mLs (69 mg) Subcutaneous every 12 hours   Refills:  1        FAMOTIDINE PO   Dose:  20 mg        Take 20 mg  by mouth daily   Refills:  0        fentaNYL 12 mcg/hr 72 hr patch   Commonly known as:  DURAGESIC   Dose:  1 patch   Quantity:  30 patch        Place 1 patch onto the skin every 72 hours remove old patch.   Refills:  0        LORazepam 0.5 MG tablet   Commonly known as:  ATIVAN   Quantity:  30 tablet        Take 0.5 mg by mouth every 4 hours as needed for nausea, vomiting, anxiety, or sleep.   Refills:  5        losartan 50 MG tablet   Commonly known as:  COZAAR   Dose:  50 mg   Quantity:  90 tablet        Take 1 tablet (50 mg) by mouth daily   Refills:  3        MAGNESIUM OXIDE PO   Dose:  400 mg        Take 400 mg by mouth daily   Refills:  0        nicotine 14 MG/24HR 24 hr patch   Commonly known as:  NICODERM CQ   Dose:  1 patch   Quantity:  30 patch        Place 1 patch onto the skin daily   Refills:  1        omeprazole 20 MG CR capsule   Commonly known as:  priLOSEC   Dose:  20 mg   Quantity:  90 capsule        Take 1 capsule (20 mg) by mouth daily   Refills:  3        potassium chloride SA 10 MEQ CR tablet   Commonly known as:  K-DUR/KLOR-CON M   Dose:  10 mEq   Quantity:  30 tablet        Take 1 tablet (10 mEq) by mouth daily   Refills:  1        prochlorperazine 10 MG tablet   Commonly known as:  COMPAZINE   Dose:  5 mg   Quantity:  30 tablet        Take 0.5 tablets (5 mg) by mouth every 6 hours as needed for nausea or vomiting   Refills:  1        rosuvastatin 5 MG tablet   Commonly known as:  CRESTOR   Dose:  5 mg   Quantity:  30 tablet        Take 1 tablet (5 mg) by mouth daily   Refills:  3        senna-docusate 8.6-50 MG per tablet   Commonly known as:  SENOKOT-S;PERICOLACE   Dose:  2 tablet   Quantity:  100 tablet        Take 2 tablets by mouth 2 times daily   Refills:  0        VENTOLIN  (90 Base) MCG/ACT Inhaler   Dose:  2 puff   Quantity:  1 Inhaler   Generic drug:  albuterol        Inhale 2 puffs into the lungs every 4 hours as needed for shortness of breath / dyspnea or wheezing    Refills:  1          ASK your doctor about these medications        Dose / Directions Last dose taken    * HYDROmorphone 2 MG tablet   Commonly known as:  DILAUDID   Dose:  2-4 mg   What changed:  Another medication with the same name was added. Make sure you understand how and when to take each.   Quantity:  80 tablet   Ask about: Which instructions should I use?        Take 1-2 tablets (2-4 mg) by mouth every 3 hours as needed for moderate to severe pain   Refills:  0        * HYDROmorphone 2 MG tablet   Commonly known as:  DILAUDID   Dose:  2 mg   What changed:  You were already taking a medication with the same name, and this prescription was added. Make sure you understand how and when to take each.   Quantity:  12 tablet   Ask about: Which instructions should I use?        Take 1 tablet (2 mg) by mouth every 3 hours as needed for pain   Refills:  0        * ondansetron 8 MG ODT tab   Commonly known as:  ZOFRAN-ODT   Dose:  8 mg   What changed:  Another medication with the same name was added. Make sure you understand how and when to take each.   Quantity:  90 tablet   Ask about: Which instructions should I use?        Take 1 tablet (8 mg) by mouth every 8 hours   Refills:  1        * ondansetron 4 MG ODT tab   Commonly known as:  ZOFRAN ODT   Dose:  4-8 mg   What changed:  You were already taking a medication with the same name, and this prescription was added. Make sure you understand how and when to take each.   Quantity:  12 tablet   Ask about: Which instructions should I use?        Take 1-2 tablets (4-8 mg) by mouth every 8 hours as needed for nausea   Refills:  0        * Notice:  This list has 4 medication(s) that are the same as other medications prescribed for you. Read the directions carefully, and ask your doctor or other care provider to review them with you.            Information about OPIOIDS     PRESCRIPTION OPIOIDS: WHAT YOU NEED TO KNOW   We gave you an opioid (narcotic) pain medicine. It  is important to manage your pain, but opioids are not always the best choice. You should first try all the other options your care team gave you. Take this medicine for as short a time (and as few doses) as possible.     These medicines have risks:    DO NOT drive when on new or higher doses of pain medicine. These medicines can affect your alertness and reaction times, and you could be arrested for driving under the influence (DUI). If you need to use opioids long-term, talk to your care team about driving.    DO NOT operate heave machinery    DO NOT do any other dangerous activities while taking these medicines.     DO NOT drink any alcohol while taking these medicines.      If the opioid prescribed includes acetaminophen, DO NOT take with any other medicines that contain acetaminophen. Read all labels carefully. Look for the word  acetaminophen  or  Tylenol.  Ask your pharmacist if you have questions or are unsure.    You can get addicted to pain medicines, especially if you have a history of addiction (chemical, alcohol or substance dependence). Talk to your care team about ways to reduce this risk.    Store your pills in a secure place, locked if possible. We will not replace any lost or stolen medicine. If you don t finish your medicine, please throw away (dispose) as directed by your pharmacist. The [place] [employer] has more information about safe disposal: https://www.pca.[facility].[place]/living-green/managing-unwanted-medications.     All opioids tend to cause constipation. Drink plenty of water and eat foods that have a lot of fiber, such as fruits, vegetables, prune juice, apple juice and high-fiber cereal. Take a laxative (Miralax, milk of magnesia, Colace, Senna) if you don t move your bowels at least every other day.         Prescriptions were sent or printed at these locations (2 Prescriptions)                   Other Prescriptions                Printed at Department/Unit printer (2 of  2)         HYDROmorphone (DILAUDID) 2 MG tablet               ondansetron (ZOFRAN ODT) 4 MG ODT tab                Orders Needing Specimen Collection     None      Pending Results     No orders found from 6/24/2018 to 6/27/2018.            Pending Culture Results     No orders found from 6/24/2018 to 6/27/2018.            Pending Results Instructions     If you had any lab results that were not finalized at the time of your Discharge, you can call the ED Lab Result RN at [phone]. You will be contacted by this team for any positive Lab results or changes in treatment. The nurses are available 7 days a week from 10A to 6:30P.  You can leave a message 24 hours per day and they will return your call.        Test Results From Your Hospital Stay               Thank you for choosing [facility]       Thank you for choosing [facility] for your care. Our goal is always to provide you with excellent care. Hearing back from our patients is one way we can continue to improve our services. Please take a few minutes to complete the written survey that you may receive in the mail after you visit with us. Thank you!        Care EveryWhere ID     This is your Care EveryWhere ID. This could be used by other organizations to access your [facility] medical records  [id number]        Equal Access to Services     [place] AH: Kevin Vidales, pepe camara, qasergio marquez, celina fisher. So [facility] [phone].    ATENCIÓN: Si habla español, tiene a galaviz disposición servicios gratuitos de asistencia lingüística. Llame al [phone].    We comply with applicable federal civil rights laws and [place] laws. We do not discriminate on the basis of race, color, national origin, age, disability, sex, sexual orientation, or gender identity.            After Visit Summary       This is your record. Keep this with you and show to your community pharmacist(s) and doctor(s) at your next visit.

## 2018-06-26 NOTE — DISCHARGE INSTRUCTIONS
Return to the Emergency Room if the following occurs:     Worsened breathing, fever >101, vomiting/dehydration, or for any concern at anytime.    Or, follow-up with the following provider as we discussed:     Return to your primary doctor this next week for reevaluation.    Medications discussed:    Dilaudid prescription refill.  Zofran prescription refill.    If you received pain-relieving or sedating medication during your time in the ER, avoid alcohol, driving automobiles, or working with machinery.  Also, a responsible adult must stay with you.        Call the Nurse Advice Line at [phone] or [phone] for any concern at anytime.

## 2018-07-03 NOTE — ED AVS SNAPSHOT
[facility] Emergency Department    [address] [address]    Phone:  [phone]    Fax:  [phone]                                       Chilo Oneil   MRN: [id number]    Department:  [facility] Department   Date of Visit:  7/3/2018           After Visit Summary Signature Page     I have received my discharge instructions, and my questions have been answered. I have discussed any challenges I see with this plan with the nurse or doctor.    ..........................................................................................................................................  Patient/Patient Representative Signature      ..........................................................................................................................................  Patient Representative Print Name and Relationship to Patient    ..................................................               ................................................  Date                                            Time    ..........................................................................................................................................  Reviewed by Signature/Title    ...................................................              ..............................................  Date                                                            Time

## 2018-07-03 NOTE — ED PROVIDER NOTES
History     Chief Complaint   Patient presents with     Insomnia     has not been able to sleep, has been seen last week for pain and insomnia still not sleeping and now today feels like his legs are resless      Dizziness     HPI  Chilo Oneil is a 68 year old male who presents with lung CA and prior PE and on lovenox, recent ED evaluations for shoulder pain and for dyspnea, anxiety, presents today for insomnia for the last 3 nights.  was sleeping better before this.     last PM had 2 episodes of near syncope =- once on arrival at home and once while walking into bathroom.  No fall.  No chest pain or shortness of breath. No palpitations. Light headed. No room spinning.  No bloody stools or black stools.  No hemetemisis.  mild urin buring. decreased urine recently.    nausea, dry heaves,      no fever.  No new cough - does have dry cough      Problem List:    Patient Active Problem List    Diagnosis Date Noted     Pulmonary emboli (H) 06/05/2018     Priority: Medium     Pulmonary embolism (H) 06/05/2018     Priority: Medium     Hypokalemia 05/31/2018     Priority: Medium     Hypomagnesemia 05/31/2018     Priority: Medium     Carcinoma, lung, left (H) 04/12/2018     Priority: Medium     CAD (coronary artery disease) 03/28/2018     Priority: Medium     No previous angiogram.  No previous stenting.    Atypical Stress Test consistent with possible CAD 8/2016: Myocardial perfusion imaging using single isotope technique demonstrated a small of inferior ischemia at the base to mid ventricle There is a second moderate area of ischemia in the anterior and anteroseptal wall from base through mid ventricle, involving the lateral base as well. There is a small fixed portion in the anteroseptal base consistent with prior infarction There is a fixed inferoseptal defect from apex to mid ventricle consistent with either prior nontransmural infarct or attenuation artifact. Gated images demonstrated inferior, inferoseptal,  anterior and anteroseptal basal hypokinesis.  The left ventricular systolic function is 52% at rest and 47% post stress.       Hyponatremia 03/28/2018     Priority: Medium     SOB (shortness of breath) 03/28/2018     Priority: Medium     Chemotherapy induced nausea and vomiting 03/28/2018     Priority: Medium     Lung cancer (H)      Priority: Medium     Left shoulder pain, cough. Bx 12/2017- Non Small Cell. Resection planned 4/2018       Uncomplicated asthma      Priority: Medium     Anxiety 12/28/2017     Priority: Medium     Gastroesophageal reflux disease without esophagitis 12/28/2017     Priority: Medium     PAD (peripheral artery disease) (H) 06/13/2016     Priority: Medium     Benign essential hypertension 06/13/2016     Priority: Medium     Hyperlipidemia LDL goal <100 06/13/2016     Priority: Medium     Tobacco use disorder 10/16/2009     Priority: Medium        Past Medical History:    Past Medical History:   Diagnosis Date     GERD (gastroesophageal reflux disease)      HTN (hypertension)      Lung cancer (H)      MI (myocardial infarction)        Past Surgical History:    Past Surgical History:   Procedure Laterality Date     FRACTURE TX, ANKLE RT/LT  1975    Fracture TX Ankle, LT     INSERT PORT VASCULAR ACCESS Left 5/15/2018    Procedure: INSERT PORT VASCULAR ACCESS;  Left chest Port-a-Cath Placement;  Surgeon: Gurjit Fox MD;  Location: [facility] OR     OPEN REDUCTION INTERNAL FIXATION HIP NAILING  9/20/2013    Procedure: OPEN REDUCTION INTERNAL FIXATION HIP NAILING;  Left Hip Open Reduction Internal Fixation ;  Surgeon: Rosas Dennis MD;  Location: [facility] OR     THORACOSCOPIC BIOPSY LUNG Left 4/11/2018    Procedure: THORACOSCOPIC BIOPSY LUNG;  subxiphoid left video assisted thorascopic surgery pleural biops, left lower lobe wedge biopsy ;  Surgeon: Mega Moreno MD;  Location:  OR     VASCULAR SURGERY         Family History:    Family History   Problem Relation Age of Onset      Diabetes Brother      type 2     Diabetes Father        Social History:  Marital Status:  Single [1]  Social History   Substance Use Topics     Smoking status: Current Every Day Smoker     Packs/day: 0.15     Years: 47.00     Types: Cigarettes     Start date: 12/26/1967     Smokeless tobacco: Never Used      Comment: 2-3 cigs daily     Alcohol use Yes      Comment: 6-8 beers per day, last 3/27 at 6pm        Medications:      acetaminophen (TYLENOL) 325 MG tablet   ASPIRIN PO   dexamethasone (DECADRON) 4 MG tablet   DULoxetine (CYMBALTA) 30 MG EC capsule   enoxaparin (LOVENOX) 80 MG/0.8ML injection   FAMOTIDINE PO   fentaNYL (DURAGESIC) 12 mcg/hr 72 hr patch   HYDROmorphone (DILAUDID) 2 MG tablet   HYDROmorphone (DILAUDID) 2 MG tablet   LORazepam (ATIVAN) 0.5 MG tablet   losartan (COZAAR) 50 MG tablet   MAGNESIUM OXIDE PO   nicotine (NICODERM CQ) 14 MG/24HR 24 hr patch   omeprazole (PRILOSEC) 20 MG CR capsule   ondansetron (ZOFRAN-ODT) 8 MG ODT tab   potassium chloride SA (K-DUR/KLOR-CON M) 10 MEQ CR tablet   prochlorperazine (COMPAZINE) 10 MG tablet   rosuvastatin (CRESTOR) 5 MG tablet   senna-docusate (SENOKOT-S;PERICOLACE) 8.6-50 MG per tablet   VENTOLIN  (90 Base) MCG/ACT Inhaler         Review of Systems   Constitutional: Positive for fatigue. Negative for chills, diaphoresis and fever.   HENT: Negative for ear pain, sinus pressure and sore throat.    Eyes: Negative for visual disturbance.   Respiratory: Negative for cough, shortness of breath and wheezing.    Cardiovascular: Negative for chest pain and palpitations.   Gastrointestinal: Negative for abdominal pain, blood in stool, constipation, diarrhea, nausea and vomiting.   Genitourinary: Negative for dysuria, frequency and urgency.   Skin: Negative for rash.   Neurological: Positive for syncope, weakness and light-headedness. Negative for headaches.   All other systems reviewed and are negative.      Physical Exam   BP: 120/85  Heart Rate: 87  Temp:  "98.2  F (36.8  C)  Resp: 17  Height: 190.5 cm (6' 3\")  Weight: 68 kg (150 lb)  SpO2: 97 %      Physical Exam   Constitutional: He appears cachectic. He appears distressed.   Eyes: Conjunctivae are normal.   Neck: Neck supple.   Cardiovascular: Normal rate and regular rhythm.  Exam reveals no friction rub.    No murmur heard.  Pulmonary/Chest: Effort normal and breath sounds normal. No respiratory distress. He has no wheezes. He has no rales.   Abdominal: Soft. Bowel sounds are normal. He exhibits no distension. There is no tenderness.   Musculoskeletal: He exhibits no edema.   Neurological: He is alert. He exhibits normal muscle tone.   Skin: No rash noted. He is not diaphoretic.       ED Course     ED Course     Procedures                    EKG Interpretation:      Interpreted by Carlo Diaz MD  EKG done at 1801 hrs. demonstrates a sinus rhythm at 70 bpm with a normal axis and possible minimal ST depression in leads V2 and V3 V4 V5.  There is normal R progression.  No Q waves.  Normal intervals.  Normal conduction.  No ectopy.  Normal conduction.  Impression sinus rhythm 70 bpm possible anterior septal ST depression that is relatively minimal.  This appears to be slightly more than I saw on EKG done 6/19/2018.           EKG Interpretation:      Interpreted by Carlo Diaz MD  EKG done at 1933  hrs. demonstrates a sinus rhythm at 70 bpm with a normal axis and possible minimal ST depression in leads V2 and V3 V4 V5.  There is normal R progression.  No Q waves.  Normal intervals.  Normal conduction.  No ectopy.  Normal conduction.  Impression sinus rhythm 70 bpm possible anterior septal ST depression that is relatively minimal.  This appears to be identical to ekg done earlier this evening.     Critical Care time:  none               Results for orders placed or performed during the hospital encounter of 07/03/18 (from the past 24 hour(s))   CBC with platelets differential   Result Value Ref Range    WBC 2.8 " (L) 4.0 - 11.0 10e9/L    RBC Count 2.22 (L) 4.4 - 5.9 10e12/L    Hemoglobin 7.4 (L) 13.3 - 17.7 g/dL    Hematocrit 20.2 (L) 40.0 - 53.0 %    MCV 91 78 - 100 fl    MCH 33.3 (H) 26.5 - 33.0 pg    MCHC 36.6 (H) 31.5 - 36.5 g/dL    RDW 16.2 (H) 10.0 - 15.0 %    Platelet Count 31 (LL) 150 - 450 10e9/L    Diff Method Manual Differential     % Neutrophils 31.0 %    % Lymphocytes 68.0 %    % Monocytes 1.0 %    % Eosinophils 0.0 %    % Basophils 0.0 %    Absolute Neutrophil 0.9 (L) 1.6 - 8.3 10e9/L    Absolute Lymphocytes 1.9 0.8 - 5.3 10e9/L    Absolute Monocytes 0.0 0.0 - 1.3 10e9/L    Absolute Eosinophils 0.0 0.0 - 0.7 10e9/L    Absolute Basophils 0.0 0.0 - 0.2 10e9/L    Anisocytosis Slight     Toxic Granulation Present     Platelet Estimate       Automated count confirmed.  Platelet morphology is normal.   Comprehensive metabolic panel   Result Value Ref Range    Sodium 127 (L) 133 - 144 mmol/L    Potassium 3.2 (L) 3.4 - 5.3 mmol/L    Chloride 90 (L) 94 - 109 mmol/L    Carbon Dioxide 29 20 - 32 mmol/L    Anion Gap 8 3 - 14 mmol/L    Glucose 80 70 - 99 mg/dL    Urea Nitrogen 18 7 - 30 mg/dL    Creatinine 1.46 (H) 0.66 - 1.25 mg/dL    GFR Estimate 48 (L) >60 mL/min/1.7m2    GFR Estimate If Black 58 (L) >60 mL/min/1.7m2    Calcium 7.8 (L) 8.5 - 10.1 mg/dL    Bilirubin Total 0.6 0.2 - 1.3 mg/dL    Albumin 2.9 (L) 3.4 - 5.0 g/dL    Protein Total 5.7 (L) 6.8 - 8.8 g/dL    Alkaline Phosphatase 61 40 - 150 U/L    ALT 11 0 - 70 U/L    AST 23 0 - 45 U/L   Troponin I   Result Value Ref Range    Troponin I ES 0.020 0.000 - 0.045 ug/L   XR Chest 2 Views    Narrative    CHEST TWO VIEWS  7/3/2018 7:22 PM     HISTORY: Near syncope.    COMPARISON: Chest x-ray 5/15/2018.      Impression    IMPRESSION: No acute cardiopulmonary disease. Ill-defined nodule  appears stable versus slightly less prominent just medial to the level  of the left port venous catheter at the left mid chest.    FRANKIE DO MD       Medications - No data to  display    Assessments & Plan (with Medical Decision Making)     MDM: Chilo Oneil is a 68 year old male who presents with a history of lung cancer, prior PE on Lovenox and with multiple recent emergency department visits for dyspnea, anxiety and now with insomnia for the last 3 evenings.  He has been unable to get to sleep from this.  But then he notes that last evening he had 2 episodes of near syncope without associated cardiopulmonary symptoms and no associated fall.      He was reluctant to have any testing and did not wish to remain for more than a brief evaluation as he noted his primary concern was his insomnia.  He was however willing to undergo testing for near syncope which was performed and demonstrated several concerning findings.  His hemoglobin is dropped from approximately 10 to 7,  he is neutropenic and his platelets have fallen to 30,000.  These are all likely chemo related changes.  Although he has no signs of active bleeding he is on Lovenox in addition.  I see possible ischemic change on his EKG but this appears to be relatively minimal and he complains of no anterior chest pain.  He had no arrhythmia in the emergency department.  He has a history of chronic hypomagnesemia and hypokalemia and these are both down today as well.  Potassium is replaced with 40 mEq orally.  I asked the patient multiple ways to remain in the hospital for transfusion monitoring for near syncope, serial troponins given his EKG change.  If his platelets were to drop further signs of active bleeding platelet transfusion may be appropriate as well.  He will likely need colony-stimulating agents when oncology opens on Thursday.  I told him that he was at risk for dying given his significant anemia, high risk for blood loss,  and possible secondary cardiac ischemia.      However Freddy refused to stay in the hospital despite the risks of going home.  He understands the risk and promises to discuss further with oncology  on Thursday.  He asks only that I treat his insomnia.  I have given 2 of the 5 mg doses of Ambien for home.  I would asked that he cut these in half.  We also discussed use of melatonin.  We discussed that he is at high risk for falls, and this will possibly increase risk - goyo. he is on blood thinners and has recent near syncope.  I asked again if he would remain min the hospital. he refuses.        I have reviewed the nursing notes.    I have reviewed the findings, diagnosis, plan and need for follow up with the patient.       New Prescriptions    No medications on file       Final diagnoses:   Coronary artery disease involving native heart with angina pectoris, unspecified vessel or lesion type (H)   Near syncope - This may be due to low hgb.  I strongly recommend staying for monitoring on telemetry.  a serious heart rhythm problem could be causing your symptoms.   Hypomagnesemia   Hypokalemia - This was low today, in part related to low magnesium.  potassium was given here. keep potassium high in diet.  recheck in clinic, see attached   Abnormal electrocardiogram - there are signs of heart ischemia (angina) on your ekg.  I strongly recommend staying foir observation because of this. please reconsider   Insomnia, unspecified type - I have given a couple of tablets of ambien to help your sleep.  Use 1/2 tab. may use with melatonin.  I strongly recommend this be done here in hospital instead of home. there is a risk of falls, injury, bleeding.  please reconsider staying in hospital   Thrombocytopenia (H) - This is very low and high risk for bleeding.   Chemotherapy-induced neutropenia (H) - please discuss with Dr. Mccauley or oncology clinic 7/5.   Anemia, unspecified type - The hgb level is low and likely causing symptoms. I strongly recommend you stay for transfusion and repeat checks here.  This may be causing angina as well.  with your risk for bleeding (lovenox, low platelets) you could die from this.  please  reconsider.       7/3/2018   [facility] EMERGENCY DEPARTMENT     Carlo Diaz MD  07/03/18 0254

## 2018-07-03 NOTE — ED AVS SNAPSHOT
[facility]    [address]    [facility] [address]    Phone:  [phone]    Fax:  [phone]                                       Chilo Oneil   MRN: [id number]    Department:  [facility]   Date of Visit:  7/3/2018           Patient Information     Date Of Birth          1950        Your diagnoses for this visit were:     Coronary artery disease involving native heart with angina pectoris, unspecified vessel or lesion type (H)     Near syncope This may be due to low hgb.  I strongly recommend staying for monitoring on telemetry.  a serious heart rhythm problem could be causing your symptoms.    Hypomagnesemia     Hypokalemia This was low today, in part related to low magnesium.  potassium was given here. keep potassium high in diet.  recheck in clinic, see attached    Abnormal electrocardiogram there are signs of heart ischemia (angina) on your ekg.  I strongly recommend staying foir observation because of this. please reconsider    Insomnia, unspecified type I have given a couple of tablets of ambien to help your sleep.  Use 1/2 tab. may use with melatonin.  I strongly recommend this be done here in hospital instead of home. there is a risk of falls, injury, bleeding.  please reconsider staying in hospital    Thrombocytopenia (H) This is very low and high risk for bleeding.    Chemotherapy-induced neutropenia (H) please discuss with Dr. Mccauley or oncology clinic 7/5.    Anemia, unspecified type The hgb level is low and likely causing symptoms. I strongly recommend you stay for transfusion and repeat checks here.  This may be causing angina as well.  with your risk for bleeding (lovenox, low platelets) you could die from this.  please reconsider.       You were seen by Carlo Diaz MD.      Follow-up Information     Follow up with Manish Plasencia MD In 1 week.    Specialty:  Family Practice    Contact information:    [address]  [facility] [address]  [phone]          Follow up with [facility].    Specialty:  EMERGENCY MEDICINE    Contact information:    [address] [facility] [address] [phone]    Additional information:    The medical center is located at   [address] [facility] (between [address] and   [address] in [place], [place] north   of [place]).        Follow up with oncology. Call in 2 days.        Discharge Instructions         ICD-10-CM    1. Coronary artery disease involving native heart with angina pectoris, unspecified vessel or lesion type (H) I25.119    2. Near syncope R55     This may be due to low hgb.  I strongly recommend staying for monitoring on telemetry.  a serious heart rhythm problem could be causing your symptoms.   3. Hypomagnesemia E83.42    4. Hypokalemia E87.6     This was low today, in part related to low magnesium.  potassium was given here. keep potassium high in diet.  recheck in clinic, see attached   5. Abnormal electrocardiogram R94.31     there are signs of heart ischemia (angina) on your ekg.  I strongly recommend staying foir observation because of this. please reconsider   6. Insomnia, unspecified type G47.00     I have given a couple of tablets of ambien to help your sleep.  Use 1/2 tab. may use with melatonin.  I strongly recommend this be done here in hospital instead of home. there is a risk of falls, injury, bleeding.  please reconsider staying in hospital   7. Thrombocytopenia (H) D69.6     This is very low and high risk for bleeding.   8. Chemotherapy-induced neutropenia (H) D70.1     T45.1X5A     please discuss with Dr. Mccauley or oncology clinic 7/5.   9. Anemia, unspecified type D64.9     The hgb level is low and likely causing symptoms. I strongly recommend you stay for transfusion and repeat checks here.  This may be causing angina as well.  with your risk for bleeding (lovenox, low platelets) you could die from this.  please  reconsider.         Anemia  Anemia is a condition that occurs when your body does not have enough healthy red blood cells (RBCs). RBCs are the parts of your blood that carry oxygen throughout your body. A protein called hemoglobin allows your RBCs to absorb and release oxygen. Without enough RBCs or hemoglobin, your body doesn't get enough oxygen. Symptoms of anemia may then occur.    What are the symptoms of anemia?  Some people with anemia have no symptoms. But most people have symptoms that range from mild to severe. These can include:    Tiredness (fatigue)    Weakness    Pale skin    Shortness of breath    Dizziness or fainting    Rapid heartbeat    Trouble doing normal amounts of activity    Jaundice (yellowing of your eyes, skin, or mouth; dark urine)  What causes anemia?  Anemia can occur when your body:    Loses too much blood    Does not make enough RBCs    Destroys your RBCs at a faster rate than it can replace them    Does not make a normal amount of hemoglobin in your RBCs  These problems can occur for many reasons, including:    A condition that you are born with (congenital or inherited), such as sickle cell disease or thalassemia    Heavy bleeding for any reason, including injury, surgery, childbirth, or even heavy menstrual periods    Being low in certain nutrients, such as iron, folate, or vitamin B12, possibly from a poor diet or a condition like celiac disease or Crohn's disease    Certain chronic conditions like diabetes, arthritis, or kidney disease    Certain chronic infections like tuberculosis or HIV    Exposure to certain medicines, such as those used for chemotherapy  There are different types of anemia. Your healthcare provider can tell you more about the type of anemia you have and what may have caused it.  How is anemia diagnosed?  To diagnose anemia, your healthcare provider orders blood tests. These can include:    Complete blood cell count (CBC). This test measures the amounts of the  different types of blood cells.    Blood smear. This test checks the size and shape of your blood cells. To do the test, a drop of your blood is viewed under a microscope. A stain is used to make the blood cells easier to see.    Iron studies. These tests measure the amount of iron in your blood. Your body needs iron to make hemoglobin in your RBCs.    Vitamin B12 and folate studies. These tests check for some of the components that help give RBCs a normal size and shape.    Reticulocyte count. This test measures the amount of new RBCs that your bone marrow makes.    Hemoglobin electrophoresis. This test checks for problems with your hemoglobin in RBCs.  How is anemia treated?  Treatment for anemia is based on the type of anemia, its cause, and the severity of your symptoms. Treatments may include:    Diet changes. This involves increasing the amount of certain nutrients in your diet, such as iron, vitamin B12, or folate. Your healthcare provider may also prescribe nutrient supplements.    Medicines. Certain medicines treat the cause of your anemia. Others help build new RBCs or relieve symptoms. If a medicine is the cause of your anemia, you may need to stop or change it.    Blood transfusions. Replacing some of your blood can increase the number of healthy RBCs in your body.    Surgery. In some cases, your doctor may do surgery to treat the underlying cause of anemia. If you need surgery, your healthcare provider will explain the procedure and outline the risks and benefits for you.  What are the long-term concerns?  If you have a certain type of anemia, you can expect a full recovery after treatment. If you have other types of anemia (especially a type you're born with), you will need to manage it for life. Your doctor can tell you more.  Date Last Reviewed: 12/1/2016 2000-2017 [employer]. [address] [place] [address], [place] [address]. All rights reserved. This information is not intended as a  substitute for professional medical care. Always follow your healthcare professional's instructions.          Discharge Instructions for Hypokalemia  You have been diagnosed with hypokalemia. This means you have a low level of potassium in your blood. Potassium helps your nerve and muscle cells work as they should. These cells include the cells in your heart. A low level of potassium in the blood can cause serious problems, such as abnormal heart rhythms and even heart attack.  Diet changes  Eat more potassium-rich foods:    Bananas    Oranges and orange juice    Tomatoes, tomato sauce, and tomato juice    Leafy green vegetables, such as spinach, kale, salad greens, collards, and chard    Melons (all kinds)    Pomegranates    Peas    Beans    Potatoes    Sweet potatoes    Avocados, including guacamole    Vegetable juices, such as V8    Fruit juices    All nuts and seeds    Fish, including tuna, halibut, salmon, cod, snapper, abhi, swordfish, and perch    Milk, including fat-free, low-fat, whole, chocolate, and buttermilk    Soy milk  Other home care    Take a potassium supplement as directed by your healthcare provider.    After strenuous exercise or any activity that causes you to sweat a lot, grab a beverage high in potassium. This includes chocolate milk, coconut water, orange juice, or low-sodium vegetable juices.    Be sure to eat foods or drink fluids that contain potassium if you are having diarrhea or vomiting.    Have your potassium levels checked regularly.    Take all medicines exactly as directed.    Tell your healthcare provider about all prescription and over-the-counter medicines you are taking. This includes herbal products.    Avoid foods that are high in salt. Avoid canned and prepared foods that are high in salt.  Follow-up    Make a follow-up appointment as directed by our staff.    Keep all follow-up appointments. Your healthcare provider needs to monitor your condition closely.     When to  call your healthcare provider  Call your provider right away or go to the emergency room if you have any of the following:    Vomiting    Fatigue    Diarrhea    Rapid, irregular heartbeat    Shortness of breath    Chest pain    Muscle cramps, spasms, or twitching    Weakness    Paralysis   Date Last Reviewed: 6/1/2017 2000-2017 [employer]. [address] [place], [place] [id number]. All rights reserved. This information is not intended as a substitute for professional medical care. Always follow your healthcare professional's instructions.          Discharge Instructions for Hypomagnesemia  You have been diagnosed with hypomagnesemia. This means you don't have enough magnesium in your blood. Magnesium is a mineral. It helps your body work normally. It helps you form bones. It helps muscles and nerves work. And it helps enzymes and hormones work. A very low magnesium level can be serious and lead to seizures and abnormal heart rhythms. And it can lead to heart attack. Other symptoms can include:     Nausea or vomiting    Sleepiness    Weakness    Personality changes    Muscle spasms or tremors    Loss of appetite  Diet changes  You will need to eat more foods that contain magnesium. These include:    Dark green, leafy vegetables, such as salad greens, spinach, kale, chard, and collards    All nuts and nut butters, including peanuts, almonds, pecans, cashews, brazil nuts, macadamia nuts, peanut butter, and almond butter    Sunflower seeds    Pumpkin seeds    Milk, chocolate milk (prepared from powder mix) and eggnog    Soy products, including tofu, soybeans, and soy milk    Beans    Halibut    Baked potatoes (with skin)    Millet, including puffed millet cereal    Brown rice, including brown rice cakes    Avocado, including guacamole    Dried apricots    Bananas    Oatmeal    Bran cereals    Chocolate and cocoa powder    Meal replacement bars and drinks   Other home care    Take a magnesium supplement  as advised.    Have your magnesium levels checked as often as advised. This is important if you are taking a diuretic. This medicine helps flush water from the body.    Tell your healthcare provider about all the medicines and herbal supplements you take. This includes prescribed and over-the-counter medicines. Some of these can lower your magnesium levels.    Take all medicines as directed.    Take your pulse as often as advised. Call your healthcare provider if your pulse rate is higher than 100 beats per minute.    Ask if you need to take a calcium supplement. If your magnesium level is low, you may be low in calcium.  Follow-up  Follow up with your healthcare provider, or as advised. Your healthcare provider will need to watch your condition closely. You may need extra care if you have a health condition that causes your hypomagnesemia.  When to call your healthcare provider  Call your provider right away or go to the emergency room if you have any of the following:    Muscle twitching, spasms, or cramps    Fatigue    Confusion    Loss of consciousness or fainting    Dizziness    Irregular or fast heartbeat    Chest pain or shortness of breath   Date Last Reviewed: 6/1/2017 2000-2017 [employer]. [address] [place] [address]. All rights reserved. This information is not intended as a substitute for professional medical care. Always follow your healthcare professional's instructions.          Insomnia  Insomnia is repeated difficulty going to sleep or staying asleep, or both. Whether you have insomnia is not defined by a specific amount of sleep. Different people need different amounts of sleep, and you may need more or less sleep at different times of your life.  There are 3 major types of insomnia:  short-term, chronic, and  other.   Short-term, or acute insomnia lasts less than 3 months.  The symptoms are temporary and can be linked directly to a stressor, such as the death of a  loved one, financial problems, or a new physical problem.  Short-term insomnia stops when the stressor resolves or the person adapts to its presence.  Chronic insomnia occurs at least 3 times a week and lasts longer than 3 months.  Chronic insomnia can occur when either the cause of the sleeping problem is not clear, or the insomnia does not get better when the stressor is resolved. A number of other criteria are also used to make the diagnosis of chronic insomnia.    Other insomnia  is the third type of insomnia-related sleep disorders.  This description applies to people who have problems getting to sleep or staying asleep, but do not meet all of the factors that describe either short-term or chronic insomnia.    Many things cause insomnia. Different people may have different causes. It can be from an underlying medical or psychological condition, or lifestyle. It can also be primary insomnia, which means no cause can be found.  Causes of insomnia include:    Chronic medical problems- heart disease, gastrointestinal problems, hormonal changes, breathing problems    Anxiety    Stress    Depression    Pain    Work schedule    Sleep apnea    Illegal drugs    Certain medicines  Many different medidcines can affect your sleep, such as stimulants, caffeine, alcohol, some decongestants, and diet pills. Other medicines may include some types of blood pressure pills, steroids, asthma medicines, antihistamines, antidepressants, seizure medicines and statins. Not all of these will affect your sleep, and they shouldn t be stopped without talking to your doctor.  Symptoms of insomnia can include:    Lying awake for long periods at night before falling asleep    Waking up several times during the night    Waking up early in the morning and not being able to get back to sleep    Feeling tired and not refreshed by sleep    Not being able to function properly during the day and finding it hard to  concentrate    Irritability    Tiredness and fatigue during the day  Home care    Review your medicines with your doctor or pharmacist to find out if they can cause insomnia. Not all medicines will affect your sleep, but they shouldn't be stopped without reviewing them with your doctor. There may be serious side effects and consequences from suddenly stopping your medicines. Not taking them may cause strokes, heart attacks, and many other problems.    Caffeine, smoking and alcohol also affect sleep. Limit your daily use and do not use these before bedtime. Alcohol may make you sleepy at first, but as its effects wear off, you may awaken a few hours later and have trouble returning to sleep.    Do not exercise, eat or drink large amounts of liquid within 2 hours of your bedtime.    Improve your sleep habits. Have a fixed bed and wake-up time. Try to keep noise, light and heat in your bedroom at a comfortable level. Try using earplugs or eyeshades if needed.     Avoid watching TV in bed.    If you do not fall asleep within 30 minutes, try to relax by reading or listening to soft music.    Limit daytime napping to one 30 minute period, early in the day.    Get regular exercise. Find other ways to lessen your stress level.    If a medicine was prescribed to help reset your sleep patterns, take it as directed. Sleeping pills are intended for short-term use, only. If taken for too long, the effect wears off while the risk of physical addiction and psychological dependence increases.  Sleep diary  If the cause isn t obvious and it is not improving, try keeping a  sleep diary  for a couple of weeks. Include in it:    The time you go to bed    How long it takes to fall asleep    How many times you wake up    What time you wake up    Your meal times and what you eat    What time you drink alcohol    Your exercise habits and times  Follow-up care  Follow up with your healthcare provider, or as advised. If X-rays or CT scans  were done, you will be notified if there is a change in the reading, especially if it affects treatment.  Call 911  Call 911 if any of these occur:    Trouble breathing    Confusion or trouble waking    Fainting or loss of consciousness    Rapid heart rate    New chest, arm, shoulder, neck or upper back pain    Trouble with speech or vision, weakness of an arm or leg    Trouble walking or talking, loss of balance, numbness or weakness in one side of your body, facial droop  When to seek medical advice  Call your healthcare provider right away if any of these occur:    Extreme restlessness or irritability    Confusion or hallucinations (seeing or hearing things that are not there)    Anxiety, depression    Several days without sleeping  Date Last Reviewed: 11/19/2015 2000-2017 [employer]. [address] [facility], [place] [address]. All rights reserved. This information is not intended as a substitute for professional medical care. Always follow your healthcare professional's instructions.          Near-Fainting with Uncertain Cause  Fainting (syncope) is a temporary loss of consciousness (passing out). This happens when blood flow to the brain is reduced. Near-fainting (near-syncope) is like fainting, but you do not fully pass out. Instead, you feel like you are going to pass out, but do not actually lose consciousness.  Signs and symptoms  The following are symptoms of near-fainting:    Feeling lightheaded or like you are going to faint    Weak pulse    Nausea    Sweating    Blurred vision or feeling like your vision is fading    Palpitations    Chest pain    Hard time breathing    Feeling cool and clammy  Causes  This happens when your blood pressure suddenly drops, and not enough blood flows to your brain.  Common minor causes include:    Sudden emotional stress such as fear, pain, panic, or the sight of blood    Straining or overexertion, such as straining while using the toilet, coughing, or  sneezing    Standing up too quickly, or standing up for too long a time  More serious causes include:    Very slow, fast, or irregular heart rate (arrhythmia)    Dehydration    Significant blood loss    Medicines, or a recent change in medicines. Medicines that can cause fainting include blood pressure or heart medicines.    Heart attack    Heart valve problems  Remember, even minor causes can become serious if you fall and injure yourself, or are driving. You may need more tests. It is very important that you follow up with your doctor as advised.  Home care  The following guidelines will help you care for yourself at home:    Rest today. Resume your normal activities as soon as you are feeling back to normal.    If you become lightheaded or dizzy, lie down right away or sit with your head between your knees.    Drink plenty of fluids and don't skip meals.  Because the exact cause of your near fainting spell is not known, another spell could occur without warning. To stay safe, do not drive a car or use dangerous equipment. Do not take a bath alone. Use a shower instead. Do not swim alone. You can resume these activities when your healthcare provider says that you are no longer in danger of having a near-fainting spell.  Follow-up care  Follow up with your healthcare provider, or as advised.  Call 911  Call 911 if any of the following occur:    Another near-fainting or full fainting spell occurs, and it is not explained by the common causes listed above    Chest, arm, neck, jaw, back or abdominal pain    Shortness of breath    Weakness, tingling, or numbness in one side of the face, or in one arm or leg    Slurred speech, confusion, trouble walking or seeing    Seizure  When to seek medical advice  Call your healthcare provider right away if any of these occur:    Changes in your medicines    Occasional mild lightheadedness, especially when standing up too quickly or straining  Date Last Reviewed: 10/1/2016     [id number] [employer]. [address] [place] [address], [place] [address]. All rights reserved. This information is not intended as a substitute for professional medical care. Always follow your healthcare professional's instructions.          What is Syncope?     The heart pumps blood nonstop to the brain and the rest of the body.      Syncope is an abrupt and temporary loss of consciousness associated with a loss of body muscle tone. It is often a result of a sudden decrease in blood flow to the brain or lack of oxygen delivery to the brain. Syncope is also known as fainting or a blackout. It is then followed by complete and usually rapid spontaneous recovery.  Understanding heart rate and blood pressure changes  Your brain and body need a steady supply of oxygen-rich blood. Your heart rate and blood pressure adjust to maintain this steady supply throughout all your activities.    The heart creates electrical signals that travel through it on pathways. These signals set the heart rate, and tell the heart when to pump blood.    In response to your body s needs, your brain may also trigger changes in your heart rate and blood pressure. There are sensors in the body that detect the amount of blood flow going to the brain and other parts of the body. If these sensors detect low blood flow, they signal the body to increase the amount of fluid in the blood vessels and increase the heart rate to provide more circulation.    The blood leaving the heart with each contraction supplies oxygen and nutrients as it circulates in your brain.  Warning signs  Syncope often happens suddenly. Some warning signs, including dimmed, blackened, or tunnel vision, lightheadedness, sleepiness, or a rapid heartbeat. Some people may feel nauseous or sweaty as well. However, you may have no warning signs at all. After syncope, you recover quickly, but you may feel tired. Do not drive if you are having warning signs that you may  faint.  Is it serious?  Syncope is a common problem with many possible causes. Often these causes are not serious. For instance, syncope can be caused by standing for too long or sitting up too fast. In some cases, you may never faint again. However, syncope in the setting of heart abnormalities can be a warning sign of more serious underlying problems. For this reason, your healthcare provider may order several tests to evaluate heart function and rhythm. If you have had syncope, call your healthcare provider to arrange an evaluation.  In older adults, syncope may be a sign that a heart attack has happened. Do not delay seeking treatment.  Even if the cause of syncope is not serious, there is a risk of injury with falling during loss of consciousness. When possible, finding a cause can improve your safety and reduce risk of injury.  Date Last Reviewed: 5/1/2016 2000-2017 [employer]. [address] [place] [address]. All rights reserved. This information is not intended as a substitute for professional medical care. Always follow your healthcare professional's instructions.            Your next 10 appointments already scheduled     Jul 12, 2018  9:00 AM CDT   Level 5 with ROOM 10 [facility] ([facility])    [facility]  [address]  [facility] [address]   [phone]            Jul 12, 2018  9:30 AM CDT   Return Visit with Mor Mccauley MD   [facility] ([facility])    [facility]  [address]  [facility] [address]   [phone]            Jul 16, 2018 11:00 AM CDT   Level 1 with ROOM 10 [facility] ([facility])    [facility]  [address]  [place] [address]   [phone]            Aug 02, 2018  9:00 AM CDT   Level 5 with ROOM 10 [facility] ([facility])    [facility]  [address] [place] [place] [address]  [facility] [address]   [phone]            Aug 06, 2018 11:30 AM CDT   Level 1 with ROOM 3 [facility] ([facility])    [facility]  [address]  [facility] [address]   [phone]              24 Hour Appointment Hotline       To make an appointment at any [facility] clinic, call [phone] ([phone]). If you don't have a family doctor or clinic, we will help you find one. [facility] [place] clinics are conveniently located to serve the needs of you and your family.             Review of your medicines      START taking        Dose / Directions Last dose taken    melatonin 1 MG Caps   Dose:  3 mg   Quantity:  90 capsule        Take 3 mg by mouth nightly as needed   Refills:  0        zolpidem 5 MG tablet   Commonly known as:  AMBIEN   Dose:  2.5-5 mg   Quantity:  2 tablet        Take 0.5-1 tablets (2.5-5 mg) by mouth nightly as needed for sleep   Refills:  0          Our records show that you are taking the medicines listed below. If these are incorrect, please call your family doctor or clinic.        Dose / Directions Last dose taken    acetaminophen 325 MG tablet   Commonly known as:  TYLENOL   Dose:  650 mg   Quantity:  100 tablet        Take 2 tablets (650 mg) by mouth every 4 hours as needed for mild pain   Refills:  0        ASPIRIN PO   Dose:  81 mg        Take 81 mg by mouth daily   Refills:  0        dexamethasone 4 MG tablet   Commonly known as:  DECADRON   Quantity:  6 tablet        Take 4 mg (1 tab) by mouth twice daily for 6 doses. Start the evening prior to Infusion.   Refills:  3        DULoxetine 30 MG EC capsule   Commonly known as:  CYMBALTA   Dose:  30 mg   Quantity:  30 capsule        Take 1 capsule (30 mg) by mouth daily   Refills:  1        enoxaparin 80 MG/0.8ML injection   Commonly known as:  LOVENOX   Dose:  1 mg/kg   Quantity:  41.4 mL        Inject 0.69 mLs (69  mg) Subcutaneous every 12 hours   Refills:  1        FAMOTIDINE PO   Dose:  20 mg        Take 20 mg by mouth daily   Refills:  0        fentaNYL 12 mcg/hr 72 hr patch   Commonly known as:  DURAGESIC   Dose:  1 patch   Quantity:  30 patch        Place 1 patch onto the skin every 72 hours remove old patch.   Refills:  0        * HYDROmorphone 2 MG tablet   Commonly known as:  DILAUDID   Dose:  2-4 mg   Quantity:  80 tablet        Take 1-2 tablets (2-4 mg) by mouth every 3 hours as needed for moderate to severe pain   Refills:  0        * HYDROmorphone 2 MG tablet   Commonly known as:  DILAUDID   Dose:  2 mg   Quantity:  12 tablet        Take 1 tablet (2 mg) by mouth every 3 hours as needed for pain   Refills:  0        LORazepam 0.5 MG tablet   Commonly known as:  ATIVAN   Quantity:  30 tablet        Take 0.5 mg by mouth every 4 hours as needed for nausea, vomiting, anxiety, or sleep.   Refills:  5        losartan 50 MG tablet   Commonly known as:  COZAAR   Dose:  50 mg   Quantity:  90 tablet        Take 1 tablet (50 mg) by mouth daily   Refills:  3        MAGNESIUM OXIDE PO   Dose:  400 mg        Take 400 mg by mouth daily   Refills:  0        nicotine 14 MG/24HR 24 hr patch   Commonly known as:  NICODERM CQ   Dose:  1 patch   Quantity:  30 patch        Place 1 patch onto the skin daily   Refills:  1        omeprazole 20 MG CR capsule   Commonly known as:  priLOSEC   Dose:  20 mg   Quantity:  90 capsule        Take 1 capsule (20 mg) by mouth daily   Refills:  3        ondansetron 8 MG ODT tab   Commonly known as:  ZOFRAN-ODT   Dose:  8 mg   Quantity:  90 tablet        Take 1 tablet (8 mg) by mouth every 8 hours   Refills:  1        potassium chloride SA 10 MEQ CR tablet   Commonly known as:  K-DUR/KLOR-CON M   Dose:  10 mEq   Quantity:  30 tablet        Take 1 tablet (10 mEq) by mouth daily   Refills:  1        prochlorperazine 10 MG tablet   Commonly known as:  COMPAZINE   Dose:  5 mg   Quantity:  30 tablet         Take 0.5 tablets (5 mg) by mouth every 6 hours as needed for nausea or vomiting   Refills:  1        rosuvastatin 5 MG tablet   Commonly known as:  CRESTOR   Dose:  5 mg   Quantity:  30 tablet        Take 1 tablet (5 mg) by mouth daily   Refills:  3        senna-docusate 8.6-50 MG per tablet   Commonly known as:  SENOKOT-S;PERICOLACE   Dose:  2 tablet   Quantity:  100 tablet        Take 2 tablets by mouth 2 times daily   Refills:  0        VENTOLIN  (90 Base) MCG/ACT Inhaler   Dose:  2 puff   Quantity:  1 Inhaler   Generic drug:  albuterol        Inhale 2 puffs into the lungs every 4 hours as needed for shortness of breath / dyspnea or wheezing   Refills:  1        * Notice:  This list has 2 medication(s) that are the same as other medications prescribed for you. Read the directions carefully, and ask your doctor or other care provider to review them with you.            Prescriptions were sent or printed at these locations (2 Prescriptions)                   Other Prescriptions                Printed at Department/Unit printer (2 of 2)         melatonin 1 MG CAPS               zolpidem (AMBIEN) 5 MG tablet                Procedures and tests performed during your visit     Procedure/Test Number of Times Performed    CBC with platelets differential 1    Comprehensive metabolic panel 1    EKG 12-lead, tracing only 2    Troponin I 1    XR Chest 2 Views 1      Orders Needing Specimen Collection     None      Pending Results     Date and Time Order Name Status Description    7/3/2018 1730 XR Chest 2 Views Preliminary             Pending Culture Results     No orders found from 7/1/2018 to 7/4/2018.            Pending Results Instructions     If you had any lab results that were not finalized at the time of your Discharge, you can call the ED Lab Result RN at [phone]. You will be contacted by this team for any positive Lab results or changes in treatment. The nurses are available 7 days a week from 10A to  6:30P.  You can leave a message 24 hours per day and they will return your call.        Test Results From Your Hospital Stay        7/3/2018  7:31 PM      Component Results     Component Value Ref Range & Units Status    WBC 2.8 (L) 4.0 - 11.0 10e9/L Final    RBC Count 2.22 (L) 4.4 - 5.9 10e12/L Final    Hemoglobin 7.4 (L) 13.3 - 17.7 g/dL Final    Hematocrit 20.2 (L) 40.0 - 53.0 % Final    MCV 91 78 - 100 fl Final    MCH 33.3 (H) 26.5 - 33.0 pg Final    MCHC 36.6 (H) 31.5 - 36.5 g/dL Final    RDW 16.2 (H) 10.0 - 15.0 % Final    Platelet Count 31 (LL) 150 - 450 10e9/L Final    This result has been called to MILADY GALLARDO RN by Ailyn Jaeger on 07 03 2018 at 1930, and has been read back.       Diff Method Manual Differential  Final    % Neutrophils 31.0 % Final    % Lymphocytes 68.0 % Final    % Monocytes 1.0 % Final    % Eosinophils 0.0 % Final    % Basophils 0.0 % Final    Absolute Neutrophil 0.9 (L) 1.6 - 8.3 10e9/L Final    Absolute Lymphocytes 1.9 0.8 - 5.3 10e9/L Final    Absolute Monocytes 0.0 0.0 - 1.3 10e9/L Final    Absolute Eosinophils 0.0 0.0 - 0.7 10e9/L Final    Absolute Basophils 0.0 0.0 - 0.2 10e9/L Final    Anisocytosis Slight  Final    Toxic Granulation Present  Final    Platelet Estimate   Final    Automated count confirmed.  Platelet morphology is normal.         7/3/2018  6:42 PM      Component Results     Component Value Ref Range & Units Status    Sodium 127 (L) 133 - 144 mmol/L Final    Potassium 3.2 (L) 3.4 - 5.3 mmol/L Final    Chloride 90 (L) 94 - 109 mmol/L Final    Carbon Dioxide 29 20 - 32 mmol/L Final    Anion Gap 8 3 - 14 mmol/L Final    Glucose 80 70 - 99 mg/dL Final    Urea Nitrogen 18 7 - 30 mg/dL Final    Creatinine 1.46 (H) 0.66 - 1.25 mg/dL Final    GFR Estimate 48 (L) >60 mL/min/1.7m2 Final    Non  GFR Calc    GFR Estimate If Black 58 (L) >60 mL/min/1.7m2 Final    African American GFR Calc    Calcium 7.8 (L) 8.5 - 10.1 mg/dL Final    Bilirubin Total 0.6 0.2  - 1.3 mg/dL Final    Albumin 2.9 (L) 3.4 - 5.0 g/dL Final    Protein Total 5.7 (L) 6.8 - 8.8 g/dL Final    Alkaline Phosphatase 61 40 - 150 U/L Final    ALT 11 0 - 70 U/L Final    AST 23 0 - 45 U/L Final         7/3/2018  6:42 PM      Component Results     Component Value Ref Range & Units Status    Troponin I ES 0.020 0.000 - 0.045 ug/L Final    The 99th percentile for upper reference range is 0.045 ug/L.  Troponin values   in the range of 0.045 - 0.120 ug/L may be associated with risks of adverse   clinical events.           7/3/2018  7:28 PM      Narrative     CHEST TWO VIEWS  7/3/2018 7:22 PM     HISTORY: Near syncope.    COMPARISON: Chest x-ray 5/15/2018.        Impression     IMPRESSION: No acute cardiopulmonary disease. Ill-defined nodule  appears stable versus slightly less prominent just medial to the level  of the left port venous catheter at the left mid chest.                Thank you for choosing [facility]       Thank you for choosing [facility] for your care. Our goal is always to provide you with excellent care. Hearing back from our patients is one way we can continue to improve our services. Please take a few minutes to complete the written survey that you may receive in the mail after you visit with us. Thank you!        Care EveryWhere ID     This is your Care EveryWhere ID. This could be used by other organizations to access your [facility] medical records  [id number]        Equal Access to Services     [place] : Hadii krysta Vidales, watylerda jax, qaybta kaalmakirit marquez, celina hewitt . So [facility] [phone].    ATENCIÓN: Si habla español, tiene a galaviz disposición servicios gratuitos de asistencia lingüística. Llame al [phone].    We comply with applicable federal civil rights laws and [place] laws. We do not discriminate on the basis of race, color, national origin, age, disability, sex, sexual orientation, or gender identity.            After  Visit Summary       This is your record. Keep this with you and show to your community pharmacist(s) and doctor(s) at your next visit.

## 2018-07-04 NOTE — DISCHARGE INSTRUCTIONS
ICD-10-CM    1. Coronary artery disease involving native heart with angina pectoris, unspecified vessel or lesion type (H) I25.119    2. Near syncope R55     This may be due to low hgb.  I strongly recommend staying for monitoring on telemetry.  a serious heart rhythm problem could be causing your symptoms.   3. Hypomagnesemia E83.42    4. Hypokalemia E87.6     This was low today, in part related to low magnesium.  potassium was given here. keep potassium high in diet.  recheck in clinic, see attached   5. Abnormal electrocardiogram R94.31     there are signs of heart ischemia (angina) on your ekg.  I strongly recommend staying foir observation because of this. please reconsider   6. Insomnia, unspecified type G47.00     I have given a couple of tablets of ambien to help your sleep.  Use 1/2 tab. may use with melatonin.  I strongly recommend this be done here in hospital instead of home. there is a risk of falls, injury, bleeding.  please reconsider staying in hospital   7. Thrombocytopenia (H) D69.6     This is very low and high risk for bleeding.   8. Chemotherapy-induced neutropenia (H) D70.1     T45.1X5A     please discuss with Dr. Mccauley or oncology clinic 7/5.   9. Anemia, unspecified type D64.9     The hgb level is low and likely causing symptoms. I strongly recommend you stay for transfusion and repeat checks here.  This may be causing angina as well.  with your risk for bleeding (lovenox, low platelets) you could die from this.  please reconsider.         Anemia  Anemia is a condition that occurs when your body does not have enough healthy red blood cells (RBCs). RBCs are the parts of your blood that carry oxygen throughout your body. A protein called hemoglobin allows your RBCs to absorb and release oxygen. Without enough RBCs or hemoglobin, your body doesn't get enough oxygen. Symptoms of anemia may then occur.    What are the symptoms of anemia?  Some people with anemia have no symptoms. But most  people have symptoms that range from mild to severe. These can include:    Tiredness (fatigue)    Weakness    Pale skin    Shortness of breath    Dizziness or fainting    Rapid heartbeat    Trouble doing normal amounts of activity    Jaundice (yellowing of your eyes, skin, or mouth; dark urine)  What causes anemia?  Anemia can occur when your body:    Loses too much blood    Does not make enough RBCs    Destroys your RBCs at a faster rate than it can replace them    Does not make a normal amount of hemoglobin in your RBCs  These problems can occur for many reasons, including:    A condition that you are born with (congenital or inherited), such as sickle cell disease or thalassemia    Heavy bleeding for any reason, including injury, surgery, childbirth, or even heavy menstrual periods    Being low in certain nutrients, such as iron, folate, or vitamin B12, possibly from a poor diet or a condition like celiac disease or Crohn's disease    Certain chronic conditions like diabetes, arthritis, or kidney disease    Certain chronic infections like tuberculosis or HIV    Exposure to certain medicines, such as those used for chemotherapy  There are different types of anemia. Your healthcare provider can tell you more about the type of anemia you have and what may have caused it.  How is anemia diagnosed?  To diagnose anemia, your healthcare provider orders blood tests. These can include:    Complete blood cell count (CBC). This test measures the amounts of the different types of blood cells.    Blood smear. This test checks the size and shape of your blood cells. To do the test, a drop of your blood is viewed under a microscope. A stain is used to make the blood cells easier to see.    Iron studies. These tests measure the amount of iron in your blood. Your body needs iron to make hemoglobin in your RBCs.    Vitamin B12 and folate studies. These tests check for some of the components that help give RBCs a normal size and  shape.    Reticulocyte count. This test measures the amount of new RBCs that your bone marrow makes.    Hemoglobin electrophoresis. This test checks for problems with your hemoglobin in RBCs.  How is anemia treated?  Treatment for anemia is based on the type of anemia, its cause, and the severity of your symptoms. Treatments may include:    Diet changes. This involves increasing the amount of certain nutrients in your diet, such as iron, vitamin B12, or folate. Your healthcare provider may also prescribe nutrient supplements.    Medicines. Certain medicines treat the cause of your anemia. Others help build new RBCs or relieve symptoms. If a medicine is the cause of your anemia, you may need to stop or change it.    Blood transfusions. Replacing some of your blood can increase the number of healthy RBCs in your body.    Surgery. In some cases, your doctor may do surgery to treat the underlying cause of anemia. If you need surgery, your healthcare provider will explain the procedure and outline the risks and benefits for you.  What are the long-term concerns?  If you have a certain type of anemia, you can expect a full recovery after treatment. If you have other types of anemia (especially a type you're born with), you will need to manage it for life. Your doctor can tell you more.  Date Last Reviewed: 12/1/2016 2000-2017 [employer]. [address] [place] [address], [place] [address]. All rights reserved. This information is not intended as a substitute for professional medical care. Always follow your healthcare professional's instructions.          Discharge Instructions for Hypokalemia  You have been diagnosed with hypokalemia. This means you have a low level of potassium in your blood. Potassium helps your nerve and muscle cells work as they should. These cells include the cells in your heart. A low level of potassium in the blood can cause serious problems, such as abnormal heart rhythms and even heart  attack.  Diet changes  Eat more potassium-rich foods:    Bananas    Oranges and orange juice    Tomatoes, tomato sauce, and tomato juice    Leafy green vegetables, such as spinach, kale, salad greens, collards, and chard    Melons (all kinds)    Pomegranates    Peas    Beans    Potatoes    Sweet potatoes    Avocados, including guacamole    Vegetable juices, such as V8    Fruit juices    All nuts and seeds    Fish, including tuna, halibut, salmon, cod, snapper, abhi, swordfish, and perch    Milk, including fat-free, low-fat, whole, chocolate, and buttermilk    Soy milk  Other home care    Take a potassium supplement as directed by your healthcare provider.    After strenuous exercise or any activity that causes you to sweat a lot, grab a beverage high in potassium. This includes chocolate milk, coconut water, orange juice, or low-sodium vegetable juices.    Be sure to eat foods or drink fluids that contain potassium if you are having diarrhea or vomiting.    Have your potassium levels checked regularly.    Take all medicines exactly as directed.    Tell your healthcare provider about all prescription and over-the-counter medicines you are taking. This includes herbal products.    Avoid foods that are high in salt. Avoid canned and prepared foods that are high in salt.  Follow-up    Make a follow-up appointment as directed by our staff.    Keep all follow-up appointments. Your healthcare provider needs to monitor your condition closely.     When to call your healthcare provider  Call your provider right away or go to the emergency room if you have any of the following:    Vomiting    Fatigue    Diarrhea    Rapid, irregular heartbeat    Shortness of breath    Chest pain    Muscle cramps, spasms, or twitching    Weakness    Paralysis   Date Last Reviewed: 6/1/2017 2000-2017 [employer]. [address] [place] [address], [place] [address]. All rights reserved. This information is not intended as a substitute  for professional medical care. Always follow your healthcare professional's instructions.          Discharge Instructions for Hypomagnesemia  You have been diagnosed with hypomagnesemia. This means you don't have enough magnesium in your blood. Magnesium is a mineral. It helps your body work normally. It helps you form bones. It helps muscles and nerves work. And it helps enzymes and hormones work. A very low magnesium level can be serious and lead to seizures and abnormal heart rhythms. And it can lead to heart attack. Other symptoms can include:     Nausea or vomiting    Sleepiness    Weakness    Personality changes    Muscle spasms or tremors    Loss of appetite  Diet changes  You will need to eat more foods that contain magnesium. These include:    Dark green, leafy vegetables, such as salad greens, spinach, kale, chard, and collards    All nuts and nut butters, including peanuts, almonds, pecans, cashews, brazil nuts, macadamia nuts, peanut butter, and almond butter    Sunflower seeds    Pumpkin seeds    Milk, chocolate milk (prepared from powder mix) and eggnog    Soy products, including tofu, soybeans, and soy milk    Beans    Halibut    Baked potatoes (with skin)    Millet, including puffed millet cereal    Brown rice, including brown rice cakes    Avocado, including guacamole    Dried apricots    Bananas    Oatmeal    Bran cereals    Chocolate and cocoa powder    Meal replacement bars and drinks   Other home care    Take a magnesium supplement as advised.    Have your magnesium levels checked as often as advised. This is important if you are taking a diuretic. This medicine helps flush water from the body.    Tell your healthcare provider about all the medicines and herbal supplements you take. This includes prescribed and over-the-counter medicines. Some of these can lower your magnesium levels.    Take all medicines as directed.    Take your pulse as often as advised. Call your healthcare provider if your  pulse rate is higher than 100 beats per minute.    Ask if you need to take a calcium supplement. If your magnesium level is low, you may be low in calcium.  Follow-up  Follow up with your healthcare provider, or as advised. Your healthcare provider will need to watch your condition closely. You may need extra care if you have a health condition that causes your hypomagnesemia.  When to call your healthcare provider  Call your provider right away or go to the emergency room if you have any of the following:    Muscle twitching, spasms, or cramps    Fatigue    Confusion    Loss of consciousness or fainting    Dizziness    Irregular or fast heartbeat    Chest pain or shortness of breath   Date Last Reviewed: 6/1/2017 2000-2017 [employer]. [address] [place] [address], [place] [address]. All rights reserved. This information is not intended as a substitute for professional medical care. Always follow your healthcare professional's instructions.          Insomnia  Insomnia is repeated difficulty going to sleep or staying asleep, or both. Whether you have insomnia is not defined by a specific amount of sleep. Different people need different amounts of sleep, and you may need more or less sleep at different times of your life.  There are 3 major types of insomnia:  short-term, chronic, and  other.   Short-term, or acute insomnia lasts less than 3 months.  The symptoms are temporary and can be linked directly to a stressor, such as the death of a loved one, financial problems, or a new physical problem.  Short-term insomnia stops when the stressor resolves or the person adapts to its presence.  Chronic insomnia occurs at least 3 times a week and lasts longer than 3 months.  Chronic insomnia can occur when either the cause of the sleeping problem is not clear, or the insomnia does not get better when the stressor is resolved. A number of other criteria are also used to make the diagnosis of chronic  insomnia.    Other insomnia  is the third type of insomnia-related sleep disorders.  This description applies to people who have problems getting to sleep or staying asleep, but do not meet all of the factors that describe either short-term or chronic insomnia.    Many things cause insomnia. Different people may have different causes. It can be from an underlying medical or psychological condition, or lifestyle. It can also be primary insomnia, which means no cause can be found.  Causes of insomnia include:    Chronic medical problems- heart disease, gastrointestinal problems, hormonal changes, breathing problems    Anxiety    Stress    Depression    Pain    Work schedule    Sleep apnea    Illegal drugs    Certain medicines  Many different medidcines can affect your sleep, such as stimulants, caffeine, alcohol, some decongestants, and diet pills. Other medicines may include some types of blood pressure pills, steroids, asthma medicines, antihistamines, antidepressants, seizure medicines and statins. Not all of these will affect your sleep, and they shouldn t be stopped without talking to your doctor.  Symptoms of insomnia can include:    Lying awake for long periods at night before falling asleep    Waking up several times during the night    Waking up early in the morning and not being able to get back to sleep    Feeling tired and not refreshed by sleep    Not being able to function properly during the day and finding it hard to concentrate    Irritability    Tiredness and fatigue during the day  Home care    Review your medicines with your doctor or pharmacist to find out if they can cause insomnia. Not all medicines will affect your sleep, but they shouldn't be stopped without reviewing them with your doctor. There may be serious side effects and consequences from suddenly stopping your medicines. Not taking them may cause strokes, heart attacks, and many other problems.    Caffeine, smoking and alcohol also  affect sleep. Limit your daily use and do not use these before bedtime. Alcohol may make you sleepy at first, but as its effects wear off, you may awaken a few hours later and have trouble returning to sleep.    Do not exercise, eat or drink large amounts of liquid within 2 hours of your bedtime.    Improve your sleep habits. Have a fixed bed and wake-up time. Try to keep noise, light and heat in your bedroom at a comfortable level. Try using earplugs or eyeshades if needed.     Avoid watching TV in bed.    If you do not fall asleep within 30 minutes, try to relax by reading or listening to soft music.    Limit daytime napping to one 30 minute period, early in the day.    Get regular exercise. Find other ways to lessen your stress level.    If a medicine was prescribed to help reset your sleep patterns, take it as directed. Sleeping pills are intended for short-term use, only. If taken for too long, the effect wears off while the risk of physical addiction and psychological dependence increases.  Sleep diary  If the cause isn t obvious and it is not improving, try keeping a  sleep diary  for a couple of weeks. Include in it:    The time you go to bed    How long it takes to fall asleep    How many times you wake up    What time you wake up    Your meal times and what you eat    What time you drink alcohol    Your exercise habits and times  Follow-up care  Follow up with your healthcare provider, or as advised. If X-rays or CT scans were done, you will be notified if there is a change in the reading, especially if it affects treatment.  Call 911  Call 911 if any of these occur:    Trouble breathing    Confusion or trouble waking    Fainting or loss of consciousness    Rapid heart rate    New chest, arm, shoulder, neck or upper back pain    Trouble with speech or vision, weakness of an arm or leg    Trouble walking or talking, loss of balance, numbness or weakness in one side of your body, facial droop  When to seek  medical advice  Call your healthcare provider right away if any of these occur:    Extreme restlessness or irritability    Confusion or hallucinations (seeing or hearing things that are not there)    Anxiety, depression    Several days without sleeping  Date Last Reviewed: 11/19/2015 2000-2017 "[employer]". [address] [place] [address]. All rights reserved. This information is not intended as a substitute for professional medical care. Always follow your healthcare professional's instructions.          Near-Fainting with Uncertain Cause  Fainting (syncope) is a temporary loss of consciousness (passing out). This happens when blood flow to the brain is reduced. Near-fainting (near-syncope) is like fainting, but you do not fully pass out. Instead, you feel like you are going to pass out, but do not actually lose consciousness.  Signs and symptoms  The following are symptoms of near-fainting:    Feeling lightheaded or like you are going to faint    Weak pulse    Nausea    Sweating    Blurred vision or feeling like your vision is fading    Palpitations    Chest pain    Hard time breathing    Feeling cool and clammy  Causes  This happens when your blood pressure suddenly drops, and not enough blood flows to your brain.  Common minor causes include:    Sudden emotional stress such as fear, pain, panic, or the sight of blood    Straining or overexertion, such as straining while using the toilet, coughing, or sneezing    Standing up too quickly, or standing up for too long a time  More serious causes include:    Very slow, fast, or irregular heart rate (arrhythmia)    Dehydration    Significant blood loss    Medicines, or a recent change in medicines. Medicines that can cause fainting include blood pressure or heart medicines.    Heart attack    Heart valve problems  Remember, even minor causes can become serious if you fall and injure yourself, or are driving. You may need more tests. It is  very important that you follow up with your doctor as advised.  Home care  The following guidelines will help you care for yourself at home:    Rest today. Resume your normal activities as soon as you are feeling back to normal.    If you become lightheaded or dizzy, lie down right away or sit with your head between your knees.    Drink plenty of fluids and don't skip meals.  Because the exact cause of your near fainting spell is not known, another spell could occur without warning. To stay safe, do not drive a car or use dangerous equipment. Do not take a bath alone. Use a shower instead. Do not swim alone. You can resume these activities when your healthcare provider says that you are no longer in danger of having a near-fainting spell.  Follow-up care  Follow up with your healthcare provider, or as advised.  Call 911  Call 911 if any of the following occur:    Another near-fainting or full fainting spell occurs, and it is not explained by the common causes listed above    Chest, arm, neck, jaw, back or abdominal pain    Shortness of breath    Weakness, tingling, or numbness in one side of the face, or in one arm or leg    Slurred speech, confusion, trouble walking or seeing    Seizure  When to seek medical advice  Call your healthcare provider right away if any of these occur:    Changes in your medicines    Occasional mild lightheadedness, especially when standing up too quickly or straining  Date Last Reviewed: 10/1/2016    9340-6454 [employer]. [address] [place] [address]. All rights reserved. This information is not intended as a substitute for professional medical care. Always follow your healthcare professional's instructions.          What is Syncope?     The heart pumps blood nonstop to the brain and the rest of the body.      Syncope is an abrupt and temporary loss of consciousness associated with a loss of body muscle tone. It is often a result of a sudden decrease in blood  flow to the brain or lack of oxygen delivery to the brain. Syncope is also known as fainting or a blackout. It is then followed by complete and usually rapid spontaneous recovery.  Understanding heart rate and blood pressure changes  Your brain and body need a steady supply of oxygen-rich blood. Your heart rate and blood pressure adjust to maintain this steady supply throughout all your activities.    The heart creates electrical signals that travel through it on pathways. These signals set the heart rate, and tell the heart when to pump blood.    In response to your body s needs, your brain may also trigger changes in your heart rate and blood pressure. There are sensors in the body that detect the amount of blood flow going to the brain and other parts of the body. If these sensors detect low blood flow, they signal the body to increase the amount of fluid in the blood vessels and increase the heart rate to provide more circulation.    The blood leaving the heart with each contraction supplies oxygen and nutrients as it circulates in your brain.  Warning signs  Syncope often happens suddenly. Some warning signs, including dimmed, blackened, or tunnel vision, lightheadedness, sleepiness, or a rapid heartbeat. Some people may feel nauseous or sweaty as well. However, you may have no warning signs at all. After syncope, you recover quickly, but you may feel tired. Do not drive if you are having warning signs that you may faint.  Is it serious?  Syncope is a common problem with many possible causes. Often these causes are not serious. For instance, syncope can be caused by standing for too long or sitting up too fast. In some cases, you may never faint again. However, syncope in the setting of heart abnormalities can be a warning sign of more serious underlying problems. For this reason, your healthcare provider may order several tests to evaluate heart function and rhythm. If you have had syncope, call your  healthcare provider to arrange an evaluation.  In older adults, syncope may be a sign that a heart attack has happened. Do not delay seeking treatment.  Even if the cause of syncope is not serious, there is a risk of injury with falling during loss of consciousness. When possible, finding a cause can improve your safety and reduce risk of injury.  Date Last Reviewed: 5/1/2016 2000-2017 [employer]. [address] [place] [address], [place] [address]. All rights reserved. This information is not intended as a substitute for professional medical care. Always follow your healthcare professional's instructions.

## 2018-07-05 NOTE — PROGRESS NOTES
Addendum at 3:59 PM:    PAS-RR  D: Per DHS regulation, SW completed and submitted PAS-RR to [place] [employer] Direct Connect via the [employer] Line.  PAS-RR confirmation # is :  [id number]    I: SW spoke with pt and they are aware a PAS-RR has been submitted.  SW reviewed with pt that they may be contacted for a follow up appointment within 10 days of hospital discharge if their SNF stay is < 30 days.  Contact information for [facility] was also provided.    A: Pt verbalized understanding.    P: Further questions may be directed to [facility] at #[phone], option #4 for PAS-RR staff.    Addendum at 1:58 PM:  Wolf received return call from Deepthi at [facility] accepting the pt to the TCU for tomorrow.  WOLF called and spoke with the pt.  Fredyd shared that he is unable to go into the TCU until Monday morning due to previous family engagements.  Sw explained his past Medicare qualifying stay and explained that he MUST go into [facility] on 7/9/18 if he wants to use his Medicare to pay for a TCU bed and not go into a long term care bed under his MA.    Pt explained that his siblings, who are both disabled, have secured rides to come and visit with him this weekend so that they can have a final weekend together.  Pt shared that his family lost his brother in law less than a year ago, so this weekend is important to them as a family.  WOLF discussed with Deepthi in admissions at [facility] and she agreed to allow the pt to admit on Monday at 9AM.    WOLF brought TCU admission orders to MD Diaz in the ED as he was the last provider to see the pt.  MD Diaz reviewed and completed the TCU admission orders.  WOLF faxed orders to Deepthi at [facility] for review.    WOLF spoke with pt again and discussed what he needed to bring with him for his TCU stay and what expectations [facility] has for their residents.   Pt was informed he cannot drink or smoke at the TCU.  He stated he is on the nicotine  patch and has not drank in months.    [facility] called this writer.  Pt has a new Case Manger, Jennifer Borja, will be in the office on 7-9-18, and will call this writer.  [phone].    YASMIN to continue to assist as needed.    Addendum:  Pt returned writer's call.  SW asked the pt if he was willing to come into the Oncology Department for labs, as requested by the Oncology RN, per previous note in EMR.  Pt stated that he will come into the clinic on 7-12-18 for his appointment and get labs then.    YASMIN and discussed pt's health.  Pt shared that he is weak, has fallen 2x over the past several days, but was able to get a good night's rest last night, so states he feels a bit better now.  SW asked the pt if he thinks it might be time for this writer to look into a short term TCU stay as this writer expressed concern over pt's weakness and overall health.  Pt stated that he has been thinking more and more about going somewhere for PT as he is really weak right now and asked this writer to explain how he can go somewhere for cares.  SW explained Medicare & [place] requirements for a TCU stay and shared the local TCUs in this area.  Pt gave this writer VERBAL PERMISSION to send referrals to:  1.  [facility]--no beds   2.  [facility]--Deepthi at [phone]/ she has Epic access to review pt's EMR  3.  [facility]--Pauline [phone]  4.  [facility]--Alix HOFFMAN is working on [place] referrals to [facility] [place] [facility] [place].  AYSMIN also called the ED and spoke with MD Carlo Diaz who saw the pt in the ED yesterday and asked if he is willing to sign TCU admission orders for the pt into a TCU.    YASMIN to continue to work on TCU placement for the pt today.      Clinic Care Coordination Contact  [facility]/Voicemail    Referral Source: ED Follow-Up    Clinical Data: Care Coordinator Outreach    Outreach attempted x 1.  Left message a very detailed message on pt's voicemail asking writer  to return call.  Pt rarely answers his phone due to not wanting to use all of his minutes, but will typically returns call to writer within a few hours once he sees that this writer has called him.  SW awaiting return call from pt.    Plan: Care Coordinator will continue to follow pt for cares.  Pt has a new [employer]  effective July 1, 2018.  SW placed call to [employer] [place] member services at [phone], and learned that due to the Holiday week, the pt has not been assigned yet to a [facility]'s () .  Sw was given [employer] phone number.  SW called [facility], spoke with Pauline, [phone], who looked the pt up and informed this writer that they have all of his information and will bring forth his medical record to a Supervisor today for his case to be assigned to a  today.  SW asked that the new  this writer as well as the pt for assistance and communication.  SW awaiting call from  .    Sofya Unger  Social Work Care Coordinator  [facility]  [phone]

## 2018-07-06 NOTE — TELEPHONE ENCOUNTER
Attempted to call and check in on pt. LM for a return call with direct line.     Rylee Ca RN on 7/6/2018 at 3:42 PM

## 2018-07-09 NOTE — PROGRESS NOTES
Clinic Care Coordination Contact    Clinic Care Coordination Contact  OUTREACH    Referral Information:  Continued cares for pt/ ED follow up cares/ TCU admission today.  Referral Source: PCP/Oncology and pt.    SW received return call from [facility] , Jennifer Bar, [phone].  She is the pt's new  via his MA insurance plan.   YASMIN provided Jennifer with brief hx on pt's cares and background.  YASMIN also explained to Jennifer that pt is enrolling into [facility] today for short term TCU stay.  Jennifer stated she will go and meet the pt tomorrow at [place] to begin working with him and getting him services for when he discharges to home.     YASMIN then placed call to pt and discussed his TCU stay today.  Pt stated he was packed and ready to go to [place] today, but he needed an address of the TCU.  YASMIN provided pt with address, directions and phone number of the TCU.     Chief Complaint   Patient presents with     Clinic Care Coordination - Follow-up     social work        Universal Utilization:    Utilization    Last refreshed: 7/7/2018  9:27 PM:  No Show Count (past year) 8       Last refreshed: 7/7/2018  9:27 PM:  ED visits 9       Last refreshed: 7/7/2018  9:27 PM:  Hospital admissions 3          Current as of: 7/7/2018  9:27 PM         Clinical Concerns:  Current Medical Concerns:  Pt states he is weak, has fallen several times over the past week.    Current Behavioral Concerns: Pt continues with anxiety due to his dx and quite possibly his end of life cares and no real plan of care in place yet.    Education Provided to patient: YASMIN attempting to assist pt with creating a plan of care for end of life, however, pt remains independent with his thoughts & cares, until today, when he agreed to enter a TCU for short term assistance.      Health Maintenance Reviewed:    Clinical Pathway: None    Medication Management:  Pt struggles with managing his medications; uncertain how  to take his medications.  Now will be getting them given to him at the TCU & staff will be educating him on medication management.    Psychosocial:  Caodaism or spiritual beliefs that impact treatment::  (Hinduism)     Financial/Insurance:  Pt receives SSDI of $776/month.  Pt is on Medicare & [employer] MA     Resources and Interventions:  Current Resources:  [facility] Waiver, [facility] [place]   Community Resources: Transitional Care ([facility], [phone])   Goals        General    Medication 1 (pt-stated)     Notes - Note edited  6/26/2018  5:03 PM by Sofya Paulino LSW    #1  Goal Statement: I will learn to take my medications properly   Measure of Success: I will take my medications properly  Supportive Steps to Achieve: I will work with either MTM or RNCC to learn how to take my medications correctly  Barriers: I have so many medications to keep track of it gets confusing.  Strengths: I will use a pill box to organize my medications  Date to Achieve By: 3 months        Pain Management (pt-stated)     Notes - Note edited  6/26/2018  5:04 PM by Sofya Paulino LSW    #3  SWCC to discuss with PCP, but would recommend a Palliative Care consult for symptom management.    Pt met with Palliative Care MD yesterday, 5-9-18, and may continue to do so.    Sofya Unger  Social Work Care Coordinator  [facility]  [phone]          Transportation (pt-stated)     Notes - Note edited  6/26/2018  5:04 PM by Sofya Paulino LSW    #2  Goal Statement: Pt needs dependable transportation to get him to his clinic appointments.  Measure of Success: Pt will attend appointments if he has dependable transportation  Supportive Steps to Achieve: SW and pt will work together to secure his MA application will allow him to get transportation benefits.  SW to also provide pt with community resources.  Barriers: Access to pt's bank statements has been a barrier.     Strengths: Pt is motivated to complete the MA application  Date to Achieve By: 3 months            Patient/Caregiver understanding: Pt is aware that he is in a short term TCU stay to help him get stronger, teach him his medications, and to assist with his weakness.    Outreach Frequency: weekly (PRN)  Future Appointments              In 3 days ROOM 10 [facility]    In 3 days Mor Mccauley MD [facility]    In 1 week ROOM 10 [facility]    In 3 weeks ROOM 10 [facility]    In 4 weeks ROOM 3 [facility]          Plan: SW to continue to assist with planning and cares for this pt.    Sofya Unger  Social Work Care Coordinator  [facility]  [phone]

## 2018-07-10 PROBLEM — F10.20 UNCOMPLICATED ALCOHOL DEPENDENCE (H): Status: ACTIVE | Noted: 2018-01-01

## 2018-07-10 PROBLEM — D61.818 PANCYTOPENIA (H): Status: ACTIVE | Noted: 2018-01-01

## 2018-07-10 PROBLEM — F41.1 GAD (GENERALIZED ANXIETY DISORDER): Status: ACTIVE | Noted: 2018-01-01

## 2018-07-10 PROBLEM — I25.2 H/O ACUTE MYOCARDIAL INFARCTION: Status: ACTIVE | Noted: 2018-01-01

## 2018-07-10 PROBLEM — I10 ESSENTIAL HYPERTENSION: Status: ACTIVE | Noted: 2018-01-01

## 2018-07-10 PROBLEM — N18.30 CKD (CHRONIC KIDNEY DISEASE) STAGE 3, GFR 30-59 ML/MIN (H): Status: ACTIVE | Noted: 2018-01-01

## 2018-07-10 PROBLEM — R41.89 COGNITIVE IMPAIRMENT: Status: ACTIVE | Noted: 2018-01-01

## 2018-07-10 PROBLEM — N17.9 AKI (ACUTE KIDNEY INJURY) (H): Status: ACTIVE | Noted: 2018-01-01

## 2018-07-10 PROBLEM — G47.00 INSOMNIA, UNSPECIFIED TYPE: Status: ACTIVE | Noted: 2018-01-01

## 2018-07-10 PROBLEM — R29.6 FALLS FREQUENTLY: Status: ACTIVE | Noted: 2018-01-01

## 2018-07-10 PROBLEM — R11.2 NAUSEA AND VOMITING, INTRACTABILITY OF VOMITING NOT SPECIFIED, UNSPECIFIED VOMITING TYPE: Status: ACTIVE | Noted: 2018-01-01

## 2018-07-10 PROBLEM — I26.99 OTHER PULMONARY EMBOLISM WITHOUT ACUTE COR PULMONALE, UNSPECIFIED CHRONICITY (H): Status: ACTIVE | Noted: 2018-01-01

## 2018-07-10 PROBLEM — E63.9 POOR NUTRITION: Status: ACTIVE | Noted: 2018-01-01

## 2018-07-10 PROBLEM — R55 NEAR SYNCOPE: Status: ACTIVE | Noted: 2018-01-01

## 2018-07-10 PROBLEM — I25.10 CORONARY ARTERY DISEASE INVOLVING NATIVE HEART WITHOUT ANGINA PECTORIS, UNSPECIFIED VESSEL OR LESION TYPE: Status: ACTIVE | Noted: 2018-01-01

## 2018-07-10 PROBLEM — R53.81 DEBILITY: Status: ACTIVE | Noted: 2018-01-01

## 2018-07-10 PROBLEM — M25.512 LEFT SHOULDER PAIN, UNSPECIFIED CHRONICITY: Status: ACTIVE | Noted: 2018-01-01

## 2018-07-10 NOTE — PROGRESS NOTES
[facility]  PRIMARY CARE PROVIDER AND CLINIC:  Manish Plasenciaangie [address] [facility] / [facility] [id number]  Chief Complaint   Patient presents with     Hospital F/U     [place] Medical Record Number:  [id number]    HPI:    Chilo Oneil is a 68 year old  (1950),admitted to the [facility]  from Hospital  [facility].  Hospital stay 7/3/18.  Admitted to this facility for  rehab, medical management and nursing care.  HPI information obtained from: facility chart records, facility staff and patient report.      Mr. Oneil is a 69 y/o whom was found to have Lung cancer in Dec 2017 after noting ongoing Left shoulder pain.  CT noted several Left nodules and a small one on the Right.  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  Later PET CT did note some hilar lymph node involvement, but no other overt positive findings, MRI brain was negative for malignancy.  He met with Oncology and has started chemotherapy with Cisplatin and Alimta.  In early June he was found to have a Right sided PE, CT noted unchanged nodules in the Left side.      A few days ago he reported to the ER due to insomnia, dizziness, ongoing Left shoulder pain, and anxiety.  Workup there noted significant KYMBERLY with increased creatine, significant electrolyte imbalance, low magnesium, and significant pancytopenia.  ER also had concerns for possible cardiac ischemia.  In review of the DC summary Mr. Oneil was reluctant to have any testing, did not want to remain in ER for evaluation, declined transfusion and treatment thus discharged despite strong recommendation for in-patient care.      EMR shows that eventually patient outreach services reached out to Mr. Oneil and us here at the TCU and due to several falls at home, has agreed to come to the TCU for ongoing management and PT/OT.     In meeting with Mr. Oneil, he was pleasant but  "quick to tell us \"I'm tired of meeting with you people, why do I have to talk to anyone\".  He reported, \"I only agreed to this for 6 days, I get to leave in 6 days right.\".  He was reluctant to talk with me but we did spend some time where he reported his light headedness/dizziness, N/V, pain and everything was resolved and he felt great.  He did endorse his SOB/ACOSTA is slightly worse than normal, but no other issues.  When I reviewed the seriousness of his current status, he did not give me the impression that he understood.  When I asked him what would he specifically like us to help him with, it was \"Just get rid of my cancer\".  He continued to decline transfusions, did not want a workup.      We did briefly discuss hospice, and he did seem to have some interest in that, but we did not pursue greatly as he is new to us, we are unclear on whom his social support is, what his cognition level is.  He is agreeable to staying at the TCU for now, reports he's leaving Friday no matter what.    I did ask about his sister.  He reports her  just  several weeks ago due to cancer.  Reports she knows about his cancer, but does not know how bad we are reporting his current condition.  He reports he does NOT want us calling her at this time.     CODE STATUS/ADVANCE DIRECTIVES DISCUSSION:   DNR / DNI  Patient's living condition: lives with roommates.      ALLERGIES:Cipro [ciprofloxacin]  PAST MEDICAL HISTORY:  has a past medical history of GERD (gastroesophageal reflux disease); HTN (hypertension); Lung cancer (H); and MI (myocardial infarction). He also has no past medical history of Complication of anesthesia; Difficult intubation; Malignant hyperthermia; or PONV (postoperative nausea and vomiting).  PAST SURGICAL HISTORY:  has a past surgical history that includes fracture tx, ankle rt/lt (); Open reduction internal fixation hip nailing (2013); Thoracoscopic biopsy lung (Left, 2018); vascular surgery; " and Insert port vascular access (Left, 5/15/2018).  FAMILY HISTORY: family history includes Diabetes in his brother and father.  SOCIAL HISTORY:  reports that he has been smoking Cigarettes.  He started smoking about 50 years ago. He has a 7.05 pack-year smoking history. He has never used smokeless tobacco. He reports that he drinks alcohol. He reports that he uses illicit drugs, including Marijuana.    Post Discharge Medication Reconciliation Status: discharge medications reconciled and changed, per note/orders (see AVS).  Current Outpatient Prescriptions   Medication Sig Dispense Refill     acetaminophen (TYLENOL) 325 MG tablet Take 2 tablets (650 mg) by mouth every 4 hours as needed for mild pain 100 tablet      ASPIRIN PO Take 81 mg by mouth daily        DULoxetine (CYMBALTA) 30 MG EC capsule Take 1 capsule (30 mg) by mouth daily 30 capsule 1     fentaNYL (DURAGESIC) 12 mcg/hr 72 hr patch Place 1 patch onto the skin every 72 hours remove old patch. 30 patch 0     HYDROmorphone (DILAUDID) 2 MG tablet Take 1-2 tablets (2-4 mg) by mouth every 3 hours as needed for moderate to severe pain 80 tablet 0     LORazepam (ATIVAN) 0.5 MG tablet Take 0.5 mg by mouth every 4 hours as needed for nausea, vomiting, anxiety, or sleep. 30 tablet 5     losartan (COZAAR) 50 MG tablet Take 1 tablet (50 mg) by mouth daily 90 tablet 3     MAGNESIUM OXIDE PO Take 400 mg by mouth daily       melatonin 1 MG CAPS Take 3 mg by mouth nightly as needed 90 capsule 0     nicotine (NICODERM CQ) 14 MG/24HR 24 hr patch Place 1 patch onto the skin daily 30 patch 1     omeprazole (PRILOSEC) 20 MG CR capsule Take 1 capsule (20 mg) by mouth daily 90 capsule 3     ondansetron (ZOFRAN-ODT) 8 MG ODT tab Take 1 tablet (8 mg) by mouth every 8 hours 90 tablet 1     potassium chloride SA (K-DUR/KLOR-CON M) 10 MEQ CR tablet Take 1 tablet (10 mEq) by mouth daily 30 tablet 1     prochlorperazine (COMPAZINE) 10 MG tablet Take 0.5 tablets (5 mg) by mouth every 6  "hours as needed for nausea or vomiting 30 tablet 1     rosuvastatin (CRESTOR) 5 MG tablet Take 1 tablet (5 mg) by mouth daily 30 tablet 3     senna-docusate (SENOKOT-S;PERICOLACE) 8.6-50 MG per tablet Take 2 tablets by mouth 2 times daily 100 tablet 0     VENTOLIN  (90 Base) MCG/ACT Inhaler Inhale 2 puffs into the lungs every 4 hours as needed for shortness of breath / dyspnea or wheezing 1 Inhaler 1     ZOLPIDEM TARTRATE PO Take 2.5 mg by mouth nightly as needed for sleep         ROS:  7 point ROS done including, light headedness/dizziness, fever/chills, pain, Resp, CV, GI, and  and is negative other than noted in HPI.      Exam:  /87  Pulse 72  Temp 98.3  F (36.8  C)  Resp 18  Wt 135 lb 3.2 oz (61.3 kg)  SpO2 100%  BMI 16.9 kg/m2     GENERAL APPEARANCE:  Elderly unkept, ill appearing male sitting up in WC, NAD.   ENT:  Mouth and oropharynx normal, moist mucous membranes.   RESP:  Lungs diminished in Left upper lobe, otherwise CTA. Regular relaxed breathing effort.  Mild non-productive cough.   CV: S1/S2 no murmur or rubs.  Regular rhythm and rate.  No generalized edema.   ABDOMEN:  Protuberant, soft, non-tender with active bowel sounds.   No guarding, rigidity, or rebound tenderness.  EXTREMITIES:  No lower extremity edema, no calf tenderness.   PSYCH: Alert and orientated, pleasant and cooperative.  Unclear if there is a degree of cognitive/memory impairment, paranoia and/or coping/denial or overwhelmed component.  But reports \"I'm fine\" or \"Yea I know that\" to most questions.      Lab/Diagnostic data: I have personally reviewed labs.  I have personally reviewed images.      CBC RESULTS:   Recent Labs   Lab Test  07/03/18   1750  06/21/18   0900   WBC  2.8*  6.2   RBC  2.22*  3.10*   HGB  7.4*  10.1*   HCT  20.2*  29.3*   MCV  91  95   MCH  33.3*  32.6   MCHC  36.6*  34.5   RDW  16.2*  16.6*   PLT  31*  607*       Last Basic Metabolic Panel:  Recent Labs   Lab Test  07/03/18   1750  " "06/21/18   0900   NA  127*  132*   POTASSIUM  3.2*  3.7   CHLORIDE  90*  96   DAMARI  7.8*  8.5   CO2  29  28   BUN  18  11   CR  1.46*  1.10   GLC  80  103*       Liver Function Studies -   Recent Labs   Lab Test  07/03/18   1750  06/09/18   0715   PROTTOTAL  5.7*  4.9*   ALBUMIN  2.9*  2.1*   BILITOTAL  0.6  0.5   ALKPHOS  61  79   AST  23  22   ALT  11  18         Lab Results   Component Value Date    A1C 4.7 04/04/2018       ASSESSMENT/PLAN:  Carcinoma, lung, left (H)  Other pulmonary embolism without acute cor pulmonale, unspecified chronicity (H)  Pancytopenia  Left shoulder pain, unspecified chronicity  Mr. Oneil has received at least 2 sessions of Cisplatin and Alimta, possible a 3rd set.  Reports he was to get q 21 days.  Reports the N/V is really bad the first few days after, but slowly improves, denies currently.  Reports all food tastes bad and no appetite since starting Chemo.  He appears unkept, malnourished, cachetic.  Collateral input from , Oncology also endorse THC use and heavy alcohol use contributing.      Our biggest concern is his significant pancytopenia and electrolyte imbalance.  Labs are several days old.  Currently he denies light headedness/dizziness subjectively, is hemodynamically stable, no overt bleeding issues.      Note he has declined transfusions from the ER, reports he will \"Think\" about them here with us.  Does note he would like to discuss with Dr. Mccauley before considering.     We spoke with Dr. Mccauley today and gave him an update.  Dr. Mccauley reports he will see him Thursday early AM.  We are working on getting him transportation there.     -Will recheck labs for new values.   -Continues on K/Mg supplement for now.   -Started MVT supplement.   -Started Boost and will have nutrition eval and treat.   --Asked TCU to make sure he get's what he needs to go to Onco appointment Thurs.   --Low threshold to return to hospital for hypotension or bleeding issues.     Due " to his pancytopenia:  -We dc'd ASA for now.   -Continues on Lovenox for his PE.    --Low threshold to stop if bleeding complications.     Falls frequently  Near syncope  Hyponatremia, report of heavy EtOH use, pancytopenia, undergoing chemotherapy, and malnutrition all contributing, thus etiology unclear.  ER did have concerns for possible cardiac ischemia, but my concern is lower at this time.    -Plan as noted above.   -PT/OT to eval and treat.     Coronary artery disease involving native heart without angina pectoris, unspecified vessel or lesion type  H/O acute myocardial infarction  Essential hypertension  Review of VS's show VSS and within acceptable range.   Does have significant KYMBERLY, thus will stop ARB.   In ER he had some non specific ST changes, that improved with time.  They had concern for possible ischemic issues, but troponin was negative/flat.  No current chest pain.    -Stopped Losartan.   -Started Norvasc due to KYMBERLY.   -Continues on ASA, statin.    -On Lovenox for his PE.     CKD (chronic kidney disease) stage 3, GFR 30-59 ml/min  KYMBERLY (acute kidney injury) (H)  Creatine baseline around 0.6-0.7 was 1.13 in hospital.  Suspect pre-renal etiology in setting of poor po intake and chemotherapy, but not fully evaluated.   -Plan as noted above.     Uncomplicated alcohol dependence (H)  Poor nutrition  Nausea and vomiting, intractability of vomiting not specified, unspecified vomiting type  We did not fully address his EtOH use or other possible illicit drug use this visit, due to already reluctant to talk with us and wanted to establish trusting relationship.  Oncology reports known EtOH use,  noted the THC use.  Suspect there may be a degree of cognitive impairment or Wernicke's present.   -Plan as noted above, for nutrition.   -Continue PRN Ativan.   -Started MVT and Thiamine for now.     ADA (generalized anxiety disorder)  Insomnia, unspecified type  -Changed Melatonin to scheduled.    -Changed Ambien to 5 mg's q daily PRN.   -Continue PTA Cymbalta.     Debility  Cognitive impairment  -PT/OT to eval and treat.     Orders:  1. Has Oncology appointment with Dr. Mccauley on 7/12 early AM, Help him get to that appointment please. Dx: Lung cancer  2. Have Dietitian evaluate and treat Dx: Poor nutrition/Intake  3. D/C ASA for now  4. Hold decadron/denamethasone for now, given by Oncology  5. D/C Losartan  6. Start Amlodipine/Norvasc 10 mg daily, hold for SBP, 110 Dx: HTN  7, D/C Famotidine  8. Change melatonin to 5 mg at HS schededuled Dx: Insomnia  9. Change ambien to 5 mg QHS PRN for sleep enhancement  10. Start Thiamine 100 mg daily Dx: EtOH dependencey  11. Start Centrum silver or other generic multivitamin tab daily Dx: Poor PO intake  12. Start 1 can boost TID with meals Dx: Poor intake  13. Add to nicotine patch, hold and remove if patient is smoking  14. Miralax 17 g Daily scheduled  15. Get CBC with diff, BMP, Magnesium lab on July 12 Dx: KYMBERLY  16. Change hydromorphone to 2 mg for pain rated 1-5, 4 mg for pain 6-10 every 3 hours PRN for pain  17. Low threshold to send to hospital for symptoms of hypotension or bleeding complications. Dx: Pancytopenia/Cancer.       Total time spent with patient visit at the [facility] nursing facility was 75 min including patient visit, review of past records and Call with Oncology. Greater than 50% of total time spent with counseling and coordinating care due to complex medical case, review of HPI, development of POC, patient education and review of POC with pt.      Electronically signed by:  Asad Astudillo, NATALIE CNP     **ADDENDUM**  Got update from RN at 1730 that Mr. Oneil requested help to pack as he has a friend coming to get him he is leaving.  I asked RN to assess more and encourage him to stay.  She came back and reported nope he's leaving.  On way to his room I saw him in [address].  I told him I do not feel this is a good idea and recommended he stay, so  "we can get him feeling better and try to improve his health.  Reported he is sick and leaving could result in him getting worse or dying.  He reports \"I have insurance, I will just go back to ER.\"  I tried again to recommend he stay, he just reported \"I appreciate what your trying to say, I'm leaving.\" He asked if he could get help to pack, and just wheeled away in his WC.  I asked RN to keep encouraging, but if he left, she should call his  and update her.   "

## 2018-07-10 NOTE — LETTER
7/10/2018        RE: Chilo Oneil  [address]  [place] [address]        [facility]  PRIMARY CARE PROVIDER AND CLINIC:  Manish Plasencia [address] [facility] / [place] [address]  Chief Complaint   Patient presents with     Hospital F/U     [facility] Medical Record Number:  [id number]    HPI:    Chilo Oneil is a 68 year old  (1950),admitted to the [facility]  from Hospital  [facility].  Hospital stay 7/3/18.  Admitted to this facility for  rehab, medical management and nursing care.  HPI information obtained from: facility chart records, facility staff and patient report.      Mr. Oneil is a 67 y/o whom was found to have Lung cancer in Dec 2017 after noting ongoing Left shoulder pain.  CT noted several Left nodules and a small one on the Right.  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  Later PET CT did note some hilar lymph node involvement, but no other overt positive findings, MRI brain was negative for malignancy.  He met with Oncology and has started chemotherapy with Cisplatin and Alimta.  In early June he was found to have a Right sided PE, CT noted unchanged nodules in the Left side.      A few days ago he reported to the ER due to insomnia, dizziness, ongoing Left shoulder pain, and anxiety.  Workup there noted significant KYMBERLY with increased creatine, significant electrolyte imbalance, low magnesium, and significant pancytopenia.  ER also had concerns for possible cardiac ischemia.  In review of the DC summary Mr. Oneil was reluctant to have any testing, did not want to remain in ER for evaluation, declined transfusion and treatment thus discharged despite strong recommendation for in-patient care.      EMR shows that eventually patient outreach services reached out to Mr. Oneil and us here at the TCU and due to several falls at home, has agreed to come to the TCU for  "ongoing management and PT/OT.     In meeting with Mr. Oneil, he was pleasant but quick to tell us \"I'm tired of meeting with you people, why do I have to talk to anyone\".  He reported, \"I only agreed to this for 6 days, I get to leave in 6 days right.\".  He was reluctant to talk with me but we did spend some time where he reported his light headedness/dizziness, N/V, pain and everything was resolved and he felt great.  He did endorse his SOB/ACOSTA is slightly worse than normal, but no other issues.  When I reviewed the seriousness of his current status, he did not give me the impression that he understood.  When I asked him what would he specifically like us to help him with, it was \"Just get rid of my cancer\".  He continued to decline transfusions, did not want a workup.      We did briefly discuss hospice, and he did seem to have some interest in that, but we did not pursue greatly as he is new to us, we are unclear on whom his social support is, what his cognition level is.  He is agreeable to staying at the TCU for now, reports he's leaving Friday no matter what.    I did ask about his sister.  He reports her  just  several weeks ago due to cancer.  Reports she knows about his cancer, but does not know how bad we are reporting his current condition.  He reports he does NOT want us calling her at this time.     CODE STATUS/ADVANCE DIRECTIVES DISCUSSION:   DNR / DNI  Patient's living condition: lives with roommates.      ALLERGIES:Cipro [ciprofloxacin]  PAST MEDICAL HISTORY:  has a past medical history of GERD (gastroesophageal reflux disease); HTN (hypertension); Lung cancer (H); and MI (myocardial infarction). He also has no past medical history of Complication of anesthesia; Difficult intubation; Malignant hyperthermia; or PONV (postoperative nausea and vomiting).  PAST SURGICAL HISTORY:  has a past surgical history that includes fracture tx, ankle rt/lt (); Open reduction internal fixation hip " nailing (9/20/2013); Thoracoscopic biopsy lung (Left, 4/11/2018); vascular surgery; and Insert port vascular access (Left, 5/15/2018).  FAMILY HISTORY: family history includes Diabetes in his brother and father.  SOCIAL HISTORY:  reports that he has been smoking Cigarettes.  He started smoking about 50 years ago. He has a 7.05 pack-year smoking history. He has never used smokeless tobacco. He reports that he drinks alcohol. He reports that he uses illicit drugs, including Marijuana.    Post Discharge Medication Reconciliation Status: discharge medications reconciled and changed, per note/orders (see AVS).  Current Outpatient Prescriptions   Medication Sig Dispense Refill     acetaminophen (TYLENOL) 325 MG tablet Take 2 tablets (650 mg) by mouth every 4 hours as needed for mild pain 100 tablet      ASPIRIN PO Take 81 mg by mouth daily        DULoxetine (CYMBALTA) 30 MG EC capsule Take 1 capsule (30 mg) by mouth daily 30 capsule 1     fentaNYL (DURAGESIC) 12 mcg/hr 72 hr patch Place 1 patch onto the skin every 72 hours remove old patch. 30 patch 0     HYDROmorphone (DILAUDID) 2 MG tablet Take 1-2 tablets (2-4 mg) by mouth every 3 hours as needed for moderate to severe pain 80 tablet 0     LORazepam (ATIVAN) 0.5 MG tablet Take 0.5 mg by mouth every 4 hours as needed for nausea, vomiting, anxiety, or sleep. 30 tablet 5     losartan (COZAAR) 50 MG tablet Take 1 tablet (50 mg) by mouth daily 90 tablet 3     MAGNESIUM OXIDE PO Take 400 mg by mouth daily       melatonin 1 MG CAPS Take 3 mg by mouth nightly as needed 90 capsule 0     nicotine (NICODERM CQ) 14 MG/24HR 24 hr patch Place 1 patch onto the skin daily 30 patch 1     omeprazole (PRILOSEC) 20 MG CR capsule Take 1 capsule (20 mg) by mouth daily 90 capsule 3     ondansetron (ZOFRAN-ODT) 8 MG ODT tab Take 1 tablet (8 mg) by mouth every 8 hours 90 tablet 1     potassium chloride SA (K-DUR/KLOR-CON M) 10 MEQ CR tablet Take 1 tablet (10 mEq) by mouth daily 30 tablet 1      "prochlorperazine (COMPAZINE) 10 MG tablet Take 0.5 tablets (5 mg) by mouth every 6 hours as needed for nausea or vomiting 30 tablet 1     rosuvastatin (CRESTOR) 5 MG tablet Take 1 tablet (5 mg) by mouth daily 30 tablet 3     senna-docusate (SENOKOT-S;PERICOLACE) 8.6-50 MG per tablet Take 2 tablets by mouth 2 times daily 100 tablet 0     VENTOLIN  (90 Base) MCG/ACT Inhaler Inhale 2 puffs into the lungs every 4 hours as needed for shortness of breath / dyspnea or wheezing 1 Inhaler 1     ZOLPIDEM TARTRATE PO Take 2.5 mg by mouth nightly as needed for sleep         ROS:  7 point ROS done including, light headedness/dizziness, fever/chills, pain, Resp, CV, GI, and  and is negative other than noted in HPI.      Exam:  /87  Pulse 72  Temp 98.3  F (36.8  C)  Resp 18  Wt 135 lb 3.2 oz (61.3 kg)  SpO2 100%  BMI 16.9 kg/m2     GENERAL APPEARANCE:  Elderly unkept, ill appearing male sitting up in WC, NAD.   ENT:  Mouth and oropharynx normal, moist mucous membranes.   RESP:  Lungs diminished in Left upper lobe, otherwise CTA. Regular relaxed breathing effort.  Mild non-productive cough.   CV: S1/S2 no murmur or rubs.  Regular rhythm and rate.  No generalized edema.   ABDOMEN:  Protuberant, soft, non-tender with active bowel sounds.   No guarding, rigidity, or rebound tenderness.  EXTREMITIES:  No lower extremity edema, no calf tenderness.   PSYCH: Alert and orientated, pleasant and cooperative.  Unclear if there is a degree of cognitive/memory impairment, paranoia and/or coping/denial or overwhelmed component.  But reports \"I'm fine\" or \"Yea I know that\" to most questions.      Lab/Diagnostic data: I have personally reviewed labs.  I have personally reviewed images.      CBC RESULTS:   Recent Labs   Lab Test  07/03/18   1750  06/21/18   0900   WBC  2.8*  6.2   RBC  2.22*  3.10*   HGB  7.4*  10.1*   HCT  20.2*  29.3*   MCV  91  95   MCH  33.3*  32.6   MCHC  36.6*  34.5   RDW  16.2*  16.6*   PLT  31*  607* " "      Last Basic Metabolic Panel:  Recent Labs   Lab Test  07/03/18   1750  06/21/18   0900   NA  127*  132*   POTASSIUM  3.2*  3.7   CHLORIDE  90*  96   DAMARI  7.8*  8.5   CO2  29  28   BUN  18  11   CR  1.46*  1.10   GLC  80  103*       Liver Function Studies -   Recent Labs   Lab Test  07/03/18   1750  06/09/18   0715   PROTTOTAL  5.7*  4.9*   ALBUMIN  2.9*  2.1*   BILITOTAL  0.6  0.5   ALKPHOS  61  79   AST  23  22   ALT  11  18         Lab Results   Component Value Date    A1C 4.7 04/04/2018       ASSESSMENT/PLAN:  Carcinoma, lung, left (H)  Other pulmonary embolism without acute cor pulmonale, unspecified chronicity (H)  Pancytopenia  Left shoulder pain, unspecified chronicity  Mr. Oneil has received at least 2 sessions of Cisplatin and Alimta, possible a 3rd set.  Reports he was to get q 21 days.  Reports the N/V is really bad the first few days after, but slowly improves, denies currently.  Reports all food tastes bad and no appetite since starting Chemo.  He appears unkept, malnourished, cachetic.  Collateral input from , Oncology also endorse THC use and heavy alcohol use contributing.      Our biggest concern is his significant pancytopenia and electrolyte imbalance.  Labs are several days old.  Currently he denies light headedness/dizziness subjectively, is hemodynamically stable, no overt bleeding issues.      Note he has declined transfusions from the ER, reports he will \"Think\" about them here with us.  Does note he would like to discuss with Dr. Mccauley before considering.     We spoke with Dr. Mccauley today and gave him an update.  Dr. Mccauley reports he will see him Thursday early AM.  We are working on getting him transportation there.     -Will recheck labs for new values.   -Continues on K/Mg supplement for now.   -Started MVT supplement.   -Started Boost and will have nutrition eval and treat.   --Asked TCU to make sure he get's what he needs to go to Onco appointment Thurs.   --Low " threshold to return to hospital for hypotension or bleeding issues.     Due to his pancytopenia:  -We dc'd ASA for now.   -Continues on Lovenox for his PE.    --Low threshold to stop if bleeding complications.     Falls frequently  Near syncope  Hyponatremia, report of heavy EtOH use, pancytopenia, undergoing chemotherapy, and malnutrition all contributing, thus etiology unclear.  ER did have concerns for possible cardiac ischemia, but my concern is lower at this time.    -Plan as noted above.   -PT/OT to eval and treat.     Coronary artery disease involving native heart without angina pectoris, unspecified vessel or lesion type  H/O acute myocardial infarction  Essential hypertension  Review of VS's show VSS and within acceptable range.   Does have significant KYMBERLY, thus will stop ARB.   In ER he had some non specific ST changes, that improved with time.  They had concern for possible ischemic issues, but troponin was negative/flat.  No current chest pain.    -Stopped Losartan.   -Started Norvasc due to KYMBERLY.   -Continues on ASA, statin.    -On Lovenox for his PE.     CKD (chronic kidney disease) stage 3, GFR 30-59 ml/min  KYMBERLY (acute kidney injury) (H)  Creatine baseline around 0.6-0.7 was 1.13 in hospital.  Suspect pre-renal etiology in setting of poor po intake and chemotherapy, but not fully evaluated.   -Plan as noted above.     Uncomplicated alcohol dependence (H)  Poor nutrition  Nausea and vomiting, intractability of vomiting not specified, unspecified vomiting type  We did not fully address his EtOH use or other possible illicit drug use this visit, due to already reluctant to talk with us and wanted to establish trusting relationship.  Oncology reports known EtOH use,  noted the THC use.  Suspect there may be a degree of cognitive impairment or Wernicke's present.   -Plan as noted above, for nutrition.   -Continue PRN Ativan.   -Started MVT and Thiamine for now.     ADA (generalized anxiety  disorder)  Insomnia, unspecified type  -Changed Melatonin to scheduled.   -Changed Ambien to 5 mg's q daily PRN.   -Continue PTA Cymbalta.     Debility  Cognitive impairment  -PT/OT to eval and treat.     Orders:  1. Has Oncology appointment with Dr. Mccauley on 7/12 early AM, Help him get to that appointment please. Dx: Lung cancer  2. Have Dietitian evaluate and treat Dx: Poor nutrition/Intake  3. D/C ASA for now  4. Hold decadron/denamethasone for now, given by Oncology  5. D/C Losartan  6. Start Amlodipine/Norvasc 10 mg daily, hold for SBP, 110 Dx: HTN  7, D/C Famotidine  8. Change melatonin to 5 mg at HS schededuled Dx: Insomnia  9. Change ambien to 5 mg QHS PRN for sleep enhancement  10. Start Thiamine 100 mg daily Dx: EtOH dependencey  11. Start Centrum silver or other generic multivitamin tab daily Dx: Poor PO intake  12. Start 1 can boost TID with meals Dx: Poor intake  13. Add to nicotine patch, hold and remove if patient is smoking  14. Miralax 17 g Daily scheduled  15. Get CBC with diff, BMP, Magnesium lab on July 12 Dx: KYMBERLY  16. Change hydromorphone to 2 mg for pain rated 1-5, 4 mg for pain 6-10 every 3 hours PRN for pain  17. Low threshold to send to hospital for symptoms of hypotension or bleeding complications. Dx: Pancytopenia/Cancer.       Total time spent with patient visit at the skilled nursing facility was 75 min including patient visit, review of past records and Call with Oncology. Greater than 50% of total time spent with counseling and coordinating care due to complex medical case, review of HPI, development of POC, patient education and review of POC with pt.      Electronically signed by:  NATALIE Olivera CNP      Sincerely,        NATALIE Olivera CNP

## 2018-07-12 NOTE — TELEPHONE ENCOUNTER
"Pt called to cancel appts today. He was scheduled to see Dr. Mccauley and receive C4 Cisplatin/Alimta. Pt states that he has a family emergency and \"there's no way I can come in today.\" I encouraged pt to come in and see the MD at least. Pt repeated that he cannot make it in today and that he is willing to reschedule. Pt also reported that he left [facility] on Tuesday. He states that he would like to go back because he realizes that he needs assistance. Pt states he will call SW and discuss. RN will notify her as well. Message sent to  staff to reschedule patient asap. Pt states it's ok to leave message with new appt time.     Rajiv Puckett RN on 7/12/2018 at 8:05 AM    "

## 2018-07-12 NOTE — PATIENT INSTRUCTIONS
Thank you for choosing [facility].  You may be receiving a survey in the mail from Laura Frausto regarding your visit today.  Please take a few minutes to complete and return the survey to let us know how we are doing.      If you have questions or concerns, please contact us via [facility] or you can contact your care team at [phone].    Our Clinic hours are:  Monday 6:40 am  to 7:00 pm  Tuesday -Friday 6:40 am to 5:00 pm    The [place] outpatient lab hours are:  Monday - Friday 6:10 am to 4:45 pm  Saturdays 7:00 am to 11:00 am  Appointments are required, call [phone]    If you have clinical questions after hours or would like to schedule an appointment,  call the clinic at [phone].

## 2018-07-12 NOTE — PROGRESS NOTES
Chief Complaint   Patient presents with     Annual Comprehensive Nursing Home     Physical for nursing home            HPI:    Chilo Oneil is a 68 year old  (1950), who is being seen today for a physical to be placed in a nursing home for long term care. He is suffering from terminal Lung CA and will be in the nursing home for palliative care as he is unable to fend for himself due to extreme weakness from the effects of his chemotherapy. He is also struggling with severe depression anxiety , insomnia.   He also had a pulmonary embolism a few weeks back and is on Lovenox. He also has a history of CAD , hypertension .         ALLERGIES: Cipro [ciprofloxacin]  PROBLEM LIST:  Patient Active Problem List   Diagnosis     Tobacco use disorder     PAD (peripheral artery disease) (H)     Benign essential hypertension     Hyperlipidemia LDL goal <100     Anxiety     Gastroesophageal reflux disease without esophagitis     CAD (coronary artery disease)     Lung cancer (H)     Uncomplicated asthma     Hyponatremia     SOB (shortness of breath)     Chemotherapy induced nausea and vomiting     Carcinoma, lung, left (H)     Hypokalemia     Hypomagnesemia     Pulmonary emboli (H)     Pulmonary embolism (H)     Other pulmonary embolism without acute cor pulmonale, unspecified chronicity (H)     Left shoulder pain, unspecified chronicity     Falls frequently     Near syncope     Coronary artery disease involving native heart without angina pectoris, unspecified vessel or lesion type     H/O acute myocardial infarction     Essential hypertension     CKD (chronic kidney disease) stage 3, GFR 30-59 ml/min     KYMBERLY (acute kidney injury) (H)     Uncomplicated alcohol dependence (H)     Poor nutrition     Nausea and vomiting, intractability of vomiting not specified, unspecified vomiting type     ADA (generalized anxiety disorder)     Insomnia, unspecified type     Debility     Cognitive impairment     Pancytopenia (H)      Past Medical History:   Diagnosis Date     GERD (gastroesophageal reflux disease)      HTN (hypertension)      Lung cancer (H)      MI (myocardial infarction)     2016, medically managed         IMMUNIZATIONS:  Most Recent Immunizations   Administered Date(s) Administered     Influenza (High Dose) 3 valent vaccine 03/29/2018       MEDICATIONS:  Current Outpatient Prescriptions   Medication Sig Dispense Refill     ASPIRIN PO Take 81 mg by mouth daily        DULoxetine (CYMBALTA) 30 MG EC capsule Take 2 capsules (60 mg) by mouth daily 60 capsule 1     enoxaparin (LOVENOX) 80 MG/0.8ML injection Inject 0.7 mLs (70 mg) Subcutaneous 2 times daily Inject 0.69 subcutaneous every 12hrs 10 Syringe 0     fentaNYL (DURAGESIC) 12 mcg/hr 72 hr patch Place 1 patch onto the skin every 72 hours remove old patch. 30 patch 0     HYDROmorphone (DILAUDID) 2 MG tablet Take 1-2 tablets (2-4 mg) by mouth every 3 hours as needed for moderate to severe pain 80 tablet 0     LORazepam (ATIVAN) 0.5 MG tablet Take 1-2 tablets (0.5-1 mg) by mouth every 4 hours as needed for anxiety Take 0.5 mg by mouth every 4 hours as needed for nausea, vomiting, anxiety, or sleep. 30 tablet 5     losartan (COZAAR) 50 MG tablet Take 1 tablet (50 mg) by mouth daily 90 tablet 3     MAGNESIUM OXIDE PO Take 400 mg by mouth daily       melatonin 1 MG CAPS Take 3 mg by mouth nightly as needed 90 capsule 0     nicotine (NICODERM CQ) 14 MG/24HR 24 hr patch Place 1 patch onto the skin daily 30 patch 1     OLANZapine (ZYPREXA) 5 MG tablet Take 1 tablet (5 mg) by mouth At Bedtime 49 tablet 1     ondansetron (ZOFRAN-ODT) 8 MG ODT tab Take 1 tablet (8 mg) by mouth every 8 hours 90 tablet 1     potassium chloride SA (K-DUR/KLOR-CON M) 10 MEQ CR tablet Take 1 tablet (10 mEq) by mouth daily 30 tablet 1     prochlorperazine (COMPAZINE) 10 MG tablet Take 0.5 tablets (5 mg) by mouth every 6 hours as needed for nausea or vomiting 30 tablet 1     VENTOLIN  (90 Base)  "MCG/ACT Inhaler Inhale 2 puffs into the lungs every 4 hours as needed for shortness of breath / dyspnea or wheezing 1 Inhaler 1     acetaminophen (TYLENOL) 325 MG tablet Take 2 tablets (650 mg) by mouth every 4 hours as needed for mild pain 100 tablet      omeprazole (PRILOSEC) 20 MG CR capsule Take 1 capsule (20 mg) by mouth daily 90 capsule 3     senna-docusate (SENOKOT-S;PERICOLACE) 8.6-50 MG per tablet Take 2 tablets by mouth 2 times daily 100 tablet 0     [DISCONTINUED] DULoxetine (CYMBALTA) 30 MG EC capsule Take 1 capsule (30 mg) by mouth daily 30 capsule 1     Medications reviewed:         Medications started since last EPIC medication reconciliation:  Orders Placed This Encounter   Medications     enoxaparin (LOVENOX) 80 MG/0.8ML injection     Sig: Inject 0.69 subcutaneous every 12hrs             ROS:  10 point ROS of systems including Constitutional, Eyes, Respiratory, Cardiovascular, Gastroenterology, Genitourinary, Integumentary, Muscularskeletal, Psychiatric were all negative except for pertinent positives noted in my HPI.    Exam:  /68 (BP Location: Left arm, Cuff Size: Adult Regular)  Pulse 77  Temp 97.7  F (36.5  C) (Tympanic)  Ht 6' 2\" (1.88 m)  Wt 134 lb (60.8 kg)  SpO2 98%  BMI 17.2 kg/m2  GENERAL APPEARANCE:  Alert, appears ill  EYES:  EOM, conjunctivae, lids, pupils and irises normal  NECK:  No adenopathy,masses or thyromegaly  RESP:  respiratory effort and palpation of chest normal, lungs clear to auscultation , no respiratory distress, diminished breath sounds through out lungs  ABDOMEN:  normal bowel sounds, soft, nontender, no hepatosplenomegaly or other masses  SKIN:  Inspection of skin and subcutaneous tissue baseline  NEURO:   Cranial nerves 2-12 are normal tested and grossly at patient's baseline  PSYCH:  oriented X 3, normal insight, judgement and memory, affect abnormal depressed mood, sad, crying, anxious     Lab/Diagnostic data:      CBC RESULTS:   Recent Labs   Lab Test  " 07/03/18   1750 06/21/18   0900   WBC  2.8*  6.2   RBC  2.22*  3.10*   HGB  7.4*  10.1*   HCT  20.2*  29.3*   MCV  91  95   MCH  33.3*  32.6   MCHC  36.6*  34.5   RDW  16.2*  16.6*   PLT  31*  607*       Last Basic Metabolic Panel:  Recent Labs   Lab Test  07/03/18   1750 06/21/18   0900   NA  127*  132*   POTASSIUM  3.2*  3.7   CHLORIDE  90*  96   DAMARI  7.8*  8.5   CO2  29  28   BUN  18  11   CR  1.46*  1.10   GLC  80  103*       Liver Function Studies -   Recent Labs   Lab Test  07/03/18 1750 06/09/18   0715   PROTTOTAL  5.7*  4.9*   ALBUMIN  2.9*  2.1*   BILITOTAL  0.6  0.5   ALKPHOS  61  79   AST  23  22   ALT  11  18       No results found for: TSH]    Lab Results   Component Value Date    A1C 4.7 04/04/2018         ASSESSMENT/PLAN  Malignant neoplasm of lower lobe of left lung (H)  Doing poorly . Chemotherapy has been discontinued . Palliative care in place    Tobacco use disorder  Will be wearing nicotine patch     Single current episode of major depressive disorder, unspecified depression episode severity  Not doing well in this regard and has no good social support   - DULoxetine (CYMBALTA) 30 MG EC capsule  Dispense: 60 capsule; Refill: 1    Other pulmonary embolism without acute cor pulmonale, unspecified chronicity (H)  - enoxaparin (LOVENOX) 70 MG/0.7ML injection  Dispense: 10 Syringe; Refill: 0    Insomnia, unspecified type  - OLANZapine (ZYPREXA) 5 MG tablet  Dispense: 49 tablet; Refill: 1    Anxiety   - LORazepam (ATIVAN) 0.5 MG tablet  Dispense: 30 tablet; Refill: 5        See attached   Electronically signed by:  Manish Plasencia MD

## 2018-07-12 NOTE — PROGRESS NOTES
Clinic Care Coordination Contact  Care Team Conversations    SW received phone call from sheri Lacey at [facility], and she informed this writer that the pt left AMA last evening.    SW also received phone call from Jennifer Bar, pt's Health Partner's , that the pt left AMA from [facility] last evening.    SW placed call to pt.  Left message asking for a return call.    Sofya Unger  Social Work Care Coordinator  [facility]  [phone]

## 2018-07-12 NOTE — PROGRESS NOTES
Clinic Care Coordination Contact    Clinic Care Coordination Contact  OUTREACH    Referral Information:  YASMIN received voice message this morning from pt stating that he wanted to go back into [facility] to live as he cannot manage cares at home any longer.  SW also received voicemail from Jennifer Bar, pt's Health Partner's  who also stated that the pt called her and asked for long term care placement due to his inability to care for self at home.  Referral Source: PCP  Primary Diagnosis: Oncology    SW called and spoke with Deepthi at [facility].  SW shared with Deepthi what the pt offered as to why he left the TCU AMA on Tuesday evening.  Pt informed this writer that he finally was informed that his cancer is not just in his lungs, but that it is affecting his whole abdominal/chest area--lungs, kidneys, etc., which is why he feels so weak.  Pt said he now finally understands the extent of his disease, that his life is nearing an end, and that he had a severe anxiety attack.  Pt said he went home, broke down emotionally and came to the conclusion that he is dying and he is sick and tired of feeling like crud.  Pt shared he was home alone Tuesday night-Thursday morning, and realized that he can no longer care for himself; he didn't sleep at all the past 2 nights, was fearful to walk anywhere in the home due to weakness, and was terrified of being alone.  Pt admitted that he needs help and is willing to go anywhere this writer can find a bed for him, but that he really like the staff at [facility].    Deepthi spoke with their staff and it was determined that the pt can return for cares if his issues of pain control and anxiety are addressed by the PCP before he arrives.  Pt was able to be seen in clinic today around 11:00 by PCP.  Pt was weepy, thin and expressed pain during his clinic visit.  Pt stated he just wants to be around people who can help him and be able to spend his  time without pain.  SW and PCP encouraged pt to follow up with his Oncologist for plan of care; pt stated he will once he wraps his mind around what he thinks he will do.  SW is aware that pt cancelled his appointment today; pt stated the thought of getting sick from the chemo made him so anxious he couldn't function.      Discussion around pain and symptom medication was had with Palliative Care CNP, Shay Mast, as she treated the pt while he was inpatient previously.  Shay reviewed pt's EMR and made recommendations on the pt's medications for pt's PCP.  MD Plasencia reviewed all medications and added them to pt's EMR and completed the H&P for pt's SNF admission orders.    SW to fax SNF orders and PASS on 7-13-18.  Pt's [facility] , Jennifer Bar, [phone] is aware of above and she arranged Palliative Care to meet with pt tomorrow for talk therapy, as pt requested this.    Pt is moving into [facility] tomorrow at 11 AM for long term cares, with [facility]'s Palliative Care team will engage with pt tomorrow at 12 PM to open to cares.       Referrals Placed: Long term care room at [facility], [phone]     Goals:   Goals        General    Medication 1 (pt-stated)     Notes - Note edited  6/26/2018  5:03 PM by Sofya Paulino LSW    #1  Goal Statement: I will learn to take my medications properly   Measure of Success: I will take my medications properly  Supportive Steps to Achieve: I will work with either MTM or RNCC to learn how to take my medications correctly  Barriers: I have so many medications to keep track of it gets confusing.  Strengths: I will use a pill box to organize my medications  Date to Achieve By: 3 months        Pain Management (pt-stated)     Notes - Note edited  6/26/2018  5:04 PM by Sofya Paulino LSW    #3  SWCC to discuss with PCP, but would recommend a Palliative Care consult for symptom management.    Pt met  with Palliative Care MD yesterday, 5-9-18, and may continue to do so.    Sofya Unger  Social Work Care Coordinator  [facility]  [phone]          Transportation (pt-stated)     Notes - Note edited  6/26/2018  5:04 PM by Sofya Paulino LSW    #2  Goal Statement: Pt needs dependable transportation to get him to his clinic appointments.  Measure of Success: Pt will attend appointments if he has dependable transportation  Supportive Steps to Achieve: SW and pt will work together to secure his MA application will allow him to get transportation benefits.  SW to also provide pt with community resources.  Barriers: Access to pt's bank statements has been a barrier.    Strengths: Pt is motivated to complete the MA application  Date to Achieve By: 3 months            Patient/Caregiver understanding: Pt is in     Outreach Frequency: weekly (PRN)  Future Appointments              Today Manish Plasencia MD [facility]    In 4 days ROOM 10 [facility]    In 3 weeks ROOM 10 [facility], [place] [facility]    In 3 weeks ROOM 3 [facility] [place] [facility]          Plan: SW to continue to follow pt and work with Jennifer Bar [facility], to ensure proper cares for the patient.    Sofya Unger  Social Work Care Coordinator  [facility]  [phone]

## 2018-07-12 NOTE — MR AVS SNAPSHOT
After Visit Summary   7/12/2018    Chilo Oneil    MRN: [id number]           Patient Information     Date Of Birth          1950        Visit Information        Provider Department      7/12/2018 2:00 PM Manish Plasencia MD [facility]        Today's Diagnoses     Malignant neoplasm of lower lobe of left lung (H)    -  1    Tobacco use disorder        Single current episode of major depressive disorder, unspecified depression episode severity        Other pulmonary embolism without acute cor pulmonale, unspecified chronicity (H)        Insomnia, unspecified type        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  You may be receiving a survey in the mail from [employer] regarding your visit today.  Please take a few minutes to complete and return the survey to let us know how we are doing.      If you have questions or concerns, please contact us via MyClasses or you can contact your care team at [phone].    Our Clinic hours are:  Monday 6:40 am  to 7:00 pm  Tuesday -Friday 6:40 am to 5:00 pm    The [place] outpatient lab hours are:  Monday - Friday 6:10 am to 4:45 pm  Saturdays 7:00 am to 11:00 am  Appointments are required, call [phone]    If you have clinical questions after hours or would like to schedule an appointment,  call the clinic at [phone].          Follow-ups after your visit        Follow-up notes from your care team     Return if symptoms worsen or fail to improve.      Your next 10 appointments already scheduled     Jul 16, 2018 11:00 AM CDT   Level 1 with ROOM 10 [facility] ([facility])    [facility]  [address]  [facility] [address]   [phone]            Jul 18, 2018  8:00 AM CDT   Level O with ROOM 1 [facility] ([facility])    [facility]  [address]  [facility] [address]   [phone]            Jul 18, 2018  8:15 AM CDT   Return Visit with Mor Mccauley MD   [facility] ([facility])    [facility]  [address]  [facility] [address]   [phone]            Aug 06, 2018 11:30 AM CDT   Level 1 with ROOM 3 [facility] ([facility])    [facility]  [address]  [facility] [address]   [phone]              Who to contact     If you have questions or need follow up information about today's clinic visit or your schedule please contact [facility] directly at [phone].  Normal or non-critical lab and imaging results will be communicated to you by MyChart, letter or phone within 4 business days after the clinic has received the results. If you do not hear from us within 7 days, please contact the clinic through MyChart or phone. If you have a critical or abnormal lab result, we will notify you by phone as soon as possible.  Submit refill requests through [employer] or call your pharmacy and they will forward the refill request to us. Please allow 3 business days for your refill to be completed.          Additional Information About Your Visit        Care EveryWhere ID     This is your Care EveryWhere ID. This could be used by other organizations to access your [place] medical records  [id number]        Your Vitals Were     Pulse Temperature Height Pulse Oximetry BMI (Body Mass Index)       77 97.7  F (36.5  C) (Tympanic) 6' 2\" (1.88 m) 98% 17.2 kg/m2        Blood Pressure from Last 3 Encounters:   07/12/18 132/68   07/10/18 154/87   07/03/18 152/79    Weight from Last 3 Encounters:   07/12/18 134 lb (60.8 kg)   07/10/18 135 lb 3.2 oz (61.3 kg)   07/03/18 150 lb (68 kg)              Today, you had the following     No orders found for display         Today's Medication " Changes          These changes are accurate as of 7/12/18  4:21 PM.  If you have any questions, ask your nurse or doctor.               Start taking these medicines.        Dose/Directions    OLANZapine 5 MG tablet   Commonly known as:  zyPREXA   Used for:  Insomnia, unspecified type   Started by:  Manish Plasencia MD        Dose:  5 mg   Take 1 tablet (5 mg) by mouth At Bedtime   Quantity:  49 tablet   Refills:  1         These medicines have changed or have updated prescriptions.        Dose/Directions    DULoxetine 30 MG EC capsule   Commonly known as:  CYMBALTA   This may have changed:  how much to take   Used for:  Single current episode of major depressive disorder, unspecified depression episode severity   Changed by:  Manish Plasencia MD        Dose:  60 mg   Take 2 capsules (60 mg) by mouth daily   Quantity:  60 capsule   Refills:  1       enoxaparin 80 MG/0.8ML injection   Commonly known as:  LOVENOX   This may have changed:    - how much to take  - how to take this  - when to take this   Used for:  Other pulmonary embolism without acute cor pulmonale, unspecified chronicity (H)   Changed by:  Mansih Plasencia MD        Dose:  70 mg   Inject 0.7 mLs (70 mg) Subcutaneous 2 times daily Inject 0.69 subcutaneous every 12hrs   Quantity:  10 Syringe   Refills:  0       LORazepam 0.5 MG tablet   Commonly known as:  ATIVAN   This may have changed:    - how much to take  - how to take this  - when to take this  - reasons to take this   Used for:  Anxiety   Changed by:  Manish Plasencia MD        Dose:  0.5-1 mg   Take 1-2 tablets (0.5-1 mg) by mouth every 4 hours as needed for anxiety Take 0.5 mg by mouth every 4 hours as needed for nausea, vomiting, anxiety, or sleep.   Quantity:  30 tablet   Refills:  5            Where to get your medicines      Some of these will need a paper prescription and others can be bought over the counter.  Ask your nurse if you have questions.      You don't need a prescription for these medications     DULoxetine 30 MG EC capsule    enoxaparin 80 MG/0.8ML injection    LORazepam 0.5 MG tablet    OLANZapine 5 MG tablet                Primary Care Provider Office Phone # Fax #    Manish Jyoti Plasencia [phone] [address] [address]        Goals        General    Medication 1 (pt-stated)     Notes - Note edited  6/26/2018  5:03 PM by Sofya Paulino LSW    #1  Goal Statement: I will learn to take my medications properly   Measure of Success: I will take my medications properly  Supportive Steps to Achieve: I will work with either MTM or RNCC to learn how to take my medications correctly  Barriers: I have so many medications to keep track of it gets confusing.  Strengths: I will use a pill box to organize my medications  Date to Achieve By: 3 months        Pain Management (pt-stated)     Notes - Note edited  6/26/2018  5:04 PM by Sofya Paulino LSW    #3  SWCC to discuss with PCP, but would recommend a Palliative Care consult for symptom management.    Pt met with Palliative Care MD yesterday, 5-9-18, and may continue to do so.    Sofya Unger  Social Work Care Coordinator  [facility]  [phone]          Transportation (pt-stated)     Notes - Note edited  6/26/2018  5:04 PM by Sofya Paulino LSW    #2  Goal Statement: Pt needs dependable transportation to get him to his clinic appointments.  Measure of Success: Pt will attend appointments if he has dependable transportation  Supportive Steps to Achieve: SW and pt will work together to secure his MA application will allow him to get transportation benefits.  SW to also provide pt with community resources.  Barriers: Access to pt's bank statements has been a barrier.    Strengths: Pt is motivated to complete the MA application  Date to Achieve By: 3 months          Equal Access to Services     SONY MILLER: Kevin  krysta Vidales, watylerda lumodestoadaha, qaybta kaalmarques marquez, celina maria a shakiraalmaz murrelldavontegan hewitt natalia. So [facility] [phone].    ATENCIÓN: Si habla español, tiene a galaviz disposición servicios gratuitos de asistencia lingüística. Macyame al [phone].    We comply with applicable federal civil rights laws and [place] laws. We do not discriminate on the basis of race, color, national origin, age, disability, sex, sexual orientation, or gender identity.            Thank you!     Thank you for choosing [facility]  for your care. Our goal is always to provide you with excellent care. Hearing back from our patients is one way we can continue to improve our services. Please take a few minutes to complete the written survey that you may receive in the mail after your visit with us. Thank you!             Your Updated Medication List - Protect others around you: Learn how to safely use, store and throw away your medicines at www.disposemymeds.org.          This list is accurate as of 7/12/18  4:21 PM.  Always use your most recent med list.                   Brand Name Dispense Instructions for use Diagnosis    acetaminophen 325 MG tablet    TYLENOL    100 tablet    Take 2 tablets (650 mg) by mouth every 4 hours as needed for mild pain    Other acute pulmonary embolism without acute cor pulmonale (H), Carcinoma, lung, left (H)       ASPIRIN PO      Take 81 mg by mouth daily        DULoxetine 30 MG EC capsule    CYMBALTA    60 capsule    Take 2 capsules (60 mg) by mouth daily    Single current episode of major depressive disorder, unspecified depression episode severity       enoxaparin 80 MG/0.8ML injection    LOVENOX    10 Syringe    Inject 0.7 mLs (70 mg) Subcutaneous 2 times daily Inject 0.69 subcutaneous every 12hrs    Other pulmonary embolism without acute cor pulmonale, unspecified chronicity (H)       fentaNYL 12 mcg/hr 72 hr patch    DURAGESIC    30 patch    Place 1 patch onto the skin every 72  hours remove old patch.    Other acute pulmonary embolism without acute cor pulmonale (H)       HYDROmorphone 2 MG tablet    DILAUDID    80 tablet    Take 1-2 tablets (2-4 mg) by mouth every 3 hours as needed for moderate to severe pain    Other acute pulmonary embolism without acute cor pulmonale (H), Carcinoma, lung, left (H)       LORazepam 0.5 MG tablet    ATIVAN    30 tablet    Take 1-2 tablets (0.5-1 mg) by mouth every 4 hours as needed for anxiety Take 0.5 mg by mouth every 4 hours as needed for nausea, vomiting, anxiety, or sleep.    Anxiety       losartan 50 MG tablet    COZAAR    90 tablet    Take 1 tablet (50 mg) by mouth daily    Benign essential hypertension       MAGNESIUM OXIDE PO      Take 400 mg by mouth daily        melatonin 1 MG Caps     90 capsule    Take 3 mg by mouth nightly as needed        nicotine 14 MG/24HR 24 hr patch    NICODERM CQ    30 patch    Place 1 patch onto the skin daily    Tobacco use disorder       OLANZapine 5 MG tablet    zyPREXA    49 tablet    Take 1 tablet (5 mg) by mouth At Bedtime    Insomnia, unspecified type       omeprazole 20 MG CR capsule    priLOSEC    90 capsule    Take 1 capsule (20 mg) by mouth daily    Carcinoma, lung, left (H)       ondansetron 8 MG ODT tab    ZOFRAN-ODT    90 tablet    Take 1 tablet (8 mg) by mouth every 8 hours    Chemotherapy induced nausea and vomiting       potassium chloride SA 10 MEQ CR tablet    K-DUR/KLOR-CON M    30 tablet    Take 1 tablet (10 mEq) by mouth daily    Carcinoma, lung, left (H)       prochlorperazine 10 MG tablet    COMPAZINE    30 tablet    Take 0.5 tablets (5 mg) by mouth every 6 hours as needed for nausea or vomiting    Chemotherapy induced nausea and vomiting       senna-docusate 8.6-50 MG per tablet    SENOKOT-S;PERICOLACE    100 tablet    Take 2 tablets by mouth 2 times daily    Carcinoma, lung, left (H)       VENTOLIN  (90 Base) MCG/ACT Inhaler   Generic drug:  albuterol     1 Inhaler    Inhale 2 puffs  into the lungs every 4 hours as needed for shortness of breath / dyspnea or wheezing

## 2018-07-12 NOTE — PROGRESS NOTES
Discussed Lovenox with Dr. Mccauley. He is ok with him transitioning to Coumadin. Referral to INR clinic was placed. INR clinic notified via phone.     Rajiv Puckett RN on 7/12/2018 at 4:27 PM

## 2018-07-13 PROBLEM — Z79.01 LONG-TERM (CURRENT) USE OF ANTICOAGULANTS: Status: ACTIVE | Noted: 2018-01-01

## 2018-07-13 NOTE — PROGRESS NOTES
Referral was placed to [facility] and a message was left. I spoke with the patient and he knows nothing about the transition and stated he is going in a nursing home. I spoke with Erica from Dr. Mccauley's office and verified this information. Patient will not be followed by [facility].    Flakito Pardo RN, BSN, PHN  Anticoagulation Clinic   [phone]

## 2018-07-13 NOTE — TELEPHONE ENCOUNTER
Left non-detailed message for Jennifer,  care coord, to return a call to the clinic RN.       I called [facility], [phone], left message with staff person.  She is checking on missing orders and will call back if needed.    CHRISTOPHER Love, RN

## 2018-07-13 NOTE — TELEPHONE ENCOUNTER
Reason for call:  Patient reporting a symptom    Symptom or request: Jennifer, Sarkis's Care Coord from [facility] calling.  Pt was admitted to [facility] today and is very upset because they do not have Rxs for his meds.  Jennifer states that pt is in pain and she wants us to call [facility] and straighten things out and get [facility] Rxs for meds, especially pain meds?      Duration (how long have symptoms been present): ongoing    Have you been treated for this before? Yes    Additional comments:     Phone Number patient can be reached at:  Home number on file [phone] (home)    Best Time:  any    Can we leave a detailed message on this number:  YES    Call taken on 7/13/2018 at 1:59 PM by Lizzie June

## 2018-07-13 NOTE — PROGRESS NOTES
Clinic Care Coordination Contact  Care Team Conversations    SW received completed SNF orders from pt's PCP and was able to fax them to Deepthi in admissions at [facility].    PAS-RR  D: Per DHS regulation, SW completed and submitted PAS-RR to [place] [employer]ging Direct Connect via the Senior LinkAge Line.  PAS-RR confirmation # is : [id number]  I: SW spoke with pt and they are aware a PAS-RR has been submitted.  SW reviewed with pt that they may be contacted for a follow up appointment within 10 days of hospital discharge if their SNF stay is < 30 days.  Contact information for Senior LinkAge Line was also provided.  A: Pt verbalized understanding.  P: Further questions may be directed to [facility] LinkAge Line at #[phone], option #4 for PAS-RR staff.    Pt to go to [facility] today around 11:00 AM and admit for long term cares.    Sofya Unger  Social Work Care Coordinator  [facility]  [phone]

## 2018-07-13 NOTE — PROGRESS NOTES
Verified with Alex.  They will be managing transition from lovenox to coumadin. Erica Elliott RN, BSN, OCN

## 2018-07-13 NOTE — MR AVS SNAPSHOT
Chilo Oneil   7/13/2018   Anticoagulation Therapy Visit    Description:  68 year old male   Provider:  Flakito Pardo, RN   Department:  [facility]           INR as of 7/13/2018     Today's INR       Anticoagulation Summary as of 7/13/2018     INR goal 2.0-3.0   Today's INR    Next INR check     Indications   Pulmonary embolism (H) [I26.99]  Long-term (current) use of anticoagulants [Z79.01] [Z79.01]

## 2018-07-16 NOTE — ED NOTES
Patient had hgb checked today and it is 5.6 and was sent to the ER for a possible blood transfusion.  Patient has Lung CA and resides at [facility].  Patient denies any pain, fever or chills.  Patient is unsure why his hgb is low and denies any bleeding.

## 2018-07-16 NOTE — TELEPHONE ENCOUNTER
Spoke with patient's Rn at [facility].    He reports that patient has had a physician encounter since he has been at [facility] reviewed  He does not have any missing orders that they are aware of  Patient's Rn asked what appointments patient has, reviewed appts.  He reported patient will not be able to make his appt today, cancelled appt.  Patient will be at appts 7/18/18.    Martine QUINTANA Rn

## 2018-07-16 NOTE — ED PROVIDER NOTES
History     Chief Complaint   Patient presents with     Anemia     coming from [facility]  Patient hgb 5.4   Lung CA      HPI  Chilo Oneil is a 68 year old male, past medical history significant for depression, for pulmonary embolism, frequent falls, ASCVD status post MI, stage III kidney disease, hypertension, alcohol dependence, generalized anxiety disorder, insomnia, left lung lung cancer, presents to the emergency department from his nursing home with concerns of hemoglobin of 5.6 by [facility] lab today,  which includes white cell count of 3.7 and normal platelet count of 309.  The patient was originally evaluated in this emergency department on 7/3/16 and transfusion was recommended to him at that time.  He refused and left AMA.  He is currently in transitional care at [facility].  I reviewed the geriatric care provider note from today with the plan for sending him in for transfusion here as there are no infusion therapy centers available in the surrounding area for the next 2 weeks.  Given his history of ACS and his hemoglobin of 5.6 the decision was made to send him to the emergency department for transfusion.  History from the patient notes fatigue, but he denies chest pain or shortness of air at the present time.  He always feels rundown.  He feels that the most recent round of chemotherapy for his lung cancer had a particularly hard.  He is agreeable to the concept of transfusion at this point.      Problem List:    Patient Active Problem List    Diagnosis Date Noted     Hip pain, left, unclear chronicity/etiology 07/17/2018     Priority: Medium     Single current episode of major depressive disorder, unspecified depression episode severity 07/16/2018     Priority: Medium     Long-term (current) use of anticoagulants [Z79.01] 07/13/2018     Priority: Medium     Other pulmonary embolism without acute cor pulmonale, unspecified chronicity (H) 07/10/2018     Priority: Medium     Left shoulder pain,  unspecified chronicity 07/10/2018     Priority: Medium     Falls frequently 07/10/2018     Priority: Medium     Near syncope 07/10/2018     Priority: Medium     Coronary artery disease involving native heart without angina pectoris, unspecified vessel or lesion type 07/10/2018     Priority: Medium     H/O acute myocardial infarction 07/10/2018     Priority: Medium     Essential hypertension 07/10/2018     Priority: Medium     CKD (chronic kidney disease) stage 3, GFR 30-59 ml/min 07/10/2018     Priority: Medium     KYMBERLY (acute kidney injury) (H) 07/10/2018     Priority: Medium     Uncomplicated alcohol dependence (H) 07/10/2018     Priority: Medium     Poor nutrition 07/10/2018     Priority: Medium     Nausea and vomiting, intractability of vomiting not specified, unspecified vomiting type 07/10/2018     Priority: Medium     ADA (generalized anxiety disorder) 07/10/2018     Priority: Medium     Insomnia, unspecified type 07/10/2018     Priority: Medium     Debility 07/10/2018     Priority: Medium     Cognitive impairment 07/10/2018     Priority: Medium     Pancytopenia (H) 07/10/2018     Priority: Medium     Pulmonary emboli (H) 06/05/2018     Priority: Medium     Pulmonary embolism (H) 06/05/2018     Priority: Medium     Hypokalemia 05/31/2018     Priority: Medium     Hypomagnesemia 05/31/2018     Priority: Medium     Carcinoma, lung, left (H) 04/12/2018     Priority: Medium     CAD (coronary artery disease) 03/28/2018     Priority: Medium     No previous angiogram.  No previous stenting.    Atypical Stress Test consistent with possible CAD 8/2016: Myocardial perfusion imaging using single isotope technique demonstrated a small of inferior ischemia at the base to mid ventricle There is a second moderate area of ischemia in the anterior and anteroseptal wall from base through mid ventricle, involving the lateral base as well. There is a small fixed portion in the anteroseptal base consistent with prior infarction  There is a fixed inferoseptal defect from apex to mid ventricle consistent with either prior nontransmural infarct or attenuation artifact. Gated images demonstrated inferior, inferoseptal, anterior and anteroseptal basal hypokinesis.  The left ventricular systolic function is 52% at rest and 47% post stress.       Hyponatremia 03/28/2018     Priority: Medium     SOB (shortness of breath) 03/28/2018     Priority: Medium     Chemotherapy induced nausea and vomiting 03/28/2018     Priority: Medium     Lung cancer (H)      Priority: Medium     Left shoulder pain, cough. Bx 12/2017- Non Small Cell. Resection planned 4/2018       Uncomplicated asthma      Priority: Medium     Anxiety 12/28/2017     Priority: Medium     Gastroesophageal reflux disease without esophagitis 12/28/2017     Priority: Medium     PAD (peripheral artery disease) (H) 06/13/2016     Priority: Medium     Benign essential hypertension 06/13/2016     Priority: Medium     Hyperlipidemia LDL goal <100 06/13/2016     Priority: Medium     Tobacco use disorder 10/16/2009     Priority: Medium        Past Medical History:    Past Medical History:   Diagnosis Date     GERD (gastroesophageal reflux disease)      HTN (hypertension)      Lung cancer (H)      MI (myocardial infarction)        Past Surgical History:    Past Surgical History:   Procedure Laterality Date     FRACTURE TX, ANKLE RT/LT  1975    Fracture TX Ankle, LT     INSERT PORT VASCULAR ACCESS Left 5/15/2018    Procedure: INSERT PORT VASCULAR ACCESS;  Left chest Port-a-Cath Placement;  Surgeon: Gurjit Fox MD;  Location: [facility] OR     OPEN REDUCTION INTERNAL FIXATION HIP NAILING  9/20/2013    Procedure: OPEN REDUCTION INTERNAL FIXATION HIP NAILING;  Left Hip Open Reduction Internal Fixation ;  Surgeon: Rosas Dennis MD;  Location: [facility] OR     THORACOSCOPIC BIOPSY LUNG Left 4/11/2018    Procedure: THORACOSCOPIC BIOPSY LUNG;  subxiphoid left video assisted thorascopic surgery pleural biops,  left lower lobe wedge biopsy ;  Surgeon: Mega Moreno MD;  Location: [facility] OR     VASCULAR SURGERY         Family History:    Family History   Problem Relation Age of Onset     Diabetes Brother      type 2     Diabetes Father        Social History:  Marital Status:  Single [1]  Social History   Substance Use Topics     Smoking status: Current Every Day Smoker     Packs/day: 0.15     Years: 47.00     Types: Cigarettes     Start date: 12/26/1967     Smokeless tobacco: Never Used      Comment: 2-3 cigs daily     Alcohol use Yes      Comment: 6-8 beers per day, last 3/27 at 6pm        Medications:      ACETAMINOPHEN PO   AmLODIPine Besylate (NORVASC PO)   ASPIRIN PO   DULoxetine (CYMBALTA) 30 MG EC capsule   enoxaparin (LOVENOX) 80 MG/0.8ML injection   fentaNYL (DURAGESIC) 12 mcg/hr 72 hr patch   HYDROmorphone (DILAUDID) 2 MG tablet   LORazepam (ATIVAN) 0.5 MG tablet   MAGNESIUM OXIDE PO   megestrol (MEGACE ORAL) 40 MG/ML suspension   melatonin 1 MG CAPS   nicotine (NICODERM CQ) 14 MG/24HR 24 hr patch   Nutritional Supplements (BOOST PO)   OLANZapine (ZYPREXA) 5 MG tablet   omeprazole (PRILOSEC) 20 MG CR capsule   ONDANSETRON PO   polyethylene glycol (MIRALAX/GLYCOLAX) Packet   potassium chloride SA (K-DUR/KLOR-CON M) 10 MEQ CR tablet   prochlorperazine (COMPAZINE) 10 MG tablet   senna-docusate (SENOKOT-S;PERICOLACE) 8.6-50 MG per tablet   THIAMINE HCL PO   VENTOLIN  (90 Base) MCG/ACT Inhaler         Review of Systems   Constitutional: Positive for activity change, appetite change and fatigue.   HENT: Negative.    Eyes: Negative.    Respiratory: Negative.    Cardiovascular: Negative.    Gastrointestinal: Negative.    Endocrine: Negative.    Genitourinary: Negative.    Musculoskeletal: Negative.    Allergic/Immunologic: Negative.    Neurological: Negative.    Hematological: Negative.    Psychiatric/Behavioral: Negative.        Physical Exam   BP: 170/73  Pulse: 84  Heart Rate: 72  Temp: 99.3  F (37.4   C)  Resp: 18  SpO2: 98 %      Physical Exam   Constitutional: He is oriented to person, place, and time.   Frail, cachectic appearing chronically ill.  Does not appear acutely ill or uncomfortable.   HENT:   Head: Normocephalic and atraumatic.   Right Ear: External ear normal.   Left Ear: External ear normal.   Nose: Nose normal.   Mouth/Throat: Oropharynx is clear and moist.   Eyes: Conjunctivae and EOM are normal. Pupils are equal, round, and reactive to light.   Neck: Normal range of motion. Neck supple.   Cardiovascular: Normal rate, regular rhythm, normal heart sounds and intact distal pulses.    Pulmonary/Chest: Effort normal and breath sounds normal.   Abdominal: Soft. Bowel sounds are normal.   Musculoskeletal: Normal range of motion.   Neurological: He is alert and oriented to person, place, and time.   Skin: Skin is warm and dry.   Psychiatric: He has a normal mood and affect. His behavior is normal.   Nursing note and vitals reviewed.      ED Course     ED Course     Procedures             Labs Ordered and Resulted from Time of ED Arrival Up to the Time of Departure from the ED   CBC WITH PLATELETS DIFFERENTIAL - Abnormal; Notable for the following:        Result Value    RBC Count 1.97 (*)     Hemoglobin 6.7 (*)     Hematocrit 19.1 (*)     MCH 34.0 (*)     RDW 17.9 (*)     All other components within normal limits   ABO/RH TYPE AND SCREEN   RED BLOOD CELL PREPARE ORDER UNIT   BLOOD COMPONENT   BLOOD COMPONENT         Critical Care time:  none               No results found for this or any previous visit (from the past 24 hour(s)).    Medications   HYDROmorphone (DILAUDID) tablet 4 mg (4 mg Oral Given 7/16/18 2234)       Assessments & Plan (with Medical Decision Making)   68-year-old male past medical history reviewed as above who presents from personal care home with concerns of acute anemia in the context of malignancy and chemotherapy as discussed in the HPI.  The patient had had transfusion  recommended to him previously but declined.  He returns now at the recommendation of geriatrics who saw him earlier today and is agreeable to the idea of transfusion.  Attempts were made by the geriatric service to get him to the appropriate infusion center for transfusion today however there is apparently a shortage of sites that would be able to take him even 2 weeks out from now.  He was therefore transferred to the emergency department.  His evaluation was limited and no lab diagnostics were performed outside of CBC, type and screen and 2 units of packed and prepared for him and transfused.  There were no complications to the course of his visit and he was disposition back to his nursing home.  He will follow up with oncology as previously planned    Disclaimer: This note consists of symbols derived from keyboarding, dictation and/or voice recognition software. As a result, there may be errors in the script that have gone undetected. Please consider this when interpreting information found in this chart.      I have reviewed the nursing notes.    I have reviewed the findings, diagnosis, plan and need for follow up with the patient.          Discharge Medication List as of 7/17/2018  1:16 AM          Final diagnoses:   Anemia, unspecified type       7/16/2018   [facility] EMERGENCY DEPARTMENT     Papa Saenz MD  07/21/18 [id number]

## 2018-07-16 NOTE — ED AVS SNAPSHOT
[facility] Emergency Department    [address] [address]    Phone:  [phone]    Fax:  [phone]                                       Chilo Oneil   MRN: [id number]    Department:  [facility] Emergency Department   Date of Visit:  7/16/2018           After Visit Summary Signature Page     I have received my discharge instructions, and my questions have been answered. I have discussed any challenges I see with this plan with the nurse or doctor.    ..........................................................................................................................................  Patient/Patient Representative Signature      ..........................................................................................................................................  Patient Representative Print Name and Relationship to Patient    ..................................................               ................................................  Date                                            Time    ..........................................................................................................................................  Reviewed by Signature/Title    ...................................................              ..............................................  Date                                                            Time

## 2018-07-16 NOTE — ED AVS SNAPSHOT
[facility] Emergency Department    [address] [address]    Phone:  [phone]    Fax:  [phone]                                       Chilo Oneil   MRN: [id number]    Department:  [facility] Emergency Department   Date of Visit:  7/16/2018           Patient Information     Date Of Birth          1950        Your diagnoses for this visit were:     Anemia, unspecified type        You were seen by Papa Saenz MD.      Follow-up Information     Follow up with Mor Mccauley MD.    Specialty:  Hematology & Oncology    Contact information:    [address] [address]  [phone]          Discharge Instructions       Return follow-up with Dr. Bell oncology.  Return to the emergency department with further emergent concerns.    Your next 10 appointments already scheduled     Jul 18, 2018  8:00 AM CDT   Level O with ROOM 1 [facility] ([facility])    [facility]  [address]  [facility] [address]   [phone]            Jul 18, 2018  8:15 AM CDT   Return Visit with Mor Mccauley MD   [facility] ([facility])    [facility]  [address]  [facility] [address] [phone]            Aug 06, 2018 11:30 AM CDT   Level 1 with ROOM 3 [facility] ([facility])    [facility]  [address]  [facility] [address]   [phone]              24 Hour Appointment Hotline       To make an appointment at any [facility], call [phone] ([phone]). If you don't have a family doctor or clinic, we will help you find one. [facility] clinics are conveniently located to serve the needs of you and your family.             Review of your medicines      Our records show that you are taking the medicines listed below. If these are incorrect, please call your family  doctor or clinic.        Dose / Directions Last dose taken    acetaminophen 325 MG tablet   Commonly known as:  TYLENOL   Dose:  650 mg   Quantity:  100 tablet        Take 2 tablets (650 mg) by mouth every 4 hours as needed for mild pain   Refills:  0        ASPIRIN PO   Dose:  81 mg        Take 81 mg by mouth daily   Refills:  0        DULoxetine 30 MG EC capsule   Commonly known as:  CYMBALTA   Dose:  60 mg   Quantity:  60 capsule        Take 2 capsules (60 mg) by mouth daily   Refills:  1        enoxaparin 80 MG/0.8ML injection   Commonly known as:  LOVENOX   Dose:  70 mg   Quantity:  10 Syringe        Inject 0.7 mLs (70 mg) Subcutaneous 2 times daily Inject 0.69 subcutaneous every 12hrs   Refills:  0        fentaNYL 12 mcg/hr 72 hr patch   Commonly known as:  DURAGESIC   Dose:  1 patch   Quantity:  30 patch        Place 1 patch onto the skin every 72 hours remove old patch.   Refills:  0        HYDROmorphone 2 MG tablet   Commonly known as:  DILAUDID   Dose:  2-4 mg   Quantity:  80 tablet        Take 1-2 tablets (2-4 mg) by mouth every 3 hours as needed for moderate to severe pain   Refills:  0        LORazepam 0.5 MG tablet   Commonly known as:  ATIVAN   Dose:  0.5-1 mg   Quantity:  30 tablet        Take 1-2 tablets (0.5-1 mg) by mouth every 4 hours as needed for anxiety Take 0.5 mg by mouth every 4 hours as needed for nausea, vomiting, anxiety, or sleep.   Refills:  5        losartan 50 MG tablet   Commonly known as:  COZAAR   Dose:  50 mg   Quantity:  90 tablet        Take 1 tablet (50 mg) by mouth daily   Refills:  3        MAGNESIUM OXIDE PO   Dose:  400 mg        Take 400 mg by mouth daily   Refills:  0        melatonin 1 MG Caps   Dose:  3 mg   Quantity:  90 capsule        Take 3 mg by mouth nightly as needed   Refills:  0        nicotine 14 MG/24HR 24 hr patch   Commonly known as:  NICODERM CQ   Dose:  1 patch   Quantity:  30 patch        Place 1 patch onto the skin daily   Refills:  1        OLANZapine  5 MG tablet   Commonly known as:  zyPREXA   Dose:  5 mg   Quantity:  49 tablet        Take 1 tablet (5 mg) by mouth At Bedtime   Refills:  1        omeprazole 20 MG CR capsule   Commonly known as:  priLOSEC   Dose:  20 mg   Quantity:  90 capsule        Take 1 capsule (20 mg) by mouth daily   Refills:  3        ondansetron 8 MG ODT tab   Commonly known as:  ZOFRAN-ODT   Dose:  8 mg   Quantity:  90 tablet        Take 1 tablet (8 mg) by mouth every 8 hours   Refills:  1        potassium chloride SA 10 MEQ CR tablet   Commonly known as:  K-DUR/KLOR-CON M   Dose:  10 mEq   Quantity:  30 tablet        Take 1 tablet (10 mEq) by mouth daily   Refills:  1        prochlorperazine 10 MG tablet   Commonly known as:  COMPAZINE   Dose:  5 mg   Quantity:  30 tablet        Take 0.5 tablets (5 mg) by mouth every 6 hours as needed for nausea or vomiting   Refills:  1        senna-docusate 8.6-50 MG per tablet   Commonly known as:  SENOKOT-S;PERICOLACE   Dose:  2 tablet   Quantity:  100 tablet        Take 2 tablets by mouth 2 times daily   Refills:  0        VENTOLIN  (90 Base) MCG/ACT Inhaler   Dose:  2 puff   Quantity:  1 Inhaler   Generic drug:  albuterol        Inhale 2 puffs into the lungs every 4 hours as needed for shortness of breath / dyspnea or wheezing   Refills:  1                Procedures and tests performed during your visit     Procedure/Test Number of Times Performed    ABO/Rh type and screen 1    Blood component 1    Blood component 1    CBC with platelets differential 1    Red blood cell prepare order unit 1    Transfuse red blood cell unit 2      Orders Needing Specimen Collection     None      Pending Results     No orders found for last 3 day(s).            Pending Culture Results     No orders found for last 3 day(s).            Pending Results Instructions     If you had any lab results that were not finalized at the time of your Discharge, you can call the ED Lab Result RN at [phone]. You will be  contacted by this team for any positive Lab results or changes in treatment. The nurses are available 7 days a week from 10A to 6:30P.  You can leave a message 24 hours per day and they will return your call.        Test Results From Your Hospital Stay        7/16/2018  7:22 PM      Component Results     Component Value Ref Range & Units Status    Units Ordered 2  Final    ABO O  Final    RH(D) Pos  Final    Antibody Screen Neg  Final    Test Valid Only At [facility]     Final    Specimen Expires 07/19/2018  Final    Crossmatch Red Blood Cells  Final         7/16/2018  6:22 PM      Component Results     Component Value Ref Range & Units Status    WBC 4.6 4.0 - 11.0 10e9/L Final    RBC Count 1.97 (L) 4.4 - 5.9 10e12/L Final    Hemoglobin 6.7 (LL) 13.3 - 17.7 g/dL Final    This result has been called to JOSE ANGEL OLSON RN by Ailyn Jaeger on 07 16 2018   at 1821, and has been read back.       Hematocrit 19.1 (L) 40.0 - 53.0 % Final    MCV 97 78 - 100 fl Final    MCH 34.0 (H) 26.5 - 33.0 pg Final    MCHC 35.1 31.5 - 36.5 g/dL Final    RDW 17.9 (H) 10.0 - 15.0 % Final    Platelet Count 372 150 - 450 10e9/L Final    Diff Method Automated Method  Final    % Neutrophils 45.5 % Final    % Lymphocytes 41.1 % Final    % Monocytes 12.3 % Final    % Eosinophils 0.4 % Final    % Basophils 0.0 % Final    % Immature Granulocytes 0.7 % Final    Nucleated RBCs 0 0 /100 Final    Absolute Neutrophil 2.1 1.6 - 8.3 10e9/L Final    Absolute Lymphocytes 1.9 0.8 - 5.3 10e9/L Final    Absolute Monocytes 0.6 0.0 - 1.3 10e9/L Final    Absolute Eosinophils 0.0 0.0 - 0.7 10e9/L Final    Absolute Basophils 0.0 0.0 - 0.2 10e9/L Final    Abs Immature Granulocytes 0.0 0 - 0.4 10e9/L Final    Absolute Nucleated RBC 0.0  Final               7/16/2018 11:49 PM      Component Results     Component    Unit Number    [id number]    Blood Component Type    Red Blood Cells Leukocyte Reduced    Division Number    00    Status of Unit    Released  to care unit    Blood Product Code    [id number]    Unit Status    ISS         7/16/2018 11:49 PM      Component Results     Component    Unit Number    [id number]    Blood Component Type    Red Blood Cells Leukocyte Reduced    Division Number    00    Status of Unit    Released to care unit    Blood Product Code    [id number]    Unit Status    ISS                Thank you for choosing [facility]       Thank you for choosing [facility] for your care. Our goal is always to provide you with excellent care. Hearing back from our patients is one way we can continue to improve our services. Please take a few minutes to complete the written survey that you may receive in the mail after you visit with us. Thank you!        Care EveryWhere ID     This is your Care EveryWhere ID. This could be used by other organizations to access your [facility] medical records  [id number]        Equal Access to Services     [place] : Kevin Vidales, pepe camara, maru marquez, celina fisher. So [facility] [phone].    ATENCIÓN: Si habla español, tiene a galaviz disposición servicios gratuitos de asistencia lingüística. Llame al [phone].    We comply with applicable federal civil rights laws and [place] laws. We do not discriminate on the basis of race, color, national origin, age, disability, sex, sexual orientation, or gender identity.            After Visit Summary       This is your record. Keep this with you and show to your community pharmacist(s) and doctor(s) at your next visit.

## 2018-07-16 NOTE — ED NOTES
Bed: ED10  Expected date:   Expected time:   Means of arrival:   Comments:  Ambulance from [facility] 5.4

## 2018-07-16 NOTE — LETTER
7/16/2018        RE: Chilo Oneil  [address]  [facility] [address]        [place] [facility]    Chief Complaint   Patient presents with     custodial Acute       [place] Medical Record Number:  [id number]    HPI:    Chilo Oneil is a 68 year old  (1950), who is being seen today for an episodic care visit at [facility] .  HPI information obtained from: facility chart records, facility staff and patient report.    Met with Mr. Oneil today for urgent follow up.  Mr. Oneil reports overall he is feeling much better, less anxiety, much less pain.  He endorses ongoing Left hip pain which etiology is unclear but has no other complaints.  Specifically denies any light headedness/dizziness.  Does endorse fatigue but no SOB/ACOSTA.       ALLERGIES: Cipro [ciprofloxacin]  Past Medical, Surgical, Family and Social History reviewed and updated in [facility].    Current Outpatient Prescriptions   Medication Sig Dispense Refill     acetaminophen (TYLENOL) 325 MG tablet Take 2 tablets (650 mg) by mouth every 4 hours as needed for mild pain 100 tablet      ASPIRIN PO Take 81 mg by mouth daily        DULoxetine (CYMBALTA) 30 MG EC capsule Take 2 capsules (60 mg) by mouth daily 60 capsule 1     enoxaparin (LOVENOX) 80 MG/0.8ML injection Inject 0.7 mLs (70 mg) Subcutaneous 2 times daily Inject 0.69 subcutaneous every 12hrs 10 Syringe 0     fentaNYL (DURAGESIC) 12 mcg/hr 72 hr patch Place 1 patch onto the skin every 72 hours remove old patch. 30 patch 0     HYDROmorphone (DILAUDID) 2 MG tablet Take 1-2 tablets (2-4 mg) by mouth every 3 hours as needed for moderate to severe pain 80 tablet 0     LORazepam (ATIVAN) 0.5 MG tablet Take 1-2 tablets (0.5-1 mg) by mouth every 4 hours as needed for anxiety Take 0.5 mg by mouth every 4 hours as needed for nausea, vomiting, anxiety, or sleep. 30 tablet 5     losartan (COZAAR) 50 MG tablet Take 1 tablet (50 mg) by mouth daily 90 tablet 3     MAGNESIUM OXIDE  PO Take 400 mg by mouth daily       melatonin 1 MG CAPS Take 3 mg by mouth nightly as needed 90 capsule 0     nicotine (NICODERM CQ) 14 MG/24HR 24 hr patch Place 1 patch onto the skin daily 30 patch 1     OLANZapine (ZYPREXA) 5 MG tablet Take 1 tablet (5 mg) by mouth At Bedtime 49 tablet 1     omeprazole (PRILOSEC) 20 MG CR capsule Take 1 capsule (20 mg) by mouth daily 90 capsule 3     ondansetron (ZOFRAN-ODT) 8 MG ODT tab Take 1 tablet (8 mg) by mouth every 8 hours 90 tablet 1     potassium chloride SA (K-DUR/KLOR-CON M) 10 MEQ CR tablet Take 1 tablet (10 mEq) by mouth daily 30 tablet 1     prochlorperazine (COMPAZINE) 10 MG tablet Take 0.5 tablets (5 mg) by mouth every 6 hours as needed for nausea or vomiting 30 tablet 1     senna-docusate (SENOKOT-S;PERICOLACE) 8.6-50 MG per tablet Take 2 tablets by mouth 2 times daily 100 tablet 0     VENTOLIN  (90 Base) MCG/ACT Inhaler Inhale 2 puffs into the lungs every 4 hours as needed for shortness of breath / dyspnea or wheezing 1 Inhaler 1     Medications reviewed:  Medications reconciled to facility chart and changes were made to reflect current medications as identified as above med list. Below are the changes that were made:   Medications stopped since last EPIC medication reconciliation:   There are no discontinued medications.    Medications started since last [facility] medication reconciliation:  No orders of the defined types were placed in this encounter.    REVIEW OF SYSTEMS:  7 point ROS done including, light headedness/dizziness, fever/chills, pain, Resp, CV, GI, and  and is negative other than noted in HPI.      Physical Exam:  /70  Pulse 83  Temp 98.2  F (36.8  C)  Resp 20  Wt 130 lb (59 kg)  SpO2 97%  BMI 16.69 kg/m2     GENERAL APPEARANCE:  Elderly unkept, ill appearing male sitting up in WC, NAD.   ENT:  Mouth and oropharynx normal, moist mucous membranes.   RESP:  Regular relaxed breathing effort.  Mild non-productive cough.   PSYCH: Alert  and orientated, pleasant and cooperative.   Suspect a degree of cognitive/memory impairment, but stable/unchanged.  No anxiety today.     Recent Labs: Hgb was 5.6 today.     CBC RESULTS:   Recent Labs   Lab Test  07/03/18 1750 06/21/18   0900   WBC  2.8*  6.2   RBC  2.22*  3.10*   HGB  7.4*  10.1*   HCT  20.2*  29.3*   MCV  91  95   MCH  33.3*  32.6   MCHC  36.6*  34.5   RDW  16.2*  16.6*   PLT  31*  607*       Last Basic Metabolic Panel:  Recent Labs   Lab Test  07/03/18 1750 06/21/18   0900   NA  127*  132*   POTASSIUM  3.2*  3.7   CHLORIDE  90*  96   DAMARI  7.8*  8.5   CO2  29  28   BUN  18  11   CR  1.46*  1.10   GLC  80  103*       Liver Function Studies -   Recent Labs   Lab Test  07/03/18 1750 06/09/18   0715   PROTTOTAL  5.7*  4.9*   ALBUMIN  2.9*  2.1*   BILITOTAL  0.6  0.5   ALKPHOS  61  79   AST  23  22   ALT  11  18       Lab Results   Component Value Date    A1C 4.7 04/04/2018     Assessment/Plan:  Carcinoma, lung, left (H)  Pancytopenia (H)  Other pulmonary embolism without acute cor pulmonale, unspecified chronicity (H)  H/O acute myocardial infarction  Coronary artery disease involving native heart without angina pectoris, unspecified vessel or lesion type  Essential hypertension  Mr. Oneil has newer diagnosed Lung cancer and was started on Cisplatin and Alimta chemotherapy.  He has been in and out of the ER due to various issues.  He was recently there on 7/3 due to light headedness, N/V and weakness.  He was found to be pancytopenic and transfusion was recommended, he refused and left AMA.  His  got involved and somehow ended up at [facility] and was admitted a few days later.  We reviewed his situation and again recommended transfusions, he again left AMA.     Couple days later now, he has returned, readmitted.  He reports he was not coping well with all the acute illness issues, in setting of recent brother in law passed due to cancer.  He is in better spirits and accepting  mood now.      Hgb is down to 5.6 today, WBC and Platelet counts improved.  He is on Lovenox due to PE but no active bleeding, he is hemodynamically stable, asymptomatic.  Anemia/pancytopenia though to be secondary to chemotherapy.    We called [place], [place], [facility] and [place] transfusion clinics, all are booked for at least two weeks.  Due to cardiac history, low Hgb in the 5 range, and still trending down, we do not recommend waiting two weeks.  He is agreeable to transfusion now, thus will send to ER for 2 u RBC's, would anticipate if uneventful he would return tonight and we will reassess him tomorrow.     Orders:  1. -Send to ER for transfusion     Total time spent with patient visit at the skilled nursing facility was 35 min including patient visit and review of past records. Greater than 50% of total time spent with counseling and coordinating care due to complex medical case, review of HPI, development of POC, patient education and review of POC with pt.      Electronically signed by  NATALIE Olivera CNP      Sincerely,        NATALIE Olivera CNP

## 2018-07-16 NOTE — PROGRESS NOTES
[place] [facility]    Chief Complaint   Patient presents with     [facility] Acute       [place] Medical Record Number:  [id number]    HPI:    Chilo Oneil is a 68 year old  (1950), who is being seen today for an episodic care visit at [facility] .  HPI information obtained from: facility chart records, facility staff and patient report.    Met with Mr. Oneil today for urgent follow up.  Mr. Oneil reports overall he is feeling much better, less anxiety, much less pain.  He endorses ongoing Left hip pain which etiology is unclear but has no other complaints.  Specifically denies any light headedness/dizziness.  Does endorse fatigue but no SOB/ACOSTA.       ALLERGIES: Cipro [ciprofloxacin]  Past Medical, Surgical, Family and Social History reviewed and updated in [facility].    Current Outpatient Prescriptions   Medication Sig Dispense Refill     acetaminophen (TYLENOL) 325 MG tablet Take 2 tablets (650 mg) by mouth every 4 hours as needed for mild pain 100 tablet      ASPIRIN PO Take 81 mg by mouth daily        DULoxetine (CYMBALTA) 30 MG EC capsule Take 2 capsules (60 mg) by mouth daily 60 capsule 1     enoxaparin (LOVENOX) 80 MG/0.8ML injection Inject 0.7 mLs (70 mg) Subcutaneous 2 times daily Inject 0.69 subcutaneous every 12hrs 10 Syringe 0     fentaNYL (DURAGESIC) 12 mcg/hr 72 hr patch Place 1 patch onto the skin every 72 hours remove old patch. 30 patch 0     HYDROmorphone (DILAUDID) 2 MG tablet Take 1-2 tablets (2-4 mg) by mouth every 3 hours as needed for moderate to severe pain 80 tablet 0     LORazepam (ATIVAN) 0.5 MG tablet Take 1-2 tablets (0.5-1 mg) by mouth every 4 hours as needed for anxiety Take 0.5 mg by mouth every 4 hours as needed for nausea, vomiting, anxiety, or sleep. 30 tablet 5     losartan (COZAAR) 50 MG tablet Take 1 tablet (50 mg) by mouth daily 90 tablet 3     MAGNESIUM OXIDE PO Take 400 mg by mouth daily       melatonin 1 MG CAPS Take 3 mg by mouth nightly  as needed 90 capsule 0     nicotine (NICODERM CQ) 14 MG/24HR 24 hr patch Place 1 patch onto the skin daily 30 patch 1     OLANZapine (ZYPREXA) 5 MG tablet Take 1 tablet (5 mg) by mouth At Bedtime 49 tablet 1     omeprazole (PRILOSEC) 20 MG CR capsule Take 1 capsule (20 mg) by mouth daily 90 capsule 3     ondansetron (ZOFRAN-ODT) 8 MG ODT tab Take 1 tablet (8 mg) by mouth every 8 hours 90 tablet 1     potassium chloride SA (K-DUR/KLOR-CON M) 10 MEQ CR tablet Take 1 tablet (10 mEq) by mouth daily 30 tablet 1     prochlorperazine (COMPAZINE) 10 MG tablet Take 0.5 tablets (5 mg) by mouth every 6 hours as needed for nausea or vomiting 30 tablet 1     senna-docusate (SENOKOT-S;PERICOLACE) 8.6-50 MG per tablet Take 2 tablets by mouth 2 times daily 100 tablet 0     VENTOLIN  (90 Base) MCG/ACT Inhaler Inhale 2 puffs into the lungs every 4 hours as needed for shortness of breath / dyspnea or wheezing 1 Inhaler 1     Medications reviewed:  Medications reconciled to facility chart and changes were made to reflect current medications as identified as above med list. Below are the changes that were made:   Medications stopped since last EPIC medication reconciliation:   There are no discontinued medications.    Medications started since last [facility] medication reconciliation:  No orders of the defined types were placed in this encounter.    REVIEW OF SYSTEMS:  7 point ROS done including, light headedness/dizziness, fever/chills, pain, Resp, CV, GI, and  and is negative other than noted in HPI.      Physical Exam:  /70  Pulse 83  Temp 98.2  F (36.8  C)  Resp 20  Wt 130 lb (59 kg)  SpO2 97%  BMI 16.69 kg/m2     GENERAL APPEARANCE:  Elderly unkept, ill appearing male sitting up in WC, NAD.   ENT:  Mouth and oropharynx normal, moist mucous membranes.   RESP:  Regular relaxed breathing effort.  Mild non-productive cough.   PSYCH: Alert and orientated, pleasant and cooperative.  Suspect a degree of cognitive/memory  impairment, but stable/unchanged.  No anxiety today.     Recent Labs: Hgb was 5.6 today.     CBC RESULTS:   Recent Labs   Lab Test  07/03/18   1750  06/21/18   0900   WBC  2.8*  6.2   RBC  2.22*  3.10*   HGB  7.4*  10.1*   HCT  20.2*  29.3*   MCV  91  95   MCH  33.3*  32.6   MCHC  36.6*  34.5   RDW  16.2*  16.6*   PLT  31*  607*       Last Basic Metabolic Panel:  Recent Labs   Lab Test  07/03/18   1750  06/21/18   0900   NA  127*  132*   POTASSIUM  3.2*  3.7   CHLORIDE  90*  96   DAMARI  7.8*  8.5   CO2  29  28   BUN  18  11   CR  1.46*  1.10   GLC  80  103*       Liver Function Studies -   Recent Labs   Lab Test  07/03/18 1750 06/09/18   0715   PROTTOTAL  5.7*  4.9*   ALBUMIN  2.9*  2.1*   BILITOTAL  0.6  0.5   ALKPHOS  61  79   AST  23  22   ALT  11  18       Lab Results   Component Value Date    A1C 4.7 04/04/2018     Assessment/Plan:  Carcinoma, lung, left (H)  Pancytopenia (H)  Other pulmonary embolism without acute cor pulmonale, unspecified chronicity (H)  H/O acute myocardial infarction  Coronary artery disease involving native heart without angina pectoris, unspecified vessel or lesion type  Essential hypertension  Mr. Oneil has newer diagnosed Lung cancer and was started on Cisplatin and Alimta chemotherapy.  He has been in and out of the ER due to various issues.  He was recently there on 7/3 due to light headedness, N/V and weakness.  He was found to be pancytopenic and transfusion was recommended, he refused and left AMA.  His  got involved and somehow ended up at [facility] and was admitted a few days later.  We reviewed his situation and again recommended transfusions, he again left AMA.     Couple days later now, he has returned, readmitted.  He reports he was not coping well with all the acute illness issues, in setting of recent brother in law passed due to cancer.  He is in better spirits and accepting mood now.      Hgb is down to 5.6 today, WBC and Platelet counts improved.  He is  on Lovenox due to PE but no active bleeding, he is hemodynamically stable, asymptomatic.  Anemia/pancytopenia though to be secondary to chemotherapy.    We called [place], [place], [facility] and [place] transfusion clinics, all are booked for at least two weeks.  Due to cardiac history, low Hgb in the 5 range, and still trending down, we do not recommend waiting two weeks.  He is agreeable to transfusion now, thus will send to ER for 2 u RBC's, would anticipate if uneventful he would return tonight and we will reassess him tomorrow.     Orders:  1. -Send to ER for transfusion     Total time spent with patient visit at the [facility] nursing facility was 35 min including patient visit and review of past records. Greater than 50% of total time spent with counseling and coordinating care due to complex medical case, review of HPI, development of POC, patient education and review of POC with pt.      Electronically signed by  NATALIE Olivera CNP

## 2018-07-17 PROBLEM — M25.552 HIP PAIN, LEFT: Status: ACTIVE | Noted: 2018-01-01

## 2018-07-17 NOTE — PROGRESS NOTES
Clinic Care Coordination Contact    Clinic Care Coordination Contact  OUTREACH    Referral Information:  Pt was seen in the ED as a Face to Face Encounter on 7/16/18, as pt came in due to low hemoglobin and received 2 units.    Referral Source: PCP  Primary Diagnosis: Oncology    While YASMIN was speaking with the pt in the ED, the pt shared with this writer that the care provider at [facility] and his Oncologist had a conversation regarding his cancer treatments.  The pt shared he believes that 2 units of new blood he gets tonight will get rid of all of the old chemotherapy and toxins out of his system, as it was the wrong kind of chemo for him.  He shared that when he goes to his Oncologist appointment on Wednesday, July 18th, that he will be starting a new chemotherapy that will cure him of cancer.      YASMIN met with ED MD Saenz to see if he was aware of this information and was informed he was not.  YASMIN also briefly searched pt's EMR before going back into pt's room.  YASMIN informed the pt that none of this information is documented in his EMR and that we should have a meeting with his cancer care team on Wednesday morning to discuss the details before he starts any new program.  Pt is in agreement with this plan of care.  SW to contact pt's Oncology RNCC to discuss prior to pt's appointment.    Chief Complaint   Patient presents with     Clinic Care Coordination - Post Hospital     ED follow up- Social Work   Universal Utilization: Clinic Utilization  Difficulty keeping appointments:: Yes  Utilization    Last refreshed: 7/17/2018  4:01 AM:  No Show Count (past year) 8       Last refreshed: 7/17/2018  4:01 AM:  ED visits 10       Last refreshed: 7/17/2018  4:01 AM:  Hospital admissions 3          Current as of: 7/17/2018  4:01 AM         Clinical Concerns:  Current Medical Concerns:  Pt states he is feeling weak, groggy, but not in pain.    Current Behavioral Concerns: Pt shared he enjoys his new living environment and  that the staff are very kind to him at [facility]  Education Provided to patient: SW and pt discussed that it would be very helpful to get a better understanding of his medical plan of care before he starts new chemotherapy.  Pt is in agreement.     Pain  Chronic pain (GOAL):: Yes  Location of chronic pain:: shoulders/ chest/ back  Chronic pain timing:: Constant  Chronic pain severity:: 5  Limitation of routine activities due to chronic pain:: Yes  Health Maintenance Reviewed: Due/Overdue:   ASTHMA ACTION PLAN Q1 YR  4[birth year]17/1955         ASTHMA CONTROL TEST Q6 MOS  4/17/1956         TETANUS IMMUNIZATION (SYSTEM ASSIGNED)  4/17/1969         DEPRESSION ACTION PLAN Q1 YR  4/17/1969         HEPATITIS C SCREENING  4/17/1969         COLON CANCER SCREEN (SYSTEM ASSIGNED)  4/17/2001         PNEUMOCOCCAL (1 of 2 - PCV13)  4/17/2016         AORTIC ANEURYSM SCREENING (SYSTEM ASSIGNED)  4/17/2016        PHQ-9 Q6 MONTHS  6/21/2018        Clinical Pathway: None    Medication Management:  Pt's medications are now being dispensed by [facility] Care team.     Functional Status:  Dependent ADLs:: Ambulation-cane  Dependent IADLs:: Cooking, Shopping, Laundry, Transportation, Cleaning, Meal Preparation, Medication Management  Bed or wheelchair confined:: No  Mobility Status: Dependent/Assisted by Another  Fallen 2 or more times in the past year?: Yes  Any fall with injury in the past year?: No    Living Situation:  Current living arrangement:: Other  Type of residence:: Nursing home ([facility], [phone])    Diet/Exercise/Sleep:  Diet:: Regular  Inadequate nutrition (GOAL):: Yes  Food Insecurity: No  Tube Feeding: No  Exercise:: Currently not exercising  Inadequate activity/exercise (GOAL):: No  Significant changes in sleep pattern (GOAL): No    Transportation:  Transportation concerns (GOAL):: Yes  Transportation means:: Regular car, Public transportation, Friend      Psychosocial:  Denominational or spiritual beliefs that impact treatment::  (Mu-ism)  Mental health DX:: Yes  Mental health DX how managed:: Medication  Mental health management concern (GOAL):: No  Informal Support system:: Friends, Family     Financial/Insurance: [facility] [employer].  Pt gets Social Security   Financial/Insurance concerns (GOAL):: No     Resources and Interventions:  Current Resources:  Pt now resides in a long term care unit at [facility]   Community Resources: [facility] , Jennifer Bar [phone]    Advance Care Plan/Directive  Advanced Care Plans/Directives on file:: Yes  Type Advanced Care Plans/Directives: Advanced Directive - On File    Referrals Placed: SW would like to have a Care Conference with Freddy's care team     Goals:   Goals        General    Medication 1 (pt-stated)     Notes - Note edited  6/26/2018  5:03 PM by Sofya Paulino LSW    #1  Goal Statement: I will learn to take my medications properly   Measure of Success: I will take my medications properly  Supportive Steps to Achieve: I will work with either MTM or RNCC to learn how to take my medications correctly  Barriers: I have so many medications to keep track of it gets confusing.  Strengths: I will use a pill box to organize my medications  Date to Achieve By: 3 months        Pain Management (pt-stated)     Notes - Note edited  6/26/2018  5:04 PM by Sofya Paulino LSW    #3  SWCC to discuss with PCP, but would recommend a Palliative Care consult for symptom management.    Pt met with Palliative Care MD yesterday, 5-9-18, and may continue to do so.    Sofya Unger  Social Work Care Coordinator  [facility]  [phone]          Transportation (pt-stated)     Notes - Note edited  6/26/2018  5:04 PM by Sofya Paulino LSW    #2  Goal Statement: Pt needs dependable transportation to get him to his clinic appointments.  Measure of Success:  Pt will attend appointments if he has dependable transportation  Supportive Steps to Achieve: SW and pt will work together to secure his MA application will allow him to get transportation benefits.  SW to also provide pt with community resources.  Barriers: Access to pt's bank statements has been a barrier.    Strengths: Pt is motivated to complete the MA application  Date to Achieve By: 3 months            Patient/Caregiver understanding: Pt is aware that this writer will be contacting Oncology and his [facility] CM regarding his appointment on 7/18/18.    Outreach Frequency: weekly (PRN)  Future Appointments              Tomorrow ROOM 1 [facility]    Tomorrow Mor Mccauley MD [facility]    In 2 weeks ROOM 3 [facility]          Plan: YASMIN contacted Rajiv, Oncology RNCC, and discussed that we could have conversation regarding pt's treatment prior to beginning any new infusions tomorrow.  YASMIN also contacted Jennifer Bar, [facility], and informed her of informaiton in this note and invited her to attend the appointment tomorrow.    Sofya Unger  Social Work Care Coordinator  [facility]  [phone]

## 2018-07-17 NOTE — LETTER
7/17/2018        RE: Chilo Oneil  [address]  [facility] [address]        [place] [facility]    Chief Complaint   Patient presents with     FCI Acute       [facility] Medical Record Number:  [id number]    HPI:    Chilo Oneil is a 68 year old  (1950), who is being seen today for an episodic care visit at [facility] .  HPI information obtained from: facility chart records, facility staff and patient report.    Met with Mr. Oneil today after he went to ER for transfusion last night, received two units of blood.  Before he denied light headedness/dizziness, but today reports that his dizziness is resolved.  He reports he feels more energetic, stronger, feeling much better.  Continues to endorse his chronic Left shoulder and hip pain but much improved since being on analgesics.  No other complaints, does voice he's glad he returned to the LT for cares.     ALLERGIES: Cipro [ciprofloxacin]  Past Medical, Surgical, Family and Social History reviewed and updated in [employer].    Current Outpatient Prescriptions   Medication Sig Dispense Refill     ACETAMINOPHEN PO Take 1,000 mg by mouth 3 times daily       AmLODIPine Besylate (NORVASC PO) Take 10 mg by mouth daily       ASPIRIN PO Take 81 mg by mouth daily        DULoxetine (CYMBALTA) 30 MG EC capsule Take 2 capsules (60 mg) by mouth daily 60 capsule 1     enoxaparin (LOVENOX) 80 MG/0.8ML injection Inject 0.7 mLs (70 mg) Subcutaneous 2 times daily Inject 0.69 subcutaneous every 12hrs 10 Syringe 0     fentaNYL (DURAGESIC) 12 mcg/hr 72 hr patch Place 1 patch onto the skin every 72 hours remove old patch. 30 patch 0     HYDROmorphone (DILAUDID) 2 MG tablet Take 1-2 tablets (2-4 mg) by mouth every 3 hours as needed for moderate to severe pain 80 tablet 0     LORazepam (ATIVAN) 0.5 MG tablet Take 1-2 tablets (0.5-1 mg) by mouth every 4 hours as needed for anxiety Take 0.5 mg by mouth every 4 hours as needed for nausea, vomiting,  anxiety, or sleep. 30 tablet 5     MAGNESIUM OXIDE PO Take 400 mg by mouth daily       melatonin 1 MG CAPS Take 3 mg by mouth nightly as needed 90 capsule 0     nicotine (NICODERM CQ) 14 MG/24HR 24 hr patch Place 1 patch onto the skin daily 30 patch 1     Nutritional Supplements (BOOST PO) Take 8 oz by mouth 3 times daily       OLANZapine (ZYPREXA) 5 MG tablet Take 1 tablet (5 mg) by mouth At Bedtime 49 tablet 1     omeprazole (PRILOSEC) 20 MG CR capsule Take 1 capsule (20 mg) by mouth daily 90 capsule 3     ONDANSETRON PO Take 4 mg by mouth every 8 hours as needed for nausea       polyethylene glycol (MIRALAX/GLYCOLAX) Packet Take 17 g by mouth daily       potassium chloride SA (K-DUR/KLOR-CON M) 10 MEQ CR tablet Take 1 tablet (10 mEq) by mouth daily 30 tablet 1     prochlorperazine (COMPAZINE) 10 MG tablet Take 0.5 tablets (5 mg) by mouth every 6 hours as needed for nausea or vomiting 30 tablet 1     senna-docusate (SENOKOT-S;PERICOLACE) 8.6-50 MG per tablet Take 2 tablets by mouth 2 times daily 100 tablet 0     THIAMINE HCL PO Take 100 mg by mouth daily       VENTOLIN  (90 Base) MCG/ACT Inhaler Inhale 2 puffs into the lungs every 4 hours as needed for shortness of breath / dyspnea or wheezing 1 Inhaler 1     Medications reviewed:  Medications reconciled to facility chart and changes were made to reflect current medications as identified as above med list. Below are the changes that were made:   Medications stopped since last EPIC medication reconciliation:   Medications Discontinued During This Encounter   Medication Reason     ondansetron (ZOFRAN-ODT) 8 MG ODT tab Medication Reconciliation Clean Up     acetaminophen (TYLENOL) 325 MG tablet Medication Reconciliation Clean Up     losartan (COZAAR) 50 MG tablet Medication Reconciliation Clean Up       Medications started since last EPIC medication reconciliation:  Orders Placed This Encounter   Medications     ONDANSETRON PO     Sig: Take 4 mg by mouth every  8 hours as needed for nausea     polyethylene glycol (MIRALAX/GLYCOLAX) Packet     Sig: Take 17 g by mouth daily     AmLODIPine Besylate (NORVASC PO)     Sig: Take 10 mg by mouth daily     ACETAMINOPHEN PO     Sig: Take 1,000 mg by mouth 3 times daily     Nutritional Supplements (BOOST PO)     Sig: Take 8 oz by mouth 3 times daily     THIAMINE HCL PO     Sig: Take 100 mg by mouth daily       REVIEW OF SYSTEMS:  7 point ROS done including, light headedness/dizziness, fever/chills, pain, Resp, CV, GI, and  and is negative other than noted in HPI.      Physical Exam:  /78  Pulse 88  Temp 98.2  F (36.8  C)  Resp 20  Wt 130 lb (59 kg)  SpO2 96%  BMI 16.69 kg/m2     GENERAL APPEARANCE:  Elderly unkept, ill appearing male sitting up in WC, NAD.   ENT:  Mouth and oropharynx normal, moist mucous membranes.   RESP:  Lungs diminished in Left upper lobe, otherwise CTA. Regular relaxed breathing effort.  Mild non-productive cough.   CV: S1/S2 no murmur or rubs.  Regular rhythm and rate.  No generalized edema.   ABDOMEN:  Protuberant, soft, non-tender with active bowel sounds.   No guarding, rigidity, or rebound tenderness.  EXTREMITIES:  No lower extremity edema, no calf tenderness.   PSYCH: Alert and orientated, pleasant and cooperative.   Clear degree of cognitive/memory impairment, appears stable/unchanged.  Mood has much improved, reports feeling good.     Recent Labs: I have personally reviewed labs, which are in facility chart.     Assessment/Plan:  Carcinoma, lung, left (H)  Pancytopenia (H)  As noted, were not able to arrange outpatient transfusion, thus went to the ER last night and received two units of RBC's.  Interesting that prior he was denying any symptoms, specifically dizziness, but now that he's back he reports his dizziness has resolved.  Thus showing he is not a great historian.  He reports dizziness resolved, more energetic, feels better over all.   -Will continue to monitor cell line counts,  with labs.     Left shoulder pain, unspecified chronicity  Hip pain, left, unclear chronicity/etiology  Pain is mild/tolerable, appears chronic but unclear, etiology at this time is unclear.  Doing well with current analgesics.   -No changes, continue to clinically monitor.     Cognitive impairment  Clearly has a degree of cognitive and memory impairment.  We have had several discussions about his cancer and his chemo and were not sure he still understands the complexity of his situation.  We tried to explain the anemia and not feeling well was likely more his chemo than cancer so chemo is on hold.  He took this as he's done with treatment.  We explained that no he still has cancer and he will meet with [facility] in a few days to review other options.  He asked if it's curable, we told him we are under the impression it's treatable but we would not use the word curable.  He would need to get specifics about that from [facility].  He dose have a  helping him.      Orders:  1. CBC no diff, Mag, BMP on Monday 23 July.     Electronically signed by  NATALIE Olivera CNP      Sincerely,        NATALIE Olivera CNP

## 2018-07-17 NOTE — PROGRESS NOTES
[facility]    Chief Complaint   Patient presents with     long term Acute       [facility] Medical Record Number:  [id number]    HPI:    Chilo Oneil is a 68 year old  (1950), who is being seen today for an episodic care visit at [facility] .  HPI information obtained from: facility chart records, facility staff and patient report.    Met with Mr. Oneil today after he went to ER for transfusion last night, received two units of blood.  Before he denied light headedness/dizziness, but today reports that his dizziness is resolved.  He reports he feels more energetic, stronger, feeling much better.  Continues to endorse his chronic Left shoulder and hip pain but much improved since being on analgesics.  No other complaints, does voice he's glad he returned to the LT for cares.     ALLERGIES: Cipro [ciprofloxacin]  Past Medical, Surgical, Family and Social History reviewed and updated in [employer].    Current Outpatient Prescriptions   Medication Sig Dispense Refill     ACETAMINOPHEN PO Take 1,000 mg by mouth 3 times daily       AmLODIPine Besylate (NORVASC PO) Take 10 mg by mouth daily       ASPIRIN PO Take 81 mg by mouth daily        DULoxetine (CYMBALTA) 30 MG EC capsule Take 2 capsules (60 mg) by mouth daily 60 capsule 1     enoxaparin (LOVENOX) 80 MG/0.8ML injection Inject 0.7 mLs (70 mg) Subcutaneous 2 times daily Inject 0.69 subcutaneous every 12hrs 10 Syringe 0     fentaNYL (DURAGESIC) 12 mcg/hr 72 hr patch Place 1 patch onto the skin every 72 hours remove old patch. 30 patch 0     HYDROmorphone (DILAUDID) 2 MG tablet Take 1-2 tablets (2-4 mg) by mouth every 3 hours as needed for moderate to severe pain 80 tablet 0     LORazepam (ATIVAN) 0.5 MG tablet Take 1-2 tablets (0.5-1 mg) by mouth every 4 hours as needed for anxiety Take 0.5 mg by mouth every 4 hours as needed for nausea, vomiting, anxiety, or sleep. 30 tablet 5     MAGNESIUM OXIDE PO Take 400 mg by mouth daily        melatonin 1 MG CAPS Take 3 mg by mouth nightly as needed 90 capsule 0     nicotine (NICODERM CQ) 14 MG/24HR 24 hr patch Place 1 patch onto the skin daily 30 patch 1     Nutritional Supplements (BOOST PO) Take 8 oz by mouth 3 times daily       OLANZapine (ZYPREXA) 5 MG tablet Take 1 tablet (5 mg) by mouth At Bedtime 49 tablet 1     omeprazole (PRILOSEC) 20 MG CR capsule Take 1 capsule (20 mg) by mouth daily 90 capsule 3     ONDANSETRON PO Take 4 mg by mouth every 8 hours as needed for nausea       polyethylene glycol (MIRALAX/GLYCOLAX) Packet Take 17 g by mouth daily       potassium chloride SA (K-DUR/KLOR-CON M) 10 MEQ CR tablet Take 1 tablet (10 mEq) by mouth daily 30 tablet 1     prochlorperazine (COMPAZINE) 10 MG tablet Take 0.5 tablets (5 mg) by mouth every 6 hours as needed for nausea or vomiting 30 tablet 1     senna-docusate (SENOKOT-S;PERICOLACE) 8.6-50 MG per tablet Take 2 tablets by mouth 2 times daily 100 tablet 0     THIAMINE HCL PO Take 100 mg by mouth daily       VENTOLIN  (90 Base) MCG/ACT Inhaler Inhale 2 puffs into the lungs every 4 hours as needed for shortness of breath / dyspnea or wheezing 1 Inhaler 1     Medications reviewed:  Medications reconciled to facility chart and changes were made to reflect current medications as identified as above med list. Below are the changes that were made:   Medications stopped since last EPIC medication reconciliation:   Medications Discontinued During This Encounter   Medication Reason     ondansetron (ZOFRAN-ODT) 8 MG ODT tab Medication Reconciliation Clean Up     acetaminophen (TYLENOL) 325 MG tablet Medication Reconciliation Clean Up     losartan (COZAAR) 50 MG tablet Medication Reconciliation Clean Up       Medications started since last [facility] medication reconciliation:  Orders Placed This Encounter   Medications     ONDANSETRON PO     Sig: Take 4 mg by mouth every 8 hours as needed for nausea     polyethylene glycol (MIRALAX/GLYCOLAX) Packet      Sig: Take 17 g by mouth daily     AmLODIPine Besylate (NORVASC PO)     Sig: Take 10 mg by mouth daily     ACETAMINOPHEN PO     Sig: Take 1,000 mg by mouth 3 times daily     Nutritional Supplements (BOOST PO)     Sig: Take 8 oz by mouth 3 times daily     THIAMINE HCL PO     Sig: Take 100 mg by mouth daily       REVIEW OF SYSTEMS:  7 point ROS done including, light headedness/dizziness, fever/chills, pain, Resp, CV, GI, and  and is negative other than noted in HPI.      Physical Exam:  /78  Pulse 88  Temp 98.2  F (36.8  C)  Resp 20  Wt 130 lb (59 kg)  SpO2 96%  BMI 16.69 kg/m2     GENERAL APPEARANCE:  Elderly unkept, ill appearing male sitting up in WC, NAD.   ENT:  Mouth and oropharynx normal, moist mucous membranes.   RESP:  Lungs diminished in Left upper lobe, otherwise CTA. Regular relaxed breathing effort.  Mild non-productive cough.   CV: S1/S2 no murmur or rubs.  Regular rhythm and rate.  No generalized edema.   ABDOMEN:  Protuberant, soft, non-tender with active bowel sounds.   No guarding, rigidity, or rebound tenderness.  EXTREMITIES:  No lower extremity edema, no calf tenderness.   PSYCH: Alert and orientated, pleasant and cooperative.  Clear degree of cognitive/memory impairment, appears stable/unchanged.  Mood has much improved, reports feeling good.     Recent Labs: I have personally reviewed labs, which are in facility chart.     Assessment/Plan:  Carcinoma, lung, left (H)  Pancytopenia (H)  As noted, were not able to arrange outpatient transfusion, thus went to the ER last night and received two units of RBC's.  Interesting that prior he was denying any symptoms, specifically dizziness, but now that he's back he reports his dizziness has resolved.  Thus showing he is not a great historian.  He reports dizziness resolved, more energetic, feels better over all.   -Will continue to monitor cell line counts, with labs.     Left shoulder pain, unspecified chronicity  Hip pain, left, unclear  chronicity/etiology  Pain is mild/tolerable, appears chronic but unclear, etiology at this time is unclear.  Doing well with current analgesics.   -No changes, continue to clinically monitor.     Cognitive impairment  Clearly has a degree of cognitive and memory impairment.  We have had several discussions about his cancer and his chemo and were not sure he still understands the complexity of his situation.  We tried to explain the anemia and not feeling well was likely more his chemo than cancer so chemo is on hold.  He took this as he's done with treatment.  We explained that no he still has cancer and he will meet with [facility] in a few days to review other options.  He asked if it's curable, we told him we are under the impression it's treatable but we would not use the word curable.  He would need to get specifics about that from [facility].  He dose have a  helping him.      Orders:  1. CBC no diff, Mag, BMP on Monday 23 July.     Electronically signed by  NATALIE Olivera CNP

## 2018-07-17 NOTE — DISCHARGE INSTRUCTIONS
Return follow-up with Dr. Bell oncology.  Return to the emergency department with further emergent concerns.

## 2018-07-18 NOTE — MR AVS SNAPSHOT
After Visit Summary   7/18/2018    Chilo Oneil    MRN: [id number]           Patient Information     Date Of Birth          1950        Visit Information        Provider Department      7/18/2018 8:15 AM Mor Mccauley MD [facility] ONCOLOGY      Today's Diagnoses     Malignant neoplasm of lower lobe of left lung (H)    -  1    Benign essential hypertension        Hyperlipidemia LDL goal <100        Gastroesophageal reflux disease without esophagitis        Tobacco use disorder        Other pulmonary embolism without acute cor pulmonale, unspecified chronicity (H)        Chemotherapy induced nausea and vomiting        Single current episode of major depressive disorder, unspecified depression episode severity        CKD (chronic kidney disease) stage 3, GFR 30-59 ml/min        Carcinoma, lung, left (H)          Care Instructions    We would like to see you back in clinic with Dr. Mccauley in 2 weeks with PET scan prior.      Your prescription (Megace) has been sent to:   [facility] [address]  [address] [address]  Phone: [phone] Fax: [phone] Alternate Fax: [phone], [phone]  When you are in need of a refill, please call your pharmacy and they will send us a request.      Copy of appointments, and after visit summary (AVS) given to patient.      If you have any questions during business hours (M-F 8 AM- 4PM), please call Erica Elliott RN, BSN, OCN Oncology Hematology /Breast Cancer Navigator at [facility] [phone].       For questions after business hours, or on holidays/weekends, please call our after hours Nurse Triage line [phone]. Thank you.            Follow-ups after your visit        Follow-up notes from your care team     Return in about 2 weeks (around 8/1/2018) for Imaging ordered before next appointment.      Your next 10 appointments already  scheduled     Aug 01, 2018 12:30 PM CDT   (Arrive by 12:00 PM)   PE NPET ONCOLOGY (EYES TO THIGHS) with [id number]   [facility] Pet CT ([facility])    [address] [facility] [address]   [phone]           Tell your doctor:   If there is any chance you may be pregnant or if you are breastfeeding.   If you have problems lying in small spaces (claustrophobia). If you do, your doctor may give you medicine to help you relax. If you have diabetes:   Have your exam early in the morning. Your blood glucose will go up as the day goes by.   Your glucose level must be 180 or less at the start of the exam. Please take any medicines you need to ensure this blood glucose level. 24 hours before your scan: Don t do any heavy exercise. (No jogging, aerobics or other workouts.) Exercise will make your pictures less accurate. 6 hours before your scan:   Stop all food and liquids (except water).   Do not chew gum or suck on mints.   If you need to take medicine with food, you may take it with a few crackers.  Please call your Imaging Department at your exam site with any questions.            Aug 02, 2018  1:45 PM CDT   Return Visit with Mor Mccauley MD   [facility] ([facility])    [facility]  [address]  [facility] [address]   [phone]            Aug 06, 2018 11:30 AM CDT   Level 1 with ROOM 3 [facility] ([facility])    [facility]  [address]  [facility] [address]   [phone]              Future tests that were ordered for you today     Open Future Orders        Priority Expected Expires Ordered    PET Oncology (Eyes to Thighs) Routine 8/1/2018 7/19/2019 7/18/2018            Who to contact     If you have questions or need follow up information about today's clinic visit or your schedule please contact [facility] directly at [phone].  Normal or  non-critical lab and imaging results will be communicated to you by MyChart, letter or phone within 4 business days after the clinic has received the results. If you do not hear from us within 7 days, please contact the clinic through MyChart or phone. If you have a critical or abnormal lab result, we will notify you by phone as soon as possible.  Submit refill requests through [employer] or call your pharmacy and they will forward the refill request to us. Please allow 3 business days for your refill to be completed.          Additional Information About Your Visit        Care EveryWhere ID     This is your Care EveryWhere ID. This could be used by other organizations to access your [facility] medical records  [id number]        Your Vitals Were     Pulse Temperature Respirations Pulse Oximetry BMI (Body Mass Index)       94 96.6  F (35.9  C) (Tympanic) 18 97% 16.63 kg/m2        Blood Pressure from Last 3 Encounters:   07/18/18 145/78   07/17/18 136/78   07/17/18 136/70    Weight from Last 3 Encounters:   07/18/18 58.7 kg (129 lb 8 oz)   07/17/18 59 kg (130 lb)   07/16/18 59 kg (130 lb)                 Today's Medication Changes          These changes are accurate as of 7/18/18  9:37 AM.  If you have any questions, ask your nurse or doctor.               Start taking these medicines.        Dose/Directions    megestrol 40 MG/ML suspension   Commonly known as:  MEGACE ORAL   Used for:  Malignant neoplasm of lower lobe of left lung (H)   Started by:  Mor Mccauley MD        Dose:  200 mg   Take 5 mLs (200 mg) by mouth daily   Quantity:  600 mL   Refills:  2            Where to get your medicines      These medications were sent to [facility] [place], [place] - [address]  [address] [facility] [address]     Phone:  [phone]     megestrol 40 MG/ML suspension                Primary Care Provider Office Phone # Fax #    Manish Plasencia [phone] [address]  [facility] [address]        Goals        General    Medication 1 (pt-stated)     Notes - Note edited  6/26/2018  5:03 PM by Sofya Paulino LSW    #1  Goal Statement: I will learn to take my medications properly   Measure of Success: I will take my medications properly  Supportive Steps to Achieve: I will work with either MTM or RNCC to learn how to take my medications correctly  Barriers: I have so many medications to keep track of it gets confusing.  Strengths: I will use a pill box to organize my medications  Date to Achieve By: 3 months        Pain Management (pt-stated)     Notes - Note edited  6/26/2018  5:04 PM by Sofya Paulino LSW    #3  SWCC to discuss with PCP, but would recommend a Palliative Care consult for symptom management.    Pt met with Palliative Care MD yesterday, 5-9-18, and may continue to do so.    Sofya Unger  Social Work Care Coordinator  [facility]  [phone]          Transportation (pt-stated)     Notes - Note edited  6/26/2018  5:04 PM by Sofya Paulino LSW    #2  Goal Statement: Pt needs dependable transportation to get him to his clinic appointments.  Measure of Success: Pt will attend appointments if he has dependable transportation  Supportive Steps to Achieve: SW and pt will work together to secure his MA application will allow him to get transportation benefits.  SW to also provide pt with community resources.  Barriers: Access to pt's bank statements has been a barrier.    Strengths: Pt is motivated to complete the MA application  Date to Achieve By: 3 months          Equal Access to Services     [facility] AH: Hadii aad ku hadasho Soomaali, waaxda luqadaha, qaybta kaalmada adeegyada, waxay erastoin hayevette tong'evette fisher. So [facility] [phone].    ATENCIÓN: Si habla español, tiene a galaviz disposición servicios gratuitos de asistencia lingüística. Llame al [phone].    We comply with applicable federal civil  rights laws and [place] laws. We do not discriminate on the basis of race, color, national origin, age, disability, sex, sexual orientation, or gender identity.            Thank you!     Thank you for choosing [facility]  for your care. Our goal is always to provide you with excellent care. Hearing back from our patients is one way we can continue to improve our services. Please take a few minutes to complete the written survey that you may receive in the mail after your visit with us. Thank you!             Your Updated Medication List - Protect others around you: Learn how to safely use, store and throw away your medicines at www.disposemymeds.org.          This list is accurate as of 7/18/18  9:37 AM.  Always use your most recent med list.                   Brand Name Dispense Instructions for use Diagnosis    ACETAMINOPHEN PO      Take 1,000 mg by mouth 3 times daily        ASPIRIN PO      Take 81 mg by mouth daily        BOOST PO      Take 8 oz by mouth 3 times daily        DULoxetine 30 MG EC capsule    CYMBALTA    60 capsule    Take 2 capsules (60 mg) by mouth daily    Single current episode of major depressive disorder, unspecified depression episode severity       enoxaparin 80 MG/0.8ML injection    LOVENOX    10 Syringe    Inject 0.7 mLs (70 mg) Subcutaneous 2 times daily Inject 0.69 subcutaneous every 12hrs    Other pulmonary embolism without acute cor pulmonale, unspecified chronicity (H)       fentaNYL 12 mcg/hr 72 hr patch    DURAGESIC    30 patch    Place 1 patch onto the skin every 72 hours remove old patch.    Other acute pulmonary embolism without acute cor pulmonale (H)       HYDROmorphone 2 MG tablet    DILAUDID    80 tablet    Take 1-2 tablets (2-4 mg) by mouth every 3 hours as needed for moderate to severe pain    Other acute pulmonary embolism without acute cor pulmonale (H), Carcinoma, lung, left (H)       LORazepam 0.5 MG tablet    ATIVAN    30 tablet    Take 1-2 tablets (0.5-1  mg) by mouth every 4 hours as needed for anxiety Take 0.5 mg by mouth every 4 hours as needed for nausea, vomiting, anxiety, or sleep.    Anxiety       MAGNESIUM OXIDE PO      Take 400 mg by mouth daily        megestrol 40 MG/ML suspension    MEGACE ORAL    600 mL    Take 5 mLs (200 mg) by mouth daily    Malignant neoplasm of lower lobe of left lung (H)       melatonin 1 MG Caps     90 capsule    Take 3 mg by mouth nightly as needed        nicotine 14 MG/24HR 24 hr patch    NICODERM CQ    30 patch    Place 1 patch onto the skin daily    Tobacco use disorder       NORVASC PO      Take 10 mg by mouth daily        OLANZapine 5 MG tablet    zyPREXA    49 tablet    Take 1 tablet (5 mg) by mouth At Bedtime    Insomnia, unspecified type       omeprazole 20 MG CR capsule    priLOSEC    90 capsule    Take 1 capsule (20 mg) by mouth daily    Carcinoma, lung, left (H)       ONDANSETRON PO      Take 4 mg by mouth every 8 hours as needed for nausea        polyethylene glycol Packet    MIRALAX/GLYCOLAX     Take 17 g by mouth daily        potassium chloride SA 10 MEQ CR tablet    K-DUR/KLOR-CON M    30 tablet    Take 1 tablet (10 mEq) by mouth daily    Carcinoma, lung, left (H)       prochlorperazine 10 MG tablet    COMPAZINE    30 tablet    Take 0.5 tablets (5 mg) by mouth every 6 hours as needed for nausea or vomiting    Chemotherapy induced nausea and vomiting       senna-docusate 8.6-50 MG per tablet    SENOKOT-S;PERICOLACE    100 tablet    Take 2 tablets by mouth 2 times daily    Carcinoma, lung, left (H)       THIAMINE HCL PO      Take 100 mg by mouth daily        VENTOLIN  (90 Base) MCG/ACT Inhaler   Generic drug:  albuterol     1 Inhaler    Inhale 2 puffs into the lungs every 4 hours as needed for shortness of breath / dyspnea or wheezing

## 2018-07-18 NOTE — LETTER
7/18/2018         RE: Chilo Oneil  [address] [address]        Dear Colleague,    Thank you for referring your patient, Chilo Oneil, to the [facility]. Please see a copy of my visit note below.    Hematology/ Oncology Follow-up Visit:  Jul 18, 2018    Reason for Visit:   Chief Complaint   Patient presents with     Oncology Clinic Visit     F/U for lung cancer       Oncologic History:  Carcinoma, lung, left (H)  Patient presented with L side shoulder and has been traveling  to /side about  to the whole L/side upper back and L side of chest for about 3 week he has had the pain has been taking aleve.  Subsequently went on to have a CT scan done.  Which showed 1.6 cm nodular infiltrate in the left upper lobe.  The lesion appear spiculated and worrisome for lung cancer.  There is a second 0.7 cm indeterminate nodule in the lingular portion of the left lung.  Subsequently the patient went on to have CT-guided biopsy.  The pathology came back showing moderately differentiated adenocarcinoma. He had subsequent noted left thoracoscopic left pleural biopsies and left lobe which resection on April 11, 2018.  The surgical pathology came back with pleural biopsies came back positive for invasive adenocarcinoma with parietal pleural invasion.  The wedge resection came back with adenocarcinoma with acinar patterns of growth moderately differentiated the tumor size is 1.1 and 0.3 cm into 2 separate tumor nodules.  Visceropleural invasion was identified the margins were negative lymphovascular invasion was not identified large venous artery involvement was not identified as well.  The pathologic staging of pT3N0.        Interval History:  Patient returning today for follow-up.  His been having difficulty with chemotherapy and mostly related to nausea, vomiting, weight loss and poor appetite.  His last chemotherapy was complicated with pulmonary embolism currently he is on Lovenox injections.   He has been in the nursing home.  Currently, he denies any chest pain.  He has been having left shoulder pain that responded well to pain medication.  The shortness of breath has improved.  He denies any skin rash or itch.  Has any fever or chills.    Review Of Systems:  Constitutional: Negative for fever, chills, and night sweats.  Fatigue, generalized weakness  Skin: negative.  Eyes: negative.  Ears/Nose/Throat: negative.  Respiratory: No shortness of breath, dyspnea on exertion, cough, or hemoptysis.  Cardiovascular: negative.  Gastrointestinal: Nausea and vomiting as mentioned above  Genitourinary: negative.  Musculoskeletal: negative.  Neurologic: negative.  Psychiatric: negative.  Hematologic/Lymphatic/Immunologic: negative.  Endocrine: negative.    All other ROS negative unless mentioned in interval history.    Past medical, social, surgical, and family histories reviewed.    Allergies:  Allergies as of 07/18/2018 - Alcides as Reviewed 07/18/2018   Allergen Reaction Noted     Cipro [ciprofloxacin] Swelling 06/22/2009       Current Medications:  Current Outpatient Prescriptions   Medication Sig Dispense Refill     megestrol (MEGACE ORAL) 40 MG/ML suspension Take 5 mLs (200 mg) by mouth daily 600 mL 2     ACETAMINOPHEN PO Take 1,000 mg by mouth 3 times daily       AmLODIPine Besylate (NORVASC PO) Take 10 mg by mouth daily       ASPIRIN PO Take 81 mg by mouth daily        DULoxetine (CYMBALTA) 30 MG EC capsule Take 2 capsules (60 mg) by mouth daily 60 capsule 1     enoxaparin (LOVENOX) 80 MG/0.8ML injection Inject 0.7 mLs (70 mg) Subcutaneous 2 times daily Inject 0.69 subcutaneous every 12hrs 10 Syringe 0     fentaNYL (DURAGESIC) 12 mcg/hr 72 hr patch Place 1 patch onto the skin every 72 hours remove old patch. 30 patch 0     HYDROmorphone (DILAUDID) 2 MG tablet Take 1-2 tablets (2-4 mg) by mouth every 3 hours as needed for moderate to severe pain 80 tablet 0     LORazepam (ATIVAN) 0.5 MG tablet Take 1-2 tablets  (0.5-1 mg) by mouth every 4 hours as needed for anxiety Take 0.5 mg by mouth every 4 hours as needed for nausea, vomiting, anxiety, or sleep. 30 tablet 5     MAGNESIUM OXIDE PO Take 400 mg by mouth daily       melatonin 1 MG CAPS Take 3 mg by mouth nightly as needed 90 capsule 0     nicotine (NICODERM CQ) 14 MG/24HR 24 hr patch Place 1 patch onto the skin daily 30 patch 1     Nutritional Supplements (BOOST PO) Take 8 oz by mouth 3 times daily       OLANZapine (ZYPREXA) 5 MG tablet Take 1 tablet (5 mg) by mouth At Bedtime 49 tablet 1     omeprazole (PRILOSEC) 20 MG CR capsule Take 1 capsule (20 mg) by mouth daily 90 capsule 3     ONDANSETRON PO Take 4 mg by mouth every 8 hours as needed for nausea       polyethylene glycol (MIRALAX/GLYCOLAX) Packet Take 17 g by mouth daily       potassium chloride SA (K-DUR/KLOR-CON M) 10 MEQ CR tablet Take 1 tablet (10 mEq) by mouth daily 30 tablet 1     prochlorperazine (COMPAZINE) 10 MG tablet Take 0.5 tablets (5 mg) by mouth every 6 hours as needed for nausea or vomiting 30 tablet 1     senna-docusate (SENOKOT-S;PERICOLACE) 8.6-50 MG per tablet Take 2 tablets by mouth 2 times daily 100 tablet 0     THIAMINE HCL PO Take 100 mg by mouth daily       VENTOLIN  (90 Base) MCG/ACT Inhaler Inhale 2 puffs into the lungs every 4 hours as needed for shortness of breath / dyspnea or wheezing 1 Inhaler 1        Physical Exam:  /78 (BP Location: Right arm, Patient Position: Sitting, Cuff Size: Adult Small)  Pulse 94  Temp 96.6  F (35.9  C) (Tympanic)  Resp 18  Wt 58.7 kg (129 lb 8 oz)  SpO2 97%  BMI 16.63 kg/m2  Wt Readings from Last 12 Encounters:   07/18/18 58.7 kg (129 lb 8 oz)   07/17/18 59 kg (130 lb)   07/16/18 59 kg (130 lb)   07/12/18 60.8 kg (134 lb)   07/10/18 61.3 kg (135 lb 3.2 oz)   07/03/18 68 kg (150 lb)   06/26/18 69.4 kg (153 lb)   06/21/18 64.5 kg (142 lb 3.2 oz)   06/19/18 65.8 kg (145 lb)   06/14/18 66.2 kg (145 lb 14.4 oz)   06/11/18 67.9 kg (149 lb 9.6  oz)   05/31/18 69.1 kg (152 lb 6.4 oz)     ECOG performance status: 1  GENERAL APPEARANCE: Healthy, alert and in no acute distress.  Muscle wasting  HEENT: Sclerae anicteric. PERRLA. Oropharynx without ulcers, lesions, or thrush.  NECK: Supple. No asymmetry or masses.  LYMPHATICS: No palpable cervical, supraclavicular, axillary, or inguinal lymphadenopathy.  RESP: Lungs clear to auscultation bilaterally without rales, rhonchi or wheezes.  CARDIOVASCULAR: Regular rate and rhythm. Normal S1, S2; no S3 or S4. No murmur, gallop, or rub.  ABDOMEN: Soft, nontender. Bowel sounds normal. No palpable organomegaly or masses.  MUSCULOSKELETAL: Extremities without gross deformities noted. No edema of bilateral lower extremities.  SKIN: No suspicious lesions or rashes.  NEURO: Alert and oriented x 3. Cranial nerves II-XII grossly intact.  PSYCHIATRIC: Mentation and affect appear normal.    Laboratory/Imaging Studies:  Infusion Therapy Visit on 07/18/2018   Component Date Value Ref Range Status     WBC 07/18/2018 4.8  4.0 - 11.0 10e9/L Final     RBC Count 07/18/2018 3.08* 4.4 - 5.9 10e12/L Final     Hemoglobin 07/18/2018 10.1* 13.3 - 17.7 g/dL Final     Hematocrit 07/18/2018 28.7* 40.0 - 53.0 % Final     MCV 07/18/2018 93  78 - 100 fl Final     MCH 07/18/2018 32.8  26.5 - 33.0 pg Final     MCHC 07/18/2018 35.2  31.5 - 36.5 g/dL Final     RDW 07/18/2018 17.1* 10.0 - 15.0 % Final     Platelet Count 07/18/2018 336  150 - 450 10e9/L Final     Diff Method 07/18/2018 Automated Method   Final     % Neutrophils 07/18/2018 55.1  % Final     % Lymphocytes 07/18/2018 30.4  % Final     % Monocytes 07/18/2018 13.3  % Final     % Eosinophils 07/18/2018 0.6  % Final     % Basophils 07/18/2018 0.0  % Final     % Immature Granulocytes 07/18/2018 0.6  % Final     Nucleated RBCs 07/18/2018 0  0 /100 Final     Absolute Neutrophil 07/18/2018 2.6  1.6 - 8.3 10e9/L Final     Absolute Lymphocytes 07/18/2018 1.5  0.8 - 5.3 10e9/L Final     Absolute  Monocytes 07/18/2018 0.6  0.0 - 1.3 10e9/L Final     Absolute Eosinophils 07/18/2018 0.0  0.0 - 0.7 10e9/L Final     Absolute Basophils 07/18/2018 0.0  0.0 - 0.2 10e9/L Final     Abs Immature Granulocytes 07/18/2018 0.0  0 - 0.4 10e9/L Final     Absolute Nucleated RBC 07/18/2018 0.0   Final     Sodium 07/18/2018 134  133 - 144 mmol/L Final     Potassium 07/18/2018 3.4  3.4 - 5.3 mmol/L Final     Chloride 07/18/2018 98  94 - 109 mmol/L Final     Carbon Dioxide 07/18/2018 30  20 - 32 mmol/L Final     Anion Gap 07/18/2018 6  3 - 14 mmol/L Final     Glucose 07/18/2018 109* 70 - 99 mg/dL Final     Urea Nitrogen 07/18/2018 13  7 - 30 mg/dL Final     Creatinine 07/18/2018 0.86  0.66 - 1.25 mg/dL Final     GFR Estimate 07/18/2018 89  >60 mL/min/1.7m2 Final    Non  GFR Calc     GFR Estimate If Black 07/18/2018 >90  >60 mL/min/1.7m2 Final    African American GFR Calc     Calcium 07/18/2018 8.5  8.5 - 10.1 mg/dL Final     Magnesium 07/18/2018 1.4* 1.6 - 2.3 mg/dL Final        Recent Results (from the past 744 hour(s))   XR Chest 2 Views    Narrative    CHEST TWO VIEWS  7/3/2018 7:22 PM     HISTORY: Near syncope.    COMPARISON: Chest x-ray 5/15/2018.      Impression    IMPRESSION: No acute cardiopulmonary disease. Ill-defined nodule  appears stable versus slightly less prominent just medial to the level  of the left port venous catheter at the left mid chest.    FRANKIE DO MD       Assessment and plan:    (C34.32) Malignant neoplasm of lower lobe of left lung (H)  (primary encounter diagnosis)  I reviewed with the patient today the management plan in details.  We also talked about goals of care.  At this time because of deterioration of the performance status and the side effects of chemotherapy we will put the chemotherapy on hold.  I will arrange for restaging workup with a PET scan in the next couple of weeks.  I asked the patient to increase his caloric intake.  I gave him prescription for Megace 200 mg  orally daily to help with his appetite.  I will see the patient again in 2 weeks time with a PET scan result to discuss management plan in more details.    (I10) Benign essential hypertension  he currently on Norvasc 10 mg orally daily.    (K21.9) Gastroesophageal reflux disease without esophagitis  She currently on omeprazole 20 mg orally daily.    (F17.200) Tobacco use disorder  Strongly emphasized importance of quitting smoking.    (I26.99) Other pulmonary embolism without acute cor pulmonale, unspecified chronicity (H)  She currently on Lovenox 70 mg subcutaneously twice daily.    (R11.2,  T45.1X5A) Chemotherapy induced nausea and vomiting  The patient management of nausea details.    (F32.9) Single current episode of major depressive disorder, unspecified depression episode severity  Patient currently on Cymbalta 60 mg daily.    (N18.3) CKD (chronic kidney disease) stage 3, GFR 30-59 ml/min  Patient creatinine has been stable.    The patient is ready to learn, no apparent learning barriers were identified.  Diagnosis and treatment plans were explained to the patient. The patient expressed understanding of the content. The patient asked appropriate questions. The patient questions were answered to his satisfaction.    Time spent 40 minutes more than 50% of the time in counseling and coordination of care including discussion of management of metastatic lung cancer, management of side effects, follow-up and prognosis.    Chart documentation with Dragon Voice recognition Software. Although reviewed after completion, some words and grammatical errors may remain.    Again, thank you for allowing me to participate in the care of your patient.        Sincerely,        Mor Mccauley MD

## 2018-07-18 NOTE — NURSING NOTE
"Oncology Rooming Note    July 18, 2018 8:49 AM   Chilo Oneil is a 68 year old male who presents for:    Chief Complaint   Patient presents with     Oncology Clinic Visit     F/U for lung cancer     Initial Vitals: /78 (BP Location: Right arm, Patient Position: Sitting, Cuff Size: Adult Small)  Pulse 94  Temp 96.6  F (35.9  C) (Tympanic)  Resp 18  Wt 58.7 kg (129 lb 8 oz)  SpO2 97%  BMI 16.63 kg/m2 Estimated body mass index is 16.63 kg/(m^2) as calculated from the following:    Height as of 7/12/18: 1.88 m (6' 2\").    Weight as of this encounter: 58.7 kg (129 lb 8 oz). Body surface area is 1.75 meters squared.  Mild Pain (3) Comment: Left shoulder   No LMP for male patient.  Allergies reviewed: Yes  Medications reviewed: Yes    Medications: Medication refills not needed today.  Pharmacy name entered into [email or web address]: [facility] [place], [place] - [address] [place] [address]    Clinical concerns: f/u lung cancer,  Pt wants to know \"how he is going to get fixed?\" Deciding on Chemo or not? Strength level of chemo... Too strong?, \"wants to know if he can be cured with a new treatment?\"    7 minutes for nursing intake (face to face time)     Rylee Ca RN              "

## 2018-07-18 NOTE — PROGRESS NOTES
Infusion Nursing Note:  Chilo Chaparroestelita presents today for port lab draw.    Patient seen by provider today: Yes   present during visit today: Not Applicable.    Note: N/A.    Intravenous Access:  Implanted Port. Labs drawn without difficulty.    Treatment Conditions:  Not Applicable.      Post Infusion Assessment:  Access discontinued per protocol.    Discharge Plan:   Departure Mode: Wheelchair. Pt has a consultation with oncologist today.    Sofya Costello RN

## 2018-07-18 NOTE — PROGRESS NOTES
Clinic Care Coordination Contact  Care Team Conversations    SW attended pt's Oncology appointment today with pt, per pt request.  Pt met with Dr. Mccauley, Oncologist, who shared the following information with the pt:    1.  Pt's previous surgery did not get all of the pt's cancer, therefore, the chemotherapy is not is not curative in nature, but to only shrink what cancer is remaining.  2.  Pt is too weak to do any chemotherapy at this time.    3.  Pt must gain weight, strength and be in better health before Dr. Mccauley will consider any options of chemotherapy or other agents.  4.  Pt will undergo a PET scan in 2+ weeks, which will look for active cancer in the pt.  5.  Will discuss chemo or other agents after the results of the PET scan are read and discuss plan of care at that time.    Pt was encouraged to quit smoking.  Pt shared he smokes 4-5 cigarettes/day.  SW encouraged pt to ask the staff at [facility] to put on the Nicotine patch; pt cannot smoke and wear the patch at the same time.    Dr. Mccauley discussed the need for caloric intake.  Wrote a new prescription for Magace, which should help the pt with his appetite.  Also discussed that pt should be taking Boost as often as possible, up to 3 drinks/day, for increased calories.    SW to share this information with Asad Astudillo PA-C of [facility] and Jennifer Bar, [facility]'s [facility], .    Sofya Unger  Social Work Care Coordinator  [facility]  [phone]

## 2018-07-18 NOTE — PROGRESS NOTES
Hematology/ Oncology Follow-up Visit:  Jul 18, 2018    Reason for Visit:   Chief Complaint   Patient presents with     Oncology Clinic Visit     F/U for lung cancer       Oncologic History:  Carcinoma, lung, left (H)  Patient presented with L side shoulder and has been traveling  to L/side about  to the whole L/side upper back and L side of chest for about 3 week he has had the pain has been taking aleve.  Subsequently went on to have a CT scan done.  Which showed 1.6 cm nodular infiltrate in the left upper lobe.  The lesion appear spiculated and worrisome for lung cancer.  There is a second 0.7 cm indeterminate nodule in the lingular portion of the left lung.  Subsequently the patient went on to have CT-guided biopsy.  The pathology came back showing moderately differentiated adenocarcinoma. He had subsequent noted left thoracoscopic left pleural biopsies and left lobe which resection on April 11, 2018.  The surgical pathology came back with pleural biopsies came back positive for invasive adenocarcinoma with parietal pleural invasion.  The wedge resection came back with adenocarcinoma with acinar patterns of growth moderately differentiated the tumor size is 1.1 and 0.3 cm into 2 separate tumor nodules.  Visceropleural invasion was identified the margins were negative lymphovascular invasion was not identified large venous artery involvement was not identified as well.  The pathologic staging of pT3N0.        Interval History:  Patient returning today for follow-up.  His been having difficulty with chemotherapy and mostly related to nausea, vomiting, weight loss and poor appetite.  His last chemotherapy was complicated with pulmonary embolism currently he is on Lovenox injections.  He has been in the nursing home.  Currently, he denies any chest pain.  He has been having left shoulder pain that responded well to pain medication.  The shortness of breath has improved.  He denies any skin rash or itch.  Has any  fever or chills.    Review Of Systems:  Constitutional: Negative for fever, chills, and night sweats.  Fatigue, generalized weakness  Skin: negative.  Eyes: negative.  Ears/Nose/Throat: negative.  Respiratory: No shortness of breath, dyspnea on exertion, cough, or hemoptysis.  Cardiovascular: negative.  Gastrointestinal: Nausea and vomiting as mentioned above  Genitourinary: negative.  Musculoskeletal: negative.  Neurologic: negative.  Psychiatric: negative.  Hematologic/Lymphatic/Immunologic: negative.  Endocrine: negative.    All other ROS negative unless mentioned in interval history.    Past medical, social, surgical, and family histories reviewed.    Allergies:  Allergies as of 07/18/2018 - Alcides as Reviewed 07/18/2018   Allergen Reaction Noted     Cipro [ciprofloxacin] Swelling 06/22/2009       Current Medications:  Current Outpatient Prescriptions   Medication Sig Dispense Refill     megestrol (MEGACE ORAL) 40 MG/ML suspension Take 5 mLs (200 mg) by mouth daily 600 mL 2     ACETAMINOPHEN PO Take 1,000 mg by mouth 3 times daily       AmLODIPine Besylate (NORVASC PO) Take 10 mg by mouth daily       ASPIRIN PO Take 81 mg by mouth daily        DULoxetine (CYMBALTA) 30 MG EC capsule Take 2 capsules (60 mg) by mouth daily 60 capsule 1     enoxaparin (LOVENOX) 80 MG/0.8ML injection Inject 0.7 mLs (70 mg) Subcutaneous 2 times daily Inject 0.69 subcutaneous every 12hrs 10 Syringe 0     fentaNYL (DURAGESIC) 12 mcg/hr 72 hr patch Place 1 patch onto the skin every 72 hours remove old patch. 30 patch 0     HYDROmorphone (DILAUDID) 2 MG tablet Take 1-2 tablets (2-4 mg) by mouth every 3 hours as needed for moderate to severe pain 80 tablet 0     LORazepam (ATIVAN) 0.5 MG tablet Take 1-2 tablets (0.5-1 mg) by mouth every 4 hours as needed for anxiety Take 0.5 mg by mouth every 4 hours as needed for nausea, vomiting, anxiety, or sleep. 30 tablet 5     MAGNESIUM OXIDE PO Take 400 mg by mouth daily       melatonin 1 MG CAPS Take  3 mg by mouth nightly as needed 90 capsule 0     nicotine (NICODERM CQ) 14 MG/24HR 24 hr patch Place 1 patch onto the skin daily 30 patch 1     Nutritional Supplements (BOOST PO) Take 8 oz by mouth 3 times daily       OLANZapine (ZYPREXA) 5 MG tablet Take 1 tablet (5 mg) by mouth At Bedtime 49 tablet 1     omeprazole (PRILOSEC) 20 MG CR capsule Take 1 capsule (20 mg) by mouth daily 90 capsule 3     ONDANSETRON PO Take 4 mg by mouth every 8 hours as needed for nausea       polyethylene glycol (MIRALAX/GLYCOLAX) Packet Take 17 g by mouth daily       potassium chloride SA (K-DUR/KLOR-CON M) 10 MEQ CR tablet Take 1 tablet (10 mEq) by mouth daily 30 tablet 1     prochlorperazine (COMPAZINE) 10 MG tablet Take 0.5 tablets (5 mg) by mouth every 6 hours as needed for nausea or vomiting 30 tablet 1     senna-docusate (SENOKOT-S;PERICOLACE) 8.6-50 MG per tablet Take 2 tablets by mouth 2 times daily 100 tablet 0     THIAMINE HCL PO Take 100 mg by mouth daily       VENTOLIN  (90 Base) MCG/ACT Inhaler Inhale 2 puffs into the lungs every 4 hours as needed for shortness of breath / dyspnea or wheezing 1 Inhaler 1        Physical Exam:  /78 (BP Location: Right arm, Patient Position: Sitting, Cuff Size: Adult Small)  Pulse 94  Temp 96.6  F (35.9  C) (Tympanic)  Resp 18  Wt 58.7 kg (129 lb 8 oz)  SpO2 97%  BMI 16.63 kg/m2  Wt Readings from Last 12 Encounters:   07/18/18 58.7 kg (129 lb 8 oz)   07/17/18 59 kg (130 lb)   07/16/18 59 kg (130 lb)   07/12/18 60.8 kg (134 lb)   07/10/18 61.3 kg (135 lb 3.2 oz)   07/03/18 68 kg (150 lb)   06/26/18 69.4 kg (153 lb)   06/21/18 64.5 kg (142 lb 3.2 oz)   06/19/18 65.8 kg (145 lb)   06/14/18 66.2 kg (145 lb 14.4 oz)   06/11/18 67.9 kg (149 lb 9.6 oz)   05/31/18 69.1 kg (152 lb 6.4 oz)     ECOG performance status: 1  GENERAL APPEARANCE: Healthy, alert and in no acute distress.  Muscle wasting  HEENT: Sclerae anicteric. PERRLA. Oropharynx without ulcers, lesions, or  thrush.  NECK: Supple. No asymmetry or masses.  LYMPHATICS: No palpable cervical, supraclavicular, axillary, or inguinal lymphadenopathy.  RESP: Lungs clear to auscultation bilaterally without rales, rhonchi or wheezes.  CARDIOVASCULAR: Regular rate and rhythm. Normal S1, S2; no S3 or S4. No murmur, gallop, or rub.  ABDOMEN: Soft, nontender. Bowel sounds normal. No palpable organomegaly or masses.  MUSCULOSKELETAL: Extremities without gross deformities noted. No edema of bilateral lower extremities.  SKIN: No suspicious lesions or rashes.  NEURO: Alert and oriented x 3. Cranial nerves II-XII grossly intact.  PSYCHIATRIC: Mentation and affect appear normal.    Laboratory/Imaging Studies:  Infusion Therapy Visit on 07/18/2018   Component Date Value Ref Range Status     WBC 07/18/2018 4.8  4.0 - 11.0 10e9/L Final     RBC Count 07/18/2018 3.08* 4.4 - 5.9 10e12/L Final     Hemoglobin 07/18/2018 10.1* 13.3 - 17.7 g/dL Final     Hematocrit 07/18/2018 28.7* 40.0 - 53.0 % Final     MCV 07/18/2018 93  78 - 100 fl Final     MCH 07/18/2018 32.8  26.5 - 33.0 pg Final     MCHC 07/18/2018 35.2  31.5 - 36.5 g/dL Final     RDW 07/18/2018 17.1* 10.0 - 15.0 % Final     Platelet Count 07/18/2018 336  150 - 450 10e9/L Final     Diff Method 07/18/2018 Automated Method   Final     % Neutrophils 07/18/2018 55.1  % Final     % Lymphocytes 07/18/2018 30.4  % Final     % Monocytes 07/18/2018 13.3  % Final     % Eosinophils 07/18/2018 0.6  % Final     % Basophils 07/18/2018 0.0  % Final     % Immature Granulocytes 07/18/2018 0.6  % Final     Nucleated RBCs 07/18/2018 0  0 /100 Final     Absolute Neutrophil 07/18/2018 2.6  1.6 - 8.3 10e9/L Final     Absolute Lymphocytes 07/18/2018 1.5  0.8 - 5.3 10e9/L Final     Absolute Monocytes 07/18/2018 0.6  0.0 - 1.3 10e9/L Final     Absolute Eosinophils 07/18/2018 0.0  0.0 - 0.7 10e9/L Final     Absolute Basophils 07/18/2018 0.0  0.0 - 0.2 10e9/L Final     Abs Immature Granulocytes 07/18/2018 0.0  0 -  0.4 10e9/L Final     Absolute Nucleated RBC 07/18/2018 0.0   Final     Sodium 07/18/2018 134  133 - 144 mmol/L Final     Potassium 07/18/2018 3.4  3.4 - 5.3 mmol/L Final     Chloride 07/18/2018 98  94 - 109 mmol/L Final     Carbon Dioxide 07/18/2018 30  20 - 32 mmol/L Final     Anion Gap 07/18/2018 6  3 - 14 mmol/L Final     Glucose 07/18/2018 109* 70 - 99 mg/dL Final     Urea Nitrogen 07/18/2018 13  7 - 30 mg/dL Final     Creatinine 07/18/2018 0.86  0.66 - 1.25 mg/dL Final     GFR Estimate 07/18/2018 89  >60 mL/min/1.7m2 Final    Non  GFR Calc     GFR Estimate If Black 07/18/2018 >90  >60 mL/min/1.7m2 Final    African American GFR Calc     Calcium 07/18/2018 8.5  8.5 - 10.1 mg/dL Final     Magnesium 07/18/2018 1.4* 1.6 - 2.3 mg/dL Final        Recent Results (from the past 744 hour(s))   XR Chest 2 Views    Narrative    CHEST TWO VIEWS  7/3/2018 7:22 PM     HISTORY: Near syncope.    COMPARISON: Chest x-ray 5/15/2018.      Impression    IMPRESSION: No acute cardiopulmonary disease. Ill-defined nodule  appears stable versus slightly less prominent just medial to the level  of the left port venous catheter at the left mid chest.    FRANKIE DO MD       Assessment and plan:    (C34.32) Malignant neoplasm of lower lobe of left lung (H)  (primary encounter diagnosis)  I reviewed with the patient today the management plan in details.  We also talked about goals of care.  At this time because of deterioration of the performance status and the side effects of chemotherapy we will put the chemotherapy on hold.  I will arrange for restaging workup with a PET scan in the next couple of weeks.  I asked the patient to increase his caloric intake.  I gave him prescription for Megace 200 mg orally daily to help with his appetite.  I will see the patient again in 2 weeks time with a PET scan result to discuss management plan in more details.    (I10) Benign essential hypertension  he currently on Norvasc 10 mg  orally daily.    (K21.9) Gastroesophageal reflux disease without esophagitis  She currently on omeprazole 20 mg orally daily.    (F17.200) Tobacco use disorder  Strongly emphasized importance of quitting smoking.    (I26.99) Other pulmonary embolism without acute cor pulmonale, unspecified chronicity (H)  She currently on Lovenox 70 mg subcutaneously twice daily.    (R11.2,  T45.1X5A) Chemotherapy induced nausea and vomiting  The patient management of nausea details.    (F32.9) Single current episode of major depressive disorder, unspecified depression episode severity  Patient currently on Cymbalta 60 mg daily.    (N18.3) CKD (chronic kidney disease) stage 3, GFR 30-59 ml/min  Patient creatinine has been stable.    The patient is ready to learn, no apparent learning barriers were identified.  Diagnosis and treatment plans were explained to the patient. The patient expressed understanding of the content. The patient asked appropriate questions. The patient questions were answered to his satisfaction.    Time spent 40 minutes more than 50% of the time in counseling and coordination of care including discussion of management of metastatic lung cancer, management of side effects, follow-up and prognosis.    Chart documentation with Dragon Voice recognition Software. Although reviewed after completion, some words and grammatical errors may remain.

## 2018-07-18 NOTE — PATIENT INSTRUCTIONS
We would like to see you back in clinic with Dr. Mccauley in 2 weeks with PET scan prior.      Your prescription (Megace) has been sent to:   [facility], [place] - [address]  [address] [address]  Phone: [phone] Fax: [phone] Alternate Fax: [phone], [phone]  When you are in need of a refill, please call your pharmacy and they will send us a request.      Copy of appointments, and after visit summary (AVS) given to patient.      If you have any questions during business hours (M-F 8 AM- 4PM), please call Erica Elliott RN, BSN, OCN Oncology Hematology /Breast Cancer Navigator at [facility] [phone].       For questions after business hours, or on holidays/weekends, please call our after hours Nurse Triage line [phone]. Thank you.

## 2018-07-26 NOTE — LETTER
7/26/2018        RE: Chilo Oneil  [address] [facility] [address]            [facility]    Chilo Oneil is a 68 year old whom is being seen today for an episodic care visit at [facility].  HPI information obtained from: facility chart records, facility staff, patient report.     Mr. Oneil is somewhat newer to us and when he came a couple weeks ago, he was having N/V from chemotherapy, some suspected alcohol withdrawal symptoms, and significant anxiety.  Since that time, chemo has been on hold, and he reports he is feeling much better, denies any of those symptoms, and reports no complaints.     REVIEW OF SYSTEMS:  7 point ROS done including, light headedness/dizziness, fever/chills, pain, Resp, CV, GI, and  and is negative other than noted in HPI.      /76  Pulse 74  Temp 97.3  F (36.3  C)  Resp 18  Wt 136 lb (61.7 kg)  SpO2 98%  BMI 17.46 kg/m2     GENERAL APPEARANCE:  Elderly male sitting up in WC, NAD.  Overall has improved.  .   RESP:  Regular relaxed breathing effort.  No cough.   PSYCH: Alert and orientated, pleasant and cooperative.  Mild-Mod degree of cognitive/memory impairment, appears stable/unchanged.  Mood has much improved, reports feeling good.     ASSESSMENT/PLAN:  Carcinoma, lung, left (H)  Cognitive impairment  Nausea and vomiting, intractability of vomiting not specified, unspecified vomiting type  Uncomplicated alcohol dependence (H)  ADA (generalized anxiety disorder)  Currently reports no N/V issues, denies any anxiety, and there are no alcohol withdrawal symptoms.  Chemo has been on hold for now.  He has not used any Ativan recently, but cares are still palliative in nature and chemo may be resumed in future, thus will continue with PRN Ativan for now.       Does benefit of PRN outweigh risks: Yes  Rational: As noted above.     Orders:  1. DC BMP/Magnesium for 30 Aug  2. Get BMP/Mag on 30 July Dx: CKD  3. Continue Lorazepam 0.5  mg  Every 3 hours PRN for N/V or anxiety, new stop date of Sep 30th    Electronically signed by:  NATALIE Olivera CNP        Sincerely,        NATALIE Olivera CNP

## 2018-07-26 NOTE — PROGRESS NOTES
[facility]    Chilo Oneil is a 68 year old whom is being seen today for an episodic care visit at [facility].  HPI information obtained from: facility chart records, facility staff, patient report.     Mr. Oneil is somewhat newer to us and when he came a couple weeks ago, he was having N/V from chemotherapy, some suspected alcohol withdrawal symptoms, and significant anxiety.  Since that time, chemo has been on hold, and he reports he is feeling much better, denies any of those symptoms, and reports no complaints.     REVIEW OF SYSTEMS:  7 point ROS done including, light headedness/dizziness, fever/chills, pain, Resp, CV, GI, and  and is negative other than noted in HPI.      /76  Pulse 74  Temp 97.3  F (36.3  C)  Resp 18  Wt 136 lb (61.7 kg)  SpO2 98%  BMI 17.46 kg/m2     GENERAL APPEARANCE:  Elderly male sitting up in WC, NAD.  Overall has improved.  .   RESP:  Regular relaxed breathing effort.  No cough.   PSYCH: Alert and orientated, pleasant and cooperative.  Mild-Mod degree of cognitive/memory impairment, appears stable/unchanged.  Mood has much improved, reports feeling good.     ASSESSMENT/PLAN:  Carcinoma, lung, left (H)  Cognitive impairment  Nausea and vomiting, intractability of vomiting not specified, unspecified vomiting type  Uncomplicated alcohol dependence (H)  ADA (generalized anxiety disorder)  Currently reports no N/V issues, denies any anxiety, and there are no alcohol withdrawal symptoms.  Chemo has been on hold for now.  He has not used any Ativan recently, but cares are still palliative in nature and chemo may be resumed in future, thus will continue with PRN Ativan for now.       Does benefit of PRN outweigh risks: Yes  Rational: As noted above.     Orders:  1. DC BMP/Magnesium for 30 Aug  2. Get BMP/Mag on 30 July Dx: CKD  3. Continue Lorazepam 0.5 mg  Every 3 hours PRN for N/V or anxiety, new stop date of Sep 30th    Electronically  signed by:  NATALIE Olivera CNP

## 2018-08-02 NOTE — PROGRESS NOTES
Sofya Unger sent a message to see if patient could get a long term medication for pain control at night. Patient has been taking ativan every 3 hours. He is not getting any sleep at night. Per Dr. Mccauley, patient should follow up with his primary or the doctor at the nursing home as this pain is not related to his cancer. Sofya was notified.  Ashley Durand RN

## 2018-08-02 NOTE — PROGRESS NOTES
"Clinic Care Coordination Contact    Clinic Care Coordination Contact  OUTREACH    Referral Information:  Sw received phone call from pt early this morning asking this writer to come to his care conference early today as he has an Oncology appointment at 1:45 PM, so will miss most of his Care Conference at [facility].  Referral Source: PCP    YASMIN and pt discussed what his wishes were for today's care conference at [facility].  Pt shared that he has graduated from  and that he went home on a pass this weekend and \"failed,\" so he now wants to move into [facility] program.  Pt shared that he knows he cannot live independently any longer, nor can he manage his medications on his own, and that he needs the support of the staff of [facility] to help him. Pt says he love living at [facility], but would like a bit more freedom, if possible.  Pt shared he now weighs 145#, feels good, except for shoulder pain, and is considering undergoing chemotherapy again if there are no negative side affects.    Pt, this writer, staff from [facility] (Christine, Yi, Cleo, Claire, Norma), and staff from [facility] (Alley Rodriguez, and Yi) all began the Care Conference before pt had to leave for his appointment.  Pt shared his wishes of moving into [facility] and [facility] staff addressed their concerns with the pt's driving while on narcotic pain medications, pt's cognitive abilities (scored 4.6/5.8) and that he is well liked by staff at [facility].   [facility] shared that Social Work may close his case if he moves into [facility] or [facility], but that Palliative Care will still be involved with his cares.  Pt was pleased with this plan.    Pt is interested in trying chemotherapy, but stated that if he has negative side affects like he did in the past, he will stop chemo and transition to Hospice cares.  Pt verbalized his understanding that Chemo is not to cure him, but to slow down the " progression of his cancer only.      Chief Complaint   Patient presents with     Clinic Care Coordination - Follow-up     social work     Universal Utilization:    Utilization    Last refreshed: 8/2/2018  2:37 PM:  No Show Count (past year) 8       Last refreshed: 8/2/2018  2:37 PM:  ED visits 10       Last refreshed: 8/2/2018  2:37 PM:  Hospital admissions 3          Current as of: 8/2/2018  2:37 PM           Clinical Concerns:  Current Medical Concerns:  Malignant neoplasm of the left lung, CAD, HTN, PAD, Pulmonary embolism  Current Behavioral Concerns: Anxiety, Depression    Education Provided to patient: SW and pt discussed his living environment, his wishes and his cares.      Health Maintenance Reviewed:     ASTHMA ACTION PLAN Q1 YR  4/17/1955         ASTHMA CONTROL TEST Q6 MOS  4[birth year]17/1955         TETANUS IMMUNIZATION (SYSTEM ASSIGNED)  4/17/1969         DEPRESSION ACTION PLAN Q1 YR  4/17/1969         HEPATITIS C SCREENING  4/17/1969         COLON CANCER SCREEN (SYSTEM ASSIGNED)  4/17/2001         PNEUMOCOCCAL (1 of 2 - PCV13)  4/17/2016         AORTIC ANEURYSM SCREENING (SYSTEM ASSIGNED)  4/17/2016        PHQ-9 Q6 MONTHS  6/21/2018        Clinical Pathway: None    Medication Management:  [place] staff provide medication management for the pt.  Pt did state today that he is having great pain in his right shoulder that wakes him every 2-3 hours at night.  YASMIN sent this message to Oncology team to address for increase in medications.     Functional Status:  Dependent ADLs:: Ambulation-cane, wheelchair at times  Dependent IADLs:: Cooking, Shopping, Laundry, Transportation, Cleaning, Meal Preparation, Medication Management  Bed or wheelchair confined:: No  Mobility Status: Independent    Living Situation:  Current living arrangement:: I live in a nursing home  Type of residence:: Nursing home ([facility], [phone])    Diet/Exercise/Sleep:  Pt graduated from PT/OT at the SNF.  Pt is on a  regular diet + 3 Ensures/day.  Pt is not sleeping well due to pain in his shoulder at night.     Transportation:  Pt has a vehicle and DL, however, due to the amount of narcotic pain medication he is on, staff at [facility] have his keys.  Pt does have [facility] at [phone] for all medical appointments.      Psychosocial:  Shinto or spiritual beliefs that impact treatment:: Yes ([employer])  Mental health DX:: Yes  Mental health DX how managed:: Medication  Mental health management concern (GOAL):: No  Informal Support system:: Friends, Family, Other (Staff at [facility] )     Financial/Insurance:  Pt receives Social Security of $773/month.  Pt is on [employer] MA      Resources and Interventions:  Current Resources:   Community Resources: [facility] staff, [facility] [employer] , Jennifer Bar [phone]    Advance Care Plan/Directive  Advanced Care Plans/Directives on file:: Yes  Type Advanced Care Plans/Directives: Advanced Directive - On File    Referrals Placed: None today.     Goals:   Pt has completed 3 goals in the past 4 months.  Will create more in the next month.    Patient/Caregiver understanding: Pt to attend Oncology appointment today to learn of new options.    Outreach Frequency: 2 weeks (PRN)  Future Appointments              In 4 days Chavo Ellis MD [facility], [place] [facility]    In 4 days ROOM 3 [facility]    In 1 month ROOM 6 [facility]    In 1 month [facility], [place] [facility]    In 2 months Mor Mccauley MD [facility], [facility]          Plan: SW to continue to follow.    Sofya Unger  Social Work Care Coordinator  [facility]  [phone]

## 2018-08-02 NOTE — PROGRESS NOTES
Hematology/ Oncology Follow-up Visit:  Aug 2, 2018    Reason for Visit:   Chief Complaint   Patient presents with     Oncology Clinic Visit     2 week follow up malignant neoplasm of lower lobe of left lung. Review PET scan results.        Oncologic History:  Carcinoma, lung, left (H)  Patient presented with L side shoulder and has been traveling  to L/side about  to the whole L/side upper back and L side of chest for about 3 week he has had the pain has been taking aleve.  Subsequently went on to have a CT scan done.  Which showed 1.6 cm nodular infiltrate in the left upper lobe.  The lesion appear spiculated and worrisome for lung cancer.  There is a second 0.7 cm indeterminate nodule in the lingular portion of the left lung.  Subsequently the patient went on to have CT-guided biopsy.  The pathology came back showing moderately differentiated adenocarcinoma. He had subsequent noted left thoracoscopic left pleural biopsies and left lobe which resection on April 11, 2018.  The surgical pathology came back with pleural biopsies came back positive for invasive adenocarcinoma with parietal pleural invasion.  The wedge resection came back with adenocarcinoma with acinar patterns of growth moderately differentiated the tumor size is 1.1 and 0.3 cm into 2 separate tumor nodules.  Visceropleural invasion was identified the margins were negative lymphovascular invasion was not identified large venous artery involvement was not identified as well.  The pathologic staging of pT3N0.        Interval History:  Patient is here today for follow-up.  Has been feeling well without any recent complaints weight loss night sweats fever or chills.  Has been eating well and gaining weight.  He does have pain in his left shoulder.  He denies any shortness of breath or cough or wheezing or hemoptysis.    Review Of Systems:  Constitutional: Negative for fever, chills, and night sweats.  Skin: negative.  Eyes: negative.  Ears/Nose/Throat:  negative.  Respiratory: No shortness of breath, dyspnea on exertion, cough, or hemoptysis.  Cardiovascular: negative.  Gastrointestinal: negative.  Genitourinary: negative.  Musculoskeletal: negative.  Neurologic: negative.  Psychiatric: negative.  Hematologic/Lymphatic/Immunologic: negative.  Endocrine: negative.    All other ROS negative unless mentioned in interval history.    Past medical, social, surgical, and family histories reviewed.    Allergies:  Allergies as of 08/02/2018 - Alcides as Reviewed 08/02/2018   Allergen Reaction Noted     Cipro [ciprofloxacin] Swelling 06/22/2009       Current Medications:  Current Outpatient Prescriptions   Medication Sig Dispense Refill     ACETAMINOPHEN PO Take 1,000 mg by mouth 3 times daily       AmLODIPine Besylate (NORVASC PO) Take 10 mg by mouth daily       enoxaparin (LOVENOX) 80 MG/0.8ML injection Inject 0.7 mLs (70 mg) Subcutaneous 2 times daily Inject 0.69 subcutaneous every 12hrs 10 Syringe 0     fentaNYL (DURAGESIC) 12 mcg/hr 72 hr patch Place 1 patch onto the skin every 72 hours remove old patch. 30 patch 0     HYDROmorphone (DILAUDID) 2 MG tablet Take 1-2 tablets (2-4 mg) by mouth every 3 hours as needed for moderate to severe pain 80 tablet 0     LORazepam (ATIVAN) 0.5 MG tablet Take 1-2 tablets (0.5-1 mg) by mouth every 4 hours as needed for anxiety Take 0.5 mg by mouth every 4 hours as needed for nausea, vomiting, anxiety, or sleep. 30 tablet 5     MAGNESIUM OXIDE PO Take 400 mg by mouth 3 times daily        melatonin 1 MG CAPS Take 3 mg by mouth nightly as needed 90 capsule 0     OLANZapine (ZYPREXA) 5 MG tablet Take 1 tablet (5 mg) by mouth At Bedtime 49 tablet 1     omeprazole (PRILOSEC) 20 MG CR capsule Take 1 capsule (20 mg) by mouth daily 90 capsule 3     ONDANSETRON PO Take 4 mg by mouth every 8 hours as needed for nausea       polyethylene glycol (MIRALAX/GLYCOLAX) Packet Take 17 g by mouth daily       prochlorperazine (COMPAZINE) 10 MG tablet Take 0.5  "tablets (5 mg) by mouth every 6 hours as needed for nausea or vomiting 30 tablet 1     senna-docusate (SENOKOT-S;PERICOLACE) 8.6-50 MG per tablet Take 2 tablets by mouth 2 times daily 100 tablet 0     VENTOLIN  (90 Base) MCG/ACT Inhaler Inhale 2 puffs into the lungs every 4 hours as needed for shortness of breath / dyspnea or wheezing 1 Inhaler 1     Nutritional Supplements (BOOST PO) Take 8 oz by mouth 3 times daily       THIAMINE HCL PO Take 100 mg by mouth daily          Physical Exam:  /64 (BP Location: Right arm, Patient Position: Sitting, Cuff Size: Adult Regular)  Pulse 82  Temp 98.1  F (36.7  C) (Tympanic)  Resp 18  Ht 1.88 m (6' 2\")  Wt 66 kg (145 lb 9.6 oz)  SpO2 98%  BMI 18.69 kg/m2  Wt Readings from Last 12 Encounters:   08/02/18 66 kg (145 lb 9.6 oz)   07/26/18 61.7 kg (136 lb)   07/18/18 58.7 kg (129 lb 8 oz)   07/17/18 59 kg (130 lb)   07/16/18 59 kg (130 lb)   07/12/18 60.8 kg (134 lb)   07/10/18 61.3 kg (135 lb 3.2 oz)   07/03/18 68 kg (150 lb)   06/26/18 69.4 kg (153 lb)   06/21/18 64.5 kg (142 lb 3.2 oz)   06/19/18 65.8 kg (145 lb)   06/14/18 66.2 kg (145 lb 14.4 oz)     ECOG performance status: 1  GENERAL APPEARANCE: Healthy, alert and in no acute distress.  HEENT: Sclerae anicteric. PERRLA. Oropharynx without ulcers, lesions, or thrush.  NECK: Supple. No asymmetry or masses.  LYMPHATICS: No palpable cervical, supraclavicular, axillary, or inguinal lymphadenopathy.  RESP: Lungs clear to auscultation bilaterally without rales, rhonchi or wheezes.  CARDIOVASCULAR: Regular rate and rhythm. Normal S1, S2; no S3 or S4. No murmur, gallop, or rub.  ABDOMEN: Soft, nontender. Bowel sounds normal. No palpable organomegaly or masses.  MUSCULOSKELETAL: Extremities without gross deformities noted. No edema of bilateral lower extremities.  SKIN: No suspicious lesions or rashes.  NEURO: Alert and oriented x 3. Cranial nerves II-XII grossly intact.  PSYCHIATRIC: Mentation and affect appear " normal.    Laboratory/Imaging Studies:  No visits with results within 2 Week(s) from this visit.  Latest known visit with results is:    Infusion Therapy Visit on 07/18/2018   Component Date Value Ref Range Status     WBC 07/18/2018 4.8  4.0 - 11.0 10e9/L Final     RBC Count 07/18/2018 3.08* 4.4 - 5.9 10e12/L Final     Hemoglobin 07/18/2018 10.1* 13.3 - 17.7 g/dL Final     Hematocrit 07/18/2018 28.7* 40.0 - 53.0 % Final     MCV 07/18/2018 93  78 - 100 fl Final     MCH 07/18/2018 32.8  26.5 - 33.0 pg Final     MCHC 07/18/2018 35.2  31.5 - 36.5 g/dL Final     RDW 07/18/2018 17.1* 10.0 - 15.0 % Final     Platelet Count 07/18/2018 336  150 - 450 10e9/L Final     Diff Method 07/18/2018 Automated Method   Final     % Neutrophils 07/18/2018 55.1  % Final     % Lymphocytes 07/18/2018 30.4  % Final     % Monocytes 07/18/2018 13.3  % Final     % Eosinophils 07/18/2018 0.6  % Final     % Basophils 07/18/2018 0.0  % Final     % Immature Granulocytes 07/18/2018 0.6  % Final     Nucleated RBCs 07/18/2018 0  0 /100 Final     Absolute Neutrophil 07/18/2018 2.6  1.6 - 8.3 10e9/L Final     Absolute Lymphocytes 07/18/2018 1.5  0.8 - 5.3 10e9/L Final     Absolute Monocytes 07/18/2018 0.6  0.0 - 1.3 10e9/L Final     Absolute Eosinophils 07/18/2018 0.0  0.0 - 0.7 10e9/L Final     Absolute Basophils 07/18/2018 0.0  0.0 - 0.2 10e9/L Final     Abs Immature Granulocytes 07/18/2018 0.0  0 - 0.4 10e9/L Final     Absolute Nucleated RBC 07/18/2018 0.0   Final     Sodium 07/18/2018 134  133 - 144 mmol/L Final     Potassium 07/18/2018 3.4  3.4 - 5.3 mmol/L Final     Chloride 07/18/2018 98  94 - 109 mmol/L Final     Carbon Dioxide 07/18/2018 30  20 - 32 mmol/L Final     Anion Gap 07/18/2018 6  3 - 14 mmol/L Final     Glucose 07/18/2018 109* 70 - 99 mg/dL Final     Urea Nitrogen 07/18/2018 13  7 - 30 mg/dL Final     Creatinine 07/18/2018 0.86  0.66 - 1.25 mg/dL Final     GFR Estimate 07/18/2018 89  >60 mL/min/1.7m2 Final    Non African American GFR  Calc     GFR Estimate If Black 07/18/2018 >90  >60 mL/min/1.7m2 Final    African American GFR Calc     Calcium 07/18/2018 8.5  8.5 - 10.1 mg/dL Final     Magnesium 07/18/2018 1.4* 1.6 - 2.3 mg/dL Final        Recent Results (from the past 744 hour(s))   XR Chest 2 Views    Narrative    CHEST TWO VIEWS  7/3/2018 7:22 PM     HISTORY: Near syncope.    COMPARISON: Chest x-ray 5/15/2018.      Impression    IMPRESSION: No acute cardiopulmonary disease. Ill-defined nodule  appears stable versus slightly less prominent just medial to the level  of the left port venous catheter at the left mid chest.    FRANKIE DO MD   PET Oncology (Eyes to Thighs)    Narrative    PET / CT WHOLE BODY 8/1/2018 2:02 PM  WITH DIAGNOSTIC CT OF CHEST, ABDOMEN AND PELVIS    HISTORY: Malignant neoplasm of lower lobe of left lung (H).    COMPARISON EXAMS:  SUBURBAN IMAGING: None.  [facility]: 4/4/2018, 1/3/2018  OTHER: None.    TECHNIQUE:  Following the uneventful administration of intravenous  contrast and FDG glucose injected intravenously, a PET/CT scan was  performed from the skull base through the mid thighs.  A diagnostic CT  scan was performed of the chest, abdomen and pelvis. Coronal, sagittal  and axial images were created and fused with the contrast enhanced CT  examination. The CT, PET and fusion images are then evaluated on a  HDmessaging workstation. Radiation dose for this scan was reduced using  automated exposure control, adjustment of the mA and/or kV according  to patient size, or iterative reconstruction technique.    DOSES AND GLUCOSE:  Dx CT w/, oral contrast: 500 mL of Breeza, IV  contrast: 100 mL of Isovue-370,  glucose: 95, ht: 188 cm, wt: 61.7 kg,  12.06 mCi of FDG, port, uptake: 60 min     FINDINGS: Normal physiologic uptake is identified within the salivary  glands, myocardium, kidneys, ureters and bladder. Scattered areas of  physiologic bowel uptake are also present.    NECK:  Lymph nodes: No pathologic  activity.    Additional findings: Persistent sinus disease in the left maxillary  sinus.    CHEST:  Lungs: Spiculated, pleural-based left upper lobe nodule measures 1.4 x  1.5 cm and has a peak SUV of 2.5, smaller, and less metabolic than on  the prior study when it measured 2.1 x 2.0 and demonstrated an SUV  measuring up to 12.1. Small nodule in left upper lobe present on the  prior study (series 6, image 52) is no longer present and only a small  amount of cavitation remains. No pathologic activity at this site.  There is a nonhypermetabolic pulmonary nodule in the superior segment  of the right lower lobe (series 7, image 69) that measures 8 mm, not  significantly changed. Additional nonhypermetabolic 7 mm nodule  present in the right lower lobe (series 7, image 99) is also  unchanged. There is moderate emphysema. Apical pleural scarring  present bilaterally.    Lymph nodes: Previously hypermetabolic left hilar lymph node now  demonstrates a max SUV of 2.4, previously 5. Hypermetabolic uptake in  the axillary lymph nodes on the prior study has resolved.    Additional findings: Hypermetabolism in the left second rib has  resolved. Hypermetabolic pleural nodule has resolved.    ABDOMEN/PELVIS:  Hepatobiliary: No pathologic activity.    Spleen: No pathologic activity.    Pancreas: No pathologic activity.    Kidneys: No pathologic activity. No solid renal mass or  hydronephrosis. There are vascular calcifications. Exophytic cyst  arises from the posterior lower pole of the right kidney.    Adrenals: No pathologic activity. Bilateral adrenal thickening  unchanged.    Reproductive: No pathologic activity. Mildly enlarged and calcified  prostate.    Gastrointestinal: No pathologic activity.    Lymph nodes: No pathologic activity.    Additional findings: None.    SKELETON:   No pathologic activity. Old fracture noted of the right inferior pubic  ramus. There is also orthopedic hardware across the left femoral neck.       Impression    IMPRESSION:  Hypermetabolic nodule left upper lobe has decreased in metabolic  activity and shrunken in size. Hypermetabolic left hilar lymph node  has also decreased in activity. No other areas of pathologic uptake in  the chest, abdomen, or pelvis.    GIL TONY MD       Assessment and plan:    (C34.92) Carcinoma, lung, left (H)  (primary encounter diagnosis)  Patient is gradually improving.  His symptoms are resolving.  He denies any nausea.  His appetite has been improving.  He is gaining weight.  I reviewed with him today most recent PET scan showing hypermetabolic nodule in the left upper lobe has decreased in size.  Hypermetabolic left hilar lymph node has also decreased in size as well.  We reviewed treatment options.  I would recommend to hold chemotherapy for 2 months time.  I will see the patient again with a repeat imaging studies in 2 months time.  Once the patient performance status improves we could consider restarting chemotherapy again.    Benign essential hypertension.  The patient currently is on Norvasc 10 mg orally daily.    Pulmonary embolism  Patient continues on Lovenox injections twice daily.    Chemotherapy-induced nausea and vomiting.  Patient nausea is currently well-controlled on the current regimen.    The patient is ready to learn, no apparent learning barriers were identified.  Diagnosis and treatment plans were explained to the patient. The patient expressed understanding of the content. The patient asked appropriate questions. The patient questions were answered to his satisfaction.    Time spent 40 minutes more than 50% of the time in counseling coronation of care including discussion of imaging studies, management of metastatic adenocarcinoma of the lung, side effects of chemotherapy, follow-up and prognosis.    Chart documentation with Dragon Voice recognition Software. Although reviewed after completion, some words and grammatical errors may remain.

## 2018-08-02 NOTE — PROGRESS NOTES
"[place] [facility]  PRIMARY CARE PROVIDER AND CLINIC:  Manish Plasencia [address] [facility] [place] [address]  Chief Complaint   Patient presents with     Hospital F/U     [place] Medical Record Number:  [id number]    HPI:    Chilo Oneil is a 68 year old  (1950),admitted to the [facility]  from Hospital  [facility].  Hospital stay 7/16/18 through 7/17/18.  Admitted to this facility for  rehab, medical management and nursing care.  HPI information obtained from: facility chart records, facility staff, patient report and [facility] chart review.      Per [facility] after hospital stay in early July:\"Mr. Oneil is a 67 y/o whom was found to have Lung cancer in Dec 2017 after noting ongoing Left shoulder pain.  CT noted several Left nodules and a small one on the Right.  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  Later PET CT did note some hilar lymph node involvement, but no other overt positive findings, MRI brain was negative for malignancy.  He met with Oncology and has started chemotherapy with Cisplatin and Alimta.  In early June he was found to have a Right sided PE, CT noted unchanged nodules in the Left side. \"  - developed drop in Hb sent to ED for blood transfusions.   - [facility] reports that Pt has AFTT, hx of illicit drug use, issue with pancytopenia, left AMA from the hospital, and also from this facility a couple of times, CTX on hold, and did show some improvement in terms of symptoms; started on OT/PT, nutrition supplement, and plan is to transfer to [facility].    Today,   - Resident seen and examined in his room, reports appetite is very good, sleep and BM are good as well.   - reports pain in left shoulder is fine with meds, and thinks it is a bursitis, which gets worse with activities.   - reports some wheezing and cough, and noted legs swelling for the last 3 days, but denies " PND or orthopnea.     CODE STATUS/ADVANCE DIRECTIVES DISCUSSION:   DNR / DNI  Patient's living condition: lives alone    ALLERGIES:Cipro [ciprofloxacin]  PAST MEDICAL HISTORY:  has a past medical history of GERD (gastroesophageal reflux disease); HTN (hypertension); Lung cancer (H); and MI (myocardial infarction). He also has no past medical history of Complication of anesthesia; Difficult intubation; Malignant hyperthermia; or PONV (postoperative nausea and vomiting).  PAST SURGICAL HISTORY:  has a past surgical history that includes fracture tx, ankle rt/lt (1975); Open reduction internal fixation hip nailing (9/20/2013); Thoracoscopic biopsy lung (Left, 4/11/2018); vascular surgery; and Insert port vascular access (Left, 5/15/2018).  FAMILY HISTORY: family history includes Diabetes in his brother and father.  SOCIAL HISTORY:  reports that he has been smoking Cigarettes.  He started smoking about 50 years ago. He has a 7.05 pack-year smoking history. He has never used smokeless tobacco. He reports that he drinks alcohol. He reports that he uses illicit drugs, including Marijuana.    Post Discharge Medication Reconciliation Status: discharge medications reconciled and changed, per note/orders (see AVS).  Current Outpatient Prescriptions   Medication Sig Dispense Refill     ACETAMINOPHEN PO Take 1,000 mg by mouth 3 times daily       AmLODIPine Besylate (NORVASC PO) Take 10 mg by mouth daily       DULoxetine HCl (CYMBALTA PO) Take 60 mg by mouth daily       enoxaparin (LOVENOX) 80 MG/0.8ML injection Inject 0.7 mLs (70 mg) Subcutaneous 2 times daily Inject 0.69 subcutaneous every 12hrs 10 Syringe 0     fentaNYL (DURAGESIC) 12 mcg/hr 72 hr patch Place 1 patch onto the skin every 72 hours remove old patch. 30 patch 0     HYDROmorphone (DILAUDID) 2 MG tablet Take 1-2 tablets (2-4 mg) by mouth every 3 hours as needed for moderate to severe pain 80 tablet 0     LORAZEPAM PO Take 0.5 mg by mouth every 3 hours as needed for  anxiety       MAGNESIUM OXIDE PO Take 400 mg by mouth 2 times daily        MELATONIN PO Take 3 mg by mouth At Bedtime       Nutritional Supplements (BOOST PO) Take 8 oz by mouth 3 times daily       OLANZapine (ZYPREXA) 5 MG tablet Take 1 tablet (5 mg) by mouth At Bedtime 49 tablet 1     omeprazole (PRILOSEC) 20 MG CR capsule Take 1 capsule (20 mg) by mouth daily 90 capsule 3     ONDANSETRON PO Take 4 mg by mouth 3 times daily        polyethylene glycol (MIRALAX/GLYCOLAX) Packet Take 17 g by mouth daily       prochlorperazine (COMPAZINE) 10 MG tablet Take 0.5 tablets (5 mg) by mouth every 6 hours as needed for nausea or vomiting 30 tablet 1     PROVIDER DOSED MANAGED WARFARIN, COUMADIN, OUTPATIENT Take 5 mg by mouth daily       senna-docusate (SENOKOT-S;PERICOLACE) 8.6-50 MG per tablet Take 2 tablets by mouth 2 times daily 100 tablet 0     THIAMINE HCL PO Take 100 mg by mouth daily       VENTOLIN  (90 Base) MCG/ACT Inhaler Inhale 2 puffs into the lungs every 4 hours as needed for shortness of breath / dyspnea or wheezing 1 Inhaler 1       ROS:  10 point ROS of systems including Constitutional, Eyes, Respiratory, Cardiovascular, Gastroenterology, Genitourinary, Integumentary, Muscularskeletal, Psychiatric were all negative except for pertinent positives noted in my HPI.    Exam:  /68  Pulse 88  Temp 98.2  F (36.8  C)  Resp 18  Wt 140 lb (63.5 kg)  SpO2 96%  BMI 17.97 kg/m2  GENERAL APPEARANCE:  Alert, in no distress  ENT:  Mouth and posterior oropharynx normal, moist mucous membranes  EYES:  EOM, conjunctivae, lids, pupils and irises normal  NECK:  No adenopathy,masses or thyromegaly  RESP:  respiratory effort and palpation of chest normal, lungs clear to auscultation , no respiratory distress  CV:  Palpation and auscultation of heart done , regular rate and rhythm, no murmur, rub, or gallop, peripheral edema 1+ in b//l legs (up to distal 2/3).   ABDOMEN:  normal bowel sounds, soft, nontender, no  "hepatosplenomegaly or other masses  M/S:   Gait and station abnormal uses walker.   SKIN:  Inspection of skin and subcutaneous tissue baseline, Palpation of skin and subcutaneous tissue baseline  NEURO:   No NFD appreciated on observation.   PSYCH:  normal insight, judgement and memory, affect and mood normal    Lab/Diagnostic data:  CBC RESULTS:   Recent Labs   Lab Test  07/18/18   0808  07/16/18   1730   WBC  4.8  4.6   RBC  3.08*  1.97*   HGB  10.1*  6.7*   HCT  28.7*  19.1*   MCV  93  97   MCH  32.8  34.0*   MCHC  35.2  35.1   RDW  17.1*  17.9*   PLT  336  372   Last Basic Metabolic Panel:  Recent Labs   Lab Test  07/18/18   0808 07/16/18 07/03/18   1750   NA  134   --   127*   POTASSIUM  3.4  3.6  3.2*   CHLORIDE  98   --   90*   DAMARI  8.5   --   7.8*   CO2  30   --   29   BUN  13   --   18   CR  0.86  0.96  1.46*   GLC  109*  57*  80   Liver Function Studies -   Recent Labs   Lab Test  07/03/18   1750  06/09/18   0715   PROTTOTAL  5.7*  4.9*   ALBUMIN  2.9*  2.1*   BILITOTAL  0.6  0.5   ALKPHOS  61  79   AST  23  22   ALT  11  18     Lab Results   Component Value Date    A1C 4.7 04/04/2018       ASSESSMENT/PLAN:  Carcinoma, lung, left (H)  - moderately differentiated adenocarcinoma, invasive adenocarcinoma with parietal pleural invasion; pathologic staging of pT3N0.  PET scan on 8/1 showed :\"Hypermetabolic nodule left upper lobe has decreased in metabolic activity and shrunken in size. Hypermetabolic left hilar lymph node has also decreased in activity reduction in the nodule size \"  - CTx on hold given AE, now doing better. Followed by Oncologist Dr. Bell.   - since pt of CTx and no nausea/emesis, may stop compazine.     Other pulmonary embolism without acute cor pulmonale, unspecified chronicity (H)  -  on coumadin with INR followed closely.   - clinically at baseline.     Single current episode of major depressive disorder, unspecified depression episode severity  Anxiety  - doing fine, continue " Cymbalta.  - attempt to dc lorazepam.   - GDR zyprexa to stop- started for anxiety by PCP on outpatient basis). By CMS regulations for NH, anxiety is not an acceptable dx for antipsychotic meds.     Coronary artery disease involving native heart without angina pectoris, unspecified vessel or lesion type  Essential hypertension  - BP readings w/i acceptable range. Now has edema, but lung clear on auscultation. Not on diuretics, will not Rx any now. Continue conservative measures.     Anemia in other chronic diseases classified elsewhere  - HH improving. On nutritional supplement.      Physical Decondition:  - improving. Plan to go to [facility].     Orders:  1. See above, otherwise, continue the rest of the current POC.       Total time spent with patient visit at the skilled nursing facility was 45 min including patient visit and reveiweing extensive records, including reviewing NP's notes, . Greater than 50% of total time spent with counseling and coordinating care due to multiple comorbidities.     Electronically signed by:  Chavo Ellis MD

## 2018-08-02 NOTE — LETTER
8/2/2018         RE: Chilo Oneil  [address] [address]        Dear Colleague,    Thank you for referring your patient, Chilo Oneil, to the [facility]. Please see a copy of my visit note below.    Hematology/ Oncology Follow-up Visit:  Aug 2, 2018    Reason for Visit:   Chief Complaint   Patient presents with     Oncology Clinic Visit     2 week follow up malignant neoplasm of lower lobe of left lung. Review PET scan results.        Oncologic History:  Carcinoma, lung, left (H)  Patient presented with L side shoulder and has been traveling  to L/side about  to the whole L/side upper back and L side of chest for about 3 week he has had the pain has been taking aleve.  Subsequently went on to have a CT scan done.  Which showed 1.6 cm nodular infiltrate in the left upper lobe.  The lesion appear spiculated and worrisome for lung cancer.  There is a second 0.7 cm indeterminate nodule in the lingular portion of the left lung.  Subsequently the patient went on to have CT-guided biopsy.  The pathology came back showing moderately differentiated adenocarcinoma. He had subsequent noted left thoracoscopic left pleural biopsies and left lobe which resection on April 11, 2018.  The surgical pathology came back with pleural biopsies came back positive for invasive adenocarcinoma with parietal pleural invasion.  The wedge resection came back with adenocarcinoma with acinar patterns of growth moderately differentiated the tumor size is 1.1 and 0.3 cm into 2 separate tumor nodules.  Visceropleural invasion was identified the margins were negative lymphovascular invasion was not identified large venous artery involvement was not identified as well.  The pathologic staging of pT3N0.        Interval History:  Patient is here today for follow-up.  Has been feeling well without any recent complaints weight loss night sweats fever or chills.  Has been eating well and gaining weight.  He does have pain  in his left shoulder.  He denies any shortness of breath or cough or wheezing or hemoptysis.    Review Of Systems:  Constitutional: Negative for fever, chills, and night sweats.  Skin: negative.  Eyes: negative.  Ears/Nose/Throat: negative.  Respiratory: No shortness of breath, dyspnea on exertion, cough, or hemoptysis.  Cardiovascular: negative.  Gastrointestinal: negative.  Genitourinary: negative.  Musculoskeletal: negative.  Neurologic: negative.  Psychiatric: negative.  Hematologic/Lymphatic/Immunologic: negative.  Endocrine: negative.    All other ROS negative unless mentioned in interval history.    Past medical, social, surgical, and family histories reviewed.    Allergies:  Allergies as of 08/02/2018 - Alcides as Reviewed 08/02/2018   Allergen Reaction Noted     Cipro [ciprofloxacin] Swelling 06/22/2009       Current Medications:  Current Outpatient Prescriptions   Medication Sig Dispense Refill     ACETAMINOPHEN PO Take 1,000 mg by mouth 3 times daily       AmLODIPine Besylate (NORVASC PO) Take 10 mg by mouth daily       enoxaparin (LOVENOX) 80 MG/0.8ML injection Inject 0.7 mLs (70 mg) Subcutaneous 2 times daily Inject 0.69 subcutaneous every 12hrs 10 Syringe 0     fentaNYL (DURAGESIC) 12 mcg/hr 72 hr patch Place 1 patch onto the skin every 72 hours remove old patch. 30 patch 0     HYDROmorphone (DILAUDID) 2 MG tablet Take 1-2 tablets (2-4 mg) by mouth every 3 hours as needed for moderate to severe pain 80 tablet 0     LORazepam (ATIVAN) 0.5 MG tablet Take 1-2 tablets (0.5-1 mg) by mouth every 4 hours as needed for anxiety Take 0.5 mg by mouth every 4 hours as needed for nausea, vomiting, anxiety, or sleep. 30 tablet 5     MAGNESIUM OXIDE PO Take 400 mg by mouth 3 times daily        melatonin 1 MG CAPS Take 3 mg by mouth nightly as needed 90 capsule 0     OLANZapine (ZYPREXA) 5 MG tablet Take 1 tablet (5 mg) by mouth At Bedtime 49 tablet 1     omeprazole (PRILOSEC) 20 MG CR capsule Take 1 capsule (20 mg) by  "mouth daily 90 capsule 3     ONDANSETRON PO Take 4 mg by mouth every 8 hours as needed for nausea       polyethylene glycol (MIRALAX/GLYCOLAX) Packet Take 17 g by mouth daily       prochlorperazine (COMPAZINE) 10 MG tablet Take 0.5 tablets (5 mg) by mouth every 6 hours as needed for nausea or vomiting 30 tablet 1     senna-docusate (SENOKOT-S;PERICOLACE) 8.6-50 MG per tablet Take 2 tablets by mouth 2 times daily 100 tablet 0     VENTOLIN  (90 Base) MCG/ACT Inhaler Inhale 2 puffs into the lungs every 4 hours as needed for shortness of breath / dyspnea or wheezing 1 Inhaler 1     Nutritional Supplements (BOOST PO) Take 8 oz by mouth 3 times daily       THIAMINE HCL PO Take 100 mg by mouth daily          Physical Exam:  /64 (BP Location: Right arm, Patient Position: Sitting, Cuff Size: Adult Regular)  Pulse 82  Temp 98.1  F (36.7  C) (Tympanic)  Resp 18  Ht 1.88 m (6' 2\")  Wt 66 kg (145 lb 9.6 oz)  SpO2 98%  BMI 18.69 kg/m2  Wt Readings from Last 12 Encounters:   08/02/18 66 kg (145 lb 9.6 oz)   07/26/18 61.7 kg (136 lb)   07/18/18 58.7 kg (129 lb 8 oz)   07/17/18 59 kg (130 lb)   07/16/18 59 kg (130 lb)   07/12/18 60.8 kg (134 lb)   07/10/18 61.3 kg (135 lb 3.2 oz)   07/03/18 68 kg (150 lb)   06/26/18 69.4 kg (153 lb)   06/21/18 64.5 kg (142 lb 3.2 oz)   06/19/18 65.8 kg (145 lb)   06/14/18 66.2 kg (145 lb 14.4 oz)     ECOG performance status: 1  GENERAL APPEARANCE: Healthy, alert and in no acute distress.  HEENT: Sclerae anicteric. PERRLA. Oropharynx without ulcers, lesions, or thrush.  NECK: Supple. No asymmetry or masses.  LYMPHATICS: No palpable cervical, supraclavicular, axillary, or inguinal lymphadenopathy.  RESP: Lungs clear to auscultation bilaterally without rales, rhonchi or wheezes.  CARDIOVASCULAR: Regular rate and rhythm. Normal S1, S2; no S3 or S4. No murmur, gallop, or rub.  ABDOMEN: Soft, nontender. Bowel sounds normal. No palpable organomegaly or masses.  MUSCULOSKELETAL: " Extremities without gross deformities noted. No edema of bilateral lower extremities.  SKIN: No suspicious lesions or rashes.  NEURO: Alert and oriented x 3. Cranial nerves II-XII grossly intact.  PSYCHIATRIC: Mentation and affect appear normal.    Laboratory/Imaging Studies:  No visits with results within 2 Week(s) from this visit.  Latest known visit with results is:    Infusion Therapy Visit on 07/18/2018   Component Date Value Ref Range Status     WBC 07/18/2018 4.8  4.0 - 11.0 10e9/L Final     RBC Count 07/18/2018 3.08* 4.4 - 5.9 10e12/L Final     Hemoglobin 07/18/2018 10.1* 13.3 - 17.7 g/dL Final     Hematocrit 07/18/2018 28.7* 40.0 - 53.0 % Final     MCV 07/18/2018 93  78 - 100 fl Final     MCH 07/18/2018 32.8  26.5 - 33.0 pg Final     MCHC 07/18/2018 35.2  31.5 - 36.5 g/dL Final     RDW 07/18/2018 17.1* 10.0 - 15.0 % Final     Platelet Count 07/18/2018 336  150 - 450 10e9/L Final     Diff Method 07/18/2018 Automated Method   Final     % Neutrophils 07/18/2018 55.1  % Final     % Lymphocytes 07/18/2018 30.4  % Final     % Monocytes 07/18/2018 13.3  % Final     % Eosinophils 07/18/2018 0.6  % Final     % Basophils 07/18/2018 0.0  % Final     % Immature Granulocytes 07/18/2018 0.6  % Final     Nucleated RBCs 07/18/2018 0  0 /100 Final     Absolute Neutrophil 07/18/2018 2.6  1.6 - 8.3 10e9/L Final     Absolute Lymphocytes 07/18/2018 1.5  0.8 - 5.3 10e9/L Final     Absolute Monocytes 07/18/2018 0.6  0.0 - 1.3 10e9/L Final     Absolute Eosinophils 07/18/2018 0.0  0.0 - 0.7 10e9/L Final     Absolute Basophils 07/18/2018 0.0  0.0 - 0.2 10e9/L Final     Abs Immature Granulocytes 07/18/2018 0.0  0 - 0.4 10e9/L Final     Absolute Nucleated RBC 07/18/2018 0.0   Final     Sodium 07/18/2018 134  133 - 144 mmol/L Final     Potassium 07/18/2018 3.4  3.4 - 5.3 mmol/L Final     Chloride 07/18/2018 98  94 - 109 mmol/L Final     Carbon Dioxide 07/18/2018 30  20 - 32 mmol/L Final     Anion Gap 07/18/2018 6  3 - 14 mmol/L Final      Glucose 07/18/2018 109* 70 - 99 mg/dL Final     Urea Nitrogen 07/18/2018 13  7 - 30 mg/dL Final     Creatinine 07/18/2018 0.86  0.66 - 1.25 mg/dL Final     GFR Estimate 07/18/2018 89  >60 mL/min/1.7m2 Final    Non  GFR Calc     GFR Estimate If Black 07/18/2018 >90  >60 mL/min/1.7m2 Final    African American GFR Calc     Calcium 07/18/2018 8.5  8.5 - 10.1 mg/dL Final     Magnesium 07/18/2018 1.4* 1.6 - 2.3 mg/dL Final        Recent Results (from the past 744 hour(s))   XR Chest 2 Views    Narrative    CHEST TWO VIEWS  7/3/2018 7:22 PM     HISTORY: Near syncope.    COMPARISON: Chest x-ray 5/15/2018.      Impression    IMPRESSION: No acute cardiopulmonary disease. Ill-defined nodule  appears stable versus slightly less prominent just medial to the level  of the left port venous catheter at the left mid chest.    FRANKIE DO MD   PET Oncology (Eyes to Thighs)    Narrative    PET / CT WHOLE BODY 8/1/2018 2:02 PM  WITH DIAGNOSTIC CT OF CHEST, ABDOMEN AND PELVIS    HISTORY: Malignant neoplasm of lower lobe of left lung (H).    COMPARISON EXAMS:  SUBURBAN IMAGING: None.  FAIRVIEW: 4/4/2018, 1/3/2018  OTHER: None.    TECHNIQUE:  Following the uneventful administration of intravenous  contrast and FDG glucose injected intravenously, a PET/CT scan was  performed from the skull base through the mid thighs.  A diagnostic CT  scan was performed of the chest, abdomen and pelvis. Coronal, sagittal  and axial images were created and fused with the contrast enhanced CT  examination. The CT, PET and fusion images are then evaluated on a  [employer] workstation. Radiation dose for this scan was reduced using  automated exposure control, adjustment of the mA and/or kV according  to patient size, or iterative reconstruction technique.    DOSES AND GLUCOSE:  Dx CT w/, oral contrast: 500 mL of Breeza, IV  contrast: 100 mL of Isovue-370,  glucose: 95, ht: 188 cm, wt: 61.7 kg,  12.06 mCi of FDG, port, uptake: 60 min      FINDINGS: Normal physiologic uptake is identified within the salivary  glands, myocardium, kidneys, ureters and bladder. Scattered areas of  physiologic bowel uptake are also present.    NECK:  Lymph nodes: No pathologic activity.    Additional findings: Persistent sinus disease in the left maxillary  sinus.    CHEST:  Lungs: Spiculated, pleural-based left upper lobe nodule measures 1.4 x  1.5 cm and has a peak SUV of 2.5, smaller, and less metabolic than on  the prior study when it measured 2.1 x 2.0 and demonstrated an SUV  measuring up to 12.1. Small nodule in left upper lobe present on the  prior study (series 6, image 52) is no longer present and only a small  amount of cavitation remains. No pathologic activity at this site.  There is a nonhypermetabolic pulmonary nodule in the superior segment  of the right lower lobe (series 7, image 69) that measures 8 mm, not  significantly changed. Additional nonhypermetabolic 7 mm nodule  present in the right lower lobe (series 7, image 99) is also  unchanged. There is moderate emphysema. Apical pleural scarring  present bilaterally.    Lymph nodes: Previously hypermetabolic left hilar lymph node now  demonstrates a max SUV of 2.4, previously 5. Hypermetabolic uptake in  the axillary lymph nodes on the prior study has resolved.    Additional findings: Hypermetabolism in the left second rib has  resolved. Hypermetabolic pleural nodule has resolved.    ABDOMEN/PELVIS:  Hepatobiliary: No pathologic activity.    Spleen: No pathologic activity.    Pancreas: No pathologic activity.    Kidneys: No pathologic activity. No solid renal mass or  hydronephrosis. There are vascular calcifications. Exophytic cyst  arises from the posterior lower pole of the right kidney.    Adrenals: No pathologic activity. Bilateral adrenal thickening  unchanged.    Reproductive: No pathologic activity. Mildly enlarged and calcified  prostate.    Gastrointestinal: No pathologic activity.    Lymph  nodes: No pathologic activity.    Additional findings: None.    SKELETON:   No pathologic activity. Old fracture noted of the right inferior pubic  ramus. There is also orthopedic hardware across the left femoral neck.      Impression    IMPRESSION:  Hypermetabolic nodule left upper lobe has decreased in metabolic  activity and shrunken in size. Hypermetabolic left hilar lymph node  has also decreased in activity. No other areas of pathologic uptake in  the chest, abdomen, or pelvis.    GIL TONY MD       Assessment and plan:    (C34.92) Carcinoma, lung, left (H)  (primary encounter diagnosis)  Patient is gradually improving.  His symptoms are resolving.  He denies any nausea.  His appetite has been improving.  He is gaining weight.  I reviewed with him today most recent PET scan showing hypermetabolic nodule in the left upper lobe has decreased in size.  Hypermetabolic left hilar lymph node has also decreased in size as well.  We reviewed treatment options.  I would recommend to hold chemotherapy for 2 months time.  I will see the patient again with a repeat imaging studies in 2 months time.  Once the patient performance status improves we could consider restarting chemotherapy again.    Benign essential hypertension.  The patient currently is on Norvasc 10 mg orally daily.    Pulmonary embolism  Patient continues on Lovenox injections twice daily.    Chemotherapy-induced nausea and vomiting.  Patient nausea is currently well-controlled on the current regimen.    The patient is ready to learn, no apparent learning barriers were identified.  Diagnosis and treatment plans were explained to the patient. The patient expressed understanding of the content. The patient asked appropriate questions. The patient questions were answered to his satisfaction.    Time spent 40 minutes more than 50% of the time in counseling coronation of care including discussion of imaging studies, management of metastatic  adenocarcinoma of the lung, side effects of chemotherapy, follow-up and prognosis.    Chart documentation with Dragon Voice recognition Software. Although reviewed after completion, some words and grammatical errors may remain.    Sofya Brooks Unger sent a message to see if patient could get a long term medication for pain control at night. Patient has been taking ativan every 3 hours. He is not getting any sleep at night. Per Dr. Mccauley, patient should follow up with his primary or the doctor at the nursing home as this pain is not related to his cancer. Sofya was notified.  Ashley Durand RN      Again, thank you for allowing me to participate in the care of your patient.        Sincerely,        Mor Mccauley MD

## 2018-08-02 NOTE — NURSING NOTE
"Oncology Rooming Note    August 2, 2018 2:00 PM   Chilo Oneil is a 68 year old male who presents for:    Chief Complaint   Patient presents with     Oncology Clinic Visit     2 week follow up malignant neoplasm of lower lobe of left lung. Review PET scan results.      Initial Vitals: /64 (BP Location: Right arm, Patient Position: Sitting, Cuff Size: Adult Regular)  Pulse 82  Temp 98.1  F (36.7  C) (Tympanic)  Resp 18  Ht 1.88 m (6' 2\")  Wt 66 kg (145 lb 9.6 oz)  SpO2 98%  BMI 18.69 kg/m2 Estimated body mass index is 18.69 kg/(m^2) as calculated from the following:    Height as of this encounter: 1.88 m (6' 2\").    Weight as of this encounter: 66 kg (145 lb 9.6 oz). Body surface area is 1.86 meters squared.  Moderate Pain (4) Comment: left side.    No LMP for male patient.  Allergies reviewed: Yes  Medications reviewed: Yes    Medications: Medication refills not needed today.  Pharmacy name entered into [employer]: [facility] [place], [place] - [address]    Clinical concerns: 2 week follow up malignant neoplasm of lower lobe of left lung. Review PET scan results. C/o left shoulder pains.      8 minutes for nursing intake (face to face time)     Dorothy Hart [facility]            "

## 2018-08-02 NOTE — MR AVS SNAPSHOT
After Visit Summary   8/2/2018    Chilo Oneil    MRN: [id number]           Patient Information     Date Of Birth          1950        Visit Information        Provider Department      8/2/2018 1:45 PM Mor Mccauley MD [facility] ONCOLOGY      Today's Diagnoses     Carcinoma, lung, left (H)    -  1      Care Instructions    We would like you to be seen by orthopedics for treatment and a possible steroid injection. Dr. Mccauley would like you to transition from lovenox to warfarin. The INR clinic will call you and will help you with the transition. Dr. Mccauley would like to see you back in 2 months with scans and labs prior to your appointment. Thank you.     When you are in need of a refill, please call your pharmacy and they will send us a request.      Copy of appointments, and after visit summary (AVS) given to patient.      If you have any questions during business hours (M-F 8 AM- 4PM), please call Ashley Durand RN Oncology Hematology  at [facility] [place] [phone].       For questions after business hours, or on holidays/weekends, please call our after hours Nurse Triage line [phone]. Thank you.            Follow-ups after your visit        Follow-up notes from your care team     Return in about 2 months (around 10/2/2018) for Imaging ordered before next appointment, Blood work before next appointment.      Your next 10 appointments already scheduled     Aug 06, 2018 11:30 AM CDT   Level 1 with ROOM 3 [facility]    n Medical [facility]  [address]  [place] [address]   [phone]            Aug 06, 2018 11:30 AM CDT   Nursing Home with Chavo Ellis MD   [facility]    [facility] [address]  [facility] [address] [phone]            Sep 26, 2018  9:00 AM CDT   Level O with ROOM 6 [facility]  Cancer Infusion ([facility])    [facility]  [address]  [facility] [address]   [phone]            Sep 26, 2018 10:15 AM CDT   (Arrive by 9:45 AM)   CT CHEST W CONTRAST with [facility]   [facility] CT ([facility])    [address] [place]  [facility] [address]   [phone]           Please bring any scans or X-rays taken at other hospitals, if similar tests were done. Also bring a list of your medicines, including vitamins, minerals and over-the-counter drugs. It is safest to leave personal items at home.  Be sure to tell your doctor:   If you have any allergies.   If there s any chance you are pregnant.   If you are breastfeeding.    If you have diabetes as your medication may need to be adjusted for this exam.  You will have contrast for this exam. To prepare:   Do not eat or drink for 2 hours before your exam. If you need to take medicine, you may take it with small sips of water. (We may ask you to take liquid medicine as well.)   The day before your exam, drink extra fluids at least six 8-ounce glasses (unless your doctor tells you to restrict your fluids).  Patients over 70 or patients with diabetes or kidney problems:   If you haven t had a blood test (creatinine test) within the last 30 days, the Cardiologist/Radiologist may require you to get this test prior to your exam.  Please wear loose clothing, such as a sweat suit or jogging clothes. Avoid snaps, zippers and other metal. We may ask you to undress and put on a hospital gown.  If you have any questions, please call the Imaging Department where you will have your exam.            Oct 03, 2018  1:30 PM CDT   Return Visit with Mor Mccauley MD   [facility] ([facility])    [facility]  [address]  [facility] [address]   [phone]              Future tests that were ordered for you today     Open Future Orders      "   Priority Expected Expires Ordered    CBC with platelets differential Routine 10/2/2018 8/2/2019 8/2/2018    Comprehensive metabolic panel Routine 10/2/2018 8/2/2019 8/2/2018    CT Chest w Contrast Routine 10/2/2018 8/2/2019 8/2/2018            Who to contact     If you have questions or need follow up information about today's clinic visit or your schedule please contact [facility] directly at [phone].  Normal or non-critical lab and imaging results will be communicated to you by MyChart, letter or phone within 4 business days after the clinic has received the results. If you do not hear from us within 7 days, please contact the clinic through MyChart or phone. If you have a critical or abnormal lab result, we will notify you by phone as soon as possible.  Submit refill requests through [employer] or call your pharmacy and they will forward the refill request to us. Please allow 3 business days for your refill to be completed.          Additional Information About Your Visit        Care EveryWhere ID     This is your Care EveryWhere ID. This could be used by other organizations to access your [place] medical records  [id number]        Your Vitals Were     Pulse Temperature Respirations Height Pulse Oximetry BMI (Body Mass Index)    82 98.1  F (36.7  C) (Tympanic) 18 1.88 m (6' 2\") 98% 18.69 kg/m2       Blood Pressure from Last 3 Encounters:   08/02/18 139/64   07/26/18 149/76   07/18/18 145/78    Weight from Last 3 Encounters:   08/02/18 66 kg (145 lb 9.6 oz)   07/26/18 61.7 kg (136 lb)   07/18/18 58.7 kg (129 lb 8 oz)               Primary Care Provider Office Phone # Fax #    Manish Plasencia [phone] [address] [facility] [address]        Goals        General    Medication 1 (pt-stated)     Notes - Note edited  6/26/2018  5:03 PM by Sofya Paulino LSW    #1  Goal Statement: I will learn to take my medications properly   Measure of Success: I will " take my medications properly  Supportive Steps to Achieve: I will work with either MTM or RNCC to learn how to take my medications correctly  Barriers: I have so many medications to keep track of it gets confusing.  Strengths: I will use a pill box to organize my medications  Date to Achieve By: 3 months        Pain Management (pt-stated)     Notes - Note edited  6/26/2018  5:04 PM by Sofya Paulino LSW    #3  SWCC to discuss with PCP, but would recommend a Palliative Care consult for symptom management.    Pt met with Palliative Care MD yesterday, 5-9-18, and may continue to do so.    Sofya Unger  Social Work Care Coordinator  [facility]  [phone]          Transportation (pt-stated)     Notes - Note edited  6/26/2018  5:04 PM by Sofya Paulino LSW    #2  Goal Statement: Pt needs dependable transportation to get him to his clinic appointments.  Measure of Success: Pt will attend appointments if he has dependable transportation  Supportive Steps to Achieve: SW and pt will work together to secure his MA application will allow him to get transportation benefits.  SW to also provide pt with community resources.  Barriers: Access to pt's bank statements has been a barrier.    Strengths: Pt is motivated to complete the MA application  Date to Achieve By: 3 months          Equal Access to Services     [place] AH: Hadhasmukh walterso Sochuyali, waaxda luqadaha, qaybta kaalmada adeegyada, celina fisher. So [facility] [phone].    ATENCIÓN: Si habla español, tiene a galaviz disposición servicios gratuitos de asistencia lingüística. Llame al [phone].    We comply with applicable federal civil rights laws and [place] laws. We do not discriminate on the basis of race, color, national origin, age, disability, sex, sexual orientation, or gender identity.            Thank you!     Thank you for choosing [facility]  for your care. Our goal is  always to provide you with excellent care. Hearing back from our patients is one way we can continue to improve our services. Please take a few minutes to complete the written survey that you may receive in the mail after your visit with us. Thank you!             Your Updated Medication List - Protect others around you: Learn how to safely use, store and throw away your medicines at www.disposemymeds.org.          This list is accurate as of 8/2/18  2:37 PM.  Always use your most recent med list.                   Brand Name Dispense Instructions for use Diagnosis    ACETAMINOPHEN PO      Take 1,000 mg by mouth 3 times daily        BOOST PO      Take 8 oz by mouth 3 times daily        enoxaparin 80 MG/0.8ML injection    LOVENOX    10 Syringe    Inject 0.7 mLs (70 mg) Subcutaneous 2 times daily Inject 0.69 subcutaneous every 12hrs    Other pulmonary embolism without acute cor pulmonale, unspecified chronicity (H)       fentaNYL 12 mcg/hr 72 hr patch    DURAGESIC    30 patch    Place 1 patch onto the skin every 72 hours remove old patch.    Other acute pulmonary embolism without acute cor pulmonale (H)       HYDROmorphone 2 MG tablet    DILAUDID    80 tablet    Take 1-2 tablets (2-4 mg) by mouth every 3 hours as needed for moderate to severe pain    Other acute pulmonary embolism without acute cor pulmonale (H), Carcinoma, lung, left (H)       LORazepam 0.5 MG tablet    ATIVAN    30 tablet    Take 1-2 tablets (0.5-1 mg) by mouth every 4 hours as needed for anxiety Take 0.5 mg by mouth every 4 hours as needed for nausea, vomiting, anxiety, or sleep.    Anxiety       MAGNESIUM OXIDE PO      Take 400 mg by mouth 3 times daily        melatonin 1 MG Caps     90 capsule    Take 3 mg by mouth nightly as needed        NORVASC PO      Take 10 mg by mouth daily        OLANZapine 5 MG tablet    zyPREXA    49 tablet    Take 1 tablet (5 mg) by mouth At Bedtime    Insomnia, unspecified type       omeprazole 20 MG CR capsule     priLOSEC    90 capsule    Take 1 capsule (20 mg) by mouth daily    Carcinoma, lung, left (H)       ONDANSETRON PO      Take 4 mg by mouth every 8 hours as needed for nausea        polyethylene glycol Packet    MIRALAX/GLYCOLAX     Take 17 g by mouth daily        prochlorperazine 10 MG tablet    COMPAZINE    30 tablet    Take 0.5 tablets (5 mg) by mouth every 6 hours as needed for nausea or vomiting    Chemotherapy induced nausea and vomiting       senna-docusate 8.6-50 MG per tablet    SENOKOT-S;PERICOLACE    100 tablet    Take 2 tablets by mouth 2 times daily    Carcinoma, lung, left (H)       THIAMINE HCL PO      Take 100 mg by mouth daily        VENTOLIN  (90 Base) MCG/ACT Inhaler   Generic drug:  albuterol     1 Inhaler    Inhale 2 puffs into the lungs every 4 hours as needed for shortness of breath / dyspnea or wheezing

## 2018-08-02 NOTE — PATIENT INSTRUCTIONS
We would like you to be seen by orthopedics for treatment and a possible steroid injection. Dr. Mccauley would like you to transition from lovenox to warfarin. The INR clinic will call you and will help you with the transition. Dr. Mccauley would like to see you back in 2 months with scans and labs prior to your appointment. Thank you.     When you are in need of a refill, please call your pharmacy and they will send us a request.      Copy of appointments, and after visit summary (AVS) given to patient.      If you have any questions during business hours (M-F 8 AM- 4PM), please call Ashley Durand RN Oncology Hematology  at [facility] [phone].       For questions after business hours, or on holidays/weekends, please call our after hours Nurse Triage line [phone]. Thank you.

## 2018-08-03 NOTE — PROGRESS NOTES
Spoke with Sailaja ADAMES from [facility]. Confirmed with her that patient is getting his INR drawn there. Patient is still on Lovenox 70 ml every 12 hours.   She does not have orders that patient was to be switched over to coumadin.   Faxed to [phone] orders from 7/12 and 7/13 verifying to change from lovenox to coumadin and that patient will not be followed by the coumadin clinic. It will need to be managed by [facility].   Reordered referral to ortho and changed it to reflect [facility].  Anticoag clinic also notified.  Ashley Durand RN

## 2018-08-03 NOTE — TELEPHONE ENCOUNTER
[facility] TELEPHONE ENCOUNTER    Chief Complaint   Patient presents with     Anticoagulation       Chilo Oneil is a 68 year old  (1950),Nurse called today to report: Coumadin clinic called nursing station to find out what the patient's INR was.  Patient not on Coumadin and now that he is admitted to the SNF, he won't be going to that clinic.  He is on Lovenox for PE    ASSESSMENT/PLAN  History of pulmonary embolism  - PROVIDER DOSED MANAGED WARFARIN, COUMADIN, OUTPATIENT; Take 5 mg by mouth daily      Check INR 8/6/18    Discontinue Lovenox when INR therapeutic between 2-3    NATALIE Chao CNP

## 2018-08-06 NOTE — LETTER
"    8/6/2018        RE: Chilo Oneil  [address] [facility] [address]        [facility]  PRIMARY CARE PROVIDER AND CLINIC:  Manish Plasencia [address] [facility] [address]  Chief Complaint   Patient presents with     Hospital F/U     [place] Medical Record Number:  [id number]    HPI:    Chilo Oneil is a 68 year old  (1950),admitted to the [facility]  from Hospital  [facility] [place] [facility].  Hospital stay 7/16/18 through 7/17/18.  Admitted to this facility for  rehab, medical management and nursing care.  HPI information obtained from: facility chart records, facility staff, patient report and [facility] chart review.      Per [facility] HIP after hospital stay in early July:\"Mr. Oneil is a 67 y/o whom was found to have Lung cancer in Dec 2017 after noting ongoing Left shoulder pain.  CT noted several Left nodules and a small one on the Right.  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  Later PET CT did note some hilar lymph node involvement, but no other overt positive findings, MRI brain was negative for malignancy.  He met with Oncology and has started chemotherapy with Cisplatin and Alimta.  In early June he was found to have a Right sided PE, CT noted unchanged nodules in the Left side. \"  - developed drop in Hb sent to ED for blood transfusions.   - [facility] reports that Pt has AFTT, hx of illicit drug use, issue with pancytopenia, left AMA from the hospital, and also from this facility a couple of times, CTX on hold, and did show some improvement in terms of symptoms; started on OT/PT, nutrition supplement, and plan is to transfer to [facility].    Today,   - Resident seen and examined in his room, reports appetite is very good, sleep and BM are good as well.   - reports pain in left shoulder is fine with meds, and thinks it is a bursitis, which gets worse with activities.   - " reports some wheezing and cough, and noted legs swelling for the last 3 days, but denies PND or orthopnea.     CODE STATUS/ADVANCE DIRECTIVES DISCUSSION:   DNR / DNI  Patient's living condition: lives alone    ALLERGIES:Cipro [ciprofloxacin]  PAST MEDICAL HISTORY:  has a past medical history of GERD (gastroesophageal reflux disease); HTN (hypertension); Lung cancer (H); and MI (myocardial infarction). He also has no past medical history of Complication of anesthesia; Difficult intubation; Malignant hyperthermia; or PONV (postoperative nausea and vomiting).  PAST SURGICAL HISTORY:  has a past surgical history that includes fracture tx, ankle rt/lt (1975); Open reduction internal fixation hip nailing (9/20/2013); Thoracoscopic biopsy lung (Left, 4/11/2018); vascular surgery; and Insert port vascular access (Left, 5/15/2018).  FAMILY HISTORY: family history includes Diabetes in his brother and father.  SOCIAL HISTORY:  reports that he has been smoking Cigarettes.  He started smoking about 50 years ago. He has a 7.05 pack-year smoking history. He has never used smokeless tobacco. He reports that he drinks alcohol. He reports that he uses illicit drugs, including Marijuana.    Post Discharge Medication Reconciliation Status: discharge medications reconciled and changed, per note/orders (see AVS).  Current Outpatient Prescriptions   Medication Sig Dispense Refill     ACETAMINOPHEN PO Take 1,000 mg by mouth 3 times daily       AmLODIPine Besylate (NORVASC PO) Take 10 mg by mouth daily       DULoxetine HCl (CYMBALTA PO) Take 60 mg by mouth daily       enoxaparin (LOVENOX) 80 MG/0.8ML injection Inject 0.7 mLs (70 mg) Subcutaneous 2 times daily Inject 0.69 subcutaneous every 12hrs 10 Syringe 0     fentaNYL (DURAGESIC) 12 mcg/hr 72 hr patch Place 1 patch onto the skin every 72 hours remove old patch. 30 patch 0     HYDROmorphone (DILAUDID) 2 MG tablet Take 1-2 tablets (2-4 mg) by mouth every 3 hours as needed for moderate to  severe pain 80 tablet 0     LORAZEPAM PO Take 0.5 mg by mouth every 3 hours as needed for anxiety       MAGNESIUM OXIDE PO Take 400 mg by mouth 2 times daily        MELATONIN PO Take 3 mg by mouth At Bedtime       Nutritional Supplements (BOOST PO) Take 8 oz by mouth 3 times daily       OLANZapine (ZYPREXA) 5 MG tablet Take 1 tablet (5 mg) by mouth At Bedtime 49 tablet 1     omeprazole (PRILOSEC) 20 MG CR capsule Take 1 capsule (20 mg) by mouth daily 90 capsule 3     ONDANSETRON PO Take 4 mg by mouth 3 times daily        polyethylene glycol (MIRALAX/GLYCOLAX) Packet Take 17 g by mouth daily       prochlorperazine (COMPAZINE) 10 MG tablet Take 0.5 tablets (5 mg) by mouth every 6 hours as needed for nausea or vomiting 30 tablet 1     PROVIDER DOSED MANAGED WARFARIN, COUMADIN, OUTPATIENT Take 5 mg by mouth daily       senna-docusate (SENOKOT-S;PERICOLACE) 8.6-50 MG per tablet Take 2 tablets by mouth 2 times daily 100 tablet 0     THIAMINE HCL PO Take 100 mg by mouth daily       VENTOLIN  (90 Base) MCG/ACT Inhaler Inhale 2 puffs into the lungs every 4 hours as needed for shortness of breath / dyspnea or wheezing 1 Inhaler 1       ROS:  10 point ROS of systems including Constitutional, Eyes, Respiratory, Cardiovascular, Gastroenterology, Genitourinary, Integumentary, Muscularskeletal, Psychiatric were all negative except for pertinent positives noted in my HPI.    Exam:  /68  Pulse 88  Temp 98.2  F (36.8  C)  Resp 18  Wt 140 lb (63.5 kg)  SpO2 96%  BMI 17.97 kg/m2  GENERAL APPEARANCE:  Alert, in no distress  ENT:  Mouth and posterior oropharynx normal, moist mucous membranes  EYES:  EOM, conjunctivae, lids, pupils and irises normal  NECK:  No adenopathy,masses or thyromegaly  RESP:  respiratory effort and palpation of chest normal, lungs clear to auscultation , no respiratory distress  CV:  Palpation and auscultation of heart done , regular rate and rhythm, no murmur, rub, or gallop, peripheral edema  "1+ in b//l legs (up to distal 2/3).   ABDOMEN:  normal bowel sounds, soft, nontender, no hepatosplenomegaly or other masses  M/S:   Gait and station abnormal uses walker.   SKIN:  Inspection of skin and subcutaneous tissue baseline, Palpation of skin and subcutaneous tissue baseline  NEURO:   No NFD appreciated on observation.   PSYCH:  normal insight, judgement and memory, affect and mood normal    Lab/Diagnostic data:  CBC RESULTS:   Recent Labs   Lab Test  07/18/18   0808  07/16/18   1730   WBC  4.8  4.6   RBC  3.08*  1.97*   HGB  10.1*  6.7*   HCT  28.7*  19.1*   MCV  93  97   MCH  32.8  34.0*   MCHC  35.2  35.1   RDW  17.1*  17.9*   PLT  336  372   Last Basic Metabolic Panel:  Recent Labs   Lab Test  07/18/18   0808 07/16/18 07/03/18   1750   NA  134   --   127*   POTASSIUM  3.4  3.6  3.2*   CHLORIDE  98   --   90*   DAMARI  8.5   --   7.8*   CO2  30   --   29   BUN  13   --   18   CR  0.86  0.96  1.46*   GLC  109*  57*  80   Liver Function Studies -   Recent Labs   Lab Test  07/03/18   1750  06/09/18   0715   PROTTOTAL  5.7*  4.9*   ALBUMIN  2.9*  2.1*   BILITOTAL  0.6  0.5   ALKPHOS  61  79   AST  23  22   ALT  11  18     Lab Results   Component Value Date    A1C 4.7 04/04/2018       ASSESSMENT/PLAN:  Carcinoma, lung, left (H)  - moderately differentiated adenocarcinoma, invasive adenocarcinoma with parietal pleural invasion; pathologic staging of pT3N0.  PET scan on 8/1 showed :\"Hypermetabolic nodule left upper lobe has decreased in metabolic activity and shrunken in size. Hypermetabolic left hilar lymph node has also decreased in activity reduction in the nodule size \"  - CTx on hold given AE, now doing better. Followed by Oncologist Dr. Bell.   - since pt of CTx and no nausea/emesis, may stop compazine.     Other pulmonary embolism without acute cor pulmonale, unspecified chronicity (H)  -  on coumadin with INR followed closely.   - clinically at baseline.     Single current episode of major depressive " disorder, unspecified depression episode severity  Anxiety  - doing fine, continue Cymbalta.  - attempt to dc lorazepam.   - GDR zyprexa to stop- started for anxiety by PCP on outpatient basis). By CMS regulations for NH, anxiety is not an acceptable dx for antipsychotic meds.     Coronary artery disease involving native heart without angina pectoris, unspecified vessel or lesion type  Essential hypertension  - BP readings w/i acceptable range. Now has edema, but lung clear on auscultation. Not on diuretics, will not Rx any now. Continue conservative measures.     Anemia in other chronic diseases classified elsewhere  - HH improving. On nutritional supplement.      Physical Decondition:  - improving. Plan to go to jail.     Orders:  1. See above, otherwise, continue the rest of the current POC.       Total time spent with patient visit at the skilled nursing facility was 45 min including patient visit and reveiweing extensive records, including reviewing NP's notes, . Greater than 50% of total time spent with counseling and coordinating care due to multiple comorbidities.     Electronically signed by:  Chavo Ellis MD      Sincerely,        Chavo Ellis MD

## 2018-08-09 NOTE — TELEPHONE ENCOUNTER
Received faxed orders for Dr. Mccauley to review. Dr. Mccauley signed orders and they were faxed to [facility].  Ashley Durand RN

## 2018-08-15 NOTE — PATIENT INSTRUCTIONS
Plan:  - Today's Plan of Care:  Continue with physical therapy    -We also discussed other future treatment options:  Referral to pain management    Follow Up: as needed    If you have any further questions for your physician or physician s care team you can call [phone] and use option 3 to leave a voice message. Calls received during business hours will be returned same day.

## 2018-08-15 NOTE — PROGRESS NOTES
Sports Medicine Clinic Visit    PCP: Manish Plasencia    Chilo Oneil is a 68 year old male who is seen  in consultation at the request of Mor Mccauley MD presenting with left shoulder pain.    Injury: He reports chronic left shoulder pain for 3 months. His shoulder pain is posterior around the scapula.  He reports no increase in his pain with activity except with sleeping. He states the pain is consistently present but the severity of pain changes. He denies injury at this time. He reports no difficulty with lifting objection and has full pain free ROM. He was referred for a steroid injection.    Location of Pain: left posterior shoulder  Duration of Pain: 3 month(s)  Rating of Pain at worst: 10/10  Rating of Pain Currently: 3/10  Symptoms are better with: Physical therapy at [facility]  Symptoms are worse with: sleeping  Additional Features:   Positive: numbness in arm pit   Negative: swelling, bruising, popping, grinding, catching, locking, instability, paresthesias and weakness  Other evaluation and/or treatments so far consists of: Ice, Heat, Tylenol, Ibuprofen  Prior History of related problems: nothing    Social History: retired    Review of Systems  Skin: no bruising, no swelling  Musculoskeletal: as above  Neurologic: occasional numbness, paresthesias  Remainder of review of systems is negative including constitutional, CV, pulmonary, GI, except as noted in HPI or medical history.    Patient's current problem list, past medical and surgical history, and family history were reviewed.    Patient Active Problem List   Diagnosis     Tobacco use disorder     PAD (peripheral artery disease) (H)     Benign essential hypertension     Hyperlipidemia LDL goal <100     Anxiety     Gastroesophageal reflux disease without esophagitis     CAD (coronary artery disease)     Lung cancer (H)     Uncomplicated asthma     Hyponatremia     SOB (shortness of breath)     Chemotherapy induced  nausea and vomiting     Carcinoma, lung, left (H)     Hypokalemia     Hypomagnesemia     Pulmonary emboli (H)     Pulmonary embolism (H)     Other pulmonary embolism without acute cor pulmonale, unspecified chronicity (H)     Left shoulder pain, unspecified chronicity     Falls frequently     Near syncope     Coronary artery disease involving native heart without angina pectoris, unspecified vessel or lesion type     H/O acute myocardial infarction     Essential hypertension     CKD (chronic kidney disease) stage 3, GFR 30-59 ml/min     KYMBERLY (acute kidney injury) (H)     Uncomplicated alcohol dependence (H)     Poor nutrition     Nausea and vomiting, intractability of vomiting not specified, unspecified vomiting type     ADA (generalized anxiety disorder)     Insomnia, unspecified type     Debility     Cognitive impairment     Pancytopenia (H)     Long-term (current) use of anticoagulants [Z79.01]     Single current episode of major depressive disorder, unspecified depression episode severity     Hip pain, left, unclear chronicity/etiology     Past Medical History:   Diagnosis Date     GERD (gastroesophageal reflux disease)      HTN (hypertension)      Lung cancer (H)      MI (myocardial infarction)     2016, medically managed     Past Surgical History:   Procedure Laterality Date     FRACTURE TX, ANKLE RT/LT  1975    Fracture TX Ankle, LT     INSERT PORT VASCULAR ACCESS Left 5/15/2018    Procedure: INSERT PORT VASCULAR ACCESS;  Left chest Port-a-Cath Placement;  Surgeon: Gurjit Fox MD;  Location: [facility]     OPEN REDUCTION INTERNAL FIXATION HIP NAILING  9/20/2013    Procedure: OPEN REDUCTION INTERNAL FIXATION HIP NAILING;  Left Hip Open Reduction Internal Fixation ;  Surgeon: Rosas Dennis MD;  Location: [facility]     THORACOSCOPIC BIOPSY LUNG Left 4/11/2018    Procedure: THORACOSCOPIC BIOPSY LUNG;  subxiphoid left video assisted thorascopic surgery pleural biops, left lower lobe wedge biopsy ;  Surgeon:  "Mega Moreno MD;  Location: [facility]     VASCULAR SURGERY       Family History   Problem Relation Age of Onset     Diabetes Brother      type 2     Diabetes Father          Objective  /88 (BP Location: Left arm, Patient Position: Chair, Cuff Size: Adult Regular)  Ht 6' (1.829 m)  Wt 145 lb (65.8 kg)  HC 2\" (5.1 cm)  BMI 19.67 kg/m2    GENERAL APPEARANCE: healthy, alert and no distress   GAIT: NORMAL  SKIN: no suspicious lesions or rashes  HEENT: Sclera clear, anicteric  CV: good peripheral pulses  RESP: Breathing not labored  NEURO: Normal strength and tone, mentation intact and speech normal  PSYCH:  mentation appears normal and affect normal/bright    Bilateral Shoulder exam    Inspection and Posture:       rounded shoulders and upper back    Skin:        no visible deformities    Tender:        Periscapular area left    Non Tender:       remainder of shoulder bilateral    ROM:        Full active and passive ROM with flexion, extension, abduction, internal and external rotation bilateral    Painful motions:       none    Strength:        abduction 5/5 bilateral       flexion 5/5 bilateral       internal rotation 5/5 bilateral       external rotation 5/5 bilateral    Impingement testing:       neg (-) Neer left       neg (-) Sepulveda left       neg (-) O'kailyn left    Sensation:        normal sensation over shoulder and upper extremity     Radiology  I ordered, visualized and reviewed these images with the patient  Xr Shoulder Left G/e 3 Views  Result Date: 8/15/2018  XR SHOULDER LT G/E 3 VW 8/15/2018 12:23 PM COMPARISON: None. HISTORY: LEFT shoulder pain.   IMPRESSION: No fracture or dislocation seen in the LEFT shoulder. Mild degenerative changes in the glenohumeral and acromioclavicular joints. No significant soft tissue swelling. LEFT subclavian implanted central venous port partially visualized. SAIGE WATT MD    Assessment:  1. Chronic periscapular pain on left side      Chronic " periscapular pain.  Normal shoulder motion and strength, however, does have scapular dyskinesis on exam.  I'm not convinced shoulder injection would help current symptoms as he doesn't have shoulder pain.  I recommend continuing physical therapy.  Could consider pain management referral for more targeted injections, will discuss with their team.    Plan:  - Today's Plan of Care:  Continue with physical therapy    -We also discussed other future treatment options:  Referral to pain management    Follow Up: as needed    Concerning signs and symptoms were reviewed.  The patient expressed understanding of this management plan and all questions were answered at this time.    Thanks for the opportunity to participate in the care of this patient, I will keep you updated on their progress.    CC: MD Salena Hill MD CAQ  Primary Care Sports Medicine  [facility]

## 2018-08-15 NOTE — LETTER
8/15/2018         RE: Chilo Oneil  [address] [address]        Dear Colleague,    Thank you for referring your patient, Chilo Oneil, to the [facility] [place]. Please see a copy of my visit note below.    Sports Medicine Clinic Visit    PCP: Manish Plasencia    Chilo Oneil is a 68 year old male who is seen  in consultation at the request of Mor Mccauley MD presenting with left shoulder pain.    Injury: He reports chronic left shoulder pain for 3 months. His shoulder pain is posterior around the scapula.  He reports no increase in his pain with activity except with sleeping. He states the pain is consistently present but the severity of pain changes. He denies injury at this time. He reports no difficulty with lifting objection and has full pain free ROM. He was referred for a steroid injection.    Location of Pain: left posterior shoulder  Duration of Pain: 3 month(s)  Rating of Pain at worst: 10/10  Rating of Pain Currently: 3/10  Symptoms are better with: Physical therapy at [facility]  Symptoms are worse with: sleeping  Additional Features:   Positive: numbness in arm pit   Negative: swelling, bruising, popping, grinding, catching, locking, instability, paresthesias and weakness  Other evaluation and/or treatments so far consists of: Ice, Heat, Tylenol, Ibuprofen  Prior History of related problems: nothing    Social History: retired    Review of Systems  Skin: no bruising, no swelling  Musculoskeletal: as above  Neurologic: occasional numbness, paresthesias  Remainder of review of systems is negative including constitutional, CV, pulmonary, GI, except as noted in HPI or medical history.    Patient's current problem list, past medical and surgical history, and family history were reviewed.    Patient Active Problem List   Diagnosis     Tobacco use disorder     PAD (peripheral artery disease) (H)     Benign essential  hypertension     Hyperlipidemia LDL goal <100     Anxiety     Gastroesophageal reflux disease without esophagitis     CAD (coronary artery disease)     Lung cancer (H)     Uncomplicated asthma     Hyponatremia     SOB (shortness of breath)     Chemotherapy induced nausea and vomiting     Carcinoma, lung, left (H)     Hypokalemia     Hypomagnesemia     Pulmonary emboli (H)     Pulmonary embolism (H)     Other pulmonary embolism without acute cor pulmonale, unspecified chronicity (H)     Left shoulder pain, unspecified chronicity     Falls frequently     Near syncope     Coronary artery disease involving native heart without angina pectoris, unspecified vessel or lesion type     H/O acute myocardial infarction     Essential hypertension     CKD (chronic kidney disease) stage 3, GFR 30-59 ml/min     KYMBERLY (acute kidney injury) (H)     Uncomplicated alcohol dependence (H)     Poor nutrition     Nausea and vomiting, intractability of vomiting not specified, unspecified vomiting type     ADA (generalized anxiety disorder)     Insomnia, unspecified type     Debility     Cognitive impairment     Pancytopenia (H)     Long-term (current) use of anticoagulants [Z79.01]     Single current episode of major depressive disorder, unspecified depression episode severity     Hip pain, left, unclear chronicity/etiology     Past Medical History:   Diagnosis Date     GERD (gastroesophageal reflux disease)      HTN (hypertension)      Lung cancer (H)      MI (myocardial infarction)     2016, medically managed     Past Surgical History:   Procedure Laterality Date     FRACTURE TX, ANKLE RT/LT  1975    Fracture TX Ankle, LT     INSERT PORT VASCULAR ACCESS Left 5/15/2018    Procedure: INSERT PORT VASCULAR ACCESS;  Left chest Port-a-Cath Placement;  Surgeon: Gurjit Fox MD;  Location: [facility]     OPEN REDUCTION INTERNAL FIXATION HIP NAILING  9/20/2013    Procedure: OPEN REDUCTION INTERNAL FIXATION HIP NAILING;  Left Hip Open Reduction  "Internal Fixation ;  Surgeon: Rosas Dennis MD;  Location: [facility]     THORACOSCOPIC BIOPSY LUNG Left 4/11/2018    Procedure: THORACOSCOPIC BIOPSY LUNG;  subxiphoid left video assisted thorascopic surgery pleural biops, left lower lobe wedge biopsy ;  Surgeon: Mega Moreno MD;  Location[facility]     VASCULAR SURGERY       Family History   Problem Relation Age of Onset     Diabetes Brother      type 2     Diabetes Father          Objective  /88 (BP Location: Left arm, Patient Position: Chair, Cuff Size: Adult Regular)  Ht 6' (1.829 m)  Wt 145 lb (65.8 kg)  HC 2\" (5.1 cm)  BMI 19.67 kg/m2    GENERAL APPEARANCE: healthy, alert and no distress   GAIT: NORMAL  SKIN: no suspicious lesions or rashes  HEENT: Sclera clear, anicteric  CV: good peripheral pulses  RESP: Breathing not labored  NEURO: Normal strength and tone, mentation intact and speech normal  PSYCH:  mentation appears normal and affect normal/bright    Bilateral Shoulder exam    Inspection and Posture:       rounded shoulders and upper back    Skin:        no visible deformities    Tender:        Periscapular area left    Non Tender:       remainder of shoulder bilateral    ROM:        Full active and passive ROM with flexion, extension, abduction, internal and external rotation bilateral    Painful motions:       none    Strength:        abduction 5/5 bilateral       flexion 5/5 bilateral       internal rotation 5/5 bilateral       external rotation 5/5 bilateral    Impingement testing:       neg (-) Neer left       neg (-) Sepulveda left       neg (-) O'kailyn left    Sensation:        normal sensation over shoulder and upper extremity     Radiology  I ordered, visualized and reviewed these images with the patient  Xr Shoulder Left G/e 3 Views  Result Date: 8/15/2018  XR SHOULDER LT G/E 3 VW 8/15/2018 12:23 PM COMPARISON: None. HISTORY: LEFT shoulder pain.   IMPRESSION: No fracture or dislocation seen in the LEFT shoulder. Mild " degenerative changes in the glenohumeral and acromioclavicular joints. No significant soft tissue swelling. LEFT subclavian implanted central venous port partially visualized. SAIGE WATT MD    Assessment:  1. Chronic periscapular pain on left side      Chronic periscapular pain.  Normal shoulder motion and strength, however, does have scapular dyskinesis on exam.  I'm not convinced shoulder injection would help current symptoms as he doesn't have shoulder pain.  I recommend continuing physical therapy.  Could consider pain management referral for more targeted injections, will discuss with their team.    Plan:  - Today's Plan of Care:  Continue with physical therapy    -We also discussed other future treatment options:  Referral to pain management    Follow Up: as needed    Concerning signs and symptoms were reviewed.  The patient expressed understanding of this management plan and all questions were answered at this time.    Thanks for the opportunity to participate in the care of this patient, I will keep you updated on their progress.    CC: MD Salena Hill MD [facility]  Primary Care Sports Medicine  [facility]    Again, thank you for allowing me to participate in the care of your patient.        Sincerely,        Salena Gambino MD

## 2018-08-15 NOTE — MR AVS SNAPSHOT
After Visit Summary   8/15/2018    Chilo Oneil    MRN: [id number]           Patient Information     Date Of Birth          1950        Visit Information        Provider Department      8/15/2018 11:20 AM Salena Gambino MD [facility] [place]        Today's Diagnoses     Left shoulder pain    -  1      Care Instructions        Plan:  - Today's Plan of Care:  Continue with physical therapy    -We also discussed other future treatment options:  Referral to pain management    Follow Up: as needed    If you have any further questions for your physician or physician s care team you can call [phone] and use option 3 to leave a voice message. Calls received during business hours will be returned same day.              Follow-ups after your visit        Your next 10 appointments already scheduled     Sep 26, 2018  9:00 AM CDT   Level O with ROOM 6 [facility] ([facility])    [facility]  [address]  [facility] [address]   [phone]            Sep 26, 2018 10:15 AM CDT   (Arrive by 9:45 AM)   CT CHEST W CONTRAST with [facility]   [facility] ([facility])    [address]  [facility] [address]   [phone]           Please bring any scans or X-rays taken at other hospitals, if similar tests were done. Also bring a list of your medicines, including vitamins, minerals and over-the-counter drugs. It is safest to leave personal items at home.  Be sure to tell your doctor:   If you have any allergies.   If there s any chance you are pregnant.   If you are breastfeeding.    If you have diabetes as your medication may need to be adjusted for this exam.  You will have contrast for this exam. To prepare:   Do not eat or drink for 2 hours before your exam. If you need to take medicine, you may take it with small sips of water. (We may ask you to take liquid medicine as well.)   The day  "before your exam, drink extra fluids at least six 8-ounce glasses (unless your doctor tells you to restrict your fluids).  Patients over 70 or patients with diabetes or kidney problems:   If you haven t had a blood test (creatinine test) within the last 30 days, the Cardiologist/Radiologist may require you to get this test prior to your exam.  Please wear loose clothing, such as a sweat suit or jogging clothes. Avoid snaps, zippers and other metal. We may ask you to undress and put on a hospital gown.  If you have any questions, please call the Imaging Department where you will have your exam.            Oct 03, 2018  1:30 PM CDT   Return Visit with Mor Mccauley MD   [facility] ([facility])    [facility]  [address]  [facility] [id number] [phone]              Who to contact     If you have questions or need follow up information about today's clinic visit or your schedule please contact [facility] [place] directly at [phone].  Normal or non-critical lab and imaging results will be communicated to you by MyChart, letter or phone within 4 business days after the clinic has received the results. If you do not hear from us within 7 days, please contact the clinic through MyChart or phone. If you have a critical or abnormal lab result, we will notify you by phone as soon as possible.  Submit refill requests through klinify or call your pharmacy and they will forward the refill request to us. Please allow 3 business days for your refill to be completed.          Additional Information About Your Visit        Care EveryWhere ID     This is your Care EveryWhere ID. This could be used by other organizations to access your [place] medical records  [id number]        Your Vitals Were     Height Head Circumference BMI (Body Mass Index)             6' (1.829 m) 2\" (5.1 cm) 19.67 kg/m2          Blood Pressure from Last 3 " Encounters:   08/15/18 138/88   08/02/18 142/68   08/02/18 139/64    Weight from Last 3 Encounters:   08/15/18 145 lb (65.8 kg)   08/02/18 140 lb (63.5 kg)   08/02/18 145 lb 9.6 oz (66 kg)               Primary Care Provider Office Phone # Fax #    Manish Jyoti Plasencia [phone] [address] [facility] [address]        Equal Access to Services     [place] : Hadii aad ku hadasho Soomaali, waaxda luqadaha, qaybta kaalmada adeegyada, waxdexter saha hayevette hewitt . So [facility] [phone].    ATENCIÓN: Si habla español, tiene a galaviz disposición servicios gratuitos de asistencia lingüística. Llame al [phone].    We comply with applicable federal civil rights laws and [place] laws. We do not discriminate on the basis of race, color, national origin, age, disability, sex, sexual orientation, or gender identity.            Thank you!     Thank you for choosing [facility]  for your care. Our goal is always to provide you with excellent care. Hearing back from our patients is one way we can continue to improve our services. Please take a few minutes to complete the written survey that you may receive in the mail after your visit with us. Thank you!             Your Updated Medication List - Protect others around you: Learn how to safely use, store and throw away your medicines at www.disposemymeds.org.          This list is accurate as of 8/15/18 12:29 PM.  Always use your most recent med list.                   Brand Name Dispense Instructions for use Diagnosis    ACETAMINOPHEN PO      Take 1,000 mg by mouth 3 times daily        BOOST PO      Take 8 oz by mouth 3 times daily        CYMBALTA PO      Take 60 mg by mouth daily        enoxaparin 80 MG/0.8ML injection    LOVENOX    10 Syringe    Inject 0.7 mLs (70 mg) Subcutaneous 2 times daily Inject 0.69 subcutaneous every 12hrs    Other pulmonary embolism without acute cor pulmonale,  unspecified chronicity (H)       fentaNYL 12 mcg/hr 72 hr patch    DURAGESIC    30 patch    Place 1 patch onto the skin every 72 hours remove old patch.    Other acute pulmonary embolism without acute cor pulmonale (H)       HYDROmorphone 2 MG tablet    DILAUDID    80 tablet    Take 1-2 tablets (2-4 mg) by mouth every 3 hours as needed for moderate to severe pain    Other acute pulmonary embolism without acute cor pulmonale (H), Carcinoma, lung, left (H)       LORAZEPAM PO      Take 0.5 mg by mouth every 3 hours as needed for anxiety        MAGNESIUM OXIDE PO      Take 400 mg by mouth 2 times daily        MELATONIN PO      Take 3 mg by mouth At Bedtime        NORVASC PO      Take 10 mg by mouth daily        OLANZapine 5 MG tablet    zyPREXA    49 tablet    Take 1 tablet (5 mg) by mouth At Bedtime    Insomnia, unspecified type       omeprazole 20 MG CR capsule    priLOSEC    90 capsule    Take 1 capsule (20 mg) by mouth daily    Carcinoma, lung, left (H)       ONDANSETRON PO      Take 4 mg by mouth 3 times daily        polyethylene glycol Packet    MIRALAX/GLYCOLAX     Take 17 g by mouth daily        prochlorperazine 10 MG tablet    COMPAZINE    30 tablet    Take 0.5 tablets (5 mg) by mouth every 6 hours as needed for nausea or vomiting    Chemotherapy induced nausea and vomiting       PROVIDER DOSED MANAGED WARFARIN (COUMADIN) OUTPATIENT      Take 5 mg by mouth daily    History of pulmonary embolism       senna-docusate 8.6-50 MG per tablet    SENOKOT-S;PERICOLACE    100 tablet    Take 2 tablets by mouth 2 times daily    Carcinoma, lung, left (H)       THIAMINE HCL PO      Take 100 mg by mouth daily        VENTOLIN  (90 Base) MCG/ACT inhaler   Generic drug:  albuterol     1 Inhaler    Inhale 2 puffs into the lungs every 4 hours as needed for shortness of breath / dyspnea or wheezing

## 2018-08-16 NOTE — TELEPHONE ENCOUNTER
Talked with his nurse at the nursing home.     They would like to proceed with referral to pain management.       Referral placed.

## 2018-08-16 NOTE — TELEPHONE ENCOUNTER
Please call patient.    Reviewed course with Pain Management - could trial trigger point injections for patient's current symptoms with scapular bursa injection next step.    If desired, please place pain management referral for trigger point injections with Dr. Sangeeta Pretty in [place]    Salena Gambino MD

## 2018-08-21 NOTE — LETTER
8/21/2018        RE: Chilo Oneil  [address] [place] [id number]        [facility]    Chief Complaint   Patient presents with     long-term Acute       [facility] Medical Record Number:  [id number]    HPI:    Chilo Oneil is a 68 year old  (1950), who is being seen today for an episodic care visit at [facility] .  HPI information obtained from: facility chart records, facility staff and patient report.    Met with Mr. Oneil today due to worsening of his Left shoulder pain.  Mr. Oneil reports he has been having Left shoulder pain for months, even before diagnosed with CA.  Reports that usually it's mild and tolerable.  Now reports in the last week it has gotten much worse and reports the pain medications (Dilaudid) only helps a little and last night he didn't get any sleep.  Besides the worsening Left shoulder pain, he has no other complaints.       ALLERGIES: Cipro [ciprofloxacin]  Past Medical, Surgical, Family and Social History reviewed and updated in [employer].    Current Outpatient Prescriptions   Medication Sig Dispense Refill     ACETAMINOPHEN PO Take 1,000 mg by mouth 3 times daily       AmLODIPine Besylate (NORVASC PO) Take 10 mg by mouth daily       DULoxetine HCl (CYMBALTA PO) Take 60 mg by mouth daily       enoxaparin (LOVENOX) 80 MG/0.8ML injection Inject 0.7 mLs (70 mg) Subcutaneous 2 times daily Inject 0.69 subcutaneous every 12hrs 10 Syringe 0     fentaNYL (DURAGESIC) 12 mcg/hr 72 hr patch Place 1 patch onto the skin every 72 hours remove old patch. 30 patch 0     HYDROmorphone (DILAUDID) 2 MG tablet Take 1-2 tablets (2-4 mg) by mouth every 3 hours as needed for moderate to severe pain 80 tablet 0     LORAZEPAM PO Take 0.5 mg by mouth every 3 hours as needed for anxiety       MAGNESIUM OXIDE PO Take 400 mg by mouth 2 times daily        MELATONIN PO Take 3 mg by mouth At Bedtime       Nutritional Supplements (BOOST PO) Take 8 oz by mouth 3 times  daily       omeprazole (PRILOSEC) 20 MG CR capsule Take 1 capsule (20 mg) by mouth daily 90 capsule 3     ONDANSETRON PO Take 4 mg by mouth 3 times daily        polyethylene glycol (MIRALAX/GLYCOLAX) Packet Take 17 g by mouth daily       senna-docusate (SENOKOT-S;PERICOLACE) 8.6-50 MG per tablet Take 2 tablets by mouth 2 times daily 100 tablet 0     THIAMINE HCL PO Take 100 mg by mouth daily       VENTOLIN  (90 Base) MCG/ACT Inhaler Inhale 2 puffs into the lungs every 4 hours as needed for shortness of breath / dyspnea or wheezing 1 Inhaler 1     Warfarin Sodium (COUMADIN PO) Take by mouth daily Take as directed per INR results       Medications reviewed:  Medications reconciled to facility chart and changes were made to reflect current medications as identified as above med list. Below are the changes that were made:   Medications stopped since last EPIC medication reconciliation:   Medications Discontinued During This Encounter   Medication Reason     PROVIDER DOSED MANAGED WARFARIN, COUMADIN, OUTPATIENT Medication Reconciliation Clean Up     prochlorperazine (COMPAZINE) 10 MG tablet Medication Reconciliation Clean Up     OLANZapine (ZYPREXA) 5 MG tablet Medication Reconciliation Clean Up       Medications started since last [facility] medication reconciliation:  Orders Placed This Encounter   Medications     Warfarin Sodium (COUMADIN PO)     Sig: Take by mouth daily Take as directed per INR results       REVIEW OF SYSTEMS:  7 point ROS done including, light headedness/dizziness, fever/chills, pain, Resp, CV, GI, and  and is negative other than noted in HPI.      Physical Exam:  /68  Pulse 88  Temp 98.2  F (36.8  C)  Resp 18  Wt 151 lb (68.5 kg)  SpO2 96%  BMI 20.48 kg/m2     GENERAL APPEARANCE:  Elderly chronically ill appearing male sitting up in WC, NAD, non-toxic.    ENT:  Mouth and oropharynx normal, moist mucous membranes.   RESP:  Lungs diminished in Left upper lobe, otherwise CTA. Regular  relaxed breathing effort.  No cough.   CV: S1/S2 no murmur or rubs.  Regular rhythm and rate.  No generalized edema.   ABDOMEN:  Protuberant, soft, non-tender with active bowel sounds.   No guarding, rigidity, or rebound tenderness.  EXTREMITIES:  No lower extremity edema, no calf tenderness.   PSYCH: Alert and orientated, pleasant and cooperative.  Clear degree of cognitive/memory impairment, appears stable/unchanged.    JOINTS: Full active and passive ROM in Left shoulder, non-tender.     Recent Labs: I have personally reviewed labs, which are in facility chart.     Assessment/Plan:  Chronic left shoulder pain  Carcinoma, lung, left (H)  Other pulmonary embolism without acute cor pulmonale, unspecified chronicity (H)  Debility  Mr. Oneil is reporting acute on chronic Left shoulder exacerbation.  Indicates the pain is in the posterior area, under the scapula, reports it feel's deep.  No pain with active or passive ROM of Left shoulder without guarding.  Was seen by [facility] the other day whom did not think shoulder was the etiology and I am in agreement.  They are noting periscapular pain, shoulder xray did not show any acute findings.  He is going to Ortho tomorrow 8/22 to get consideration of a trigger injection, [facility] did not feel he would benefit from a shoulder injection.      Review of PET CT from 8/1 notes ANDREA nodule 1.4x1.5 cm and Left hilar lymph node positive, thus query if this pain could be malignancy in nature, but unclear.  It is curious that he has no pain with any shoulder ROM/movement.     He did have severe thrombocytopenia a few weeks ago however, that has resolved with holding of chemotherapy.  He has a h/o PE and has been on Warfarin, but INR is now threapeutic, was on Lovenox up until today.     Due to INR being therapeutic, and going for possible injection tomorrow, will stop his Lovenox.      We will see if Ortho proceeds with an injection, as that may be diagnostic in it's self.  If  they do not, we will need to modify analgesics more.   -Did order OTC Biofreeze for pain.   -Did order PT/OT to eval/treat Left shoulder pain.     Orders:  1. DC Lovenox  2. Start biofreeze PRN on L shoulder for pain  3. PT/OT to eval and treat L shoulder    Electronically signed by  NATALIE Olivera CNP      Sincerely,        NATALIE Olivera CNP

## 2018-08-21 NOTE — PROGRESS NOTES
[facility]    Chief Complaint   Patient presents with     snf Acute       [facility] Medical Record Number:  [id number]    HPI:    Chilo Oneil is a 68 year old  (1950), who is being seen today for an episodic care visit at [facility] .  HPI information obtained from: facility chart records, facility staff and patient report.    Met with Mr. Oneil today due to worsening of his Left shoulder pain.  Mr. Oneil reports he has been having Left shoulder pain for months, even before diagnosed with CA.  Reports that usually it's mild and tolerable.  Now reports in the last week it has gotten much worse and reports the pain medications (Dilaudid) only helps a little and last night he didn't get any sleep.  Besides the worsening Left shoulder pain, he has no other complaints.       ALLERGIES: Cipro [ciprofloxacin]  Past Medical, Surgical, Family and Social History reviewed and updated in "[employer]    Current Outpatient Prescriptions   Medication Sig Dispense Refill     ACETAMINOPHEN PO Take 1,000 mg by mouth 3 times daily       AmLODIPine Besylate (NORVASC PO) Take 10 mg by mouth daily       DULoxetine HCl (CYMBALTA PO) Take 60 mg by mouth daily       enoxaparin (LOVENOX) 80 MG/0.8ML injection Inject 0.7 mLs (70 mg) Subcutaneous 2 times daily Inject 0.69 subcutaneous every 12hrs 10 Syringe 0     fentaNYL (DURAGESIC) 12 mcg/hr 72 hr patch Place 1 patch onto the skin every 72 hours remove old patch. 30 patch 0     HYDROmorphone (DILAUDID) 2 MG tablet Take 1-2 tablets (2-4 mg) by mouth every 3 hours as needed for moderate to severe pain 80 tablet 0     LORAZEPAM PO Take 0.5 mg by mouth every 3 hours as needed for anxiety       MAGNESIUM OXIDE PO Take 400 mg by mouth 2 times daily        MELATONIN PO Take 3 mg by mouth At Bedtime       Nutritional Supplements (BOOST PO) Take 8 oz by mouth 3 times daily       omeprazole (PRILOSEC) 20 MG CR capsule Take 1 capsule (20 mg) by mouth daily  90 capsule 3     ONDANSETRON PO Take 4 mg by mouth 3 times daily        polyethylene glycol (MIRALAX/GLYCOLAX) Packet Take 17 g by mouth daily       senna-docusate (SENOKOT-S;PERICOLACE) 8.6-50 MG per tablet Take 2 tablets by mouth 2 times daily 100 tablet 0     THIAMINE HCL PO Take 100 mg by mouth daily       VENTOLIN  (90 Base) MCG/ACT Inhaler Inhale 2 puffs into the lungs every 4 hours as needed for shortness of breath / dyspnea or wheezing 1 Inhaler 1     Warfarin Sodium (COUMADIN PO) Take by mouth daily Take as directed per INR results       Medications reviewed:  Medications reconciled to facility chart and changes were made to reflect current medications as identified as above med list. Below are the changes that were made:   Medications stopped since last EPIC medication reconciliation:   Medications Discontinued During This Encounter   Medication Reason     PROVIDER DOSED MANAGED WARFARIN, COUMADIN, OUTPATIENT Medication Reconciliation Clean Up     prochlorperazine (COMPAZINE) 10 MG tablet Medication Reconciliation Clean Up     OLANZapine (ZYPREXA) 5 MG tablet Medication Reconciliation Clean Up       Medications started since last [facility] medication reconciliation:  Orders Placed This Encounter   Medications     Warfarin Sodium (COUMADIN PO)     Sig: Take by mouth daily Take as directed per INR results       REVIEW OF SYSTEMS:  7 point ROS done including, light headedness/dizziness, fever/chills, pain, Resp, CV, GI, and  and is negative other than noted in HPI.      Physical Exam:  /68  Pulse 88  Temp 98.2  F (36.8  C)  Resp 18  Wt 151 lb (68.5 kg)  SpO2 96%  BMI 20.48 kg/m2     GENERAL APPEARANCE:  Elderly chronically ill appearing male sitting up in WC, NAD, non-toxic.    ENT:  Mouth and oropharynx normal, moist mucous membranes.   RESP:  Lungs diminished in Left upper lobe, otherwise CTA. Regular relaxed breathing effort.  No cough.   CV: S1/S2 no murmur or rubs.  Regular rhythm and  rate.  No generalized edema.   ABDOMEN:  Protuberant, soft, non-tender with active bowel sounds.   No guarding, rigidity, or rebound tenderness.  EXTREMITIES:  No lower extremity edema, no calf tenderness.   PSYCH: Alert and orientated, pleasant and cooperative.  Clear degree of cognitive/memory impairment, appears stable/unchanged.    JOINTS: Full active and passive ROM in Left shoulder, non-tender.     Recent Labs: I have personally reviewed labs, which are in facility chart.     Assessment/Plan:  Chronic left shoulder pain  Carcinoma, lung, left (H)  Other pulmonary embolism without acute cor pulmonale, unspecified chronicity (H)  Debility  Mr. Oneil is reporting acute on chronic Left shoulder exacerbation.  Indicates the pain is in the posterior area, under the scapula, reports it feel's deep.  No pain with active or passive ROM of Left shoulder without guarding.  Was seen by [facility] the other day whom did not think shoulder was the etiology and I am in agreement.  They are noting periscapular pain, shoulder xray did not show any acute findings.  He is going to Ortho tomorrow 8/22 to get consideration of a trigger injection, [facility] did not feel he would benefit from a shoulder injection.      Review of PET CT from 8/1 notes ANDREA nodule 1.4x1.5 cm and Left hilar lymph node positive, thus query if this pain could be malignancy in nature, but unclear.  It is curious that he has no pain with any shoulder ROM/movement.     He did have severe thrombocytopenia a few weeks ago however, that has resolved with holding of chemotherapy.  He has a h/o PE and has been on Warfarin, but INR is now threapeutic, was on Lovenox up until today.     Due to INR being therapeutic, and going for possible injection tomorrow, will stop his Lovenox.      We will see if Ortho proceeds with an injection, as that may be diagnostic in it's self.  If they do not, we will need to modify analgesics more.   -Did order OTC Biofreeze for pain.    -Did order PT/OT to eval/treat Left shoulder pain.     Orders:  1. DC Lovenox  2. Start biofreeze PRN on L shoulder for pain  3. PT/OT to eval and treat L shoulder    Electronically signed by  NATALIE Olivera CNP

## 2018-08-22 NOTE — MR AVS SNAPSHOT
After Visit Summary   8/22/2018    Chilo Oneil    MRN: [id number]           Patient Information     Date Of Birth          1950        Visit Information        Provider Department      8/22/2018 2:30 PM Wilbert Noe MD [facility]        Care Instructions    [facility]   Post Procedure Instructions    Today you had:  trigger point injections       Medications used:  lidocaine   bupivicaine           Go to the emergency room if you develop any shortness of breath    Monitor the injection sites for signs and symptoms of infection-fever, chills, redness, swelling, warmth, or drainage to areas.    You may have soreness at injection sites for up to 24 hours.    You may apply ice to the painful areas to help minimize the discomfort of the needle pokes.    Do not apply heat to sites for at least 12 hours.    You may use anti-inflammatory medications or Tylenol for pain control if necessary    Pain Clinic phone number during work hours Monday-Friday:  [phone]    After hours provider line: [phone]                Follow-ups after your visit        Your next 10 appointments already scheduled     Sep 26, 2018  9:00 AM CDT   Level O with ROOM 6 [facility] ([facility])    [facility]  [address]  [facility] [address]   [phone]            Sep 26, 2018 10:15 AM CDT   (Arrive by 9:45 AM)   CT CHEST W CONTRAST with [facility]   [facility] CT ([facility])    [address]  [facility] [address]   [phone]           Please bring any scans or X-rays taken at other hospitals, if similar tests were done. Also bring a list of your medicines, including vitamins, minerals and over-the-counter drugs. It is safest to leave personal items at home.  Be sure to tell your doctor:   If you have any allergies.   If there s any chance you are pregnant.   If  you are breastfeeding.    If you have diabetes as your medication may need to be adjusted for this exam.  You will have contrast for this exam. To prepare:   Do not eat or drink for 2 hours before your exam. If you need to take medicine, you may take it with small sips of water. (We may ask you to take liquid medicine as well.)   The day before your exam, drink extra fluids at least six 8-ounce glasses (unless your doctor tells you to restrict your fluids).  Patients over 70 or patients with diabetes or kidney problems:   If you haven t had a blood test (creatinine test) within the last 30 days, the Cardiologist/Radiologist may require you to get this test prior to your exam.  Please wear loose clothing, such as a sweat suit or jogging clothes. Avoid snaps, zippers and other metal. We may ask you to undress and put on a hospital gown.  If you have any questions, please call the Imaging Department where you will have your exam.            Oct 03, 2018  1:30 PM CDT   Return Visit with Mor Mccauley MD   [facility] ([facility])    [facility]  [address]  [facility] [address] [phone]              Who to contact     If you have questions or need follow up information about today's clinic visit or your schedule please contact [facility] directly at [phone].  Normal or non-critical lab and imaging results will be communicated to you by MyChart, letter or phone within 4 business days after the clinic has received the results. If you do not hear from us within 7 days, please contact the clinic through MyChart or phone. If you have a critical or abnormal lab result, we will notify you by phone as soon as possible.  Submit refill requests through [employer] or call your pharmacy and they will forward the refill request to us. Please allow 3 business days for your refill to be completed.          Additional Information About  Your Visit        Care EveryWhere ID     This is your Care EveryWhere ID. This could be used by other organizations to access your [facility] medical records  [id number]        Your Vitals Were     Pulse                   80            Blood Pressure from Last 3 Encounters:   08/22/18 130/67   08/21/18 142/68   08/15/18 138/88    Weight from Last 3 Encounters:   08/21/18 68.5 kg (151 lb)   08/15/18 65.8 kg (145 lb)   08/02/18 63.5 kg (140 lb)              Today, you had the following     No orders found for display       Primary Care Provider Office Phone # Fax #    Omerrolly Jyoti Plasencia [phone] [address] [facility] [address]        Equal Access to Services     [place] : Kevin garcia Soearl, waaxkirit luqadaha, qaybta kaalmada adeegyada, celina hewitt . So [facility] [phone].    ATENCIÓN: Si habla español, tiene a galaviz disposición servicios gratuitos de asistencia lingüística. Macyame al [phone].    We comply with applicable federal civil rights laws and [place] laws. We do not discriminate on the basis of race, color, national origin, age, disability, sex, sexual orientation, or gender identity.            Thank you!     Thank you for choosing [facility]  for your care. Our goal is always to provide you with excellent care. Hearing back from our patients is one way we can continue to improve our services. Please take a few minutes to complete the written survey that you may receive in the mail after your visit with us. Thank you!             Your Updated Medication List - Protect others around you: Learn how to safely use, store and throw away your medicines at www.disposemymeds.org.          This list is accurate as of 8/22/18  3:06 PM.  Always use your most recent med list.                   Brand Name Dispense Instructions for use Diagnosis    ACETAMINOPHEN PO      Take 1,000 mg by mouth 3 times daily         BOOST PO      Take 8 oz by mouth 3 times daily        COMPAZINE PO      Take 5 mg by mouth every 6 hours as needed for nausea        COUMADIN PO      Take by mouth daily Take as directed per INR results        CYMBALTA PO      Take 60 mg by mouth daily        enoxaparin 80 MG/0.8ML injection    LOVENOX    10 Syringe    Inject 0.7 mLs (70 mg) Subcutaneous 2 times daily Inject 0.69 subcutaneous every 12hrs    Other pulmonary embolism without acute cor pulmonale, unspecified chronicity (H)       fentaNYL 12 mcg/hr 72 hr patch    DURAGESIC    30 patch    Place 1 patch onto the skin every 72 hours remove old patch.    Other acute pulmonary embolism without acute cor pulmonale (H)       HYDROmorphone 2 MG tablet    DILAUDID    80 tablet    Take 1-2 tablets (2-4 mg) by mouth every 3 hours as needed for moderate to severe pain    Other acute pulmonary embolism without acute cor pulmonale (H), Carcinoma, lung, left (H)       LORAZEPAM PO      Take 0.5 mg by mouth every 3 hours as needed for anxiety        MAGNESIUM OXIDE PO      Take 400 mg by mouth 2 times daily        MELATONIN PO      Take 3 mg by mouth At Bedtime        NORVASC PO      Take 10 mg by mouth daily        OLANZapine zydis 5 MG ODT tab    zyPREXA     Take 5 mg by mouth At Bedtime        omeprazole 20 MG CR capsule    priLOSEC    90 capsule    Take 1 capsule (20 mg) by mouth daily    Carcinoma, lung, left (H)       ONDANSETRON PO      Take 4 mg by mouth 3 times daily        polyethylene glycol Packet    MIRALAX/GLYCOLAX     Take 17 g by mouth daily        senna-docusate 8.6-50 MG per tablet    SENOKOT-S;PERICOLACE    100 tablet    Take 2 tablets by mouth 2 times daily    Carcinoma, lung, left (H)       THIAMINE HCL PO      Take 100 mg by mouth daily        VENTOLIN  (90 Base) MCG/ACT inhaler   Generic drug:  albuterol     1 Inhaler    Inhale 2 puffs into the lungs every 4 hours as needed for shortness of breath / dyspnea or wheezing        ZOFRAN PO       Take 4 mg by mouth

## 2018-08-22 NOTE — PROGRESS NOTES
Pre procedure Diagnosis: myofascial pain   Post procedure Diagnosis: Same  Procedure performed: trigger point injections  Anesthesia: none  Complications: none  Operators: Wilbert Pretty MD     Indications:   Chilo Oneil is a 68 year old male with a history of chronic neck and upper thoracic pain and pain in the left shoulder that is not worsened with left upper extremity range of motion.  Exam shows myofascial pain of the muscle groups listed below and they have tried conservative treatment including physical therapy, medications.    Options/alternatives, benefits and risks were discussed with the patient including bleeding, infection, tissue trauma and pnuemothorax.  Questions were answered to his satisfaction and he agrees to proceed. Voluntary informed consent was obtained and signed.     Vitals were reviewed: Yes  /67  Pulse 80  Allergies were reviewed:  Yes   Medications were reviewed:  Yes   Pre-procedure pain score: 7/10    Procedure:  After getting informed consent, a Pause for the Cause was performed.    Trigger points were identified by patient, and marked when appropriate.  The area was prepped with Chloroprep.    Using clean technique, injections were completed using a 30G, 1 inch needle.  After negative aspiration, injections were completed.  A total of 9 locations were injected.  When possible, tissue was retracted from the chest wall to avoid lung injury.    Muscle groups injected:  Cervical paraspinal muscles  Thoracic paraspinal muscles  Trapezius muscles  Supraspinatus muscles    Injection solution contained:  5ml of 1% lidocaine and 5ml of 0.5% bupivacaine (total injectate volume 10 ml).    Hemostasis was achieved, the area was cleaned, and bandaids were placed when appropriate.  The patient tolerated the procedure well.  Breath sounds were normal.      Post-procedure pain score: 4/10  Follow-up includes:   -repeat prn    Wilbert Pretty MD  [facility]

## 2018-08-22 NOTE — PATIENT INSTRUCTIONS
[facility]   Post Procedure Instructions    Today you had:  trigger point injections       Medications used:  lidocaine   bupivicaine           Go to the emergency room if you develop any shortness of breath    Monitor the injection sites for signs and symptoms of infection-fever, chills, redness, swelling, warmth, or drainage to areas.    You may have soreness at injection sites for up to 24 hours.    You may apply ice to the painful areas to help minimize the discomfort of the needle pokes.    Do not apply heat to sites for at least 12 hours.    You may use anti-inflammatory medications or Tylenol for pain control if necessary    Pain Clinic phone number during work hours Monday-Friday:  [phone]    After hours provider line: [phone]

## 2018-08-23 NOTE — PROGRESS NOTES
Clinic Care Coordination Contact  Care Team Conversations    Pt called this writer looking for the phone number to the pain clinic that he was at yesterday.  When asked why he doesn't have that information, pt stated he gave all of his paperwork to the staff at [facility] yesterday upon return from his visit.    Pt shared he has extreme pain in his shoulder/upper back area and was told that he would have immediate relief & has not had that.  Pt wants to call the Pain Clinic to find out what his next steps are, but was told by the SNF RN to wait one more day before calling.    Pt wants to call today due to his pain level.      SW provided pt with the phone number, but also encouraged him to have a discussion with the SNF RN before calling the Pain Clinic.    Sofya Unger  Social Work Care Coordinator  [facility]  [phone]

## 2018-08-27 PROBLEM — G89.29 CHRONIC LEFT SHOULDER PAIN: Status: ACTIVE | Noted: 2018-01-01

## 2018-08-27 PROBLEM — M25.512 CHRONIC LEFT SHOULDER PAIN: Status: ACTIVE | Noted: 2018-01-01

## 2018-08-27 NOTE — PROGRESS NOTES
[facility]    Chief Complaint   Patient presents with     FDC Acute        [facility] Medical Record Number:  [id number]    HPI:    Chilo Oneil is a 68 year old  (1950), who is being seen today for an episodic care visit at [facility] .  HPI information obtained from: facility chart records, facility staff and patient report.    We met with Mr. Oneil today for follow up due to ongoing Left shoulder pain.  He is s/p trigger point injection on 8/22, where he reports a couple hours he thinks it helped, but none that evening or since.  Rates Left shoulder pain as 7-10/10, reports it mostly behind his Left scapula area, deep.  No pain with passive/active ROM of Left shoulder, no neck pain.  Last week he denied radiculopathy pain, does endorse a h/o neck trauma a number of years ago, now endorses shooting pain at times from axilla to elbow area.  Reports pain is keeping him up at night, pain worse at night, better during the day.  Thinks the pain med's are helping but not enough.  No other complaints.     ALLERGIES: Cipro [ciprofloxacin]  Past Medical, Surgical, Family and Social History reviewed and updated in [facility].    Current Outpatient Prescriptions   Medication Sig Dispense Refill     ACETAMINOPHEN PO Take 1,000 mg by mouth 3 times daily       AmLODIPine Besylate (NORVASC PO) Take 10 mg by mouth daily       DULoxetine HCl (CYMBALTA PO) Take 60 mg by mouth daily       enoxaparin (LOVENOX) 80 MG/0.8ML injection Inject 0.7 mLs (70 mg) Subcutaneous 2 times daily Inject 0.69 subcutaneous every 12hrs 10 Syringe 0     fentaNYL (DURAGESIC) 12 mcg/hr 72 hr patch Place 1 patch onto the skin every 72 hours remove old patch. 30 patch 0     HYDROmorphone (DILAUDID) 2 MG tablet Take 1-2 tablets (2-4 mg) by mouth every 3 hours as needed for moderate to severe pain 80 tablet 0     LORAZEPAM PO Take 0.5 mg by mouth every 3 hours as needed for anxiety       MAGNESIUM OXIDE PO Take 400  mg by mouth 2 times daily        MELATONIN PO Take 3 mg by mouth At Bedtime       Nutritional Supplements (BOOST PO) Take 8 oz by mouth 3 times daily       OLANZapine zydis (ZYPREXA) 5 MG ODT tab Take 5 mg by mouth At Bedtime       omeprazole (PRILOSEC) 20 MG CR capsule Take 1 capsule (20 mg) by mouth daily 90 capsule 3     Ondansetron HCl (ZOFRAN PO) Take 4 mg by mouth       ONDANSETRON PO Take 4 mg by mouth 3 times daily        polyethylene glycol (MIRALAX/GLYCOLAX) Packet Take 17 g by mouth daily       Prochlorperazine Maleate (COMPAZINE PO) Take 5 mg by mouth every 6 hours as needed for nausea       senna-docusate (SENOKOT-S;PERICOLACE) 8.6-50 MG per tablet Take 2 tablets by mouth 2 times daily 100 tablet 0     THIAMINE HCL PO Take 100 mg by mouth daily       VENTOLIN  (90 Base) MCG/ACT Inhaler Inhale 2 puffs into the lungs every 4 hours as needed for shortness of breath / dyspnea or wheezing 1 Inhaler 1     Warfarin Sodium (COUMADIN PO) Take by mouth daily Take as directed per INR results       Medications reviewed:  Medications reconciled to facility chart and changes were made to reflect current medications as identified as above med list. Below are the changes that were made:   Medications stopped since last EPIC medication reconciliation:   There are no discontinued medications.    Medications started since last [facility] medication reconciliation:  No orders of the defined types were placed in this encounter.    REVIEW OF SYSTEMS:  7 point ROS done including, light headedness/dizziness, fever/chills, pain, Resp, CV, GI, and  and is negative other than noted in HPI.    Physical Exam:  /68  Pulse 88  Temp 98.2  F (36.8  C)  Resp 18  Wt 147 lb 6.4 oz (66.9 kg)  SpO2 96%  BMI 19.99 kg/m2     GENERAL APPEARANCE:  Elderly chronically ill appearing male sitting up in WC, NAD, non-toxic.    ENT:  Mouth and oropharynx normal, moist mucous membranes.   RESP:  Lungs diminished in Left upper lobe, and  RLL today.  Regular relaxed breathing effort.  No cough.   CV: S1/S2 no murmur or rubs.  Regular rhythm and rate.  No generalized edema.   ABDOMEN:  Protuberant, soft, non-tender with active bowel sounds.   No guarding, rigidity, or rebound tenderness.  EXTREMITIES:  No lower extremity edema, no calf tenderness.   PSYCH: Alert and orientated, pleasant and cooperative.  Mild degree of cognitive/memory impairment, appears stable/unchanged.    JOINTS: Full active and passive ROM in Left shoulder, non-tender.   SPINE: No nuchal rigidity, no cervical tenderness on palpation.      Recent Labs: None recent.    Assessment/Plan:  Chronic left shoulder pain s/p injection Aug 22  Carcinoma, lung, left (H)  Other pulmonary embolism without acute cor pulmonale, unspecified chronicity (H)  Debility  Mr. Oneil saw sports med whom noted they did not think pain was his shoulder but still sent him to ortho whom noted possible trigger point pain, thus he is s/p injection 8/22 which he reports a couple hours of improvement, but then back to prior 7-10/10 and no relief over the weekend.  He still can move entire ROM of Left shoulder without pain, I have low suspicion shoulder is the etiology.  He does endorse under scapula and deep, he does have a known malignancy mass there thus I query if that is the etiology, now worse due to being off chemo.  He thinks he had that pain longer than malignancy, thus unclear.  He now endorses some radiculopathy nature to the pain, endorses occasional shooting pain from below axilla to elbow at times, does endorse a distant neck injury.  No nuchal rigidity, no cervical pain on exam, no pain on cervical ROM, thus again unclear and etiology of pain remains unclear.      Plan will start lower dose of Gabapentin and monitor.  If improved we can increase, if no change then will stop and increase Fentanyl patch.  Did increase at bedtime Dilaudid as he reports pain keeping him from sleeping as biggest issue.      Orders:  1. Lower Zofran down to BID with AM meal and PM meal x 7 days then lower down to once daily in AM Dx: Chronic nausea  2. Start Gabapentin 300 mg at bedtime. Due to radiculopathic pain   3. Change dilaudid to 2 mg for pain 1-5, 4 mg for pain 6-10 q3h PRN, and can have 6 mg at bedtime PRN x 1 every night if shoulder pain is >8/10.      Electronically signed by  NATALIE Olivera CNP

## 2018-08-27 NOTE — LETTER
8/27/2018        RE: Chilo Oneil  [address] [place] [id number]        [facility]    Chief Complaint   Patient presents with     correction Acute        [facility] Medical Record Number:  [id number]    HPI:    Chilo Oneil is a 68 year old  (1950), who is being seen today for an episodic care visit at [facility] .  HPI information obtained from: facility chart records, facility staff and patient report.    We met with Mr. Oneil today for follow up due to ongoing Left shoulder pain.  He is s/p trigger point injection on 8/22, where he reports a couple hours he thinks it helped, but none that evening or since.  Rates Left shoulder pain as 7-10/10, reports it mostly behind his Left scapula area, deep.  No pain with passive/active ROM of Left shoulder, no neck pain.  Last week he denied radiculopathy pain, does endorse a h/o neck trauma a number of years ago, now endorses shooting pain at times from axilla to elbow area.  Reports pain is keeping him up at night, pain worse at night, better during the day.  Thinks the pain med's are helping but not enough.  No other complaints.     ALLERGIES: Cipro [ciprofloxacin]  Past Medical, Surgical, Family and Social History reviewed and updated in [facility].    Current Outpatient Prescriptions   Medication Sig Dispense Refill     ACETAMINOPHEN PO Take 1,000 mg by mouth 3 times daily       AmLODIPine Besylate (NORVASC PO) Take 10 mg by mouth daily       DULoxetine HCl (CYMBALTA PO) Take 60 mg by mouth daily       enoxaparin (LOVENOX) 80 MG/0.8ML injection Inject 0.7 mLs (70 mg) Subcutaneous 2 times daily Inject 0.69 subcutaneous every 12hrs 10 Syringe 0     fentaNYL (DURAGESIC) 12 mcg/hr 72 hr patch Place 1 patch onto the skin every 72 hours remove old patch. 30 patch 0     HYDROmorphone (DILAUDID) 2 MG tablet Take 1-2 tablets (2-4 mg) by mouth every 3 hours as needed for moderate to severe pain 80 tablet 0     LORAZEPAM PO Take  0.5 mg by mouth every 3 hours as needed for anxiety       MAGNESIUM OXIDE PO Take 400 mg by mouth 2 times daily        MELATONIN PO Take 3 mg by mouth At Bedtime       Nutritional Supplements (BOOST PO) Take 8 oz by mouth 3 times daily       OLANZapine zydis (ZYPREXA) 5 MG ODT tab Take 5 mg by mouth At Bedtime       omeprazole (PRILOSEC) 20 MG CR capsule Take 1 capsule (20 mg) by mouth daily 90 capsule 3     Ondansetron HCl (ZOFRAN PO) Take 4 mg by mouth       ONDANSETRON PO Take 4 mg by mouth 3 times daily        polyethylene glycol (MIRALAX/GLYCOLAX) Packet Take 17 g by mouth daily       Prochlorperazine Maleate (COMPAZINE PO) Take 5 mg by mouth every 6 hours as needed for nausea       senna-docusate (SENOKOT-S;PERICOLACE) 8.6-50 MG per tablet Take 2 tablets by mouth 2 times daily 100 tablet 0     THIAMINE HCL PO Take 100 mg by mouth daily       VENTOLIN  (90 Base) MCG/ACT Inhaler Inhale 2 puffs into the lungs every 4 hours as needed for shortness of breath / dyspnea or wheezing 1 Inhaler 1     Warfarin Sodium (COUMADIN PO) Take by mouth daily Take as directed per INR results       Medications reviewed:  Medications reconciled to facility chart and changes were made to reflect current medications as identified as above med list. Below are the changes that were made:   Medications stopped since last EPIC medication reconciliation:   There are no discontinued medications.    Medications started since last [facility] medication reconciliation:  No orders of the defined types were placed in this encounter.    REVIEW OF SYSTEMS:  7 point ROS done including, light headedness/dizziness, fever/chills, pain, Resp, CV, GI, and  and is negative other than noted in HPI.    Physical Exam:  /68  Pulse 88  Temp 98.2  F (36.8  C)  Resp 18  Wt 147 lb 6.4 oz (66.9 kg)  SpO2 96%  BMI 19.99 kg/m2     GENERAL APPEARANCE:  Elderly chronically ill appearing male sitting up in WC, NAD, non-toxic.    ENT:  Mouth and  oropharynx normal, moist mucous membranes.   RESP:  Lungs diminished in Left upper lobe, and RLL today.  Regular relaxed breathing effort.  No cough.   CV: S1/S2 no murmur or rubs.  Regular rhythm and rate.  No generalized edema.   ABDOMEN:  Protuberant, soft, non-tender with active bowel sounds.   No guarding, rigidity, or rebound tenderness.  EXTREMITIES:  No lower extremity edema, no calf tenderness.   PSYCH: Alert and orientated, pleasant and cooperative.  Mild degree of cognitive/memory impairment, appears stable/unchanged.    JOINTS: Full active and passive ROM in Left shoulder, non-tender.   SPINE: No nuchal rigidity, no cervical tenderness on palpation.      Recent Labs: None recent.    Assessment/Plan:  Chronic left shoulder pain s/p injection Aug 22  Carcinoma, lung, left (H)  Other pulmonary embolism without acute cor pulmonale, unspecified chronicity (H)  Debility  Mr. Oneil saw sports med whom noted they did not think pain was his shoulder but still sent him to ortho whom noted possible trigger point pain, thus he is s/p injection 8/22 which he reports a couple hours of improvement, but then back to prior 7-10/10 and no relief over the weekend.  He still can move entire ROM of Left shoulder without pain, I have low suspicion shoulder is the etiology.  He does endorse under scapula and deep, he does have a known malignancy mass there thus I query if that is the etiology, now worse due to being off chemo.  He thinks he had that pain longer than malignancy, thus unclear.  He now endorses some radiculopathy nature to the pain, endorses occasional shooting pain from below axilla to elbow at times, does endorse a distant neck injury.  No nuchal rigidity, no cervical pain on exam, no pain on cervical ROM, thus again unclear and etiology of pain remains unclear.      Plan will start lower dose of Gabapentin and monitor.  If improved we can increase, if no change then will stop and increase Fentanyl patch.  Did  increase at bedtime Dilaudid as he reports pain keeping him from sleeping as biggest issue.     Orders:  1. Lower Zofran down to BID with AM meal and PM meal x 7 days then lower down to once daily in AM Dx: Chronic nausea  2. Start Gabapentin 300 mg at bedtime. Due to radiculopathic pain   3. Change dilaudid to 2 mg for pain 1-5, 4 mg for pain 6-10 q3h PRN, and can have 6 mg at bedtime PRN x 1 every night if shoulder pain is >8/10.      Electronically signed by  NATALIE Olivera CNP      Sincerely,        NATALIE Olivera CNP

## 2018-08-30 NOTE — PROGRESS NOTES
Clinic Care Coordination Contact  [facility]/Voicemail       Clinical Data: Care Coordinator Outreach  Outreach attempted x 1.  Patient left a message for usual CC who is on PTO.  He asked for help with getting on waiting list for cottages in [place].  He would like to know age limits and other information about this housing option.  This CC left message on his voicemail with call back information and requested return call.  Plan: Respond to call back.  Will route this encounter to usual CC who has this patient enrolled.     Halle Ontiveros,   [facility]  [email or web address]  [phone]

## 2018-08-30 NOTE — PROGRESS NOTES
"[facility]  Chief Complaint   Patient presents with     RECHECK     HPI:    Chilo Oneil is a 68 year old  (1950), who is being seen today for an episodic care visit at [facility]. HPI information obtained from: facility chart records, facility staff, patient report and [facility] chart review.     Today's concern is:  Chronic left shoulder pain s/p injection Aug 22  Malignant neoplasm of lower lobe of left lung (H)  Patient states he slept incorrectly last night and has a little more shoulder pain than usual.  He is utilizing PRN doses of Dilaudid to help ease this.  He also stated, \"it might be related to the lung cancer.\" He denied SOB, CP/ dizziness, cough. He states his B&B are working fine and has no other complaints today.     History of pulmonary embolism  Long-term (current) use of anticoagulants [Z79.01]  Nursing contacted provider regarding ordering issues related to coumadin and the upcoming holiday weekend of labor day. INR is ordered for holiday and there is not a dose for this day.  Current dosing is ordered as 7mg on M/F and 6mg all other days. Last INR was 1.79, and before that 2.14, and dosing has fluctuated between 6-7mg.  Patient denies bleeding/bruising.     PMH/PSH reviewed in EPIC today    REVIEW OF SYSTEMS:  4 point ROS including Respiratory, CV, GI and , other than that noted in the HPI,  is negative    /68  Pulse 88  Temp 98.2  F (36.8  C)  Resp 18  Wt 147 lb 6.4 oz (66.9 kg)  SpO2 96%  BMI 19.99 kg/m2  GENERAL APPEARANCE: Alert, in no distress, cooperative.   ENT: Mouth and posterior oropharynx normal, moist mucous membranes, hearing acuity intact.  EYES: EOM, conjunctivae, lids, pupils and irises normal, PERRL.  RESP: Respiratory effort and palpation of chest normal, no respiratory distress, Lung sounds clear with diminished left.  CV: Palpation and auscultation of heart done, rate and rhythm controlled, no murmur, no rub or " gallop, no edema.   ABDOMEN: Normal bowel sounds, soft, nontender, no hepatosplenomegaly or other masses.  SKIN: Inspection/Palpation of skin and subcutaneous tissue baseline.  NEURO: 2-12 in normal limits and at patient's baseline, sensation intact PPS.  PSYCH: Insight and judgement, memory intact, affect and mood normal.    ASSESSMENT/PLAN:  Chronic left shoulder pain s/p injection Aug 22  Malignant neoplasm of lower lobe of left lung (H)  Chronic. Stable. Unchanged. Patient states it is worse today, but usually well controlled. Follow-up as needed.    History of pulmonary embolism  Long-term (current) use of anticoagulants [Z79.01]  Chronic. Stable. Last INR level was subtherapeutic. Will add one more day of dosing and recheck INR on 9/4. Follow-up accordingly.     Orders:  1. Coumadin 7mg PO x1 on Monday 9/3. Dx: H/O PE.  2. Discontinue INR check on 9/3. Dx: H/O PE.  3. INR recheck on 9/4/18. Dx: H/O PE.    Electronically signed by:  Dr. Lashanda Suárez, APRN CNP DNP

## 2018-08-30 NOTE — LETTER
"    8/30/2018        RE: Chilo Oneil  [address] [facility] [address]        [facility]  Chief Complaint   Patient presents with     RECHECK     HPI:    Chilo Oneil is a 68 year old  (1950), who is being seen today for an episodic care visit at [facility]. HPI information obtained from: facility chart records, facility staff, patient report and [facility] chart review.     Today's concern is:  Chronic left shoulder pain s/p injection Aug 22  Malignant neoplasm of lower lobe of left lung (H)  Patient states he slept incorrectly last night and has a little more shoulder pain than usual.  He is utilizing PRN doses of Dilaudid to help ease this.  He also stated, \"it might be related to the lung cancer.\" He denied SOB, CP/ dizziness, cough. He states his B&B are working fine and has no other complaints today.     History of pulmonary embolism  Long-term (current) use of anticoagulants [Z79.01]  Nursing contacted provider regarding ordering issues related to coumadin and the upcoming holiday weekend of labor day. INR is ordered for holiday and there is not a dose for this day.  Current dosing is ordered as 7mg on M/F and 6mg all other days. Last INR was 1.79, and before that 2.14, and dosing has fluctuated between 6-7mg.  Patient denies bleeding/bruising.     PMH/PSH reviewed in EPIC today    REVIEW OF SYSTEMS:  4 point ROS including Respiratory, CV, GI and , other than that noted in the HPI,  is negative    /68  Pulse 88  Temp 98.2  F (36.8  C)  Resp 18  Wt 147 lb 6.4 oz (66.9 kg)  SpO2 96%  BMI 19.99 kg/m2  GENERAL APPEARANCE: Alert, in no distress, cooperative.   ENT: Mouth and posterior oropharynx normal, moist mucous membranes, hearing acuity intact.  EYES: EOM, conjunctivae, lids, pupils and irises normal, PERRL.  RESP: Respiratory effort and palpation of chest normal, no respiratory distress, Lung sounds clear with diminished left.  CV: Palpation " and auscultation of heart done, rate and rhythm controlled, no murmur, no rub or gallop, no edema.   ABDOMEN: Normal bowel sounds, soft, nontender, no hepatosplenomegaly or other masses.  SKIN: Inspection/Palpation of skin and subcutaneous tissue baseline.  NEURO: 2-12 in normal limits and at patient's baseline, sensation intact PPS.  PSYCH: Insight and judgement, memory intact, affect and mood normal.    ASSESSMENT/PLAN:  Chronic left shoulder pain s/p injection Aug 22  Malignant neoplasm of lower lobe of left lung (H)  Chronic. Stable. Unchanged. Patient states it is worse today, but usually well controlled. Follow-up as needed.    History of pulmonary embolism  Long-term (current) use of anticoagulants [Z79.01]  Chronic. Stable. Last INR level was subtherapeutic. Will add one more day of dosing and recheck INR on 9/4. Follow-up accordingly.     Orders:  1. Coumadin 7mg PO x1 on Monday 9/3. Dx: H/O PE.  2. Discontinue INR check on 9/3. Dx: H/O PE.  3. INR recheck on 9/4/18. Dx: H/O PE.    Electronically signed by:  Dr. Lashanda Suárez, APRN CNP DNP        Sincerely,        Lashanda Suárez, NP

## 2018-09-04 PROBLEM — Z86.711 HISTORY OF PULMONARY EMBOLISM: Status: ACTIVE | Noted: 2018-01-01

## 2018-09-04 PROBLEM — Z51.81 ENCOUNTER FOR THERAPEUTIC DRUG MONITORING: Status: ACTIVE | Noted: 2018-01-01

## 2018-09-04 NOTE — PROGRESS NOTES
[facility]    Chief Complaint   Patient presents with     care home Acute       [facility] Medical Record Number:  [id number]    HPI:    Chilo Oneil is a 68 year old  (1950), who is being seen today for an episodic care visit at [facility].  HPI information obtained from: facility chart records, facility staff and patient report.    Mr. Oneil requested we visit him today due to ongoing Left shoulder pain.  Reports overall the pain is slightly better, since being on new pain meds, but still reports 10/10 pain at times.  Otherwise he reports he is doing well, no other complaints.     ALLERGIES: Cipro [ciprofloxacin]  Past Medical, Surgical, Family and Social History reviewed and updated in [facility].    Current Outpatient Prescriptions   Medication Sig Dispense Refill     ACETAMINOPHEN PO Take 1,000 mg by mouth 3 times daily       AmLODIPine Besylate (NORVASC PO) Take 10 mg by mouth daily       DULoxetine HCl (CYMBALTA PO) Take 60 mg by mouth daily       fentaNYL (DURAGESIC) 12 mcg/hr 72 hr patch Place 1 patch onto the skin every 72 hours remove old patch. 30 patch 0     GABAPENTIN PO Take 300 mg by mouth At Bedtime       HYDROmorphone (DILAUDID) 2 MG tablet Take 1-2 tablets (2-4 mg) by mouth every 3 hours as needed for moderate to severe pain 80 tablet 0     MAGNESIUM OXIDE PO Take 400 mg by mouth 2 times daily        MELATONIN PO Take 3 mg by mouth At Bedtime       Nutritional Supplements (BOOST PO) Take 8 oz by mouth 3 times daily       omeprazole (PRILOSEC) 20 MG CR capsule Take 1 capsule (20 mg) by mouth daily 90 capsule 3     ONDANSETRON PO Take 4 mg by mouth 3 times daily        polyethylene glycol (MIRALAX/GLYCOLAX) Packet Take 17 g by mouth daily       Prochlorperazine Maleate (COMPAZINE PO) Take 5 mg by mouth every 6 hours as needed for nausea       senna-docusate (SENOKOT-S;PERICOLACE) 8.6-50 MG per tablet Take 2 tablets by mouth 2 times daily 100 tablet 0     THIAMINE HCL PO Take  100 mg by mouth daily       VENTOLIN  (90 Base) MCG/ACT Inhaler Inhale 2 puffs into the lungs every 4 hours as needed for shortness of breath / dyspnea or wheezing 1 Inhaler 1     Warfarin Sodium (COUMADIN PO) Take by mouth daily Take as directed per INR results       Medications reviewed:  Medications reconciled to facility chart and changes were made to reflect current medications as identified as above med list. Below are the changes that were made:   Medications stopped since last EPIC medication reconciliation:   Medications Discontinued During This Encounter   Medication Reason     OLANZapine zydis (ZYPREXA) 5 MG ODT tab Medication Reconciliation Clean Up     LORAZEPAM PO Medication Reconciliation Clean Up     enoxaparin (LOVENOX) 80 MG/0.8ML injection Medication Reconciliation Clean Up       Medications started since last [facility] medication reconciliation:  No orders of the defined types were placed in this encounter.    REVIEW OF SYSTEMS:  7 point ROS done including, light headedness/dizziness, fever/chills, pain, Resp, CV, GI, and  and is negative other than noted in HPI.      Physical Exam:  /68  Pulse 88  Temp 98.2  F (36.8  C)  Resp 18  Wt 147 lb 6.4 oz (66.9 kg)  SpO2 96%  BMI 19.99 kg/m2     GENERAL APPEARANCE:  Elderly chronically ill appearing male sitting up in WC, NAD, non-toxic.    ENT:  Mouth and oropharynx normal, moist mucous membranes.   RESP:  Lungs diminished in Left upper lobe, otherwise CTA.  Regular relaxed breathing effort.  No cough.   CV: S1/S2 no murmur or rubs.  Regular rhythm and rate.  No generalized edema.   ABDOMEN:  Protuberant, soft, non-tender with active bowel sounds.   No guarding, rigidity, or rebound tenderness.  EXTREMITIES:  No lower extremity edema, no calf tenderness.   PSYCH: Alert and orientated, pleasant and cooperative.  Mild degree of cognitive/memory impairment, appears stable/unchanged.    JOINTS: Full active and passive ROM in Left shoulder,  non-tender.     Recent Labs: I have personally reviewed labs, which are in facility chart.     CBC RESULTS:   Recent Labs   Lab Test  07/18/18   0808  07/16/18   1730   WBC  4.8  4.6   RBC  3.08*  1.97*   HGB  10.1*  6.7*   HCT  28.7*  19.1*   MCV  93  97   MCH  32.8  34.0*   MCHC  35.2  35.1   RDW  17.1*  17.9*   PLT  336  372       Last Basic Metabolic Panel:  Recent Labs   Lab Test  07/18/18   0808 07/16/18 07/03/18   1750   NA  134   --   127*   POTASSIUM  3.4  3.6  3.2*   CHLORIDE  98   --   90*   DAMARI  8.5   --   7.8*   CO2  30   --   29   BUN  13   --   18   CR  0.86  0.96  1.46*   GLC  109*  57*  80       Liver Function Studies -   Recent Labs   Lab Test  07/03/18   1750  06/09/18   0715   PROTTOTAL  5.7*  4.9*   ALBUMIN  2.9*  2.1*   BILITOTAL  0.6  0.5   ALKPHOS  61  79   AST  23  22   ALT  11  18       Lab Results   Component Value Date    A1C 4.7 04/04/2018     Assessment/Plan:  Chronic left shoulder pain s/p injection Aug 22  Malignant neoplasm of left lung (H)  Mr. Oneil continues to endorse intermittent Left shoulder pain, reports no improvement since the trigger injections a week or so ago.  Etiology of pain unclear, but he seen Ortho and Sports medicine with no clear etiology, also has full use/ROM of his Left shoulder with out pain, thus I do not think it's his shoulder.  Reports he did have some time before his chemo, but unsure how long he's had the lung cancer.  He does have a known mass in the upper Left lobe and pain is deep under his scapula area per his report.  Thus is it the mass is the question, noting it's worse since being off chemotherapy.  Other possible etiology is could he have some cervical compression as pain is slightly radicular in nature, with a h/o prior unknown neck injury per his report.  But no other neuro/focal deficits.    Last week we increased his at bedtime Dilaudid and started at bedtime Gabapentin, which he reports sleeping better at night and some improvement, but  reports other times it's 10/10 and miserable.  He wanted to see if he could see his prior PCP for a 2nd opinion.   Plan:  -Increased Dilaudid from 2-4 mg to 4-6 mg's q3h PRN.   -Increased Gabapentin to BID from at bedtime.   -OK'd him to make a prior PCP appointment if he wants.   -I requested the [facility] facility set up a Onco f/u to have them assess if they feel it's malignancy related.  And to see where they are at with resuming a less severe chemo regimen.       --If no significant results, we may need to consider a CT cervical imaging to assess for cervical etiology.     History of pulmonary embolism  Long-term (current) use of anticoagulants [Z79.01]  Encounter for therapeutic drug monitoring  No bleeding issues, VSS.   -See below for Warfarin orders.     Orders:  1. Please arrange for Oncology f/u with Dr. Horton -Lung Cancer/L shoulder pain  2. DC all prior Hydromorphone, start Hydromorphone 4 mg every 3 hours PRN for pain 1-5, 6 mg for pain 6-10, hold for over sedation Dx: Pain  3. Increase Gabapentin to 300 mgs BID Dx: Left arm Neuropathy  4. Warfarin 7 mg on all Mondays and Thursdays and 6 mg all other days  5. Next INR on 18 Sept    Electronically signed by  NATALIE Olivera CNP

## 2018-09-04 NOTE — LETTER
9/4/2018        RE: Chilo Oneil  [address] [place] [id number]        [facility]    Chief Complaint   Patient presents with     FDC Acute       [facility] Medical Record Number:  [id number]    HPI:    Chilo Oneil is a 68 year old  (1950), who is being seen today for an episodic care visit at [facility].  HPI information obtained from: facility chart records, facility staff and patient report.    Mr. Oneil requested we visit him today due to ongoing Left shoulder pain.  Reports overall the pain is slightly better, since being on new pain meds, but still reports 10/10 pain at times.  Otherwise he reports he is doing well, no other complaints.     ALLERGIES: Cipro [ciprofloxacin]  Past Medical, Surgical, Family and Social History reviewed and updated in [facility].    Current Outpatient Prescriptions   Medication Sig Dispense Refill     ACETAMINOPHEN PO Take 1,000 mg by mouth 3 times daily       AmLODIPine Besylate (NORVASC PO) Take 10 mg by mouth daily       DULoxetine HCl (CYMBALTA PO) Take 60 mg by mouth daily       fentaNYL (DURAGESIC) 12 mcg/hr 72 hr patch Place 1 patch onto the skin every 72 hours remove old patch. 30 patch 0     GABAPENTIN PO Take 300 mg by mouth At Bedtime       HYDROmorphone (DILAUDID) 2 MG tablet Take 1-2 tablets (2-4 mg) by mouth every 3 hours as needed for moderate to severe pain 80 tablet 0     MAGNESIUM OXIDE PO Take 400 mg by mouth 2 times daily        MELATONIN PO Take 3 mg by mouth At Bedtime       Nutritional Supplements (BOOST PO) Take 8 oz by mouth 3 times daily       omeprazole (PRILOSEC) 20 MG CR capsule Take 1 capsule (20 mg) by mouth daily 90 capsule 3     ONDANSETRON PO Take 4 mg by mouth 3 times daily        polyethylene glycol (MIRALAX/GLYCOLAX) Packet Take 17 g by mouth daily       Prochlorperazine Maleate (COMPAZINE PO) Take 5 mg by mouth every 6 hours as needed for nausea       senna-docusate (SENOKOT-S;PERICOLACE) 8.6-50 MG  per tablet Take 2 tablets by mouth 2 times daily 100 tablet 0     THIAMINE HCL PO Take 100 mg by mouth daily       VENTOLIN  (90 Base) MCG/ACT Inhaler Inhale 2 puffs into the lungs every 4 hours as needed for shortness of breath / dyspnea or wheezing 1 Inhaler 1     Warfarin Sodium (COUMADIN PO) Take by mouth daily Take as directed per INR results       Medications reviewed:  Medications reconciled to facility chart and changes were made to reflect current medications as identified as above med list. Below are the changes that were made:   Medications stopped since last EPIC medication reconciliation:   Medications Discontinued During This Encounter   Medication Reason     OLANZapine zydis (ZYPREXA) 5 MG ODT tab Medication Reconciliation Clean Up     LORAZEPAM PO Medication Reconciliation Clean Up     enoxaparin (LOVENOX) 80 MG/0.8ML injection Medication Reconciliation Clean Up       Medications started since last [facility] medication reconciliation:  No orders of the defined types were placed in this encounter.    REVIEW OF SYSTEMS:  7 point ROS done including, light headedness/dizziness, fever/chills, pain, Resp, CV, GI, and  and is negative other than noted in HPI.      Physical Exam:  /68  Pulse 88  Temp 98.2  F (36.8  C)  Resp 18  Wt 147 lb 6.4 oz (66.9 kg)  SpO2 96%  BMI 19.99 kg/m2     GENERAL APPEARANCE:  Elderly chronically ill appearing male sitting up in WC, NAD, non-toxic.    ENT:  Mouth and oropharynx normal, moist mucous membranes.   RESP:  Lungs diminished in Left upper lobe, otherwise CTA.  Regular relaxed breathing effort.  No cough.   CV: S1/S2 no murmur or rubs.  Regular rhythm and rate.  No generalized edema.   ABDOMEN:  Protuberant, soft, non-tender with active bowel sounds.   No guarding, rigidity, or rebound tenderness.  EXTREMITIES:  No lower extremity edema, no calf tenderness.   PSYCH: Alert and orientated, pleasant and cooperative.  Mild degree of cognitive/memory  impairment, appears stable/unchanged.    JOINTS: Full active and passive ROM in Left shoulder, non-tender.     Recent Labs: I have personally reviewed labs, which are in facility chart.     CBC RESULTS:   Recent Labs   Lab Test  07/18/18   0808  07/16/18   1730   WBC  4.8  4.6   RBC  3.08*  1.97*   HGB  10.1*  6.7*   HCT  28.7*  19.1*   MCV  93  97   MCH  32.8  34.0*   MCHC  35.2  35.1   RDW  17.1*  17.9*   PLT  336  372       Last Basic Metabolic Panel:  Recent Labs   Lab Test  07/18/18   0808 07/16/18 07/03/18   1750   NA  134   --   127*   POTASSIUM  3.4  3.6  3.2*   CHLORIDE  98   --   90*   DAMARI  8.5   --   7.8*   CO2  30   --   29   BUN  13   --   18   CR  0.86  0.96  1.46*   GLC  109*  57*  80       Liver Function Studies -   Recent Labs   Lab Test  07/03/18   1750  06/09/18   0715   PROTTOTAL  5.7*  4.9*   ALBUMIN  2.9*  2.1*   BILITOTAL  0.6  0.5   ALKPHOS  61  79   AST  23  22   ALT  11  18       Lab Results   Component Value Date    A1C 4.7 04/04/2018     Assessment/Plan:  Chronic left shoulder pain s/p injection Aug 22  Malignant neoplasm of left lung (H)  Mr. Oneil continues to endorse intermittent Left shoulder pain, reports no improvement since the trigger injections a week or so ago.  Etiology of pain unclear, but he seen Ortho and Sports medicine with no clear etiology, also has full use/ROM of his Left shoulder with out pain, thus I do not think it's his shoulder.  Reports he did have some time before his chemo, but unsure how long he's had the lung cancer.  He does have a known mass in the upper Left lobe and pain is deep under his scapula area per his report.  Thus is it the mass is the question, noting it's worse since being off chemotherapy.  Other possible etiology is could he have some cervical compression as pain is slightly radicular in nature, with a h/o prior unknown neck injury per his report.  But no other neuro/focal deficits.    Last week we increased his at bedtime Dilaudid and  started at bedtime Gabapentin, which he reports sleeping better at night and some improvement, but reports other times it's 10/10 and miserable.  He wanted to see if he could see his prior PCP for a 2nd opinion.   Plan:  -Increased Dilaudid from 2-4 mg to 4-6 mg's q3h PRN.   -Increased Gabapentin to BID from at bedtime.   -OK'd him to make a prior PCP appointment if he wants.   -I requested the [facility] facility set up a Onco f/u to have them assess if they feel it's malignancy related.  And to see where they are at with resuming a less severe chemo regimen.       --If no significant results, we may need to consider a CT cervical imaging to assess for cervical etiology.     History of pulmonary embolism  Long-term (current) use of anticoagulants [Z79.01]  Encounter for therapeutic drug monitoring  No bleeding issues, VSS.   -See below for Warfarin orders.     Orders:  1. Please arrange for Oncology f/u with Dr. Horton -Lung Cancer/L shoulder pain  2. DC all prior Hydromorphone, start Hydromorphone 4 mg every 3 hours PRN for pain 1-5, 6 mg for pain 6-10, hold for over sedation Dx: Pain  3. Increase Gabapentin to 300 mgs BID Dx: Left arm Neuropathy  4. Warfarin 7 mg on all Mondays and Thursdays and 6 mg all other days  5. Next INR on 18 Sept    Electronically signed by  NATALIE Olivera CNP      Sincerely,        NATALIE Olivera CNP

## 2018-09-07 NOTE — PROGRESS NOTES
[facility]    Chief Complaint   Patient presents with     intermediate Regulatory       [facility] Medical Record Number:  [id number]    HPI:    Chilo Oneil is a 68 year old  (1950), who is being seen today for a federally mandated E/M visit at [facility].  HPI information obtained from: facility chart records, facility staff and patient report.     Met with Mr. Oneil today for his regulatory visit.  Mr. Oneil continues to endorse intermittent Left shoulder pain.  Continues to endorse it fluctuates from mild to severe, mostly located under the Left scapula area, but occasionally does radiate down the Left arm to the elbow.  Continues to have no pain with passive/active ROM or use of Left shoulder.  Reports the increased pain meds are helping, but still get's 10/10 at times, usually worse at night.  Besides the ongoing pain, he reports he's been doing well.  Reports the mood as good, does endorse occasional anxiety, but much better since being here with us at the [facility] facility.  Reports good appetite, and although you usually find him getting around more in a WC, he has been ambulating some.  He has no other complaints, besides the Left shoulder pain.     ALLERGIES: Cipro [ciprofloxacin]  PAST MEDICAL HISTORY:  has a past medical history of GERD (gastroesophageal reflux disease); HTN (hypertension); Lung cancer (H); and MI (myocardial infarction). He also has no past medical history of Complication of anesthesia; Difficult intubation; Malignant hyperthermia; or PONV (postoperative nausea and vomiting).  PAST SURGICAL HISTORY:  has a past surgical history that includes fracture tx, ankle rt/lt (1975); Open reduction internal fixation hip nailing (9/20/2013); Thoracoscopic biopsy lung (Left, 4/11/2018); vascular surgery; and Insert port vascular access (Left, 5/15/2018).  FAMILY HISTORY: family history includes Diabetes in his brother and father.  SOCIAL HISTORY:  reports that he has been  smoking Cigarettes.  He started smoking about 50 years ago. He has a 7.05 pack-year smoking history. He has never used smokeless tobacco. He reports that he drinks alcohol. He reports that he uses illicit drugs, including Marijuana.    MEDICATIONS:  Current Outpatient Prescriptions   Medication Sig Dispense Refill     ACETAMINOPHEN PO Take 1,000 mg by mouth 3 times daily       AmLODIPine Besylate (NORVASC PO) Take 10 mg by mouth daily       DULoxetine HCl (CYMBALTA PO) Take 60 mg by mouth daily       fentaNYL (DURAGESIC) 12 mcg/hr 72 hr patch Place 1 patch onto the skin every 72 hours remove old patch. 30 patch 0     GABAPENTIN PO Take 300 mg by mouth 2 times daily        HYDROmorphone (DILAUDID) 2 MG tablet Take 1-2 tablets (2-4 mg) by mouth every 3 hours as needed for moderate to severe pain 80 tablet 0     MAGNESIUM OXIDE PO Take 400 mg by mouth 2 times daily        MELATONIN PO Take 3 mg by mouth At Bedtime       Nutritional Supplements (BOOST PO) Take 8 oz by mouth 3 times daily       omeprazole (PRILOSEC) 20 MG CR capsule Take 1 capsule (20 mg) by mouth daily 90 capsule 3     ONDANSETRON PO Take 4 mg by mouth 3 times daily        polyethylene glycol (MIRALAX/GLYCOLAX) Packet Take 17 g by mouth daily       Prochlorperazine Maleate (COMPAZINE PO) Take 5 mg by mouth every 6 hours as needed for nausea       senna-docusate (SENOKOT-S;PERICOLACE) 8.6-50 MG per tablet Take 2 tablets by mouth 2 times daily 100 tablet 0     THIAMINE HCL PO Take 100 mg by mouth daily       VENTOLIN  (90 Base) MCG/ACT Inhaler Inhale 2 puffs into the lungs every 4 hours as needed for shortness of breath / dyspnea or wheezing 1 Inhaler 1     Warfarin Sodium (COUMADIN PO) Take by mouth daily Take as directed per INR results       Medications reviewed:  Medications reconciled to facility chart and changes were made to reflect current medications as identified as above med list. Below are the changes that were made:   Medications  stopped since last EPIC medication reconciliation:   There are no discontinued medications.    Medications started since last [facility] medication reconciliation:  No orders of the defined types were placed in this encounter.    Case Management:  I have reviewed the care plan and MDS and do agree with the plan. Patient's desire to return to the community is not present.  Information reviewed:  Medications, vital signs, orders, and nursing notes.    ROS:  7 point ROS done including, light headedness/dizziness, fever/chills, pain, Resp, CV, GI, and  and is negative other than noted in HPI.      Exam:  Vitals: /68  Pulse 88  Temp 98.2  F (36.8  C)  Resp 18  Wt 147 lb 6.4 oz (66.9 kg)  SpO2 96%  BMI 19.99 kg/m2  BMI= Body mass index is 19.99 kg/(m^2).     GENERAL APPEARANCE:  Elderly chronically ill appearing male sitting up in WC, NAD, non-toxic.    ENT:  Mouth and oropharynx normal, moist mucous membranes.   RESP:  Lungs diminished in Left upper lobe, otherwise CTA.  Regular relaxed breathing effort.  No cough.   CV: S1/S2 no murmur or rubs.  Regular rhythm and rate.  No generalized edema.   ABDOMEN:  Protuberant, soft, non-tender with active bowel sounds.   No guarding, rigidity, or rebound tenderness.  EXTREMITIES:  No lower extremity edema, no calf tenderness.   PSYCH: Alert and orientated, pleasant and cooperative.  Mild degree of cognitive/memory impairment, appears stable/unchanged.    JOINTS: Full active and passive ROM in Left shoulder, non-tender.     Lab/Diagnostic data: None recent.     ASSESSMENT/PLAN  Left shoulder pain, unspecified chronicity and etiology  Chronic left shoulder pain s/p injection Aug 22  Malignant neoplasm of left lung (H)  As noted before, there is low suspicion that shoulder is actually the etiology.  Has been seen by Ortho [facility] Sport med recently, empiric trigger point injection did not help per his report.  The increased Dilaudid and starting Gabapentin did.      Etiology is  unclear.  He is starting to note some mild radiculopathy nature where at times pain will radiate down the Left arm to the shoulder, but pain is usually just deep under Left scapula.  He has a known malignancy in that area, although the mass is not huge, we query with the stopping of chemo if that could be the cause.  He also reports a distant neck/head injury, we have no imaging, and he has no neck pain, but query if it could be cervical in nature.     He has f/u with [facility] at the end of the month, we noted they ordered a Chest CT.  I did send a note off to Dr. Mccauley to see if they would be willing to add a cervical CT to the chest one to help evaluate etiology of this Left shoulder pain.     Otherwise:  -We continue on newly started Gabapentin.   -We continue on newly increased Dilaudid.   -Still on Fentanyl patch, Cymbalta and Acetaminophen.    History of pulmonary embolism  Continues on Warfarin.  No respiratory complaints.     Essential hypertension  Coronary artery disease involving native heart without angina pectoris, unspecified vessel or lesion type  Review of VS's show VSS and within acceptable range.   No current s/s of clinical CHF.   -No changes, continue to clinically monitor.     CKD (chronic kidney disease) stage 3, GFR 30-59 ml/min  Will get up dated creatine.     Pancytopenia (H)  Has been improving since being off chemo.   Will get updated cell counts/CBC.     ADA (generalized anxiety disorder)  Reports his depression as gone, reports his mood as good.  Does endorse occasional anxiety periods, reports the worsening pain as not helping, but does note improvement with the Ativan.    -Will continue PRN Ativan for now, new stop date of Oct 31st.     Debility  Still spends most of his time and mobility in a WC, but reports he has been walking a bit more now.      Orders:  1. Change Zofran to 4 mg every 8 hours PRN for nausea. DC the scheduled dose.  2. Get BMP, Magnesium on 18 September Dx: CKD  3.  Get CBC no diff on sept 18 Dx: Pancytopenia    Electronically signed by:  NATALIE Olivera CNP

## 2018-09-07 NOTE — LETTER
9/7/2018        RE: Chilo Oneil  [address] [facility] [address]          [facility]    Chief Complaint   Patient presents with     FDC Regulatory       [facility] Record Number:  [id number]    HPI:    Chilo Oneil is a 68 year old  (1950), who is being seen today for a federally mandated E/M visit at [facility].  HPI information obtained from: facility chart records, facility staff and patient report.     Met with Mr. Oneil today for his regulatory visit.  Mr. Oneil continues to endorse intermittent Left shoulder pain.  Continues to endorse it fluctuates from mild to severe, mostly located under the Left scapula area, but occasionally does radiate down the Left arm to the elbow.  Continues to have no pain with passive/active ROM or use of Left shoulder.  Reports the increased pain meds are helping, but still get's 10/10 at times, usually worse at night.  Besides the ongoing pain, he reports he's been doing well.  Reports the mood as good, does endorse occasional anxiety, but much better since being here with us at the [facility] facility.  Reports good appetite, and although you usually find him getting around more in a WC, he has been ambulating some.  He has no other complaints, besides the Left shoulder pain.     ALLERGIES: Cipro [ciprofloxacin]  PAST MEDICAL HISTORY:  has a past medical history of GERD (gastroesophageal reflux disease); HTN (hypertension); Lung cancer (H); and MI (myocardial infarction). He also has no past medical history of Complication of anesthesia; Difficult intubation; Malignant hyperthermia; or PONV (postoperative nausea and vomiting).  PAST SURGICAL HISTORY:  has a past surgical history that includes fracture tx, ankle rt/lt (1975); Open reduction internal fixation hip nailing (9/20/2013); Thoracoscopic biopsy lung (Left, 4/11/2018); vascular surgery; and Insert port vascular access (Left, 5/15/2018).  FAMILY HISTORY: family history  includes Diabetes in his brother and father.  SOCIAL HISTORY:  reports that he has been smoking Cigarettes.  He started smoking about 50 years ago. He has a 7.05 pack-year smoking history. He has never used smokeless tobacco. He reports that he drinks alcohol. He reports that he uses illicit drugs, including Marijuana.    MEDICATIONS:  Current Outpatient Prescriptions   Medication Sig Dispense Refill     ACETAMINOPHEN PO Take 1,000 mg by mouth 3 times daily       AmLODIPine Besylate (NORVASC PO) Take 10 mg by mouth daily       DULoxetine HCl (CYMBALTA PO) Take 60 mg by mouth daily       fentaNYL (DURAGESIC) 12 mcg/hr 72 hr patch Place 1 patch onto the skin every 72 hours remove old patch. 30 patch 0     GABAPENTIN PO Take 300 mg by mouth 2 times daily        HYDROmorphone (DILAUDID) 2 MG tablet Take 1-2 tablets (2-4 mg) by mouth every 3 hours as needed for moderate to severe pain 80 tablet 0     MAGNESIUM OXIDE PO Take 400 mg by mouth 2 times daily        MELATONIN PO Take 3 mg by mouth At Bedtime       Nutritional Supplements (BOOST PO) Take 8 oz by mouth 3 times daily       omeprazole (PRILOSEC) 20 MG CR capsule Take 1 capsule (20 mg) by mouth daily 90 capsule 3     ONDANSETRON PO Take 4 mg by mouth 3 times daily        polyethylene glycol (MIRALAX/GLYCOLAX) Packet Take 17 g by mouth daily       Prochlorperazine Maleate (COMPAZINE PO) Take 5 mg by mouth every 6 hours as needed for nausea       senna-docusate (SENOKOT-S;PERICOLACE) 8.6-50 MG per tablet Take 2 tablets by mouth 2 times daily 100 tablet 0     THIAMINE HCL PO Take 100 mg by mouth daily       VENTOLIN  (90 Base) MCG/ACT Inhaler Inhale 2 puffs into the lungs every 4 hours as needed for shortness of breath / dyspnea or wheezing 1 Inhaler 1     Warfarin Sodium (COUMADIN PO) Take by mouth daily Take as directed per INR results       Medications reviewed:  Medications reconciled to facility chart and changes were made to reflect current medications as  identified as above med list. Below are the changes that were made:   Medications stopped since last EPIC medication reconciliation:   There are no discontinued medications.    Medications started since last [facility] medication reconciliation:  No orders of the defined types were placed in this encounter.    Case Management:  I have reviewed the care plan and MDS and do agree with the plan. Patient's desire to return to the community is not present.  Information reviewed:  Medications, vital signs, orders, and nursing notes.    ROS:  7 point ROS done including, light headedness/dizziness, fever/chills, pain, Resp, CV, GI, and  and is negative other than noted in HPI.      Exam:  Vitals: /68  Pulse 88  Temp 98.2  F (36.8  C)  Resp 18  Wt 147 lb 6.4 oz (66.9 kg)  SpO2 96%  BMI 19.99 kg/m2  BMI= Body mass index is 19.99 kg/(m^2).     GENERAL APPEARANCE:  Elderly chronically ill appearing male sitting up in WC, NAD, non-toxic.    ENT:  Mouth and oropharynx normal, moist mucous membranes.   RESP:  Lungs diminished in Left upper lobe, otherwise CTA.  Regular relaxed breathing effort.  No cough.   CV: S1/S2 no murmur or rubs.  Regular rhythm and rate.  No generalized edema.   ABDOMEN:  Protuberant, soft, non-tender with active bowel sounds.   No guarding, rigidity, or rebound tenderness.  EXTREMITIES:  No lower extremity edema, no calf tenderness.   PSYCH: Alert and orientated, pleasant and cooperative.  Mild degree of cognitive/memory impairment, appears stable/unchanged.    JOINTS: Full active and passive ROM in Left shoulder, non-tender.     Lab/Diagnostic data: None recent.     ASSESSMENT/PLAN  Left shoulder pain, unspecified chronicity and etiology  Chronic left shoulder pain s/p injection Aug 22  Malignant neoplasm of left lung (H)  As noted before, there is low suspicion that shoulder is actually the etiology.  Has been seen by [facility] recently, empiric trigger point injection did not help per  his report.  The increased Dilaudid and starting Gabapentin did.      Etiology is unclear.  He is starting to note some mild radiculopathy nature where at times pain will radiate down the Left arm to the shoulder, but pain is usually just deep under Left scapula.  He has a known malignancy in that area, although the mass is not huge, we query with the stopping of chemo if that could be the cause.  He also reports a distant neck/head injury, we have no imaging, and he has no neck pain, but query if it could be cervical in nature.     He has f/u with [facility] at the end of the month, we noted they ordered a Chest CT.  I did send a note off to Dr. Mccauley to see if they would be willing to add a cervical CT to the chest one to help evaluate etiology of this Left shoulder pain.     Otherwise:  -We continue on newly started Gabapentin.   -We continue on newly increased Dilaudid.   -Still on Fentanyl patch, Cymbalta and Acetaminophen.    History of pulmonary embolism  Continues on Warfarin.  No respiratory complaints.     Essential hypertension  Coronary artery disease involving native heart without angina pectoris, unspecified vessel or lesion type  Review of VS's show VSS and within acceptable range.   No current s/s of clinical CHF.   -No changes, continue to clinically monitor.     CKD (chronic kidney disease) stage 3, GFR 30-59 ml/min  Will get up dated creatine.     Pancytopenia (H)  Has been improving since being off chemo.   Will get updated cell counts/CBC.     ADA (generalized anxiety disorder)  Reports his depression as gone, reports his mood as good.  Does endorse occasional anxiety periods, reports the worsening pain as not helping, but does note improvement with the Ativan.    -Will continue PRN Ativan for now, new stop date of Oct 31st.     Debility  Still spends most of his time and mobility in a WC, but reports he has been walking a bit more now.      Orders:  1. Change Zofran to 4 mg every 8 hours PRN for  nausea. DC the scheduled dose.  2. Get BMP, Magnesium on 18 September Dx: CKD  3. Get CBC no diff on sept 18 Dx: Pancytopenia    Electronically signed by:  NATALIE Olivera CNP        Sincerely,        NATALIE Olivera CNP

## 2018-09-18 NOTE — LETTER
9/18/2018        RE: Chilo Oneil  [address] [place] [id number]        [facility]  HPI:    Chilo Oneil is a 68 year old  (1950), who is being seen today for an episodic care visit at [facility].  HPI information obtained from: facility chart records, facility staff, patient report and [facility] chart review. Today's concern is INR/Coumadin management for A. Fib    Bleeding Signs/Symptoms:  None  Thromboembolic Signs/Symptoms:  None    Medication Changes:  No  Dietary Changes:  No  Activity Changes: No  Bacterial/Viral Infection:  No    Missed Coumadin Doses:  None    On ASA: No    Other Concerns:  No    OBJECTIVE:    INR Today:  2.76  Current Dose:  Coumadin 7 mg PO  Mon/Thur and give 6 mg PO all other days.    ASSESSMENT:  History of Pulmonary Embolism  Long-term (current) use of anticoagulants [Z79.01]  Encounter for therapeutic drug monitoring  Chronic. Stable. Therapeutic. Will continue dosing as noted below and follow-up accordingly.    Therapeutic INR for goal of 2-3    PLAN:    New Dose: Coumadin 7 mg PO  Mon/Thur and give 6 mg PO all other days    Next INR: 10/3/18    Electronically signed by:  Dr. Lashanda Suárez, APRN CNP DNP                Sincerely,        Lashanda Suárez, NP

## 2018-09-18 NOTE — PROGRESS NOTES
[facility]  HPI:    Chilo Oneil is a 68 year old  (1950), who is being seen today for an episodic care visit at [facility].  HPI information obtained from: facility chart records, facility staff, patient report and [facility] chart review. Today's concern is INR/Coumadin management for A. Fib    Bleeding Signs/Symptoms:  None  Thromboembolic Signs/Symptoms:  None    Medication Changes:  No  Dietary Changes:  No  Activity Changes: No  Bacterial/Viral Infection:  No    Missed Coumadin Doses:  None    On ASA: No    Other Concerns:  No    OBJECTIVE:    INR Today:  2.76  Current Dose:  Coumadin 7 mg PO  Mon/Thur and give 6 mg PO all other days.    ASSESSMENT:  History of Pulmonary Embolism  Long-term (current) use of anticoagulants [Z79.01]  Encounter for therapeutic drug monitoring  Chronic. Stable. Therapeutic. Will continue dosing as noted below and follow-up accordingly.    Therapeutic INR for goal of 2-3    PLAN:    New Dose: Coumadin 7 mg PO  Mon/Thur and give 6 mg PO all other days    Next INR: 10/3/18    Electronically signed by:  Dr. Lashanda Suárez, APRN CNP DNP

## 2018-09-26 NOTE — MR AVS SNAPSHOT
After Visit Summary   9/26/2018    Chilo Oneil    MRN: [id number]           Patient Information     Date Of Birth          1950        Visit Information        Provider Department      9/26/2018 9:00 AM ROOM 6 [facility]        Today's Diagnoses     Carcinoma, lung, left (H)           Follow-ups after your visit        Your next 10 appointments already scheduled     Sep 26, 2018 10:15 AM CDT   (Arrive by 9:45 AM)   CT CHEST W CONTRAST with [facility]CT1   [facility] CT ([facility])    [address] [address]   [phone]           How do I prepare for my exam? (Food and drink instructions) **You will have contrast for this exam.** Do not eat or drink for 2 hours before your exam. If you need to take medicine, you may take it with small sips of water. (We may ask you to take liquid medicine as well.)  The day before your exam, drink extra fluids at least six 8-ounce glasses (unless your doctor tells you to restrict your fluids).  How do I prepare for my exam? (Other instructions) Patients over 70 or patients with diabetes or kidney problems: If you haven t had a blood test (creatinine test) within the last 30 days, the Cardiologist/Radiologist may require you to get this test prior to your exam.  What should I wear: Please wear loose clothing, such as a sweat suit or jogging clothes.  Avoid snaps, zippers and other metal. We may ask you to undress and put on a hospital gown.  How long does the exam take: Most scans take less than 20 minutes.  What should I bring: Please bring any scans or X-rays taken at other hospitals, if similar tests were done. Also bring a list of your medicines, including vitamins, minerals and over-the-counter drugs. It is safest to leave personal items at home.  Do I need a :  No  is needed.  What do I need to tell my doctor? Be sure to tell your doctor: * If you have any allergies. * If there s any chance  you are pregnant. * If you are breastfeeding. * If you have diabetes as your medication may need to be adjusted for this exam.  What should I do after the exam: No restrictions, You may resume normal activities.  What is this test: A CT (computed tomography) scan is a series of pictures that allows us to look inside your body. The scanner creates images of the body in cross sections, much like slices of bread. This helps us see any problems more clearly. You may receive contrast (X-ray dye) before or during your scan. Contrast is given through an IV (small needle in your arm).  Who should I call with questions: If you have any questions, please call the Imaging Department where you will have your exam. Directions, parking instructions, and other information is available on our website, "[employer]imaging.            Oct 03, 2018  1:30 PM CDT   Return Visit with Mor Mccauley MD   [facility] ([facility])    [facility]  [address]  [place] [address] [phone]              Who to contact     If you have questions or need follow up information about today's clinic visit or your schedule please contact [facility] directly at [phone].  Normal or non-critical lab and imaging results will be communicated to you by MyChart, letter or phone within 4 business days after the clinic has received the results. If you do not hear from us within 7 days, please contact the clinic through MyChart or phone. If you have a critical or abnormal lab result, we will notify you by phone as soon as possible.  Submit refill requests through "[employer] or call your pharmacy and they will forward the refill request to us. Please allow 3 business days for your refill to be completed.          Additional Information About Your Visit        Care EveryWhere ID     This is your Care EveryWhere ID. This could be used by other organizations to access your [facility]  medical records  [id number]         Blood Pressure from Last 3 Encounters:   09/18/18 142/68   09/07/18 142/68   09/04/18 142/68    Weight from Last 3 Encounters:   09/18/18 69.9 kg (154 lb 3.2 oz)   09/07/18 66.9 kg (147 lb 6.4 oz)   09/04/18 66.9 kg (147 lb 6.4 oz)              We Performed the Following     CBC with platelets differential     Comprehensive metabolic panel        Primary Care Provider Office Phone # Fax #    Asad Astudillo, NATALIE [phone]       [address]  [place] [address]        Equal Access to Services     [place] : Hadii krysta Vidales, watylerda jax, qaybta kaalmada alma, celina hewitt . So [facility] [phone].    ATENCIÓN: Si habla español, tiene a galaviz disposición servicios gratuitos de asistencia lingüística. Llame al [phone].    We comply with applicable federal civil rights laws and [place] laws. We do not discriminate on the basis of race, color, national origin, age, disability, sex, sexual orientation, or gender identity.            Thank you!     Thank you for choosing [facility]  for your care. Our goal is always to provide you with excellent care. Hearing back from our patients is one way we can continue to improve our services. Please take a few minutes to complete the written survey that you may receive in the mail after your visit with us. Thank you!             Your Updated Medication List - Protect others around you: Learn how to safely use, store and throw away your medicines at www.disposemymeds.org.          This list is accurate as of 9/26/18  9:56 AM.  Always use your most recent med list.                   Brand Name Dispense Instructions for use Diagnosis    ACETAMINOPHEN PO      Take 1,000 mg by mouth 3 times daily        BOOST PO      Take 8 oz by mouth 3 times daily        COMPAZINE PO      Take 5 mg by mouth every 6 hours as needed for nausea        COUMADIN PO      Take  by mouth daily Take as directed per INR results        CYMBALTA PO      Take 60 mg by mouth daily        fentaNYL 12 mcg/hr 72 hr patch    DURAGESIC    30 patch    Place 1 patch onto the skin every 72 hours remove old patch.    Other acute pulmonary embolism without acute cor pulmonale (H)       GABAPENTIN PO      Take 300 mg by mouth 2 times daily        HYDROmorphone 2 MG tablet    DILAUDID    80 tablet    Take 1-2 tablets (2-4 mg) by mouth every 3 hours as needed for moderate to severe pain    Other acute pulmonary embolism without acute cor pulmonale (H), Carcinoma, lung, left (H)       MAGNESIUM OXIDE PO      Take 400 mg by mouth 2 times daily        MELATONIN PO      Take 3 mg by mouth At Bedtime        NORVASC PO      Take 10 mg by mouth daily        omeprazole 20 MG CR capsule    priLOSEC    90 capsule    Take 1 capsule (20 mg) by mouth daily    Carcinoma, lung, left (H)       ONDANSETRON PO      Take 4 mg by mouth 3 times daily        polyethylene glycol Packet    MIRALAX/GLYCOLAX     Take 17 g by mouth daily        senna-docusate 8.6-50 MG per tablet    SENOKOT-S;PERICOLACE    100 tablet    Take 2 tablets by mouth 2 times daily    Carcinoma, lung, left (H)       THIAMINE HCL PO      Take 100 mg by mouth daily        VENTOLIN  (90 Base) MCG/ACT inhaler   Generic drug:  albuterol     1 Inhaler    Inhale 2 puffs into the lungs every 4 hours as needed for shortness of breath / dyspnea or wheezing

## 2018-09-28 NOTE — TELEPHONE ENCOUNTER
Prior Authorization Retail Medication Request    Medication/Dose: Hydromorphone 2 mg Tab take 2 tab every 3 hours as needed for pain 1-5 and 3 tablets every 3 hours as needed for pain 6-10  ICD code (if different than what is on RX):    Previously Tried and Failed:    Rationale:      Insurance Name:  Osmani HAMM  Insurance ID:  [id number]  Group#: [id number]  Phone: [phone]    Pharmacy Information (if different than what is on RX)  Name:  [facility]  Phone:  [phone]

## 2018-09-30 NOTE — PROGRESS NOTES
"  [facility]    Chief Complaint   Patient presents with     [facility] Regulatory       [place] Medical Record Number:  [id number]    HPI:    Chilo Oneil is a 68 year old  (1950), who is being seen today for a federally mandated E/M visit at [facility].  HPI information obtained from: facility chart records, facility staff, patient report and [facility] chart review.     Today's concerns are:  -  - Resident seen and examined.   - Reports no concern today.   - Denies chest pain, breathing issue.   - Reports sleep, appetite and BM are fine.   - RN has no concern today.   - GNP reports was overly sick with neutropenia due to chemo. Neutropenia and sickness resolved with holding chemo, has been looking good. Complains of a Left shoulder pain (reports deep under Left scapula area) That is where he has a known Left lung mass, updated imaging shows smaller though. Seen by Ortho and Sports med whom reported likely not his shoulder, as I note he has full ROM with no pain. He did get a Left posterior shoulder trigger point injection with no relief/help per his report. He is to be seen by Onco soon, I asked them to get a Cervical CT when they get a repeat chest CT to see if there is any neuropathic/radiculopath etiology to this pain. Likes the \"Dilaudid\". Thus I have declined increasing at this time. Etiology remains unclear.     --------------------------------  - - Past Medical, social, family histories, medications, and allergies reviewed and updated  - Medications reviewed: in the chart and EHR.   - Case Management:   I have reviewed the care plan and MDS and do agree with the plan. Patient's desire to return to the community is not present.  Information reviewed:  Medications, vital signs, orders, and nursing notes.    MEDICATIONS:  Current Outpatient Prescriptions   Medication Sig Dispense Refill     ACETAMINOPHEN PO Take 1,000 mg by mouth 3 times daily       AmLODIPine Besylate " (NORVASC PO) Take 10 mg by mouth daily       DULoxetine HCl (CYMBALTA PO) Take 60 mg by mouth daily       fentaNYL (DURAGESIC) 12 mcg/hr 72 hr patch Place 1 patch onto the skin every 72 hours remove old patch. 30 patch 0     GABAPENTIN PO Take 300 mg by mouth 2 times daily        HYDROmorphone (DILAUDID) 2 MG tablet Take 1-2 tablets (2-4 mg) by mouth every 3 hours as needed for moderate to severe pain 80 tablet 0     LORAZEPAM PO Take 0.5 mg by mouth every 3 hours as needed for anxiety       MAGNESIUM OXIDE PO Take 400 mg by mouth 2 times daily        MELATONIN PO Take 3 mg by mouth At Bedtime       Nutritional Supplements (BOOST PO) Take 8 oz by mouth 3 times daily       omeprazole (PRILOSEC) 20 MG CR capsule Take 1 capsule (20 mg) by mouth daily 90 capsule 3     ONDANSETRON PO Take 4 mg by mouth 3 times daily        polyethylene glycol (MIRALAX/GLYCOLAX) Packet Take 17 g by mouth daily       senna-docusate (SENOKOT-S;PERICOLACE) 8.6-50 MG per tablet Take 2 tablets by mouth 2 times daily 100 tablet 0     THIAMINE HCL PO Take 100 mg by mouth daily       VENTOLIN  (90 Base) MCG/ACT Inhaler Inhale 2 puffs into the lungs every 4 hours as needed for shortness of breath / dyspnea or wheezing 1 Inhaler 1     Warfarin Sodium (COUMADIN PO) Take by mouth daily Take as directed per INR results       Medications reviewed:  Medications stopped since last EPIC medication reconciliation:     Prochlorperazine Maleate (COMPAZINE PO) Medication Reconciliation Clean Up   Medications started since last [facility] medication reconciliation:    LORAZEPAM PO     Sig: Take 0.5 mg by mouth every 3 hours as needed for anxiety     ROS:  4 point ROS including Respiratory, CV, GI and , other than that noted in the HPI,  is negative    Exam:  Vitals: /61  Pulse 79  Temp 97.6  F (36.4  C)  Resp 18  Wt 159 lb 9.6 oz (72.4 kg)  SpO2 93%  BMI 21.65 kg/m2  BMI= Body mass index is 21.65 kg/(m^2).  GENERAL APPEARANCE:  Alert, in no  distress  RESP:  respiratory effort and palpation of chest normal, lungs clear to auscultation , no respiratory distress  CV:  Palpation and auscultation of heart done , regular rate and rhythm, no murmur, rub, or gallop, ABDOMEN:  normal bowel sounds, soft, nontender, no hepatosplenomegaly or other masses  M/S:   Gait and station abnormal uses walker.   SKIN:  Inspection of skin and subcutaneous tissue baseline, Palpation of skin and subcutaneous tissue baseline  NEURO:   No NFD appreciated on observation.   PSYCH:  normal insight, judgement and memory, affect and mood normal  Lab/Diagnostic data:     CBC RESULTS:   Recent Labs   Lab Test  09/26/18   0900 07/18/18   0808   WBC  8.3  4.8   RBC  3.02*  3.08*   HGB  10.3*  10.1*   HCT  30.4*  28.7*   MCV  101*  93   MCH  34.1*  32.8   MCHC  33.9  35.2   RDW  13.2  17.1*   PLT  350  336       Last Basic Metabolic Panel:  Recent Labs   Lab Test  09/26/18   0900 07/18/18   0808   NA  134  134   POTASSIUM  4.4  3.4   CHLORIDE  99  98   DAMARI  10.0  8.5   CO2  29  30   BUN  22  13   CR  0.78  0.86   GLC  114*  109*       Liver Function Studies -   Recent Labs   Lab Test  09/26/18   0900  07/03/18   1750   PROTTOTAL  7.3  5.7*   ALBUMIN  3.7  2.9*   BILITOTAL  0.3  0.6   ALKPHOS  82  61   AST  17  23   ALT  12  11         Lab Results   Component Value Date    A1C 4.7 04/04/2018     Procedure Component Value Ref Range Date/Time   CT Chest w Contrast [[id number]] Resulted: 09/26/18 1125   Order Status: Completed Updated: 09/26/18 1126   Narrative:     CT CHEST WITH CONTRAST  9/26/2018 10:28 AM    HISTORY: Follow-up carcinoma left lung.     COMPARISON: A CT on 6/5/2018. At that time there was a right pulmonary  embolism and a left lung mass.    TECHNIQUE: Routine transverse CT imaging of the chest was performed  following the uneventful intravenous administration of 80 mL  Isovue-370 contrast. Radiation dose for this scan was reduced using  automated exposure control,  adjustment of the mA and/or kV according  to patient size, or iterative reconstruction technique.    FINDINGS: The heart size is normal. Again seen are several non enlarged  mediastinal lymph nodes. No enlarged lymph node or other abnormal  mediastinal mass is seen. There is calcification of the thoracic aorta  and coronary arteries. The vascular structures are otherwise  unremarkable. Note that timing of the contrast did not optimize the  pulmonary arteries and therefore evaluation of an embolism is  significantly limited. However, there is no evidence of an embolism.  There has been decrease in size of a previously seen mass in the  anterior aspect of the mid left lung. This currently measures 1.4 x  1.1 cm compared to the previous 2.3 x 1.3 cm. Again seen is an area of  presumed scarring in the left lung apex. Mild scarring or atelectasis  along the superior extent of the left lower lobe is unchanged. A few  small nodules are again noted, the largest in the right lower lobe  measuring 0.5 cm on series 5 image 35. The lungs are otherwise clear  with no new or enlarging abnormality. No pneumothorax is demonstrated.  No pleural effusion is identified. There are degenerative changes in  the spine. Again seen is an anterior left chest wall port. There are a  few scattered non enlarged lymph nodes within the axilla bilaterally.  No other chest wall abnormality is demonstrated. The visualized upper  abdomen is unremarkable.     Impression:     IMPRESSION:   1. Decrease in size of what is now a 1.4 cm nodule or mass in the  anterior left mid lung.  2. A previously seen right pulmonary embolism is no longer  demonstrated. Note that the current study is not optimized to evaluate  the pulmonary arteries.  3. No other change is demonstrated.    ADILIA ALSTON MD       ASSESSMENT/PLAN  Carcinoma, lung, left (H)  - moderately differentiated adenocarcinoma, invasive adenocarcinoma with parietal pleural invasion; pathologic  staging of pT3N0.    - CTx on hold given AE, now doing better. Followed by Oncologist Dr. Bell. Neutropenia resolved.   - CT on 9/26 showed Decrease in size of what is now a 1.4 cm nodule or mass in the  anterior left mid lung.  - interscapular pain could be 2/2 to cancer.      Other pulmonary embolism without acute cor pulmonale, unspecified chronicity (H)  -  on coumadin with INR followed closely. High risk for recurrent VTE. Defer To Dr. Bell OAC any further recommendations.   - PE resolved on above CT.      Single current episode of major depressive disorder, unspecified depression episode severity  Anxiety  - doing fine, continue Cymbalta.  - on Lorazepam prn, attempt to reduce to stop.   - zyprexa stopped.      Coronary artery disease involving native heart without angina pectoris, unspecified vessel or lesion type  Essential hypertension  - started on amlodipine. In general reading is acceptable as long as Pt is asymptomatic. Limited life expectancy no mortality benefit in over treating.     Anemia in other chronic diseases classified elsewhere  - macrocytosis. - HH stable. On nutritional supplement.   - no B12 or folic acid level.        Orders:  - See above, otherwise, continue the rest of the current POC.     Total time spent with patient visit at the skilled nursing facility was 35 min including patient visit, review of past records and discussing with NP. Greater than 50% of total time spent with counseling and coordinating care due to above multiple comorbidities.     Electronically signed by:  Chavo Ellis MD

## 2018-10-01 NOTE — TELEPHONE ENCOUNTER
Prior Authorization Approval    Authorization Effective Date: 1/1/2018  Authorization Expiration Date: 12/31/2019  Medication: Hydromorphone 2 mg Tab take 2 tab every 3 hours as needed for pain 1-5 and 3 tablets every 3 hours as needed for pain 6-10 approved   Approved Dose/Quantity:   Reference #:     Insurance Company: [employer] - Phone [phone] Fax [phone]  Expected CoPay:       CoPay Card Available:      Foundation Assistance Needed:    Which Pharmacy is filling the prescription (Not needed for infusion/clinic administered): [facility], [place], [place] - [address] [place] [address]  Pharmacy Notified: Yes  Patient Notified: Yes

## 2018-10-01 NOTE — LETTER
"    10/1/2018        RE: Chilo Oneil  [address] [facility] [address]          [facility]    Chief Complaint   Patient presents with     [facility] Regulatory       [place] Medical Record Number:  [id number]    HPI:    Chilo Oneil is a 68 year old  (1950), who is being seen today for a federally mandated E/M visit at [facility].  HPI information obtained from: facility chart records, facility staff, patient report and [facility] chart review.     Today's concerns are:  -  - Resident seen and examined.   - Reports no concern today.   - Denies chest pain, breathing issue.   - Reports sleep, appetite and BM are fine.   - RN has no concern today.   - GNP reports was overly sick with neutropenia due to chemo. Neutropenia and sickness resolved with holding chemo, has been looking good. Complains of a Left shoulder pain (reports deep under Left scapula area) That is where he has a known Left lung mass, updated imaging shows smaller though. Seen by Ortho and Sports med whom reported likely not his shoulder, as I note he has full ROM with no pain. He did get a Left posterior shoulder trigger point injection with no relief/help per his report. He is to be seen by [facility] soon, I asked them to get a Cervical CT when they get a repeat chest CT to see if there is any neuropathic/radiculopath etiology to this pain. Likes the \"Dilaudid\". Thus I have declined increasing at this time. Etiology remains unclear.     --------------------------------  - - Past Medical, social, family histories, medications, and allergies reviewed and updated  - Medications reviewed: in the chart and EHR.   - Case Management:   I have reviewed the care plan and MDS and do agree with the plan. Patient's desire to return to the community is not present.  Information reviewed:  Medications, vital signs, orders, and nursing notes.    MEDICATIONS:  Current Outpatient Prescriptions   Medication Sig Dispense Refill     " ACETAMINOPHEN PO Take 1,000 mg by mouth 3 times daily       AmLODIPine Besylate (NORVASC PO) Take 10 mg by mouth daily       DULoxetine HCl (CYMBALTA PO) Take 60 mg by mouth daily       fentaNYL (DURAGESIC) 12 mcg/hr 72 hr patch Place 1 patch onto the skin every 72 hours remove old patch. 30 patch 0     GABAPENTIN PO Take 300 mg by mouth 2 times daily        HYDROmorphone (DILAUDID) 2 MG tablet Take 1-2 tablets (2-4 mg) by mouth every 3 hours as needed for moderate to severe pain 80 tablet 0     LORAZEPAM PO Take 0.5 mg by mouth every 3 hours as needed for anxiety       MAGNESIUM OXIDE PO Take 400 mg by mouth 2 times daily        MELATONIN PO Take 3 mg by mouth At Bedtime       Nutritional Supplements (BOOST PO) Take 8 oz by mouth 3 times daily       omeprazole (PRILOSEC) 20 MG CR capsule Take 1 capsule (20 mg) by mouth daily 90 capsule 3     ONDANSETRON PO Take 4 mg by mouth 3 times daily        polyethylene glycol (MIRALAX/GLYCOLAX) Packet Take 17 g by mouth daily       senna-docusate (SENOKOT-S;PERICOLACE) 8.6-50 MG per tablet Take 2 tablets by mouth 2 times daily 100 tablet 0     THIAMINE HCL PO Take 100 mg by mouth daily       VENTOLIN  (90 Base) MCG/ACT Inhaler Inhale 2 puffs into the lungs every 4 hours as needed for shortness of breath / dyspnea or wheezing 1 Inhaler 1     Warfarin Sodium (COUMADIN PO) Take by mouth daily Take as directed per INR results       Medications reviewed:  Medications stopped since last EPIC medication reconciliation:     Prochlorperazine Maleate (COMPAZINE PO) Medication Reconciliation Clean Up   Medications started since last [facility] medication reconciliation:    LORAZEPAM PO     Sig: Take 0.5 mg by mouth every 3 hours as needed for anxiety     ROS:  4 point ROS including Respiratory, CV, GI and , other than that noted in the HPI,  is negative    Exam:  Vitals: /61  Pulse 79  Temp 97.6  F (36.4  C)  Resp 18  Wt 159 lb 9.6 oz (72.4 kg)  SpO2 93%  BMI 21.65  kg/m2  BMI= Body mass index is 21.65 kg/(m^2).  GENERAL APPEARANCE:  Alert, in no distress  RESP:  respiratory effort and palpation of chest normal, lungs clear to auscultation , no respiratory distress  CV:  Palpation and auscultation of heart done , regular rate and rhythm, no murmur, rub, or gallop, ABDOMEN:  normal bowel sounds, soft, nontender, no hepatosplenomegaly or other masses  M/S:   Gait and station abnormal uses walker.   SKIN:  Inspection of skin and subcutaneous tissue baseline, Palpation of skin and subcutaneous tissue baseline  NEURO:   No NFD appreciated on observation.   PSYCH:  normal insight, judgement and memory, affect and mood normal  Lab/Diagnostic data:     CBC RESULTS:   Recent Labs   Lab Test  09/26/18   0900 07/18/18   0808   WBC  8.3  4.8   RBC  3.02*  3.08*   HGB  10.3*  10.1*   HCT  30.4*  28.7*   MCV  101*  93   MCH  34.1*  32.8   MCHC  33.9  35.2   RDW  13.2  17.1*   PLT  350  336       Last Basic Metabolic Panel:  Recent Labs   Lab Test  09/26/18   0900  07/18/18   0808   NA  134  134   POTASSIUM  4.4  3.4   CHLORIDE  99  98   DAMARI  10.0  8.5   CO2  29  30   BUN  22  13   CR  0.78  0.86   GLC  114*  109*       Liver Function Studies -   Recent Labs   Lab Test  09/26/18   0900  07/03/18   1750   PROTTOTAL  7.3  5.7*   ALBUMIN  3.7  2.9*   BILITOTAL  0.3  0.6   ALKPHOS  82  61   AST  17  23   ALT  12  11         Lab Results   Component Value Date    A1C 4.7 04/04/2018     Procedure Component Value Ref Range Date/Time   CT Chest w Contrast [[id number]] Resulted: 09/26/18 1125   Order Status: Completed Updated: 09/26/18 1126   Narrative:     CT CHEST WITH CONTRAST  9/26/2018 10:28 AM    HISTORY: Follow-up carcinoma left lung.     COMPARISON: A CT on 6/5/2018. At that time there was a right pulmonary  embolism and a left lung mass.    TECHNIQUE: Routine transverse CT imaging of the chest was performed  following the uneventful intravenous administration of 80 mL  Isovue-370 contrast.  Radiation dose for this scan was reduced using  automated exposure control, adjustment of the mA and/or kV according  to patient size, or iterative reconstruction technique.    FINDINGS: The heart size is normal. Again seen are several non enlarged  mediastinal lymph nodes. No enlarged lymph node or other abnormal  mediastinal mass is seen. There is calcification of the thoracic aorta  and coronary arteries. The vascular structures are otherwise  unremarkable. Note that timing of the contrast did not optimize the  pulmonary arteries and therefore evaluation of an embolism is  significantly limited. However, there is no evidence of an embolism.  There has been decrease in size of a previously seen mass in the  anterior aspect of the mid left lung. This currently measures 1.4 x  1.1 cm compared to the previous 2.3 x 1.3 cm. Again seen is an area of  presumed scarring in the left lung apex. Mild scarring or atelectasis  along the superior extent of the left lower lobe is unchanged. A few  small nodules are again noted, the largest in the right lower lobe  measuring 0.5 cm on series 5 image 35. The lungs are otherwise clear  with no new or enlarging abnormality. No pneumothorax is demonstrated.  No pleural effusion is identified. There are degenerative changes in  the spine. Again seen is an anterior left chest wall port. There are a  few scattered non enlarged lymph nodes within the axilla bilaterally.  No other chest wall abnormality is demonstrated. The visualized upper  abdomen is unremarkable.     Impression:     IMPRESSION:   1. Decrease in size of what is now a 1.4 cm nodule or mass in the  anterior left mid lung.  2. A previously seen right pulmonary embolism is no longer  demonstrated. Note that the current study is not optimized to evaluate  the pulmonary arteries.  3. No other change is demonstrated.    ADILIA ALSTON MD       ASSESSMENT/PLAN  Carcinoma, lung, left (H)  - moderately differentiated  adenocarcinoma, invasive adenocarcinoma with parietal pleural invasion; pathologic staging of pT3N0.    - CTx on hold given AE, now doing better. Followed by Oncologist Dr. Bell. Neutropenia resolved.   - CT on 9/26 showed Decrease in size of what is now a 1.4 cm nodule or mass in the  anterior left mid lung.  - interscapular pain could be 2/2 to cancer.      Other pulmonary embolism without acute cor pulmonale, unspecified chronicity (H)  -  on coumadin with INR followed closely. High risk for recurrent VTE. Defer To Dr. Bell OAC any further recommendations.   - PE resolved on above CT.      Single current episode of major depressive disorder, unspecified depression episode severity  Anxiety  - doing fine, continue Cymbalta.  - on Lorazepam prn, attempt to reduce to stop.   - zyprexa stopped.      Coronary artery disease involving native heart without angina pectoris, unspecified vessel or lesion type  Essential hypertension  - started on amlodipine. In general reading is acceptable as long as Pt is asymptomatic. Limited life expectancy no mortality benefit in over treating.     Anemia in other chronic diseases classified elsewhere  - macrocytosis. - HH stable. On nutritional supplement.   - no B12 or folic acid level.        Orders:  - See above, otherwise, continue the rest of the current POC.     Total time spent with patient visit at the skilled nursing facility was 35 min including patient visit, review of past records and discussing with NP. Greater than 50% of total time spent with counseling and coordinating care due to above multiple comorbidities.     Electronically signed by:  Chavo Ellis MD        Sincerely,        Chavo Ellis MD

## 2018-10-01 NOTE — TELEPHONE ENCOUNTER
PA Initiation    Medication: Hydromorphone 2 mg Tab take 2 tab every 3 hours as needed for pain 1-5 and 3 tablets every 3 hours as needed for pain 6-10  Insurance Company: [employer] - Phone [phone] Fax [phone]  Pharmacy Filling the Rx: [facility] - [facility] - [place], [place] - [address] [place] [address]  Filling Pharmacy Phone: [phone]  Filling Pharmacy Fax:    Start Date: 10/1/2018    THIS HAS BEEN SUBMITTED BY THE PRIOR-AUTHORIZATION TEAM. ANY QUESTIONS PLEASE CALL [phone]. THANK YOU

## 2018-10-03 NOTE — LETTER
10/3/2018         RE: Chilo Oneil  [address] [address]        Dear Colleague,    Thank you for referring your patient, Chilo Oneil, to the [facility]. Please see a copy of my visit note below.    Hematology/ Oncology Follow-up Visit:  Oct 3, 2018    Reason for Visit:   Chief Complaint   Patient presents with     Oncology Clinic Visit     2 mo f/up, Carcinoma, lung, left, review CT/Lab results       Oncologic History:  Carcinoma, lung, left (H)  Patient presented with L side shoulder and has been traveling  to L/side about  to the whole L/side upper back and L side of chest for about 3 week he has had the pain has been taking aleve.  Subsequently went on to have a CT scan done.  Which showed 1.6 cm nodular infiltrate in the left upper lobe.  The lesion appear spiculated and worrisome for lung cancer.  There is a second 0.7 cm indeterminate nodule in the lingular portion of the left lung.  Subsequently the patient went on to have CT-guided biopsy.  The pathology came back showing moderately differentiated adenocarcinoma. He had subsequent noted left thoracoscopic left pleural biopsies and left lobe which resection on April 11, 2018.  The surgical pathology came back with pleural biopsies came back positive for invasive adenocarcinoma with parietal pleural invasion.  The wedge resection came back with adenocarcinoma with acinar patterns of growth moderately differentiated the tumor size is 1.1 and 0.3 cm into 2 separate tumor nodules.  Visceropleural invasion was identified the margins were negative lymphovascular invasion was not identified large venous artery involvement was not identified as well.  The pathologic staging of pT3N0.        Interval History:  Patient returning today for follow-up.  He still lives in nursing home.  He is gradually improving.  He denies any shortness of breath or cough or wheezing.  He denies any nausea or vomiting or diarrhea.  He is gaining  weight.  He continues to have pain in his left shoulder.  He continues to smoke about 3 cigarettes a day.    Review Of Systems:  Constitutional: Negative for fever, chills, and night sweats.  Skin: negative.  Eyes: negative.  Ears/Nose/Throat: negative.  Respiratory: No shortness of breath, dyspnea on exertion, cough, or hemoptysis.  Cardiovascular: negative.  Gastrointestinal: negative.  Genitourinary: negative.  Musculoskeletal: negative.  Neurologic: negative.  Psychiatric: negative.  Hematologic/Lymphatic/Immunologic: negative.  Endocrine: negative.    All other ROS negative unless mentioned in interval history.    Past medical, social, surgical, and family histories reviewed.    Allergies:  Allergies as of 10/03/2018 - Alcides as Reviewed 10/03/2018   Allergen Reaction Noted     Cipro [ciprofloxacin] Swelling 06/22/2009       Current Medications:  Current Outpatient Prescriptions   Medication Sig Dispense Refill     ACETAMINOPHEN PO Take 1,000 mg by mouth 3 times daily       DULoxetine HCl (CYMBALTA PO) Take 60 mg by mouth daily       fentaNYL (DURAGESIC) 12 mcg/hr 72 hr patch Place 1 patch onto the skin every 72 hours remove old patch. 30 patch 0     GABAPENTIN PO Take 300 mg by mouth 2 times daily        HYDROmorphone (DILAUDID) 2 MG tablet Take 1-2 tablets (2-4 mg) by mouth every 3 hours as needed for moderate to severe pain 80 tablet 0     LORAZEPAM PO Take 0.5 mg by mouth every 3 hours as needed for anxiety       MAGNESIUM OXIDE PO Take 400 mg by mouth 2 times daily        MELATONIN PO Take 3 mg by mouth At Bedtime       omeprazole (PRILOSEC) 20 MG CR capsule Take 1 capsule (20 mg) by mouth daily 90 capsule 3     ONDANSETRON PO Take 4 mg by mouth 3 times daily        polyethylene glycol (MIRALAX/GLYCOLAX) Packet Take 17 g by mouth daily       senna-docusate (SENOKOT-S;PERICOLACE) 8.6-50 MG per tablet Take 2 tablets by mouth 2 times daily 100 tablet 0     Warfarin Sodium (COUMADIN PO) Take by mouth daily Take  "as directed per INR results       AmLODIPine Besylate (NORVASC PO) Take 10 mg by mouth daily       Nutritional Supplements (BOOST PO) Take 8 oz by mouth 3 times daily       THIAMINE HCL PO Take 100 mg by mouth daily       VENTOLIN  (90 Base) MCG/ACT Inhaler Inhale 2 puffs into the lungs every 4 hours as needed for shortness of breath / dyspnea or wheezing (Patient not taking: Reported on 10/3/2018) 1 Inhaler 1        Physical Exam:  /59 (BP Location: Right arm, Patient Position: Sitting, Cuff Size: Adult Regular)  Pulse 90  Temp 98.8  F (37.1  C) (Tympanic)  Resp 18  Ht 1.88 m (6' 2\")  Wt 70.7 kg (155 lb 12.8 oz)  SpO2 93%  BMI 20 kg/m2  Wt Readings from Last 12 Encounters:   10/03/18 70.7 kg (155 lb 12.8 oz)   09/30/18 72.4 kg (159 lb 9.6 oz)   09/18/18 69.9 kg (154 lb 3.2 oz)   09/07/18 66.9 kg (147 lb 6.4 oz)   09/04/18 66.9 kg (147 lb 6.4 oz)   08/30/18 66.9 kg (147 lb 6.4 oz)   08/27/18 66.9 kg (147 lb 6.4 oz)   08/21/18 68.5 kg (151 lb)   08/15/18 65.8 kg (145 lb)   08/02/18 63.5 kg (140 lb)   08/02/18 66 kg (145 lb 9.6 oz)   07/26/18 61.7 kg (136 lb)     ECOG performance status: 0  GENERAL APPEARANCE: Healthy, alert and in no acute distress.  HEENT: Sclerae anicteric. PERRLA. Oropharynx without ulcers, lesions, or thrush.  NECK: Supple. No asymmetry or masses.  LYMPHATICS: No palpable cervical, supraclavicular, axillary, or inguinal lymphadenopathy.  RESP: Lungs clear to auscultation bilaterally without rales, rhonchi or wheezes.  CARDIOVASCULAR: Regular rate and rhythm. Normal S1, S2; no S3 or S4. No murmur, gallop, or rub.  ABDOMEN: Soft, nontender. Bowel sounds normal. No palpable organomegaly or masses.  MUSCULOSKELETAL: Extremities without gross deformities noted. No edema of bilateral lower extremities.  SKIN: No suspicious lesions or rashes.  NEURO: Alert and oriented x 3. Cranial nerves II-XII grossly intact.  PSYCHIATRIC: Mentation and affect appear normal.    Laboratory/Imaging " Studies:  Infusion Therapy Visit on 09/26/2018   Component Date Value Ref Range Status     WBC 09/26/2018 8.3  4.0 - 11.0 10e9/L Final     RBC Count 09/26/2018 3.02* 4.4 - 5.9 10e12/L Final     Hemoglobin 09/26/2018 10.3* 13.3 - 17.7 g/dL Final     Hematocrit 09/26/2018 30.4* 40.0 - 53.0 % Final     MCV 09/26/2018 101* 78 - 100 fl Final     MCH 09/26/2018 34.1* 26.5 - 33.0 pg Final     MCHC 09/26/2018 33.9  31.5 - 36.5 g/dL Final     RDW 09/26/2018 13.2  10.0 - 15.0 % Final     Platelet Count 09/26/2018 350  150 - 450 10e9/L Final     Diff Method 09/26/2018 Automated Method   Final     % Neutrophils 09/26/2018 72.0  % Final     % Lymphocytes 09/26/2018 17.3  % Final     % Monocytes 09/26/2018 9.1  % Final     % Eosinophils 09/26/2018 0.8  % Final     % Basophils 09/26/2018 0.4  % Final     % Immature Granulocytes 09/26/2018 0.4  % Final     Nucleated RBCs 09/26/2018 0  0 /100 Final     Absolute Neutrophil 09/26/2018 6.0  1.6 - 8.3 10e9/L Final     Absolute Lymphocytes 09/26/2018 1.4  0.8 - 5.3 10e9/L Final     Absolute Monocytes 09/26/2018 0.8  0.0 - 1.3 10e9/L Final     Absolute Eosinophils 09/26/2018 0.1  0.0 - 0.7 10e9/L Final     Absolute Basophils 09/26/2018 0.0  0.0 - 0.2 10e9/L Final     Abs Immature Granulocytes 09/26/2018 0.0  0 - 0.4 10e9/L Final     Absolute Nucleated RBC 09/26/2018 0.0   Final     Sodium 09/26/2018 134  133 - 144 mmol/L Final     Potassium 09/26/2018 4.4  3.4 - 5.3 mmol/L Final     Chloride 09/26/2018 99  94 - 109 mmol/L Final     Carbon Dioxide 09/26/2018 29  20 - 32 mmol/L Final     Anion Gap 09/26/2018 6  3 - 14 mmol/L Final     Glucose 09/26/2018 114* 70 - 99 mg/dL Final     Urea Nitrogen 09/26/2018 22  7 - 30 mg/dL Final     Creatinine 09/26/2018 0.78  0.66 - 1.25 mg/dL Final     GFR Estimate 09/26/2018 >90  >60 mL/min/1.7m2 Final    Non  GFR Calc     GFR Estimate If Black 09/26/2018 >90  >60 mL/min/1.7m2 Final    African American GFR Calc     Calcium 09/26/2018 10.0   8.5 - 10.1 mg/dL Final     Bilirubin Total 09/26/2018 0.3  0.2 - 1.3 mg/dL Final     Albumin 09/26/2018 3.7  3.4 - 5.0 g/dL Final     Protein Total 09/26/2018 7.3  6.8 - 8.8 g/dL Final     Alkaline Phosphatase 09/26/2018 82  40 - 150 U/L Final     ALT 09/26/2018 12  0 - 70 U/L Final     AST 09/26/2018 17  0 - 45 U/L Final        Recent Results (from the past 744 hour(s))   CT Chest w Contrast    Narrative    CT CHEST WITH CONTRAST  9/26/2018 10:28 AM    HISTORY: Follow-up carcinoma left lung.     COMPARISON: A CT on 6/5/2018. At that time there was a right pulmonary  embolism and a left lung mass.    TECHNIQUE: Routine transverse CT imaging of the chest was performed  following the uneventful intravenous administration of 80 mL  Isovue-370 contrast. Radiation dose for this scan was reduced using  automated exposure control, adjustment of the mA and/or kV according  to patient size, or iterative reconstruction technique.    FINDINGS: The heart size is normal. Again seen are several nonenlarged  mediastinal lymph nodes. No enlarged lymph node or other abnormal  mediastinal mass is seen. There is calcification of the thoracic aorta  and coronary arteries. The vascular structures are otherwise  unremarkable. Note that timing of the contrast did not optimize the  pulmonary arteries and therefore evaluation of an embolism is  significantly limited. However, there is no evidence of an embolism.  There has been decrease in size of a previously seen mass in the  anterior aspect of the mid left lung. This currently measures 1.4 x  1.1 cm compared to the previous 2.3 x 1.3 cm. Again seen is an area of  presumed scarring in the left lung apex. Mild scarring or atelectasis  along the superior extent of the left lower lobe is unchanged. A few  small nodules are again noted, the largest in the right lower lobe  measuring 0.5 cm on series 5 image 35. The lungs are otherwise clear  with no new or enlarging abnormality. No  pneumothorax is demonstrated.  No pleural effusion is identified. There are degenerative changes in  the spine. Again seen is an anterior left chest wall port. There are a  few scattered nonenlarged lymph nodes within the axilla bilaterally.  No other chest wall abnormality is demonstrated. The visualized upper  abdomen is unremarkable.      Impression    IMPRESSION:   1. Decrease in size of what is now a 1.4 cm nodule or mass in the  anterior left midlung.  2. A previously seen right pulmonary embolism is no longer  demonstrated. Note that the current study is not optimized to evaluate  the pulmonary arteries.  3. No other change is demonstrated.    ADILIA ALSTON MD       Assessment and plan:    (C34.92) Carcinoma, lung, left (H)  (primary encounter diagnosis)  I reviewed with the patient today most recent set scan.  He has a stable disease.  No new findings.  I would recommend to continue to monitor the changes in his lung I will see him back in 3 months or sooner if there are new developments or concerns.    (N18.3) CKD (chronic kidney disease) stage 3, GFR 30-59 ml/min (H)  Creatinine has been stable.    (I10) Essential hypertension  Patient currently on Norvasc 10 mg orally daily.    (K21.9) Gastroesophageal reflux disease without esophagitis  She currently on omeprazole 20 mg orally daily.    (E83.42) Hypomagnesemia  Currently on magnesium oxide limits.    (I26.99) Other pulmonary embolism without acute cor pulmonale, unspecified chronicity (H)  Patient will continue with anticoagulation was warfarin.      The patient is ready to learn, no apparent learning barriers were identified.  Diagnosis and treatment plans were explained to the patient. The patient expressed understanding of the content. The patient asked appropriate questions. The patient questions were answered to his satisfaction.    Chart documentation with Dragon Voice recognition Software. Although reviewed after completion, some words and  grammatical errors may remain.    Again, thank you for allowing me to participate in the care of your patient.        Sincerely,        Mor Mccauley MD

## 2018-10-03 NOTE — PATIENT INSTRUCTIONS
We would like you to have a CT done today. Dr. Mccauley would like to see you back in 3 months with labs and a chest CT prior to your visit.      When you are in need of a refill, please call your pharmacy and they will send us a request.      Copy of appointments, and after visit summary (AVS) given to patient.      If you have any questions during business hours (M-F 8 AM- 4PM), please call Ashley Durand RN Oncology Hematology  at [facility] [phone].       For questions after business hours, or on holidays/weekends, please call our after hours Nurse Triage line [phone]. Thank you.

## 2018-10-03 NOTE — LETTER
10/3/2018        RE: Chilo Oneil  [address] [place] [id number]        [facility]  HPI:    Chilo Oneil is a 68 year old  (1950), who is being seen today for an episodic care visit at [facility].  HPI information obtained from: facility chart records, facility staff, patient report and [facility] chart review. Today's concern is INR/Coumadin management for A. Fib    Bleeding Signs/Symptoms:  None  Thromboembolic Signs/Symptoms:  None  Medication Changes:  No  Dietary Changes:  No  Activity Changes: No  Bacterial/Viral Infection:  No  Missed Coumadin Doses:  None  On ASA: No  Other Concerns:  No    OBJECTIVE:  INR Today: 2.81  Current Dose: Coumadin 7mg M/Th and 6mg AOD.    ASSESSMENT:  Other pulmonary embolism without acute cor pulmonale, unspecified chronicity (H)  Encounter for therapeutic drug monitoring  Chronic. Stable. Therapeutic. Will dose Coumadin as noted below and recheck Hgb in 4 wks. Follow-up accordingly. Therapeutic INR for goal of 2-3.    PLAN:  New Dose: Coumadin 7 mg PO every day on M/TH and 6 mg PO AOD.    Next INR: 10/31/18    Electronically signed by:  Dr. Lashanda Suárez, APRN CNP DNP            Sincerely,        Lashanda Suárez, NP

## 2018-10-03 NOTE — PROGRESS NOTES
[facility]  HPI:    Chilo Oneil is a 68 year old  (1950), who is being seen today for an episodic care visit at [facility].  HPI information obtained from: facility chart records, facility staff, patient report and [facility] chart review. Today's concern is INR/Coumadin management for A. Fib    Bleeding Signs/Symptoms:  None  Thromboembolic Signs/Symptoms:  None  Medication Changes:  No  Dietary Changes:  No  Activity Changes: No  Bacterial/Viral Infection:  No  Missed Coumadin Doses:  None  On ASA: No  Other Concerns:  No    OBJECTIVE:  INR Today: 2.81  Current Dose: Coumadin 7mg M/Th and 6mg AOD.    ASSESSMENT:  Other pulmonary embolism without acute cor pulmonale, unspecified chronicity (H)  Encounter for therapeutic drug monitoring  Chronic. Stable. Therapeutic. Will dose Coumadin as noted below and recheck Hgb in 4 wks. Follow-up accordingly. Therapeutic INR for goal of 2-3.    PLAN:  New Dose: Coumadin 7 mg PO every day on M/TH and 6 mg PO AOD.    Next INR: 10/31/18    Electronically signed by:  Dr. Lashanda Suárez, APRN CNP DNP

## 2018-10-03 NOTE — NURSING NOTE
"Oncology Rooming Note    October 3, 2018 1:22 PM   Chilo Oneil is a 68 year old male who presents for:    Chief Complaint   Patient presents with     Oncology Clinic Visit     2 mo f/up, Carcinoma, lung, left, review CT/Lab results     Initial Vitals: /59 (BP Location: Right arm, Patient Position: Sitting, Cuff Size: Adult Regular)  Pulse 90  Temp 98.8  F (37.1  C) (Tympanic)  Resp 18  Ht 1.88 m (6' 2\")  Wt 70.7 kg (155 lb 12.8 oz)  SpO2 93%  BMI 20 kg/m2 Estimated body mass index is 20 kg/(m^2) as calculated from the following:    Height as of this encounter: 1.88 m (6' 2\").    Weight as of this encounter: 70.7 kg (155 lb 12.8 oz). Body surface area is 1.92 meters squared.  No Pain (0) Comment: Data Unavailable   No LMP for male patient.  Allergies reviewed: Yes  Medications reviewed: Yes    Medications: Medication refills not needed today.  Pharmacy name entered into [employer]: [facility] [place], [place] - [address]    Clinical concerns:   2 mo f/up, Carcinoma, lung, left, review CT/Lab results        7 minutes for nursing intake (face to face time)     Queenie Burgos LPN              "

## 2018-10-03 NOTE — PROGRESS NOTES
Hematology/ Oncology Follow-up Visit:  Oct 3, 2018    Reason for Visit:   Chief Complaint   Patient presents with     Oncology Clinic Visit     2 mo f/up, Carcinoma, lung, left, review CT/Lab results       Oncologic History:  Carcinoma, lung, left (H)  Patient presented with L side shoulder and has been traveling  to L/side about  to the whole L/side upper back and L side of chest for about 3 week he has had the pain has been taking aleve.  Subsequently went on to have a CT scan done.  Which showed 1.6 cm nodular infiltrate in the left upper lobe.  The lesion appear spiculated and worrisome for lung cancer.  There is a second 0.7 cm indeterminate nodule in the lingular portion of the left lung.  Subsequently the patient went on to have CT-guided biopsy.  The pathology came back showing moderately differentiated adenocarcinoma. He had subsequent noted left thoracoscopic left pleural biopsies and left lobe which resection on April 11, 2018.  The surgical pathology came back with pleural biopsies came back positive for invasive adenocarcinoma with parietal pleural invasion.  The wedge resection came back with adenocarcinoma with acinar patterns of growth moderately differentiated the tumor size is 1.1 and 0.3 cm into 2 separate tumor nodules.  Visceropleural invasion was identified the margins were negative lymphovascular invasion was not identified large venous artery involvement was not identified as well.  The pathologic staging of pT3N0.        Interval History:  Patient returning today for follow-up.  He still lives in nursing home.  He is gradually improving.  He denies any shortness of breath or cough or wheezing.  He denies any nausea or vomiting or diarrhea.  He is gaining weight.  He continues to have pain in his left shoulder.  He continues to smoke about 3 cigarettes a day.    Review Of Systems:  Constitutional: Negative for fever, chills, and night sweats.  Skin: negative.  Eyes:  negative.  Ears/Nose/Throat: negative.  Respiratory: No shortness of breath, dyspnea on exertion, cough, or hemoptysis.  Cardiovascular: negative.  Gastrointestinal: negative.  Genitourinary: negative.  Musculoskeletal: negative.  Neurologic: negative.  Psychiatric: negative.  Hematologic/Lymphatic/Immunologic: negative.  Endocrine: negative.    All other ROS negative unless mentioned in interval history.    Past medical, social, surgical, and family histories reviewed.    Allergies:  Allergies as of 10/03/2018 - Alcides as Reviewed 10/03/2018   Allergen Reaction Noted     Cipro [ciprofloxacin] Swelling 06/22/2009       Current Medications:  Current Outpatient Prescriptions   Medication Sig Dispense Refill     ACETAMINOPHEN PO Take 1,000 mg by mouth 3 times daily       DULoxetine HCl (CYMBALTA PO) Take 60 mg by mouth daily       fentaNYL (DURAGESIC) 12 mcg/hr 72 hr patch Place 1 patch onto the skin every 72 hours remove old patch. 30 patch 0     GABAPENTIN PO Take 300 mg by mouth 2 times daily        HYDROmorphone (DILAUDID) 2 MG tablet Take 1-2 tablets (2-4 mg) by mouth every 3 hours as needed for moderate to severe pain 80 tablet 0     LORAZEPAM PO Take 0.5 mg by mouth every 3 hours as needed for anxiety       MAGNESIUM OXIDE PO Take 400 mg by mouth 2 times daily        MELATONIN PO Take 3 mg by mouth At Bedtime       omeprazole (PRILOSEC) 20 MG CR capsule Take 1 capsule (20 mg) by mouth daily 90 capsule 3     ONDANSETRON PO Take 4 mg by mouth 3 times daily        polyethylene glycol (MIRALAX/GLYCOLAX) Packet Take 17 g by mouth daily       senna-docusate (SENOKOT-S;PERICOLACE) 8.6-50 MG per tablet Take 2 tablets by mouth 2 times daily 100 tablet 0     Warfarin Sodium (COUMADIN PO) Take by mouth daily Take as directed per INR results       AmLODIPine Besylate (NORVASC PO) Take 10 mg by mouth daily       Nutritional Supplements (BOOST PO) Take 8 oz by mouth 3 times daily       THIAMINE HCL PO Take 100 mg by mouth daily  "      VENTOLIN  (90 Base) MCG/ACT Inhaler Inhale 2 puffs into the lungs every 4 hours as needed for shortness of breath / dyspnea or wheezing (Patient not taking: Reported on 10/3/2018) 1 Inhaler 1        Physical Exam:  /59 (BP Location: Right arm, Patient Position: Sitting, Cuff Size: Adult Regular)  Pulse 90  Temp 98.8  F (37.1  C) (Tympanic)  Resp 18  Ht 1.88 m (6' 2\")  Wt 70.7 kg (155 lb 12.8 oz)  SpO2 93%  BMI 20 kg/m2  Wt Readings from Last 12 Encounters:   10/03/18 70.7 kg (155 lb 12.8 oz)   09/30/18 72.4 kg (159 lb 9.6 oz)   09/18/18 69.9 kg (154 lb 3.2 oz)   09/07/18 66.9 kg (147 lb 6.4 oz)   09/04/18 66.9 kg (147 lb 6.4 oz)   08/30/18 66.9 kg (147 lb 6.4 oz)   08/27/18 66.9 kg (147 lb 6.4 oz)   08/21/18 68.5 kg (151 lb)   08/15/18 65.8 kg (145 lb)   08/02/18 63.5 kg (140 lb)   08/02/18 66 kg (145 lb 9.6 oz)   07/26/18 61.7 kg (136 lb)     ECOG performance status: 0  GENERAL APPEARANCE: Healthy, alert and in no acute distress.  HEENT: Sclerae anicteric. PERRLA. Oropharynx without ulcers, lesions, or thrush.  NECK: Supple. No asymmetry or masses.  LYMPHATICS: No palpable cervical, supraclavicular, axillary, or inguinal lymphadenopathy.  RESP: Lungs clear to auscultation bilaterally without rales, rhonchi or wheezes.  CARDIOVASCULAR: Regular rate and rhythm. Normal S1, S2; no S3 or S4. No murmur, gallop, or rub.  ABDOMEN: Soft, nontender. Bowel sounds normal. No palpable organomegaly or masses.  MUSCULOSKELETAL: Extremities without gross deformities noted. No edema of bilateral lower extremities.  SKIN: No suspicious lesions or rashes.  NEURO: Alert and oriented x 3. Cranial nerves II-XII grossly intact.  PSYCHIATRIC: Mentation and affect appear normal.    Laboratory/Imaging Studies:  Infusion Therapy Visit on 09/26/2018   Component Date Value Ref Range Status     WBC 09/26/2018 8.3  4.0 - 11.0 10e9/L Final     RBC Count 09/26/2018 3.02* 4.4 - 5.9 10e12/L Final     Hemoglobin 09/26/2018 " 10.3* 13.3 - 17.7 g/dL Final     Hematocrit 09/26/2018 30.4* 40.0 - 53.0 % Final     MCV 09/26/2018 101* 78 - 100 fl Final     MCH 09/26/2018 34.1* 26.5 - 33.0 pg Final     MCHC 09/26/2018 33.9  31.5 - 36.5 g/dL Final     RDW 09/26/2018 13.2  10.0 - 15.0 % Final     Platelet Count 09/26/2018 350  150 - 450 10e9/L Final     Diff Method 09/26/2018 Automated Method   Final     % Neutrophils 09/26/2018 72.0  % Final     % Lymphocytes 09/26/2018 17.3  % Final     % Monocytes 09/26/2018 9.1  % Final     % Eosinophils 09/26/2018 0.8  % Final     % Basophils 09/26/2018 0.4  % Final     % Immature Granulocytes 09/26/2018 0.4  % Final     Nucleated RBCs 09/26/2018 0  0 /100 Final     Absolute Neutrophil 09/26/2018 6.0  1.6 - 8.3 10e9/L Final     Absolute Lymphocytes 09/26/2018 1.4  0.8 - 5.3 10e9/L Final     Absolute Monocytes 09/26/2018 0.8  0.0 - 1.3 10e9/L Final     Absolute Eosinophils 09/26/2018 0.1  0.0 - 0.7 10e9/L Final     Absolute Basophils 09/26/2018 0.0  0.0 - 0.2 10e9/L Final     Abs Immature Granulocytes 09/26/2018 0.0  0 - 0.4 10e9/L Final     Absolute Nucleated RBC 09/26/2018 0.0   Final     Sodium 09/26/2018 134  133 - 144 mmol/L Final     Potassium 09/26/2018 4.4  3.4 - 5.3 mmol/L Final     Chloride 09/26/2018 99  94 - 109 mmol/L Final     Carbon Dioxide 09/26/2018 29  20 - 32 mmol/L Final     Anion Gap 09/26/2018 6  3 - 14 mmol/L Final     Glucose 09/26/2018 114* 70 - 99 mg/dL Final     Urea Nitrogen 09/26/2018 22  7 - 30 mg/dL Final     Creatinine 09/26/2018 0.78  0.66 - 1.25 mg/dL Final     GFR Estimate 09/26/2018 >90  >60 mL/min/1.7m2 Final    Non  GFR Calc     GFR Estimate If Black 09/26/2018 >90  >60 mL/min/1.7m2 Final    African American GFR Calc     Calcium 09/26/2018 10.0  8.5 - 10.1 mg/dL Final     Bilirubin Total 09/26/2018 0.3  0.2 - 1.3 mg/dL Final     Albumin 09/26/2018 3.7  3.4 - 5.0 g/dL Final     Protein Total 09/26/2018 7.3  6.8 - 8.8 g/dL Final     Alkaline Phosphatase  09/26/2018 82  40 - 150 U/L Final     ALT 09/26/2018 12  0 - 70 U/L Final     AST 09/26/2018 17  0 - 45 U/L Final        Recent Results (from the past 744 hour(s))   CT Chest w Contrast    Narrative    CT CHEST WITH CONTRAST  9/26/2018 10:28 AM    HISTORY: Follow-up carcinoma left lung.     COMPARISON: A CT on 6/5/2018. At that time there was a right pulmonary  embolism and a left lung mass.    TECHNIQUE: Routine transverse CT imaging of the chest was performed  following the uneventful intravenous administration of 80 mL  Isovue-370 contrast. Radiation dose for this scan was reduced using  automated exposure control, adjustment of the mA and/or kV according  to patient size, or iterative reconstruction technique.    FINDINGS: The heart size is normal. Again seen are several nonenlarged  mediastinal lymph nodes. No enlarged lymph node or other abnormal  mediastinal mass is seen. There is calcification of the thoracic aorta  and coronary arteries. The vascular structures are otherwise  unremarkable. Note that timing of the contrast did not optimize the  pulmonary arteries and therefore evaluation of an embolism is  significantly limited. However, there is no evidence of an embolism.  There has been decrease in size of a previously seen mass in the  anterior aspect of the mid left lung. This currently measures 1.4 x  1.1 cm compared to the previous 2.3 x 1.3 cm. Again seen is an area of  presumed scarring in the left lung apex. Mild scarring or atelectasis  along the superior extent of the left lower lobe is unchanged. A few  small nodules are again noted, the largest in the right lower lobe  measuring 0.5 cm on series 5 image 35. The lungs are otherwise clear  with no new or enlarging abnormality. No pneumothorax is demonstrated.  No pleural effusion is identified. There are degenerative changes in  the spine. Again seen is an anterior left chest wall port. There are a  few scattered nonenlarged lymph nodes within  the axilla bilaterally.  No other chest wall abnormality is demonstrated. The visualized upper  abdomen is unremarkable.      Impression    IMPRESSION:   1. Decrease in size of what is now a 1.4 cm nodule or mass in the  anterior left midlung.  2. A previously seen right pulmonary embolism is no longer  demonstrated. Note that the current study is not optimized to evaluate  the pulmonary arteries.  3. No other change is demonstrated.    ADILIA ALSTON MD       Assessment and plan:    (C34.92) Carcinoma, lung, left (H)  (primary encounter diagnosis)  I reviewed with the patient today most recent set scan.  He has a stable disease.  No new findings.  I would recommend to continue to monitor the changes in his lung I will see him back in 3 months or sooner if there are new developments or concerns.    (N18.3) CKD (chronic kidney disease) stage 3, GFR 30-59 ml/min (H)  Creatinine has been stable.    (I10) Essential hypertension  Patient currently on Norvasc 10 mg orally daily.    (K21.9) Gastroesophageal reflux disease without esophagitis  She currently on omeprazole 20 mg orally daily.    (E83.42) Hypomagnesemia  Currently on magnesium oxide limits.    (I26.99) Other pulmonary embolism without acute cor pulmonale, unspecified chronicity (H)  Patient will continue with anticoagulation was warfarin.      The patient is ready to learn, no apparent learning barriers were identified.  Diagnosis and treatment plans were explained to the patient. The patient expressed understanding of the content. The patient asked appropriate questions. The patient questions were answered to his satisfaction.    Chart documentation with Dragon Voice recognition Software. Although reviewed after completion, some words and grammatical errors may remain.

## 2018-10-03 NOTE — MR AVS SNAPSHOT
After Visit Summary   10/3/2018    Chilo Oneil    MRN: [id number]           Patient Information     Date Of Birth          1950        Visit Information        Provider Department      10/3/2018 1:30 PM Mor Mccauley MD [facility] ONCOLOGY      Today's Diagnoses     Carcinoma, lung, left (H)    -  1    Benign essential hypertension        Chemotherapy induced nausea and vomiting        CKD (chronic kidney disease) stage 3, GFR 30-59 ml/min (H)        Essential hypertension        Gastroesophageal reflux disease without esophagitis        Hypomagnesemia        Other pulmonary embolism without acute cor pulmonale, unspecified chronicity (H)          Care Instructions    We would like you to have a CT done today. Dr. Mccauley would like to see you back in 3 months with labs and a chest CT prior to your visit.      When you are in need of a refill, please call your pharmacy and they will send us a request.      Copy of appointments, and after visit summary (AVS) given to patient.      If you have any questions during business hours (M-F 8 AM- 4PM), please call Ashley Durand RN Oncology Hematology  at [facility][note 3] [phone].       For questions after business hours, or on holidays/weekends, please call our after hours Nurse Triage line [phone]. Thank you.            Follow-ups after your visit        Follow-up notes from your care team     Return in about 3 months (around 1/3/2019) for Imaging ordered before next appointment, Blood work before next appointment, Imaging ordered today.      Your next 10 appointments already scheduled     Oct 05, 2018  1:00 PM CDT   CT CERVICAL SPINE W CONTRAST with WYCT1   [facility] CT ([facility])    [address] [address]   [phone]           How do I prepare for my exam? (Food and drink instructions) **You will have contrast for this exam.** Do not  eat or drink for 2 hours before your exam. If you need to take medicine, you may take it with small sips of water. (We may ask you to take liquid medicine as well.)  The day before your exam, drink extra fluids at least six 8-ounce glasses (unless your doctor tells you to restrict your fluids).  How do I prepare for my exam? (Other instructions) Patients over 70 or patients with diabetes or kidney problems: If you haven t had a blood test (creatinine test) within the last 30 days, the Cardiologist/Radiologist may require you to get this test prior to your exam.  What should I wear: Please wear loose clothing, such as a sweat suit or jogging clothes.  Avoid snaps, zippers and other metal. We may ask you to undress and put on a hospital gown.  How long does the exam take: Most scans take less than 20 minutes.  What should I bring: Please bring any scans or X-rays taken at other hospitals, if similar tests were done. Also bring a list of your medicines, including vitamins, minerals and over-the-counter drugs. It is safest to leave personal items at home.  Do I need a :  No  is needed.  What do I need to tell my doctor? Be sure to tell your doctor: * If you have any allergies. * If there s any chance you are pregnant. * If you are breastfeeding. * If you have diabetes as your medication may need to be adjusted for this exam.  What should I do after the exam: No restrictions, You may resume normal activities.  What is this test: A CT (computed tomography) scan is a series of pictures that allows us to look inside your body. The scanner creates images of the body in cross sections, much like slices of bread. This helps us see any problems more clearly. You may receive contrast (X-ray dye) before or during your scan. Contrast is given through an IV (small needle in your arm).  Who should I call with questions: If you have any questions, please call the Imaging Department where you will have your exam.  Directions, parking instructions, and other information is available on our website, [email or web address].            Dec 28, 2018  2:00 PM CST   Level O with ROOM 6 [facility] ([facility])    [facility]  [address]  [facility] [address]   [phone]            Dec 28, 2018  3:00 PM CST   CT CHEST/ABDOMEN/PELVIS W CONTRAST with WYCT1   [facility] CT ([facility])    [address]  [facility] [address]   [phone]           How do I prepare for my exam? (Food and drink instructions) To prepare: Do not eat or drink for 2 hours before your exam. If you need to take medicine, you may take it with small sips of water. (We may ask you to take liquid medicine as well.)  How do I prepare for my exam? (Other instructions) Please arrive 30 minutes early for your CT.  Once in the department you might be asked to drink water 15-20 minutes prior to your exam.  If indicated you may be asked to drink an oral contrast in advance of your CT.  If this is the case, the imaging team will let you know or be in contact with you prior to your appointment  Patients over 70 or patients with diabetes or kidney problems: If you haven t had a blood test (creatinine test) within the last 30 days, the Cardiologist/Radiologist may require you to get this test prior to your exam.  If you have diabetes:  Continue to take your metformin medication on the day of your exam  What should I wear: Please wear loose clothing, such as a sweat suit or jogging clothes. Avoid snaps, zippers and other metal. We may ask you to undress and put on a hospital gown.  How long does the exam take: Most scans take less than 20 minutes.  What should I bring: Please bring any scans or X-rays taken at other hospitals, if similar tests were done. Also bring a list of your medicines, including vitamins, minerals and over-the-counter drugs. It is safest to leave personal  items at home.  Do I need a : No  is needed.  What do I need to tell my doctor? Be sure to tell your doctor: * If you have any allergies. * If there s any chance you are pregnant. * If you are breastfeeding.  What should I do after the exam: No restrictions, You may resume normal activities.  What is this test: A CT (computed tomography) scan is a series of pictures that allows us to look inside your body. The scanner creates images of the body in cross sections, much like slices of bread. This helps us see any problems more clearly. You may receive contrast (X-ray dye) before or during your scan. You will be asked to drink the contrast.  Who should I call with questions: If you have any questions, please call the Imaging Department where you will have your exam. Directions, parking instructions, and other information is available on our website, [email or web address].            Jan 03, 2019  1:40 PM CST   Return Visit with Mor Mccauley MD   [facility] ([facility])    [facility]  [address]  [facility] [address]   [phone]              Future tests that were ordered for you today     Open Future Orders        Priority Expected Expires Ordered    CBC with platelets differential Routine 1/3/2019 10/3/2019 10/3/2018    Comprehensive metabolic panel Routine 1/3/2019 10/3/2019 10/3/2018    CT Chest/Abdomen/Pelvis w Contrast Routine 1/3/2019 10/3/2019 10/3/2018            Who to contact     If you have questions or need follow up information about today's clinic visit or your schedule please contact [facility] directly at [phone].  Normal or non-critical lab and imaging results will be communicated to you by MyChart, letter or phone within 4 business days after the clinic has received the results. If you do not hear from us within 7 days, please contact the clinic through MyChart or phone. If you have a critical or abnormal  "lab result, we will notify you by phone as soon as possible.  Submit refill requests through [employer] or call your pharmacy and they will forward the refill request to us. Please allow 3 business days for your refill to be completed.          Additional Information About Your Visit        Care EveryWhere ID     This is your Care EveryWhere ID. This could be used by other organizations to access your [place] medical records  [id number]        Your Vitals Were     Pulse Temperature Respirations Height Pulse Oximetry BMI (Body Mass Index)    90 98.8  F (37.1  C) (Tympanic) 18 1.88 m (6' 2\") 93% 20 kg/m2       Blood Pressure from Last 3 Encounters:   10/03/18 119/59   09/30/18 163/61   09/18/18 142/68    Weight from Last 3 Encounters:   10/03/18 70.7 kg (155 lb 12.8 oz)   09/30/18 72.4 kg (159 lb 9.6 oz)   09/18/18 69.9 kg (154 lb 3.2 oz)               Primary Care Provider Office Phone # Fax #    Asad Gregoryhill Astudillo, APRN [phone]       [address] [facility] [address]  [facility] [address]        Equal Access to Services     SONY SNOW : Hadii krysta ku hadasho Soomaali, waaxda luqadaha, qaybta kaalmada adeegyada, celina rosenbergn kaylen hewitt . So [facility] [phone].    ATENCIÓN: Si habla español, tiene a galaviz disposición servicios gratuitos de asistencia lingüística. Llame al [phone].    We comply with applicable federal civil rights laws and [place] laws. We do not discriminate on the basis of race, color, national origin, age, disability, sex, sexual orientation, or gender identity.            Thank you!     Thank you for choosing [facility]  for your care. Our goal is always to provide you with excellent care. Hearing back from our patients is one way we can continue to improve our services. Please take a few minutes to complete the written survey that you may receive in the mail after your visit with us. Thank you!             Your Updated Medication List - Protect others around you: " Learn how to safely use, store and throw away your medicines at www.disposemymeds.org.          This list is accurate as of 10/3/18  3:23 PM.  Always use your most recent med list.                   Brand Name Dispense Instructions for use Diagnosis    ACETAMINOPHEN PO      Take 1,000 mg by mouth 3 times daily        BOOST PO      Take 8 oz by mouth 3 times daily        COUMADIN PO      Take by mouth daily Take as directed per INR results        CYMBALTA PO      Take 60 mg by mouth daily        fentaNYL 12 mcg/hr 72 hr patch    DURAGESIC    30 patch    Place 1 patch onto the skin every 72 hours remove old patch.    Other acute pulmonary embolism without acute cor pulmonale (H)       GABAPENTIN PO      Take 300 mg by mouth 2 times daily        HYDROmorphone 2 MG tablet    DILAUDID    80 tablet    Take 1-2 tablets (2-4 mg) by mouth every 3 hours as needed for moderate to severe pain    Other acute pulmonary embolism without acute cor pulmonale (H), Carcinoma, lung, left (H)       LORAZEPAM PO      Take 0.5 mg by mouth every 3 hours as needed for anxiety        MAGNESIUM OXIDE PO      Take 400 mg by mouth 2 times daily        MELATONIN PO      Take 3 mg by mouth At Bedtime        NORVASC PO      Take 10 mg by mouth daily        omeprazole 20 MG CR capsule    priLOSEC    90 capsule    Take 1 capsule (20 mg) by mouth daily    Carcinoma, lung, left (H)       ONDANSETRON PO      Take 4 mg by mouth 3 times daily        polyethylene glycol Packet    MIRALAX/GLYCOLAX     Take 17 g by mouth daily        senna-docusate 8.6-50 MG per tablet    SENOKOT-S;PERICOLACE    100 tablet    Take 2 tablets by mouth 2 times daily    Carcinoma, lung, left (H)       THIAMINE HCL PO      Take 100 mg by mouth daily        VENTOLIN  (90 Base) MCG/ACT inhaler   Generic drug:  albuterol     1 Inhaler    Inhale 2 puffs into the lungs every 4 hours as needed for shortness of breath / dyspnea or wheezing

## 2018-10-05 NOTE — PROGRESS NOTES
Please inform the patient.  CT shows severe arthritic changes.  No evidence of metastatic cancer.  No change in plan

## 2018-10-05 NOTE — PROGRESS NOTES
Clinic Care Coordination Contact    Situation: Patient chart reviewed by care coordinator.    Background: pt had been in care coordination in the past.     Assessment: pt appears to be at [facility] for long term SNF.  Notes in chart comment about federally mandated SNF visits and not TCU care. Per oncology note on 10-3-18 pt still lives in the nursing home.      Plan/Recommendations: At this time will close as clinic CC is not generally ongoing once pt is living in a nursing home.  If pt calls SW assistance will be offered yet no outreach will be made.     ERIC Loaiza, Clinic Care Coordinator 10/5/2018   2:39 PM  [phone]

## 2018-10-15 NOTE — PROGRESS NOTES
"[facility]  Chief Complaint   Patient presents with     FPC Acute     [place] Medical Record Number:  [id number]  Place of Service where encounter took place:  [facility]  ([facility]) [[id number]]    HPI:    Chilo Oneil is a 68 year old  (1950), who is being seen today for an episodic care visit.  HPI information obtained from: facility chart records, facility staff, patient report and [facility] chart review.     Today's concern is:  Left shoulder pain, unspecified chronicity and etiology  Carcinoma, lung, left (H)  History of pulmonary embolism  Chronic pain syndrome  Last week, patient consulted provider requesting a left shoulder injection.  Noting Recent history of trigger point injections to left trapezius by sports med (Lido/Bupivocaine), which patient states was not helpful.  Left shoulder XR in August 2018 showed \"No fracture or dislocation seen in the LEFT shoulder. Mild degenerative changes in the glenohumeral and acromioclavicular joints.No significant soft tissue swelling. LEFT subclavian implanted central venous port partially visualized.\"    Today, patient is agreeable to receive injection. Aware of potential side effect. He is on Coumadin. If this is effective, patient counseled on taking less hydromorphone (currently takes 6mg Q3h PRN, but take around the clock).  Provider strongly counseled on trying 4mg instead and backing off if injection is helpful for pain.      PMH/PSH reviewed in [facility] today.    REVIEW OF SYSTEMS:  4 point ROS including Respiratory, CV, GI and , other than that noted in the HPI,  is negative    /74  Pulse 89  Temp 98.1  F (36.7  C)  Resp 18  Wt 191 lb 9.6 oz (86.9 kg)  SpO2 95%  BMI 24.6 kg/m2  GENERAL APPEARANCE: Alert, in no distress, cooperative.   ENT: Mouth and posterior oropharynx normal, moist mucous membranes, hearing acuity WDL.  EYES: EOM, conjunctivae, lids, pupils and irises normal, PERRL2.   RESP: " Respiratory effort and palpation of chest normal, no respiratory distress, Lung sounds diminished.  CV: Palpation and auscultation of heart done, rate and rhythm regular, no murmur, no rub or gallop, Edema none.   ABDOMEN: Normal bowel sounds, soft, nontender, no hepatosplenomegaly or other masses.  M/S: Gait and station WDL/weak, left shoulder has crepitus w/ ROM and tender points, Digits and nails WDL.  SKIN: Inspection/Palpation of skin and subcutaneous tissue baseline w/ fragility.   NEURO: 2-12 in normal limits and at patient's baseline, sensation as noted above.   PSYCH: Insight and judgement, memory intact, affect and mood normal.    ASSESSMENT/PLAN:  Left shoulder pain, unspecified chronicity and etiology  Carcinoma, lung, left (H)  History of pulmonary embolism  Chronic pain syndrome  Chronic. Stable. A steroid injection was performed at left posterior shoulder using 1% plain Lidocaine and 40mg of Depomedrol. This was well tolerated. Patient open to taking less Hydromorphone, and 1 hour post-injection already states therapeutic effect. We will recheck INR in 1 week (sooner), as noted below. Follow-up routinely or as needed.    Orders:  1. Discontinue INR on 10/31.  2. Recheck INR x 1 on 10/22 Dx: H/O PE.  3. FYI L shoulder injected with steroid and lidocaine today.  4. Apply ice to Left shoulder BID x2 days Dx: Injection.    Electronically signed by:  Dr. Lashanda Suárez, APRN CNP DNP

## 2018-10-15 NOTE — LETTER
"    10/15/2018        RE: Chilo Oneil  [address] [facility] [address]        [place] [facility]  Chief Complaint   Patient presents with     senior living Acute     [place] Medical Record Number:  [id number]  Place of Service where encounter took place:  [facility]  (S) [[id number]]    HPI:    Chilo Oneil is a 68 year old  (1950), who is being seen today for an episodic care visit.  HPI information obtained from: facility chart records, facility staff, patient report and [facility] chart review.     Today's concern is:  Left shoulder pain, unspecified chronicity and etiology  Carcinoma, lung, left (H)  History of pulmonary embolism  Chronic pain syndrome  Last week, patient consulted provider requesting a left shoulder injection.  Noting Recent history of trigger point injections to left trapezius by sports med (Lido/Bupivocaine), which patient states was not helpful.  Left shoulder XR in August 2018 showed \"No fracture or dislocation seen in the LEFT shoulder. Mild degenerative changes in the glenohumeral and acromioclavicular joints.No significant soft tissue swelling. LEFT subclavian implanted central venous port partially visualized.\"    Today, patient is agreeable to receive injection. Aware of potential side effect. He is on Coumadin. If this is effective, patient counseled on taking less hydromorphone (currently takes 6mg Q3h PRN, but take around the clock).  Provider strongly counseled on trying 4mg instead and backing off if injection is helpful for pain.      PMH/PSH reviewed in [facility] today.    REVIEW OF SYSTEMS:  4 point ROS including Respiratory, CV, GI and , other than that noted in the HPI,  is negative    /74  Pulse 89  Temp 98.1  F (36.7  C)  Resp 18  Wt 191 lb 9.6 oz (86.9 kg)  SpO2 95%  BMI 24.6 kg/m2  GENERAL APPEARANCE: Alert, in no distress, cooperative.   ENT: Mouth and posterior oropharynx normal, moist mucous membranes, hearing " acuity WDL.  EYES: EOM, conjunctivae, lids, pupils and irises normal, PERRL2.   RESP: Respiratory effort and palpation of chest normal, no respiratory distress, Lung sounds diminished.  CV: Palpation and auscultation of heart done, rate and rhythm regular, no murmur, no rub or gallop, Edema none.   ABDOMEN: Normal bowel sounds, soft, nontender, no hepatosplenomegaly or other masses.  M/S: Gait and station WDL/weak, left shoulder has crepitus w/ ROM and tender points, Digits and nails WDL.  SKIN: Inspection/Palpation of skin and subcutaneous tissue baseline w/ fragility.   NEURO: 2-12 in normal limits and at patient's baseline, sensation as noted above.   PSYCH: Insight and judgement, memory intact, affect and mood normal.    ASSESSMENT/PLAN:  Left shoulder pain, unspecified chronicity and etiology  Carcinoma, lung, left (H)  History of pulmonary embolism  Chronic pain syndrome  Chronic. Stable. A steroid injection was performed at left posterior shoulder using 1% plain Lidocaine and 40mg of Depomedrol. This was well tolerated. Patient open to taking less Hydromorphone, and 1 hour post-injection already states therapeutic effect. We will recheck INR in 1 week (sooner), as noted below. Follow-up routinely or as needed.    Orders:  1. Discontinue INR on 10/31.  2. Recheck INR x 1 on 10/22 Dx: H/O PE.  3. FYI L shoulder injected with steroid and lidocaine today.  4. Apply ice to Left shoulder BID x2 days Dx: Injection.    Electronically signed by:  Dr. Lashanda Suárez, APRN CNP DNP        Sincerely,        Lashanda Suárez NP

## 2018-10-22 NOTE — PROGRESS NOTES
[place] [facility]  [place] Medical Record Number:  [id number]  Place of Service where encounter took place:  [facility]  (S) [[id number]]    HPI:    Chilo Oneil is a 68 year old  (1950), who is being seen today for an episodic care visit at the above location.   HPI information obtained from: facility chart records, facility staff, patient report and [facility] chart review. Today's concern is INR/Coumadin management for PE    Bleeding Signs/Symptoms:  None  Thromboembolic Signs/Symptoms:  None  Medication Changes:  No  Dietary Changes:  No  Activity Changes: No  Bacterial/Viral Infection:  No  Missed Coumadin Doses:  None  On ASA: No  Other Concerns:  No    OBJECTIVE:  INR Today: 3.21  Current Dose: 7mg M/Th, 6mg AOD.    ASSESSMENT:  History of pulmonary embolism  Encounter for therapeutic drug monitoring  Long term current use of anticoagulant therapy  Chronic. Stable. Supratherapeutic. Will dose coumadin as noted below and recheck INR in 1 week. Follow-up accordingly. Supratherapeutic likely from steroid injection. INR for goal of 2-3.    PLAN:  New Dose: 7 mg PO on Thursday, 6 mg PO all other days.    Next INR: 1 week.    Electronically signed by:  Dr. Lashanda Suárez, APRN CNP DNP

## 2018-10-22 NOTE — PROGRESS NOTES
SUBJECTIVE:   Chilo Oneil is a 68 year old male who presents to clinic today for the following health issues:    68 yr old male with metastatic lung cancer here for left shoulder pain. This has been ongoing for a long period of time. He has had x-rays done which showed mild degenerative changes . He also had Ct of his cervical spine done which showed severe degenerative changes especially at C5 - C6 . He reports that night time symptoms are worse . He is already on Dilaudid for his terminal cancer. This does appear to be helping. He got a cortisone shot in his shoulder and this did not appear to help.       Joint Pain    Onset: ongoing     Description:   Location: left shoulder  Character: Sharp, Dull ache, Stabbing, Burning and Fullness    Intensity: moderate, severe    Progression of Symptoms: worse    Accompanying Signs & Symptoms:  Other symptoms: radiation of pain to down arm, numbness and tingling    History:   Previous similar pain: YES      Precipitating factors:   Trauma or overuse: YES- patient has lung  cancer     Alleviating factors:  Improved by: dilaudid but not at night     Therapies Tried and outcome: Patient does see the oncologist and is on pain medication for lung ca            Problem list and histories reviewed & adjusted, as indicated.  Additional history: as documented    Patient Active Problem List   Diagnosis     Tobacco use disorder     PAD (peripheral artery disease) (H)     Benign essential hypertension     Hyperlipidemia LDL goal <100     Anxiety     Gastroesophageal reflux disease without esophagitis     CAD (coronary artery disease)     Lung cancer (H)     Uncomplicated asthma     Hyponatremia     SOB (shortness of breath)     Chemotherapy induced nausea and vomiting     Carcinoma, lung, left (H)     Hypokalemia     Hypomagnesemia     Pulmonary emboli (H)     Pulmonary embolism (H)     Other pulmonary embolism without acute cor pulmonale, unspecified chronicity (H)     Left  shoulder pain, unspecified chronicity     Falls frequently     Near syncope     Coronary artery disease involving native heart without angina pectoris, unspecified vessel or lesion type     H/O acute myocardial infarction     Essential hypertension     CKD (chronic kidney disease) stage 3, GFR 30-59 ml/min (H)     KYMBERLY (acute kidney injury) (H)     Uncomplicated alcohol dependence (H)     Poor nutrition     Nausea and vomiting, intractability of vomiting not specified, unspecified vomiting type     ADA (generalized anxiety disorder)     Insomnia, unspecified type     Debility     Cognitive impairment     Pancytopenia (H)     Long-term (current) use of anticoagulants [Z79.01]     Single current episode of major depressive disorder, unspecified depression episode severity     Hip pain, left, unclear chronicity/etiology     Chronic left shoulder pain     History of pulmonary embolism     Encounter for therapeutic drug monitoring     Past Surgical History:   Procedure Laterality Date     FRACTURE TX, ANKLE RT/LT  1975    Fracture TX Ankle, LT     INSERT PORT VASCULAR ACCESS Left 5/15/2018    Procedure: INSERT PORT VASCULAR ACCESS;  Left chest Port-a-Cath Placement;  Surgeon: Gurjit Fox MD;  Location: [facility] OR     OPEN REDUCTION INTERNAL FIXATION HIP NAILING  9/20/2013    Procedure: OPEN REDUCTION INTERNAL FIXATION HIP NAILING;  Left Hip Open Reduction Internal Fixation ;  Surgeon: Rosas Dennis MD;  Location: [facility] OR     THORACOSCOPIC BIOPSY LUNG Left 4/11/2018    Procedure: THORACOSCOPIC BIOPSY LUNG;  subxiphoid left video assisted thorascopic surgery pleural biops, left lower lobe wedge biopsy ;  Surgeon: Mega Moreno MD;  Location:  OR     VASCULAR SURGERY         Social History   Substance Use Topics     Smoking status: Current Every Day Smoker     Packs/day: 0.15     Years: 47.00     Types: Cigarettes     Start date: 12/26/1967     Smokeless tobacco: Never Used      Comment: 4-5 cigs daily.       Alcohol use Yes      Comment: 6-8 beers per day, last 3/27 at 6pm     Family History   Problem Relation Age of Onset     Diabetes Brother      type 2     Diabetes Father          Current Outpatient Prescriptions   Medication Sig Dispense Refill     ACETAMINOPHEN PO Take 1,000 mg by mouth 3 times daily       AmLODIPine Besylate (NORVASC PO) Take 10 mg by mouth daily       Benzocaine 10 MG LOZG Take 1 tablet by mouth every 2 hours as needed       diclofenac (VOLTAREN) 1 % GEL topical gel Apply 4 grams to knees or 2 grams to hands four times daily using enclosed dosing card. 100 g 1     DULoxetine HCl (CYMBALTA PO) Take 60 mg by mouth daily       fentaNYL (DURAGESIC) 12 mcg/hr 72 hr patch Place 1 patch onto the skin every 72 hours remove old patch. 30 patch 0     GABAPENTIN PO Take 300 mg by mouth 2 times daily        HYDROMORPHONE HCL PO Take 4-6 mg by mouth every 3 hours as needed for moderate to severe pain       LORAZEPAM PO Take 0.5 mg by mouth every 3 hours as needed for anxiety       MAGNESIUM OXIDE PO Take 400 mg by mouth 2 times daily        MELATONIN PO Take 3 mg by mouth At Bedtime       Nutritional Supplements (BOOST PO) Take 8 oz by mouth 3 times daily       omeprazole (PRILOSEC) 20 MG CR capsule Take 1 capsule (20 mg) by mouth daily 90 capsule 3     ONDANSETRON PO Take 4 mg by mouth 3 times daily        polyethylene glycol (MIRALAX/GLYCOLAX) Packet Take 17 g by mouth daily       senna-docusate (SENOKOT-S;PERICOLACE) 8.6-50 MG per tablet Take 2 tablets by mouth 2 times daily 100 tablet 0     THIAMINE HCL PO Take 100 mg by mouth daily       Warfarin Sodium (COUMADIN PO) Take by mouth daily Take as directed per INR results       VENTOLIN  (90 Base) MCG/ACT Inhaler Inhale 2 puffs into the lungs every 4 hours as needed for shortness of breath / dyspnea or wheezing 1 Inhaler 1     Allergies   Allergen Reactions     Cipro [Ciprofloxacin] Swelling     BP Readings from Last 3 Encounters:   10/22/18  "112/60   10/15/18 142/74   10/03/18 119/59    Wt Readings from Last 3 Encounters:   10/22/18 154 lb (69.9 kg)   10/15/18 191 lb 9.6 oz (86.9 kg)   10/03/18 155 lb 12.8 oz (70.7 kg)                  Labs reviewed in EPIC    Reviewed and updated as needed this visit by clinical staff  Tobacco  Allergies  Meds  Med Hx  Surg Hx  Fam Hx  Soc Hx      Reviewed and updated as needed this visit by Provider         ROS:  Constitutional, HEENT, cardiovascular, pulmonary, gi and gu systems are negative, except as otherwise noted.    OBJECTIVE:     /60  Pulse 78  Temp 97.7  F (36.5  C) (Tympanic)  Resp 16  Ht 6' 2.5\" (1.892 m)  Wt 154 lb (69.9 kg)  SpO2 91%  BMI 19.51 kg/m2  Body mass index is 19.51 kg/(m^2).  GENERAL: healthy, alert and no distress  EYES: Eyes grossly normal to inspection, PERRL and conjunctivae and sclerae normal  HENT: ear canals and TM's normal, nose and mouth without ulcers or lesions  NECK: no adenopathy, no asymmetry, masses, or scars and thyroid normal to palpation  RESP: lungs clear to auscultation - no rales, rhonchi or wheezes  CV: regular rate and rhythm, normal S1 S2, no S3 or S4, no murmur, click or rub, no peripheral edema and peripheral pulses strong  MS: decreased range of motion in left shoulder    Diagnostic Test Results:  none     ASSESSMENT/PLAN:       1. Chronic left shoulder pain  Discussed options with patient , there is a 10 % systemic absorption of the Voltaren gel. If this starts to affect his INR, we will find some other topical medication for him . Patient understood the plan .   - diclofenac (VOLTAREN) 1 % GEL topical gel; Apply 4 grams to knees or 2 grams to hands four times daily using enclosed dosing card.  Dispense: 100 g; Refill: 1    FUTURE APPOINTMENTS:       - Follow-up visit as needed.    Manish Plasencia MD  [facility]  "

## 2018-10-22 NOTE — MR AVS SNAPSHOT
After Visit Summary   10/22/2018    Chilo Oneil    MRN: [id number]           Patient Information     Date Of Birth          1950        Visit Information        Provider Department      10/22/2018 10:00 AM aMnish Plasencia MD [facility]        Today's Diagnoses     Chronic left shoulder pain    -  1      Care Instructions          Thank you for choosing [facility].  You may be receiving a survey in the mail from [facility] regarding your visit today.  Please take a few minutes to complete and return the survey to let us know how we are doing.      If you have questions or concerns, please contact us via [employer] or you can contact your care team at [phone].    Our Clinic hours are:  Monday 6:40 am  to 7:00 pm  Tuesday -Friday 6:40 am to 5:00 pm    The [place] outpatient lab hours are:  Monday - Friday 6:10 am to 4:45 pm  Saturdays 7:00 am to 11:00 am  Appointments are required, call [phone]    If you have clinical questions after hours or would like to schedule an appointment,  call the clinic at [phone].          Follow-ups after your visit        Follow-up notes from your care team     Return in about 2 weeks (around 11/5/2018), or if symptoms worsen or fail to improve.      Your next 10 appointments already scheduled     Oct 22, 2018 11:45 AM CDT   Nursing Home with Lashanda Suárez NP   [facility]    [address] [place] [address]  [facility] [address]   [phone]            Dec 28, 2018  2:00 PM CST   Level O with ROOM 6 [facility] ([facility])    n Medical Ctr [facility]  [address]  [facility] [address]   [phone]            Dec 28, 2018  3:00 PM CST   CT CHEST/ABDOMEN/PELVIS W CONTRAST with [facility]   [facility] CT ([facility])    [address]  [facility] [address]   [phone]           How do I prepare  for my exam? (Food and drink instructions) To prepare: Do not eat or drink for 2 hours before your exam. If you need to take medicine, you may take it with small sips of water. (We may ask you to take liquid medicine as well.)  How do I prepare for my exam? (Other instructions) Please arrive 30 minutes early for your CT.  Once in the department you might be asked to drink water 15-20 minutes prior to your exam.  If indicated you may be asked to drink an oral contrast in advance of your CT.  If this is the case, the imaging team will let you know or be in contact with you prior to your appointment  Patients over 70 or patients with diabetes or kidney problems: If you haven t had a blood test (creatinine test) within the last 30 days, the Cardiologist/Radiologist may require you to get this test prior to your exam.  If you have diabetes:  Continue to take your metformin medication on the day of your exam  What should I wear: Please wear loose clothing, such as a sweat suit or jogging clothes. Avoid snaps, zippers and other metal. We may ask you to undress and put on a hospital gown.  How long does the exam take: Most scans take less than 20 minutes.  What should I bring: Please bring any scans or X-rays taken at other hospitals, if similar tests were done. Also bring a list of your medicines, including vitamins, minerals and over-the-counter drugs. It is safest to leave personal items at home.  Do I need a : No  is needed.  What do I need to tell my doctor? Be sure to tell your doctor: * If you have any allergies. * If there s any chance you are pregnant. * If you are breastfeeding.  What should I do after the exam: No restrictions, You may resume normal activities.  What is this test: A CT (computed tomography) scan is a series of pictures that allows us to look inside your body. The scanner creates images of the body in cross sections, much like slices of bread. This helps us see any problems more  "clearly. You may receive contrast (X-ray dye) before or during your scan. You will be asked to drink the contrast.  Who should I call with questions: If you have any questions, please call the Imaging Department where you will have your exam. Directions, parking instructions, and other information is available on our website, [email or web address].            Jan 03, 2019  1:40 PM CST   Return Visit with Mor Mccauley MD   [facility] ([facility])    [facility]  [address]  [facility] [address] [phone]              Who to contact     If you have questions or need follow up information about today's clinic visit or your schedule please contact [facility] directly at [phone].  Normal or non-critical lab and imaging results will be communicated to you by MyChart, letter or phone within 4 business days after the clinic has received the results. If you do not hear from us within 7 days, please contact the clinic through MyChart or phone. If you have a critical or abnormal lab result, we will notify you by phone as soon as possible.  Submit refill requests through [employer] or call your pharmacy and they will forward the refill request to us. Please allow 3 business days for your refill to be completed.          Additional Information About Your Visit        Care EveryWhere ID     This is your Care EveryWhere ID. This could be used by other organizations to access your [facility] medical records  [id number]        Your Vitals Were     Pulse Temperature Respirations Height Pulse Oximetry BMI (Body Mass Index)    78 97.7  F (36.5  C) (Tympanic) 16 6' 2.5\" (1.892 m) 91% 19.51 kg/m2       Blood Pressure from Last 3 Encounters:   10/22/18 112/60   10/15/18 142/74   10/03/18 119/59    Weight from Last 3 Encounters:   10/22/18 154 lb (69.9 kg)   10/15/18 191 lb 9.6 oz (86.9 kg)   10/03/18 155 lb 12.8 oz (70.7 kg)              Today, you had " the following     No orders found for display         Today's Medication Changes          These changes are accurate as of 10/22/18 10:32 AM.  If you have any questions, ask your nurse or doctor.               Start taking these medicines.        Dose/Directions    diclofenac 1 % Gel topical gel   Commonly known as:  VOLTAREN   Used for:  Chronic left shoulder pain   Started by:  Manish Plasencia MD        Apply 4 grams to knees or 2 grams to hands four times daily using enclosed dosing card.   Quantity:  100 g   Refills:  1            Where to get your medicines      These medications were sent to [facility] - [place], [place] - [address]  [address], [facility] [address]     Phone:  [phone]     diclofenac 1 % Gel topical gel                Primary Care Provider Office Phone # Fax #    Lashanda CLAUDE Suárez [phone]       [address]  [facility] [address]        Equal Access to Services     [facility]AN [facility] : Hadii krysta ku hadasho Soomaali, waaxda luqadaha, qaybta kaalmada adeegyada, waxay erastoin hayevette hewitt . So [facility] [phone].    ATENCIÓN: Si habla español, tiene a galaviz disposición servicios gratuitos de asistencia lingüística. Yecenia al [phone].    We comply with applicable federal civil rights laws and [place] laws. We do not discriminate on the basis of race, color, national origin, age, disability, sex, sexual orientation, or gender identity.            Thank you!     Thank you for choosing [facility]  for your care. Our goal is always to provide you with excellent care. Hearing back from our patients is one way we can continue to improve our services. Please take a few minutes to complete the written survey that you may receive in the mail after your visit with us. Thank you!             Your Updated Medication List - Protect others around you: Learn how to safely use, store and throw away your  medicines at www.disposemymeds.org.          This list is accurate as of 10/22/18 10:32 AM.  Always use your most recent med list.                   Brand Name Dispense Instructions for use Diagnosis    ACETAMINOPHEN PO      Take 1,000 mg by mouth 3 times daily        Benzocaine 10 MG Lozg      Take 1 tablet by mouth every 2 hours as needed        BOOST PO      Take 8 oz by mouth 3 times daily        COUMADIN PO      Take by mouth daily Take as directed per INR results        CYMBALTA PO      Take 60 mg by mouth daily        diclofenac 1 % Gel topical gel    VOLTAREN    100 g    Apply 4 grams to knees or 2 grams to hands four times daily using enclosed dosing card.    Chronic left shoulder pain       fentaNYL 12 mcg/hr 72 hr patch    DURAGESIC    30 patch    Place 1 patch onto the skin every 72 hours remove old patch.    Other acute pulmonary embolism without acute cor pulmonale (H)       GABAPENTIN PO      Take 300 mg by mouth 2 times daily        HYDROMORPHONE HCL PO      Take 4-6 mg by mouth every 3 hours as needed for moderate to severe pain        LORAZEPAM PO      Take 0.5 mg by mouth every 3 hours as needed for anxiety        MAGNESIUM OXIDE PO      Take 400 mg by mouth 2 times daily        MELATONIN PO      Take 3 mg by mouth At Bedtime        NORVASC PO      Take 10 mg by mouth daily        omeprazole 20 MG CR capsule    priLOSEC    90 capsule    Take 1 capsule (20 mg) by mouth daily    Carcinoma, lung, left (H)       ONDANSETRON PO      Take 4 mg by mouth 3 times daily        polyethylene glycol Packet    MIRALAX/GLYCOLAX     Take 17 g by mouth daily        senna-docusate 8.6-50 MG per tablet    SENOKOT-S;PERICOLACE    100 tablet    Take 2 tablets by mouth 2 times daily    Carcinoma, lung, left (H)       THIAMINE HCL PO      Take 100 mg by mouth daily        VENTOLIN  (90 Base) MCG/ACT inhaler   Generic drug:  albuterol     1 Inhaler    Inhale 2 puffs into the lungs every 4 hours as needed for  shortness of breath / dyspnea or wheezing

## 2018-10-22 NOTE — LETTER
10/22/2018        RE: Chilo Oneil  [address] [place] [address]        [facility]  [place] Medical Record Number:  [id number]  Place of Service where encounter took place:  [facility]  (S) [[id number]]    HPI:    Chilo Oneil is a 68 year old  (1950), who is being seen today for an episodic care visit at the above location.   HPI information obtained from: facility chart records, facility staff, patient report and [facility] chart review. Today's concern is INR/Coumadin management for PE    Bleeding Signs/Symptoms:  None  Thromboembolic Signs/Symptoms:  None  Medication Changes:  No  Dietary Changes:  No  Activity Changes: No  Bacterial/Viral Infection:  No  Missed Coumadin Doses:  None  On ASA: No  Other Concerns:  No    OBJECTIVE:  INR Today: 3.21  Current Dose: 7mg M/Th, 6mg AOD.    ASSESSMENT:  History of pulmonary embolism  Encounter for therapeutic drug monitoring  Long term current use of anticoagulant therapy  Chronic. Stable. Supratherapeutic. Will dose coumadin as noted below and recheck INR in 1 week. Follow-up accordingly. Supratherapeutic likely from steroid injection. INR for goal of 2-3.    PLAN:  New Dose: 7 mg PO on Thursday, 6 mg PO all other days.    Next INR: 1 week.    Electronically signed by:  Dr. Lashanda Suárez, APRN CNP DNP              Sincerely,        Lashanda Suárez NP

## 2018-10-22 NOTE — PATIENT INSTRUCTIONS
Thank you for choosing [facility].  You may be receiving a survey in the mail from [facility] regarding your visit today.  Please take a few minutes to complete and return the survey to let us know how we are doing.      If you have questions or concerns, please contact us via [employer] or you can contact your care team at [phone].    Our Clinic hours are:  Monday 6:40 am  to 7:00 pm  Tuesday -Friday 6:40 am to 5:00 pm    The [place] outpatient lab hours are:  Monday - Friday 6:10 am to 4:45 pm  Saturdays 7:00 am to 11:00 am  Appointments are required, call [phone]    If you have clinical questions after hours or would like to schedule an appointment,  call the clinic at [phone].

## 2018-10-29 NOTE — PROGRESS NOTES
[facility]  [place] Medical Record Number:  [id number]  Place of Service where encounter took place:  [facility]  ([facility]) [[id number]]    HPI:    Chilo Oneil is a 68 year old  (1950), who is being seen today for an episodic care visit at the above location.   HPI information obtained from: facility chart records, facility staff, patient report, [facility] chart review and Care Everywhere [facility] chart review. Today's concern is INR/Coumadin management for PE    Bleeding Signs/Symptoms:  None  Thromboembolic Signs/Symptoms:  None  Medication Changes:  No  Dietary Changes:  No  Activity Changes: No  Bacterial/Viral Infection:  No  Missed Coumadin Doses:  None  On ASA: No  Other Concerns:  No    OBJECTIVE:  INR Today: 3.36  Current Dose: 7mg QThursday, 6mg AOD.    ASSESSMENT:  History of pulmonary embolism  Encounter for therapeutic drug monitoring  Long term current use of anticoagulant therapy  Chronic. Stable. Supratherapeutic. Will dose Coumadin as noted below and recheck INR in 1 week. Follow-up accordingly. Therapeutic INR for goal of 2-3.    PLAN:  New Dose: 6mg every day.    Next INR: 1 week    Electronically signed by:  Dr. Lashanda Suárez, APRN CNP DNP

## 2018-10-29 NOTE — LETTER
10/29/2018        RE: Chilo Oneil  [address] [place] [address]        [facility]  [facility] Medical Record Number:  [id number]  Place of Service where encounter took place:  [facility]  (S) [[id number]]    HPI:    Chilo Oneil is a 68 year old  (1950), who is being seen today for an episodic care visit at the above location.   HPI information obtained from: facility chart records, facility staff, patient report, [facility] chart review and Care Everywhere [facility] chart review. Today's concern is INR/Coumadin management for PE    Bleeding Signs/Symptoms:  None  Thromboembolic Signs/Symptoms:  None  Medication Changes:  No  Dietary Changes:  No  Activity Changes: No  Bacterial/Viral Infection:  No  Missed Coumadin Doses:  None  On ASA: No  Other Concerns:  No    OBJECTIVE:  INR Today: 3.36  Current Dose: 7mg QThursday, 6mg AOD.    ASSESSMENT:  History of pulmonary embolism  Encounter for therapeutic drug monitoring  Long term current use of anticoagulant therapy  Chronic. Stable. Supratherapeutic. Will dose Coumadin as noted below and recheck INR in 1 week. Follow-up accordingly. Therapeutic INR for goal of 2-3.    PLAN:  New Dose: 6mg every day.    Next INR: 1 week    Electronically signed by:  Dr. Lashanda Suárez, APRN CNP DNP            Sincerely,        Lashanda Suárez, NP

## 2018-11-05 NOTE — LETTER
11/5/2018        RE: Chilo Oneil  [address] [place] [address]        [facility]  [place] Medical Record Number:  [id number]  Place of Service where encounter took place:  [facility]  (S) [[id number]]    HPI:    Chilo Oneil is a 68 year old  (1950), who is being seen today for an episodic care visit at the above location.   HPI information obtained from: facility chart records, facility staff, patient report and [facility] chart review. Today's concern is INR/Coumadin management for PE    Bleeding Signs/Symptoms:  None  Thromboembolic Signs/Symptoms:  None  Medication Changes:  No  Dietary Changes:  No  Activity Changes: No  Bacterial/Viral Infection:  No  Missed Coumadin Doses:  None  On ASA: No  Other Concerns:  No    OBJECTIVE:  INR Today:  4.03  Current Dose: 6mg every day.     ASSESSMENT:  History of pulmonary embolism  Encounter for therapeutic drug monitoring  Long term current use of anticoagulant therapy  Chronic. Stable. Supratherapeutic. Will hold Coumadin x1 today, give 0/5mg tomorrow, and recheck on Wednesday 11/7. Follow-up accordingly. Supratherapeutic INR for goal of 2-3.    PLAN:  New Dose: Hold today, 0.5mg tomorrow    Next INR: 2 days.    Electronically signed by:  NATALIE Harvey CNP DNP            Sincerely,        Lashanda Suárez, CIERA

## 2018-11-05 NOTE — PROGRESS NOTES
[facility]  [place] Medical Record Number:  [id number]  Place of Service where encounter took place:  [facility]  ([facility]) [[id number]]    HPI:    Chilo Oneil is a 68 year old  (1950), who is being seen today for an episodic care visit at the above location.   HPI information obtained from: facility chart records, facility staff, patient report and [facility] chart review. Today's concern is INR/Coumadin management for PE    Bleeding Signs/Symptoms:  None  Thromboembolic Signs/Symptoms:  None  Medication Changes:  No  Dietary Changes:  No  Activity Changes: No  Bacterial/Viral Infection:  No  Missed Coumadin Doses:  None  On ASA: No  Other Concerns:  No    OBJECTIVE:  INR Today:  4.03  Current Dose: 6mg every day.     ASSESSMENT:  History of pulmonary embolism  Encounter for therapeutic drug monitoring  Long term current use of anticoagulant therapy  Chronic. Stable. Supratherapeutic. Will hold Coumadin x1 today, give 0/5mg tomorrow, and recheck on Wednesday 11/7. Follow-up accordingly. Supratherapeutic INR for goal of 2-3.    PLAN:  New Dose: Hold today, 0.5mg tomorrow    Next INR: 2 days.    Electronically signed by:  Dr. Lashanda Suárez, APRN CNP DNP

## 2018-11-06 NOTE — PROGRESS NOTES
[facility] [facility]  [place] Medical Record Number:  [id number]  Place of Service where encounter took place:  [facility]  ([facility]) [[id number]]    HPI:    Chilo Oneil is a 68 year old  (1950), who is being seen today for an episodic care visit at the above location.   HPI information obtained from: facility chart records, facility staff, patient report and [facility] chart review. Today's concern is INR/Coumadin management for PE    Bleeding Signs/Symptoms:  None  Thromboembolic Signs/Symptoms:  None  Medication Changes:  No  Dietary Changes:  No  Activity Changes: No  Bacterial/Viral Infection:  No  Missed Coumadin Doses: Coumadin held x1 on 11/5 given supratherapeutic.   On ASA: No  Other Concerns:  No    OBJECTIVE:  INR Today: 1.92  Current Dose: Held x1, 0.5 yesterday, was previously on 7mg QTh, 6mg AOD.    ASSESSMENT:  History of pulmonary embolism  Encounter for therapeutic drug monitoring  Long term current use of anticoagulant therapy  Chronic. Stable. Slightly Subtherapeutic. Will dose coumadin as noted below and recheck INR in 2 days. Follow-up accordingly. Subtherapeutic INR for goal of 2-3.    PLAN:  New Dose: 6mg every day.   Next INR: 11/9.    Orders:  1. Bengay cream, apply to L shoulder TID in between Voltaren gel applications(must wiat at least 1 hour) can not mix these toprals. Dx: Pain    Electronically signed by:  Dr. Lashanda Suárez, APRN CNP DNP

## 2018-11-07 NOTE — LETTER
11/7/2018        RE: Chilo Oneil  [address] [facility] [address]        [place] [facility]  [place] Medical Record Number:  [id number]  Place of Service where encounter took place:  [facility]  (S) [[id number]]    HPI:    Chilo Oneil is a 68 year old  (1950), who is being seen today for an episodic care visit at the above location.   HPI information obtained from: facility chart records, facility staff, patient report and [facility] chart review. Today's concern is INR/Coumadin management for PE    Bleeding Signs/Symptoms:  None  Thromboembolic Signs/Symptoms:  None  Medication Changes:  No  Dietary Changes:  No  Activity Changes: No  Bacterial/Viral Infection:  No  Missed Coumadin Doses: Coumadin held x1 on 11/5 given supratherapeutic.   On ASA: No  Other Concerns:  No    OBJECTIVE:  INR Today: 1.92  Current Dose: Held x1, 0.5 yesterday, was previously on 7mg QTh, 6mg AOD.    ASSESSMENT:  History of pulmonary embolism  Encounter for therapeutic drug monitoring  Long term current use of anticoagulant therapy  Chronic. Stable. Slightly Subtherapeutic. Will dose coumadin as noted below and recheck INR in 2 days. Follow-up accordingly. Subtherapeutic INR for goal of 2-3.    PLAN:  New Dose: 6mg every day.   Next INR: 11/9.    Orders:  1. Bengay cream, apply to L shoulder TID in between Voltaren gel applications(must wiat at least 1 hour) can not mix these toprals. Dx: Pain    Electronically signed by:  Dr. Lashanda Suárez, APRN CNP DNP          Sincerely,        Lashanda Suárez, CIERA

## 2018-11-13 PROBLEM — E46 PROTEIN-CALORIE MALNUTRITION (H): Status: ACTIVE | Noted: 2018-01-01

## 2018-11-13 NOTE — PROGRESS NOTES
[place] [facility]  Chief Complaint   Patient presents with     custodial Acute     [place] Medical Record Number:  [id number]  Place of Service where encounter took place:  [facility]  ([facility]) [[id number]]    HPI:    Chilo Oneil is a 68 year old  (1950), who is being seen today for an episodic care visit.  HPI information obtained from: facility chart records, facility staff, patient report and [facility] chart review.     Today's concern is:  History of pulmonary embolism  Encounter for therapeutic drug monitoring  Long term current use of anticoagulant therapy  Patient has had a fluctuating INR for the past few weeks, which required Coumadin to be held x1 on 11/5. His coumadin dosing has been 6mg every day since that time. Today, INR is therapeutic at 2.42, patient denies sxs of bleeding/bruising, palpitations, dizziness, etc.      Carcinoma, lung, left (H)  Left shoulder pain, unspecified chronicity and etiology  Coronary artery disease involving native heart without angina pectoris, unspecified vessel or lesion type  Chronic pain syndrome  Essential hypertension  ADA (generalized anxiety disorder)  Patient has ongoing left shoulder pain for which he receives Dilaudid nearly Q3h, a steroid injection, trigger-point injections, topical bengay and voltaren. He has seen multiple specialists.    Nursing consulted me today, stating that patient is requesting to see ortho specialist (again).  Patient states that the left shoulder pain sometimes radiates into his neck, down his left arm, and he even stated to his chest.  This is intermittent, sharp, relieved with rest/meds. Suspecting cardiac in nature, I performed a chart review which showed NSR on EKG in July 2018, but Lexiscan in March 2018 was positive for showing ischemia. No nitrates in use at this time and patient has previous sighx of MI.  Chart review shows stable VS w/ BPs as noted below:  11/13/2018 15:24 138/71 mmHg    10/18/2018 23:27 137/77 mmHg   10/18/2018 13:43 153/78 mmHg   10/17/2018 21:34 112/56 mmHg   10/17/2018 14:32 154/87 mmHg  10/16/2018 22:39 134/56 mmHg   10/15/2018 23:08 128/80 mmHg   10/15/2018 13:28 141/84 mmHg     PM[facility]H reviewed [facility] today.     REVIEW OF SYSTEMS:  4 point ROS including Respiratory, CV, GI and , other than that noted in the HPI,  is negative    /77  Pulse 71  Temp 97.7  F (36.5  C)  Resp 18  Wt 153 lb (69.4 kg)  SpO2 94%  BMI 19.38 kg/m2  GENERAL APPEARANCE: Alert, in no distress, cooperative.   ENT: Mouth and posterior oropharynx normal, moist mucous membranes, hearing acuity [place].  EYES: EOM, conjunctivae, lids, pupils and irises normal, PERRL2.   RESP: Respiratory effort and palpation of chest normal, no respiratory distress, Lung sounds diminished throughout.   CV: Palpation and auscultation of heart done, rate and rhythm regular, no murmur, no rub or gallop, Edema None.  ABDOMEN: Normal bowel sounds, soft, nontender, no hepatosplenomegaly or other masses.  SKIN: Inspection/Palpation of skin and subcutaneous tissue baseline w/ scattered bruising and fragility.   NEURO: 2-12 in normal limits and at patient's baseline, sensation intact PPS.  PSYCH: Insight and judgement, memory intact, affect and mood normal.    ASSESSMENT/PLAN:  History of pulmonary embolism  Encounter for therapeutic drug monitoring  Long term current use of anticoagulant therapy  Chronic. Stable. Therapeutic. Will dose Coumadin as noted below and recheck INR in 1 week. Will follow-up accordingly.     Carcinoma, lung, left (H)  Left shoulder pain, unspecified chronicity and etiology  Coronary artery disease involving native heart without angina pectoris, unspecified vessel or lesion type  Chronic pain syndrome  Essential hypertension  ADA (generalized anxiety disorder)  Chronic. Stable. Suspect stable angina as differential. Given pain is relieved at rest or w/ meds, we will consult cardiology. We will  renew Lorazepam secondary to medicare guidelines. Follow-up routinely or as needed.    Orders:  1. Consult Cardiology. Dx: Left Shoulder radiating pain, suspecting stable angina.   2. Lorazepam 0.5 mg PO TID PRN from 11/23/ -12/7 Dx: ADA.  3. Coumadin 6 mg PO every day Dx: H/O PE  4. Recheck INR x1 on 11/19 Dx: H/O PE    Electronically signed by:  Dr. Lashanda Suárez, APRN CNP DNP

## 2018-11-13 NOTE — LETTER
11/13/2018        RE: Chilo Oneil  [address] [facility] [address]        [place] [facility]  Chief Complaint   Patient presents with     halfway Acute     [place] Medical Record Number:  [id number]  Place of Service where encounter took place:  [facility]  (S) [[id number]]    HPI:    Chilo Oneil is a 68 year old  (1950), who is being seen today for an episodic care visit.  HPI information obtained from: facility chart records, facility staff, patient report and [facility] chart review.     Today's concern is:  History of pulmonary embolism  Encounter for therapeutic drug monitoring  Long term current use of anticoagulant therapy  Patient has had a fluctuating INR for the past few weeks, which required Coumadin to be held x1 on 11/5. His coumadin dosing has been 6mg every day since that time. Today, INR is therapeutic at 2.42, patient denies sxs of bleeding/bruising, palpitations, dizziness, etc.      Carcinoma, lung, left (H)  Left shoulder pain, unspecified chronicity and etiology  Coronary artery disease involving native heart without angina pectoris, unspecified vessel or lesion type  Chronic pain syndrome  Essential hypertension  ADA (generalized anxiety disorder)  Patient has ongoing left shoulder pain for which he receives Dilaudid nearly Q3h, a steroid injection, trigger-point injections, topical bengay and voltaren. He has seen multiple specialists.    Nursing consulted me today, stating that patient is requesting to see ortho specialist (again).  Patient states that the left shoulder pain sometimes radiates into his neck, down his left arm, and he even stated to his chest.  This is intermittent, sharp, relieved with rest/meds. Suspecting cardiac in nature, I performed a chart review which showed NSR on EKG in July 2018, but Lexiscan in March 2018 was positive for showing ischemia. No nitrates in use at this time and patient has previous sighx of MI.   Chart review shows stable VS w/ BPs as noted below:  11/13/2018 15:24 138/71 mmHg   10/18/2018 23:27 137/77 mmHg   10/18/2018 13:43 153/78 mmHg   10/17/2018 21:34 112/56 mmHg   10/17/2018 14:32 154/87 mmHg  10/16/2018 22:39 134/56 mmHg   10/15/2018 23:08 128/80 mmHg   10/15/2018 13:28 141/84 mmHg     PMH/PSH reviewed in [facility] today.     REVIEW OF SYSTEMS:  4 point ROS including Respiratory, CV, GI and , other than that noted in the HPI,  is negative    /77  Pulse 71  Temp 97.7  F (36.5  C)  Resp 18  Wt 153 lb (69.4 kg)  SpO2 94%  BMI 19.38 kg/m2  GENERAL APPEARANCE: Alert, in no distress, cooperative.   ENT: Mouth and posterior oropharynx normal, moist mucous membranes, hearing acuity [place].  EYES: EOM, conjunctivae, lids, pupils and irises normal, PERRL2.   RESP: Respiratory effort and palpation of chest normal, no respiratory distress, Lung sounds diminished throughout.   CV: Palpation and auscultation of heart done, rate and rhythm regular, no murmur, no rub or gallop, Edema None.  ABDOMEN: Normal bowel sounds, soft, nontender, no hepatosplenomegaly or other masses.  SKIN: Inspection/Palpation of skin and subcutaneous tissue baseline w/ scattered bruising and fragility.   NEURO: 2-12 in normal limits and at patient's baseline, sensation intact PPS.  PSYCH: Insight and judgement, memory intact, affect and mood normal.    ASSESSMENT/PLAN:  History of pulmonary embolism  Encounter for therapeutic drug monitoring  Long term current use of anticoagulant therapy  Chronic. Stable. Therapeutic. Will dose Coumadin as noted below and recheck INR in 1 week. Will follow-up accordingly.     Carcinoma, lung, left (H)  Left shoulder pain, unspecified chronicity and etiology  Coronary artery disease involving native heart without angina pectoris, unspecified vessel or lesion type  Chronic pain syndrome  Essential hypertension  ADA (generalized anxiety disorder)  Chronic. Stable. Suspect stable angina as differential.  Given pain is relieved at rest or w/ meds, we will consult cardiology. We will renew Lorazepam secondary to medicare guidelines. Follow-up routinely or as needed.    Orders:  1. Consult Cardiology. Dx: Left Shoulder radiating pain, suspecting stable angina.   2. Lorazepam 0.5 mg PO TID PRN from 11/23/ -12/7 Dx: ADA.  3. Coumadin 6 mg PO every day Dx: H/O PE  4. Recheck INR x1 on 11/19 Dx: H/O PE    Electronically signed by:  Dr. Lashanda Suárez, APRN CNP DNP        Sincerely,        Lashanda Suárez, NP

## 2018-11-19 NOTE — LETTER
11/19/2018        RE: Chilo Oneil  [address] [facility] [address]        [place] [facility]  [place] Medical Record Number:  [id number]  Place of Service where encounter took place:  No question data found.    HPI:    Chilo Oneil is a 68 year old  (1950), who is being seen today for an episodic care visit at the above location.   HPI information obtained from: facility chart records, facility staff, patient report, [facility] chart review and Care Everywhere [facility] chart review. Today's concern is INR/Coumadin management for PE    Bleeding Signs/Symptoms:  None  Thromboembolic Signs/Symptoms:  None  Medication Changes:  No  Dietary Changes:  No  Activity Changes: No  Bacterial/Viral Infection:  No  Missed Coumadin Doses:  None  On ASA: No  Other Concerns:  No    OBJECTIVE:  INR Today: 2.60  Current Dose: 6 mg PO every day     ASSESSMENT:  History of pulmonary embolism  Encounter for therapeutic drug monitoring  Long term current use of anticoagulant therapy  Chronic. Stable. Therapeutic. Will dose Coumadin as noted below and recheck INR in about 1 week. Follow-up accordingly. Therapeutic INR for goal of 2-3.    PLAN:  New Dose: 6mg every day.    Next INR: 11/28.    Orders:  1.  Decrease Hydromorphone frequency from Q3h PRN to Q4h PRN w/ order otherwise the same.    Electronically signed by:  Dr. Lashanda Suárez, APRN CNP DNP            Sincerely,        Lashanda Suárez, NP

## 2018-11-19 NOTE — PROGRESS NOTES
[place]  [place] Medical Record Number:  [id number]  Place of Service where encounter took place:  No question data found.    HPI:    Chilo Oneil is a 68 year old  (1950), who is being seen today for an episodic care visit at the above location.   HPI information obtained from: facility chart records, facility staff, patient report, [facility] chart review and Care Everywhere [facility] chart review. Today's concern is INR/Coumadin management for PE    Bleeding Signs/Symptoms:  None  Thromboembolic Signs/Symptoms:  None  Medication Changes:  No  Dietary Changes:  No  Activity Changes: No  Bacterial/Viral Infection:  No  Missed Coumadin Doses:  None  On ASA: No  Other Concerns:  No    OBJECTIVE:  INR Today: 2.60  Current Dose: 6 mg PO every day     ASSESSMENT:  History of pulmonary embolism  Encounter for therapeutic drug monitoring  Long term current use of anticoagulant therapy  Chronic. Stable. Therapeutic. Will dose Coumadin as noted below and recheck INR in about 1 week. Follow-up accordingly. Therapeutic INR for goal of 2-3.    PLAN:  New Dose: 6mg every day.    Next INR: 11/28.    Orders:  1.  Decrease Hydromorphone frequency from Q3h PRN to Q4h PRN w/ order otherwise the same.    Electronically signed by:  Dr. Lashanda Suárez, APRN CNP DNP

## 2018-11-27 NOTE — PROGRESS NOTES
[place] [facility]  Chief Complaint   Patient presents with     senior care Acute     [place] Medical Record Number:  [id number]  Place of Service where encounter took place:  [facility]  ([facility]) [[id number]]    HPI:    Chilo Oneil is a 68 year old  (1950), who is being seen today for an episodic care visit.  HPI information obtained from: facility chart records, facility staff, patient report and [facility] chart review.     Today's concern is:  History of pulmonary embolism  Encounter for therapeutic drug monitoring  Long term current use of anticoagulant therapy  Patient denies CP, SOB, cough, palpitations, or new swelling. Last INR was 2.60 on 11/19 and patient's current Coumadin dose is 6mg every day. INR returned today at 4.77.  Patient was educatied on being careful for the next few days to prevent bleeding/bruising.     Moderate protein-calorie malnutrition (H)  Carcinoma, lung, left (H)  Left shoulder pain, unspecified chronicity and etiology  Chronic pain syndrome  Patient states his appetite is fair, and that his cancer doctor told him that his cancer is not worsening. He is scheduled for a chest CT in December and will see Dr. Mccauley on 01/03/19. He has continuous complaints of left should pain, which has been treated w/ intra-articular injections, trigger point injections, PO analgesics (including high dose opioids), topical bengay and voltaren, and a Fentanyl patch.  During our last visit, we talked about the possibility of Hyperalgesia, and he was willing to change his PRN Dilaudid from Q3h to Q4h.  Today, he states he is miserable, the pain is waking him up at night, and he wants this changed back to Q3h.  Cardiology consult in-the-works to rule-out ischemic etiology.      Hyperlipidemia LDL goal <100  Essential hypertension  Noting automatic prompt given by system for this patient to be on a statin. Patient remembers being on something for cholesterol and does  not recall side effects. Noting chart review shows Crestor was utilized in July 2018, but stopped for an unknown reason.  Perhaps due to patient's palliative goals of care. With patient continuing to pursue cancer treatments, and cancer remaining at-bay, potential for statin restart.  BPs are controlled as noted below, and last known Cholesterol was 227 in 2016.  11/14/2018 21:42 130/78 mmHg   11/13/2018 15:24 138/71 mmHg   10/18/2018 23:27 137/77 mmHg   10/18/2018 13:43 153/78 mmHg   10/17/2018 21:34 112/56 mmHg   10/17/2018 14:32 154/87 mmHg   10/16/2018 22:39 134/56 mmHg   10/15/2018 23:08 128/80 mmHg     PMH/PSH reviewed in [facility] today.    REVIEW OF SYSTEMS:  10 point ROS of systems including Constitutional, Eyes, Respiratory, Cardiovascular, Gastroenterology, Genitourinary, Integumentary, Musculoskeletal, Psychiatric were all negative except for pertinent positives noted in my HPI.    /78  Pulse 77  Temp 97.7  F (36.5  C)  Resp 18  Wt 150 lb 6.4 oz (68.2 kg)  SpO2 98%  BMI 19.05 kg/m2  GENERAL APPEARANCE: Alert, in no distress, cooperative.   ENT: Mouth and posterior oropharynx normal, moist mucous membranes, hearing acuity [place].  EYES: EOM, conjunctivae, lids, pupils and irises normal, PERRL2.   RESP: Respiratory effort and palpation of chest normal, no respiratory distress, Lung sounds diminished as per baseline w/ poor air movement.  CV: Palpation and auscultation of heart done, rate and rhythm regular, no murmur, no rub or gallop, Edema 0+ BLE.  ABDOMEN: Normal bowel sounds, soft, nontender, no hepatosplenomegaly or other masses.  SKIN: Inspection/Palpation of skin and subcutaneous tissue baseline w/ fragility.  NEURO: 2-12 at patient's baseline, sensation baseline PPS.  PSYCH: Insight and judgement, memory at baseline, affect and mood normal.    ASSESSMENT/PLAN:  History of pulmonary embolism  Encounter for therapeutic drug monitoring  Long term current use of anticoagulant therapy  Chronic.  Tenuous. Supratherapeutic. Will hold Coumadin today, give 0.5 tomorrow, and recheck Friday 11/30. Patient educated on safety precautions. Follow-up accordingly.    Moderate protein-calorie malnutrition (H)  Carcinoma, lung, left (H)  Left shoulder pain, unspecified chronicity and etiology  Chronic pain syndrome  Chronic. Will seek expert guidance for pain management specialist and return patient to prior regimen. Look forward to onc visit in January. Follow-up routinely or as needed.    Hyperlipidemia LDL goal <100  Essential hypertension  Chronic. Unknown. Will recheck fasting lipids, and consider statin use based on results. BPs stable. Will follow-up routinely or as needed.    Orders:  1. Hold Coumadin x1 today on 11/28. Dx: PE.  2. Coumadin 0.5 mg PO x1 tomorrow 11/29 Dx: PE.   3. Recheck Coumadin x1 on Friday 11/30. Dx: PE   4. Increase Dilaudid frequency back to Q3 hours PRN. Rx stays the same. Dx: Chronic. Pain  5. Consult pain management x1 Dx: Chronic pain  6. Fasting lipid panel x1 on 11/30. Dx: HLD.  FYI: Consider statin w/ follow-up.    Electronically signed by:  Dr. Lashanda Suárez, APRN CNP DNP

## 2018-11-28 NOTE — LETTER
11/28/2018        RE: Chilo Oneil  [address] [facility] [address]        [place] [facility]  Chief Complaint   Patient presents with     halfway Acute     [place] Medical Record Number:  [id number]  Place of Service where encounter took place:  [facility]  (S) [[id number]]    HPI:    Chilo Oneil is a 68 year old  (1950), who is being seen today for an episodic care visit.  HPI information obtained from: facility chart records, facility staff, patient report and [facility] chart review.     Today's concern is:  History of pulmonary embolism  Encounter for therapeutic drug monitoring  Long term current use of anticoagulant therapy  Patient denies CP, SOB, cough, palpitations, or new swelling. Last INR was 2.60 on 11/19 and patient's current Coumadin dose is 6mg every day. INR returned today at 4.77.  Patient was educatied on being careful for the next few days to prevent bleeding/bruising.     Moderate protein-calorie malnutrition (H)  Carcinoma, lung, left (H)  Left shoulder pain, unspecified chronicity and etiology  Chronic pain syndrome  Patient states his appetite is fair, and that his cancer doctor told him that his cancer is not worsening. He is scheduled for a chest CT in December and will see Dr. Mccauley on 01/03/19. He has continuous complaints of left should pain, which has been treated w/ intra-articular injections, trigger point injections, PO analgesics (including high dose opioids), topical bengay and voltaren, and a Fentanyl patch.  During our last visit, we talked about the possibility of Hyperalgesia, and he was willing to change his PRN Dilaudid from Q3h to Q4h.  Today, he states he is miserable, the pain is waking him up at night, and he wants this changed back to Q3h.  Cardiology consult in-the-works to rule-out ischemic etiology.      Hyperlipidemia LDL goal <100  Essential hypertension  Noting automatic prompt given by system for this  patient to be on a statin. Patient remembers being on something for cholesterol and does not recall side effects. Noting chart review shows Crestor was utilized in July 2018, but stopped for an unknown reason.  Perhaps due to patient's palliative goals of care. With patient continuing to pursue cancer treatments, and cancer remaining at-bay, potential for statin restart.  BPs are controlled as noted below, and last known Cholesterol was 227 in 2016.  11/14/2018 21:42 130/78 mmHg   11/13/2018 15:24 138/71 mmHg   10/18/2018 23:27 137/77 mmHg   10/18/2018 13:43 153/78 mmHg   10/17/2018 21:34 112/56 mmHg   10/17/2018 14:32 154/87 mmHg   10/16/2018 22:39 134/56 mmHg   10/15/2018 23:08 128/80 mmHg     PMH/PSH reviewed in EP[facility] today.    REVIEW OF SYSTEMS:  10 point ROS of systems including Constitutional, Eyes, Respiratory, Cardiovascular, Gastroenterology, Genitourinary, Integumentary, Musculoskeletal, Psychiatric were all negative except for pertinent positives noted in my HPI.    /78  Pulse 77  Temp 97.7  F (36.5  C)  Resp 18  Wt 150 lb 6.4 oz (68.2 kg)  SpO2 98%  BMI 19.05 kg/m2  GENERAL APPEARANCE: Alert, in no distress, cooperative.   ENT: Mouth and posterior oropharynx normal, moist mucous membranes, hearing acuity [place].  EYES: EOM, conjunctivae, lids, pupils and irises normal, PERRL2.   RESP: Respiratory effort and palpation of chest normal, no respiratory distress, Lung sounds diminished as per baseline w/ poor air movement.  CV: Palpation and auscultation of heart done, rate and rhythm regular, no murmur, no rub or gallop, Edema 0+ BLE.  ABDOMEN: Normal bowel sounds, soft, nontender, no hepatosplenomegaly or other masses.  SKIN: Inspection/Palpation of skin and subcutaneous tissue baseline w/ fragility.  NEURO: 2-12 at patient's baseline, sensation baseline PPS.  PSYCH: Insight and judgement, memory at baseline, affect and mood normal.    ASSESSMENT/PLAN:  History of pulmonary embolism  Encounter for  therapeutic drug monitoring  Long term current use of anticoagulant therapy  Chronic. Tenuous. Supratherapeutic. Will hold Coumadin today, give 0.5 tomorrow, and recheck Friday 11/30. Patient educated on safety precautions. Follow-up accordingly.    Moderate protein-calorie malnutrition (H)  Carcinoma, lung, left (H)  Left shoulder pain, unspecified chronicity and etiology  Chronic pain syndrome  Chronic. Will seek expert guidance for pain management specialist and return patient to prior regimen. Look forward to onc visit in January. Follow-up routinely or as needed.    Hyperlipidemia LDL goal <100  Essential hypertension  Chronic. Unknown. Will recheck fasting lipids, and consider statin use based on results. BPs stable. Will follow-up routinely or as needed.    Orders:  1. Hold Coumadin x1 today on 11/28. Dx: PE.  2. Coumadin 0.5 mg PO x1 tomorrow 11/29 Dx: PE.   3. Recheck Coumadin x1 on Friday 11/30. Dx: PE   4. Increase Dilaudid frequency back to Q3 hours PRN. Rx stays the same. Dx: Chronic. Pain  5. Consult pain management x1 Dx: Chronic pain  6. Fasting lipid panel x1 on 11/30. Dx: HLD.  FYI: Consider statin w/ follow-up.    Electronically signed by:  Dr. Lashanda Suárez, APRN CNP DNP        Sincerely,        Lashanda Suárez, CIERA

## 2018-11-29 NOTE — PROGRESS NOTES
[place] [facility]  [place] Medical Record Number:  [id number]  Place of Service where encounter took place: [facility]  ([facility]) [[id number]]    HPI:    Chilo Oneil is a 68 year old  (1950), who is being seen today for an episodic care visit at the above location.   HPI information obtained from: facility chart records, facility staff, patient report, [facility] chart review and Care Everywhere [facility] chart review. Today's concern is INR/Coumadin management for PE    Bleeding Signs/Symptoms:  None  Thromboembolic Signs/Symptoms:  None  Medication Changes:  No  Dietary Changes:  No  Activity Changes: No  Bacterial/Viral Infection:  No  Missed Coumadin Doses:  HELD DOSE on 11/28 for INR 4.77.  On ASA: No  Other Concerns:  No    OBJECTIVE:  INR Today: 2.52.   Current Dose: Was 6mg every day, until high INR. Then held x1 and 0.5 x1. Rechecked today.    ASSESSMENT:  History of pulmonary embolism  Encounter for therapeutic drug monitoring  Long term current use of anticoagulant therapy  Chronic. Stable. Therapeutic. Will dose couamdin as noted below and recheck INR in 1 week. Follow-up accordingly. Therapeutic INR for goal of 2-3.    PLAN:  New Dose: 6mg every day.    Next INR: 1 week.    Orders: Statin not prescribed. Fasting Lipid profile from today as noted below. While patient has positive ASCVD, survival from cancer is <10 years unless changes occur.       Electronically signed by:  Dr. Lashanda Suárez, APRN CNP DNP

## 2018-11-30 NOTE — LETTER
11/30/2018        RE: Chilo Oneil  [address] [facility] [address]        [facility]  [place] [facility] Record Number:  [id number]  Place of Service where encounter took place: [facility]  (S) [[id number]]    HPI:    Chilo Oneil is a 68 year old  (1950), who is being seen today for an episodic care visit at the above location.   HPI information obtained from: facility chart records, facility staff, patient report, [facility] chart review and Care Everywhere [facility] chart review. Today's concern is INR/Coumadin management for PE    Bleeding Signs/Symptoms:  None  Thromboembolic Signs/Symptoms:  None  Medication Changes:  No  Dietary Changes:  No  Activity Changes: No  Bacterial/Viral Infection:  No  Missed Coumadin Doses:  HELD DOSE on 11/28 for INR 4.77.  On ASA: No  Other Concerns:  No    OBJECTIVE:  INR Today: 2.52.   Current Dose: Was 6mg every day, until high INR. Then held x1 and 0.5 x1. Rechecked today.    ASSESSMENT:  History of pulmonary embolism  Encounter for therapeutic drug monitoring  Long term current use of anticoagulant therapy  Chronic. Stable. Therapeutic. Will dose couamdin as noted below and recheck INR in 1 week. Follow-up accordingly. Therapeutic INR for goal of 2-3.    PLAN:  New Dose: 6mg every day.    Next INR: 1 week.    Orders: Statin not prescribed. Fasting Lipid profile from today as noted below. While patient has positive ASCVD, survival from cancer is <10 years unless changes occur.       Electronically signed by:  Dr. Lashanda Suárez, APRN CNP DNP            Sincerely,        Lashanda Suárez, CIERA

## 2018-12-07 NOTE — PROGRESS NOTES
"[facility]  Chief Complaint   Patient presents with     Annual Comprehensive Nursing Home     INR RESULTS     [place] [facility] Record Number:  [id number]  Place of Service where encounter took place:  [facility]  (S) [[id number]]    HPI:    Chilo Oneil is a 68 year old  (1950), who is being seen today for an annual comprehensive visit.  HPI information obtained from: facility chart records, facility staff, patient report, [facility] chart review and Care Everywhere [facility] chart review.  Today's concerns are:    Carcinoma, lung, left (H)  History of pulmonary embolism  Encounter for therapeutic drug monitoring  Long term current use of anticoagulant therapy  Patient states his SOB is barely there, he continues to smoke around 1/2 pack/day, occassional cough, room air, denies fever/chills. He has the occasional beer when out of facility with friends. His INR has been variable, with last value as noted below, and Coumadin dosing at 6mg every day.  He wants to continue treatments for his lung cancer, which according to October CT & biopsy (adenocarcinoma), and was staged at pT3N0 and oncologist stated his disease process was \"stable.\"  He states that when he feels \"well enough\" he would like to transition to an independent living/AL w/ medication assistance.      Left shoulder pain, unspecified chronicity and etiology  Chronic pain syndrome  ADA (generalized anxiety disorder)  Patient states that he has intermittent left should pain, which sometimes wakes him up at night.  He states otherwise he sleeps well.  The pain is left shoulder, sometimes radiates to different locations, is sharp.  He has attempted trigger-point injections, intra-articular injections, topical treatments, and large amount of opioid treatments for which he has now become dependent.  We recently attempted a GDR of PRN Dilaudid (Q3h) and changed to Q4h, and after only a week, patient said the pain he " "experienced \"wasn't worth it\" and wanted it switched back to Q3h. He wants immediate relief, and he also wants to find out \"what is wrong.  XR in August 2018 showed just mild degenerative changes. He has seen ortho specialists, sports med, another primary care provider for a second opinion, oncology states this is unrelated to cancer. Therefore, we considered cardiac etiology as previous Lexiscan showed ischemia, but just recommended follow-up, therefore a referral was placed. We also considered that perhaps patient will need specialist to wean off short-acting opioids onto long-acting, and have also consulted pain management.     Moderate protein-calorie malnutrition (H)  Poor nutrition  Patient states he is tired, has a variable appetite, and he acknowledges that he has \"lost a lot of weight since coming here.\" In actuality, patient has gained weight since admit.  In July 2018, his admission weight was 135lbs, and this week his weights is 153 lbs.  His recent weight trend is noted below.  His admission albumin and total protein were 2.9/5.7 in July 2018, and have since slightly improved to 3.7/7.3 in September. It should be noted that during this time he stopped cancer treatments in August 2018.  He would like to continue his weight gain/improvement, especially if he decides to pursue cancer treatment in the future, and he is requesting house supplementation.  12/03/2018 10:12 151.4 Lbs (Standing)  11/28/2018 08:17 150.4 Lbs (Standing)  11/27/2018 07:47 148.2 Lbs (Standing)  11/20/2018 14:53 152 Lbs (Standing)  11/20/2018 07:04 155.6 Lbs (Standing)  11/13/2018 14:07 149.6 Lbs (Standing)  11/06/2018 13:27 153 Lbs (Standing)  10/29/2018 14:36 154.6 Lbs (Standing)  10/18/2018 11:58 156.4 Lbs (Standing)  10/08/2018 12:46 191.6 Lbs (Standing)  10/03/2018 10:53 155.7 Lbs (Standing)    Essential hypertension  Coronary artery disease involving native heart without angina pectoris, unspecified vessel or lesion " "type  Screening for DM  Screening for thyroid disorder  Patient denies CP or \"heart pain,\" denies palpitations, dizziness, headache. States he is tired as already noted. Chart review does not reveal a recent TSH or a1c, and weight changes/comorbidities are as listed above. His current regimen is below, he has a cardiology follow-up appt next week, and his recent BP trend is listed here. His lipid panel was normal, he is not diabetic, and with active cancer not being treated, his life expectancy is < 5 years, therefore no statin is initiated.  12/05/2018 19:41 132/69 mmHg   11/28/2018 17:07 132/76 mmHg   11/14/2018 21:42 130/78 mmHg   11/13/2018 15:24 138/71 mmHg   10/18/2018 23:27 137/77 mmHg   10/18/2018 13:43 153/78 mmHg   10/17/2018 21:34 112/56 mmHg  10/17/2018 14:32 154/87 mmHg   10/16/2018 22:39 134/56 mmHg   10/15/2018 23:08 128/80 mmHg  10/15/2018 13:28 141/84 mmHg  10/14/2018 15:13 142/74 mmHg   10/13/2018 17:38 136/82 mmHg  10/13/2018 11:04 161/78 mmHg     ALLERGIES: Cipro [ciprofloxacin]  PROBLEM LIST:  Patient Active Problem List   Diagnosis     Tobacco use disorder     PAD (peripheral artery disease) (H)     Benign essential hypertension     Hyperlipidemia LDL goal <100     Anxiety     Gastroesophageal reflux disease without esophagitis     CAD (coronary artery disease)     Lung cancer (H)     Uncomplicated asthma     Hyponatremia     SOB (shortness of breath)     Chemotherapy induced nausea and vomiting     Carcinoma, lung, left (H)     Hypokalemia     Hypomagnesemia     Pulmonary emboli (H)     Pulmonary embolism (H)     Other pulmonary embolism without acute cor pulmonale, unspecified chronicity (H)     Left shoulder pain, unspecified chronicity     Falls frequently     Near syncope     Coronary artery disease involving native heart without angina pectoris, unspecified vessel or lesion type     H/O acute myocardial infarction     Essential hypertension     CKD (chronic kidney disease) stage 3, GFR " 30-59 ml/min (H)     KYMBERLY (acute kidney injury) (H)     Uncomplicated alcohol dependence (H)     Poor nutrition     Nausea and vomiting, intractability of vomiting not specified, unspecified vomiting type     ADA (generalized anxiety disorder)     Insomnia, unspecified type     Debility     Cognitive impairment     Pancytopenia (H)     Long-term (current) use of anticoagulants [Z79.01]     Single current episode of major depressive disorder, unspecified depression episode severity     Hip pain, left, unclear chronicity/etiology     Chronic left shoulder pain     History of pulmonary embolism     Encounter for therapeutic drug monitoring     Protein-calorie malnutrition (H)     PAST MEDICAL HISTORY:  has a past medical history of GERD (gastroesophageal reflux disease); HTN (hypertension); Lung cancer (H); and MI (myocardial infarction) (H). He also has no past medical history of Complication of anesthesia; Difficult intubation; Malignant hyperthermia; or PONV (postoperative nausea and vomiting).  PAST SURGICAL HISTORY:  has a past surgical history that includes fracture tx, ankle rt/lt (1975); Open reduction internal fixation hip nailing (9/20/2013); Thoracoscopic biopsy lung (Left, 4/11/2018); vascular surgery; and Insert port vascular access (Left, 5/15/2018).  FAMILY HISTORY: family history includes Diabetes in his brother and father.  SOCIAL HISTORY:  reports that he has been smoking Cigarettes.  He started smoking about 50 years ago. He has a 7.05 pack-year smoking history. He has never used smokeless tobacco. He reports that he drinks alcohol. He reports that he uses illicit drugs, including Marijuana.  IMMUNIZATIONS:  Most Recent Immunizations   Administered Date(s) Administered     Flu, Unspecified 10/25/2018     Influenza (High Dose) 3 valent vaccine 03/29/2018     Above immunizations pulled from [facility]. [facility] and facility records also reconciled. Outstanding information sent to  to  update [facility].  Future immunizations are not needed at this point as all recommended immunizations are up to date or patient has refused additional vaccinations.   MEDICATIONS:  Current Outpatient Prescriptions   Medication Sig Dispense Refill     ACETAMINOPHEN PO Take 1,000 mg by mouth 3 times daily       AmLODIPine Besylate (NORVASC PO) Take 10 mg by mouth daily       camphor-menthol-methyl sal (MT ECKERT ULTRA STRENGTH) 4-10-30 % CREA Externally apply topically 3 times daily       diclofenac (VOLTAREN) 1 % GEL topical gel Apply 4 grams to knees or 2 grams to hands four times daily using enclosed dosing card. 100 g 1     DULoxetine HCl (CYMBALTA PO) Take 60 mg by mouth daily       fentaNYL (DURAGESIC) 12 mcg/hr 72 hr patch Place 1 patch onto the skin every 72 hours remove old patch. 30 patch 0     GABAPENTIN PO Take 300 mg by mouth 2 times daily        HYDROMORPHONE HCL PO Take 4-6 mg by mouth every 4 hours as needed for moderate to severe pain        LORAZEPAM PO Take 0.5 mg by mouth every 3 hours as needed for anxiety       MAGNESIUM OXIDE PO Take 400 mg by mouth 2 times daily        MELATONIN PO Take 3 mg by mouth At Bedtime       omeprazole (PRILOSEC) 20 MG CR capsule Take 1 capsule (20 mg) by mouth daily 90 capsule 3     ONDANSETRON PO Take 4 mg by mouth 3 times daily        polyethylene glycol (MIRALAX/GLYCOLAX) Packet Take 17 g by mouth daily       senna-docusate (SENOKOT-S;PERICOLACE) 8.6-50 MG per tablet Take 2 tablets by mouth 2 times daily 100 tablet 0     STATIN NOT PRESCRIBED (INTENTIONAL) 1 each daily Please choose reason not prescribed, below       THIAMINE HCL PO Take 100 mg by mouth daily       VENTOLIN  (90 Base) MCG/ACT Inhaler Inhale 2 puffs into the lungs every 4 hours as needed for shortness of breath / dyspnea or wheezing 1 Inhaler 1     Warfarin Sodium (COUMADIN PO) Take by mouth daily Take as directed per INR results       Medications reviewed:  Medications reconciled to  facility chart and changes were made to reflect current medications as identified as above med list. Below are the changes that were made:   Medications stopped since last EPIC medication reconciliation:   There are no discontinued medications.    Medications started since last [facility] medication reconciliation:  No orders of the defined types were placed in this encounter.    Case Management:  I have reviewed the facility/SNF care plan/MDS which was done, including the falls risk, nutrition and pain screening. I also reviewed the current immunizations, and preventive care. .Future cancer screening is clinically indicated, however no testing is due at this time. Patient's desire to return to the community is present, but is not able due to care needs . Current Level of Care is appropriate.    The health plan product change has happened. I have reviewed the  MDS, the preventative needs,  and facility care plan. The level of care is appropriate. I have reviewed the code status/advanced directives.     Advance Directive Discussion:    I reviewed the current advanced directives as reflected in EPIC, the POLST and the facility chart, and verified the congruency of orders. I did review the advance directives with the resident. Patient wishes to remain DNR/DNI.    Team Discussion:  I communicated with the appropriate disciplines involved with the Plan of Care: Nursing      Patient Goal:  Patient's goal is pain control and comfort, but also for transition to lower level of care/IL.   Information reviewed: Medications, vital signs, orders, and nursing notes.    ROS:  10 point ROS of systems including Constitutional, Eyes, Respiratory, Cardiovascular, Gastroenterology, Genitourinary, Integumentary, Musculoskeletal, Psychiatric were all negative except for pertinent positives noted in my HPI.    Exam:  /69  Pulse 68  Temp 97.3  F (36.3  C)  Resp 18  Wt 151 lb 6.4 oz (68.7 kg)  SpO2 96%  BMI 19.18 kg/m2  GENERAL  APPEARANCE: Alert, in no distress, cooperative  ENT: Mouth and posterior oropharynx normal, moist mucous membranes, normal hearing acuity  EYES: EOM, conjunctivae, lids, pupils and irises normal, PERRL2.   NECK: No adenopathy,masses or thyromegaly.  RESP: Respiratory effort and palpation of chest normal, lungs diminished to auscultation, no respiratory distress. On room air.   CV: Palpation and auscultation of heart done, regular rate and rhythm, no murmur, rub, or gallop, no edema, +2 pedal pulses.  ABDOMEN: Normal bowel sounds, soft, nontender, no hepatosplenomegaly or other masses, no guarding or rebound.  SKIN: Inspection of skin and subcutaneous tissue baseline, Palpation of skin and subcutaneous tissue baseline w/ darkening/klaus appearance to BLE. Left subclavian port.   NEURO: Cranial nerves 2-12 are grossly at patient's baseline, Examination of sensation by touch normal.  PSYCH: Oriented X 3, normal insight, judgement and memory, affect and mood normal.     Lab/Diagnostic data:  CBC RESULTS:     Recent Labs   Lab Test  09/26/18   0900  07/18/18   0808   WBC  8.3  4.8   RBC  3.02*  3.08*   HGB  10.3*  10.1*   HCT  30.4*  28.7*   MCV  101*  93   MCH  34.1*  32.8   MCHC  33.9  35.2   RDW  13.2  17.1*   PLT  350  336     Last Basic Metabolic Panel:    Recent Labs   Lab Test  09/26/18   0900  07/18/18   0808   NA  134  134   POTASSIUM  4.4  3.4   CHLORIDE  99  98   DAMARI  10.0  8.5   CO2  29  30   BUN  22  13   CR  0.78  0.86   GLC  114*  109*     Liver Function Studies -   Recent Labs   Lab Test  09/26/18   0900  07/03/18   1750   PROTTOTAL  7.3  5.7*   ALBUMIN  3.7  2.9*   BILITOTAL  0.3  0.6   ALKPHOS  82  61   AST  17  23   ALT  12  11     Lab Results   Component Value Date    A1C 4.7 04/04/2018     ASSESSMENT/PLAN  Carcinoma, lung, left (H)  History of pulmonary embolism  Encounter for therapeutic drug monitoring  Long term current use of anticoagulant therapy  Chronic. Stable. INR supratherapeutic. Will dose  Coumadin as noted below and recheck INR on 12/10. Appreciate heme/onc consult here in early January w/ [facility]. Follow-up routinely or as needed.    Left shoulder pain, unspecified chronicity and etiology  Chronic pain syndrome  ADA (generalized anxiety disorder)  Chronic. Ongoing. Referral to Pain Management in-process. Patient believes that Fentanyl patch is not working, as is agreeable to discontinuation. Will renew PRN Ativan order. Will stop Fentanyl  as noted below and follow-up routinely or as needed.     Moderate protein-calorie malnutrition (H)  Poor nutrition  Chronic. Stable. Will start house supplement. Patient agreeable to this change. Follow-up routinely or as needed.    Essential hypertension  Coronary artery disease involving native heart without angina pectoris, unspecified vessel or lesion type  Screening for DM  Screening for thyroid disorder  Chronic. Stable. Will screen w/ TSH and a1c given weight changes, pain, fatigue. Will not screen for colorectal cancer given recent full body PET scan. Referral to cardiology in-process and scheduled for 12/14. Follow-up routinely or as needed.    Orders:  1. TSH and free T4 x 1 on 12/10 Dx: Screening for thyroid disorder  2. Hgb A1C x 1 on 12/10 Dx: Screening for DM  3. Coumadin 0.5 mg x 1 today. 6 mg PO every day on all other days  4. Recheck INR x 1 on 12/10 Dx: H/O PE  5. Discontinue Fentanyl patch  7. Renew Lorazepam 0.5 mg one tab PO TID PRN Dx: ADA though 12.21  8. House supplement, 4 oz TID PO Dx: Malnutrition    Electronically signed by:  Dr. Lashanda Suárez, APRN CNP DNP

## 2018-12-07 NOTE — LETTER
"    12/7/2018        RE: Chilo Oneil  [address] [facility] [address]        [place] [facility]  Chief Complaint   Patient presents with     Annual Comprehensive Nursing Home     INR RESULTS     [place] Medical Record Number:  [id number]  Place of Service where encounter took place:  [facility] [place]  ([facility]GS) [[id number]]    HPI:    Chilo Oneil is a 68 year old  (1950), who is being seen today for an annual comprehensive visit.  HPI information obtained from: facility chart records, facility staff, patient report, [facility] chart review and Care Everywhere [facility] chart review.  Today's concerns are:    Carcinoma, lung, left (H)  History of pulmonary embolism  Encounter for therapeutic drug monitoring  Long term current use of anticoagulant therapy  Patient states his SOB is barely there, he continues to smoke around 1/2 pack/day, occassional cough, room air, denies fever/chills. He has the occasional beer when out of facility with friends. His INR has been variable, with last value as noted below, and Coumadin dosing at 6mg every day.  He wants to continue treatments for his lung cancer, which according to October CT & biopsy (adenocarcinoma), and was staged at pT3N0 and oncologist stated his disease process was \"stable.\"  He states that when he feels \"well enough\" he would like to transition to an independent living/AL w/ medication assistance.      Left shoulder pain, unspecified chronicity and etiology  Chronic pain syndrome  ADA (generalized anxiety disorder)  Patient states that he has intermittent left should pain, which sometimes wakes him up at night.  He states otherwise he sleeps well.  The pain is left shoulder, sometimes radiates to different locations, is sharp.  He has attempted trigger-point injections, intra-articular injections, topical treatments, and large amount of opioid treatments for which he has now become dependent.  We recently attempted a GDR of PRN " "Dilaudid (Q3h) and changed to Q4h, and after only a week, patient said the pain he experienced \"wasn't worth it\" and wanted it switched back to Q3h. He wants immediate relief, and he also wants to find out \"what is wrong.  XR in August 2018 showed just mild degenerative changes. He has seen ortho specialists, sports med, another primary care provider for a second opinion, oncology states this is unrelated to cancer. Therefore, we considered cardiac etiology as previous Lexiscan showed ischemia, but just recommended follow-up, therefore a referral was placed. We also considered that perhaps patient will need specialist to wean off short-acting opioids onto long-acting, and have also consulted pain management.     Moderate protein-calorie malnutrition (H)  Poor nutrition  Patient states he is tired, has a variable appetite, and he acknowledges that he has \"lost a lot of weight since coming here.\" In actuality, patient has gained weight since admit.  In July 2018, his admission weight was 135lbs, and this week his weights is 153 lbs.  His recent weight trend is noted below.  His admission albumin and total protein were 2.9/5.7 in July 2018, and have since slightly improved to 3.7/7.3 in September. It should be noted that during this time he stopped cancer treatments in August 2018.  He would like to continue his weight gain/improvement, especially if he decides to pursue cancer treatment in the future, and he is requesting house supplementation.  12/03/2018 10:12 151.4 Lbs (Standing)  11/28/2018 08:17 150.4 Lbs (Standing)  11/27/2018 07:47 148.2 Lbs (Standing)  11/20/2018 14:53 152 Lbs (Standing)  11/20/2018 07:04 155.6 Lbs (Standing)  11/13/2018 14:07 149.6 Lbs (Standing)  11/06/2018 13:27 153 Lbs (Standing)  10/29/2018 14:36 154.6 Lbs (Standing)  10/18/2018 11:58 156.4 Lbs (Standing)  10/08/2018 12:46 191.6 Lbs (Standing)  10/03/2018 10:53 155.7 Lbs (Standing)    Essential hypertension  Coronary artery disease " "involving native heart without angina pectoris, unspecified vessel or lesion type  Screening for DM  Screening for thyroid disorder  Patient denies CP or \"heart pain,\" denies palpitations, dizziness, headache. States he is tired as already noted. Chart review does not reveal a recent TSH or a1c, and weight changes/comorbidities are as listed above. His current regimen is below, he has a cardiology follow-up appt next week, and his recent BP trend is listed here. His lipid panel was normal, he is not diabetic, and with active cancer not being treated, his life expectancy is < 5 years, therefore no statin is initiated.  12/05/2018 19:41 132/69 mmHg   11/28/2018 17:07 132/76 mmHg   11/14/2018 21:42 130/78 mmHg   11/13/2018 15:24 138/71 mmHg   10/18/2018 23:27 137/77 mmHg   10/18/2018 13:43 153/78 mmHg   10/17/2018 21:34 112/56 mmHg  10/17/2018 14:32 154/87 mmHg   10/16/2018 22:39 134/56 mmHg   10/15/2018 23:08 128/80 mmHg  10/15/2018 13:28 141/84 mmHg  10/14/2018 15:13 142/74 mmHg   10/13/2018 17:38 136/82 mmHg  10/13/2018 11:04 161/78 mmHg     ALLERGIES: Cipro [ciprofloxacin]  PROBLEM LIST:  Patient Active Problem List   Diagnosis     Tobacco use disorder     PAD (peripheral artery disease) (H)     Benign essential hypertension     Hyperlipidemia LDL goal <100     Anxiety     Gastroesophageal reflux disease without esophagitis     CAD (coronary artery disease)     Lung cancer (H)     Uncomplicated asthma     Hyponatremia     SOB (shortness of breath)     Chemotherapy induced nausea and vomiting     Carcinoma, lung, left (H)     Hypokalemia     Hypomagnesemia     Pulmonary emboli (H)     Pulmonary embolism (H)     Other pulmonary embolism without acute cor pulmonale, unspecified chronicity (H)     Left shoulder pain, unspecified chronicity     Falls frequently     Near syncope     Coronary artery disease involving native heart without angina pectoris, unspecified vessel or lesion type     H/O acute myocardial " infarction     Essential hypertension     CKD (chronic kidney disease) stage 3, GFR 30-59 ml/min (H)     KYMBERLY (acute kidney injury) (H)     Uncomplicated alcohol dependence (H)     Poor nutrition     Nausea and vomiting, intractability of vomiting not specified, unspecified vomiting type     ADA (generalized anxiety disorder)     Insomnia, unspecified type     Debility     Cognitive impairment     Pancytopenia (H)     Long-term (current) use of anticoagulants [Z79.01]     Single current episode of major depressive disorder, unspecified depression episode severity     Hip pain, left, unclear chronicity/etiology     Chronic left shoulder pain     History of pulmonary embolism     Encounter for therapeutic drug monitoring     Protein-calorie malnutrition (H)     PAST MEDICAL HISTORY:  has a past medical history of GERD (gastroesophageal reflux disease); HTN (hypertension); Lung cancer (H); and MI (myocardial infarction) (H). He also has no past medical history of Complication of anesthesia; Difficult intubation; Malignant hyperthermia; or PONV (postoperative nausea and vomiting).  PAST SURGICAL HISTORY:  has a past surgical history that includes fracture tx, ankle rt/lt (1975); Open reduction internal fixation hip nailing (9/20/2013); Thoracoscopic biopsy lung (Left, 4/11/2018); vascular surgery; and Insert port vascular access (Left, 5/15/2018).  FAMILY HISTORY: family history includes Diabetes in his brother and father.  SOCIAL HISTORY:  reports that he has been smoking Cigarettes.  He started smoking about 50 years ago. He has a 7.05 pack-year smoking history. He has never used smokeless tobacco. He reports that he drinks alcohol. He reports that he uses illicit drugs, including Marijuana.  IMMUNIZATIONS:  Most Recent Immunizations   Administered Date(s) Administered     Flu, Unspecified 10/25/2018     Influenza (High Dose) 3 valent vaccine 03/29/2018     Above immunizations pulled from [facility]. MIMELINA and  facility records also reconciled. Outstanding information sent to  to update [facility].  Future immunizations are not needed at this point as all recommended immunizations are up to date or patient has refused additional vaccinations.   MEDICATIONS:  Current Outpatient Prescriptions   Medication Sig Dispense Refill     ACETAMINOPHEN PO Take 1,000 mg by mouth 3 times daily       AmLODIPine Besylate (NORVASC PO) Take 10 mg by mouth daily       camphor-menthol-methyl sal (MT ECKERT ULTRA STRENGTH) 4-10-30 % CREA Externally apply topically 3 times daily       diclofenac (VOLTAREN) 1 % GEL topical gel Apply 4 grams to knees or 2 grams to hands four times daily using enclosed dosing card. 100 g 1     DULoxetine HCl (CYMBALTA PO) Take 60 mg by mouth daily       fentaNYL (DURAGESIC) 12 mcg/hr 72 hr patch Place 1 patch onto the skin every 72 hours remove old patch. 30 patch 0     GABAPENTIN PO Take 300 mg by mouth 2 times daily        HYDROMORPHONE HCL PO Take 4-6 mg by mouth every 4 hours as needed for moderate to severe pain        LORAZEPAM PO Take 0.5 mg by mouth every 3 hours as needed for anxiety       MAGNESIUM OXIDE PO Take 400 mg by mouth 2 times daily        MELATONIN PO Take 3 mg by mouth At Bedtime       omeprazole (PRILOSEC) 20 MG CR capsule Take 1 capsule (20 mg) by mouth daily 90 capsule 3     ONDANSETRON PO Take 4 mg by mouth 3 times daily        polyethylene glycol (MIRALAX/GLYCOLAX) Packet Take 17 g by mouth daily       senna-docusate (SENOKOT-S;PERICOLACE) 8.6-50 MG per tablet Take 2 tablets by mouth 2 times daily 100 tablet 0     STATIN NOT PRESCRIBED (INTENTIONAL) 1 each daily Please choose reason not prescribed, below       THIAMINE HCL PO Take 100 mg by mouth daily       VENTOLIN  (90 Base) MCG/ACT Inhaler Inhale 2 puffs into the lungs every 4 hours as needed for shortness of breath / dyspnea or wheezing 1 Inhaler 1     Warfarin Sodium (COUMADIN PO) Take by mouth daily Take as  directed per INR results       Medications reviewed:  Medications reconciled to facility chart and changes were made to reflect current medications as identified as above med list. Below are the changes that were made:   Medications stopped since last EPIC medication reconciliation:   There are no discontinued medications.    Medications started since last [facility] medication reconciliation:  No orders of the defined types were placed in this encounter.    Case Management:  I have reviewed the facility/SNF care plan/MDS which was done, including the falls risk, nutrition and pain screening. I also reviewed the current immunizations, and preventive care. .Future cancer screening is clinically indicated, however no testing is due at this time. Patient's desire to return to the community is present, but is not able due to care needs . Current Level of Care is appropriate.    The health plan product change has happened. I have reviewed the  MDS, the preventative needs,  and facility care plan. The level of care is appropriate. I have reviewed the code status/advanced directives.     Advance Directive Discussion:    I reviewed the current advanced directives as reflected in EPIC, the POLST and the facility chart, and verified the congruency of orders. I did review the advance directives with the resident. Patient wishes to remain DNR/DNI.    Team Discussion:  I communicated with the appropriate disciplines involved with the Plan of Care: Nursing      Patient Goal:  Patient's goal is pain control and comfort, but also for transition to lower level of care/IL.   Information reviewed: Medications, vital signs, orders, and nursing notes.    ROS:  10 point ROS of systems including Constitutional, Eyes, Respiratory, Cardiovascular, Gastroenterology, Genitourinary, Integumentary, Musculoskeletal, Psychiatric were all negative except for pertinent positives noted in my HPI.    Exam:  /69  Pulse 68  Temp 97.3  F (36.3  C)   Resp 18  Wt 151 lb 6.4 oz (68.7 kg)  SpO2 96%  BMI 19.18 kg/m2  GENERAL APPEARANCE: Alert, in no distress, cooperative  ENT: Mouth and posterior oropharynx normal, moist mucous membranes, normal hearing acuity  EYES: EOM, conjunctivae, lids, pupils and irises normal, PERRL2.   NECK: No adenopathy,masses or thyromegaly.  RESP: Respiratory effort and palpation of chest normal, lungs diminished to auscultation, no respiratory distress. On room air.   CV: Palpation and auscultation of heart done, regular rate and rhythm, no murmur, rub, or gallop, no edema, +2 pedal pulses.  ABDOMEN: Normal bowel sounds, soft, nontender, no hepatosplenomegaly or other masses, no guarding or rebound.  SKIN: Inspection of skin and subcutaneous tissue baseline, Palpation of skin and subcutaneous tissue baseline w/ darkening/klaus appearance to BLE. Left subclavian port.   NEURO: Cranial nerves 2-12 are grossly at patient's baseline, Examination of sensation by touch normal.  PSYCH: Oriented X 3, normal insight, judgement and memory, affect and mood normal.     Lab/Diagnostic data:  CBC RESULTS:     Recent Labs   Lab Test  09/26/18   0900 07/18/18   0808   WBC  8.3  4.8   RBC  3.02*  3.08*   HGB  10.3*  10.1*   HCT  30.4*  28.7*   MCV  101*  93   MCH  34.1*  32.8   MCHC  33.9  35.2   RDW  13.2  17.1*   PLT  350  336     Last Basic Metabolic Panel:    Recent Labs   Lab Test  09/26/18   0900  07/18/18   0808   NA  134  134   POTASSIUM  4.4  3.4   CHLORIDE  99  98   DAMARI  10.0  8.5   CO2  29  30   BUN  22  13   CR  0.78  0.86   GLC  114*  109*     Liver Function Studies -   Recent Labs   Lab Test  09/26/18   0900  07/03/18   1750   PROTTOTAL  7.3  5.7*   ALBUMIN  3.7  2.9*   BILITOTAL  0.3  0.6   ALKPHOS  82  61   AST  17  23   ALT  12  11     Lab Results   Component Value Date    A1C 4.7 04/04/2018     ASSESSMENT/PLAN  Carcinoma, lung, left (H)  History of pulmonary embolism  Encounter for therapeutic drug monitoring  Long term current use  of anticoagulant therapy  Chronic. Stable. INR supratherapeutic. Will dose Coumadin as noted below and recheck INR on 12/10. Appreciate heme/onc consult here in early January w/ Gar[facility]. Follow-up routinely or as needed.    Left shoulder pain, unspecified chronicity and etiology  Chronic pain syndrome  ADA (generalized anxiety disorder)  Chronic. Ongoing. Referral to Pain Management in-process. Patient believes that Fentanyl patch is not working, as is agreeable to discontinuation. Will renew PRN Ativan order. Will stop Fentanyl  as noted below and follow-up routinely or as needed.     Moderate protein-calorie malnutrition (H)  Poor nutrition  Chronic. Stable. Will start house supplement. Patient agreeable to this change. Follow-up routinely or as needed.    Essential hypertension  Coronary artery disease involving native heart without angina pectoris, unspecified vessel or lesion type  Screening for DM  Screening for thyroid disorder  Chronic. Stable. Will screen w/ TSH and a1c given weight changes, pain, fatigue. Will not screen for colorectal cancer given recent full body PET scan. Referral to cardiology in-process and scheduled for 12/14. Follow-up routinely or as needed.    Orders:  1. TSH and free T4 x 1 on 12/10 Dx: Screening for thyroid disorder  2. Hgb A1C x 1 on 12/10 Dx: Screening for DM  3. Coumadin 0.5 mg x 1 today. 6 mg PO every day on all other days  4. Recheck INR x 1 on 12/10 Dx: H/O PE  5. Discontinue Fentanyl patch  7. Renew Lorazepam 0.5 mg one tab PO TID PRN Dx: ADA though 12.21  8. House supplement, 4 oz TID PO Dx: Malnutrition    Electronically signed by:  Dr. Lashanda Suárez, APRN CNP DNP          Sincerely,        Lashanda Suárez, NP

## 2018-12-10 NOTE — LETTER
12/10/2018        RE: Chilo Oneil  [address] [place] [id number]        [place] [facility]  [place] Medical Record Number:  [id number]  Place of Service where encounter took place:  [facility]  (S) [[id number]]    HPI:    Chilo Oneil is a 68 year old  (1950), who is being seen today for an episodic care visit at the above location.   HPI information obtained from: facility chart records, facility staff, patient report and [facility] chart review. Today's concern is INR/Coumadin management for h/o PE    Bleeding Signs/Symptoms:  None  Thromboembolic Signs/Symptoms:  None  Medication Changes:  No  Dietary Changes:  No  Activity Changes: No  Bacterial/Viral Infection:  No  Missed Coumadin Doses:  None  On ASA: No  Other Concerns:  No    OBJECTIVE:  INR Today: 3.06  Current Dose:  0.5 mg x 1 on 12/7. 6 mg PO every day on all other days. Was 6mg every day prior.     ASSESSMENT:  History of pulmonary embolism  Encounter for therapeutic drug monitoring  Long term current use of anticoagulant therapy  Chronic. Stable. Slightly supratherapeutic but improving from prior. Will dose Coumadin as noted below and recheck INR in 5 days. Follow-up accordingly. Therapeutic INR for goal of 2-3.    PLAN:  New Dose: 5mg today, 6mg AOD.    Next INR: On Friday 12/14.     Electronically signed by:   Dr. Lashanda Suárez, APRN CNP DNP            Sincerely,        Lashanda Suárez, NP

## 2018-12-10 NOTE — PROGRESS NOTES
[place] [facility]  [place] Medical Record Number:  [id number]  Place of Service where encounter took place:  [facility]  (S) [[id number]]    HPI:    Chilo Oneil is a 68 year old  (1950), who is being seen today for an episodic care visit at the above location.   HPI information obtained from: facility chart records, facility staff, patient report and [facility] chart review. Today's concern is INR/Coumadin management for h/o PE    Bleeding Signs/Symptoms:  None  Thromboembolic Signs/Symptoms:  None  Medication Changes:  No  Dietary Changes:  No  Activity Changes: No  Bacterial/Viral Infection:  No  Missed Coumadin Doses:  None  On ASA: No  Other Concerns:  No    OBJECTIVE:  INR Today: 3.06  Current Dose:  0.5 mg x 1 on 12/7. 6 mg PO every day on all other days. Was 6mg every day prior.     ASSESSMENT:  History of pulmonary embolism  Encounter for therapeutic drug monitoring  Long term current use of anticoagulant therapy  Chronic. Stable. Slightly supratherapeutic but improving from prior. Will dose Coumadin as noted below and recheck INR in 5 days. Follow-up accordingly. Therapeutic INR for goal of 2-3.    PLAN:  New Dose: 5mg today, 6mg AOD.    Next INR: On Friday 12/14.     Electronically signed by:   Dr. Lashanda Suárez, NATALIE CNP DNP

## 2018-12-12 NOTE — PROGRESS NOTES
[place] [facility]  Chief Complaint   Patient presents with     [facility]     [place] Medical Record Number:  [id number]  Place of Service where encounter took place:  [facility]  ([facility]GS) [[id number]]    HPI:    Chilo Oneil is a 68 year old  (1950), who is being seen today for an episodic care visit.  HPI information obtained from: facility chart records, facility staff, patient report and [facility] chart review.     Today's concern is:  Carcinoma, lung, left (H)  Left shoulder pain, unspecified chronicity and etiology  Chronic pain syndrome  ADA (generalized anxiety disorder)  Debility  Patient used some Oxycodone that he got from a friend, and later reported to nursing that he thought it was really helpful for his pain.  Nursing educated him on safety around medication use/misuse. He subsequently was requesting a visit to get some oxycodone ordered for his pain.    Today, patient states that pain is again primarily in left shoulder, it wakes him up at night, is intermittent he take Q3h PRN Dilaudid almost routinely. We recently discontinued his Fentanyl patch because he did not believe that it would help. Provider consulted PharmD Dr. Lind for equivalence calculation for us to start OxyContin w/ breakthrough oxycodone. Patient has a pain clinic referral, but cannot get in until January.  We have tried multiple modalities.     Today, I recommended a big change in regimen which is noted and ordered below.  I educated patient that OxyContin is long-acting, w/ breakthrough Oxycodone which is short-acting.  He is accustomed to asking the nurse Q3h when in pain/anxious for Dilaudid, and provider made clear that he would not be able to do that with this big regimen change. He seems to think this pain is arthritis, yet XR showed only mild degeneration. Ortho and SportsMed have also consulted, he has received joint injection (from myself), and nothing seems to help. Cardiology  consult for tomorrow in case this is intermittent ischemia.      PMH/PSH reviewed in [facility] today.    REVIEW OF SYSTEMS:  4 point ROS including Respiratory, CV, GI and , other than that noted in the HPI,  is negative    /66   Pulse 75   Temp 98.5  F (36.9  C)   Resp 18   Wt 66.8 kg (147 lb 3.2 oz)   SpO2 94%   BMI 18.65 kg/m    GENERAL APPEARANCE: Alert, in no distress, cooperative.   ENT: Mouth and posterior oropharynx normal, moist mucous membranes, hearing acuity [place].  EYES: EOM, conjunctivae, lids, pupils and irises normal, PERRL2.   SKIN: Inspection/Palpation of skin and subcutaneous tissue baseline w/ fragility.  NEURO: 2-12 at patient's baseline, sensation baseline PPS.  PSYCH: Insight and judgement, memory baseline, affect and mood normal.    ASSESSMENT/PLAN:  Carcinoma, lung, left (H)  Left shoulder pain, unspecified chronicity and etiology  Chronic pain syndrome  ADA (generalized anxiety disorder)  Debility  Chronic. Stable. Change pain regimen as noted below, which will ease pill burden and hopefully provide more sustained relief for patient.  Nursing was also encouraged to provide ongoing education and support, as this regimen change will come with a behavior change too, and nursing was encouraged to use non-pharmacological methods to help control pain/anxiety during this transition.      Orders:  1. Discontinue all Dilaudid orders when OxyContin and Oxycodone arrives.   2. OxyContin ER 30mg PO BID. Dx: Chronic. Pain Syndrome.  3. Oxycodone 5mg PO Q8H PRN. Dx: Breakthrough severe pain.   FYI: This regimen change made in concert w/ PharmD and patient.     Electronically signed by:  Dr. Lashanda Suárez, APRN CNP DNP

## 2018-12-13 NOTE — LETTER
12/13/2018        RE: Chilo Oneil  [facility]  [address] [facility] [address]        [facility]  Chief Complaint   Patient presents with     [facility] Acute     [place] Medical Record Number:  [id number]  Place of Service where encounter took place:  [facility]  ([facility]) [[id number]]    HPI:    Chilo Oneil is a 68 year old  (1950), who is being seen today for an episodic care visit.  HPI information obtained from: facility chart records, facility staff, patient report and [facility] chart review.     Today's concern is:  Carcinoma, lung, left (H)  Left shoulder pain, unspecified chronicity and etiology  Chronic pain syndrome  ADA (generalized anxiety disorder)  Debility  Patient used some Oxycodone that he got from a friend, and later reported to nursing that he thought it was really helpful for his pain.  Nursing educated him on safety around medication use/misuse. He subsequently was requesting a visit to get some oxycodone ordered for his pain.    Today, patient states that pain is again primarily in left shoulder, it wakes him up at night, is intermittent he take Q3h PRN Dilaudid almost routinely. We recently discontinued his Fentanyl patch because he did not believe that it would help. Provider consulted PharmD Dr. Lind for equivalence calculation for us to start OxyContin w/ breakthrough oxycodone. Patient has a pain clinic referral, but cannot get in until January.  We have tried multiple modalities.     Today, I recommended a big change in regimen which is noted and ordered below.  I educated patient that OxyContin is long-acting, w/ breakthrough Oxycodone which is short-acting.  He is accustomed to asking the nurse Q3h when in pain/anxious for Dilaudid, and provider made clear that he would not be able to do that with this big regimen change. He seems to think this pain is arthritis, yet XR showed only mild degeneration. Ortho  and [facility] have also consulted, he has received joint injection (from myself), and nothing seems to help. Cardiology consult for tomorrow in case this is intermittent ischemia.      PMH/PSH reviewed in [facility] today.    REVIEW OF SYSTEMS:  4 point ROS including Respiratory, CV, GI and , other than that noted in the HPI,  is negative    /66   Pulse 75   Temp 98.5  F (36.9  C)   Resp 18   Wt 66.8 kg (147 lb 3.2 oz)   SpO2 94%   BMI 18.65 kg/m     GENERAL APPEARANCE: Alert, in no distress, cooperative.   ENT: Mouth and posterior oropharynx normal, moist mucous membranes, hearing acuity Eek.  EYES: EOM, conjunctivae, lids, pupils and irises normal, PERRL2.   SKIN: Inspection/Palpation of skin and subcutaneous tissue baseline w/ fragility.  NEURO: 2-12 at patient's baseline, sensation baseline PPS.  PSYCH: Insight and judgement, memory baseline, affect and mood normal.    ASSESSMENT/PLAN:  Carcinoma, lung, left (H)  Left shoulder pain, unspecified chronicity and etiology  Chronic pain syndrome  ADA (generalized anxiety disorder)  Debility  Chronic. Stable. Change pain regimen as noted below, which will ease pill burden and hopefully provide more sustained relief for patient.  Nursing was also encouraged to provide ongoing education and support, as this regimen change will come with a behavior change too, and nursing was encouraged to use non-pharmacological methods to help control pain/anxiety during this transition.      Orders:  1. Discontinue all Dilaudid orders when OxyContin and Oxycodone arrives.   2. OxyContin ER 30mg PO BID. Dx: Chronic. Pain Syndrome.  3. Oxycodone 5mg PO Q8H PRN. Dx: Breakthrough severe pain.   FYI: This regimen change made in concert w/ PharmD and patient.     Electronically signed by:  Dr. Lashanda Suárez, APRN CNP DNP        Sincerely,        Lashanda Suárez, NP

## 2018-12-14 NOTE — PROGRESS NOTES
CARDIOLOGY VISIT    REASON FOR VISIT: chest pain    SUBJECTIVE:  68-year-old male seen for chest pain.     He has a history of pulmonary embolism, on chronic warfarin, tobacco abuse, peripheral arterial disease, hypertension, dyslipidemia, lung cancer, and CKD.     In early 2018 he was diagnosed with adenocarcinoma of the lung, stage T3N0.  He underwent a wedge resection.  He then had subsequent chemotherapy.    Echo August 2016 showed EF 65%, no wall motion abnormality, normal RV, no valve disease.     Lexiscan nuclear stress March 2018 showed small reversible inferior and apical defect, cannot exclude his ischemia, probable diaphragm attenuation, EF 61%.     He was seen by oncology in October 2018.  Most recent CT chest scan showed a stable disease, he is going to follow-up routinely.     Currently he is off chemotherapy.  He sees oncology again in January, they will reassess.  CT scan in September 2018 showed decreased lung mass.  There was significant coronary calcification noted.    He again complains of left shoulder pain, this is a very chronic issue.  He has pain in his shoulder that radiates into his left arm and sometimes into his chest.  This is worse at night when he is laying in bed.  It seems to be a little better during the day.  It does not really come on with exertion.  Currently he is living in a nursing home.  He has lost a significant amount of weight with his chemotherapy.  He walks slowly using a cane for balance, he denies any falls.    MEDICATIONS:  Current Outpatient Medications   Medication     ACETAMINOPHEN PO     AmLODIPine Besylate (NORVASC PO)     camphor-menthol-methyl sal (MT ECKERT ULTRA STRENGTH) 4-10-30 % CREA     diclofenac (VOLTAREN) 1 % GEL topical gel     DULoxetine HCl (CYMBALTA PO)     fentaNYL (DURAGESIC) 12 mcg/hr 72 hr patch     GABAPENTIN PO     HYDROMORPHONE HCL PO     LORAZEPAM PO     MAGNESIUM OXIDE PO     MELATONIN PO     omeprazole (PRILOSEC) 20 MG CR capsule      ONDANSETRON PO     polyethylene glycol (MIRALAX/GLYCOLAX) Packet     senna-docusate (SENOKOT-S;PERICOLACE) 8.6-50 MG per tablet     STATIN NOT PRESCRIBED (INTENTIONAL)     THIAMINE HCL PO     VENTOLIN  (90 Base) MCG/ACT Inhaler     Warfarin Sodium (COUMADIN PO)     No current facility-administered medications for this visit.        ALLERGIES:  Allergies   Allergen Reactions     Cipro [Ciprofloxacin] Swelling       REVIEW OF SYSTEMS:  Constitutional:  No weight loss, fever, chills, weakness or fatigue.  HEENT:  Eyes:  No visual loss, blurred vision, double vision or yellow sclerae. No hearing loss, sneezing, congestion, runny nose or sore throat.  Skin:  No rash or itching.  Cardiovascular: per HPI  Respiratory: per HPI  GI:  No anorexia, nausea, vomiting or diarrhea. No abdominal pain or blood.  :  No dysurea, hematuria  Neurologic:  No headache, dizziness, syncope, paralysis, ataxia, numbness or tingling in the extremities. No change in bowel or bladder control.  Musculoskeletal:  No muscle, back pain, joint pain or stiffness.  Hematologic:  No anemia, bleeding or bruising.  Lymphatics:  No enlarged nodes. No history of splenectomy.  Psychiatric:  No history of depression or anxiety.  Endocrine:  No reports of sweating, cold or heat intolerance. No polyuria or polydipsia.  Allergies:  No history of asthma, hives, eczema or rhinitis.    PHYSICAL EXAM:                     Vital Signs with Ranges  Temp:  [98.5  F (36.9  C)] 98.5  F (36.9  C)  Pulse:  [75] 75  Resp:  [18] 18  BP: (147)/(66) 147/66  SpO2:  [94 %] 94 %  0 lbs 0 oz    Constitutional: awake, alert, no distress  Eyes: PERRL, sclera nonicteric  ENT: trachea midline  Respiratory: Lungs clear  Cardiovascular: Regular rate and rhythm, no murmur, no lower extremity edema  GI: nondistended, nontender, bowel sounds present  Lymph/Hematologic: no lymphadenopathy  Skin: dry, no rash  Musculoskeletal: good muscle tone, strength 5/5 in upper and lower  extremities  Neurologic: no focal deficits  Neuropsychiatric: appropriate affact    DATA:  Lab: December 10, 2018: Hemoglobin 9.6, cholesterol 175, HDL 44, LDL 99   September 2018: Creatinine 0.8     ASSESSMENT:  68-year-old male seen for follow-up of chest and shoulder pain.  His pain is likely musculoskeletal, it is quite atypical for angina.  However he did have significant coronary calcification noted on his chest CT recently.  His nuclear stress from early 2018 showed possible inferior ischemia versus attenuation.    At this point will continue with medical therapy.  If his chest pain symptoms become more exertional, angiogram may be indicated.  Stress test will be repeated in mid 2019 to see if there is any worsening from the previous one.    RECOMMENDATIONS:  1. Chest and shoulder pain, suspect noncardiac  - Repeat Lexiscan nuclear stress test in mid 2019  -On follow-up consider adding aspirin if his symptoms are any worse or stress test looks any worse  -Moderate threshold for angiogram in the context of his lung cancer    Follow-up in 6 months with MONY.    Chad Crawford MD  Cardiology - [facility]  Pager:  [phone]  Text Page  December 14, 2018

## 2018-12-14 NOTE — LETTER
12/14/2018    Lashanda Suárez, NP  [address]  [facility] [address]    RE: Chilo Oneil       Dear Colleague,    I had the pleasure of seeing Chilo Oneil in the [facility].    CARDIOLOGY VISIT    REASON FOR VISIT: chest pain    SUBJECTIVE:  68-year-old male seen for chest pain.     He has a history of pulmonary embolism, on chronic warfarin, tobacco abuse, peripheral arterial disease, hypertension, dyslipidemia, lung cancer, and CKD.     In early 2018 he was diagnosed with adenocarcinoma of the lung, stage T3N0.  He underwent a wedge resection.  He then had subsequent chemotherapy.    Echo August 2016 showed EF 65%, no wall motion abnormality, normal RV, no valve disease.     Lexiscan nuclear stress March 2018 showed small reversible inferior and apical defect, cannot exclude his ischemia, probable diaphragm attenuation, EF 61%.     He was seen by oncology in October 2018.  Most recent CT chest scan showed a stable disease, he is going to follow-up routinely.     Currently he is off chemotherapy.  He sees oncology again in January, they will reassess.  CT scan in September 2018 showed decreased lung mass.  There was significant coronary calcification noted.    He again complains of left shoulder pain, this is a very chronic issue.  He has pain in his shoulder that radiates into his left arm and sometimes into his chest.  This is worse at night when he is laying in bed.  It seems to be a little better during the day.  It does not really come on with exertion.  Currently he is living in a nursing home.  He has lost a significant amount of weight with his chemotherapy.  He walks slowly using a cane for balance, he denies any falls.    MEDICATIONS:  Current Outpatient Medications   Medication     ACETAMINOPHEN PO     AmLODIPine Besylate (NORVASC PO)     camphor-menthol-methyl sal (MT ECKERT ULTRA STRENGTH) 4-10-30 % CREA     diclofenac (VOLTAREN) 1 % GEL topical gel      DULoxetine HCl (CYMBALTA PO)     fentaNYL (DURAGESIC) 12 mcg/hr 72 hr patch     GABAPENTIN PO     HYDROMORPHONE HCL PO     LORAZEPAM PO     MAGNESIUM OXIDE PO     MELATONIN PO     omeprazole (PRILOSEC) 20 MG CR capsule     ONDANSETRON PO     polyethylene glycol (MIRALAX/GLYCOLAX) Packet     senna-docusate (SENOKOT-S;PERICOLACE) 8.6-50 MG per tablet     STATIN NOT PRESCRIBED (INTENTIONAL)     THIAMINE HCL PO     VENTOLIN  (90 Base) MCG/ACT Inhaler     Warfarin Sodium (COUMADIN PO)     No current facility-administered medications for this visit.        ALLERGIES:  Allergies   Allergen Reactions     Cipro [Ciprofloxacin] Swelling       REVIEW OF SYSTEMS:  Constitutional:  No weight loss, fever, chills, weakness or fatigue.  HEENT:  Eyes:  No visual loss, blurred vision, double vision or yellow sclerae. No hearing loss, sneezing, congestion, runny nose or sore throat.  Skin:  No rash or itching.  Cardiovascular: per HPI  Respiratory: per HPI  GI:  No anorexia, nausea, vomiting or diarrhea. No abdominal pain or blood.  :  No dysurea, hematuria  Neurologic:  No headache, dizziness, syncope, paralysis, ataxia, numbness or tingling in the extremities. No change in bowel or bladder control.  Musculoskeletal:  No muscle, back pain, joint pain or stiffness.  Hematologic:  No anemia, bleeding or bruising.  Lymphatics:  No enlarged nodes. No history of splenectomy.  Psychiatric:  No history of depression or anxiety.  Endocrine:  No reports of sweating, cold or heat intolerance. No polyuria or polydipsia.  Allergies:  No history of asthma, hives, eczema or rhinitis.    PHYSICAL EXAM:                     Vital Signs with Ranges  Temp:  [98.5  F (36.9  C)] 98.5  F (36.9  C)  Pulse:  [75] 75  Resp:  [18] 18  BP: (147)/(66) 147/66  SpO2:  [94 %] 94 %  0 lbs 0 oz    Constitutional: awake, alert, no distress  Eyes: PERRL, sclera nonicteric  ENT: trachea midline  Respiratory: Lungs clear  Cardiovascular: Regular rate and  rhythm, no murmur, no lower extremity edema  GI: nondistended, nontender, bowel sounds present  Lymph/Hematologic: no lymphadenopathy  Skin: dry, no rash  Musculoskeletal: good muscle tone, strength 5/5 in upper and lower extremities  Neurologic: no focal deficits  Neuropsychiatric: appropriate affact    DATA:  Lab: December 10, 2018: Hemoglobin 9.6, cholesterol 175, HDL 44, LDL 99   September 2018: Creatinine 0.8     ASSESSMENT:  68-year-old male seen for follow-up of chest and shoulder pain.  His pain is likely musculoskeletal, it is quite atypical for angina.  However he did have significant coronary calcification noted on his chest CT recently.  His nuclear stress from early 2018 showed possible inferior ischemia versus attenuation.    At this point will continue with medical therapy.  If his chest pain symptoms become more exertional, angiogram may be indicated.  Stress test will be repeated in mid 2019 to see if there is any worsening from the previous one.    RECOMMENDATIONS:  1. Chest and shoulder pain, suspect noncardiac  - Repeat Lexiscan nuclear stress test in mid 2019  -On follow-up consider adding aspirin if his symptoms are any worse or stress test looks any worse  -Moderate threshold for angiogram in the context of his lung cancer    Follow-up in 6 months with MONY.    Chad Crawford MD  Cardiology - [facility]  Pager:  [phone]  Text Page  December 14, 2018      Thank you for allowing me to participate in the care of your patient.      Sincerely,     Chad Crawford MD     [facility]    cc:   No referring provider defined for this encounter.

## 2018-12-14 NOTE — LETTER
12/14/2018    Lashanda Suárez, NP  [address]  [facility] [address]    RE: hCilo Oneil       Dear Colleague,    I had the pleasure of seeing Chilo Oneil in the [facility].    CARDIOLOGY VISIT    REASON FOR VISIT: chest pain    SUBJECTIVE:  68-year-old male seen for chest pain.     He has a history of pulmonary embolism, on chronic warfarin, tobacco abuse, peripheral arterial disease, hypertension, dyslipidemia, lung cancer, and CKD.     In early 2018 he was diagnosed with adenocarcinoma of the lung, stage T3N0.  He underwent a wedge resection.  He then had subsequent chemotherapy.    Echo August 2016 showed EF 65%, no wall motion abnormality, normal RV, no valve disease.     Lexiscan nuclear stress March 2018 showed small reversible inferior and apical defect, cannot exclude his ischemia, probable diaphragm attenuation, EF 61%.     He was seen by oncology in October 2018.  Most recent CT chest scan showed a stable disease, he is going to follow-up routinely.     Currently he is off chemotherapy.  He sees oncology again in January, they will reassess.  CT scan in September 2018 showed decreased lung mass.  There was significant coronary calcification noted.    He again complains of left shoulder pain, this is a very chronic issue.  He has pain in his shoulder that radiates into his left arm and sometimes into his chest.  This is worse at night when he is laying in bed.  It seems to be a little better during the day.  It does not really come on with exertion.  Currently he is living in a nursing home.  He has lost a significant amount of weight with his chemotherapy.  He walks slowly using a cane for balance, he denies any falls.    MEDICATIONS:  Current Outpatient Medications   Medication     ACETAMINOPHEN PO     AmLODIPine Besylate (NORVASC PO)     camphor-menthol-methyl sal (MT ECKERT ULTRA STRENGTH) 4-10-30 % CREA     diclofenac (VOLTAREN) 1 % GEL topical gel      DULoxetine HCl (CYMBALTA PO)     fentaNYL (DURAGESIC) 12 mcg/hr 72 hr patch     GABAPENTIN PO     HYDROMORPHONE HCL PO     LORAZEPAM PO     MAGNESIUM OXIDE PO     MELATONIN PO     omeprazole (PRILOSEC) 20 MG CR capsule     ONDANSETRON PO     polyethylene glycol (MIRALAX/GLYCOLAX) Packet     senna-docusate (SENOKOT-S;PERICOLACE) 8.6-50 MG per tablet     STATIN NOT PRESCRIBED (INTENTIONAL)     THIAMINE HCL PO     VENTOLIN  (90 Base) MCG/ACT Inhaler     Warfarin Sodium (COUMADIN PO)     No current facility-administered medications for this visit.        ALLERGIES:  Allergies   Allergen Reactions     Cipro [Ciprofloxacin] Swelling       REVIEW OF SYSTEMS:  Constitutional:  No weight loss, fever, chills, weakness or fatigue.  HEENT:  Eyes:  No visual loss, blurred vision, double vision or yellow sclerae. No hearing loss, sneezing, congestion, runny nose or sore throat.  Skin:  No rash or itching.  Cardiovascular: per HPI  Respiratory: per HPI  GI:  No anorexia, nausea, vomiting or diarrhea. No abdominal pain or blood.  :  No dysurea, hematuria  Neurologic:  No headache, dizziness, syncope, paralysis, ataxia, numbness or tingling in the extremities. No change in bowel or bladder control.  Musculoskeletal:  No muscle, back pain, joint pain or stiffness.  Hematologic:  No anemia, bleeding or bruising.  Lymphatics:  No enlarged nodes. No history of splenectomy.  Psychiatric:  No history of depression or anxiety.  Endocrine:  No reports of sweating, cold or heat intolerance. No polyuria or polydipsia.  Allergies:  No history of asthma, hives, eczema or rhinitis.    PHYSICAL EXAM:                     Vital Signs with Ranges  Temp:  [98.5  F (36.9  C)] 98.5  F (36.9  C)  Pulse:  [75] 75  Resp:  [18] 18  BP: (147)/(66) 147/66  SpO2:  [94 %] 94 %  0 lbs 0 oz    Constitutional: awake, alert, no distress  Eyes: PERRL, sclera nonicteric  ENT: trachea midline  Respiratory: Lungs clear  Cardiovascular: Regular rate and  rhythm, no murmur, no lower extremity edema  GI: nondistended, nontender, bowel sounds present  Lymph/Hematologic: no lymphadenopathy  Skin: dry, no rash  Musculoskeletal: good muscle tone, strength 5/5 in upper and lower extremities  Neurologic: no focal deficits  Neuropsychiatric: appropriate affact    DATA:  Lab: December 10, 2018: Hemoglobin 9.6, cholesterol 175, HDL 44, LDL 99   September 2018: Creatinine 0.8     ASSESSMENT:  68-year-old male seen for follow-up of chest and shoulder pain.  His pain is likely musculoskeletal, it is quite atypical for angina.  However he did have significant coronary calcification noted on his chest CT recently.  His nuclear stress from early 2018 showed possible inferior ischemia versus attenuation.    At this point will continue with medical therapy.  If his chest pain symptoms become more exertional, angiogram may be indicated.  Stress test will be repeated in mid 2019 to see if there is any worsening from the previous one.    RECOMMENDATIONS:  1. Chest and shoulder pain, suspect noncardiac  - Repeat Lexiscan nuclear stress test in mid 2019  -On follow-up consider adding aspirin if his symptoms are any worse or stress test looks any worse  -Moderate threshold for angiogram in the context of his lung cancer    Follow-up in 6 months with MONY.    Chad Crawford MD  Cardiology - [facility]  Pager:  [phone]  Text Page  December 14, 2018      Thank you for allowing me to participate in the care of your patient.      Sincerely,     Chad Crawford MD     [facility]

## 2018-12-17 NOTE — PROGRESS NOTES
[place] [facility]    [place] Medical Record Number:  [id number]  Place of Service where encounter took place:  [facility]  ([facility]) [[id number]]    HPI:    Chilo Oneil is a 68 year old  (1950), who is being seen today for an episodic care visit at the above location.   HPI information obtained from: facility chart records, facility staff, patient report and [facility] chart review. Today's concern is INR/Coumadin management for PE    Bleeding Signs/Symptoms:  None  Thromboembolic Signs/Symptoms:  None    Medication Changes:  No  Dietary Changes:  No  Activity Changes: No  Bacterial/Viral Infection:  No    Missed Coumadin Doses:  None    On ASA: No    Other Concerns:  No    OBJECTIVE:    INR Today:  3.47  Current Dose:  5mg today, 6mg AOD    ASSESSMENT:     Other pulmonary embolism without acute cor pulmonale, unspecified chronicity (H)  Long term (current) use of anticoagulants  Encounter for therapeutic drug monitoring  Therapeutic INR for goal of 2-3    PLAN:    New Dose: 3mg daily      Next INR: Fri 12/21    Electronically signed by:  NATALIE Monaco CNP

## 2018-12-20 NOTE — PROGRESS NOTES
[facility]  Chief Complaint   Patient presents with     FCI Acute     [place] Medical Record Number:  [id number]  Place of Service where encounter took place:  [facility]  (S) [[id number]]    HPI:    Chilo Oneil is a 68 year old  (1950), who is being seen today for an episodic care visit.  HPI information obtained from: facility chart records, facility staff, patient report and [facility] chart review.     Today's concern is:  Other pulmonary embolism without acute cor pulmonale, unspecified chronicity (H)  Long term (current) use of anticoagulants  Encounter for therapeutic drug monitoring  Last INR showed 3.47, and patient's previous dosing had been 5mg x1 and 6mg AOD. When supratherapeutic level came back, dose was changed to 3mg every day. Today, INR recheck is back at 1.76 (subtherapeutic). Patient denies bleeding/bruising or unusual swelling. He recently had a follow-up w/ cardiology who recommended a repeat Lexiscan in mid-2019.     Carcinoma, lung, left (H)  Left shoulder pain, unspecified chronicity and etiology  Chronic pain syndrome  ADA (generalized anxiety disorder)  Patient was seen 1 week ago for big change in pain regimen. He had been taking Dilaudid Q3h PRN almost around the clock and not feeling good pain relief.  Patient had been educated long-acting and short-acting medications for pain. We changed entire regimen to OxyContin ER 30mg BID, and PRN Oxycodone TID (which is is using 1 dose daily on average since start). He also uses PRN Ativan for ADA, which has been helpful during this transition.  He uses these doses several times/wk. Today, he states his pain is much better controlled, that he is sleeping better than before, and has a side effect of some drowsiness, but prefers that over the pain he was in.  His PRN Ativan order expires today. He denies bowel/bladder changes. 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  He feels like he doesn't have any money left and feels stressed about this. He said we fixed the pain only to get a new problem.    PMH/PSH reviewed in [facility] today.    REVIEW OF SYSTEMS:  10 point ROS of systems including Constitutional, Eyes, Respiratory, Cardiovascular, Gastroenterology, Genitourinary, Integumentary, Musculoskeletal, Psychiatric were all negative except for pertinent positives noted in my HPI.    /76   Pulse 87   Temp 98.2  F (36.8  C)   Resp 18   Wt 69.7 kg (153 lb 9.6 oz)   SpO2 97%   BMI 19.46 kg/m    GENERAL APPEARANCE: Alert, in no distress, cooperative.   ENT: Mouth and posterior oropharynx normal, moist mucous membranes, hearing acuity [place].  EYES: EOM, conjunctivae, lids, pupils and irises normal, PERRL2.   RESP: Respiratory effort and palpation of chest normal, no respiratory distress, Lung sounds clear/diminished.  CV: Palpation and auscultation of heart done, rate and rhythm regular, no murmur, no rub or gallop, Edema 0+ BLE.  ABDOMEN: Normal bowel sounds, soft, nontender, no hepatosplenomegaly or other masses.  SKIN: Inspection/Palpation of skin and subcutaneous tissue baseline w/ fragility.  NEURO: 2-12 at patient's baseline, sensation baseline PPS.  PSYCH: Insight and judgement, memory baseline, affect and mood normal.    ASSESSMENT/PLAN:  Other pulmonary embolism without acute cor pulmonale, unspecified chronicity (H)  Long term (current) use of anticoagulants  Encounter for therapeutic drug monitoring  Chronic. Stable. Subtherapeutic swing back from previous high. Will dose Coumadin as noted below and recheck INR on 12/26. Follow-up accordingly.      Carcinoma, lung, left (H)  Left shoulder pain, unspecified chronicity and etiology  Chronic pain syndrome  ADA (generalized anxiety disorder)  Chronic. Stable. Better controlled on new regimen.  Will renew PRN Ativan order and follow-up routinely or as needed.    Orders:  1. Coumadin 6 mg PO every day    2.  Recheck INR x 1 on 12/26   3. Renew PRN ativan order 12/21 x 14 days Dx: ADA    Electronically signed by:  Dr. Lashanda Suárez, APRN CNP DNP

## 2018-12-21 NOTE — LETTER
12/21/2018        RE: Chilo Oneil  [facility]  [address] [facility] [address]        [place] [facility]  Chief Complaint   Patient presents with     longterm Acute     [place] Medical Record Number:  [id number]  Place of Service where encounter took place:  [facility]  ([facility]) [[id number]]    HPI:    Chilo Oneil is a 68 year old  (1950), who is being seen today for an episodic care visit.  HPI information obtained from: facility chart records, facility staff, patient report and [facility] chart review.     Today's concern is:  Other pulmonary embolism without acute cor pulmonale, unspecified chronicity (H)  Long term (current) use of anticoagulants  Encounter for therapeutic drug monitoring  Last INR showed 3.47, and patient's previous dosing had been 5mg x1 and 6mg AOD. When supratherapeutic level came back, dose was changed to 3mg every day. Today, INR recheck is back at 1.76 (subtherapeutic). Patient denies bleeding/bruising or unusual swelling. He recently had a follow-up w/ cardiology who recommended a repeat Lexiscan in mid-2019.     Carcinoma, lung, left (H)  Left shoulder pain, unspecified chronicity and etiology  Chronic pain syndrome  ADA (generalized anxiety disorder)  Patient was seen 1 week ago for big change in pain regimen. He had been taking Dilaudid Q3h PRN almost around the clock and not feeling good pain relief.  Patient had been educated long-acting and short-acting medications for pain. We changed entire regimen to OxyContin ER 30mg BID, and PRN Oxycodone TID (which is is using 1 dose daily on average since start). He also uses PRN Ativan for ADA, which has been helpful during this transition.  He uses these doses several times/wk. Today, he states his pain is much better controlled, that he is sleeping better than before, and has a side effect of some drowsiness, but prefers that over the pain he was in.  His PRN Ativan  order expires today. He denies bowel/bladder changes. 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  He feels like he doesn't have any money left and feels stressed about this. He said we fixed the pain only to get a new problem.    PMH/PSH reviewed in [facility] today.    REVIEW OF SYSTEMS:  10 point ROS of systems including Constitutional, Eyes, Respiratory, Cardiovascular, Gastroenterology, Genitourinary, Integumentary, Musculoskeletal, Psychiatric were all negative except for pertinent positives noted in my HPI.    /76   Pulse 87   Temp 98.2  F (36.8  C)   Resp 18   Wt 69.7 kg (153 lb 9.6 oz)   SpO2 97%   BMI 19.46 kg/m     GENERAL APPEARANCE: Alert, in no distress, cooperative.   ENT: Mouth and posterior oropharynx normal, moist mucous membranes, hearing acuity [place].  EYES: EOM, conjunctivae, lids, pupils and irises normal, PERRL2.   RESP: Respiratory effort and palpation of chest normal, no respiratory distress, Lung sounds clear/diminished.  CV: Palpation and auscultation of heart done, rate and rhythm regular, no murmur, no rub or gallop, Edema 0+ BLE.  ABDOMEN: Normal bowel sounds, soft, nontender, no hepatosplenomegaly or other masses.  SKIN: Inspection/Palpation of skin and subcutaneous tissue baseline w/ fragility.  NEURO: 2-12 at patient's baseline, sensation baseline PPS.  PSYCH: Insight and judgement, memory baseline, affect and mood normal.    ASSESSMENT/PLAN:  Other pulmonary embolism without acute cor pulmonale, unspecified chronicity (H)  Long term (current) use of anticoagulants  Encounter for therapeutic drug monitoring  Chronic. Stable. Subtherapeutic swing back from previous high. Will dose Coumadin as noted below and recheck INR on 12/26. Follow-up accordingly.      Carcinoma, lung, left (H)  Left shoulder pain, unspecified chronicity and etiology  Chronic pain syndrome  ADA (generalized anxiety disorder)  Chronic. Stable. Better controlled on new  regimen.  Will renew PRN Ativan order and follow-up routinely or as needed.    Orders:  1. Coumadin 6 mg PO every day    2. Recheck INR x 1 on 12/26   3. Renew PRN ativan order 12/21 x 14 days Dx: ADA    Electronically signed by:  Dr. Lashanda Suárez, APRN CNP DNP        Sincerely,        Lashanda Suárez, NP

## 2018-12-24 NOTE — PROGRESS NOTES
[place] [facility]  [place] [facility] Record Number:  [id number]  Place of Service where encounter took place:  [facility]  ([facility]GS) [[id number]]    HPI:    Chilo Oneil is a 68 year old  (1950), who is being seen today for an episodic care visit at the above location.   HPI information obtained from: facility chart records, facility staff, patient report and [facility] chart review. Today's concern is INR/Coumadin management for A. Fib    Bleeding Signs/Symptoms:  None  Thromboembolic Signs/Symptoms:  None  Medication Changes:  No  Dietary Changes:  No  Activity Changes: No  Bacterial/Viral Infection:  No  Missed Coumadin Doses:  None  On ASA: No  Other Concerns:  No    OBJECTIVE:  INR Today: 2.64.  Current Dose: Coumadin 6 mg PO every day. Last INR was 1.76.       ASSESSMENT:  Other pulmonary embolism without acute cor pulmonale, unspecified chronicity (H)  Long term (current) use of anticoagulants  Encounter for therapeutic drug monitoring  Chronic. Stable. Therapeutic. Will dose Coumadin as noted below and recheck INR on 12/31. Follow-up accordingly. Therapeutic INR for goal of 2-3.    PLAN:  New Dose: 3mg Tu/Th, 6mg AOD.    Next INR: Monday 12/31.     Electronically signed by:  Dr. Lashanda Suárez, APRN CNP DNP

## 2018-12-26 NOTE — LETTER
12/26/2018        RE: Chilo Oneil  [facility]  [address] [place] [address]        [place] [facility]  [place] Medical Record Number:  [id number]  Place of Service where encounter took place:  [facility]  (FGS) [[id number]]    HPI:    Chilo Oneil is a 68 year old  (1950), who is being seen today for an episodic care visit at the above location.   HPI information obtained from: facility chart records, facility staff, patient report and [facility] chart review. Today's concern is INR/Coumadin management for A. Fib    Bleeding Signs/Symptoms:  None  Thromboembolic Signs/Symptoms:  None  Medication Changes:  No  Dietary Changes:  No  Activity Changes: No  Bacterial/Viral Infection:  No  Missed Coumadin Doses:  None  On ASA: No  Other Concerns:  No    OBJECTIVE:  INR Today: 2.64.  Current Dose: Coumadin 6 mg PO every day. Last INR was 1.76.       ASSESSMENT:  Other pulmonary embolism without acute cor pulmonale, unspecified chronicity (H)  Long term (current) use of anticoagulants  Encounter for therapeutic drug monitoring  Chronic. Stable. Therapeutic. Will dose Coumadin as noted below and recheck INR on 12/31. Follow-up accordingly. Therapeutic INR for goal of 2-3.    PLAN:  New Dose: 3mg Tu/Th, 6mg AOD.    Next INR: Monday 12/31.     Electronically signed by:  Dr. Lashanda Suárez, APRN CNP DNP            Sincerely,        Lashanda Suárez, NP

## 2018-12-28 NOTE — PROGRESS NOTES
[place] [facility]  [place] [facility] Record Number:  [id number]  Place of Service where encounter took place:  [facility]  (FGS) [[id number]]    HPI:    Chilo Oneil is a 68 year old  (1950), who is being seen today for an episodic care visit at the above location.   HPI information obtained from: facility chart records, facility staff, patient report, [facility] chart review and Care Everywhere [facility] chart review. Today's concern is INR/Coumadin management for PE    Bleeding Signs/Symptoms:  None  Thromboembolic Signs/Symptoms:  None  Medication Changes:  No  Dietary Changes:  No  Activity Changes: No  Bacterial/Viral Infection:  No  Missed Coumadin Doses:  None  On ASA: No  Other Concerns:  No    OBJECTIVE:  INR Today: 2.75  Current Dose:  3mg Tu/Th, 6mg AOD.    ASSESSMENT:  Other pulmonary embolism without acute cor pulmonale, unspecified chronicity (H)  Long term (current) use of anticoagulants  Encounter for therapeutic drug monitoring  Chronic. Stable. Therapeutic. Will dose Coumadin as noted below and recheck INR in 1 week. Follow-up accordingly. Therapeutic INR for goal of 2-3.    PLAN:  New Dose: Same. 3mg Tu/Th, 6mg AOD.    Next INR: 1 week.    Electronically signed by:  Dr. Lashanda Suárez, APRN CNP DNP

## 2018-12-28 NOTE — PROGRESS NOTES
Infusion Nursing Note:  Chilo Oneil presents today for  labs drawn via his port.   Patient seen by provider today: No   present during visit today: Not Applicable.    Note: Labs drawn via his port per [facility] protocol. Pt. Sent to CT with a needle in place.    Intravenous Access:  Labs drawn without difficulty.  Implanted Port.    Treatment Conditions:  Lab Results   Component Value Date    HGB 9.6 12/10/2018     Lab Results   Component Value Date    WBC 8.3 09/26/2018      Lab Results   Component Value Date    ANEU 6.0 09/26/2018     Lab Results   Component Value Date     09/26/2018      Lab Results   Component Value Date     09/26/2018                   Lab Results   Component Value Date    POTASSIUM 4.4 09/26/2018           Lab Results   Component Value Date    MAG 1.4 07/18/2018            Lab Results   Component Value Date    CR 0.78 09/26/2018                   Lab Results   Component Value Date    DAMARI 10.0 09/26/2018                Lab Results   Component Value Date    BILITOTAL 0.3 09/26/2018           Lab Results   Component Value Date    ALBUMIN 3.7 09/26/2018                    Lab Results   Component Value Date    ALT 12 09/26/2018           Lab Results   Component Value Date    AST 17 09/26/2018           Post Infusion Assessment:  Blood return noted pre and post infusion.  Site patent and intact, free from redness, edema or discomfort.  No evidence of extravasations.    Discharge Plan:   Patient and/or family verbalized understanding of discharge instructions and all questions answered.  Patient discharged in stable condition accompanied by: self.  Departure Mode: Ambulatory.    Shannon Jiménez RN

## 2018-12-31 NOTE — LETTER
12/31/2018        RE: Chilo Oneil  [facility]  [address] [facility] [address]        [place] [facility]  [place] Medical Record Number:  [id number]  Place of Service where encounter took place:  [facility]  ([facility]GS) [[id number]]    HPI:    Chilo Oneil is a 68 year old  (1950), who is being seen today for an episodic care visit at the above location.   HPI information obtained from: facility chart records, facility staff, patient report, [facility] chart review and Care Everywhere [facility] chart review. Today's concern is INR/Coumadin management for PE    Bleeding Signs/Symptoms:  None  Thromboembolic Signs/Symptoms:  None  Medication Changes:  No  Dietary Changes:  No  Activity Changes: No  Bacterial/Viral Infection:  No  Missed Coumadin Doses:  None  On ASA: No  Other Concerns:  No    OBJECTIVE:  INR Today: 2.75  Current Dose:  3mg Tu/Th, 6mg AOD.    ASSESSMENT:  Other pulmonary embolism without acute cor pulmonale, unspecified chronicity (H)  Long term (current) use of anticoagulants  Encounter for therapeutic drug monitoring  Chronic. Stable. Therapeutic. Will dose Coumadin as noted below and recheck INR in 1 week. Follow-up accordingly. Therapeutic INR for goal of 2-3.    PLAN:  New Dose: Same. 3mg Tu/Th, 6mg AOD.    Next INR: 1 week.    Electronically signed by:  Dr. Lashanda Suárez, APRN CNP DNP              Sincerely,        Lashanda Suárez, NP

## 2019-01-01 ENCOUNTER — NURSING HOME VISIT (OUTPATIENT)
Dept: GERIATRICS | Facility: CLINIC | Age: 69
End: 2019-01-01
Payer: COMMERCIAL

## 2019-01-01 ENCOUNTER — INFUSION THERAPY VISIT (OUTPATIENT)
Dept: INFUSION THERAPY | Facility: CLINIC | Age: 69
End: 2019-01-01
Attending: INTERNAL MEDICINE
Payer: COMMERCIAL

## 2019-01-01 ENCOUNTER — ONCOLOGY VISIT (OUTPATIENT)
Dept: ONCOLOGY | Facility: CLINIC | Age: 69
End: 2019-01-01
Attending: INTERNAL MEDICINE
Payer: COMMERCIAL

## 2019-01-01 ENCOUNTER — RECORDS - HEALTHEAST (OUTPATIENT)
Dept: LAB | Facility: CLINIC | Age: 69
End: 2019-01-01

## 2019-01-01 ENCOUNTER — HOSPITAL ENCOUNTER (OUTPATIENT)
Dept: GENERAL RADIOLOGY | Facility: CLINIC | Age: 69
End: 2019-05-03
Attending: RADIOLOGY
Payer: COMMERCIAL

## 2019-01-01 ENCOUNTER — APPOINTMENT (OUTPATIENT)
Dept: CT IMAGING | Facility: CLINIC | Age: 69
End: 2019-01-01
Attending: FAMILY MEDICINE
Payer: COMMERCIAL

## 2019-01-01 ENCOUNTER — TELEPHONE (OUTPATIENT)
Dept: GERIATRICS | Facility: CLINIC | Age: 69
End: 2019-01-01

## 2019-01-01 ENCOUNTER — TELEPHONE (OUTPATIENT)
Dept: ONCOLOGY | Facility: CLINIC | Age: 69
End: 2019-01-01

## 2019-01-01 ENCOUNTER — PATIENT OUTREACH (OUTPATIENT)
Dept: GERIATRIC MEDICINE | Facility: CLINIC | Age: 69
End: 2019-01-01

## 2019-01-01 ENCOUNTER — PATIENT OUTREACH (OUTPATIENT)
Dept: ONCOLOGY | Facility: CLINIC | Age: 69
End: 2019-01-01

## 2019-01-01 ENCOUNTER — TRANSFERRED RECORDS (OUTPATIENT)
Dept: HEALTH INFORMATION MANAGEMENT | Facility: CLINIC | Age: 69
End: 2019-01-01

## 2019-01-01 ENCOUNTER — RADIOLOGY INJECTION OFFICE VISIT (OUTPATIENT)
Dept: PALLIATIVE MEDICINE | Facility: CLINIC | Age: 69
End: 2019-01-01
Payer: COMMERCIAL

## 2019-01-01 ENCOUNTER — TELEPHONE (OUTPATIENT)
Dept: PALLIATIVE MEDICINE | Facility: CLINIC | Age: 69
End: 2019-01-01

## 2019-01-01 ENCOUNTER — HOSPITAL ENCOUNTER (OUTPATIENT)
Dept: CT IMAGING | Facility: CLINIC | Age: 69
Discharge: HOME | End: 2019-05-03
Attending: INTERNAL MEDICINE | Admitting: INTERNAL MEDICINE
Payer: COMMERCIAL

## 2019-01-01 ENCOUNTER — APPOINTMENT (OUTPATIENT)
Dept: GENERAL RADIOLOGY | Facility: CLINIC | Age: 69
End: 2019-01-01
Attending: EMERGENCY MEDICINE
Payer: COMMERCIAL

## 2019-01-01 ENCOUNTER — HOSPITAL ENCOUNTER (EMERGENCY)
Facility: CLINIC | Age: 69
Discharge: HOME | End: 2019-04-23
Attending: EMERGENCY MEDICINE | Admitting: EMERGENCY MEDICINE
Payer: COMMERCIAL

## 2019-01-01 ENCOUNTER — AMBULATORY - HEALTHEAST (OUTPATIENT)
Dept: OTHER | Facility: CLINIC | Age: 69
End: 2019-01-01

## 2019-01-01 ENCOUNTER — HOSPITAL ENCOUNTER (EMERGENCY)
Facility: CLINIC | Age: 69
Discharge: HOME | End: 2019-03-31
Attending: FAMILY MEDICINE | Admitting: FAMILY MEDICINE
Payer: COMMERCIAL

## 2019-01-01 ENCOUNTER — HOSPITAL ENCOUNTER (EMERGENCY)
Facility: CLINIC | Age: 69
Discharge: HOME | End: 2019-03-23
Attending: FAMILY MEDICINE | Admitting: FAMILY MEDICINE
Payer: COMMERCIAL

## 2019-01-01 ENCOUNTER — HOSPITAL ENCOUNTER (OUTPATIENT)
Dept: NUCLEAR MEDICINE | Facility: CLINIC | Age: 69
Setting detail: NUCLEAR MEDICINE
Discharge: HOME | End: 2019-04-30
Attending: INTERNAL MEDICINE | Admitting: INTERNAL MEDICINE
Payer: COMMERCIAL

## 2019-01-01 ENCOUNTER — ANCILLARY PROCEDURE (OUTPATIENT)
Dept: RADIOLOGY | Facility: CLINIC | Age: 69
End: 2019-01-01
Attending: ANESTHESIOLOGY
Payer: COMMERCIAL

## 2019-01-01 ENCOUNTER — DOCUMENTATION ONLY (OUTPATIENT)
Dept: LAB | Facility: CLINIC | Age: 69
End: 2019-01-01

## 2019-01-01 ENCOUNTER — HOSPITAL ENCOUNTER (OUTPATIENT)
Dept: NUCLEAR MEDICINE | Facility: CLINIC | Age: 69
Setting detail: NUCLEAR MEDICINE
End: 2019-04-30
Attending: INTERNAL MEDICINE
Payer: COMMERCIAL

## 2019-01-01 ENCOUNTER — OFFICE VISIT (OUTPATIENT)
Dept: PALLIATIVE MEDICINE | Facility: CLINIC | Age: 69
End: 2019-01-01
Attending: NURSE PRACTITIONER
Payer: COMMERCIAL

## 2019-01-01 ENCOUNTER — DOCUMENTATION ONLY (OUTPATIENT)
Dept: OTHER | Facility: CLINIC | Age: 69
End: 2019-01-01

## 2019-01-01 VITALS
TEMPERATURE: 98.3 F | WEIGHT: 147 LBS | HEART RATE: 92 BPM | SYSTOLIC BLOOD PRESSURE: 133 MMHG | DIASTOLIC BLOOD PRESSURE: 68 MMHG | BODY MASS INDEX: 18.37 KG/M2 | OXYGEN SATURATION: 93 % | RESPIRATION RATE: 20 BRPM

## 2019-01-01 VITALS
RESPIRATION RATE: 30 BRPM | OXYGEN SATURATION: 90 % | TEMPERATURE: 97.1 F | SYSTOLIC BLOOD PRESSURE: 165 MMHG | HEART RATE: 96 BPM | DIASTOLIC BLOOD PRESSURE: 80 MMHG | BODY MASS INDEX: 18.87 KG/M2 | WEIGHT: 149 LBS

## 2019-01-01 VITALS — WEIGHT: 139 LBS | BODY MASS INDEX: 17.37 KG/M2

## 2019-01-01 VITALS
BODY MASS INDEX: 18 KG/M2 | TEMPERATURE: 98.2 F | DIASTOLIC BLOOD PRESSURE: 64 MMHG | SYSTOLIC BLOOD PRESSURE: 124 MMHG | OXYGEN SATURATION: 93 % | HEART RATE: 82 BPM | WEIGHT: 144 LBS | RESPIRATION RATE: 18 BRPM

## 2019-01-01 VITALS
SYSTOLIC BLOOD PRESSURE: 128 MMHG | DIASTOLIC BLOOD PRESSURE: 68 MMHG | RESPIRATION RATE: 20 BRPM | HEART RATE: 78 BPM | TEMPERATURE: 97.9 F | BODY MASS INDEX: 19.46 KG/M2 | OXYGEN SATURATION: 94 % | WEIGHT: 153.6 LBS

## 2019-01-01 VITALS
WEIGHT: 137 LBS | BODY MASS INDEX: 17.35 KG/M2 | OXYGEN SATURATION: 86 % | TEMPERATURE: 98 F | HEART RATE: 82 BPM | DIASTOLIC BLOOD PRESSURE: 74 MMHG | SYSTOLIC BLOOD PRESSURE: 126 MMHG | RESPIRATION RATE: 16 BRPM

## 2019-01-01 VITALS
HEIGHT: 75 IN | TEMPERATURE: 97.8 F | BODY MASS INDEX: 17.41 KG/M2 | HEART RATE: 76 BPM | SYSTOLIC BLOOD PRESSURE: 139 MMHG | WEIGHT: 140 LBS | RESPIRATION RATE: 16 BRPM | DIASTOLIC BLOOD PRESSURE: 74 MMHG | OXYGEN SATURATION: 96 %

## 2019-01-01 VITALS
BODY MASS INDEX: 17.87 KG/M2 | RESPIRATION RATE: 20 BRPM | HEIGHT: 75 IN | HEART RATE: 99 BPM | OXYGEN SATURATION: 94 % | SYSTOLIC BLOOD PRESSURE: 168 MMHG | WEIGHT: 143.7 LBS | DIASTOLIC BLOOD PRESSURE: 76 MMHG | TEMPERATURE: 97.8 F

## 2019-01-01 VITALS
WEIGHT: 141 LBS | SYSTOLIC BLOOD PRESSURE: 130 MMHG | TEMPERATURE: 97.8 F | DIASTOLIC BLOOD PRESSURE: 72 MMHG | BODY MASS INDEX: 17.62 KG/M2 | RESPIRATION RATE: 20 BRPM | OXYGEN SATURATION: 94 % | HEART RATE: 96 BPM

## 2019-01-01 VITALS
OXYGEN SATURATION: 96 % | SYSTOLIC BLOOD PRESSURE: 124 MMHG | DIASTOLIC BLOOD PRESSURE: 72 MMHG | TEMPERATURE: 97.2 F | HEART RATE: 82 BPM | RESPIRATION RATE: 20 BRPM | WEIGHT: 149.2 LBS | BODY MASS INDEX: 18.9 KG/M2

## 2019-01-01 VITALS
RESPIRATION RATE: 20 BRPM | HEART RATE: 82 BPM | DIASTOLIC BLOOD PRESSURE: 72 MMHG | OXYGEN SATURATION: 96 % | TEMPERATURE: 97.3 F | WEIGHT: 149 LBS | BODY MASS INDEX: 18.87 KG/M2 | SYSTOLIC BLOOD PRESSURE: 124 MMHG

## 2019-01-01 VITALS
BODY MASS INDEX: 18.77 KG/M2 | OXYGEN SATURATION: 93 % | SYSTOLIC BLOOD PRESSURE: 147 MMHG | DIASTOLIC BLOOD PRESSURE: 67 MMHG | HEART RATE: 83 BPM | WEIGHT: 151 LBS | HEIGHT: 75 IN | RESPIRATION RATE: 20 BRPM | TEMPERATURE: 98.4 F

## 2019-01-01 VITALS
DIASTOLIC BLOOD PRESSURE: 61 MMHG | RESPIRATION RATE: 22 BRPM | OXYGEN SATURATION: 97 % | DIASTOLIC BLOOD PRESSURE: 74 MMHG | WEIGHT: 144.8 LBS | HEART RATE: 93 BPM | WEIGHT: 143 LBS | BODY MASS INDEX: 18.1 KG/M2 | SYSTOLIC BLOOD PRESSURE: 133 MMHG | SYSTOLIC BLOOD PRESSURE: 144 MMHG | TEMPERATURE: 97.4 F | HEART RATE: 69 BPM | HEIGHT: 75 IN | RESPIRATION RATE: 18 BRPM | OXYGEN SATURATION: 97 % | TEMPERATURE: 97.5 F | BODY MASS INDEX: 17.78 KG/M2

## 2019-01-01 VITALS
TEMPERATURE: 97.7 F | SYSTOLIC BLOOD PRESSURE: 134 MMHG | BODY MASS INDEX: 19.94 KG/M2 | WEIGHT: 157.4 LBS | DIASTOLIC BLOOD PRESSURE: 77 MMHG | OXYGEN SATURATION: 94 % | RESPIRATION RATE: 18 BRPM | HEART RATE: 78 BPM

## 2019-01-01 VITALS
TEMPERATURE: 98 F | WEIGHT: 153.6 LBS | SYSTOLIC BLOOD PRESSURE: 128 MMHG | OXYGEN SATURATION: 95 % | HEART RATE: 79 BPM | RESPIRATION RATE: 18 BRPM | DIASTOLIC BLOOD PRESSURE: 73 MMHG | BODY MASS INDEX: 19.46 KG/M2

## 2019-01-01 VITALS
DIASTOLIC BLOOD PRESSURE: 72 MMHG | BODY MASS INDEX: 19.15 KG/M2 | TEMPERATURE: 97.8 F | WEIGHT: 151.2 LBS | SYSTOLIC BLOOD PRESSURE: 148 MMHG | OXYGEN SATURATION: 97 % | RESPIRATION RATE: 18 BRPM | HEART RATE: 78 BPM

## 2019-01-01 VITALS
RESPIRATION RATE: 18 BRPM | OXYGEN SATURATION: 91 % | DIASTOLIC BLOOD PRESSURE: 69 MMHG | WEIGHT: 150 LBS | HEART RATE: 73 BPM | TEMPERATURE: 98.5 F | SYSTOLIC BLOOD PRESSURE: 153 MMHG | BODY MASS INDEX: 19 KG/M2

## 2019-01-01 VITALS
OXYGEN SATURATION: 93 % | BODY MASS INDEX: 17.55 KG/M2 | RESPIRATION RATE: 18 BRPM | SYSTOLIC BLOOD PRESSURE: 124 MMHG | HEART RATE: 82 BPM | DIASTOLIC BLOOD PRESSURE: 64 MMHG | WEIGHT: 140.4 LBS | TEMPERATURE: 98.2 F

## 2019-01-01 VITALS — HEART RATE: 75 BPM | SYSTOLIC BLOOD PRESSURE: 162 MMHG | DIASTOLIC BLOOD PRESSURE: 68 MMHG

## 2019-01-01 VITALS
RESPIRATION RATE: 18 BRPM | TEMPERATURE: 98 F | HEART RATE: 70 BPM | OXYGEN SATURATION: 94 % | WEIGHT: 153.6 LBS | SYSTOLIC BLOOD PRESSURE: 120 MMHG | BODY MASS INDEX: 19.46 KG/M2 | DIASTOLIC BLOOD PRESSURE: 70 MMHG

## 2019-01-01 VITALS
WEIGHT: 137 LBS | RESPIRATION RATE: 18 BRPM | SYSTOLIC BLOOD PRESSURE: 122 MMHG | TEMPERATURE: 98.3 F | BODY MASS INDEX: 17.12 KG/M2 | OXYGEN SATURATION: 92 % | DIASTOLIC BLOOD PRESSURE: 68 MMHG | HEART RATE: 86 BPM

## 2019-01-01 VITALS
HEART RATE: 82 BPM | WEIGHT: 138.8 LBS | TEMPERATURE: 98.9 F | SYSTOLIC BLOOD PRESSURE: 136 MMHG | DIASTOLIC BLOOD PRESSURE: 78 MMHG | RESPIRATION RATE: 18 BRPM | OXYGEN SATURATION: 94 % | BODY MASS INDEX: 17.35 KG/M2

## 2019-01-01 VITALS
BODY MASS INDEX: 17.35 KG/M2 | SYSTOLIC BLOOD PRESSURE: 124 MMHG | OXYGEN SATURATION: 94 % | DIASTOLIC BLOOD PRESSURE: 76 MMHG | TEMPERATURE: 98.5 F | RESPIRATION RATE: 18 BRPM | HEART RATE: 88 BPM | WEIGHT: 138.8 LBS

## 2019-01-01 VITALS
SYSTOLIC BLOOD PRESSURE: 134 MMHG | RESPIRATION RATE: 18 BRPM | TEMPERATURE: 97.7 F | OXYGEN SATURATION: 94 % | HEART RATE: 78 BPM | BODY MASS INDEX: 19.46 KG/M2 | WEIGHT: 153.6 LBS | DIASTOLIC BLOOD PRESSURE: 77 MMHG

## 2019-01-01 VITALS
RESPIRATION RATE: 16 BRPM | DIASTOLIC BLOOD PRESSURE: 66 MMHG | HEART RATE: 84 BPM | SYSTOLIC BLOOD PRESSURE: 150 MMHG | TEMPERATURE: 97.1 F

## 2019-01-01 VITALS
SYSTOLIC BLOOD PRESSURE: 118 MMHG | WEIGHT: 153.4 LBS | DIASTOLIC BLOOD PRESSURE: 55 MMHG | RESPIRATION RATE: 16 BRPM | OXYGEN SATURATION: 94 % | TEMPERATURE: 97 F | HEART RATE: 78 BPM | BODY MASS INDEX: 19.43 KG/M2

## 2019-01-01 VITALS
BODY MASS INDEX: 19.79 KG/M2 | WEIGHT: 156.2 LBS | DIASTOLIC BLOOD PRESSURE: 70 MMHG | HEART RATE: 70 BPM | TEMPERATURE: 98 F | OXYGEN SATURATION: 94 % | SYSTOLIC BLOOD PRESSURE: 120 MMHG | RESPIRATION RATE: 18 BRPM

## 2019-01-01 VITALS — DIASTOLIC BLOOD PRESSURE: 77 MMHG | SYSTOLIC BLOOD PRESSURE: 171 MMHG | HEART RATE: 68 BPM

## 2019-01-01 VITALS
HEART RATE: 79 BPM | OXYGEN SATURATION: 92 % | SYSTOLIC BLOOD PRESSURE: 120 MMHG | RESPIRATION RATE: 18 BRPM | WEIGHT: 149.2 LBS | BODY MASS INDEX: 18.9 KG/M2 | DIASTOLIC BLOOD PRESSURE: 62 MMHG | TEMPERATURE: 98.9 F

## 2019-01-01 VITALS
WEIGHT: 145 LBS | OXYGEN SATURATION: 94 % | RESPIRATION RATE: 24 BRPM | TEMPERATURE: 98 F | HEIGHT: 75 IN | BODY MASS INDEX: 18.03 KG/M2 | DIASTOLIC BLOOD PRESSURE: 61 MMHG | SYSTOLIC BLOOD PRESSURE: 134 MMHG

## 2019-01-01 VITALS
BODY MASS INDEX: 19.15 KG/M2 | HEART RATE: 72 BPM | DIASTOLIC BLOOD PRESSURE: 80 MMHG | TEMPERATURE: 97.7 F | SYSTOLIC BLOOD PRESSURE: 136 MMHG | OXYGEN SATURATION: 95 % | WEIGHT: 151.2 LBS | RESPIRATION RATE: 18 BRPM

## 2019-01-01 VITALS
SYSTOLIC BLOOD PRESSURE: 118 MMHG | DIASTOLIC BLOOD PRESSURE: 65 MMHG | HEART RATE: 78 BPM | OXYGEN SATURATION: 94 % | WEIGHT: 148 LBS | TEMPERATURE: 98.6 F | RESPIRATION RATE: 18 BRPM | BODY MASS INDEX: 18.75 KG/M2

## 2019-01-01 VITALS
HEART RATE: 96 BPM | TEMPERATURE: 97.8 F | WEIGHT: 144 LBS | SYSTOLIC BLOOD PRESSURE: 130 MMHG | BODY MASS INDEX: 18 KG/M2 | RESPIRATION RATE: 20 BRPM | OXYGEN SATURATION: 94 % | DIASTOLIC BLOOD PRESSURE: 72 MMHG

## 2019-01-01 VITALS
SYSTOLIC BLOOD PRESSURE: 170 MMHG | BODY MASS INDEX: 19.25 KG/M2 | DIASTOLIC BLOOD PRESSURE: 67 MMHG | WEIGHT: 152 LBS | HEART RATE: 76 BPM

## 2019-01-01 VITALS
WEIGHT: 152 LBS | RESPIRATION RATE: 18 BRPM | DIASTOLIC BLOOD PRESSURE: 80 MMHG | OXYGEN SATURATION: 95 % | TEMPERATURE: 97.7 F | HEART RATE: 72 BPM | BODY MASS INDEX: 19.25 KG/M2 | SYSTOLIC BLOOD PRESSURE: 136 MMHG

## 2019-01-01 VITALS
HEART RATE: 72 BPM | DIASTOLIC BLOOD PRESSURE: 65 MMHG | TEMPERATURE: 96.6 F | RESPIRATION RATE: 22 BRPM | OXYGEN SATURATION: 96 % | SYSTOLIC BLOOD PRESSURE: 138 MMHG

## 2019-01-01 VITALS
DIASTOLIC BLOOD PRESSURE: 67 MMHG | WEIGHT: 155 LBS | RESPIRATION RATE: 18 BRPM | BODY MASS INDEX: 19.63 KG/M2 | HEART RATE: 70 BPM | TEMPERATURE: 98.6 F | SYSTOLIC BLOOD PRESSURE: 152 MMHG | OXYGEN SATURATION: 93 %

## 2019-01-01 VITALS
BODY MASS INDEX: 19.94 KG/M2 | TEMPERATURE: 97.7 F | OXYGEN SATURATION: 94 % | HEART RATE: 78 BPM | WEIGHT: 157.4 LBS | SYSTOLIC BLOOD PRESSURE: 134 MMHG | RESPIRATION RATE: 18 BRPM | DIASTOLIC BLOOD PRESSURE: 77 MMHG

## 2019-01-01 VITALS
WEIGHT: 152 LBS | BODY MASS INDEX: 19.25 KG/M2 | SYSTOLIC BLOOD PRESSURE: 120 MMHG | OXYGEN SATURATION: 92 % | TEMPERATURE: 98.9 F | HEART RATE: 79 BPM | DIASTOLIC BLOOD PRESSURE: 62 MMHG | RESPIRATION RATE: 18 BRPM

## 2019-01-01 DIAGNOSIS — Z79.01 LONG TERM (CURRENT) USE OF ANTICOAGULANTS: ICD-10-CM

## 2019-01-01 DIAGNOSIS — M79.18 MYOFASCIAL MUSCLE PAIN: ICD-10-CM

## 2019-01-01 DIAGNOSIS — Z51.81 ENCOUNTER FOR THERAPEUTIC DRUG MONITORING: ICD-10-CM

## 2019-01-01 DIAGNOSIS — K21.9 GASTROESOPHAGEAL REFLUX DISEASE WITHOUT ESOPHAGITIS: Chronic | ICD-10-CM

## 2019-01-01 DIAGNOSIS — J90 PLEURAL EFFUSION: ICD-10-CM

## 2019-01-01 DIAGNOSIS — D53.9 MACROCYTIC ANEMIA: ICD-10-CM

## 2019-01-01 DIAGNOSIS — M40.00 ACQUIRED POSTURAL KYPHOSIS: ICD-10-CM

## 2019-01-01 DIAGNOSIS — C34.92 CARCINOMA, LUNG, LEFT (H): ICD-10-CM

## 2019-01-01 DIAGNOSIS — C34.92 CARCINOMA, LUNG, LEFT (H): Primary | ICD-10-CM

## 2019-01-01 DIAGNOSIS — G89.4 CHRONIC PAIN SYNDROME: ICD-10-CM

## 2019-01-01 DIAGNOSIS — F41.9 ANXIETY: Chronic | ICD-10-CM

## 2019-01-01 DIAGNOSIS — Z86.711 HISTORY OF PULMONARY EMBOLISM: Primary | ICD-10-CM

## 2019-01-01 DIAGNOSIS — F41.1 GAD (GENERALIZED ANXIETY DISORDER): ICD-10-CM

## 2019-01-01 DIAGNOSIS — I26.99 OTHER PULMONARY EMBOLISM WITHOUT ACUTE COR PULMONALE, UNSPECIFIED CHRONICITY (H): ICD-10-CM

## 2019-01-01 DIAGNOSIS — I10 BENIGN ESSENTIAL HYPERTENSION: Chronic | ICD-10-CM

## 2019-01-01 DIAGNOSIS — M25.512 CHRONIC LEFT SHOULDER PAIN: ICD-10-CM

## 2019-01-01 DIAGNOSIS — G89.3 CHRONIC PAIN DUE TO NEOPLASM: ICD-10-CM

## 2019-01-01 DIAGNOSIS — M25.512 LEFT SHOULDER PAIN, UNSPECIFIED CHRONICITY: ICD-10-CM

## 2019-01-01 DIAGNOSIS — E78.5 HYPERLIPIDEMIA LDL GOAL <100: Chronic | ICD-10-CM

## 2019-01-01 DIAGNOSIS — F10.20 UNCOMPLICATED ALCOHOL DEPENDENCE (H): ICD-10-CM

## 2019-01-01 DIAGNOSIS — G89.29 CHRONIC LEFT SHOULDER PAIN: ICD-10-CM

## 2019-01-01 DIAGNOSIS — Z86.711 HISTORY OF PULMONARY EMBOLISM: ICD-10-CM

## 2019-01-01 DIAGNOSIS — C34.92 ADENOCARCINOMA, LUNG, LEFT (H): ICD-10-CM

## 2019-01-01 DIAGNOSIS — F32.0 CURRENT MILD EPISODE OF MAJOR DEPRESSIVE DISORDER WITHOUT PRIOR EPISODE (H): ICD-10-CM

## 2019-01-01 DIAGNOSIS — J90 PLEURAL EFFUSION ON LEFT: ICD-10-CM

## 2019-01-01 DIAGNOSIS — I27.82 OTHER CHRONIC PULMONARY EMBOLISM WITH ACUTE COR PULMONALE (H): Primary | ICD-10-CM

## 2019-01-01 DIAGNOSIS — R07.89 OTHER CHEST PAIN: ICD-10-CM

## 2019-01-01 DIAGNOSIS — I25.119 CORONARY ARTERY DISEASE INVOLVING NATIVE HEART WITH ANGINA PECTORIS, UNSPECIFIED VESSEL OR LESION TYPE (H): ICD-10-CM

## 2019-01-01 DIAGNOSIS — R41.82 ACUTE ALTERATION IN MENTAL STATUS: Primary | ICD-10-CM

## 2019-01-01 DIAGNOSIS — F51.03 PARADOXICAL INSOMNIA: ICD-10-CM

## 2019-01-01 DIAGNOSIS — I26.99 OTHER PULMONARY EMBOLISM WITHOUT ACUTE COR PULMONALE, UNSPECIFIED CHRONICITY (H): Primary | ICD-10-CM

## 2019-01-01 DIAGNOSIS — F32.1 CURRENT MODERATE EPISODE OF MAJOR DEPRESSIVE DISORDER WITHOUT PRIOR EPISODE (H): ICD-10-CM

## 2019-01-01 DIAGNOSIS — G89.4 CHRONIC PAIN SYNDROME: Primary | ICD-10-CM

## 2019-01-01 DIAGNOSIS — E44.0 MODERATE PROTEIN-CALORIE MALNUTRITION (H): ICD-10-CM

## 2019-01-01 DIAGNOSIS — Z79.01 ANTICOAGULATED ON COUMADIN: ICD-10-CM

## 2019-01-01 DIAGNOSIS — R19.7 DIARRHEA, UNSPECIFIED TYPE: ICD-10-CM

## 2019-01-01 DIAGNOSIS — F17.200 TOBACCO USE DISORDER: ICD-10-CM

## 2019-01-01 DIAGNOSIS — R79.1 SUPRATHERAPEUTIC INR: ICD-10-CM

## 2019-01-01 DIAGNOSIS — Z12.11 ENCOUNTER FOR SCREENING FOR MALIGNANT NEOPLASM OF COLON: ICD-10-CM

## 2019-01-01 DIAGNOSIS — R05.9 COUGH: ICD-10-CM

## 2019-01-01 DIAGNOSIS — M54.12 CERVICAL RADICULOPATHY: ICD-10-CM

## 2019-01-01 DIAGNOSIS — D50.8 OTHER IRON DEFICIENCY ANEMIA: ICD-10-CM

## 2019-01-01 DIAGNOSIS — T45.1X5A CHEMOTHERAPY INDUCED NAUSEA AND VOMITING: ICD-10-CM

## 2019-01-01 DIAGNOSIS — R11.2 CHEMOTHERAPY INDUCED NAUSEA AND VOMITING: ICD-10-CM

## 2019-01-01 DIAGNOSIS — R53.81 DEBILITY: ICD-10-CM

## 2019-01-01 DIAGNOSIS — F41.1 GAD (GENERALIZED ANXIETY DISORDER): Primary | ICD-10-CM

## 2019-01-01 DIAGNOSIS — I26.09 OTHER CHRONIC PULMONARY EMBOLISM WITH ACUTE COR PULMONALE (H): Primary | ICD-10-CM

## 2019-01-01 DIAGNOSIS — C34.32 MALIGNANT NEOPLASM OF LOWER LOBE OF LEFT LUNG (H): Primary | Chronic | ICD-10-CM

## 2019-01-01 DIAGNOSIS — R10.84 ABDOMINAL PAIN, GENERALIZED: ICD-10-CM

## 2019-01-01 DIAGNOSIS — C34.32 MALIGNANT NEOPLASM OF LOWER LOBE OF LEFT LUNG (H): Chronic | ICD-10-CM

## 2019-01-01 DIAGNOSIS — M40.00 ACQUIRED POSTURAL KYPHOSIS: Primary | ICD-10-CM

## 2019-01-01 DIAGNOSIS — M50.30 DDD (DEGENERATIVE DISC DISEASE), CERVICAL: ICD-10-CM

## 2019-01-01 DIAGNOSIS — Z79.01 ANTICOAGULATED ON COUMADIN: Primary | ICD-10-CM

## 2019-01-01 DIAGNOSIS — M54.12 CERVICAL RADICULOPATHY: Primary | ICD-10-CM

## 2019-01-01 DIAGNOSIS — D61.818 PANCYTOPENIA (H): Primary | ICD-10-CM

## 2019-01-01 DIAGNOSIS — K59.00 CONSTIPATION, UNSPECIFIED CONSTIPATION TYPE: ICD-10-CM

## 2019-01-01 LAB
ALBUMIN SERPL-MCNC: 2.8 G/DL (ref 3.4–5)
ALBUMIN SERPL-MCNC: 2.9 G/DL (ref 3.4–5)
ALBUMIN SERPL-MCNC: 2.9 G/DL (ref 3.5–5)
ALBUMIN SERPL-MCNC: 2.9 G/DL (ref 3.5–5)
ALBUMIN SERPL-MCNC: 3 G/DL (ref 3.4–5)
ALBUMIN SERPL-MCNC: 3 G/DL (ref 3.4–5)
ALBUMIN UR-MCNC: NEGATIVE MG/DL
ALP SERPL-CCNC: 101 U/L (ref 40–150)
ALP SERPL-CCNC: 102 U/L (ref 40–150)
ALP SERPL-CCNC: 102 U/L (ref 40–150)
ALP SERPL-CCNC: 109 U/L (ref 40–150)
ALP SERPL-CCNC: 114 U/L (ref 45–120)
ALP SERPL-CCNC: 114 U/L (ref 45–120)
ALT SERPL W P-5'-P-CCNC: 10 U/L (ref 0–70)
ALT SERPL W P-5'-P-CCNC: 11 U/L (ref 0–70)
ALT SERPL W P-5'-P-CCNC: 12 U/L (ref 0–70)
ALT SERPL W P-5'-P-CCNC: 13 U/L (ref 0–70)
ALT SERPL W P-5'-P-CCNC: <9 U/L (ref 0–45)
ALT SERPL-CCNC: <9 U/L (ref 0–45)
ANION GAP SERPL CALCULATED.3IONS-SCNC: 10 MMOL/L (ref 5–18)
ANION GAP SERPL CALCULATED.3IONS-SCNC: 11 MMOL/L (ref 5–18)
ANION GAP SERPL CALCULATED.3IONS-SCNC: 11 MMOL/L (ref 5–18)
ANION GAP SERPL CALCULATED.3IONS-SCNC: 5 MMOL/L (ref 3–14)
ANION GAP SERPL CALCULATED.3IONS-SCNC: 7 MMOL/L (ref 3–14)
ANION GAP SERPL CALCULATED.3IONS-SCNC: 7 MMOL/L (ref 3–14)
ANION GAP SERPL CALCULATED.3IONS-SCNC: 8 MMOL/L (ref 3–14)
APPEARANCE UR: CLEAR
AST SERPL W P-5'-P-CCNC: 10 U/L (ref 0–45)
AST SERPL W P-5'-P-CCNC: 11 U/L (ref 0–45)
AST SERPL W P-5'-P-CCNC: 13 U/L (ref 0–45)
AST SERPL W P-5'-P-CCNC: 14 U/L (ref 0–40)
AST SERPL W P-5'-P-CCNC: 14 U/L (ref 0–45)
AST SERPL-CCNC: 14 U/L (ref 0–40)
BASOPHILS # BLD AUTO: 0 10E9/L (ref 0–0.2)
BASOPHILS # BLD AUTO: 0 10E9/L (ref 0–0.2)
BASOPHILS # BLD AUTO: 0 THOU/UL (ref 0–0.2)
BASOPHILS # BLD AUTO: 0.1 10E9/L (ref 0–0.2)
BASOPHILS # BLD AUTO: 0.1 10E9/L (ref 0–0.2)
BASOPHILS # BLD AUTO: 0.1 THOU/UL (ref 0–0.2)
BASOPHILS NFR BLD AUTO: 0 % (ref 0–2)
BASOPHILS NFR BLD AUTO: 0.1 %
BASOPHILS NFR BLD AUTO: 0.2 %
BASOPHILS NFR BLD AUTO: 0.4 %
BASOPHILS NFR BLD AUTO: 0.5 %
BASOPHILS NFR BLD AUTO: 1 % (ref 0–2)
BILIRUB SERPL-MCNC: 0.2 MG/DL (ref 0.2–1.3)
BILIRUB SERPL-MCNC: 0.2 MG/DL (ref 0–1)
BILIRUB SERPL-MCNC: 0.2 MG/DL (ref 0–1)
BILIRUB SERPL-MCNC: 0.3 MG/DL (ref 0.2–1.3)
BILIRUB UR QL STRIP: NEGATIVE
BUN SERPL-MCNC: 12 MG/DL (ref 7–30)
BUN SERPL-MCNC: 16 MG/DL (ref 7–30)
BUN SERPL-MCNC: 17 MG/DL (ref 8–22)
BUN SERPL-MCNC: 19 MG/DL (ref 8–22)
BUN SERPL-MCNC: 19 MG/DL (ref 8–22)
BUN SERPL-MCNC: 20 MG/DL (ref 7–30)
BUN SERPL-MCNC: 23 MG/DL (ref 7–30)
CALCIUM SERPL-MCNC: 10.5 MG/DL (ref 8.5–10.5)
CALCIUM SERPL-MCNC: 10.5 MG/DL (ref 8.5–10.5)
CALCIUM SERPL-MCNC: 8.9 MG/DL (ref 8.5–10.1)
CALCIUM SERPL-MCNC: 9.2 MG/DL (ref 8.5–10.1)
CALCIUM SERPL-MCNC: 9.3 MG/DL (ref 8.5–10.5)
CALCIUM SERPL-MCNC: 9.4 MG/DL (ref 8.5–10.1)
CALCIUM SERPL-MCNC: 9.5 MG/DL (ref 8.5–10.1)
CHLORIDE BLD-SCNC: 91 MMOL/L (ref 98–107)
CHLORIDE BLD-SCNC: 96 MMOL/L (ref 98–107)
CHLORIDE SERPL-SCNC: 92 MMOL/L (ref 94–109)
CHLORIDE SERPL-SCNC: 94 MMOL/L (ref 94–109)
CHLORIDE SERPL-SCNC: 95 MMOL/L (ref 94–109)
CHLORIDE SERPL-SCNC: 96 MMOL/L (ref 94–109)
CHLORIDE SERPLBLD-SCNC: 91 MMOL/L (ref 98–107)
CO2 SERPL-SCNC: 26 MMOL/L (ref 20–32)
CO2 SERPL-SCNC: 27 MMOL/L (ref 22–31)
CO2 SERPL-SCNC: 29 MMOL/L (ref 20–32)
CO2 SERPL-SCNC: 31 MMOL/L (ref 20–32)
CO2 SERPL-SCNC: 31 MMOL/L (ref 22–31)
CO2 SERPL-SCNC: 31 MMOL/L (ref 22–31)
CO2 SERPL-SCNC: 33 MMOL/L (ref 20–32)
COLOR UR AUTO: YELLOW
COPATH REPORT: NORMAL
CREAT SERPL-MCNC: 0.86 MG/DL (ref 0.7–1.3)
CREAT SERPL-MCNC: 0.96 MG/DL (ref 0.66–1.25)
CREAT SERPL-MCNC: 0.97 MG/DL (ref 0.66–1.25)
CREAT SERPL-MCNC: 1.07 MG/DL (ref 0.66–1.25)
CREAT SERPL-MCNC: 1.07 MG/DL (ref 0.66–1.25)
CREAT SERPL-MCNC: 1.34 MG/DL (ref 0.7–1.3)
CREAT SERPL-MCNC: 1.34 MG/DL (ref 0.7–1.3)
DIFFERENTIAL METHOD BLD: ABNORMAL
DIFFERENTIAL: ABNORMAL
EOSINOPHIL # BLD AUTO: 0 10E9/L (ref 0–0.7)
EOSINOPHIL # BLD AUTO: 0 10E9/L (ref 0–0.7)
EOSINOPHIL # BLD AUTO: 0 THOU/UL (ref 0–0.4)
EOSINOPHIL # BLD AUTO: 0.1 10E9/L (ref 0–0.7)
EOSINOPHIL # BLD AUTO: 0.2 10E9/L (ref 0–0.7)
EOSINOPHIL # BLD AUTO: 0.3 THOU/UL (ref 0–0.4)
EOSINOPHIL NFR BLD AUTO: 0 %
EOSINOPHIL NFR BLD AUTO: 0 % (ref 0–6)
EOSINOPHIL NFR BLD AUTO: 0.3 %
EOSINOPHIL NFR BLD AUTO: 0.4 %
EOSINOPHIL NFR BLD AUTO: 1.9 %
EOSINOPHIL NFR BLD AUTO: 2 % (ref 0–6)
ERYTHROCYTE [DISTWIDTH] IN BLOOD BY AUTOMATED COUNT: 13.9 % (ref 10–15)
ERYTHROCYTE [DISTWIDTH] IN BLOOD BY AUTOMATED COUNT: 14.6 % (ref 10–15)
ERYTHROCYTE [DISTWIDTH] IN BLOOD BY AUTOMATED COUNT: 14.6 % (ref 10–15)
ERYTHROCYTE [DISTWIDTH] IN BLOOD BY AUTOMATED COUNT: 14.7 % (ref 11–14.5)
ERYTHROCYTE [DISTWIDTH] IN BLOOD BY AUTOMATED COUNT: 14.8 % (ref 11–14.5)
ERYTHROCYTE [DISTWIDTH] IN BLOOD BY AUTOMATED COUNT: 16.4 % (ref 10–15)
FERRITIN SERPL-MCNC: 50 NG/ML (ref 26–388)
FOLATE SERPL-MCNC: 13.9 NG/ML
GFR SERPL CREATININE-BSD FRML MDRD: 53 ML/MIN/1.73M2
GFR SERPL CREATININE-BSD FRML MDRD: 53 ML/MIN/1.73M2
GFR SERPL CREATININE-BSD FRML MDRD: 71 ML/MIN/{1.73_M2}
GFR SERPL CREATININE-BSD FRML MDRD: 71 ML/MIN/{1.73_M2}
GFR SERPL CREATININE-BSD FRML MDRD: 80 ML/MIN/{1.73_M2}
GFR SERPL CREATININE-BSD FRML MDRD: 81 ML/MIN/{1.73_M2}
GFR SERPL CREATININE-BSD FRML MDRD: >60 ML/MIN/1.73M2
GLUCOSE BLD-MCNC: 67 MG/DL (ref 70–125)
GLUCOSE BLD-MCNC: 80 MG/DL (ref 70–125)
GLUCOSE SERPL-MCNC: 106 MG/DL (ref 70–99)
GLUCOSE SERPL-MCNC: 119 MG/DL (ref 70–99)
GLUCOSE SERPL-MCNC: 67 MG/DL (ref 70–125)
GLUCOSE SERPL-MCNC: 95 MG/DL (ref 70–99)
GLUCOSE SERPL-MCNC: 96 MG/DL (ref 70–99)
GLUCOSE UR STRIP-MCNC: NEGATIVE MG/DL
HBA1C MFR BLD: 5.1 % (ref 4.2–6.1)
HCT VFR BLD AUTO: 25.6 % (ref 40–53)
HCT VFR BLD AUTO: 26.3 % (ref 40–54)
HCT VFR BLD AUTO: 26.3 % (ref 40–54)
HCT VFR BLD AUTO: 28.4 % (ref 40–53)
HCT VFR BLD AUTO: 28.7 % (ref 40–53)
HCT VFR BLD AUTO: 30.5 % (ref 40–53)
HCT VFR BLD AUTO: 31 % (ref 40–54)
HCT VFR BLD AUTO: 35.2 % (ref 40–54)
HEMOCCULT STL QL: NEGATIVE
HEMOGLOBIN: 8.8 G/DL (ref 14–18)
HGB BLD-MCNC: 10.8 G/DL (ref 14–18)
HGB BLD-MCNC: 7.8 G/DL (ref 13.3–17.7)
HGB BLD-MCNC: 8.8 G/DL (ref 14–18)
HGB BLD-MCNC: 9.1 G/DL (ref 13.3–17.7)
HGB BLD-MCNC: 9.5 G/DL (ref 13.3–17.7)
HGB BLD-MCNC: 9.5 G/DL (ref 13.3–17.7)
HGB BLD-MCNC: 9.7 G/DL (ref 14–18)
HGB UR QL STRIP: NEGATIVE
HYALINE CASTS #/AREA URNS LPF: 4 /LPF (ref 0–2)
IMM GRANULOCYTES # BLD: 0 10E9/L (ref 0–0.4)
IMM GRANULOCYTES # BLD: 0.1 10E9/L (ref 0–0.4)
IMM GRANULOCYTES NFR BLD: 0.4 %
IMM GRANULOCYTES NFR BLD: 0.5 %
IMM GRANULOCYTES NFR BLD: 0.5 %
IMM GRANULOCYTES NFR BLD: 1 %
INR PPP: 0.9 (ref 0.86–1.14)
INR PPP: 1.03 (ref 0.9–1.1)
INR PPP: 1.09 (ref 0.9–1.1)
INR PPP: 1.09 (ref 0.9–1.1)
INR PPP: 1.49 (ref 0.9–1.1)
INR PPP: 1.5 (ref 0.86–1.14)
INR PPP: 1.8 (ref 0.9–1.1)
INR PPP: 1.8 (ref 0.9–1.1)
INR PPP: 1.83 (ref 0.9–1.1)
INR PPP: 1.84 (ref 0.9–1.1)
INR PPP: 2 (ref 0.9–1.1)
INR PPP: 2.04 (ref 0.9–1.1)
INR PPP: 2.08 (ref 0.9–1.1)
INR PPP: 2.17 (ref 0.9–1.1)
INR PPP: 2.33 (ref 0.9–1.1)
INR PPP: 2.37 (ref 0.9–1.1)
INR PPP: 2.42 (ref 0.9–1.1)
INR PPP: 2.43 (ref 0.9–1.1)
INR PPP: 2.51 (ref 0.9–1.1)
INR PPP: 2.51 (ref 0.9–1.1)
INR PPP: 2.63 (ref 0.9–1.1)
INR PPP: 2.79 (ref 0.9–1.1)
INR PPP: 2.79 (ref 0.9–1.1)
INR PPP: 3.02 (ref 0.9–1.1)
INR PPP: 3.18 (ref 0.9–1.1)
INR PPP: 3.18 (ref 0.9–1.1)
INR PPP: 3.4 (ref 0.9–1.1)
INR PPP: 3.52 (ref 0.9–1.1)
INR PPP: 4.31 (ref 0.9–1.1)
INR PPP: 5.85 (ref 0.86–1.14)
IRON SATN MFR SERPL: 6 % (ref 15–46)
IRON SERPL-MCNC: 20 UG/DL (ref 35–180)
KETONES UR STRIP-MCNC: NEGATIVE MG/DL
LEUKOCYTE ESTERASE UR QL STRIP: NEGATIVE
LIPASE SERPL-CCNC: 87 U/L (ref 73–393)
LYMPHOCYTES # BLD AUTO: 0.4 10E9/L (ref 0.8–5.3)
LYMPHOCYTES # BLD AUTO: 1.2 10E9/L (ref 0.8–5.3)
LYMPHOCYTES # BLD AUTO: 1.3 10E9/L (ref 0.8–5.3)
LYMPHOCYTES # BLD AUTO: 1.4 10E9/L (ref 0.8–5.3)
LYMPHOCYTES # BLD AUTO: 1.6 THOU/UL (ref 0.8–4.4)
LYMPHOCYTES # BLD AUTO: 1.9 THOU/UL (ref 0.8–4.4)
LYMPHOCYTES NFR BLD AUTO: 10.8 %
LYMPHOCYTES NFR BLD AUTO: 11.8 %
LYMPHOCYTES NFR BLD AUTO: 13 % (ref 20–40)
LYMPHOCYTES NFR BLD AUTO: 19 % (ref 20–40)
LYMPHOCYTES NFR BLD AUTO: 5.3 %
LYMPHOCYTES NFR BLD AUTO: 7.7 %
MCH RBC QN AUTO: 26.5 PG (ref 26.5–33)
MCH RBC QN AUTO: 28.7 PG (ref 26.5–33)
MCH RBC QN AUTO: 29.3 PG (ref 27–34)
MCH RBC QN AUTO: 29.6 PG (ref 26.5–33)
MCH RBC QN AUTO: 29.7 PG (ref 27–34)
MCH RBC QN AUTO: 30.4 PG (ref 26.5–33)
MCH RBC QN AUTO: 32.8 PG (ref 27–34)
MCH RBC QN AUTO: 32.8 PG (ref 27–34)
MCHC RBC AUTO-ENTMCNC: 30.5 G/DL (ref 31.5–36.5)
MCHC RBC AUTO-ENTMCNC: 30.7 G/DL (ref 32–36)
MCHC RBC AUTO-ENTMCNC: 31.1 G/DL (ref 31.5–36.5)
MCHC RBC AUTO-ENTMCNC: 31.3 G/DL (ref 32–36)
MCHC RBC AUTO-ENTMCNC: 32 G/DL (ref 31.5–36.5)
MCHC RBC AUTO-ENTMCNC: 33.1 G/DL (ref 31.5–36.5)
MCHC RBC AUTO-ENTMCNC: 33.5 G/DL (ref 32–36)
MCHC RBC AUTO-ENTMCNC: 33.5 G/DL (ref 32–36)
MCV RBC AUTO: 87 FL (ref 78–100)
MCV RBC AUTO: 92 FL (ref 78–100)
MCV RBC AUTO: 92 FL (ref 78–100)
MCV RBC AUTO: 93 FL (ref 78–100)
MCV RBC AUTO: 94 FL (ref 80–100)
MCV RBC AUTO: 97 FL (ref 80–100)
MCV RBC AUTO: 98 FL (ref 80–100)
MCV RBC AUTO: 98 FL (ref 80–100)
MONOCYTES # BLD AUTO: 0.1 10E9/L (ref 0–1.3)
MONOCYTES # BLD AUTO: 0.9 10E9/L (ref 0–1.3)
MONOCYTES # BLD AUTO: 1 10E9/L (ref 0–1.3)
MONOCYTES # BLD AUTO: 1.1 10E9/L (ref 0–1.3)
MONOCYTES # BLD AUTO: 1.1 THOU/UL (ref 0–0.9)
MONOCYTES # BLD AUTO: 1.2 THOU/UL (ref 0–0.9)
MONOCYTES NFR BLD AUTO: 1.1 %
MONOCYTES NFR BLD AUTO: 13 % (ref 2–10)
MONOCYTES NFR BLD AUTO: 5.9 %
MONOCYTES NFR BLD AUTO: 7.4 %
MONOCYTES NFR BLD AUTO: 8 % (ref 2–10)
MONOCYTES NFR BLD AUTO: 9.7 %
NEUTROPHILS # BLD AUTO: 11.2 THOU/UL (ref 2–7.7)
NEUTROPHILS # BLD AUTO: 14.3 10E9/L (ref 1.6–8.3)
NEUTROPHILS # BLD AUTO: 5.8 THOU/UL (ref 2–7.7)
NEUTROPHILS # BLD AUTO: 7.5 10E9/L (ref 1.6–8.3)
NEUTROPHILS # BLD AUTO: 8.9 10E9/L (ref 1.6–8.3)
NEUTROPHILS # BLD AUTO: 8.9 10E9/L (ref 1.6–8.3)
NEUTROPHILS NFR BLD AUTO: 68 % (ref 50–70)
NEUTROPHILS NFR BLD AUTO: 77 % (ref 50–70)
NEUTROPHILS NFR BLD AUTO: 77.9 %
NEUTROPHILS NFR BLD AUTO: 78.4 %
NEUTROPHILS NFR BLD AUTO: 85.3 %
NEUTROPHILS NFR BLD AUTO: 92.5 %
NITRATE UR QL: NEGATIVE
NRBC # BLD AUTO: 0 10*3/UL
NRBC BLD AUTO-RTO: 0 /100
NT-PROBNP SERPL-MCNC: 759 PG/ML (ref 0–900)
PH UR STRIP: 5 PH (ref 5–7)
PLATELET # BLD AUTO: 499 THOU/UL (ref 140–440)
PLATELET # BLD AUTO: 499 THOU/UL (ref 140–440)
PLATELET # BLD AUTO: 566 10E9/L (ref 150–450)
PLATELET # BLD AUTO: 611 10E9/L (ref 150–450)
PLATELET # BLD AUTO: 641 10E9/L (ref 150–450)
PLATELET # BLD AUTO: 661 THOU/UL (ref 140–440)
PLATELET # BLD AUTO: 667 THOU/UL (ref 140–440)
PLATELET # BLD AUTO: 672 10E9/L (ref 150–450)
PMV BLD AUTO: 8.7 FL (ref 8.5–12.5)
PMV BLD AUTO: 8.7 FL (ref 8.5–12.5)
PMV BLD AUTO: 8.8 FL (ref 8.5–12.5)
POTASSIUM BLD-SCNC: 4.1 MMOL/L (ref 3.5–5)
POTASSIUM BLD-SCNC: 4.4 MMOL/L (ref 3.5–5)
POTASSIUM SERPL-SCNC: 3.9 MMOL/L (ref 3.4–5.3)
POTASSIUM SERPL-SCNC: 4.1 MMOL/L (ref 3.5–5)
POTASSIUM SERPL-SCNC: 4.2 MMOL/L (ref 3.4–5.3)
POTASSIUM SERPL-SCNC: 4.5 MMOL/L (ref 3.4–5.3)
POTASSIUM SERPL-SCNC: 4.6 MMOL/L (ref 3.4–5.3)
PROT SERPL-MCNC: 6.8 G/DL (ref 6.8–8.8)
PROT SERPL-MCNC: 7.1 G/DL (ref 6.8–8.8)
PROT SERPL-MCNC: 7.1 G/DL (ref 6–8)
PROT SERPL-MCNC: 7.1 G/DL (ref 6–8)
PROT SERPL-MCNC: 7.2 G/DL (ref 6.8–8.8)
PROT SERPL-MCNC: 7.2 G/DL (ref 6.8–8.8)
RBC # BLD AUTO: 2.68 MILL/UL (ref 4.4–6.2)
RBC # BLD AUTO: 2.68 MILL/UL (ref 4.4–6.2)
RBC # BLD AUTO: 2.94 10E12/L (ref 4.4–5.9)
RBC # BLD AUTO: 3.07 10E12/L (ref 4.4–5.9)
RBC # BLD AUTO: 3.12 10E12/L (ref 4.4–5.9)
RBC # BLD AUTO: 3.31 10E12/L (ref 4.4–5.9)
RBC # BLD AUTO: 3.31 MILL/UL (ref 4.4–6.2)
RBC # BLD AUTO: 3.64 MILL/UL (ref 4.4–6.2)
RBC #/AREA URNS AUTO: <1 /HPF (ref 0–2)
SODIUM SERPL-SCNC: 126 MMOL/L (ref 133–144)
SODIUM SERPL-SCNC: 131 MMOL/L (ref 133–144)
SODIUM SERPL-SCNC: 132 MMOL/L (ref 133–144)
SODIUM SERPL-SCNC: 133 MMOL/L (ref 136–145)
SODIUM SERPL-SCNC: 134 MMOL/L (ref 133–144)
SOURCE: ABNORMAL
SP GR UR STRIP: 1.02 (ref 1–1.03)
TIBC SERPL-MCNC: 316 UG/DL (ref 240–430)
TROPONIN I SERPL-MCNC: <0.015 UG/L (ref 0–0.04)
TSH SERPL DL<=0.005 MIU/L-ACNC: 0.58 MU/L (ref 0.4–4)
TSH SERPL DL<=0.005 MIU/L-ACNC: 1.26 UIU/ML (ref 0.3–5)
TSH SERPL-ACNC: 1.26 UIU/ML (ref 0.3–5)
UROBILINOGEN UR STRIP-MCNC: 0 MG/DL (ref 0–2)
VIT B12 SERPL-MCNC: 744 PG/ML (ref 193–986)
WBC # BLD AUTO: 11.4 10E9/L (ref 4–11)
WBC # BLD AUTO: 11.5 10E9/L (ref 4–11)
WBC # BLD AUTO: 16.7 10E9/L (ref 4–11)
WBC # BLD AUTO: 8.1 10E9/L (ref 4–11)
WBC # BLD AUTO: 8.5 THOU/UL (ref 4–11)
WBC #/AREA URNS AUTO: 1 /HPF (ref 0–5)
WBC: 11.4 THOU/UL (ref 4–11)
WBC: 14.6 THOU/UL (ref 4–11)
WBC: 8.5 THOU/UL (ref 4–11)

## 2019-01-01 PROCEDURE — 83880 ASSAY OF NATRIURETIC PEPTIDE: CPT | Performed by: FAMILY MEDICINE

## 2019-01-01 PROCEDURE — 25000128 H RX IP 250 OP 636

## 2019-01-01 PROCEDURE — 85025 COMPLETE CBC W/AUTO DIFF WBC: CPT | Performed by: FAMILY MEDICINE

## 2019-01-01 PROCEDURE — 99309 SBSQ NF CARE MODERATE MDM 30: CPT | Performed by: NURSE PRACTITIONER

## 2019-01-01 PROCEDURE — 83550 IRON BINDING TEST: CPT | Performed by: INTERNAL MEDICINE

## 2019-01-01 PROCEDURE — 99284 EMERGENCY DEPT VISIT MOD MDM: CPT | Mod: 25

## 2019-01-01 PROCEDURE — 99207 ZZC CDG-CODE CATEGORY CHANGED: CPT | Performed by: NURSE PRACTITIONER

## 2019-01-01 PROCEDURE — 83540 ASSAY OF IRON: CPT | Performed by: INTERNAL MEDICINE

## 2019-01-01 PROCEDURE — 80053 COMPREHEN METABOLIC PANEL: CPT | Performed by: FAMILY MEDICINE

## 2019-01-01 PROCEDURE — 88342 IMHCHEM/IMCYTCHM 1ST ANTB: CPT | Performed by: RADIOLOGY

## 2019-01-01 PROCEDURE — 84443 ASSAY THYROID STIM HORMONE: CPT | Performed by: INTERNAL MEDICINE

## 2019-01-01 PROCEDURE — 96376 TX/PRO/DX INJ SAME DRUG ADON: CPT | Mod: 59

## 2019-01-01 PROCEDURE — 99308 SBSQ NF CARE LOW MDM 20: CPT | Performed by: NURSE PRACTITIONER

## 2019-01-01 PROCEDURE — 88341 IMHCHEM/IMCYTCHM EA ADD ANTB: CPT | Performed by: RADIOLOGY

## 2019-01-01 PROCEDURE — 99307 SBSQ NF CARE SF MDM 10: CPT | Performed by: NURSE PRACTITIONER

## 2019-01-01 PROCEDURE — 88305 TISSUE EXAM BY PATHOLOGIST: CPT | Performed by: RADIOLOGY

## 2019-01-01 PROCEDURE — 96372 THER/PROPH/DIAG INJ SC/IM: CPT

## 2019-01-01 PROCEDURE — 99152 MOD SED SAME PHYS/QHP 5/>YRS: CPT

## 2019-01-01 PROCEDURE — 80053 COMPREHEN METABOLIC PANEL: CPT | Performed by: INTERNAL MEDICINE

## 2019-01-01 PROCEDURE — 71046 X-RAY EXAM CHEST 2 VIEWS: CPT

## 2019-01-01 PROCEDURE — 25000128 H RX IP 250 OP 636: Performed by: FAMILY MEDICINE

## 2019-01-01 PROCEDURE — 25800030 ZZH RX IP 258 OP 636: Performed by: INTERNAL MEDICINE

## 2019-01-01 PROCEDURE — 40000557 ZZH STATISTIC PORT-A-CATH ACCESS/FLUSHING

## 2019-01-01 PROCEDURE — 99215 OFFICE O/P EST HI 40 MIN: CPT | Performed by: PAIN MEDICINE

## 2019-01-01 PROCEDURE — 99285 EMERGENCY DEPT VISIT HI MDM: CPT | Mod: 25

## 2019-01-01 PROCEDURE — 36415 COLL VENOUS BLD VENIPUNCTURE: CPT | Performed by: INTERNAL MEDICINE

## 2019-01-01 PROCEDURE — 71045 X-RAY EXAM CHEST 1 VIEW: CPT

## 2019-01-01 PROCEDURE — 96361 HYDRATE IV INFUSION ADD-ON: CPT

## 2019-01-01 PROCEDURE — 96411 CHEMO IV PUSH ADDL DRUG: CPT

## 2019-01-01 PROCEDURE — 85610 PROTHROMBIN TIME: CPT | Performed by: ANESTHESIOLOGY

## 2019-01-01 PROCEDURE — 85610 PROTHROMBIN TIME: CPT | Performed by: FAMILY MEDICINE

## 2019-01-01 PROCEDURE — 85610 PROTHROMBIN TIME: CPT | Performed by: PAIN MEDICINE

## 2019-01-01 PROCEDURE — 25000128 H RX IP 250 OP 636: Performed by: RADIOLOGY

## 2019-01-01 PROCEDURE — G0463 HOSPITAL OUTPT CLINIC VISIT: HCPCS

## 2019-01-01 PROCEDURE — 99215 OFFICE O/P EST HI 40 MIN: CPT | Performed by: INTERNAL MEDICINE

## 2019-01-01 PROCEDURE — 36416 COLLJ CAPILLARY BLOOD SPEC: CPT | Performed by: PAIN MEDICINE

## 2019-01-01 PROCEDURE — 25800030 ZZH RX IP 258 OP 636: Performed by: FAMILY MEDICINE

## 2019-01-01 PROCEDURE — 88342 IMHCHEM/IMCYTCHM 1ST ANTB: CPT | Mod: 26 | Performed by: RADIOLOGY

## 2019-01-01 PROCEDURE — 78306 BONE IMAGING WHOLE BODY: CPT

## 2019-01-01 PROCEDURE — 82607 VITAMIN B-12: CPT | Performed by: INTERNAL MEDICINE

## 2019-01-01 PROCEDURE — 96374 THER/PROPH/DIAG INJ IV PUSH: CPT

## 2019-01-01 PROCEDURE — 85025 COMPLETE CBC W/AUTO DIFF WBC: CPT | Performed by: INTERNAL MEDICINE

## 2019-01-01 PROCEDURE — 25000128 H RX IP 250 OP 636: Performed by: INTERNAL MEDICINE

## 2019-01-01 PROCEDURE — 82746 ASSAY OF FOLIC ACID SERUM: CPT | Performed by: INTERNAL MEDICINE

## 2019-01-01 PROCEDURE — G0463 HOSPITAL OUTPT CLINIC VISIT: HCPCS | Mod: 25

## 2019-01-01 PROCEDURE — 99284 EMERGENCY DEPT VISIT MOD MDM: CPT | Mod: Z6 | Performed by: EMERGENCY MEDICINE

## 2019-01-01 PROCEDURE — 74177 CT ABD & PELVIS W/CONTRAST: CPT

## 2019-01-01 PROCEDURE — 83690 ASSAY OF LIPASE: CPT | Performed by: FAMILY MEDICINE

## 2019-01-01 PROCEDURE — 25000125 ZZHC RX 250: Performed by: FAMILY MEDICINE

## 2019-01-01 PROCEDURE — 93005 ELECTROCARDIOGRAM TRACING: CPT

## 2019-01-01 PROCEDURE — 36415 COLL VENOUS BLD VENIPUNCTURE: CPT

## 2019-01-01 PROCEDURE — 81001 URINALYSIS AUTO W/SCOPE: CPT | Performed by: FAMILY MEDICINE

## 2019-01-01 PROCEDURE — 96376 TX/PRO/DX INJ SAME DRUG ADON: CPT

## 2019-01-01 PROCEDURE — 25000132 ZZH RX MED GY IP 250 OP 250 PS 637: Performed by: FAMILY MEDICINE

## 2019-01-01 PROCEDURE — 84484 ASSAY OF TROPONIN QUANT: CPT | Performed by: FAMILY MEDICINE

## 2019-01-01 PROCEDURE — 99285 EMERGENCY DEPT VISIT HI MDM: CPT | Mod: 25 | Performed by: FAMILY MEDICINE

## 2019-01-01 PROCEDURE — 25000125 ZZHC RX 250: Performed by: RADIOLOGY

## 2019-01-01 PROCEDURE — 99207 C PROLONGED SERV,NO CONTACT,1ST HR: CPT | Performed by: NURSE PRACTITIONER

## 2019-01-01 PROCEDURE — 25800030 ZZH RX IP 258 OP 636: Performed by: RADIOLOGY

## 2019-01-01 PROCEDURE — 82728 ASSAY OF FERRITIN: CPT | Performed by: INTERNAL MEDICINE

## 2019-01-01 PROCEDURE — 96374 THER/PROPH/DIAG INJ IV PUSH: CPT | Mod: 59

## 2019-01-01 PROCEDURE — 99285 EMERGENCY DEPT VISIT HI MDM: CPT | Mod: Z6 | Performed by: FAMILY MEDICINE

## 2019-01-01 PROCEDURE — 93010 ELECTROCARDIOGRAM REPORT: CPT | Mod: Z6 | Performed by: FAMILY MEDICINE

## 2019-01-01 PROCEDURE — 96375 TX/PRO/DX INJ NEW DRUG ADDON: CPT

## 2019-01-01 PROCEDURE — 99310 SBSQ NF CARE HIGH MDM 45: CPT | Performed by: FAMILY MEDICINE

## 2019-01-01 PROCEDURE — 32405 CT LUNG MEDIASTINUM BIOPSY: CPT

## 2019-01-01 PROCEDURE — 96417 CHEMO IV INFUS EACH ADDL SEQ: CPT

## 2019-01-01 PROCEDURE — 71260 CT THORAX DX C+: CPT

## 2019-01-01 PROCEDURE — 96413 CHEMO IV INFUSION 1 HR: CPT

## 2019-01-01 PROCEDURE — 25000128 H RX IP 250 OP 636: Performed by: EMERGENCY MEDICINE

## 2019-01-01 PROCEDURE — 40000986 XR CHEST 1 VW

## 2019-01-01 PROCEDURE — 99310 SBSQ NF CARE HIGH MDM 45: CPT | Performed by: NURSE PRACTITIONER

## 2019-01-01 PROCEDURE — 36416 COLLJ CAPILLARY BLOOD SPEC: CPT | Performed by: ANESTHESIOLOGY

## 2019-01-01 PROCEDURE — 99214 OFFICE O/P EST MOD 30 MIN: CPT | Performed by: INTERNAL MEDICINE

## 2019-01-01 PROCEDURE — 34300033 ZZH RX 343: Performed by: INTERNAL MEDICINE

## 2019-01-01 PROCEDURE — A9561 TC99M OXIDRONATE: HCPCS | Performed by: INTERNAL MEDICINE

## 2019-01-01 PROCEDURE — 62321 NJX INTERLAMINAR CRV/THRC: CPT | Performed by: ANESTHESIOLOGY

## 2019-01-01 PROCEDURE — 99207 ZZC NO BILLABLE SERVICE THIS VISIT: CPT | Performed by: PAIN MEDICINE

## 2019-01-01 PROCEDURE — 96375 TX/PRO/DX INJ NEW DRUG ADDON: CPT | Mod: 59

## 2019-01-01 RX ORDER — LORAZEPAM 0.5 MG/1
0.5 TABLET ORAL 3 TIMES DAILY PRN
Qty: 25 TABLET | Refills: 0 | Status: SHIPPED | OUTPATIENT
Start: 2019-01-01 | End: 2019-01-01

## 2019-01-01 RX ORDER — CYANOCOBALAMIN 1000 UG/ML
1000 INJECTION, SOLUTION INTRAMUSCULAR; SUBCUTANEOUS ONCE
Status: CANCELLED
Start: 2019-01-01

## 2019-01-01 RX ORDER — ALBUTEROL SULFATE 90 UG/1
1-2 AEROSOL, METERED RESPIRATORY (INHALATION)
Status: CANCELLED
Start: 2019-01-01

## 2019-01-01 RX ORDER — LIDOCAINE 40 MG/G
CREAM TOPICAL
Status: DISCONTINUED | OUTPATIENT
Start: 2019-01-01 | End: 2019-01-01

## 2019-01-01 RX ORDER — DIPHENHYDRAMINE HYDROCHLORIDE 50 MG/ML
50 INJECTION INTRAMUSCULAR; INTRAVENOUS
Status: CANCELLED
Start: 2019-01-01

## 2019-01-01 RX ORDER — HEPARIN SODIUM (PORCINE) LOCK FLUSH IV SOLN 100 UNIT/ML 100 UNIT/ML
SOLUTION INTRAVENOUS
Status: COMPLETED
Start: 2019-01-01 | End: 2019-01-01

## 2019-01-01 RX ORDER — PROCHLORPERAZINE MALEATE 10 MG
10 TABLET ORAL EVERY 6 HOURS PRN
Qty: 30 TABLET | Refills: 3 | Status: SHIPPED | OUTPATIENT
Start: 2019-01-01

## 2019-01-01 RX ORDER — GUAIFENESIN 200 MG/1
400 TABLET ORAL EVERY 4 HOURS PRN
Start: 2019-01-01 | End: 2019-01-01

## 2019-01-01 RX ORDER — GABAPENTIN 600 MG/1
600 TABLET ORAL 2 TIMES DAILY
Qty: 90 TABLET | Refills: 1 | Status: SHIPPED | OUTPATIENT
Start: 2019-01-01

## 2019-01-01 RX ORDER — IOPAMIDOL 755 MG/ML
69 INJECTION, SOLUTION INTRAVASCULAR ONCE
Status: COMPLETED | OUTPATIENT
Start: 2019-01-01 | End: 2019-01-01

## 2019-01-01 RX ORDER — HYDROMORPHONE HYDROCHLORIDE 1 MG/ML
INJECTION, SOLUTION INTRAMUSCULAR; INTRAVENOUS; SUBCUTANEOUS
Status: COMPLETED
Start: 2019-01-01 | End: 2019-01-01

## 2019-01-01 RX ORDER — LORAZEPAM 0.5 MG/1
0.5 TABLET ORAL 3 TIMES DAILY PRN
Start: 2019-01-01 | End: 2019-01-01

## 2019-01-01 RX ORDER — OXYCODONE HYDROCHLORIDE 5 MG/1
10 TABLET ORAL EVERY 4 HOURS PRN
Status: DISCONTINUED | OUTPATIENT
Start: 2019-01-01 | End: 2019-01-01 | Stop reason: HOSPADM

## 2019-01-01 RX ORDER — OXYCODONE HYDROCHLORIDE 5 MG/1
5 TABLET ORAL EVERY 4 HOURS PRN
Qty: 12 TABLET | Refills: 0
Start: 2019-01-01 | End: 2019-01-01

## 2019-01-01 RX ORDER — SENNA AND DOCUSATE SODIUM 50; 8.6 MG/1; MG/1
2 TABLET, FILM COATED ORAL EVERY MORNING
Start: 2019-01-01

## 2019-01-01 RX ORDER — IOPAMIDOL 755 MG/ML
68 INJECTION, SOLUTION INTRAVASCULAR ONCE
Status: COMPLETED | OUTPATIENT
Start: 2019-01-01 | End: 2019-01-01

## 2019-01-01 RX ORDER — LIDOCAINE 50 MG/G
OINTMENT TOPICAL 2 TIMES DAILY
COMMUNITY
End: 2019-01-01

## 2019-01-01 RX ORDER — DEXTROSE MONOHYDRATE 25 G/50ML
25-50 INJECTION, SOLUTION INTRAVENOUS
Status: DISCONTINUED | OUTPATIENT
Start: 2019-01-01 | End: 2019-01-01 | Stop reason: HOSPADM

## 2019-01-01 RX ORDER — NICOTINE POLACRILEX 4 MG
15-30 LOZENGE BUCCAL
Status: DISCONTINUED | OUTPATIENT
Start: 2019-01-01 | End: 2019-01-01 | Stop reason: HOSPADM

## 2019-01-01 RX ORDER — ALBUTEROL SULFATE 0.83 MG/ML
2.5 SOLUTION RESPIRATORY (INHALATION)
Status: CANCELLED | OUTPATIENT
Start: 2019-01-01

## 2019-01-01 RX ORDER — HEPARIN SODIUM (PORCINE) LOCK FLUSH IV SOLN 100 UNIT/ML 100 UNIT/ML
5 SOLUTION INTRAVENOUS EVERY 8 HOURS
Status: DISCONTINUED | OUTPATIENT
Start: 2019-01-01 | End: 2019-01-01 | Stop reason: HOSPADM

## 2019-01-01 RX ORDER — OXYCODONE HYDROCHLORIDE 30 MG/1
30 TABLET, FILM COATED, EXTENDED RELEASE ORAL AT BEDTIME
Qty: 30 TABLET | Refills: 0 | Status: SHIPPED | OUTPATIENT
Start: 2019-01-01 | End: 2019-01-01

## 2019-01-01 RX ORDER — EPINEPHRINE 0.3 MG/.3ML
0.3 INJECTION SUBCUTANEOUS EVERY 5 MIN PRN
Status: CANCELLED | OUTPATIENT
Start: 2019-01-01

## 2019-01-01 RX ORDER — FLUMAZENIL 0.1 MG/ML
0.2 INJECTION, SOLUTION INTRAVENOUS
Status: DISCONTINUED | OUTPATIENT
Start: 2019-01-01 | End: 2019-01-01 | Stop reason: HOSPADM

## 2019-01-01 RX ORDER — SODIUM CHLORIDE 9 MG/ML
INJECTION, SOLUTION INTRAVENOUS CONTINUOUS
Status: DISCONTINUED | OUTPATIENT
Start: 2019-01-01 | End: 2019-01-01 | Stop reason: HOSPADM

## 2019-01-01 RX ORDER — NICOTINE 21 MG/24HR
1 PATCH, TRANSDERMAL 24 HOURS TRANSDERMAL DAILY PRN
COMMUNITY

## 2019-01-01 RX ORDER — HYDROMORPHONE HYDROCHLORIDE 1 MG/ML
0.5 INJECTION, SOLUTION INTRAMUSCULAR; INTRAVENOUS; SUBCUTANEOUS ONCE
Status: COMPLETED | OUTPATIENT
Start: 2019-01-01 | End: 2019-01-01

## 2019-01-01 RX ORDER — HEPARIN SODIUM (PORCINE) LOCK FLUSH IV SOLN 100 UNIT/ML 100 UNIT/ML
500 SOLUTION INTRAVENOUS ONCE
Status: DISCONTINUED | OUTPATIENT
Start: 2019-01-01 | End: 2019-01-01 | Stop reason: HOSPADM

## 2019-01-01 RX ORDER — MAGNESIUM CARB/ALUMINUM HYDROX 105-160MG
296 TABLET,CHEWABLE ORAL ONCE
Status: COMPLETED | OUTPATIENT
Start: 2019-01-01 | End: 2019-01-01

## 2019-01-01 RX ORDER — HEPARIN SODIUM (PORCINE) LOCK FLUSH IV SOLN 100 UNIT/ML 100 UNIT/ML
5 SOLUTION INTRAVENOUS
Status: DISCONTINUED | OUTPATIENT
Start: 2019-01-01 | End: 2019-01-01 | Stop reason: HOSPADM

## 2019-01-01 RX ORDER — ONDANSETRON 8 MG/1
8 TABLET, FILM COATED ORAL EVERY 8 HOURS PRN
Qty: 10 TABLET | Refills: 3 | Status: SHIPPED | OUTPATIENT
Start: 2019-01-01

## 2019-01-01 RX ORDER — FOLIC ACID 1 MG/1
1000 TABLET ORAL DAILY
Qty: 30 TABLET | Refills: 4 | Status: SHIPPED | OUTPATIENT
Start: 2019-01-01

## 2019-01-01 RX ORDER — CALCIUM CARBONATE 500 MG/1
1 TABLET, CHEWABLE ORAL 2 TIMES DAILY
Start: 2019-01-01 | End: 2019-01-01

## 2019-01-01 RX ORDER — HYDROMORPHONE HYDROCHLORIDE 1 MG/ML
0.5 INJECTION, SOLUTION INTRAMUSCULAR; INTRAVENOUS; SUBCUTANEOUS
Status: COMPLETED | OUTPATIENT
Start: 2019-01-01 | End: 2019-01-01

## 2019-01-01 RX ORDER — DEXAMETHASONE 4 MG/1
4 TABLET ORAL 2 TIMES DAILY WITH MEALS
Qty: 6 TABLET | Refills: 3 | Status: SHIPPED | OUTPATIENT
Start: 2019-01-01

## 2019-01-01 RX ORDER — OXYCODONE HYDROCHLORIDE 30 MG/1
30 TABLET, FILM COATED, EXTENDED RELEASE ORAL AT BEDTIME
Qty: 30 TABLET | Refills: 0 | Status: SHIPPED | OUTPATIENT
Start: 2019-01-01

## 2019-01-01 RX ORDER — LORAZEPAM 2 MG/ML
0.5 INJECTION INTRAMUSCULAR EVERY 4 HOURS PRN
Status: CANCELLED
Start: 2019-01-01

## 2019-01-01 RX ORDER — EPINEPHRINE 1 MG/ML
0.3 INJECTION, SOLUTION, CONCENTRATE INTRAVENOUS EVERY 5 MIN PRN
Status: CANCELLED | OUTPATIENT
Start: 2019-01-01

## 2019-01-01 RX ORDER — MIRTAZAPINE 7.5 MG/1
7.5 TABLET, FILM COATED ORAL AT BEDTIME
Start: 2019-01-01

## 2019-01-01 RX ORDER — OLANZAPINE 10 MG/2ML
5 INJECTION, POWDER, FOR SOLUTION INTRAMUSCULAR ONCE
Status: COMPLETED | OUTPATIENT
Start: 2019-01-01 | End: 2019-01-01

## 2019-01-01 RX ORDER — CYANOCOBALAMIN 1000 UG/ML
1000 INJECTION, SOLUTION INTRAMUSCULAR; SUBCUTANEOUS ONCE
Status: COMPLETED | OUTPATIENT
Start: 2019-01-01 | End: 2019-01-01

## 2019-01-01 RX ORDER — LORAZEPAM 1 MG/1
TABLET ORAL
Qty: 1 TABLET | Refills: 0 | Status: SHIPPED | OUTPATIENT
Start: 2019-01-01 | End: 2019-01-01

## 2019-01-01 RX ORDER — NALOXONE HYDROCHLORIDE 0.4 MG/ML
.1-.4 INJECTION, SOLUTION INTRAMUSCULAR; INTRAVENOUS; SUBCUTANEOUS
Status: DISCONTINUED | OUTPATIENT
Start: 2019-01-01 | End: 2019-01-01 | Stop reason: HOSPADM

## 2019-01-01 RX ORDER — OXYCODONE HYDROCHLORIDE 5 MG/1
5 TABLET ORAL EVERY 4 HOURS PRN
Qty: 30 TABLET | Refills: 0 | Status: SHIPPED | OUTPATIENT
Start: 2019-01-01 | End: 2019-01-01

## 2019-01-01 RX ORDER — OXYCODONE HCL 10 MG/1
10 TABLET, FILM COATED, EXTENDED RELEASE ORAL 2 TIMES DAILY
Qty: 90 TABLET | Refills: 0 | Status: SHIPPED | OUTPATIENT
Start: 2019-01-01

## 2019-01-01 RX ORDER — LORAZEPAM 0.5 MG/1
0.5 TABLET ORAL 2 TIMES DAILY PRN
Qty: 30 TABLET | Refills: 0 | Status: SHIPPED | OUTPATIENT
Start: 2019-01-01 | End: 2019-01-01

## 2019-01-01 RX ORDER — OXYCODONE HYDROCHLORIDE 5 MG/1
5 TABLET ORAL ONCE
Status: COMPLETED | OUTPATIENT
Start: 2019-01-01 | End: 2019-01-01

## 2019-01-01 RX ORDER — OXYCODONE HYDROCHLORIDE 5 MG/1
5 TABLET ORAL EVERY 4 HOURS PRN
Qty: 30 TABLET | Refills: 0 | Status: SHIPPED | OUTPATIENT
Start: 2019-01-01

## 2019-01-01 RX ORDER — HEPARIN SODIUM (PORCINE) LOCK FLUSH IV SOLN 100 UNIT/ML 100 UNIT/ML
5 SOLUTION INTRAVENOUS
Status: ACTIVE | OUTPATIENT
Start: 2019-01-01

## 2019-01-01 RX ORDER — OXYCODONE HCL 10 MG/1
10 TABLET, FILM COATED, EXTENDED RELEASE ORAL 2 TIMES DAILY
Qty: 60 TABLET | Refills: 0 | Status: SHIPPED | OUTPATIENT
Start: 2019-01-01 | End: 2019-01-01

## 2019-01-01 RX ORDER — SODIUM CHLORIDE 9 MG/ML
1000 INJECTION, SOLUTION INTRAVENOUS CONTINUOUS PRN
Status: CANCELLED
Start: 2019-01-01

## 2019-01-01 RX ORDER — MEPERIDINE HYDROCHLORIDE 25 MG/ML
25 INJECTION INTRAMUSCULAR; INTRAVENOUS; SUBCUTANEOUS EVERY 30 MIN PRN
Status: CANCELLED | OUTPATIENT
Start: 2019-01-01

## 2019-01-01 RX ORDER — HYDROMORPHONE HYDROCHLORIDE 1 MG/ML
0.5 INJECTION, SOLUTION INTRAMUSCULAR; INTRAVENOUS; SUBCUTANEOUS
Status: DISCONTINUED | OUTPATIENT
Start: 2019-01-01 | End: 2019-01-01 | Stop reason: HOSPADM

## 2019-01-01 RX ORDER — GUAIFENESIN 200 MG/10ML
400 LIQUID ORAL EVERY 4 HOURS PRN
Start: 2019-01-01 | End: 2019-01-01

## 2019-01-01 RX ORDER — FENTANYL CITRATE 50 UG/ML
25-50 INJECTION, SOLUTION INTRAMUSCULAR; INTRAVENOUS EVERY 5 MIN PRN
Status: DISCONTINUED | OUTPATIENT
Start: 2019-01-01 | End: 2019-01-01 | Stop reason: HOSPADM

## 2019-01-01 RX ORDER — MAGNESIUM OXIDE 400 MG/1
400 TABLET ORAL 2 TIMES DAILY
COMMUNITY

## 2019-01-01 RX ORDER — GUAIFENESIN 200 MG/1
400 TABLET ORAL EVERY 4 HOURS PRN
COMMUNITY
Start: 2019-01-01 | End: 2019-01-01

## 2019-01-01 RX ORDER — METHYLPREDNISOLONE SODIUM SUCCINATE 125 MG/2ML
125 INJECTION, POWDER, LYOPHILIZED, FOR SOLUTION INTRAMUSCULAR; INTRAVENOUS
Status: CANCELLED
Start: 2019-01-01

## 2019-01-01 RX ORDER — LIDOCAINE 40 MG/G
CREAM TOPICAL
Status: DISCONTINUED | OUTPATIENT
Start: 2019-01-01 | End: 2019-01-01 | Stop reason: HOSPADM

## 2019-01-01 RX ORDER — TIZANIDINE HYDROCHLORIDE 4 MG/1
4 CAPSULE, GELATIN COATED ORAL 3 TIMES DAILY PRN
Qty: 30 CAPSULE | Refills: 0
Start: 2019-01-01

## 2019-01-01 RX ORDER — LORAZEPAM 0.5 MG/1
0.5 TABLET ORAL 3 TIMES DAILY PRN
Qty: 30 TABLET | Refills: 0 | Status: SHIPPED | OUTPATIENT
Start: 2019-01-01

## 2019-01-01 RX ADMIN — IOPAMIDOL 68 ML: 755 INJECTION, SOLUTION INTRAVENOUS at 21:33

## 2019-01-01 RX ADMIN — SODIUM CHLORIDE 96 ML: 9 INJECTION, SOLUTION INTRAVENOUS at 14:41

## 2019-01-01 RX ADMIN — SODIUM CHLORIDE: 9 INJECTION, SOLUTION INTRAVENOUS at 21:11

## 2019-01-01 RX ADMIN — DEXAMETHASONE SODIUM PHOSPHATE: 10 INJECTION, SOLUTION INTRAMUSCULAR; INTRAVENOUS at 10:10

## 2019-01-01 RX ADMIN — HYDROMORPHONE HYDROCHLORIDE 1 MG: 1 INJECTION, SOLUTION INTRAMUSCULAR; INTRAVENOUS; SUBCUTANEOUS at 04:01

## 2019-01-01 RX ADMIN — Medication 5 ML: at 10:43

## 2019-01-01 RX ADMIN — CYANOCOBALAMIN 1000 MCG: 1000 INJECTION, SOLUTION INTRAMUSCULAR at 14:04

## 2019-01-01 RX ADMIN — Medication 0.5 MG: at 15:47

## 2019-01-01 RX ADMIN — SODIUM CHLORIDE 57 ML: 9 INJECTION, SOLUTION INTRAVENOUS at 21:33

## 2019-01-01 RX ADMIN — FENTANYL CITRATE 25 MCG: 50 INJECTION, SOLUTION INTRAMUSCULAR; INTRAVENOUS at 08:16

## 2019-01-01 RX ADMIN — Medication 5 ML: at 15:49

## 2019-01-01 RX ADMIN — SODIUM CHLORIDE: 9 INJECTION, SOLUTION INTRAVENOUS at 07:50

## 2019-01-01 RX ADMIN — Medication 0.5 MG: at 22:18

## 2019-01-01 RX ADMIN — CARBOPLATIN 365 MG: 10 INJECTION, SOLUTION INTRAVENOUS at 10:55

## 2019-01-01 RX ADMIN — MIDAZOLAM 0.5 MG: 1 INJECTION INTRAMUSCULAR; INTRAVENOUS at 08:16

## 2019-01-01 RX ADMIN — SODIUM CHLORIDE 250 ML: 9 INJECTION, SOLUTION INTRAVENOUS at 09:52

## 2019-01-01 RX ADMIN — MAGNESIUM CITRATE 296 ML: 1.75 LIQUID ORAL at 23:26

## 2019-01-01 RX ADMIN — HYDROMORPHONE HYDROCHLORIDE 1 MG: 1 INJECTION, SOLUTION INTRAMUSCULAR; INTRAVENOUS; SUBCUTANEOUS at 04:44

## 2019-01-01 RX ADMIN — MIDAZOLAM 1 MG: 1 INJECTION INTRAMUSCULAR; INTRAVENOUS at 08:04

## 2019-01-01 RX ADMIN — FENTANYL CITRATE 50 MCG: 50 INJECTION, SOLUTION INTRAMUSCULAR; INTRAVENOUS at 08:05

## 2019-01-01 RX ADMIN — OXYCODONE HYDROCHLORIDE 10 MG: 5 TABLET ORAL at 19:37

## 2019-01-01 RX ADMIN — DOCUSATE SODIUM 286 ML: 50 LIQUID ORAL at 22:21

## 2019-01-01 RX ADMIN — LIDOCAINE HYDROCHLORIDE 10 ML: 10 INJECTION, SOLUTION EPIDURAL; INFILTRATION; INTRACAUDAL; PERINEURAL at 08:15

## 2019-01-01 RX ADMIN — Medication 26.3 MILLICURIE: at 07:31

## 2019-01-01 RX ADMIN — Medication 0.5 MG: at 06:53

## 2019-01-01 RX ADMIN — FAMOTIDINE 20 MG: 20 INJECTION, SOLUTION INTRAVENOUS at 09:58

## 2019-01-01 RX ADMIN — Medication 0.5 MG: at 15:43

## 2019-01-01 RX ADMIN — IOPAMIDOL 69 ML: 755 INJECTION, SOLUTION INTRAVENOUS at 14:41

## 2019-01-01 RX ADMIN — Medication 0.5 MG: at 20:00

## 2019-01-01 RX ADMIN — SODIUM CHLORIDE 700 MG: 9 INJECTION, SOLUTION INTRAVENOUS at 11:47

## 2019-01-01 RX ADMIN — Medication 0.5 MG: at 14:33

## 2019-01-01 RX ADMIN — OLANZAPINE 5 MG: 10 INJECTION, POWDER, FOR SOLUTION INTRAMUSCULAR at 04:44

## 2019-01-01 RX ADMIN — Medication 500 UNITS: at 12:10

## 2019-01-01 RX ADMIN — SODIUM CHLORIDE 200 MG: 9 INJECTION, SOLUTION INTRAVENOUS at 10:28

## 2019-01-01 RX ADMIN — Medication 5 ML: at 23:26

## 2019-01-01 RX ADMIN — OXYCODONE HYDROCHLORIDE 5 MG: 5 TABLET ORAL at 22:18

## 2019-01-01 ASSESSMENT — MIFFLIN-ST. JEOR
SCORE: 1512.45
SCORE: 1537.62
SCORE: 1495.67
SCORE: 1504.27
SCORE: 1513.35

## 2019-01-01 ASSESSMENT — ENCOUNTER SYMPTOMS
FREQUENCY: 0
ABDOMINAL PAIN: 1
BLOOD IN STOOL: 0
SINUS PRESSURE: 0
SORE THROAT: 0
PALPITATIONS: 0
VOMITING: 0
NAUSEA: 0
HEADACHES: 0
DYSURIA: 0
DIARRHEA: 0
WHEEZING: 0
CONSTIPATION: 1
CHILLS: 0
DIAPHORESIS: 0
COUGH: 0
SHORTNESS OF BREATH: 0
FEVER: 0

## 2019-01-01 ASSESSMENT — PAIN SCALES - GENERAL
PAINLEVEL: EXTREME PAIN (8)
PAINLEVEL: SEVERE PAIN (7)
PAINLEVEL: EXTREME PAIN (8)
PAINLEVEL: EXTREME PAIN (8)
PAINLEVEL: SEVERE PAIN (6)
PAINLEVEL: SEVERE PAIN (6)

## 2019-01-03 PROBLEM — D50.9 IRON DEFICIENCY ANEMIA: Status: ACTIVE | Noted: 2019-01-01

## 2019-01-03 NOTE — PROGRESS NOTES
Hematology/ Oncology Follow-up Visit:  Wang 3, 2019    Reason for Visit:   Chief Complaint   Patient presents with     Oncology Clinic Visit     3 month follow up malignant neoplasm of lower lobe of left lung.        Oncologic History:  Carcinoma, lung, left (H)  Patient presented with L side shoulder and has been traveling  to L/side about  to the whole L/side upper back and L side of chest for about 3 week he has had the pain has been taking aleve.  Subsequently went on to have a CT scan done.  Which showed 1.6 cm nodular infiltrate in the left upper lobe.  The lesion appear spiculated and worrisome for lung cancer.  There is a second 0.7 cm indeterminate nodule in the lingular portion of the left lung.  Subsequently the patient went on to have CT-guided biopsy.  The pathology came back showing moderately differentiated adenocarcinoma. He had subsequent noted left thoracoscopic left pleural biopsies and left lobe which resection on April 11, 2018.  The surgical pathology came back with pleural biopsies came back positive for invasive adenocarcinoma with parietal pleural invasion.  The wedge resection came back with adenocarcinoma with acinar patterns of growth moderately differentiated the tumor size is 1.1 and 0.3 cm into 2 separate tumor nodules.  Visceropleural invasion was identified the margins were negative lymphovascular invasion was not identified large venous artery involvement was not identified as well.  The pathologic staging of pT3N0.        Interval History:  Patient returning today for follow-up.  He continues to do well without any significant shortness of breath or cough or wheezing.  Denies any nausea vomiting or diarrhea.  He continues to have pain in the left shoulder.    Review Of Systems:  Constitutional: Negative for fever, chills, and night sweats.  Skin: negative.  Eyes: negative.  Ears/Nose/Throat: negative.  Respiratory: No shortness of breath, dyspnea on exertion, cough, or  hemoptysis.  Cardiovascular: negative.  Gastrointestinal: negative.  Genitourinary: negative.  Musculoskeletal: negative.  Neurologic: negative.  Psychiatric: negative.  Hematologic/Lymphatic/Immunologic: negative.  Endocrine: negative.    All other ROS negative unless mentioned in interval history.    Past medical, social, surgical, and family histories reviewed.    Allergies:  Allergies as of 01/03/2019 - Reviewed 01/03/2019   Allergen Reaction Noted     Cipro [ciprofloxacin] Swelling 06/22/2009       Current Medications:  Current Outpatient Medications   Medication Sig Dispense Refill     ACETAMINOPHEN PO Take 1,000 mg by mouth 3 times daily       AmLODIPine Besylate (NORVASC PO) Take 10 mg by mouth daily       camphor-menthol-methyl sal (MT ECKERT ULTRA STRENGTH) 4-10-30 % CREA Externally apply topically 3 times daily       diclofenac (VOLTAREN) 1 % GEL topical gel Apply 4 grams to knees or 2 grams to hands four times daily using enclosed dosing card. 100 g 1     DULoxetine HCl (CYMBALTA PO) Take 60 mg by mouth daily       fentaNYL (DURAGESIC) 12 mcg/hr 72 hr patch Place 1 patch onto the skin every 72 hours remove old patch. 30 patch 0     GABAPENTIN PO Take 300 mg by mouth 2 times daily        LORAZEPAM PO Take 0.5 mg by mouth every 3 hours as needed for anxiety       MAGNESIUM OXIDE PO Take 400 mg by mouth 2 times daily        MELATONIN PO Take 3 mg by mouth At Bedtime       omeprazole (PRILOSEC) 20 MG CR capsule Take 1 capsule (20 mg) by mouth daily 90 capsule 3     ONDANSETRON PO Take 4 mg by mouth 3 times daily        oxyCODONE (OXYCONTIN) 30 MG 12 hr tablet Take by mouth every 12 hours       oxyCODONE HCl (OXAYDO) 5 MG TABA Take 5 mg by mouth every 8 hours as needed       polyethylene glycol (MIRALAX/GLYCOLAX) Packet Take 17 g by mouth daily       senna-docusate (SENOKOT-S;PERICOLACE) 8.6-50 MG per tablet Take 2 tablets by mouth 2 times daily 100 tablet 0     STATIN NOT PRESCRIBED (INTENTIONAL) 1 each daily  "Please choose reason not prescribed, below       THIAMINE HCL PO Take 100 mg by mouth daily       VENTOLIN  (90 Base) MCG/ACT Inhaler Inhale 2 puffs into the lungs every 4 hours as needed for shortness of breath / dyspnea or wheezing 1 Inhaler 1     Warfarin Sodium (COUMADIN PO) Take by mouth daily Take as directed per INR results          Physical Exam:  /67 (BP Location: Right arm, Patient Position: Sitting, Cuff Size: Adult Regular)   Pulse 83   Temp 98.4  F (36.9  C) (Tympanic)   Resp 20   Ht 1.892 m (6' 2.5\")   Wt 68.5 kg (151 lb)   SpO2 93%   BMI 19.13 kg/m    Wt Readings from Last 12 Encounters:   01/03/19 68.5 kg (151 lb)   12/31/18 67.1 kg (148 lb)   12/26/18 68.6 kg (151 lb 3.2 oz)   12/21/18 69.7 kg (153 lb 9.6 oz)   12/17/18 66.8 kg (147 lb 3.2 oz)   12/14/18 66.9 kg (147 lb 6.4 oz)   12/13/18 66.8 kg (147 lb 3.2 oz)   12/10/18 68.7 kg (151 lb 6.4 oz)   12/07/18 68.7 kg (151 lb 6.4 oz)   11/30/18 68.2 kg (150 lb 6.4 oz)   11/28/18 68.2 kg (150 lb 6.4 oz)   11/19/18 67.9 kg (149 lb 9.6 oz)     ECOG performance status: 0  GENERAL APPEARANCE: Healthy, alert and in no acute distress.  HEENT: Sclerae anicteric. PERRLA. Oropharynx without ulcers, lesions, or thrush.  NECK: Supple. No asymmetry or masses.  LYMPHATICS: No palpable cervical, supraclavicular, axillary, or inguinal lymphadenopathy.  RESP: Lungs clear to auscultation bilaterally without rales, rhonchi or wheezes.  CARDIOVASCULAR: Regular rate and rhythm. Normal S1, S2; no S3 or S4. No murmur, gallop, or rub.  ABDOMEN: Soft, nontender. Bowel sounds normal. No palpable organomegaly or masses.  MUSCULOSKELETAL: Extremities without gross deformities noted. No edema of bilateral lower extremities.  SKIN: No suspicious lesions or rashes.  NEURO: Alert and oriented x 3. Cranial nerves II-XII grossly intact.  PSYCHIATRIC: Mentation and affect appear normal.    Laboratory/Imaging Studies:  Infusion Therapy Visit on 12/28/2018   Component " Date Value Ref Range Status     Sodium 12/28/2018 132* 133 - 144 mmol/L Final     Potassium 12/28/2018 4.8  3.4 - 5.3 mmol/L Final     Chloride 12/28/2018 100  94 - 109 mmol/L Final     Carbon Dioxide 12/28/2018 29  20 - 32 mmol/L Final     Anion Gap 12/28/2018 3  3 - 14 mmol/L Final     Glucose 12/28/2018 90  70 - 99 mg/dL Final     Urea Nitrogen 12/28/2018 17  7 - 30 mg/dL Final     Creatinine 12/28/2018 1.08  0.66 - 1.25 mg/dL Final     GFR Estimate 12/28/2018 70  >60 mL/min/[1.73_m2] Final    Comment: Non  GFR Calc  Starting 12/18/2018, serum creatinine based estimated GFR (eGFR) will be   calculated using the Chronic Kidney Disease Epidemiology Collaboration   (CKD-EPI) equation.       GFR Estimate If Black 12/28/2018 81  >60 mL/min/[1.73_m2] Final    Comment:  GFR Calc  Starting 12/18/2018, serum creatinine based estimated GFR (eGFR) will be   calculated using the Chronic Kidney Disease Epidemiology Collaboration   (CKD-EPI) equation.       Calcium 12/28/2018 9.2  8.5 - 10.1 mg/dL Final     Bilirubin Total 12/28/2018 0.2  0.2 - 1.3 mg/dL Final     Albumin 12/28/2018 3.4  3.4 - 5.0 g/dL Final     Protein Total 12/28/2018 6.9  6.8 - 8.8 g/dL Final     Alkaline Phosphatase 12/28/2018 84  40 - 150 U/L Final     ALT 12/28/2018 15  0 - 70 U/L Final     AST 12/28/2018 13  0 - 45 U/L Final     WBC 12/28/2018 7.4  4.0 - 11.0 10e9/L Final     RBC Count 12/28/2018 2.84* 4.4 - 5.9 10e12/L Final     Hemoglobin 12/28/2018 9.7* 13.3 - 17.7 g/dL Final     Hematocrit 12/28/2018 29.3* 40.0 - 53.0 % Final     MCV 12/28/2018 103* 78 - 100 fl Final     MCH 12/28/2018 34.2* 26.5 - 33.0 pg Final     MCHC 12/28/2018 33.1  31.5 - 36.5 g/dL Final     RDW 12/28/2018 13.5  10.0 - 15.0 % Final     Platelet Count 12/28/2018 472* 150 - 450 10e9/L Final     Diff Method 12/28/2018 Automated Method   Final     % Neutrophils 12/28/2018 60.7  % Final     % Lymphocytes 12/28/2018 24.6  % Final     % Monocytes  12/28/2018 11.6  % Final     % Eosinophils 12/28/2018 2.4  % Final     % Basophils 12/28/2018 0.4  % Final     % Immature Granulocytes 12/28/2018 0.3  % Final     Nucleated RBCs 12/28/2018 0  0 /100 Final     Absolute Neutrophil 12/28/2018 4.5  1.6 - 8.3 10e9/L Final     Absolute Lymphocytes 12/28/2018 1.8  0.8 - 5.3 10e9/L Final     Absolute Monocytes 12/28/2018 0.9  0.0 - 1.3 10e9/L Final     Absolute Eosinophils 12/28/2018 0.2  0.0 - 0.7 10e9/L Final     Absolute Basophils 12/28/2018 0.0  0.0 - 0.2 10e9/L Final     Abs Immature Granulocytes 12/28/2018 0.0  0 - 0.4 10e9/L Final     Absolute Nucleated RBC 12/28/2018 0.0   Final        Recent Results (from the past 744 hour(s))   CT Chest/Abdomen/Pelvis w Contrast    Narrative    CT CHEST/ABDOMEN/PELVIS W CONTRAST 12/28/2018 3:12 PM    HISTORY: Left-sided lung cancer.    CONTRAST:  74 ml isovue 370.    TECHNIQUE: CT of the chest, abdomen, and pelvis is performed with IV  contrast.    Routine evaluated structures in the chest include the lungs,  mediastinal structures, pleura, and chest wall.    Routine assessed structures in the abdomen include the liver, spleen,  pancreas, adrenal glands, and kidneys. Also assessed are the  retroperitoneum, gastrointestinal tract, and the abdominal wall.    Intrapelvic anatomy is also assessed.    Radiation dose for this scan is reduced using automated exposure  control, adjustment of the mA and/or kV according to patient size, or  iterative reconstruction technique.    COMPARISON: Chest CT dated 9/26/2018. PET CT dated 8/1/2018.    FINDINGS:    Chest: Irregular subpleural nodularity in the left upper lobe is  similar to the prior study. It measures 11 x 8 x 9 mm. This compares  to 10 x 9 x 7 mm on the prior study. A 5 mm nodule elsewhere in the  left upper lobe on image #29 is stable. It is no longer cavitating.  Other bandlike abnormality with a nodular component in the left lower  lobe, posterior to the left hilum, is stable.  Centrilobular emphysema  and scattered areas of subpleural interstitial scarring, especially in  the right lung, are stable. A 5 mm noncalcified right lower lobe  pulmonary nodule is stable on image #36. On axial image 16 of series 2  and coronal image 49 of series 4 there is a small area of nodularity  involving the anterolateral pleural margin in the upper left  hemithorax. This measures 9 mm in maximal dimension. It wasn't as  readily apparent on 9/26/2018 and wasn't seen on the PET CT dated  8/1/2018. Other pleural-based slight nodular enhancement is seen in  the left apical area, best demonstrated on coronal images 57-76 of  series 4. This is slightly more prominent as well. A borderline in  size left hilar lymph node continues to be stable on axial image 30  add to 0.9 cm.    Abdomen: Nodular thickening of the adrenal glands is stable dating  back to 1/3/2018.    Pelvis: No significant abnormality is demonstrated.      Impression    IMPRESSION:  There is some subcentimeter pleural-based nodularity in  the left apical area which is slightly more prominent/evident compared  to previous. This is of concern for some early developing metastatic  disease. Other abnormalities are stable.    VERONICA HINTON MD       Assessment and plan:      (D50.8) Other iron deficiency anemia  Patient with normocytic anemia.  Today I will check serum ferritin, B12, folate.  Check stool for occult blood.  If positive patient will need full GI workup.    (C34.92) Carcinoma, lung, left (H)  I reviewed with patient most recent CT scan showing minimal changes although there is suspicious area in the left upper lobe.  Remains asymptomatic.  We will continue to monitor these changes.  I will see the patient again in 3 months time or sooner if there are new development or concerns.    (I26.99) Other pulmonary embolism without acute cor pulmonale, unspecified chronicity (H)  Continues on anticoagulation with warfarin.  INR has been monitored  through the anticoagulant clinic.    (I10) Benign essential hypertension  Patient currently on Norvasc 10 mg orally daily.    (K21.9) Gastroesophageal reflux disease without esophagitis  She is currently on omeprazole 20 mg orally daily.      The patient is ready to learn, no apparent learning barriers were identified.  Diagnosis and treatment plans were explained to the patient. The patient expressed understanding of the content. The patient asked appropriate questions. The patient questions were answered to his satisfaction.    Chart documentation with Dragon Voice recognition Software. Although reviewed after completion, some words and grammatical errors may remain.

## 2019-01-03 NOTE — LETTER
1/3/2019         RE: Chilo Oneil  [facility]  [address] [place] [id number]        Dear Colleague,    Thank you for referring your patient, Chilo Oneil, to the [facility]. Please see a copy of my visit note below.    Hematology/ Oncology Follow-up Visit:  Wang 3, 2019    Reason for Visit:   Chief Complaint   Patient presents with     Oncology Clinic Visit     3 month follow up malignant neoplasm of lower lobe of left lung.        Oncologic History:  Carcinoma, lung, left (H)  Patient presented with L side shoulder and has been traveling  to L/side about  to the whole L/side upper back and L side of chest for about 3 week he has had the pain has been taking aleve.  Subsequently went on to have a CT scan done.  Which showed 1.6 cm nodular infiltrate in the left upper lobe.  The lesion appear spiculated and worrisome for lung cancer.  There is a second 0.7 cm indeterminate nodule in the lingular portion of the left lung.  Subsequently the patient went on to have CT-guided biopsy.  The pathology came back showing moderately differentiated adenocarcinoma. He had subsequent noted left thoracoscopic left pleural biopsies and left lobe which resection on April 11, 2018.  The surgical pathology came back with pleural biopsies came back positive for invasive adenocarcinoma with parietal pleural invasion.  The wedge resection came back with adenocarcinoma with acinar patterns of growth moderately differentiated the tumor size is 1.1 and 0.3 cm into 2 separate tumor nodules.  Visceropleural invasion was identified the margins were negative lymphovascular invasion was not identified large venous artery involvement was not identified as well.  The pathologic staging of pT3N0.        Interval History:  Patient returning today for follow-up.  He continues to do well without any significant shortness of breath or cough or wheezing.  Denies any nausea vomiting or diarrhea.  He continues  to have pain in the left shoulder.    Review Of Systems:  Constitutional: Negative for fever, chills, and night sweats.  Skin: negative.  Eyes: negative.  Ears/Nose/Throat: negative.  Respiratory: No shortness of breath, dyspnea on exertion, cough, or hemoptysis.  Cardiovascular: negative.  Gastrointestinal: negative.  Genitourinary: negative.  Musculoskeletal: negative.  Neurologic: negative.  Psychiatric: negative.  Hematologic/Lymphatic/Immunologic: negative.  Endocrine: negative.    All other ROS negative unless mentioned in interval history.    Past medical, social, surgical, and family histories reviewed.    Allergies:  Allergies as of 01/03/2019 - Reviewed 01/03/2019   Allergen Reaction Noted     Cipro [ciprofloxacin] Swelling 06/22/2009       Current Medications:  Current Outpatient Medications   Medication Sig Dispense Refill     ACETAMINOPHEN PO Take 1,000 mg by mouth 3 times daily       AmLODIPine Besylate (NORVASC PO) Take 10 mg by mouth daily       camphor-menthol-methyl sal (MT ECKERT ULTRA STRENGTH) 4-10-30 % CREA Externally apply topically 3 times daily       diclofenac (VOLTAREN) 1 % GEL topical gel Apply 4 grams to knees or 2 grams to hands four times daily using enclosed dosing card. 100 g 1     DULoxetine HCl (CYMBALTA PO) Take 60 mg by mouth daily       fentaNYL (DURAGESIC) 12 mcg/hr 72 hr patch Place 1 patch onto the skin every 72 hours remove old patch. 30 patch 0     GABAPENTIN PO Take 300 mg by mouth 2 times daily        LORAZEPAM PO Take 0.5 mg by mouth every 3 hours as needed for anxiety       MAGNESIUM OXIDE PO Take 400 mg by mouth 2 times daily        MELATONIN PO Take 3 mg by mouth At Bedtime       omeprazole (PRILOSEC) 20 MG CR capsule Take 1 capsule (20 mg) by mouth daily 90 capsule 3     ONDANSETRON PO Take 4 mg by mouth 3 times daily        oxyCODONE (OXYCONTIN) 30 MG 12 hr tablet Take by mouth every 12 hours       oxyCODONE HCl (OXAYDO) 5 MG TABA Take 5 mg by mouth every 8 hours as  "needed       polyethylene glycol (MIRALAX/GLYCOLAX) Packet Take 17 g by mouth daily       senna-docusate (SENOKOT-S;PERICOLACE) 8.6-50 MG per tablet Take 2 tablets by mouth 2 times daily 100 tablet 0     STATIN NOT PRESCRIBED (INTENTIONAL) 1 each daily Please choose reason not prescribed, below       THIAMINE HCL PO Take 100 mg by mouth daily       VENTOLIN  (90 Base) MCG/ACT Inhaler Inhale 2 puffs into the lungs every 4 hours as needed for shortness of breath / dyspnea or wheezing 1 Inhaler 1     Warfarin Sodium (COUMADIN PO) Take by mouth daily Take as directed per INR results          Physical Exam:  /67 (BP Location: Right arm, Patient Position: Sitting, Cuff Size: Adult Regular)   Pulse 83   Temp 98.4  F (36.9  C) (Tympanic)   Resp 20   Ht 1.892 m (6' 2.5\")   Wt 68.5 kg (151 lb)   SpO2 93%   BMI 19.13 kg/m     Wt Readings from Last 12 Encounters:   01/03/19 68.5 kg (151 lb)   12/31/18 67.1 kg (148 lb)   12/26/18 68.6 kg (151 lb 3.2 oz)   12/21/18 69.7 kg (153 lb 9.6 oz)   12/17/18 66.8 kg (147 lb 3.2 oz)   12/14/18 66.9 kg (147 lb 6.4 oz)   12/13/18 66.8 kg (147 lb 3.2 oz)   12/10/18 68.7 kg (151 lb 6.4 oz)   12/07/18 68.7 kg (151 lb 6.4 oz)   11/30/18 68.2 kg (150 lb 6.4 oz)   11/28/18 68.2 kg (150 lb 6.4 oz)   11/19/18 67.9 kg (149 lb 9.6 oz)     ECOG performance status: 0  GENERAL APPEARANCE: Healthy, alert and in no acute distress.  HEENT: Sclerae anicteric. PERRLA. Oropharynx without ulcers, lesions, or thrush.  NECK: Supple. No asymmetry or masses.  LYMPHATICS: No palpable cervical, supraclavicular, axillary, or inguinal lymphadenopathy.  RESP: Lungs clear to auscultation bilaterally without rales, rhonchi or wheezes.  CARDIOVASCULAR: Regular rate and rhythm. Normal S1, S2; no S3 or S4. No murmur, gallop, or rub.  ABDOMEN: Soft, nontender. Bowel sounds normal. No palpable organomegaly or masses.  MUSCULOSKELETAL: Extremities without gross deformities noted. No edema of bilateral lower " extremities.  SKIN: No suspicious lesions or rashes.  NEURO: Alert and oriented x 3. Cranial nerves II-XII grossly intact.  PSYCHIATRIC: Mentation and affect appear normal.    Laboratory/Imaging Studies:  Infusion Therapy Visit on 12/28/2018   Component Date Value Ref Range Status     Sodium 12/28/2018 132* 133 - 144 mmol/L Final     Potassium 12/28/2018 4.8  3.4 - 5.3 mmol/L Final     Chloride 12/28/2018 100  94 - 109 mmol/L Final     Carbon Dioxide 12/28/2018 29  20 - 32 mmol/L Final     Anion Gap 12/28/2018 3  3 - 14 mmol/L Final     Glucose 12/28/2018 90  70 - 99 mg/dL Final     Urea Nitrogen 12/28/2018 17  7 - 30 mg/dL Final     Creatinine 12/28/2018 1.08  0.66 - 1.25 mg/dL Final     GFR Estimate 12/28/2018 70  >60 mL/min/[1.73_m2] Final    Comment: Non  GFR Calc  Starting 12/18/2018, serum creatinine based estimated GFR (eGFR) will be   calculated using the Chronic Kidney Disease Epidemiology Collaboration   (CKD-EPI) equation.       GFR Estimate If Black 12/28/2018 81  >60 mL/min/[1.73_m2] Final    Comment:  GFR Calc  Starting 12/18/2018, serum creatinine based estimated GFR (eGFR) will be   calculated using the Chronic Kidney Disease Epidemiology Collaboration   (CKD-EPI) equation.       Calcium 12/28/2018 9.2  8.5 - 10.1 mg/dL Final     Bilirubin Total 12/28/2018 0.2  0.2 - 1.3 mg/dL Final     Albumin 12/28/2018 3.4  3.4 - 5.0 g/dL Final     Protein Total 12/28/2018 6.9  6.8 - 8.8 g/dL Final     Alkaline Phosphatase 12/28/2018 84  40 - 150 U/L Final     ALT 12/28/2018 15  0 - 70 U/L Final     AST 12/28/2018 13  0 - 45 U/L Final     WBC 12/28/2018 7.4  4.0 - 11.0 10e9/L Final     RBC Count 12/28/2018 2.84* 4.4 - 5.9 10e12/L Final     Hemoglobin 12/28/2018 9.7* 13.3 - 17.7 g/dL Final     Hematocrit 12/28/2018 29.3* 40.0 - 53.0 % Final     MCV 12/28/2018 103* 78 - 100 fl Final     MCH 12/28/2018 34.2* 26.5 - 33.0 pg Final     MCHC 12/28/2018 33.1  31.5 - 36.5 g/dL Final     RDW  12/28/2018 13.5  10.0 - 15.0 % Final     Platelet Count 12/28/2018 472* 150 - 450 10e9/L Final     Diff Method 12/28/2018 Automated Method   Final     % Neutrophils 12/28/2018 60.7  % Final     % Lymphocytes 12/28/2018 24.6  % Final     % Monocytes 12/28/2018 11.6  % Final     % Eosinophils 12/28/2018 2.4  % Final     % Basophils 12/28/2018 0.4  % Final     % Immature Granulocytes 12/28/2018 0.3  % Final     Nucleated RBCs 12/28/2018 0  0 /100 Final     Absolute Neutrophil 12/28/2018 4.5  1.6 - 8.3 10e9/L Final     Absolute Lymphocytes 12/28/2018 1.8  0.8 - 5.3 10e9/L Final     Absolute Monocytes 12/28/2018 0.9  0.0 - 1.3 10e9/L Final     Absolute Eosinophils 12/28/2018 0.2  0.0 - 0.7 10e9/L Final     Absolute Basophils 12/28/2018 0.0  0.0 - 0.2 10e9/L Final     Abs Immature Granulocytes 12/28/2018 0.0  0 - 0.4 10e9/L Final     Absolute Nucleated RBC 12/28/2018 0.0   Final        Recent Results (from the past 744 hour(s))   CT Chest/Abdomen/Pelvis w Contrast    Narrative    CT CHEST/ABDOMEN/PELVIS W CONTRAST 12/28/2018 3:12 PM    HISTORY: Left-sided lung cancer.    CONTRAST:  74 ml isovue 370.    TECHNIQUE: CT of the chest, abdomen, and pelvis is performed with IV  contrast.    Routine evaluated structures in the chest include the lungs,  mediastinal structures, pleura, and chest wall.    Routine assessed structures in the abdomen include the liver, spleen,  pancreas, adrenal glands, and kidneys. Also assessed are the  retroperitoneum, gastrointestinal tract, and the abdominal wall.    Intrapelvic anatomy is also assessed.    Radiation dose for this scan is reduced using automated exposure  control, adjustment of the mA and/or kV according to patient size, or  iterative reconstruction technique.    COMPARISON: Chest CT dated 9/26/2018. PET CT dated 8/1/2018.    FINDINGS:    Chest: Irregular subpleural nodularity in the left upper lobe is  similar to the prior study. It measures 11 x 8 x 9 mm. This compares  to 10 x  9 x 7 mm on the prior study. A 5 mm nodule elsewhere in the  left upper lobe on image #29 is stable. It is no longer cavitating.  Other bandlike abnormality with a nodular component in the left lower  lobe, posterior to the left hilum, is stable. Centrilobular emphysema  and scattered areas of subpleural interstitial scarring, especially in  the right lung, are stable. A 5 mm noncalcified right lower lobe  pulmonary nodule is stable on image #36. On axial image 16 of series 2  and coronal image 49 of series 4 there is a small area of nodularity  involving the anterolateral pleural margin in the upper left  hemithorax. This measures 9 mm in maximal dimension. It wasn't as  readily apparent on 9/26/2018 and wasn't seen on the PET CT dated  8/1/2018. Other pleural-based slight nodular enhancement is seen in  the left apical area, best demonstrated on coronal images 57-76 of  series 4. This is slightly more prominent as well. A borderline in  size left hilar lymph node continues to be stable on axial image 30  add to 0.9 cm.    Abdomen: Nodular thickening of the adrenal glands is stable dating  back to 1/3/2018.    Pelvis: No significant abnormality is demonstrated.      Impression    IMPRESSION:  There is some subcentimeter pleural-based nodularity in  the left apical area which is slightly more prominent/evident compared  to previous. This is of concern for some early developing metastatic  disease. Other abnormalities are stable.    VERONICA HINTON MD       Assessment and plan:      (D50.8) Other iron deficiency anemia  Patient with normocytic anemia.  Today I will check serum ferritin, B12, folate.  Check stool for occult blood.  If positive patient will need full GI workup.    (C34.92) Carcinoma, lung, left (H)  I reviewed with patient most recent CT scan showing minimal changes although there is suspicious area in the left upper lobe.  Remains asymptomatic.  We will continue to monitor these changes.  I will see the  patient again in 3 months time or sooner if there are new development or concerns.    (I26.99) Other pulmonary embolism without acute cor pulmonale, unspecified chronicity (H)  Continues on anticoagulation with warfarin.  INR has been monitored through the anticoagulant clinic.    (I10) Benign essential hypertension  Patient currently on Norvasc 10 mg orally daily.    (K21.9) Gastroesophageal reflux disease without esophagitis  She is currently on omeprazole 20 mg orally daily.      The patient is ready to learn, no apparent learning barriers were identified.  Diagnosis and treatment plans were explained to the patient. The patient expressed understanding of the content. The patient asked appropriate questions. The patient questions were answered to his satisfaction.    Chart documentation with Dragon Voice recognition Software. Although reviewed after completion, some words and grammatical errors may remain.    Again, thank you for allowing me to participate in the care of your patient.        Sincerely,        Mor Mccauley MD

## 2019-01-03 NOTE — PATIENT INSTRUCTIONS
Dr. Mccauley would like to see you back in 3 months with labs and a CT prior.      When you are in need of a refill, please call your pharmacy and they will send us a request.      Copy of appointments, and after visit summary (AVS) given to patient.      If you have any questions during business hours (M-F 8 AM- 4PM), please call Ashley Durand RN Oncology Hematology  at [facility] [phone].       For questions after business hours, or on holidays/weekends, please call our after hours Nurse Triage line [phone]. Thank you.

## 2019-01-06 NOTE — PROGRESS NOTES
[place] [facility]  Chief Complaint   Patient presents with     snf Acute     [place] Medical Record Number:  [id number]  Place of Service where encounter took place:  [facility]  ([facility]) [[id number]]    HPI:    Chilo Oneil is a 68 year old  (1950), who is being seen today for an episodic care visit.  HPI information obtained from: facility chart records, facility staff, patient report, [facility] chart review and Care Everywhere [facility] chart review. Today's concern is:    Other pulmonary embolism without acute cor pulmonale, unspecified chronicity (H)  Encounter for therapeutic drug monitoring  Long term (current) use of anticoagulants  Patient's last INR was 2.75 (therapeutic) on 12/31, and current Coumadin dosing is 3mg Tu/Th and 6mg AOD. Patient denies dizziness, SOB, CP, palpitations, or sxs of bleeding/bruising. INR resulted today at 3.02 (slightly supratherapeutic).     Carcinoma, lung, left (H)  Left shoulder pain, unspecified chronicity and etiology  Chronic pain syndrome  ADA (generalized anxiety disorder)  Current moderate episode of major depressive disorder  On 12/13, patient had a large regimen change from Q3h Dilaudid dosing, to OxyContin BID and breakthrough oxycodone TID.  Overall patient has done well with this change.  He continues to have left shoulder pain, but it is usually well controlled. Last week, nursing states he had a rough night, where pain kept him up and PRNs were not helpful.  He states that he could've slept wrong. He was encouraged to use ice/heat, and topical products such as lidocaine or bengay. Integrative therapy services were offered, and  services were consulted. Patient's most recent oncology scans and visit revealed suspicious ANDREA, but staging still denotes T3N0, recommending follow-up in 3 months. He is using PRN Lorazepam several times/week, and is concerned finances. Noting for differential rule-out,  "cardiology/ortho/sports-med, and other GPs have seen this patient for this pain.    Today we discussed massage therapy, noting poor posture in wheelchair and potential for walking more to move his joints.  He was apprehensive because he thinks the more he mobilizes, the worse his shoulder pain would get.  Reassurance and education given.  Provider offered small massage to left shoulder, and patient was amazed with the relief, admitting that the muscles are tight and they need to be worked on.  We talked about how sometimes \"there isn't a pill\" and exercise, strengthening, stretching, and massage could make a difference. Patient willing to try this.  He states the lidocaine ointment helped him over the weekend, but it was very expensive.     PMH/PSH reviewed in [employer] today.    REVIEW OF SYSTEMS:  6 point ROS including Respiratory, CV, GI and , other than that noted in the HPI,  is negative    /65   Pulse 78   Temp 98.6  F (37  C)   Resp 18   Wt 67.1 kg (148 lb)   SpO2 94%   BMI 18.75 kg/m    GENERAL APPEARANCE: Alert, in no distress, cooperative.   ENT: Mouth and posterior oropharynx normal, moist mucous membranes, hearing acuity [place].  EYES: EOM, conjunctivae, lids, pupils and irises normal, PERRL2.   RESP: Respiratory effort and palpation of chest normal, no respiratory distress, Lung sounds diminished.  CV: Palpation and auscultation of heart done, rate and rhythm regular, no murmur, no rub or gallop, Edema 0+ BLE.  ABDOMEN: Normal bowel sounds, soft, nontender, no hepatosplenomegaly or other masses.  SKIN: Inspection/Palpation of skin and subcutaneous tissue baseline w/ fragility.  NEURO: 2-12 at patient's baseline, sensation baseline PPS.  PSYCH: Insight and judgement, memory baseline, affect and mood normal.    ASSESSMENT/PLAN:  Other pulmonary embolism without acute cor pulmonale, unspecified chronicity (H)  Encounter for therapeutic drug monitoring  Long term (current) use of " anticoagulants  Chronic. Stable. Slightly supratherapeutic. Will dose Coumadin as noted below and recheck INR in 1 week. Follow-up accordingly.     Carcinoma, lung, left (H)  Left shoulder pain, unspecified chronicity and etiology  Chronic pain syndrome  ADA (generalized anxiety disorder)  Current moderate episode of major depressive disorder  Chronic. Stable. Noting Pain Referral Appt on 1/29/19. Patient agreeable to mobilizing more, integrative services like massage, stretching, and heat/ice. Will consult PT/OT and Integrative therapy department. Follow-up routinely or as needed.    Orders:  1. Coumadin 3 mg x 1 today.   2. Coumadin 3 mg PO every day on Tu/Th  3. Coumadin 6 mg PO every day on AOD.  4. Recheck INR x 1 in 1 week.  5. PT/OT to evaluate and treat walking, Stretching, massage, Heat/Ice therapies. Dx: L Shoulder pain.  FYI: Nursing to consult integrative therapy service specifically for massage therapy.   FYI: In-process prior auth for Lidocaine ointment.     Electronically signed by:  Dr. Lashanda Suárez, APRN CNP DNP

## 2019-01-07 NOTE — LETTER
1/7/2019        RE: Chilo Oneil  [facility]  [address] [facility] [address]        [place] [facility]  Chief Complaint   Patient presents with     alf Acute     [place] Medical Record Number:  [id number]  Place of Service where encounter took place:  [facility]  ([facility]) [[id number]]    HPI:    Chilo Oneil is a 68 year old  (1950), who is being seen today for an episodic care visit.  HPI information obtained from: facility chart records, facility staff, patient report, [facility] chart review and Care Everywhere [facility] chart review. Today's concern is:    Other pulmonary embolism without acute cor pulmonale, unspecified chronicity (H)  Encounter for therapeutic drug monitoring  Long term (current) use of anticoagulants  Patient's last INR was 2.75 (therapeutic) on 12/31, and current Coumadin dosing is 3mg Tu/Th and 6mg AOD. Patient denies dizziness, SOB, CP, palpitations, or sxs of bleeding/bruising. INR resulted today at 3.02 (slightly supratherapeutic).     Carcinoma, lung, left (H)  Left shoulder pain, unspecified chronicity and etiology  Chronic pain syndrome  ADA (generalized anxiety disorder)  Current moderate episode of major depressive disorder  On 12/13, patient had a large regimen change from Q3h Dilaudid dosing, to OxyContin BID and breakthrough oxycodone TID.  Overall patient has done well with this change.  He continues to have left shoulder pain, but it is usually well controlled. Last week, nursing states he had a rough night, where pain kept him up and PRNs were not helpful.  He states that he could've slept wrong. He was encouraged to use ice/heat, and topical products such as lidocaine or bengay. Integrative therapy services were offered, and  services were consulted. Patient's most recent oncology scans and visit revealed suspicious ANDREA, but staging still denotes T3N0, recommending follow-up in 3 months. He is using  "PRN Lorazepam several times/week, and is concerned finances. Noting for differential rule-out, cardiology/ortho/sports-med, and other GPs have seen this patient for this pain.    Today we discussed massage therapy, noting poor posture in wheelchair and potential for walking more to move his joints.  He was apprehensive because he thinks the more he mobilizes, the worse his shoulder pain would get.  Reassurance and education given.  Provider offered small massage to left shoulder, and patient was amazed with the relief, admitting that the muscles are tight and they need to be worked on.  We talked about how sometimes \"there isn't a pill\" and exercise, strengthening, stretching, and massage could make a difference. Patient willing to try this.  He states the lidocaine ointment helped him over the weekend, but it was very expensive.     PMH/PSH reviewed in [employer] today.    REVIEW OF SYSTEMS:  6 point ROS including Respiratory, CV, GI and , other than that noted in the HPI,  is negative    /65   Pulse 78   Temp 98.6  F (37  C)   Resp 18   Wt 67.1 kg (148 lb)   SpO2 94%   BMI 18.75 kg/m     GENERAL APPEARANCE: Alert, in no distress, cooperative.   ENT: Mouth and posterior oropharynx normal, moist mucous membranes, hearing acuity [place].  EYES: EOM, conjunctivae, lids, pupils and irises normal, PERRL2.   RESP: Respiratory effort and palpation of chest normal, no respiratory distress, Lung sounds diminished.  CV: Palpation and auscultation of heart done, rate and rhythm regular, no murmur, no rub or gallop, Edema 0+ BLE.  ABDOMEN: Normal bowel sounds, soft, nontender, no hepatosplenomegaly or other masses.  SKIN: Inspection/Palpation of skin and subcutaneous tissue baseline w/ fragility.  NEURO: 2-12 at patient's baseline, sensation baseline PPS.  PSYCH: Insight and judgement, memory baseline, affect and mood normal.    ASSESSMENT/PLAN:  Other pulmonary embolism without acute cor pulmonale, unspecified chronicity " (H)  Encounter for therapeutic drug monitoring  Long term (current) use of anticoagulants  Chronic. Stable. Slightly supratherapeutic. Will dose Coumadin as noted below and recheck INR in 1 week. Follow-up accordingly.     Carcinoma, lung, left (H)  Left shoulder pain, unspecified chronicity and etiology  Chronic pain syndrome  ADA (generalized anxiety disorder)  Current moderate episode of major depressive disorder  Chronic. Stable. Noting Pain Referral Appt on 1/29/19. Patient agreeable to mobilizing more, integrative services like massage, stretching, and heat/ice. Will consult PT/OT and Integrative therapy department. Follow-up routinely or as needed.    Orders:  1. Coumadin 3 mg x 1 today.   2. Coumadin 3 mg PO every day on Tu/Th  3. Coumadin 6 mg PO every day on AOD.  4. Recheck INR x 1 in 1 week.  5. PT/OT to evaluate and treat walking, Stretching, massage, Heat/Ice therapies. Dx: L Shoulder pain.  FYI: Nursing to consult integrative therapy service specifically for massage therapy.   FYI: In-process prior auth for Lidocaine ointment.     Electronically signed by:  Dr. Lashanda Suárez, APRN CNP DNP        Sincerely,        Lashanda Suárez, NP

## 2019-01-09 NOTE — TELEPHONE ENCOUNTER
Prior Authorization Retail Medication Request    Medication/Dose: Lidocaine Ointment 5 % Apply to left shoulder topically BID and BID PRN  ICD code (if different than what is on RX):    Previously Tried and Failed:    Rationale:      Insurance Name:  Medical [place] - [place]  Insurance ID:  Medicaid # [id number]      Pharmacy Information (if different than what is on RX)  Name:  [facility]  Phone: [phone]

## 2019-01-12 NOTE — PROGRESS NOTES
Face to Face and Medical Necessity Statement for DME Provider visit    Demographic Information on Chilo Oneil:  Gender: male  : 1950  [facility]  [address] [place] [address]  [phone] (home)     Medical Record: [id number]  Social Security Number: [id number]  Primary Care Provider: Lashanda Suárez  Insurance: Payor: MEDICA / Plan: MEDICA D[employer] / Product Type: HMO /     HPI:   Chilo Oneil is a 68 year old  (1950), who is being seen today (19) for a face to face provider visit at [facility]; medical necessity statement for DME included. This patient requires the following:    DME Ordered and Medical Necessity Statement   Electric Wheelchair (Power Mobility Device) as per therapy recommendation.  Size: 18 x 16  Corresponding cushion: Yes.  Standard foot rests: corresponding to appliance.  Elevating leg rests: corresponding w/ appliance.   Arm rests: Yes: Adjustable/corresponding to device.   Adjustable headrest: need for Dx: Cervical Kyphosis.   Adjustable armrests due to acquired postural kyphosis.   Lap tray: No  Dose patient use oxygen? No   Able to propel w/c? Sometimes, If no why not? Pain may prohibit.    Mobility related ADL that are affected in the home: ambulation, ROM.     The wheelchair is suitable and necessary for use in the patient's home.  Reason why a cane or walker will not meet the patient's needs. (ie: balance, tolerance, level of assistance): prohibitive pain.    The patient has expressed willingness to use the wheelchair in the home and does have the physical and cognitive ability to maneuver the equipment or has a caregiver who is available, willing, and able to provide assistance in the home with the wheelchair.   Patients functional mobility deficit can be sufficiently resolved by the use of the above wheelchair.    Pt needing above DME with expected length of need of 99 Years due to medical  necessity associated with following diagnosis:     Other pulmonary embolism without acute cor pulmonale, unspecified chronicity (H)  Encounter for therapeutic drug monitoring  Long term (current) use of anticoagulants  Acquired postural kyphosis  Left shoulder pain, unspecified chronicity  Chronic pain syndrome  Carcinoma, lung, left (H)      PMH   has a past medical history of GERD (gastroesophageal reflux disease), HTN (hypertension), Lung cancer (H), and MI (myocardial infarction) (H). He also has no past medical history of Complication of anesthesia, Difficult intubation, Malignant hyperthermia, or PONV (postoperative nausea and vomiting).    ROS:SOB w/ ambulation more than 10ft (lung Ca - progressive), pain to left shoulder w/ ANY ROM, with many medications and non-pharm interventions attempted/failed. Manual wheelchair becoming more difficult and painful and restricting ADLs. 4 point ROS including Respiratory, CV, GI and , other than that noted in the HPI,  is negative    EXAM  Vitals: /55   Pulse 78   Temp 97  F (36.1  C)   Resp 16   Wt 69.6 kg (153 lb 6.4 oz)   SpO2 94%   BMI 19.43 kg/m  ;BMI= Body mass index is 19.43 kg/m .   GENERAL APPEARANCE: Alert, in no distress, cooperative.   ENT: Mouth and posterior oropharynx normal, moist mucous membranes, hearing acuity [place].  EYES: EOM, conjunctivae, lids, pupils and irises normal, PERRL2.   MSK: Limited ROM second to pain. Kyphosis on exam with unilateral postural issues from cervical down to T12.    SKIN: Inspection/Palpation of skin and subcutaneous tissue baseline w/ fragility.  NEURO: 2-12 at patient's baseline, sensation baseline PPS.  PSYCH: Insight and judgement, memory baseline, affect and mood normal.    ASSESSMENT/PLAN:  4. Acquired postural kyphosis  M40.00   5. Left shoulder pain, unspecified chronicity and etiology M25.512   6. Chronic pain syndrome G89.4   7. Carcinoma, lung, left (H) C34.92     Orders:  1. Facility staff/TC to  contact [employer] company to get their order form for provider to fill out.     ELECTRONICALLY SIGNED BY [place] CERTIFIED PROVIDER:  NATALIE Harvey CNP DNP  NPI: [id number]  [facility]  [address] [place] [address], [facility] [address]  [place] [id number]

## 2019-01-12 NOTE — PROGRESS NOTES
[facility]  [place] [facility] Record Number:  [id number]  Place of Service where encounter took place:  [facility]  ([facility]) [[id number]]    HPI:    Chilo Oneil is a 68 year old  (1950), who is being seen today for an episodic care visit at the above location.   HPI information obtained from: facility chart records, facility staff, patient report, [facility] chart review and Care Everywhere [facility] chart review. Today's concern is INR/Coumadin management for PE    Bleeding Signs/Symptoms:  None  Thromboembolic Signs/Symptoms:  None  Medication Changes:  No  Dietary Changes:  No  Activity Changes: No  Bacterial/Viral Infection:  No  Missed Coumadin Doses:  None  On ASA: No  Other Concerns:  No    OBJECTIVE:  INR Today: 2.37  Current Dose: Last 3.02, 3mg x1, then 3mg Tu/Th, 6mg AOD     ASSESSMENT:  Other pulmonary embolism without acute cor pulmonale, unspecified chronicity (H)  Encounter for therapeutic drug monitoring  Long term (current) use of anticoagulants  Chronic. Stable. Therapeutic. Will dose coumadin as noted below and recheck INR in 1 week. Therapeutic INR for goal of 2-3    PLAN:  New Dose: Coumadin 3mg M/W/F, 6mg AOD.  Next INR: 1 week.    Electronically signed by:  Dr. Lashanda Suárez, NATALIE CNP DNP

## 2019-01-14 NOTE — LETTER
2019        RE: Chilo Oneil  [facility]  [address] [place] [address]        [facility]  [place] Medical Record Number:  [id number]  Place of Service where encounter took place:  [facility]  ([facility]) [[id number]]    HPI:    Chilo Oneil is a 68 year old  (1950), who is being seen today for an episodic care visit at the above location.   HPI information obtained from: facility chart records, facility staff, patient report, [facility] chart review and Care Everywhere [facility] chart review. Today's concern is INR/Coumadin management for PE    Bleeding Signs/Symptoms:  None  Thromboembolic Signs/Symptoms:  None  Medication Changes:  No  Dietary Changes:  No  Activity Changes: No  Bacterial/Viral Infection:  No  Missed Coumadin Doses:  None  On ASA: No  Other Concerns:  No    OBJECTIVE:  INR Today: 2.37  Current Dose: Last 3.02, 3mg x1, then 3mg Tu/Th, 6mg AOD     ASSESSMENT:  Other pulmonary embolism without acute cor pulmonale, unspecified chronicity (H)  Encounter for therapeutic drug monitoring  Long term (current) use of anticoagulants  Chronic. Stable. Therapeutic. Will dose coumadin as noted below and recheck INR in 1 week. Therapeutic INR for goal of 2-3    PLAN:  New Dose: Coumadin 3mg M/W/, 6mg AOD.  Next INR: 1 week.    Electronically signed by:  Dr. Lashanda Suárez, NATALIE CNP DNP            Face to Face and Medical Necessity Statement for DME Provider visit    Demographic Information on Chilo Oneil:  Gender: male  : 1950  [facility]  [address] [place] [address]  [phone] (home)     Medical Record: [id number]  Social Security Number: [id number]  Primary Care Provider: Lashanda Suárez  Insurance: Payor: MEDICA / Plan: MEDICA D[employer] SOLUTIONS [employer]ERS / Product Type: HMO /     HPI:   Chilo Oneil is a 68 year old  (1950), who is being seen today (19) for a face to face  provider visit at [facility]; medical necessity statement for DME included. This patient requires the following:    DME Ordered and Medical Necessity Statement   Electric Wheelchair (Power Mobility Device) as per therapy recommendation.  Size: 18 x 16  Corresponding cushion: Yes.  Standard foot rests: corresponding to appliance.  Elevating leg rests: corresponding w/ appliance.   Arm rests: Yes: Adjustable/corresponding to device.   Adjustable headrest: need for Dx: Cervical Kyphosis.   Adjustable armrests due to acquired postural kyphosis.   Lap tray: No  Dose patient use oxygen? No   Able to propel w/c? Sometimes, If no why not? Pain may prohibit.    Mobility related ADL that are affected in the home: ambulation, ROM.     The wheelchair is suitable and necessary for use in the patient's home.  Reason why a cane or walker will not meet the patient's needs. (ie: balance, tolerance, level of assistance): prohibitive pain.    The patient has expressed willingness to use the wheelchair in the home and does have the physical and cognitive ability to maneuver the equipment or has a caregiver who is available, willing, and able to provide assistance in the home with the wheelchair.   Patients functional mobility deficit can be sufficiently resolved by the use of the above wheelchair.    Pt needing above DME with expected length of need of 99 Years due to medical necessity associated with following diagnosis:     Other pulmonary embolism without acute cor pulmonale, unspecified chronicity (H)  Encounter for therapeutic drug monitoring  Long term (current) use of anticoagulants  Acquired postural kyphosis  Left shoulder pain, unspecified chronicity  Chronic pain syndrome  Carcinoma, lung, left (H)      PMH   has a past medical history of GERD (gastroesophageal reflux disease), HTN (hypertension), Lung cancer (H), and MI (myocardial infarction) (H). He also has no past medical history of Complication of  anesthesia, Difficult intubation, Malignant hyperthermia, or PONV (postoperative nausea and vomiting).    ROS:SOB w/ ambulation more than 10ft (lung Ca - progressive), pain to left shoulder w/ ANY ROM, with many medications and non-pharm interventions attempted/failed. Manual wheelchair becoming more difficult and painful and restricting ADLs. 4 point ROS including Respiratory, CV, GI and , other than that noted in the HPI,  is negative    EXAM  Vitals: /55   Pulse 78   Temp 97  F (36.1  C)   Resp 16   Wt 69.6 kg (153 lb 6.4 oz)   SpO2 94%   BMI 19.43 kg/m   ;BMI= Body mass index is 19.43 kg/m .   GENERAL APPEARANCE: Alert, in no distress, cooperative.   ENT: Mouth and posterior oropharynx normal, moist mucous membranes, hearing acuity [place].  EYES: EOM, conjunctivae, lids, pupils and irises normal, PERRL2.   MSK: Limited ROM second to pain. Kyphosis on exam with unilateral postural issues from cervical down to T12.    SKIN: Inspection/Palpation of skin and subcutaneous tissue baseline w/ fragility.  NEURO: 2-12 at patient's baseline, sensation baseline PPS.  PSYCH: Insight and judgement, memory baseline, affect and mood normal.    ASSESSMENT/PLAN:  4. Acquired postural kyphosis  M40.00   5. Left shoulder pain, unspecified chronicity and etiology M25.512   6. Chronic pain syndrome G89.4   7. Carcinoma, lung, left (H) C34.92     Orders:  1. Facility staff/TC to contact DME company to get their order form for provider to fill out.     ELECTRONICALLY SIGNED BY [place] CERTIFIED PROVIDER:  Dr. Lashanda Suárez, APRN CNP DNP  NPI: [id number]  [facility]  [address] [place] [address], [address]  [place] [address]            Sincerely,        Lashanda Suárez NP

## 2019-01-14 NOTE — TELEPHONE ENCOUNTER
Central Prior Authorization Team   Phone: [phone]    PA Initiation    Medication: Lidocaine Ointment 5 % Apply to left shoulder topically BID and BID PRN  Insurance Company: [employer] - Phone [phone] Fax [phone]  Pharmacy Filling the Rx: [facility] - [place], - [place], [place] - [address] [place] [address]  Filling Pharmacy Phone: [phone]  Filling Pharmacy Fax:    Start Date: 1/14/2019    INITIATED VIA PHONE [phone]. REF# [id number].

## 2019-01-15 NOTE — TELEPHONE ENCOUNTER
Prior Authorization Approval    Authorization Effective Date: 12/18/2018  Authorization Expiration Date: 4/15/2019  Medication: Lidocaine Ointment 5 %-APPROVED  Approved Dose/Quantity:    Reference #:     Insurance Company: [employer] - Phone [phone] Fax [phone]  Expected CoPay:       CoPay Card Available:      Foundation Assistance Needed:    Which Pharmacy is filling the prescription (Not needed for infusion/clinic administered): [facility], - [place], [place] - [address] [place] [address]  Pharmacy Notified: Yes  Patient Notified: Yes

## 2019-01-20 NOTE — PROGRESS NOTES
[facility]  [place] [facility] Record Number:  [id number]  Place of Service where encounter took place:  [facility]  ([facility]) [[id number]]    HPI:    Chilo Oneil is a 68 year old  (1950), who is being seen today for an episodic care visit at the above location.   HPI information obtained from: facility chart records, facility staff, patient report, [facility] chart review and Care Everywhere [facility] chart review. Today's concern is INR/Coumadin management for PE    Bleeding Signs/Symptoms:  None  Thromboembolic Signs/Symptoms:  None  Medication Changes:  No  Dietary Changes:  No  Activity Changes: No  Bacterial/Viral Infection:  No  Missed Coumadin Doses:  None  On ASA: No  Other Concerns:  No    OBJECTIVE:  INR Today: 2.79.  Current Dose: 3mg Tu/Th (and one other day x1), and 6mg AOD. Was 3.02 (2 weeks ago).    ASSESSMENT:  Other pulmonary embolism without acute cor pulmonale, unspecified chronicity (H)  Encounter for therapeutic drug monitoring  Long term (current) use of anticoagulants  Chronic. Stable. Therapeutic. Will dose Coumadin as noted below and recheck INR in 2 weeks. Follow-up accordingly. Therapeutic INR for goal of 2-3.    PLAN:  New Dose: 3mg M/W/F, 6mg AOD.    Next INR: 2 weeks.    Electronically signed by:  Dr. Lashanda Suárez, APRN CNP DNP

## 2019-01-21 NOTE — LETTER
1/21/2019        RE: Chilo Oneil  [facility]  [address] [facility] [address]        [facility]  [place] [facility] Record Number:  [id number]  Place of Service where encounter took place:  [facility]  ([facility]) [[id number]]    HPI:    Chilo Oneil is a 68 year old  (1950), who is being seen today for an episodic care visit at the above location.   HPI information obtained from: facility chart records, facility staff, patient report, [facility] chart review and Care Everywhere [facility] chart review. Today's concern is INR/Coumadin management for PE    Bleeding Signs/Symptoms:  None  Thromboembolic Signs/Symptoms:  None  Medication Changes:  No  Dietary Changes:  No  Activity Changes: No  Bacterial/Viral Infection:  No  Missed Coumadin Doses:  None  On ASA: No  Other Concerns:  No    OBJECTIVE:  INR Today: 2.79.  Current Dose: 3mg Tu/Th (and one other day x1), and 6mg AOD. Was 3.02 (2 weeks ago).    ASSESSMENT:  Other pulmonary embolism without acute cor pulmonale, unspecified chronicity (H)  Encounter for therapeutic drug monitoring  Long term (current) use of anticoagulants  Chronic. Stable. Therapeutic. Will dose Coumadin as noted below and recheck INR in 2 weeks. Follow-up accordingly. Therapeutic INR for goal of 2-3.    PLAN:  New Dose: 3mg M/W/F, 6mg AOD.    Next INR: 2 weeks.    Electronically signed by:  Dr. Lashanda Suárez, APRN CNP DNP            Sincerely,        Lashanda Suárez, CIERA

## 2019-01-29 NOTE — PROGRESS NOTES
[facility]    Date of visit: 1/29/2019    Reason for consultation:    Primary Care Provider is Lashanda Suárez  Pain medications are being prescribed by pcp.    Please see the [facility] health questionnaire which the patient completed and reviewed with me in detail.    Chief Complaint:    Chief Complaint   Patient presents with     Pain    not showing any recent prescriptions-but clearly documented in primary's notes  MME prescribed prior to seeing patient:112.5  Current MME:    Pain history: Patient recent diagnosis of  Lung cancer and continues to smoke.  Patient PCP concern for opioid-induced hyperalgesia and looking for a better long-acting regimen to limit as needed doses  Chilo Oneil is a 68 year old male who first started having problems with chronic pain, but has gotten progressively work over approx 8 months ago.  The pt denies any specific inciting event. The pt reports having Left neck pain radiates to his UE. Additionally, he is having severe shoulder pain.  The pain is a sharp shooting pain that radiates down to his upper extremity.  The pain is worse with extension and rotation.  Positive numbness burning tingling.  Pain slightly better with rest.  Of note patient was recently changed from Dilaudid to OxyContin and as needed oxycodone by PCP with improved pain control.  Patient denies any interventions on his neck in the past.  He denies any overt weakness.  The patient's shoulder pain is worse with external rotation and abduction.  He notes significant decrease in range of motion.  He reports he has had a shoulder injection in the past with significant benefit for a long period of time.  Of note most recent injection was not beneficial.  He does note improved pain control of his shoulder with new regimen.  Overall the pain significantly affects his abilities to carry out his ADLs.    The patient currently lives in  a nursing home and has access to a physician at his location.  Pt recently changed to oxycontin ER 30mg bid and oxycodone 5mg q8. The pt was on dilaudid prior. The pain is better controlled     Current treatments include:  Cymbalta 60 mg  Gabapentin 300 mg twice daily  oxycontin ER 30mg bid and oxycodone 5mg q8.  Per note only taking as needed dose approximately 1 time unclear whether he is using Voltaren gel  Coumadin  Previous medication treatments included:  Extremely hard to ascertain  Fentanyl patch  Other treatments have included:  Chilo Oneil has been seen at a pain clinic in the past.    PT: not recently    Injections:   Shoulder injection with sig benefit     Past Medical History:  Past Medical History:   Diagnosis Date     GERD (gastroesophageal reflux disease)      HTN (hypertension)      Lung cancer (H)      MI (myocardial infarction) (H)     2016, medically managed     Past Surgical History:  Past Surgical History:   Procedure Laterality Date     FRACTURE TX, ANKLE RT/LT  1975    Fracture TX Ankle, LT     INSERT PORT VASCULAR ACCESS Left 5/15/2018    Procedure: INSERT PORT VASCULAR ACCESS;  Left chest Port-a-Cath Placement;  Surgeon: Gurjit Fox MD;  Location: [facility]     OPEN REDUCTION INTERNAL FIXATION HIP NAILING  9/20/2013    Procedure: OPEN REDUCTION INTERNAL FIXATION HIP NAILING;  Left Hip Open Reduction Internal Fixation ;  Surgeon: Rosas Dennis MD;  Location: [facility] OR     THORACOSCOPIC BIOPSY LUNG Left 4/11/2018    Procedure: THORACOSCOPIC BIOPSY LUNG;  subxiphoid left video assisted thorascopic surgery pleural biops, left lower lobe wedge biopsy ;  Surgeon: Mega Moreno MD;  [facility]     VASCULAR SURGERY       Medications:  Current Outpatient Medications   Medication Sig Dispense Refill     ACETAMINOPHEN PO Take 1,000 mg by mouth 3 times daily       AmLODIPine Besylate (NORVASC PO) Take 10 mg by mouth daily       camphor-menthol-methyl sal (MT ECKERT ULTRA  STRENGTH) 4-10-30 % CREA Externally apply topically 3 times daily       diclofenac (VOLTAREN) 1 % GEL topical gel Apply 4 grams to knees or 2 grams to hands four times daily using enclosed dosing card. 100 g 1     DULoxetine HCl (CYMBALTA PO) Take 60 mg by mouth daily       fentaNYL (DURAGESIC) 12 mcg/hr 72 hr patch Place 1 patch onto the skin every 72 hours remove old patch. 30 patch 0     GABAPENTIN PO Take 300 mg by mouth 2 times daily        LORAZEPAM PO Take 0.5 mg by mouth every 3 hours as needed for anxiety       MAGNESIUM OXIDE PO Take 400 mg by mouth 2 times daily        MELATONIN PO Take 3 mg by mouth At Bedtime       omeprazole (PRILOSEC) 20 MG CR capsule Take 1 capsule (20 mg) by mouth daily 90 capsule 3     ONDANSETRON PO Take 4 mg by mouth 3 times daily        oxyCODONE (OXYCONTIN) 30 MG 12 hr tablet Take by mouth every 12 hours       oxyCODONE HCl (OXAYDO) 5 MG TABA Take 5 mg by mouth every 8 hours as needed       polyethylene glycol (MIRALAX/GLYCOLAX) Packet Take 17 g by mouth daily       senna-docusate (SENOKOT-S;PERICOLACE) 8.6-50 MG per tablet Take 2 tablets by mouth 2 times daily 100 tablet 0     STATIN NOT PRESCRIBED (INTENTIONAL) 1 each daily Please choose reason not prescribed, below       Warfarin Sodium (COUMADIN PO) Take by mouth daily Take as directed per INR results       THIAMINE HCL PO Take 100 mg by mouth daily       VENTOLIN  (90 Base) MCG/ACT Inhaler Inhale 2 puffs into the lungs every 4 hours as needed for shortness of breath / dyspnea or wheezing (Patient not taking: Reported on 1/29/2019) 1 Inhaler 1     Allergies:     Allergies   Allergen Reactions     Cipro [Ciprofloxacin] Swelling     Social History:  Home situation: Nursing home    Tobacco use: yes    History of chemical dependency treatment: no    Family history:  Family History   Problem Relation Age of Onset     Diabetes Brother         type 2     Diabetes Father      Family history of headaches:  c    Review of  Systems:  Skin: negative  Eyes: negative  Ears/Nose/Throat: negative  Respiratory: No shortness of breath, dyspnea on exertion, cough, or hemoptysis  Cardiovascular: negative  Gastrointestinal: negative  Genitourinary: negative  Musculoskeletal: negative  Neurologic: negative  Psychiatric: negative  Hematologic/Lymphatic/Immunologic: negative  Endocrine: negative    Physical Exam:  Vitals:    01/29/19 1430   BP: 170/67   Pulse: 76   Weight: 68.9 kg (152 lb)     Exam:  Constitutional: healthy, alert and no distress  Head: normocephalic. Atraumatic.   Eyes: no redness or jaundice noted   ENT: oropharnx normal.  MMM.  Neck supple.    Cardiovascular: Negative JVD  Respiratory: Speaking in full sentences no accessory muscles use   gastrointestinal: soft, non-tender,  Skin: no suspicious lesions or rashes  Psychiatric: mentation appears normal and affect normal/bright    Musculoskeletal exam:  Gait/Station/Posture: Antalgic  Cervical spine: Significantly decreased positive TTP, negative Spurling         Lumbar spine:     ROM: Limited                    SI: n negative   Greater trochanteric bursa: Negative  Left shoulder exam: Positive empty can, positive external rotation, positive Sepulveda, positive Neer's on the left  Neurologic exam:  CN:  Cranial nerves 2-12 are normal  Motor:  5/5 UE   Reflexes:     Biceps:     +2   Brachioradialis   +2     Triceps:  +2      Sensory:  (upper and lower extremities):   Light touch: normal    Allodynia: absent    Dysethesia: absent    Hyperalgesia: absent     Diagnostic tests:  Segmental analysis:     C2-C3: Severe right and moderate left facet arthropathy in combination  with small disc osteophyte complex. Mild bilateral foraminal stenosis.  Mild to moderate spinal canal stenosis with slight indentation on the  left anterior thecal sac.     C3-C4: Small disc osteophyte complex and mild bilateral facet  arthropathy. Mild bilateral foraminal stenosis. Mild spinal  canal  stenosis.     C4-C5: Broad-based disc osteophyte complex and severe bilateral facet  arthropathy. Moderate to severe bilateral foraminal stenosis. Mild  spinal canal stenosis.     C5-C6: Broad-based disc osteophyte complex and moderate to severe  bilateral facet arthropathy. Severe right and moderate left foraminal  stenosis. Moderate to severe spinal canal stenosis with indentation on  the anterior cervical cord.     C6-C7: Broad-based disc osteophyte complex and mild bilateral facet  arthropathy. Mild bilateral foraminal stenosis. Mild spinal canal  stenosis.                                                                      IMPRESSION: Degenerative change as detailed, most severe at C4-C5 and  C5-C6.                                                                     IMPRESSION: No fracture or dislocation seen in the LEFT shoulder. Mild  degenerative changes in the glenohumeral and acromioclavicular joints.  No significant soft tissue swelling. LEFT subclavian implanted central  venous port partially visualized.       D.I.R.E Score: Patient Selection for Chronic Opioid Analgesia    For each factor, rate the patient's score from 1 - 3 based on the explanations on the right.       Diagnosis             2         1 = Benign chronic condition with minimal objective findings or no definite medical diagnosis.  Examples:  fibromyalgia, migraine, headaches, non-specific back pain.  2 = Slowly progressive condition concordant with moderate pain, or fixed condition with moderate objective findings.  Examples: failed back surgery syndrome, back pain with moderate degenerative changes, neuropathic pain.  3 = Advanced condition concordant with severe pain with objective findings.  Examples: severe ischemic vascular disease, advanced neuropathy, severe spinal stenosis.    Intractability             1         1 = Few therapies have been tried and the patient takes a passive role in his/her pain management process.   2  = Most costomary treatments have been tried but the patient is not fully engaged in the pain management process, or barriers prevent (insurance, transportation, medical illness)  3 = Patient fully engaged in a spectrum of appropriate treatments but with inadequate response.    Risk   (Risk = Total of P+C+R+S below)       Psychological             2         1 = Serious personality dysfunction or mental illness interfering with care.  Examples: personality disorder, severe affective disorder, significant personality issues.  2 = Personality or mental health interferes moderately.  Example: depression or anxiety disorder.  3 = Good communication with the clinic.  No significant personality dysfunction or mental illness.       Chemical      Health             3         1 = Active or very recent use of illicit drugs, excessive alcohol, or prescription drug abuse.  2 = Chemical coper (uses medications to cope with stress) or history of chemical dependency in remission.  3 = No CD history.  Not drug-focused or chemically reliant       Reliability             3         1 = History of numerous problems: medication misuse, missed appointments, rarely follows through.  2 = Occasional difficulties with compliance, but generally reliable.  3 = Highly reliable patient with medications, appointments and treatment.       Social      Support             2         1= Life in chaos.  Little family support and few close relationships.  Loss of most normal life roles.  2 = Reduction in some relationships and life roles.  3 = Supportive family/close relationships.  Involved in work or school and no social isolation.    Efficacy score             2         1 = Poor function or minimal pain relief despite moderate to high doses.  2 = Moderate benefit with function improved in a number of ways (or insufficient info - hasn't tried opioid yet or very low doses or too short a trial.  3 = Good improvement in pain and function and quality of  life with stable doses over time.                                    15    Total score = D + I + R + E    Score 7-13: Not a suitable candidate for long-term opioid analgesia  Score 14-21: May be a good candidate      Assessment/Plan:  Chilo Oneil is a 68 year old male who presents with the complaints of left-sided neck pain radiating to his left upper extremity and left shoulder pain.  Chilo was seen today for pain.    Diagnoses and all orders for this visit:    Cervical radiculopathy    Myofascial muscle pain    Chronic left shoulder pain         - Further procedures recommended:    - consider cervical epidural will have to discuss if he can come off anticoagulation.    - consider shoulder injection would use fluoroscopy   - Medication Management: Of note patient currently reports that his pain is better controlled  On his current regimen    Will send Recommendations to PCP.  Would make 1 change at a time. Given inability to actually see . Patient will discuss with  PCP changes    -  First change would be trial of zanaflex 4mg twice a day as needed. Start with Pm dose for 3 days before adding am dose as tolerated    - Consider addition of low dose elavil 10mg at night for neuropathic pain component(given patient already on cymbalta 60mg daily)   - Consider changing am Oxycontin to 10mg am 10mg afternoon, and 30mg at night with goal of improved steady state( states pain is worse at night). And then oxycodone 5mg twice a day as needed for severe pain. Would change MME to <90  - Physical Therapy: would strongly recommend PAIN PHYSICAL THERAPY    - Follow up: As needed   - will call if interested and able to have procedure. Needs to discuss anticoagulation with PCP               Total time spent was 60 minutes, and more than 50% of face to face time was spent counseling and/or coordination of care regarding principles of multidisciplinary care and medication management left-sided neck pain radiating to his  left upper extremity and left shoulder pain    Martínez Pruitt MD  [facility]

## 2019-01-29 NOTE — Clinical Note
Placed some recs on possible changes to regimen. Also was considering cervical epidural, but wanted to discuss anticoagulation with you first. Thanks for the ref

## 2019-01-29 NOTE — PATIENT INSTRUCTIONS
- Further procedures recommended:    - consider cervical epidural will have to discuss if he can come off anticoagulation.    - consider shoulder injection would use fluoroscopy   - Medication Management: Of note patient currently reports that his pain is better controlled  On his current regimen    Will send Recommendations to PCP.  Would make 1 change at a time. Given inability to actually see . Patient will discuss with  PCP changes    -  First change would be trial of zanaflex 4mg twice a day as needed. Start with Pm dose for 3 days before adding am dose as tolerated    - Consider addition of low dose elavil 10mg at night for neuropathic pain component(given patient already on cymbalta 60mg daily)   - consider changing gabapentin to Three times a day dosing   - Consider changing am Oxycontin to 10mg am 10mg afternoon, and 30mg at night with goal of improved steady state( states pain is worse at night). And then oxycodone 5mg twice a day as needed for severe pain. Will change MME to <90  - Physical Therapy: would strongly recommend PAIN PHYSICAL THERAPY    - Follow up:   - Message sent to PCP to discuss plan going forward   - will call if interested and able to have procedure. Needs to discuss anticoagulation with PCP       ----------------------------------------------------------------  Clinic Number:  [phone]   Call this number with any questions about your care and for scheduling assistance. Calls are returned Monday through Friday between 8 AM and 4:30 PM. We usually get back to you within 2 business days depending on the issue/request.       Medication refills:    For non-narcotic medications, call your pharmacy directly to request a refill. The pharmacy will contact the Pain Management Center for authorization. Please allow 3-4 days for these refills to be processed.     For narcotic refills, call the clinic number or send a [employer] message. Please contact us 7-10 days before your refill is due.  The message MUST include the name of the specific medication(s) requested and how you would like to receive the prescription(s). The options are as follows:    Pain Clinic staff can mail the prescription to your pharmacy. Please tell us the name of the pharmacy.    You may pick the prescription up at the Pain Clinic (tell us the location) or during a clinic visit with your pain provider    Pain Clinic staff can deliver the prescription to the [facility] pharmacy in the clinic building. Please tell us the location.      We believe regular attendance is key to your success in our program.    Any time you are unable to keep your appointment we ask that you call us at least 24 hours in advance to let us know. This will allow us to offer the appointment time to another patient.

## 2019-01-30 NOTE — PROGRESS NOTES
"[facility]  Chief Complaint   Patient presents with     [facility] Acute     [facility] Medical Record Number:  [id number]  Place of Service where encounter took place:  [facility]  ([facility]) [[id number]]    HPI:    Chilo Oneil is a 68 year old  (1950), who is being seen today for an episodic care visit.  HPI information obtained from: facility chart records, facility staff, patient report and [facility] chart review.     Today's concern is:  Acquired postural kyphosis  Left shoulder pain, unspecified chronicity and etiology  Chronic pain syndrome  Carcinoma, lung, left (H)  ADA (generalized anxiety disorder)  Current moderate episode of major depressive disorder without prior episode (H)  Debility  Moderate protein-calorie malnutrition (H)  Patient seen on 1/29 by pain management specialist who had some great recommendations for his management moving forward (see note). Concern about moving forward w/ potential procedure of cervical epidural injection, and therefore discussion w/ patient today about stopping Coumadin pre-procedure, risks of clotting off coumadin, or bleeding on Coumadin, especially as this would involve his spine. He is willing to move forward w/ procedure. He states that he is excited to try some of the other changes suggested, and is aware that PCP cannot make all changes in one day. He states he had a rough night last night from \"all the poking around\" that they did yesterday. He states appetite is fair, next oncology follow-up is in 3 months. Patient is hopeful for some improvements.      PMH/PSH reviewed in [facility] today.    REVIEW OF SYSTEMS:  10 point ROS of systems including Constitutional, Eyes, Respiratory, Cardiovascular, Gastroenterology, Genitourinary, Integumentary, Musculoskeletal, Psychiatric were all negative except for pertinent positives noted in my HPI.    /73   Pulse 79   Temp 98  F (36.7  C)   Resp 18   Wt 69.7 kg (153 lb 9.6 oz)  "  SpO2 95%   BMI 19.46 kg/m    GENERAL APPEARANCE: Alert, in no distress, cooperative.   ENT: Mouth and posterior oropharynx normal, moist mucous membranes, hearing acuity [place].  EYES: EOM, conjunctivae, lids, pupils and irises normal, PERRL2.   ABDOMEN: Normal bowel sounds, soft, nontender, no hepatosplenomegaly or other masses.  SKIN: Inspection/Palpation of skin and subcutaneous tissue baseline w/ fragility.  NEURO: 2-12 at patient's baseline, sensation baseline PPS.  PSYCH: Insight and judgement, memory baseline, affect and mood normal.    ASSESSMENT/PLAN:  Acquired postural kyphosis  Left shoulder pain, unspecified chronicity and etiology  Chronic pain syndrome  Carcinoma, lung, left (H)  ADA (generalized anxiety disorder)  Current moderate episode of major depressive disorder without prior episode (H)  Debility  Moderate protein-calorie malnutrition (H)  Chronic. Stable. Appreciate specialist recommendations and will start w/ Zanaflex dosing as noted below. Patient aware of anticoagulation risks if he were to pursue procedure. Nursing to call and schedule cervical epidural procedure and we will adjust anticoagulation once date is available. Will follow-up on Monday for INR/cooumadin and adjust OxyContin at that time.     Orders:  1. Zanaflex 4 mg PO at bedtime x3 days then increase to 4 mg PO BID PRN Dx: Spasms  2. Schedule cervical epidural procedure   FYI: Once date available I will adjust anticoagulation.   FYI  Will see again next week to change OxyContin.    Electronically signed by:  Dr. Lashanda Suárez, APRN CNP DNP

## 2019-01-30 NOTE — LETTER
"    1/30/2019        RE: Chilo Oneil  [facility]  [address] [facility] [address]        [facility]  Chief Complaint   Patient presents with     [facility] Acute     [facility] Medical Record Number:  [id number]  Place of Service where encounter took place:  [facility]) [[id number]]    HPI:    Chilo Oneil is a 68 year old  (1950), who is being seen today for an episodic care visit.  HPI information obtained from: facility chart records, facility staff, patient report and [facility] chart review.     Today's concern is:  Acquired postural kyphosis  Left shoulder pain, unspecified chronicity and etiology  Chronic pain syndrome  Carcinoma, lung, left (H)  ADA (generalized anxiety disorder)  Current moderate episode of major depressive disorder without prior episode (H)  Debility  Moderate protein-calorie malnutrition (H)  Patient seen on 1/29 by pain management specialist who had some great recommendations for his management moving forward (see note). Concern about moving forward w/ potential procedure of cervical epidural injection, and therefore discussion w/ patient today about stopping Coumadin pre-procedure, risks of clotting off coumadin, or bleeding on Coumadin, especially as this would involve his spine. He is willing to move forward w/ procedure. He states that he is excited to try some of the other changes suggested, and is aware that PCP cannot make all changes in one day. He states he had a rough night last night from \"all the poking around\" that they did yesterday. He states appetite is fair, next oncology follow-up is in 3 months. Patient is hopeful for some improvements.      PMH/PSH reviewed in [facility] today.    REVIEW OF SYSTEMS:  10 point ROS of systems including Constitutional, Eyes, Respiratory, Cardiovascular, Gastroenterology, Genitourinary, Integumentary, Musculoskeletal, Psychiatric were all negative except for pertinent " positives noted in my HPI.    /73   Pulse 79   Temp 98  F (36.7  C)   Resp 18   Wt 69.7 kg (153 lb 9.6 oz)   SpO2 95%   BMI 19.46 kg/m     GENERAL APPEARANCE: Alert, in no distress, cooperative.   ENT: Mouth and posterior oropharynx normal, moist mucous membranes, hearing acuity [place].  EYES: EOM, conjunctivae, lids, pupils and irises normal, PERRL2.   ABDOMEN: Normal bowel sounds, soft, nontender, no hepatosplenomegaly or other masses.  SKIN: Inspection/Palpation of skin and subcutaneous tissue baseline w/ fragility.  NEURO: 2-12 at patient's baseline, sensation baseline PPS.  PSYCH: Insight and judgement, memory baseline, affect and mood normal.    ASSESSMENT/PLAN:  Acquired postural kyphosis  Left shoulder pain, unspecified chronicity and etiology  Chronic pain syndrome  Carcinoma, lung, left (H)  ADA (generalized anxiety disorder)  Current moderate episode of major depressive disorder without prior episode (H)  Debility  Moderate protein-calorie malnutrition (H)  Chronic. Stable. Appreciate specialist recommendations and will start w/ Zanaflex dosing as noted below. Patient aware of anticoagulation risks if he were to pursue procedure. Nursing to call and schedule cervical epidural procedure and we will adjust anticoagulation once date is available. Will follow-up on Monday for INR/cooumadin and adjust OxyContin at that time.     Orders:  1. Zanaflex 4 mg PO at bedtime x3 days then increase to 4 mg PO BID PRN Dx: Spasms  2. Schedule cervical epidural procedure   FYI: Once date available I will adjust anticoagulation.   FYI  Will see again next week to change OxyContin.    Electronically signed by:  Dr. Lashanda Suárez, APRN CNP DNP        Sincerely,        Lashanda Suárez, NP

## 2019-02-03 NOTE — PROGRESS NOTES
[facility]  Chief Complaint   Patient presents with     detention Regulatory   [facility] Record Number:  [id number]  [facility]  (FGS) [[id number]]    HPI:    Chilo Oneil is a 68 year old  (1950), who is being seen today for a federally mandated E/M visit at [facility].  HPI information obtained from: facility chart records, facility staff, patient report and [facility] chart review.     Today's concerns are:  -  - Resident seen and examined.   - DNP reports recently changed entire opioid regimen in December, with good result, got new WC, sees pain specialist started on Zanaflex with possible cervical injection.   - Resident reports pain is less now, in the neck with radiation to left arm, scheduled for neck injection. Asked the Writer about the nature of the procedure and what to expect. Reports operating well the new power chair.   -Reports sleep, appetite and BM are fine.   - RN has no concern today.   --------------------------------  - - Past Medical, social, family histories, medications, and allergies reviewed and updated  - Medications reviewed: in the chart and EHR.   - Case Management:   I have reviewed the care plan and MDS and do agree with the plan. Patient's desire to return to the community is not present.  Information reviewed:  Medications, vital signs, orders, and nursing notes.    MEDICATIONS:  Current Outpatient Medications   Medication Sig Dispense Refill     ACETAMINOPHEN PO Take 1,000 mg by mouth 3 times daily       AmLODIPine Besylate (NORVASC PO) Take 10 mg by mouth daily       camphor-menthol-methyl sal (MT ECKERT ULTRA STRENGTH) 4-10-30 % CREA Externally apply topically 3 times daily       diclofenac (VOLTAREN) 1 % GEL topical gel Apply 4 grams to knees or 2 grams to hands four times daily using enclosed dosing card. 100 g 1     DULoxetine HCl (CYMBALTA PO) Take 60 mg by mouth daily       GABAPENTIN PO Take 300 mg by mouth 2 times  daily        MAGNESIUM OXIDE PO Take 400 mg by mouth 2 times daily        MELATONIN PO Take 3 mg by mouth At Bedtime       omeprazole (PRILOSEC) 20 MG CR capsule Take 1 capsule (20 mg) by mouth daily 90 capsule 3     ONDANSETRON PO Take 4 mg by mouth 3 times daily        polyethylene glycol (MIRALAX/GLYCOLAX) Packet Take 17 g by mouth daily       senna-docusate (SENOKOT-S;PERICOLACE) 8.6-50 MG per tablet Take 2 tablets by mouth 2 times daily 100 tablet 0     STATIN NOT PRESCRIBED (INTENTIONAL) 1 each daily Please choose reason not prescribed, below       THIAMINE HCL PO Take 100 mg by mouth daily       tiZANidine (ZANAFLEX) 4 MG tablet Take 4 mg by mouth At Bedtime x3 days then increase to BID PRN       VENTOLIN  (90 Base) MCG/ACT Inhaler Inhale 2 puffs into the lungs every 4 hours as needed for shortness of breath / dyspnea or wheezing 1 Inhaler 1     Warfarin Sodium (COUMADIN PO) Take by mouth daily Take as directed per INR results       lidocaine (XYLOCAINE) 5 % external ointment Apply topically 2 times daily And BID PRN       LORazepam (ATIVAN) 0.5 MG tablet Take 1 tablet (0.5 mg) by mouth 2 times daily as needed for anxiety 30 tablet 0     oxyCODONE (OXYCONTIN) 10 MG 12 hr tablet Take 1 tablet (10 mg) by mouth 2 times daily Give QAM and Q-Afternoon. 60 tablet 0     oxyCODONE (OXYCONTIN) 30 MG 12 hr tablet Take 1 tablet (30 mg) by mouth At Bedtime 30 tablet 0     oxyCODONE HCl (OXAYDO) 5 MG TABA Take 5 mg by mouth 2 times daily as needed (breakthrough pain 8-10/10) 30 each 0   ROS:  4 point ROS including Respiratory, CV, GI and , other than that noted in the HPI,  is negative    Exam:  Vitals: /77   Pulse 78   Temp 97.7  F (36.5  C)   Resp 18   Wt 69.7 kg (153 lb 9.6 oz)   SpO2 94%   BMI 19.46 kg/m    BMI= Body mass index is 19.46 kg/m .  GENERAL APPEARANCE:  Alert, in no distress  RESP:  respiratory effort and palpation of chest normal, lungs clear to auscultation , no respiratory  distress  CV:  Palpation and auscultation of heart done , regular rate and rhythm, no murmur, rub, or gallop, ABDOMEN:  normal bowel sounds, soft, nontender, no hepatosplenomegaly or other masses  M/S:   Gait and station abnormal uses walker. Some muscles atrophy in the legs.   SKIN:  Inspection of skin and subcutaneous tissue baseline, Palpation of skin and subcutaneous tissue baseline  NEURO:   No NFD appreciated on observation.   PSYCH:  normal insight, judgement and memory, affect and mood normal    Lab/Diagnostic data:   Lab Results   Component Value Date    WBC 7.4 12/28/2018     Lab Results   Component Value Date    RBC 2.84 12/28/2018     Lab Results   Component Value Date    HGB 9.7 12/28/2018     Lab Results   Component Value Date    HCT 29.3 12/28/2018     No components found for: MCT  Lab Results   Component Value Date     12/28/2018     Lab Results   Component Value Date    MCH 34.2 12/28/2018     Lab Results   Component Value Date    MCHC 33.1 12/28/2018     Lab Results   Component Value Date    RDW 13.5 12/28/2018     Lab Results   Component Value Date     12/28/2018     Last Comprehensive Metabolic Panel:  Sodium   Date Value Ref Range Status   12/28/2018 132 (L) 133 - 144 mmol/L Final     Potassium   Date Value Ref Range Status   12/28/2018 4.8 3.4 - 5.3 mmol/L Final     Chloride   Date Value Ref Range Status   12/28/2018 100 94 - 109 mmol/L Final     Carbon Dioxide   Date Value Ref Range Status   12/28/2018 29 20 - 32 mmol/L Final     Anion Gap   Date Value Ref Range Status   12/28/2018 3 3 - 14 mmol/L Final     Glucose   Date Value Ref Range Status   12/28/2018 90 70 - 99 mg/dL Final     Urea Nitrogen   Date Value Ref Range Status   12/28/2018 17 7 - 30 mg/dL Final     Creatinine   Date Value Ref Range Status   12/28/2018 1.08 0.66 - 1.25 mg/dL Final     GFR Estimate   Date Value Ref Range Status   12/28/2018 70 >60 mL/min/[1.73_m2] Final     Comment:     Non  GFR  Calc  Starting 12/18/2018, serum creatinine based estimated GFR (eGFR) will be   calculated using the Chronic Kidney Disease Epidemiology Collaboration   (CKD-EPI) equation.       Calcium   Date Value Ref Range Status   12/28/2018 9.2 8.5 - 10.1 mg/dL Final     Bilirubin Total   Date Value Ref Range Status   12/28/2018 0.2 0.2 - 1.3 mg/dL Final     Alkaline Phosphatase   Date Value Ref Range Status   12/28/2018 84 40 - 150 U/L Final     ALT   Date Value Ref Range Status   12/28/2018 15 0 - 70 U/L Final     AST   Date Value Ref Range Status   12/28/2018 13 0 - 45 U/L Final     Last Comprehensive Metabolic Panel:  Sodium   Date Value Ref Range Status   12/28/2018 132 (L) 133 - 144 mmol/L Final     Potassium   Date Value Ref Range Status   12/28/2018 4.8 3.4 - 5.3 mmol/L Final     Chloride   Date Value Ref Range Status   12/28/2018 100 94 - 109 mmol/L Final     Carbon Dioxide   Date Value Ref Range Status   12/28/2018 29 20 - 32 mmol/L Final     Anion Gap   Date Value Ref Range Status   12/28/2018 3 3 - 14 mmol/L Final     Glucose   Date Value Ref Range Status   12/28/2018 90 70 - 99 mg/dL Final     Urea Nitrogen   Date Value Ref Range Status   12/28/2018 17 7 - 30 mg/dL Final     Creatinine   Date Value Ref Range Status   12/28/2018 1.08 0.66 - 1.25 mg/dL Final     GFR Estimate   Date Value Ref Range Status   12/28/2018 70 >60 mL/min/[1.73_m2] Final     Comment:     Non  GFR Calc  Starting 12/18/2018, serum creatinine based estimated GFR (eGFR) will be   calculated using the Chronic Kidney Disease Epidemiology Collaboration   (CKD-EPI) equation.       Calcium   Date Value Ref Range Status   12/28/2018 9.2 8.5 - 10.1 mg/dL Final     ============================================================  ASSESSMENT/PLAN    Hx of pulmonary embolism without acute cor pulmonale, unspecified chronicity (H)  -  - around August 2018  -  on coumadin with INR followed closely. At risk for recurrent VTE given lung  malignancy and frailty.   - defer to Hematologist Dr. Bell coumadin duration of therapy.     Single current episode of major depressive disorder, unspecified depression episode severity  - doing fine, continue Cymbalta, attempt GDR when feasible. .  - on Lorazepam GDR.   - zyprexa stopped.      Carcinoma, lung, left (H)  - diagnosed in early 2018, s/p wedge resection, moderately differentiated adenocarcinoma, invasive adenocarcinoma with parietal pleural invasion; pathologic staging of pT3N0.    - CTx stopped 2/2 intolerable AE,  Followed by Oncologist Dr. Bell. Had Neutropenia which was resolved.   - CT on 9/26 showed Decrease in size of what is now a 1.4 cm nodule or mass in the  anterior left mid lung      Mild Protein calorie malnutrition:  Body mass index is 19.46 kg/m .   - on supplement. Sarcopenia is a contributing factor.     Coronary artery disease involving native heart without angina pectoris, unspecified vessel or lesion type  Essential hypertension  - lexiscan nuclear stress test (March 2018) showed small reversible inferior and apical defect, EF around 61%  - saw Cardiologist Dr. Chad Crawford in December, and left arm pain felt likely to be of a MSK origin. Plan to repeat Lexiscan nuclear stress test in mid 2019.   - at baseline.   - BP w/i acceptable range.     Anemia in other chronic diseases classified elsewhere  - macrocytosis. - HH with some reduction. No B12 or folic acid level in the epic. Consider checking folic acid, B12 and iron study.     Chronic Pain Syndrome  - possible arthritic in nature. On scheduled diclofenac. This can cause systemic effect of NSAID's. Monitor for cardiac,hepatic, renal and gastric AE.      - consider Biofreeze, warm compress.   - schedule for cervical epidural injection.  on the nature of the procedure and what to expect.     Frailty:  - Significant  Deficits requiring NH placement. Requiring extensive assistance from nursing. Up for meals only o/w  spends the day resting in bed    Orders:  - See above, otherwise, continue the rest of the current POC.     Total time spent with patient visit at the skilled nursing facility was 35 min including patient visit, review of past records and discussing with NP. Greater than 50% of total time spent with counseling and coordinating care due to above multiple comorbidities, recommendations and counseling.      Electronically signed by:  Chavo Ellis MD

## 2019-02-04 NOTE — LETTER
2/4/2019        RE: Chiol Oneil  [facility]  [address] [facility] [address]        [facility]  Chief Complaint   Patient presents with     snf Regulatory   [facility] Record Number:  [id number]  [facility]  (FGS) [[id number]]    HPI:    Chilo Oneil is a 68 year old  (1950), who is being seen today for a federally mandated E/M visit at [facility].  HPI information obtained from: facility chart records, facility staff, patient report and [facility] chart review.     Today's concerns are:  -  - Resident seen and examined.   - DNP reports recently changed entire opioid regimen in December, with good result, got new WC, sees pain specialist started on Zanaflex with possible cervical injection.   - Resident reports pain is less now, in the neck with radiation to left arm, scheduled for neck injection. Asked the Writer about the nature of the procedure and what to expect. Reports operating well the new power chair.   -Reports sleep, appetite and BM are fine.   - RN has no concern today.   --------------------------------  - - Past Medical, social, family histories, medications, and allergies reviewed and updated  - Medications reviewed: in the chart and EHR.   - Case Management:   I have reviewed the care plan and MDS and do agree with the plan. Patient's desire to return to the community is not present.  Information reviewed:  Medications, vital signs, orders, and nursing notes.    MEDICATIONS:  Current Outpatient Medications   Medication Sig Dispense Refill     ACETAMINOPHEN PO Take 1,000 mg by mouth 3 times daily       AmLODIPine Besylate (NORVASC PO) Take 10 mg by mouth daily       camphor-menthol-methyl sal (MT ECKERT ULTRA STRENGTH) 4-10-30 % CREA Externally apply topically 3 times daily       diclofenac (VOLTAREN) 1 % GEL topical gel Apply 4 grams to knees or 2 grams to hands four times daily using enclosed dosing card. 100 g  1     DULoxetine HCl (CYMBALTA PO) Take 60 mg by mouth daily       GABAPENTIN PO Take 300 mg by mouth 2 times daily        MAGNESIUM OXIDE PO Take 400 mg by mouth 2 times daily        MELATONIN PO Take 3 mg by mouth At Bedtime       omeprazole (PRILOSEC) 20 MG CR capsule Take 1 capsule (20 mg) by mouth daily 90 capsule 3     ONDANSETRON PO Take 4 mg by mouth 3 times daily        polyethylene glycol (MIRALAX/GLYCOLAX) Packet Take 17 g by mouth daily       senna-docusate (SENOKOT-S;PERICOLACE) 8.6-50 MG per tablet Take 2 tablets by mouth 2 times daily 100 tablet 0     STATIN NOT PRESCRIBED (INTENTIONAL) 1 each daily Please choose reason not prescribed, below       THIAMINE HCL PO Take 100 mg by mouth daily       tiZANidine (ZANAFLEX) 4 MG tablet Take 4 mg by mouth At Bedtime x3 days then increase to BID PRN       VENTOLIN  (90 Base) MCG/ACT Inhaler Inhale 2 puffs into the lungs every 4 hours as needed for shortness of breath / dyspnea or wheezing 1 Inhaler 1     Warfarin Sodium (COUMADIN PO) Take by mouth daily Take as directed per INR results       lidocaine (XYLOCAINE) 5 % external ointment Apply topically 2 times daily And BID PRN       LORazepam (ATIVAN) 0.5 MG tablet Take 1 tablet (0.5 mg) by mouth 2 times daily as needed for anxiety 30 tablet 0     oxyCODONE (OXYCONTIN) 10 MG 12 hr tablet Take 1 tablet (10 mg) by mouth 2 times daily Give QAM and Q-Afternoon. 60 tablet 0     oxyCODONE (OXYCONTIN) 30 MG 12 hr tablet Take 1 tablet (30 mg) by mouth At Bedtime 30 tablet 0     oxyCODONE HCl (OXAYDO) 5 MG TABA Take 5 mg by mouth 2 times daily as needed (breakthrough pain 8-10/10) 30 each 0   ROS:  4 point ROS including Respiratory, CV, GI and , other than that noted in the HPI,  is negative    Exam:  Vitals: /77   Pulse 78   Temp 97.7  F (36.5  C)   Resp 18   Wt 69.7 kg (153 lb 9.6 oz)   SpO2 94%   BMI 19.46 kg/m     BMI= Body mass index is 19.46 kg/m .  GENERAL APPEARANCE:  Alert, in no  distress  RESP:  respiratory effort and palpation of chest normal, lungs clear to auscultation , no respiratory distress  CV:  Palpation and auscultation of heart done , regular rate and rhythm, no murmur, rub, or gallop, ABDOMEN:  normal bowel sounds, soft, nontender, no hepatosplenomegaly or other masses  M/S:   Gait and station abnormal uses walker. Some muscles atrophy in the legs.   SKIN:  Inspection of skin and subcutaneous tissue baseline, Palpation of skin and subcutaneous tissue baseline  NEURO:   No NFD appreciated on observation.   PSYCH:  normal insight, judgement and memory, affect and mood normal    Lab/Diagnostic data:   Lab Results   Component Value Date    WBC 7.4 12/28/2018     Lab Results   Component Value Date    RBC 2.84 12/28/2018     Lab Results   Component Value Date    HGB 9.7 12/28/2018     Lab Results   Component Value Date    HCT 29.3 12/28/2018     No components found for: MCT  Lab Results   Component Value Date     12/28/2018     Lab Results   Component Value Date    MCH 34.2 12/28/2018     Lab Results   Component Value Date    MCHC 33.1 12/28/2018     Lab Results   Component Value Date    RDW 13.5 12/28/2018     Lab Results   Component Value Date     12/28/2018     Last Comprehensive Metabolic Panel:  Sodium   Date Value Ref Range Status   12/28/2018 132 (L) 133 - 144 mmol/L Final     Potassium   Date Value Ref Range Status   12/28/2018 4.8 3.4 - 5.3 mmol/L Final     Chloride   Date Value Ref Range Status   12/28/2018 100 94 - 109 mmol/L Final     Carbon Dioxide   Date Value Ref Range Status   12/28/2018 29 20 - 32 mmol/L Final     Anion Gap   Date Value Ref Range Status   12/28/2018 3 3 - 14 mmol/L Final     Glucose   Date Value Ref Range Status   12/28/2018 90 70 - 99 mg/dL Final     Urea Nitrogen   Date Value Ref Range Status   12/28/2018 17 7 - 30 mg/dL Final     Creatinine   Date Value Ref Range Status   12/28/2018 1.08 0.66 - 1.25 mg/dL Final     GFR Estimate   Date  Value Ref Range Status   12/28/2018 70 >60 mL/min/[1.73_m2] Final     Comment:     Non  GFR Calc  Starting 12/18/2018, serum creatinine based estimated GFR (eGFR) will be   calculated using the Chronic Kidney Disease Epidemiology Collaboration   (CKD-EPI) equation.       Calcium   Date Value Ref Range Status   12/28/2018 9.2 8.5 - 10.1 mg/dL Final     Bilirubin Total   Date Value Ref Range Status   12/28/2018 0.2 0.2 - 1.3 mg/dL Final     Alkaline Phosphatase   Date Value Ref Range Status   12/28/2018 84 40 - 150 U/L Final     ALT   Date Value Ref Range Status   12/28/2018 15 0 - 70 U/L Final     AST   Date Value Ref Range Status   12/28/2018 13 0 - 45 U/L Final     Last Comprehensive Metabolic Panel:  Sodium   Date Value Ref Range Status   12/28/2018 132 (L) 133 - 144 mmol/L Final     Potassium   Date Value Ref Range Status   12/28/2018 4.8 3.4 - 5.3 mmol/L Final     Chloride   Date Value Ref Range Status   12/28/2018 100 94 - 109 mmol/L Final     Carbon Dioxide   Date Value Ref Range Status   12/28/2018 29 20 - 32 mmol/L Final     Anion Gap   Date Value Ref Range Status   12/28/2018 3 3 - 14 mmol/L Final     Glucose   Date Value Ref Range Status   12/28/2018 90 70 - 99 mg/dL Final     Urea Nitrogen   Date Value Ref Range Status   12/28/2018 17 7 - 30 mg/dL Final     Creatinine   Date Value Ref Range Status   12/28/2018 1.08 0.66 - 1.25 mg/dL Final     GFR Estimate   Date Value Ref Range Status   12/28/2018 70 >60 mL/min/[1.73_m2] Final     Comment:     Non  GFR Calc  Starting 12/18/2018, serum creatinine based estimated GFR (eGFR) will be   calculated using the Chronic Kidney Disease Epidemiology Collaboration   (CKD-EPI) equation.       Calcium   Date Value Ref Range Status   12/28/2018 9.2 8.5 - 10.1 mg/dL Final     ============================================================  ASSESSMENT/PLAN    Hx of pulmonary embolism without acute cor pulmonale, unspecified chronicity (H)  -  -  around August 2018  -  on coumadin with INR followed closely. At risk for recurrent VTE given lung malignancy and frailty.   - defer to Hematologist Dr. Bell coumadin duration of therapy.     Single current episode of major depressive disorder, unspecified depression episode severity  - doing fine, continue Cymbalta, attempt GDR when feasible. .  - on Lorazepam GDR.   - zyprexa stopped.      Carcinoma, lung, left (H)  - diagnosed in early 2018, s/p wedge resection, moderately differentiated adenocarcinoma, invasive adenocarcinoma with parietal pleural invasion; pathologic staging of pT3N0.    - CTx stopped 2/2 intolerable AE,  Followed by Oncologist Dr. Bell. Had Neutropenia which was resolved.   - CT on 9/26 showed Decrease in size of what is now a 1.4 cm nodule or mass in the  anterior left mid lung      Mild Protein calorie malnutrition:  Body mass index is 19.46 kg/m .    - on supplement. Sarcopenia is a contributing factor.     Coronary artery disease involving native heart without angina pectoris, unspecified vessel or lesion type  Essential hypertension  - lexiscan nuclear stress test (March 2018) showed small reversible inferior and apical defect, EF around 61%  - saw Cardiologist Dr. Chad Crawford in December, and left arm pain felt likely to be of a MSK origin. Plan to repeat Lexiscan nuclear stress test in mid 2019.   - at baseline.   - BP w/i acceptable range.     Anemia in other chronic diseases classified elsewhere  - macrocytosis. - HH with some reduction. No B12 or folic acid level in the epic. Consider checking folic acid, B12 and iron study.     Chronic Pain Syndrome  - possible arthritic in nature. On scheduled diclofenac. This can cause systemic effect of NSAID's. Monitor for cardiac,hepatic, renal and gastric AE.      - consider Biofreeze, warm compress.   - schedule for cervical epidural injection.  on the nature of the procedure and what to expect.     Frailty:  - Significant   Deficits requiring NH placement. Requiring extensive assistance from nursing. Up for meals only o/w spends the day resting in bed    Orders:  - See above, otherwise, continue the rest of the current POC.     Total time spent with patient visit at the skilled nursing facility was 35 min including patient visit, review of past records and discussing with NP. Greater than 50% of total time spent with counseling and coordinating care due to above multiple comorbidities, recommendations and counseling.      Electronically signed by:  Chavo Ellis MD      Sincerely,        Chavo Ellis MD

## 2019-02-06 NOTE — LETTER
2/6/2019        RE: Chilo Oneil  [facility]  [address] [facility] [address]        [place] [facility]  Chief Complaint   Patient presents with     group home Acute     [place] Medical Record Number:  [id number]  Place of Service where encounter took place:  [facility]  (FGS) [[id number]]    HPI:    Chilo Oneil is a 68 year old  (1950), who is being seen today for an episodic care visit.  HPI information obtained from: facility chart records, facility staff, patient report, [facility] chart review and Care Everywhere [facility] chart review.     Today's concern is:  Carcinoma, lung, left (H)  Moderate protein-calorie malnutrition (H)  Chronic pain syndrome  Acquired postural kyphosis  Left shoulder pain, unspecified chronicity and etiology  Current moderate episode of major depressive disorder without prior episode (H)  ADA (generalized anxiety disorder)  Patient was seen last week by pain specialist for consultation. Appreciate recommendations, and we started a trial of Zanaflex. It was recommended to then consider Elavil and changes to OxyContin/Oxycodone, and a cervical epidural procedure.     Today, patient states pain is improved and he felt the Zanaflex was better, and he again states that pain is worse at night. Patient seen by consulting MD, who also recommended a GDR of Ativan. Patient loves his new wheelchair and states it relieves a lot of pain. He denies nausea, numbness/tingling, and notes he is groggy during the day.  No procedure has been scheduled yet.     History of pulmonary embolism  Encounter for therapeutic drug monitoring  Long term (current) use of anticoagulants  Patient denies CP, SOB, palpitations, bleeding/bruising, or new swelling. INR was 2.79 (therapeutic) and current Coumadin dosing is 3mg M/W/F, 6mg AOD.  Today, INR recheck was 4.31 (supratherapeutic), possibly secondary to recent medication changes..    PMH/PSH reviewed  in EPIC today.    REVIEW OF SYSTEMS:  Limited secondary to cognitive impairment but today pt reports the above and 10 point ROS of systems including Constitutional, Eyes, Respiratory, Cardiovascular, Gastroenterology, Genitourinary, Integumentary, Musculoskeletal, Psychiatric were all negative except for pertinent positives noted in my HPI.    /77   Pulse 78   Temp 97.7  F (36.5  C)   Resp 18   Wt 71.4 kg (157 lb 6.4 oz)   SpO2 94%   BMI 19.94 kg/m     GENERAL APPEARANCE: Alert, in no distress, cooperative.   ENT: Mouth and posterior oropharynx normal, moist mucous membranes, hearing acuity [place].  EYES: EOM, conjunctivae, lids, pupils and irises normal, PERRL2.   RESP: Respiratory effort and palpation of chest normal, no respiratory distress, Lung sounds diminished. On RA.  CV: Palpation and auscultation of heart done, rate and rhythm regular, no murmur, no rub or gallop, Edema 0+ BLE.  ABDOMEN: Normal bowel sounds, soft, nontender, no hepatosplenomegaly or other masses.  SKIN: Inspection/Palpation of skin and subcutaneous tissue baseline w/ fragility.  NEURO: 2-12 at patient's baseline, sensation baseline PPS.  PSYCH: Insight and judgement, memory baseline, affect and mood normal.    ASSESSMENT/PLAN:  Carcinoma, lung, left (H)  Moderate protein-calorie malnutrition (H)  Chronic pain syndrome  Acquired postural kyphosisLeft shoulder pain, unspecified chronicity and etiology  Current moderate episode of major depressive disorder without prior episode (H)  ADA (generalized anxiety disorder)  Chronic. Stable. Changes have been helpful. Will change OxyContin/Oxycodone dosing per Pain Mangement's recommendations. Zanaflex? Ativan? Procedure.    History of pulmonary embolism  Encounter for therapeutic drug monitoring  Long term (current) use of anticoagulants  Chronic. Stable. Supratherapeutic. Will hold dose x1, give 0.5mg x1 of Coumadin as noted below and recheck INR in 2 days. Follow-up accordingly.      Orders:  1. Change OxyContin to 10 mg PO in the AM and in the Afternoon () and 30 mg PO at bedtime. Dx: CPS.  2. Decrease Oxycodone form 5 mg PO Q8 hours PRN to 5 mg PO BID PRN. Dx: CPS.   3. Notify provider when epidural procedure scheduled.   4. Decrease PRN Ativan from TID to 0.5 mg PO BID PRN x14 days Dx: Anxiety.  5. Hold Coumadin x1 today. Dx: H/o PE.   6. Coumadin 0.5 mg PO x1 tomorrow. Dx: H/o PE.   7. Recheck INR x1 on Friday (2/8/19) Dx: H/o PE.    Electronically signed by:  Dr. Lashanda Suárez, APRN CNP DNP        Sincerely,        Lashanda Suárez, CIERA

## 2019-02-07 NOTE — TELEPHONE ENCOUNTER
.Pre-screening Questions for Radiology Injections:    Injection to be done at which interventional clinic site? [facility]    Instruct patient to arrive as directed prior to the scheduled appointment time:    [place] AND [place]: 30 minutes before      Procedure ordered by Lashanda Suárez NP    Procedure ordered? Cervical Epidural Steroid Injection    What insurance would patient like us to bill for this procedure? [employer]      Worker's comp or MVA (motor vehicle accident) -Any injection DO NOT SCHEDULE and route to Dorothy Lindo.      [employer] insurance - For SI joint injections, DO NOT SCHEDULE and route Dorothy Lindo.       [employer] - Any injection besides hip/shoulder/knee joint DO NOT SCHEDULE and route to Dorothy Lindo. She will obtain PA and call pt back to schedule procedure or notify pt of denial.       [employer]-Route to Dorothy for review        IF SCHEDULING IN [place] AND NEEDS A PA, IT IS OKAY TO SCHEDULE. [place] HANDLES THEIR OWN PA'S AFTER THE PATIENT IS SCHEDULED. PLEASE SCHEDULE AT LEAST 1 WEEK OUT SO A PA CAN BE OBTAINED.      Any chance of pregnancy? NO   If YES, do NOT schedule and route to RN pool    Is an  needed? No     Patient has a drive home? (mandatory) YES:     Is patient taking any blood thinners (plavix, coumadin, jantoven, warfarin, heparin, pradaxa or dabigatran )? Yes - coumadin   If hold needed, do NOT schedule, route to RN pool     Is patient taking any aspirin products (includes Excedrin and Fiorinal)? No     If more than 325mg/day do NOT schedule; route to RN pool     For CERVICAL procedures, hold all aspirin products for 6 days.     Tell pt that if aspirin product is not held for 6 days, the procedure WILL BE cancelled.      Does the patient have a bleeding or clotting disorder? No     If YES, okay to schedule AND route to RN nurse pool    For any patients with platelet count <100, must be forwarded to provider    Is patient diabetic?  No  If YES, have  them bring their glucometer.    Does patient have an active infection or treated for one within the past week? No     Is patient currently taking any antibiotics?  No     For patients on chronic, preventative, or prophylactic antibiotics, procedures may be scheduled.     For patients on antibiotics for active or recent infection:    Kenia Braga [place], [place]-antibiotic course must have been completed for 4 days    Is patient currently taking any steroid medications? (i.e. Prednisone, Medrol)  No     For patients on steroid medications:    Kenia Braga [place], [place]-steroid course must have been completed for 4 days    Reviewed with patient:  If you are started on any steroids or antibiotics between now and your appointment, you must contact us because the procedure may need to be cancelled.  Yes    Is patient actively being treated for cancer or immunocompromised? No  If YES, do NOT schedule and route to RN pool     Are you able to get on and off an exam table with minimal or no assistance? Yes  If NO, do NOT schedule and route to RN pool    Are you able to roll over and lay on your stomach with minimal or no assistance? Yes  If NO, do NOT schedule and route to RN pool     Any allergies to contrast dye, iodine, shellfish, or numbing and steroid medications? No  If YES, route to RN pool AND add allergy information to appointment notes    Allergies: Cipro [ciprofloxacin]      Has the patient had a flu shot or any other vaccinations within 7 days before or after the procedure.  No     Does patient have an MRI/CT?  YES: CT  (SI joint, hip injections, lumbar sympathetic blocks, and stellate ganglion blocks do not require an MRI)    Was the MRI done w/in the last 3 years?  Yes    Was MRI done at [place]? Yes      If not, where was it done? N/A       If MRI was not done at [facility], [facility] or [facility] do NOT schedule and route to nursing.  If pt has an imaging disc, the injection may be  scheduled but pt has to bring disc to appt. If they show up w/out disc the injection cannot be done    Reminders (please tell patient if applicable):       Instructed pt to arrive 30 minutes early for IV start if this is for a cervical procedure, ALL sympathetic (stellate ganglion, hypogastric, or lumbar sympathetic block) and all sedation procedures (RFA, spinal cord stimulation trials).  YES:    -IVs are not routinely placed for Dr. Fink cervical cases   -Dr. Pruitt: IVs for cervical ESIs and cervical TBDs (not CMBBs/facet inj)      If NPO for sedation, informed patient that it is okay to take medications with sips of water (except if they are to hold blood thinners).  YES:    *DO take blood pressure medication if it is prescribed*      If this is for a cervical JOE, informed patient that aspirin needs to be held for 6 days.   YES:       For all patients not having spinal cord stimulator (SCS) trials or radiofrequency ablations (RFAs), informed patient:    IV sedation is not provided for this procedure.  If you feel that an oral anti-anxiety medication is needed, you can discuss this further with your referring provider or primary care provider.  The Pain Clinic provider will discuss specifics of what the procedure includes at your appointment.  Most procedures last 10-20 minutes.  We use numbing medications to help with any discomfort during the procedure.  YES:       Do not schedule procedures requiring IV placement in the first appointment of the day or first appointment after lunch. Do NOT schedule at 0745, 0815 or 1245. ok      For patients 85 or older we recommend having an adult stay w/ them for the remainder of the day.   ok    Does the patient have any questions?  Not Applicable  Nixon MORENO  [facility]

## 2019-02-07 NOTE — TELEPHONE ENCOUNTER
Patient is requesting to schedule an injection with the [facility].     This would require the patient to hold:                 Coumadin (Warfarin)         Hold 5 days prior to procedure.  Check INR prior to procedure.  Ok to resume night of procedure, unless directed otherwise by provider or anticoagulation clinic.      We are requesting your approval to hold the medication for this time frame.    Please keep call open and route back to the PAIN NURSE [[id number]] pool.     If hold approved, we will contact the patient for scheduling.    Thank you.    Routed to provider    Alejandra MOREL-RN Care Coordinator  [facility]

## 2019-02-07 NOTE — PROGRESS NOTES
[place] [facility]  [place] Medical Record Number:  [id number]  Place of Service where encounter took place:  [facility]  (FGS) [[id number]]    HPI:    Chilo Oneil is a 68 year old  (1950), who is being seen today for an episodic care visit at the above location.   HPI information obtained from: facility chart records, facility staff, patient report, [facility] chart review and Care Everywhere [facility] chart review. Today's concern is INR/Coumadin management for PE    Bleeding Signs/Symptoms: None  Thromboembolic Signs/Symptoms:  None  Medication Changes: Yes. Started PRN Zanaflex last week and adjusted OxyContin dosing.  Dietary Changes:  No  Activity Changes: No  Bacterial/Viral Infection:  No  Missed Coumadin Doses: Yes. Held x1 on 2/6.  On ASA: No  Other Concerns:  No    OBJECTIVE:  Last INR: 4.31 on 2/6/19.   INR Today: 2.37  Current Dose: Held x1, 0.5 x 1, and prior was 3mg M/W/F, 6mg AOD.     ASSESSMENT:  History of pulmonary embolism  Encounter for therapeutic drug monitoring  Long term (current) use of anticoagulants  Chronic. Stable. Therapeutic. Will dose Coumadin as noted below and recheck INR in 1 week. Follow-up accordingly. Therapeutic INR for goal of 2-3.    PLAN:  New Dose: 6mg M/W/F, 3mg AOD.    Next INR: 1 week.    Electronically signed by:  Dr. Lashanda Suárez, APRN CNP DNP

## 2019-02-08 NOTE — LETTER
2/8/2019        RE: Chilo Oneil  [facility]  [address] [facility] [address]        [place] [facility]  [place] Medical Record Number:  [id number]  Place of Service where encounter took place:  [facility]  (FGS) [[id number]]    HPI:    Chilo Oneil is a 68 year old  (1950), who is being seen today for an episodic care visit at the above location.   HPI information obtained from: facility chart records, facility staff, patient report, [facility] chart review and Care Everywhere [facility] chart review. Today's concern is INR/Coumadin management for PE    Bleeding Signs/Symptoms: None  Thromboembolic Signs/Symptoms:  None  Medication Changes: Yes. Started PRN Zanaflex last week and adjusted OxyContin dosing.  Dietary Changes:  No  Activity Changes: No  Bacterial/Viral Infection:  No  Missed Coumadin Doses: Yes. Held x1 on 2/6.  On ASA: No  Other Concerns:  No    OBJECTIVE:  Last INR: 4.31 on 2/6/19.   INR Today: 2.37  Current Dose: Held x1, 0.5 x 1, and prior was 3mg M/W/F, 6mg AOD.     ASSESSMENT:  History of pulmonary embolism  Encounter for therapeutic drug monitoring  Long term (current) use of anticoagulants  Chronic. Stable. Therapeutic. Will dose Coumadin as noted below and recheck INR in 1 week. Follow-up accordingly. Therapeutic INR for goal of 2-3.    PLAN:  New Dose: 6mg M/W/F, 3mg AOD.    Next INR: 1 week.    Electronically signed by:  Dr. Lashanda Suárez, APRN CNP DNP            Sincerely,        Lashanda Suáerz, CIERA

## 2019-02-09 PROBLEM — F32.9 CURRENT EPISODE OF MAJOR DEPRESSIVE DISORDER WITHOUT PRIOR EPISODE: Status: ACTIVE | Noted: 2018-01-01

## 2019-02-13 PROBLEM — Z76.89 HEALTH CARE HOME: Status: ACTIVE | Noted: 2019-01-01

## 2019-02-13 NOTE — TELEPHONE ENCOUNTER
Received call from [facility] to schedule Cervical JOE. Advised that we are still waiting to hear from Lashanda Suárez in regards to holding the patient's coumadin. Advised that once we have approval to hold the coumadin then our nursing staff will reach out for scheduling. She stated that Lashanda Suárez will be in today and will have her send message in regards to coumadin hold. Routing as GIA.      Salena Craig    [facility]

## 2019-02-13 NOTE — TELEPHONE ENCOUNTER
Mercedes Altamirano the patients Primary Care Provider calling to speak with Dr Pruitt or a nurse. She can be reached at  [phone].      Karis MCKEON    [facility] [place]

## 2019-02-13 NOTE — TELEPHONE ENCOUNTER
Prior Authorization Retail Medication Request    Medication/Dose: Oxycontin 30 mg. give 1 tab PO at bedtime  ICD code (if different than what is on RX):    Previously Tried and Failed:    Rationale:      Insurance Name:  Medicare  Insurance ID:  [id number]  Medicare Beneficiary ID:\[id number]  FPMedicaMSHO: [id number]    Pharmacy Information (if different than what is on RX)  Name:  [facility]  Phone:  [phone]

## 2019-02-14 NOTE — TELEPHONE ENCOUNTER
Writer attempted to call [place] x6 today, no answer and unable to LVM.  Writer called general line and was transferred and bounced back to general line    Pt needs to be schedule for Injection.      If [place] call's back.  Please schedule injection for 2 weeks out.  Pt is on coumadin and Pt's PCP is aware that Pt needs to be held and bridged.      Please send back to nursing after Pt is scheduled.    Carlo Ko, RN  Care Coordinator   [facility] [place]

## 2019-02-14 NOTE — PROGRESS NOTES
[place] [facility]  [place] Medical Record Number:  [id number]  Place of Service where encounter took place: [facility]  (FGS) [[id number]]    HPI:    Chilo Oneil is a 68 year old  (1950), who is being seen today for an episodic care visit at the above location.   HPI information obtained from: facility chart records, facility staff, patient report, [facility] chart review and Care Everywhere [facility] chart review. Today's concern is INR/Coumadin management for PE    ROS/Subjective:  Bleeding Signs/Symptoms:  None  Thromboembolic Signs/Symptoms:  None  Medication Changes: Yes, OxyContin dosing was changed last week secondary to pain specialist recommendation.   Dietary Changes:  No  Activity Changes: Yes, patient experienced a mechanical fall this week on 2/13.   Bacterial/Viral Infection:  No  Missed Coumadin Doses:  None  On ASA: No  Other Concerns:  No    OBJECTIVE:  /68   Pulse 78   Temp 97.9  F (36.6  C)   Resp 20   Wt 69.7 kg (153 lb 9.6 oz)   SpO2 94%   BMI 19.46 kg/m    Last INR: 2.37 on 2/8/19 (1 week ago).   INR Today: 3.40.  Current Dose:  6mg M/W/F, 3mg AOD.     ASSESSMENT:  History of pulmonary embolism  Encounter for therapeutic drug monitoring  Long term (current) use of anticoagulants  Chronic. Stable. Supratherapeutic. Will dose Coumadin as noted below and recheck INR in 2 days on Monday 2/18. Follow-up accordingly. Therapeutic INR for goal of 2-3.    PLAN:  New Dose: 0.5mg x1, 3mg Sat/Sun.     Next INR: Monday 2/18 (2 days).    Electronically signed by:  Dr. Lashanda Suárez, APRN CNP DNP

## 2019-02-14 NOTE — TELEPHONE ENCOUNTER
Call back to Pt's PCP.      Nurses said that they called to schedule Pt's JOE and were told that Pt needs approvale to hold INR first.      Pt's PCP stated that she messaged Dr. Pruitt and said that it was ok to hold coumadin and schedule and she will manage Pt's INR and Bridging after this is scheduled.    PCP asked that writer call and schedule with Alex to that transportation, INR, and bridge cam be scheduled.      PCP manages all aspects of Pt's INR.  Writer attempted to call Alex at [phone], no answer, unable to LVM    Carlo Ko, RN  Care Coordinator   [facility] [place]

## 2019-02-14 NOTE — TELEPHONE ENCOUNTER
Writer talked to Lashanda Altamirano.  OK to hold coumadin, she will be bridging pt.      Please schedule injection for after Dr. Pruitt gets back from [place].  Will then forward dates to Lashanda Altamirano so she can appropriately manage Pt's INR.    Thanks.    Carlo Ko, RN  Care Coordinator   [facility] [place]

## 2019-02-15 NOTE — TELEPHONE ENCOUNTER
Call to [place] x 6 on 2/14/19 with busy signal each time.      Writer attempted to call Alex today 2/15/19 at [phone] after an attempt also by  staff, busy signal, unable to LVM.  Writer again called general line only to be transferred and sent back to general line and unable to LVM.    General phone line  [address] [id number] [phone]      Carlo Ko, RN  Care Coordinator   [facility] [place]

## 2019-02-15 NOTE — LETTER
2/15/2019        RE: Chilo Oneil  [facility]  [address] [facility] [address]        [place] [facility]  [place] Medical Record Number:  [id number]  Place of Service where encounter took place: [facility]  (FGS) [[id number]]    HPI:    Chilo Oneil is a 68 year old  (1950), who is being seen today for an episodic care visit at the above location.   HPI information obtained from: facility chart records, facility staff, patient report, [facility] chart review and Care Everywhere [facility] chart review. Today's concern is INR/Coumadin management for PE    ROS/Subjective:  Bleeding Signs/Symptoms:  None  Thromboembolic Signs/Symptoms:  None  Medication Changes: Yes, OxyContin dosing was changed last week secondary to pain specialist recommendation.   Dietary Changes:  No  Activity Changes: Yes, patient experienced a mechanical fall this week on 2/13.   Bacterial/Viral Infection:  No  Missed Coumadin Doses:  None  On ASA: No  Other Concerns:  No    OBJECTIVE:  /68   Pulse 78   Temp 97.9  F (36.6  C)   Resp 20   Wt 69.7 kg (153 lb 9.6 oz)   SpO2 94%   BMI 19.46 kg/m     Last INR: 2.37 on 2/8/19 (1 week ago).   INR Today: 3.40.  Current Dose:  6mg M/W/F, 3mg AOD.     ASSESSMENT:  History of pulmonary embolism  Encounter for therapeutic drug monitoring  Long term (current) use of anticoagulants  Chronic. Stable. Supratherapeutic. Will dose Coumadin as noted below and recheck INR in 2 days on Monday 2/18. Follow-up accordingly. Therapeutic INR for goal of 2-3.    PLAN:  New Dose: 0.5mg x1, 3mg Sat/Sun.     Next INR: Monday 2/18 (2 days).    Electronically signed by:  Dr. Lashanda Suárez, NATALIE CNP DNP        Sincerely,        NATALIE Werner CNP

## 2019-02-17 NOTE — PROGRESS NOTES
[place] [facility]  [place] Medical Record Number:  [id number]  Place of Service where encounter took place: [facility]  (FGS) [[id number]]    HPI:    Chilo Oneil is a 68 year old  (1950), who is being seen today for an episodic care visit at the above location.   HPI information obtained from: facility chart records, facility staff, patient report, [facility] chart review and Care Everywhere [facility] chart review. Today's concern is INR/Coumadin management for PE:    ROS/Subjective:  Bleeding Signs/Symptoms:  None  Thromboembolic Signs/Symptoms:  None  Medication Changes:  No  Dietary Changes:  No  Activity Changes: No  Bacterial/Viral Infection:  No  Missed Coumadin Doses:  None  On ASA: No  Other Concerns:  No    OBJECTIVE:  /70   Pulse 70   Temp 98  F (36.7  C)   Resp 18   Wt 69.7 kg (153 lb 9.6 oz)   SpO2 94%   BMI 19.46 kg/m    Last INR: 3.40 on 2/15/19.   INR Today: 3.52.  Current Dose: 0.5mg x1, 3mg Sat/Sun, was 6mg M/W/F, 3mg AOD.     ASSESSMENT:  History of pulmonary embolism  Encounter for therapeutic drug monitoring  Long term (current) use of anticoagulants  Chronic. Stable. Still supratheapeutic. Will dose Coumadin as noted below and recheck INR in 2 days. Follow-up accordingly. Therapeutic INR goal of 2-3.    PLAN:  New Dose: Hold x1, 0.5mg x1.      Next INR: 2 days on 2/20.     Electronically signed by:  Dr. Lashanda Suárez, APRN CNP DNP

## 2019-02-18 NOTE — TELEPHONE ENCOUNTER
Prior Authorization Approval    Authorization Effective Date: 1/1/2019  Authorization Expiration Date: 2/18/2020  Medication: Oxycontin 30 mg-APPROVED  Approved Dose/Quantity:    Reference #: [id number]   Insurance Company: Medicare Blue - Phone [phone] Fax [phone]  Expected CoPay:       CoPay Card Available:      Foundation Assistance Needed:    Which Pharmacy is filling the prescription (Not needed for infusion/clinic administered): [facility], - [place], [place] - [address] [place] [address]  Pharmacy Notified: Yes  Patient Notified: Yes

## 2019-02-18 NOTE — LETTER
2/18/2019        RE: Chilo Oneil  [facility]  [address] [facility] [address]        [place] [facility]  [place] Medical Record Number:  [id number]  Place of Service where encounter took place: [facility]  (FGS) [[id number]]    HPI:    Chilo Oneil is a 68 year old  (1950), who is being seen today for an episodic care visit at the above location.   HPI information obtained from: facility chart records, facility staff, patient report, [facility] chart review and Care Everywhere [facility] chart review. Today's concern is INR/Coumadin management for PE:    ROS/Subjective:  Bleeding Signs/Symptoms:  None  Thromboembolic Signs/Symptoms:  None  Medication Changes:  No  Dietary Changes:  No  Activity Changes: No  Bacterial/Viral Infection:  No  Missed Coumadin Doses:  None  On ASA: No  Other Concerns:  No    OBJECTIVE:  /70   Pulse 70   Temp 98  F (36.7  C)   Resp 18   Wt 69.7 kg (153 lb 9.6 oz)   SpO2 94%   BMI 19.46 kg/m     Last INR: 3.40 on 2/15/19.   INR Today: 3.52.  Current Dose: 0.5mg x1, 3mg Sat/Sun, was 6mg M/W/F, 3mg AOD.     ASSESSMENT:  History of pulmonary embolism  Encounter for therapeutic drug monitoring  Long term (current) use of anticoagulants  Chronic. Stable. Still supratheapeutic. Will dose Coumadin as noted below and recheck INR in 2 days. Follow-up accordingly. Therapeutic INR goal of 2-3.    PLAN:  New Dose: Hold x1, 0.5mg x1.      Next INR: 2 days on 2/20.     Electronically signed by:  Dr. Lashanda Suárez APRN CNP DNP        Sincerely,        NATALIE Werner CNP

## 2019-02-18 NOTE — TELEPHONE ENCOUNTER
Central Prior Authorization Team   Phone: [phone]    PA Initiation    Medication: Oxycontin 30 mg. give 1 tab PO at bedtime  Insurance Company: Medicare Blue - Phone [phone] Fax [phone]  Pharmacy Filling the Rx: [facility] - [facility] - [place], [place] - [address] [place] [address]  Filling Pharmacy Phone: [phone]  Filling Pharmacy Fax: [phone]  Start Date: 2/18/2019    INITIATED VIA PHONE.  REF # [id number]

## 2019-02-18 NOTE — TELEPHONE ENCOUNTER
Pt is scheduled for Cervical Epidural Steroid Injection on 3/1/19 at 0845, INR in lab prior scheduled for 0815.  Pt will need to hold coumadin for 5 days before procedure.      Per previous conversation with pt's PCP Lashanda Suárez's, She manages pt's INR.      Will forward to Lashanda Altamirano to review and order hold/bridge prior to Cervical JOE.      Carlo Ko, RN  Care Coordinator   [facility] [place]

## 2019-02-19 NOTE — TELEPHONE ENCOUNTER
Carlo Ko, RN Registered Nurse Signed   Creation Time: 02/14/2019 1:15 PM         Writer attempted to call Alex x6 today, no answer and unable to LVM.  Writer called general line and was transferred and bounced back to general line     Pt needs to be schedule for Injection.       If Brooker call's back.  Please schedule injection for 2 weeks out.  Pt is on coumadin and Pt's PCP is aware that Pt needs to be held and bridged.       Please send back to nursing after Pt is scheduled.     Carlo Ko, RN  Care Coordinator   [facility] [place]

## 2019-02-19 NOTE — PROGRESS NOTES
2-19-19   CC received call from YASMIN Richmond at [facility] that member may be given 30 day notice from the facility.  She wanted to know if CC has contacted any of the foster care homes in the area because member may need to be discharged there.      CC did reach out to [facility] and [facility] [employer] via email to see about openings.  CC received reply from [facility] and [place] that they do. CC reached out to the two provides in [facility] and one was willing to meet with member if he was willing and then other CC left a message.  CC has email out to the [place] worker to see about areas which AFC are in so this can be shared with member.     CC did review with Lauren Gutierres, RN manager to see if relocation worker was needed in this case and she stated that it could be used since member does not have supplies and resources at the time he would be moving.  CC will then reach out to agencies to see if they will come up to members area to help.     CC will then f/u with YASMIN at [facility] as needed.   Deepthi Wood MA [facility] Care Coordinator   [phone]

## 2019-02-19 NOTE — PROGRESS NOTES
[facility] Care Coordination Contact      [facility] Initial Assessment     Home visit for Initial Health Risk Assessment with Chilo Oneil completed on February 14, 2019    CC received referral for LTCC to be completed on member thru Jessica MAGALLON, E&E specialist. She was contacted by YASMIN Richmond at [facility].    Present today was YASMNI Richmond and Chilo Oneil.  CC met with member in YASMIN office.  He also told CC that he needed to go within one hour because he has an appointment.      Discussion was had with member about his plans to move out of SNF.  He stated that he would like to try on living on his own at this time.  He has lived with friends in the past but does not think this will be an option again.  Discussion was held about the type of support that member would need.  He stated he did not have a bed at this time and would also need to get some things for an apartment if he were to move on his own.    CC expressed some concerns about his ability to do this and asked what type of support system he had in place.  He said several friends so CC asked him to contact those friends and see if CC or YASMIN at [facility] could contact them to discuss their willingness and ability to support him.  He said he would fill this out when he was back from his appointment and then give SUNDAY to YASMIN to fax to CC.      CC then talked with member about Adult Foster Care as an options.  This would provider him some level of support and assistance if he needed this.  Member was unsure of this and thought he would like to go and tour these places if needed.  CC said she would see if there were openings in the area and then let YASMIN at the [facility] know.   YASMIN said she would then put member name on list for Sn apartments in the area as well.      Current Care Plan  Member currently receiving the following home care services:   Member is requesting SN and HM when he leave the SNF if moves to his own apartments.  During discussion member also is  needed DME as well so CC requested that he be assessed for Wheelchair and lift chair as well    Member currently receiving the following community resources:    Member did ask for MOW and PERS unit as well if he is to move to apartment as well.        Medication Review  Medication reconciliation completed in Epic: If no, please explain not at this time member is still in SNF    Medication set-up & administration: RN set up weekly. - this would be ordered for him when he leave the SNF.    Self-administers medications.  Medication Risk Assessment Medication (1 or more, place referral to MTM): N/A: No risk factors identified  MTM Referral Placed: No: No risk factors idenified    Mental/Behavioral Health   Depression Screening: See PHQ assessment flowsheet.          No current MH services-will place referral for none at this time      Falls Assessment:    member reported that he did fall outside at the SNF but has not fallen since.  He did also state that he would need a wheelchair or walker when he leave the SNF so CC asked that once placement is found that this be ordered.         ADL/IADL Dependencies:    Member stated that he is independent in ADL's at this time.  Member did appear at visit to be somewhat unkept so CC will encourage him to accept this help if he is discontinue from the SNF.  Member did state he would need help with IADL's when he is discharged as well.  Transportation was also talked about and member said he had friends that will take him places so CC asked for SUNDAY with those contacts.       [facility] sponsored benefits: Shared information re: Silver Sneakers/gym memberships, ASA, Calcium +D.    PCA Assessment completed at visit: Not applicable at this time as member said he did not need/ want and help with ADL's      Elderly Waiver Eligibility: Yes-will open to EW if member is discharged.  CC will reach out to [place] cnty financial worker as well to see if there are any concerns about opening to  waiver.      Care Plan & Recommendations: CC asked if SW at [facility] is going to be able to call Sn apartments at this time to see if they have openings  SW stated that she would but did asked that CC reach out to AF home to see if this was an option for member.  CC will also review with supervisor to see if relocation worker is needed for member.  SW at [facility] asked CC if she would provide this type of service for member as well.      CC will f/u to see if SUNDAY is filled out from member and then contact friends of member to see their willingness to provider support.  CC will also reach out to [facility] licensors at the [facility] to see if they have openings for male who also smokes.  CC will then follow-up with SW to see if member is willing to accept this placement.      See [facility] for detailed assessment information.    Follow-Up Plan: CC will f/ozzie with supervisor and also SW at [facility] next week on AFC for member and see if Sn apartments have been called openings.      [place] care continuum providers: Please refer to Health Care Home on the Epic Problem List to view this patient's [facility] Care Plan Summary.    Deepthi Wood MA [facility] Care Coordinator   [phone]

## 2019-02-19 NOTE — TELEPHONE ENCOUNTER
Carlo Ko, RN Registered Nurse Signed   Creation Time: 02/14/2019 8:27 AM         Call back to Pt's PCP.     Nurses said that they called to schedule Pt's JOE and were told that Pt needs approvale to hold INR first.       Pt's PCP stated that she messaged Dr. Pruitt and said that it was ok to hold coumadin and schedule and she will manage Pt's INR and Bridging after this is scheduled.     PCP asked that writer call and schedule with Alex to that transportation, INR, and bridge cam be scheduled.       PCP manages all aspects of Pt's INR.  Writer attempted to call Alex at [phone], no answer, unable to LVM     Carlo Ko, RN  Care Coordinator   [facility] [place]

## 2019-02-19 NOTE — PROGRESS NOTES
[place] [facility]  [place] Medical Record Number:  [id number]  Place of Service where encounter took place: [facility]  (S) [[id number]]    HPI:    Chilo Oneil is a 68 year old  (1950), who is being seen today for an episodic care visit at the above location.   HPI information obtained from: facility chart records, facility staff, patient report and [facility] chart review. Today's concern is INR/Coumadin management for PE:    ROS/Subjective:  Bleeding Signs/Symptoms:  None  Thromboembolic Signs/Symptoms:  None  Medication Changes:  No  Dietary Changes:  No  Activity Changes: No  Bacterial/Viral Infection:  No  Missed Coumadin Doses: Held x1.  On ASA: No  Other Concerns:  No    OBJECTIVE:  /70   Pulse 70   Temp 98  F (36.7  C)   Resp 18   Wt 70.9 kg (156 lb 3.2 oz)   SpO2 94%   BMI 19.79 kg/m    Last INR: 3.52 on 2/18.   INR Today: 2.02.  Current Dose: Hold x1, 0.5mg x1. Prior was 6mg M/W/F, 3mg AOD.    ASSESSMENT:  History of pulmonary embolism  Encounter for therapeutic drug monitoring  Long term (current) use of anticoagulants  Chronic. Stable. Therapeutic. Will dose Coumadin as noted below and recheck INR in 2 days given upcoming procedure. Follow-up accordingly. Therapeutic INR for goal of 2-3    PLAN:  1. New Dose: 6mg W/F, 3mg Th/Sat.    2. Next INR: 2/22.     Orders Prior to Cervical Epidural:  3. Recheck INR x1 on 2/22.  Dx: H/O PE.   4. STOP Coumadin on 2/24 (no dose on 2/24). No Coumadin 2/25-2/28.   5. On 2/26 in AM, Start Lovenox 70 mg subcutaneous injection Q12hrs x5 doses with last dose in the AM on 2/28. Dx: H/o PE.   6. Recheck INR x1 on 2/28 Dx: H/O PE.    Electronically signed by:   Dr. Lashanda Suárez, APRN CNP DNP

## 2019-02-19 NOTE — TELEPHONE ENCOUNTER
See the 2/1/19 telephone encounter for more information.    Chantell Thomas RN-BSN  [facility]

## 2019-02-19 NOTE — TELEPHONE ENCOUNTER
Carlo Ko, RN Registered Nurse Signed   Creation Time: 02/18/2019 11:39 AM         Pt is scheduled for Cervical Epidural Steroid Injection on 3/1/19 at 0845, INR in lab prior scheduled for 0815.  Pt will need to hold coumadin for 5 days before procedure.       Per previous conversation with pt's PCP Lashanda Suárez's, She manages pt's INR.       Will forward to Lashanda Altamirano to review and order hold/bridge prior to Cervical JOE.       Carlo Ko, RN  Care Coordinator   [facility] [place]

## 2019-02-20 NOTE — PROGRESS NOTES
2-20-19   CC received call from YASMIN Richmond at [facility] stating she is meeting with administration today and unsure if 30 notice will be given.  She stated that member may now want to go back to his previous residents.  CC asked if current home owner/friend was willing to have him come back.  Yi said she was unsure.  CC then asked if SUNDAY had been completed that was left last week to see if friends/family could be contacted.  Yi said she was going to work on this today.      CC then asked if VM was received about foster options and she said she had talked to member about this and he said he did not want to move into a AFC.  CC stated there were opening but provider and [facility] would have to meet and visit with him first if he wanted to go.  Yi stated understanding of this.      CC then asked if independent apartments has been contact and she said three were called:  [place], [place] and [place].  Two did not have openings and long waiting list, one she had not heard back from.      CC offered to call Demetri on  to see and then let her know.  CC did this and they do not have immediate openings and waiting list. CC called and left Yi LY on this.      CC then called and made referral to [facility] and Human Services for relocation support.  This is for member because he does not have any furniture and housing looks like it will be a struggle to find.      CC also asked Yi to see if member was willing to look any further or into the [place] and then CC will continue to search.     Deepthi Wood MA [facility] Coordinator   [phone]

## 2019-02-20 NOTE — LETTER
2/20/2019        RE: Chilo Oneil  [facility]  [address] [facility] [address]        [place] [facility]  [place] Medical Record Number:  [id number]  Place of Service where encounter took place: [facility]  (FGS) [[id number]]    HPI:    Chilo Oneil is a 68 year old  (1950), who is being seen today for an episodic care visit at the above location.   HPI information obtained from: facility chart records, facility staff, patient report and [facility] chart review. Today's concern is INR/Coumadin management for PE:    ROS/Subjective:  Bleeding Signs/Symptoms:  None  Thromboembolic Signs/Symptoms:  None  Medication Changes:  No  Dietary Changes:  No  Activity Changes: No  Bacterial/Viral Infection:  No  Missed Coumadin Doses: Held x1.  On ASA: No  Other Concerns:  No    OBJECTIVE:  /70   Pulse 70   Temp 98  F (36.7  C)   Resp 18   Wt 70.9 kg (156 lb 3.2 oz)   SpO2 94%   BMI 19.79 kg/m     Last INR: 3.52 on 2/18.   INR Today: 2.02.  Current Dose: Hold x1, 0.5mg x1. Prior was 6mg M/W/F, 3mg AOD.    ASSESSMENT:  History of pulmonary embolism  Encounter for therapeutic drug monitoring  Long term (current) use of anticoagulants  Chronic. Stable. Therapeutic. Will dose Coumadin as noted below and recheck INR in 2 days given upcoming procedure. Follow-up accordingly. Therapeutic INR for goal of 2-3    PLAN:  1. New Dose: 6mg W/F, 3mg Th/Sat.    2. Next INR: 2/22.     Orders Prior to Cervical Epidural:  3. Recheck INR x1 on 2/22.  Dx: H/O PE.   4. STOP Coumadin on 2/24 (no dose on 2/24). No Coumadin 2/25-2/28.   5. On 2/26 in AM, Start Lovenox 70 mg subcutaneous injection Q12hrs x5 doses with last dose in the AM on 2/28. Dx: H/o PE.   6. Recheck INR x1 on 2/28 Dx: H/O PE.    Electronically signed by:   Dr. Lashanda Suárez, APRN CNP DNP        Sincerely,        NATALIE Werner CNP

## 2019-02-21 NOTE — TELEPHONE ENCOUNTER
Isamar calling to get appointment r/s. Pt is taking Blood Thinner [phone].      Karis MCKEON    [facility] [place]

## 2019-02-21 NOTE — TELEPHONE ENCOUNTER
Writer attempted to call pt, No answer.  LVM for Pt to call writer back at [phone].    Carlo Ko, RN  Care Coordinator   [facility] [place]

## 2019-02-27 NOTE — PROGRESS NOTES
[place] [facility]  [place] Medical Record Number: [id number]  Place of Service where encounter took place: [facility]  (S) [[id number]]    HPI:    Chilo Oneil is a 68 year old  (1950), who is being seen today for an episodic care visit at the above location.   HPI information obtained from: facility chart records, facility staff, patient report and [facility] chart review. Today's concern is INR/Coumadin management for PE:    ROS/Subjective:  Bleeding Signs/Symptoms:  None  Thromboembolic Signs/Symptoms:  None  Medication Changes:  No  Dietary Changes:  No  Activity Changes: No  Bacterial/Viral Infection:  No  Missed Coumadin Doses: Yes, held for 5 days w/ Lovenox bridge for epidural procedure today, which could not be performed (see below).   On ASA: No  Other Concerns:  No    OBJECTIVE:  /72   Pulse 78   Temp 97.8  F (36.6  C)   Resp 18   Wt 68.6 kg (151 lb 3.2 oz)   SpO2 97%   BMI 19.15 kg/m    Last INR: 3.18 on 2/28/19.   INR Today: 1.5.  Current Dose:  Before holding was 6mg W/F, 3mg Th/Sat. Last dose of Lovenox was 2/28 AM.    ASSESSMENT:  History of pulmonary embolism  Encounter for therapeutic drug monitoring  Long term (current) use of anticoagulants  Chronic. Stable. Subtherapeutic purposefully. However, per pain clinic protocol, INR must be 1.4 or below, and therefore procedure has been rescheduled for Monday morning.      Will HOLD Coumadin as noted below and recheck INR on Sunday as procedure is scheduled for Monday. Will also bridge w/ Lovenox until Sunday AM (24 hours prior to procedure).  Follow-up accordingly. Therapeutic INR for goal of 2-3, but goal to have procedure is 1.4.    Plan:  1. Continue to hold Coumadin. Pre-procedure.  2. Hold ETOH from all sources and give pt education.  3. Restart Lovenox 70 mg subcutaneous BID first does today 3/1 at PM x4 doses with last dose on Sunday 3/3 in AM. Dx: H/O PE, bridge for procedure.   4. Recheck INR  x1 on riaz 3/3/19. Call to on-call w/ goal of <1.4. Dx: H/O PE.   5. Clinic to check INR on Monday 3/4 prior to procedure (I already told nurse at clinic by phone).   6. Increase Zanaflex to 8 mg PO BID PRN. Dx: Muscle spasm.      Electronically signed by:   Dr. Lashanda Suárez, NATALIE CNP DNP  ____________________________________________________________________________    [place] [facility]  NON-FACE TO FACE PROLONGED SERVICE  Chilo FLORENCIO Clarice 1950  Date of Related Face to Face Service: 3/1/19  INTENT OF SERVICE  This is an extended evaluation of patient's specific problem.  The service relates to a recent or future E/M visit.  Documentation supports medical necessity, relates to ongoing patient management and documents start times and stop times.    Regarding the following diagnoses:  History of pulmonary embolism  Encounter for therapeutic drug monitoring  Long term (current) use of anticoagulants  Chronic left shoulder pain,     * On 2/28/19: I discussed by phone with Leila & Belia who are nurses @ facility regarding supratherapeutic INR level, ETOH intake, dietary intake prior to procedure scheduled on 3/1/19.   Leila: (Start time 10:18  Stop time 10:23) = 5 minutes.   Belia: (Start time 11:23  Stop time 11:27) = 4 minutes.     GAP in time noted below   Belia: (Start time 12:40  Stop time 12:49) = 9 minutes.  * On 2/28: I coordinated care with Dr. Maria Esther Elliott, PharmD and anticoagulation expert to assist in treatment plan for this patient prior to procedure.     (Start time 12:22  Stop time 12:38) = 16 minutes.   * On 2/28: I coordinated care with [facility], (Dr. Pruitt's office), and attempted to contact he or his nurse regarding supratherapeutic level and plan moving forward.    (Start time 12:49  Stop time 12:58) = 9 minutes.    ASSESSMENT/PLAN   History of pulmonary embolism  Encounter for therapeutic drug monitoring  Long term (current) use of anticoagulants  Chronic  left shoulder pain  On 2/28: Provider ordered recheck of INR at facility STAT, and INR level on 2/28 @ 1700 was still 3.18.  Provider ordered Vitamin K 3mg PO x1 via phone. Notified Dr. Pruitt's office to obtain POC INR prior to procedure for safety. See above for outcome.    TIME  Total time spent with Non-Face to Face prolonged service 43 minutes.     Electronically signed by:  Dr. Lashanda Suárez, NATALIE CNP DNP

## 2019-02-28 NOTE — TELEPHONE ENCOUNTER
"Dr Lashanda Suárez calling to speak with a nurse regarding the pt and his injection he has schedule for 3/1. She stated \"there is a problem\" so is asking for a c/b asap. [phone].      Karis MCKOEN    [facility] [place]    "

## 2019-03-01 NOTE — TELEPHONE ENCOUNTER
This is being managed in a separate encounter.    REMEDIOS JamesN, RN  Care Coordinator  [facility] [place]

## 2019-03-01 NOTE — TELEPHONE ENCOUNTER
Patients SHIV has been rescheduled for 3/4/19 at 9:34 AM in [place] with Dr. Sangeeta Pretty related to an elevated INR today of 1.5.     Spoke with Dr Suárez ([phone]) and she is going to bridge the patient with Lovenox until that time.     Will route to [facility] as patient will need an additional INR drawn prior to Mondays injection.     Number for [facility] is [phone]. This number is now accurate in patients demographics. [place] will currently work on scheduling his transportation.    Sofya Anne, REMEDIOSN, RN  Care Coordinator  [facility] [place]

## 2019-03-01 NOTE — PROGRESS NOTES
Procedure was canceled secondary to INR of 1.5.  Possible options are on Monday and Tuesday.  We will need to discuss with provider at assisted living given on anticoagulation for PE.  As of now we will reschedule.  Message sent to NP.  Approximately 10 minutes spent discussing with patient reason for cancellation of procedure.

## 2019-03-01 NOTE — PATIENT INSTRUCTIONS
Pt is re-scheduled for Monday at 0945 with Dr. Sangeeta Pretty.      Writer attempted to call Alex twice this morning to coordinate.  Will have Alex call clinic at [phone] to confirm this works.  Dr. Pruitt sent a message to your Primary care provider to that Lovenox and Coumadin can be coordinated as well.    Carlo Ko RN

## 2019-03-01 NOTE — TELEPHONE ENCOUNTER
Attempted to call Alex to reschedule injection as pt's INR was too high this morning.      Pt is tentatively rescheduled for 0945 with Dr. Sangeeta Pretty on Monday 3/4/19.    Of note, Writer has attempted to call Alex on Multiple occasions and have never been able to speak with anyone over the phone.  Possibly there is a better number to get ahold of nursing.     Will forward to Pt's PCP as FYI and hopefully she can facilitate Pt's coumadin hold until Monday     Carlo Ko, RN  Care Coordinator   [facility] [place]

## 2019-03-01 NOTE — LETTER
3/1/2019        RE: Chilo Oneil  [facility]  [address] [facility] [address]        [place] [facility]  [place] Medical Record Number: [id number]  Place of Service where encounter took place: [facility]  ([facility]) [[id number]]    HPI:    Chilo Oneil is a 68 year old  (1950), who is being seen today for an episodic care visit at the above location.   HPI information obtained from: facility chart records, facility staff, patient report and [facility] chart review. Today's concern is INR/Coumadin management for PE:    ROS/Subjective:  Bleeding Signs/Symptoms:  None  Thromboembolic Signs/Symptoms:  None  Medication Changes:  No  Dietary Changes:  No  Activity Changes: No  Bacterial/Viral Infection:  No  Missed Coumadin Doses: Yes, held for 5 days w/ Lovenox bridge for epidural procedure today, which could not be performed (see below).   On ASA: No  Other Concerns:  No    OBJECTIVE:  /72   Pulse 78   Temp 97.8  F (36.6  C)   Resp 18   Wt 68.6 kg (151 lb 3.2 oz)   SpO2 97%   BMI 19.15 kg/m     Last INR: 3.18 on 2/28/19.   INR Today: 1.5.  Current Dose:  Before holding was 6mg W/F, 3mg Th/Sat. Last dose of Lovenox was 2/28 AM.    ASSESSMENT:  History of pulmonary embolism  Encounter for therapeutic drug monitoring  Long term (current) use of anticoagulants  Chronic. Stable. Subtherapeutic purposefully. However, per pain clinic protocol, INR must be 1.4 or below, and therefore procedure has been rescheduled for Monday morning.      Will HOLD Coumadin as noted below and recheck INR on Sunday as procedure is scheduled for Monday. Will also bridge w/ Lovenox until Sunday AM (24 hours prior to procedure).  Follow-up accordingly. Therapeutic INR for goal of 2-3, but goal to have procedure is 1.4.    Plan:  1. Continue to hold Coumadin. Pre-procedure.  2. Hold ETOH from all sources and give pt education.  3. Restart Lovenox 70 mg subcutaneous BID first  does today 3/1 at PM x4 doses with last dose on Jonathan 3/3 in AM. Dx: H/O PE, bridge for procedure.   4. Recheck INR x1 on jonathan 3/3/19. Call to on-call w/ goal of <1.4. Dx: H/O PE.   5. Clinic to check INR on Monday 3/4 prior to procedure (I already told nurse at clinic by phone).   6. Increase Zanaflex to 8 mg PO BID PRN. Dx: Muscle spasm.      Electronically signed by:   Dr. Lashanda Suárez, APRN CNP DNP  ____________________________________________________________________________    [place] [facility]  NON-FACE TO FACE PROLONGED SERVICE  Chilo Oneil 1950  Date of Related Face to Face Service: 3/1/19  INTENT OF SERVICE  This is an extended evaluation of patient's specific problem.  The service relates to a recent or future E/M visit.  Documentation supports medical necessity, relates to ongoing patient management and documents start times and stop times.    Regarding the following diagnoses:  History of pulmonary embolism  Encounter for therapeutic drug monitoring  Long term (current) use of anticoagulants  Chronic left shoulder pain,     * On 2/28/19: I discussed by phone with Leila & Belia who are nurses @ facility regarding supratherapeutic INR level, ETOH intake, dietary intake prior to procedure scheduled on 3/1/19.   Leila: (Start time 10:18  Stop time 10:23) = 5 minutes.   Belia: (Start time 11:23  Stop time 11:27) = 4 minutes.     GAP in time noted below   Belia: (Start time 12:40  Stop time 12:49) = 9 minutes.  * On 2/28: I coordinated care with Dr. Maria Esther Elliott, PharmD and anticoagulation expert to assist in treatment plan for this patient prior to procedure.     (Start time 12:22  Stop time 12:38) = 16 minutes.   * On 2/28: I coordinated care with [facility], (Dr. Pruitt's office), and attempted to contact he or his nurse regarding supratherapeutic level and plan moving forward.    (Start time 12:49  Stop time 12:58) = 9 minutes.    ASSESSMENT/PLAN   History of  pulmonary embolism  Encounter for therapeutic drug monitoring  Long term (current) use of anticoagulants  Chronic left shoulder pain  On 2/28: Provider ordered recheck of INR at facility STAT, and INR level on 2/28 @ 1700 was still 3.18.  Provider ordered Vitamin K 3mg PO x1 via phone. Notified Dr. Pruitt's office to obtain POC INR prior to procedure for safety. See above for outcome.    TIME  Total time spent with Non-Face to Face prolonged service 43 minutes.     Electronically signed by:  NATALIE Harvey CNP DNP                Sincerely,        NATALIE Werner CNP

## 2019-03-01 NOTE — NURSING NOTE
Pre-procedure Intake    Have you been fasting? NA    If yes, for how long? No     Are you taking a prescribed blood thinner such as coumadin, Plavix, Xarelto?   Yes     If yes, when did you take your last dose? 3-4 days ago     Do you take aspirin?  No    If cervical procedure, have you held aspirin for 6 days?   No     Do you have any allergies to contrast dye, iodine, steroid and/or numbing medications?  NO    Are you currently taking antibiotics or have an active infection?  NO    Have you had a fever/elevated temperature within the past week? NO    Are you currently taking oral steroids? NO    Do you have a ? Yes       Are you pregnant or breastfeeding?  Not Applicable    Are the vital signs normal?  No    Kelby Juares MA

## 2019-03-03 NOTE — PROGRESS NOTES
Pt requesting referral for appt with Dr Bianca Jane on 3/21/17  Tasked to nursing [place] [facility]  [place] Medical Record Number:  [id number]  Place of Service where encounter took place:  [facility]  ([facility]) [[id number]]  Chief Complaint   Patient presents with     Nursing Home Acute     HPI:    Chilo Oneil  is a 68 year old (1950), who is being seen today for an episodic care visit.  HPI information obtained from: facility chart records, facility staff, patient report, [facility] chart review and Care Everywhere [facility] chart review.     Today's concern is:  History of pulmonary embolism  Encounter for therapeutic drug monitoring  Long term (current) use of anticoagulants  Patient has been on long-term anticoagulation via coumadin for a PE that he experienced last year. He was scheduled for an epidural injection on 3/1, therefore we have stopped Coumadin on 2/24, bridged w/ Lovenox. Spot check on 2/28 was high at 3.18, and we therefore reversed w/ Vitamin K in order for him to have procedure next day. In clinic on 3/1 prior to scheduled procedure, INR was 1.5, and guidelines prohibit injecion above 1.4. Therefore, injection was rescheduled for Monday morning 3/4 (today).  We continued Lovenox bridging through the weekend, with last dose of Lovenox on 3/3 on AM.  We spot checked INR yesterday (3/3) and result was 1.03 (appropriate). Today, INR recheck in clinic prior to procedure was 0.9. Pain specialist and anticoagulation PharmD collaborated w/ provider and we are to restart Coumadin tonight and Lovenox in AM until therapeutic.     Chronic left shoulder pain  Carcinoma, lung, left (H)  Chronic pain syndrome  Acquired postural kyphosis  ADA (generalized anxiety disorder)  Current mild episode of major depressive disorder without prior episode (H)  In addition to chronic pain, epidural injection, and collaboration w/ pain clinic. It is noted that patient has experienced extra stress, anxiety, and depression as of late. His goal is to move to an independent  living environment, and this has been cleared by therapy.  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 He is working w/ [employer] for living placement.  In addition, he has supposedly refused or has been unable to pay his room and board at current facility, and he notes being scared/nervous about this. A goal of this epidural injection, was for patient to experience less pain, function better, and move forward w/ his independent living plans. Last PHQ9 in January 2019 was 1/27 w/ mild depression factors, repeat would be helpful.    Past Medical and Surgical History reviewed in [place] today.  REVIEW OF SYSTEMS:  4 point ROS including Respiratory, CV, GI and , other than that noted in the HPI, is negative.    Physical Exam:  /80   Pulse 72   Temp 97.7  F (36.5  C)   Resp 18   Wt 68.6 kg (151 lb 3.2 oz)   SpO2 95%   BMI 19.15 kg/m     GENERAL APPEARANCE: Alert, in no distress, cooperative.   ENT: Mouth and posterior oropharynx normal, moist mucous membranes, hearing acuity [place].  EYES: EOM, conjunctivae, lids, pupils and irises normal, PERRL2.   RESP: Respiratory effort and palpation of chest normal, no respiratory distress, Lung sounds clear/diminished, with small wheeze noted in ANDREA posteriorly.   CV: Palpation and auscultation of heart done, rate and rhythm regular, no murmur, no rub or gallop, Edema 0+ BLE.  ABDOMEN: Normal bowel sounds, soft, nontender, no hepatosplenomegaly or other masses.  SKIN: Inspection/Palpation of skin and subcutaneous tissue baseline w/ fragility.  NEURO: 2-12 at patient's baseline, sensation baseline PPS.  PSYCH: Insight and judgement, memory baseline, affect and mood normal.    Labs:       ASSESSMENT/PLAN:  History of pulmonary embolism  Encounter for therapeutic drug monitoring  Long term (current) use of anticoagulants  Chronic. Stable. Obviously subtherapeutic. Will restart Coumadin as noted below, recheck INR in 2 days, and  also continue Lovenox bridge starting tomorrow in the AM. Follow-up w/ next INR on 3/6.      Chronic left shoulder pain  Carcinoma, lung, left (H)  Chronic pain syndrome  Acquired postural kyphosis  ADA (generalized anxiety disorder)  Current mild episode of major depressive disorder without prior episode (H)  Chronic. Stable. Patient hopeful regarding procedure. Will reduce Zanaflex back to original dosing, recheck labs post-procedure, and continue w/ anticoagulation as already noted. Follow-up routinely or as needed.    Orders:  1. CBC and BMP x1 on 3/6. Dx: Post-Procedure.  2. Coumadin 6 mg PO x1 today (3/4/19). Dx: H/O PE.  3. Coumadin 6 mg PO x1 tomorrow (3/5/19). Dx: H/O PE.  4. Lovenox 70 mg subcutaneous BID, starting AM on 3/5/19. Dx: H/O PE.   5. Recheck INR x1 on 3/6 Dx: H/O PE.  6. Change Zanaflex to 4 mg PO TID PRN Dx: Muscle Spasms.  7. Repeat PHQ9 in house within 1 week Dx: Depression.    Electronically signed by:  Dr. Lashanda Suárez, APRN CNP DNP

## 2019-03-03 NOTE — TELEPHONE ENCOUNTER
INR called to this NP today and it was 1.03  Having a procedure done and NP wanted to make sure it is under 1.4    No new orders.  Having an INR done in the clinic tomorrow    Electronically signed by Pippa Jacome RN, CNP

## 2019-03-04 NOTE — LETTER
3/4/2019        RE: Chilo Oneil  [facility]  [address] [facility] [address]        [place] [facility]  [place] Medical Record Number:  [id number]  Place of Service where encounter took place:  [facility]  ([facility]) [[id number]]  Chief Complaint   Patient presents with     Nursing Home Acute     HPI:    Chilo Oneil  is a 68 year old (1950), who is being seen today for an episodic care visit.  HPI information obtained from: facility chart records, facility staff, patient report, [facility] chart review and Care Everywhere [facility] chart review.     Today's concern is:  History of pulmonary embolism  Encounter for therapeutic drug monitoring  Long term (current) use of anticoagulants  Patient has been on long-term anticoagulation via coumadin for a PE that he experienced last year. He was scheduled for an epidural injection on 3/1, therefore we have stopped Coumadin on 2/24, bridged w/ Lovenox. Spot check on 2/28 was high at 3.18, and we therefore reversed w/ Vitamin K in order for him to have procedure next day. In clinic on 3/1 prior to scheduled procedure, INR was 1.5, and guidelines prohibit injecion above 1.4. Therefore, injection was rescheduled for Monday morning 3/4 (today).  We continued Lovenox bridging through the weekend, with last dose of Lovenox on 3/3 on AM.  We spot checked INR yesterday (3/3) and result was 1.03 (appropriate). Today, INR recheck in clinic prior to procedure was 0.9. Pain specialist and anticoagulation PharmD collaborated w/ provider and we are to restart Coumadin tonight and Lovenox in AM until therapeutic.     Chronic left shoulder pain  Carcinoma, lung, left (H)  Chronic pain syndrome  Acquired postural kyphosis  ADA (generalized anxiety disorder)  Current mild episode of major depressive disorder without prior episode (H)  In addition to chronic pain, epidural injection, and collaboration w/ pain clinic. It is noted that  patient has experienced extra stress, anxiety, and depression as of late. His goal is to move to an independent living environment, and this has been cleared by therapy.  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 He is working w/ [employer] for living placement.  In addition, he has supposedly refused or has been unable to pay his room and board at current facility, and he notes being scared/nervous about this. A goal of this epidural injection, was for patient to experience less pain, function better, and move forward w/ his independent living plans. Last PHQ9 in January 2019 was 1/27 w/ mild depression factors, repeat would be helpful.    Past Medical and Surgical History reviewed in Epic today.  REVIEW OF SYSTEMS:  4 point ROS including Respiratory, CV, GI and , other than that noted in the HPI, is negative.    Physical Exam:  /80   Pulse 72   Temp 97.7  F (36.5  C)   Resp 18   Wt 68.6 kg (151 lb 3.2 oz)   SpO2 95%   BMI 19.15 kg/m      GENERAL APPEARANCE: Alert, in no distress, cooperative.   ENT: Mouth and posterior oropharynx normal, moist mucous membranes, hearing acuity [place].  EYES: EOM, conjunctivae, lids, pupils and irises normal, PERRL2.   RESP: Respiratory effort and palpation of chest normal, no respiratory distress, Lung sounds clear/diminished, with small wheeze noted in ANDREA posteriorly.   CV: Palpation and auscultation of heart done, rate and rhythm regular, no murmur, no rub or gallop, Edema 0+ BLE.  ABDOMEN: Normal bowel sounds, soft, nontender, no hepatosplenomegaly or other masses.  SKIN: Inspection/Palpation of skin and subcutaneous tissue baseline w/ fragility.  NEURO: 2-12 at patient's baseline, sensation baseline PPS.  PSYCH: Insight and judgement, memory baseline, affect and mood normal.    Labs:       ASSESSMENT/PLAN:  History of pulmonary embolism  Encounter for therapeutic drug monitoring  Long term (current) use of  anticoagulants  Chronic. Stable. Obviously subtherapeutic. Will restart Coumadin as noted below, recheck INR in 2 days, and also continue Lovenox bridge starting tomorrow in the AM. Follow-up w/ next INR on 3/6.      Chronic left shoulder pain  Carcinoma, lung, left (H)  Chronic pain syndrome  Acquired postural kyphosis  ADA (generalized anxiety disorder)  Current mild episode of major depressive disorder without prior episode (H)  Chronic. Stable. Patient hopeful regarding procedure. Will reduce Zanaflex back to original dosing, recheck labs post-procedure, and continue w/ anticoagulation as already noted. Follow-up routinely or as needed.    Orders:  1. CBC and BMP x1 on 3/6. Dx: Post-Procedure.  2. Coumadin 6 mg PO x1 today (3/4/19). Dx: H/O PE.  3. Coumadin 6 mg PO x1 tomorrow (3/5/19). Dx: H/O PE.  4. Lovenox 70 mg subcutaneous BID, starting AM on 3/5/19. Dx: H/O PE.   5. Recheck INR x1 on 3/6 Dx: H/O PE.  6. Change Zanaflex to 4 mg PO TID PRN Dx: Muscle Spasms.  7. Repeat PHQ9 in house within 1 week Dx: Depression.    Electronically signed by:  NATALIE Harvey CNP DNP        Sincerely,        NATALIE Werner CNP

## 2019-03-04 NOTE — NURSING NOTE
Pre-procedure Intake    Have you been fasting? NA    If yes, for how long? NA    Are you taking a prescribed blood thinner such as coumadin, Plavix, Xarelto?    Yes -   Coumadin    If yes, when did you take your last dose? Patient is unsure    Do you take aspirin?  No    If cervical procedure, have you held aspirin for 6 days?   NA    Do you have any allergies to contrast dye, iodine, steroid and/or numbing medications?  NO    Are you currently taking antibiotics or have an active infection?  NO    Have you had a fever/elevated temperature within the past week? NO    Are you currently taking oral steroids? NO    Do you have a ? Yes       Are you pregnant or breastfeeding?  Not Applicable    Are the vital signs normal?  Yes    Shira Kiser CMA ([facility])

## 2019-03-04 NOTE — NURSING NOTE
Discharge Information    IV Discontiued Time:  0949 catheter intact    Amount of Fluid Infused:  Saline lock    Discharge Criteria = When patient returns to baseline or as per MD order    Consciousness:  Pt is fully awake    Circulation:  BP +/- 20% of pre-procedure level    Respiration:  Patient is able to breathe deeply    O2 Sat:  Patient is able to maintain O2 Sat >92% on room air    Activity:  Moves 4 extremities on command    Ambulation:  Patient is able to stand and walk or stand and pivot into wheelchair    Dressing:  Clean/dry or No Dressing    Notes:   Discharge instructions and AVS given to patient    Patient meets criteria for discharge?  YES    Admitted to PCU?  No    Responsible adult present to accompany patient home?  Yes    Signature/Title:    remington caro RN Care Coordinator  [facility]

## 2019-03-04 NOTE — PROGRESS NOTES
inPre procedure Diagnosis: Cervical radiculopathy, cervical degenerative disc disease   Post procedure Diagnosis: Same  Procedure performed: cervical interlaminar epidural steroid injection at C7-T1, fluoroscopically guided, contrast controlled  Anesthesia: local  Complications: none - positioning was somewhat difficult due to chronic bilateral shoulder pain and increased cervical lordosis  Operators: Wilbert Pretty MD     Indications:   Chilo Oneil is a 68 year old male was sent by Dr. Pruitt for cervical epidural steroid injection.  The patient has a history of chronic neck pain with radiating pain.  Examination shows tender paraspinal muscles, decreased cervical ROM, and Spurling's was not performed..  he has tried conservative treatment including activity modifications, medications.    Component Value Flag Ref Range Units Status Collected Lab   INR 0.90   0.86 - 1.14  Final 03/04/2019  8:22 AM BE       CT cervical spine was done on 10/5/18 which showed   FINDINGS: Alignment is maintained. Multilevel degenerative disc  disease and facet arthropathy are present with additional details  below. There is a moderate to severe degenerative change at the  atlantoaxial articulation with joint space loss and small pannus  formation. No fracture or traumatic subluxation is identified. No  osteolytic or osteoblastic masses are identified. 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     Paraspinal soft tissues demonstrate nodular thickening of the left  lung apex, not appreciably changed compared to 8/1/2018 PET exam.  There is severe atherosclerotic plaquing at the bilateral carotid  bifurcations. There are opacities within the visualized posterior  inferior trachea, likely secretions.     Segmental analysis:     C2-C3: Severe right and moderate left facet arthropathy in combination  with small disc osteophyte  complex. Mild bilateral foraminal stenosis.  Mild to moderate spinal canal stenosis with slight indentation on the  left anterior thecal sac.     C3-C4: Small disc osteophyte complex and mild bilateral facet  arthropathy. Mild bilateral foraminal stenosis. Mild spinal canal  stenosis.     C4-C5: Broad-based disc osteophyte complex and severe bilateral facet  arthropathy. Moderate to severe bilateral foraminal stenosis. Mild  spinal canal stenosis.     C5-C6: Broad-based disc osteophyte complex and moderate to severe  bilateral facet arthropathy. Severe right and moderate left foraminal  stenosis. Moderate to severe spinal canal stenosis with indentation on  the anterior cervical cord.     C6-C7: Broad-based disc osteophyte complex and mild bilateral facet  arthropathy. Mild bilateral foraminal stenosis. Mild spinal canal  stenosis.                                                                    IMPRESSION: Degenerative change as detailed, most severe at C4-C5 and  C5-C6.     FINESSE MACIEL MD    Options/alternatives, benefits and risks were discussed with the patient including but not limited to bleeding, infection, no pain relief, tissue trauma, exposure to radiation, reaction to medications, spinal cord injury, dural puncture, weakness, numbness and headache.  Questions were answered to his satisfaction and he wishes to proceed. Voluntary informed consent was obtained and signed.     Vitals were reviewed: Yes  /68   Pulse 75   Allergies were reviewed:  Yes   Medications were reviewed:  Yes   Pre-procedure pain score: 8/10    Procedure:  The patient's medical history, medications, and allergies were reviewed and reconciled.  After obtaining signed informed consent, the patient was brought into the procedure suite and was placed in a prone position on the procedure table.   A Pause for the Cause was performed.  Patient was prepped and draped in the usual sterile fashion.     The C7-T1 interspace was  identified with AP fluoroscopy.  A total of 3 ml of 1% lidocaine was used to anesthetize the skin and subcutaneous tissues for a midline approach.    A 20 gauge 3.5 inch Touhy needle was advanced utilizing intermittent AP and Lateral fluoroscopy and saline for loss of resistance.  The epidural space was encountered on the first pass without difficulties.  Aspiration for blood and CSF was negative.  Needle position was verified by injecting 1.5 ml of Omnipaque 300 utilizing real-time fluoroscopy that showed good needle placement and epidural spread without signs of intravascular or intrathecal uptake.  Omnipaque wasted:  8.5 ml.    Then, after repeated negative aspiration for blood or CSF, a test dose was performed by injecting 1 ml of Lidocaine 1% with epinephrine into the epidural space which was negative for heart rate or blood pressure changes, tinnitus, metallic taste, paresthesias, or other complaints.    Then, after repeated negative aspiration for blood or CSF, a combination of Depomedrol 40 mg, Dexamethasone 5 mg, Lidocaine 2% 1 ml, diluted with 1.5 ml of normal saline to a total injectate volume of 4 ml was injected into the epidural space in a slow and incremental fashion and the needle was restyletted and withdrawn.  All injected medications were preservative free.    The injection site was cleaned and a sterile dressing was applied.    The patient tolerated the procedure well without complications and was taken to the recovery room for continued observation.    Images were saved to PACS.    Post-procedure pain score: 6/10  Follow-up includes:   -f/u phone call in one week  -f/u with referring provider    Wilbert Pretty MD  [facility] [place]

## 2019-03-04 NOTE — PATIENT INSTRUCTIONS
[facility]   Procedure Discharge Instructions    Today you saw:    Dr. Wilbert Pretty      You had an:  Epidural steroid injection   -cervical     Medications used:  Lidocaine   Bupivacaine   Dexamethasone Depo-medrol  Omnipaque   Kenalog   Normal saline             You were holding your coumadin, please restart taking it: tonight    Be cautious. Numbness and/or weakness in the upper extremities may occur for up to 6-8 hours after the procedure due to effect of the local anesthetic    Do not drive for 6 hours. The effect of the local anesthetic could slow your reflexes.     You may resume your regular activities after 24 hours    Avoid strenuous activity for the first 24 hours    You may shower, however avoid swimming, tub baths or hot tubs for 24 hours following your procedure    You may have a mild to moderate increase in pain for several days following the injection.    It may take up to 14 days for the steroid medication to start working although you may feel the effect as early as a few days after the procedure.       You may use ice packs for 10-15 minutes, 3 to 4 times a day at the injection site for comfort    Do not use heat to painful areas for 6 to 8 hours. This will give the local anesthetic time to wear off and prevent you from accidentally burning your skin.     You may use anti-inflammatory medications (such as Ibuprofen or Aleve or Advil) or Tylenol for pain control if necessary    If you were fasting, you may resume your normal diet and medications after the procedure    If you have diabetes, check your blood sugar more frequently than usual as your blood sugar may be higher than normal for 10-14 days following a steroid injection. Contact your doctor who manages your diabetes if your blood sugar is higher than usual    Possible side effects of steroids that you may experience include flushing, elevated blood pressure, increased appetite, mild headaches and restlessness.   All of these symptoms will get better with time.    If you experience any of the following, call the Pain Clinic during work hours at [phone] or the Provider Line after hours at [phone]:  -Fever over 100 degree F  -Swelling, bleeding, redness, drainage, warmth at the injection site  -Progressive weakness or numbness in your legs or arms  -Loss of bowel or bladder function  -Unusual headache that is not relieved by Tylenol or other pain reliever  -Unusual new onset of pain that is not improving

## 2019-03-06 NOTE — LETTER
3/6/2019        RE: Chilo Oneil  [facility]  [address] [facility] [address]        [place] [facility]  [place] Medical Record Number:  [id number]  Place of Service where encounter took place: [facility]  ([facility]GS) [[id number]]    HPI:    Chilo Oneil is a 68 year old  (1950), who is being seen today for an episodic care visit at the above location.   HPI information obtained from: facility chart records, facility staff, patient report, [facility] chart review and Care Everywhere [facility] chart review. Today's concern is INR/Coumadin management for PE:    ROS/Subjective:  Bleeding Signs/Symptoms:  None  Thromboembolic Signs/Symptoms:  None  Medication Changes: Yes. Epidural injection x1 on Monday, bridging back w/ Lovenox.    Dietary Changes:  No  Activity Changes: No  Bacterial/Viral Infection:  No  Missed Coumadin Doses: Coumadin held for procedure until PM on 3/4.  On ASA: No  Other Concerns:  No    OBJECTIVE:  /80   Pulse 72   Temp 97.7  F (36.5  C)   Resp 18   Wt 68.9 kg (152 lb)   SpO2 95%   BMI 19.25 kg/m     Last INR: 0.9 on 3/4/19.   INR Today: 1.09.  Current Dose: Lovenox 70mg BID + Coumadin 6mg x2 days.     ASSESSMENT:  History of pulmonary embolism  Encounter for therapeutic drug monitoring  Long term (current) use of anticoagulants  Chronic. Stable. Subtherapeutic as expected. Will dose Coumadin as noted below and recheck INR in in 2 days. Follow-up accordingly. Therapeutic INR goal of 2-3.    PLAN:  New Dose: 6mg x1, 3mg x1 tomorrow, continue Lovenox as ordered.    Next INR: Friday 3/8.     Electronically signed by:  NATALIE Harvey CNP DNP        Sincerely,        NATALIE Werner CNP

## 2019-03-06 NOTE — PROGRESS NOTES
[place] [facility]  [place] Medical Record Number:  [id number]  Place of Service where encounter took place: [facility]  ([facility]GS) [[id number]]    HPI:    Chilo Oneil is a 68 year old  (1950), who is being seen today for an episodic care visit at the above location.   HPI information obtained from: facility chart records, facility staff, patient report, [facility] chart review and Care Everywhere [facility] chart review. Today's concern is INR/Coumadin management for PE:    ROS/Subjective:  Bleeding Signs/Symptoms:  None  Thromboembolic Signs/Symptoms:  None  Medication Changes: Yes. Epidural injection x1 on Monday, bridging back w/ Lovenox.    Dietary Changes:  No  Activity Changes: No  Bacterial/Viral Infection:  No  Missed Coumadin Doses: Coumadin held for procedure until PM on 3/4.  On ASA: No  Other Concerns:  No    OBJECTIVE:  /80   Pulse 72   Temp 97.7  F (36.5  C)   Resp 18   Wt 68.9 kg (152 lb)   SpO2 95%   BMI 19.25 kg/m    Last INR: 0.9 on 3/4/19.   INR Today: 1.09.  Current Dose: Lovenox 70mg BID + Coumadin 6mg x2 days.     ASSESSMENT:  History of pulmonary embolism  Encounter for therapeutic drug monitoring  Long term (current) use of anticoagulants  Chronic. Stable. Subtherapeutic as expected. Will dose Coumadin as noted below and recheck INR in in 2 days. Follow-up accordingly. Therapeutic INR goal of 2-3.    PLAN:  New Dose: 6mg x1, 3mg x1 tomorrow, continue Lovenox as ordered.    Next INR: Friday 3/8.     Electronically signed by:  Dr. Lashanda Suárez, APRN CNP DNP

## 2019-03-07 NOTE — PROGRESS NOTES
[place] [facility]  [place] Medical Record Number:  [id number]  Place of Service where encounter took place: [facility]  (FGS) [[id number]]    HPI:    Chilo Oneil is a 68 year old  (1950), who is being seen today for an episodic care visit at the above location.   HPI information obtained from: facility chart records, facility staff, patient report, [facility] chart review and Care Everywhere [facility] chart review. Today's concern is INR/Coumadin management for PE:    ROS/Subjective:  Bleeding Signs/Symptoms:  None  Thromboembolic Signs/Symptoms:  None  Medication Changes: Yes. Bridging w/ Lovenox until INR above 2.0.    Dietary Changes:  No  Activity Changes: No  Bacterial/Viral Infection:  No  Missed Coumadin Doses: No.  On ASA: No  Other Concerns:  No    OBJECTIVE:  /62   Pulse 79   Temp 98.9  F (37.2  C)   Resp 18   Wt 68.9 kg (152 lb)   SpO2 92%   BMI 19.25 kg/m    Last INR: 1.09 on 3/4/19.   INR Today: 1.49.  Current Dose: Lovenox 70mg BID + the below.   Date INR New Dose/Orders   3/6 1.09 6mg x1 and 3mg x1 + Lovenox.   3/4 0.9 6mg x2 days, w/ Lovenox restart on 3/5.   3/3 1.03 HELD + Lovenox.   3/1 1.5 HELD.     ASSESSMENT:  History of pulmonary embolism  Encounter for therapeutic drug monitoring  Long term (current) use of anticoagulants  Chronic. Stable. Subtherapeutic and improving. Will dose Coumadin as noted below, continue Lovenox, and recheck INR in in 4 days. Follow-up accordingly. Therapeutic INR goal of 2-3.    PLAN:  New Dose: Continue Lovenox. 6mg Fri/Sat, 3mg Sun/Mon.    Next INR: Tuesday 3/12/19.  Other orders:  1. Discontinue PRN Liquor.  2. Hold controlled substances (ativan/opioids) if w/in 4 hours of ETOH consumption/smell of ETOH.    Electronically signed by:  Dr. Lashanda Suárez, APRN CNP DNP

## 2019-03-08 NOTE — LETTER
3/8/2019        RE: Chilo Oneil  [facility]  [address] [facility] [address]        [place] [facility]  [place] Medical Record Number:  [id number]  Place of Service where encounter took place: [facility]  ([facility]GS) [[id number]]    HPI:    Chilo Oneil is a 68 year old  (1950), who is being seen today for an episodic care visit at the above location.   HPI information obtained from: facility chart records, facility staff, patient report, [facility] chart review and Care Everywhere [facility] chart review. Today's concern is INR/Coumadin management for PE:    ROS/Subjective:  Bleeding Signs/Symptoms:  None  Thromboembolic Signs/Symptoms:  None  Medication Changes: Yes. Bridging w/ Lovenox until INR above 2.0.    Dietary Changes:  No  Activity Changes: No  Bacterial/Viral Infection:  No  Missed Coumadin Doses: No.  On ASA: No  Other Concerns:  No    OBJECTIVE:  /62   Pulse 79   Temp 98.9  F (37.2  C)   Resp 18   Wt 68.9 kg (152 lb)   SpO2 92%   BMI 19.25 kg/m     Last INR: 1.09 on 3/4/19.   INR Today: 1.49.  Current Dose: Lovenox 70mg BID + the below.   Date INR New Dose/Orders   3/6 1.09 6mg x1 and 3mg x1 + Lovenox.   3/4 0.9 6mg x2 days, w/ Lovenox restart on 3/5.   3/3 1.03 HELD + Lovenox.   3/1 1.5 HELD.     ASSESSMENT:  History of pulmonary embolism  Encounter for therapeutic drug monitoring  Long term (current) use of anticoagulants  Chronic. Stable. Subtherapeutic and improving. Will dose Coumadin as noted below, continue Lovenox, and recheck INR in in 4 days. Follow-up accordingly. Therapeutic INR goal of 2-3.    PLAN:  New Dose: Continue Lovenox. 6mg Fri/Sat, 3mg Sun/Mon.    Next INR: Tuesday 3/12/19.  Other orders:  1. Discontinue PRN Liquor.  2. Hold controlled substances (ativan/opioids) if w/in 4 hours of ETOH consumption/smell of ETOH.    Electronically signed by:  Dr. Lashanda Suárez, APRN CNP DNP          Sincerely,        Lashanda SMART  NATAILE Suárez CNP

## 2019-03-10 NOTE — PROGRESS NOTES
[place] [facility]  [place] Medical Record Number:  [id number]  Place of Service where encounter took place: [facility]  (FGS) [[id number]]    HPI:    Chilo Oneil is a 68 year old  (1950), who is being seen today for an episodic care visit at the above location.   HPI information obtained from: facility chart records, facility staff, patient report, [facility] chart review and Care Everywhere [facility] chart review. Today's concern is INR/Coumadin management for PE:    ROS/Subjective:  Bleeding Signs/Symptoms:  None  Thromboembolic Signs/Symptoms:  None  Medication Changes: Yes. Bridging w/ Lovenox until INR above 2.0.    Dietary Changes:  No  Activity Changes: No  Bacterial/Viral Infection:  No  Missed Coumadin Doses: No.  On ASA: No  Other Concerns:  No    OBJECTIVE:  /62   Pulse 79   Temp 98.9  F (37.2  C)   Resp 18   Wt 67.7 kg (149 lb 3.2 oz)   SpO2 92%   BMI 18.90 kg/m    Last INR: 1.49 on 3/8/19.   INR Today: 2.33.  Current Dose: Lovenox 70mg BID + the below.   Date INR New Dose/Orders   3/8 1.49 6mg x2 days, 3mg x2 days + Lovenox.   3/6 1.09 6mg x1 and 3mg x1 + Lovenox.   3/4 0.9 6mg x2 days, w/ Lovenox restart on 3/5.   3/3 1.03 HELD + Lovenox.   3/1 1.5 HELD.     ASSESSMENT:  History of pulmonary embolism  Encounter for therapeutic drug monitoring  Long term (current) use of anticoagulants  Chronic. Stable. Therapeutic. Will dose Coumadin as noted below, continue Lovenox, and recheck INR in on Friday 3/15. Follow-up accordingly. Therapeutic INR goal of 2-3.    PLAN:  New Dose: 6mg x1, 3mg x2.    Next INR: Friday 3/15/19.   Discontinue Lovenox.    Electronically signed by:  Dr. Lashanda Suárez, APRN CNP DNP

## 2019-03-12 NOTE — LETTER
3/12/2019        RE: Chilo Oneil  [facility]  [address] [facility] [address]        [place] [facility]  [place] Medical Record Number:  [id number]  Place of Service where encounter took place: [facility]  (FGS) [[id number]]    HPI:    Chilo Oneil is a 68 year old  (1950), who is being seen today for an episodic care visit at the above location.   HPI information obtained from: facility chart records, facility staff, patient report, [facility] chart review and Care Everywhere [facility] chart review. Today's concern is INR/Coumadin management for PE:    ROS/Subjective:  Bleeding Signs/Symptoms:  None  Thromboembolic Signs/Symptoms:  None  Medication Changes: Yes. Bridging w/ Lovenox until INR above 2.0.    Dietary Changes:  No  Activity Changes: No  Bacterial/Viral Infection:  No  Missed Coumadin Doses: No.  On ASA: No  Other Concerns:  No    OBJECTIVE:  /62   Pulse 79   Temp 98.9  F (37.2  C)   Resp 18   Wt 67.7 kg (149 lb 3.2 oz)   SpO2 92%   BMI 18.90 kg/m     Last INR: 1.49 on 3/8/19.   INR Today: 2.33.  Current Dose: Lovenox 70mg BID + the below.   Date INR New Dose/Orders   3/8 1.49 6mg x2 days, 3mg x2 days + Lovenox.   3/6 1.09 6mg x1 and 3mg x1 + Lovenox.   3/4 0.9 6mg x2 days, w/ Lovenox restart on 3/5.   3/3 1.03 HELD + Lovenox.   3/1 1.5 HELD.     ASSESSMENT:  History of pulmonary embolism  Encounter for therapeutic drug monitoring  Long term (current) use of anticoagulants  Chronic. Stable. Therapeutic. Will dose Coumadin as noted below, continue Lovenox, and recheck INR in on Friday 3/15. Follow-up accordingly. Therapeutic INR goal of 2-3.    PLAN:  New Dose: 6mg x1, 3mg x2.    Next INR: Friday 3/15/19.   Discontinue Lovenox.    Electronically signed by:  Dr. Lashanda Suárez, APRN CNP DNP        Sincerely,        NATALIE Werner CNP

## 2019-03-12 NOTE — TELEPHONE ENCOUNTER
Information noted - allow two weeks for steroid effect.  If recent JOE does not offer significant benefit, would hesitate to repeat.    Wilbert Pretty MD  [facility]

## 2019-03-12 NOTE — TELEPHONE ENCOUNTER
"Patient had a cervical interlaminar epidural steroid injection at C7-T1 on 3/4/19.  Called patient for an update.      Pt reported the following details:  Taylor (nurse with [facility]) states the patient complains of continued pain 8/10.  He is having difficulties sleeping. Per Taylor, he is refusing any bengay cream for relief because the cream is \"too sticky\".  He is wanted to discuss another injection.    Told patient that the information will be forwarded to her provider.  Also explained that, if a steroid medication was used, it could take up to 14 days to feel the full effect and if pt has any further questions or concerns pt should call the clinic at [phone].        "

## 2019-03-14 NOTE — PROGRESS NOTES
"[place] [facility]  [place] Medical Record Number:  [id number]  Place of Service where encounter took place:  [facility]  ([facility]GS) [[id number]]  Chief Complaint   Patient presents with     Cough     HPI:    Chilo Oneil  is a 68 year old (1950), who is being seen today for an episodic care visit.  HPI information obtained from: facility chart records, facility staff, patient report, [facility] chart review and Care Everywhere [facility] chart review. Today's concern is:    History of pulmonary embolism  Encounter for therapeutic drug monitoring  Long term (current) use of anticoagulants  Patient denies bleeding/bruising, new swelling. His last INR was 2.33 on 3/12, his dosing over the last few days was 6mg x1 and 3mg x2 days. His INR today is therapeutic at 2.42. His historical dosing post-epidural injection procedure last week is noted below:  Date INR New Dose/Orders   3/12 2.33 6mg x1, 3mg x2, and stopped Lovenox.   3/8 1.49 6mg x2, 3mg x2 + Lovenox.   3/6 1.09 6mg x1 and 3mg x1 + Lovenox.   3/4 0.9 6mg x2 days, w/ Lovenox restart on 3/5.   3/3 1.03 HELD + Lovenox.   3/1 1.5 HELD.     Uncomplicated alcohol dependence (H)  Chronic pain syndrome  Carcinoma of left lung (H)  Cough  Acquired postural kyphosis  Left shoulder pain, unspecified chronicity and etiology  ADA (generalized anxiety disorder)  Paradoxical insomnia  Patient states he has had a cough for several days and it is disrupting his sleep. He wants something \"stronger\" for his cough and \"stronger\" for his sleep. Noting his cervical epidural injection procedure was 1 week ago and patient has stated sometimes pain is still bad and sometimes it's not....but he states that coughing is making it worse. Noting patient's social situation is strained, especially financially. He would like to move-in with help from his friend Yamileth, and someday return to work, but he fears his pain will never be controlled enough to achieve this goal. " Provider consulted in-house psych for counseling and ongoing support w/ this patient (also seen today 3/15).     Past Medical and Surgical History reviewed in Epic today.  MEDICATIONS:  Current Outpatient Medications   Medication Sig Dispense Refill     ACETAMINOPHEN PO Take 1,000 mg by mouth 3 times daily       AmLODIPine Besylate (NORVASC PO) Take 10 mg by mouth daily       camphor-menthol-methyl sal (MT ECKERT ULTRA STRENGTH) 4-10-30 % CREA Externally apply topically 3 times daily       DULoxetine HCl (CYMBALTA PO) Take 60 mg by mouth daily       enoxaparin (LOVENOX) 40 MG/0.4ML syringe Inject 0.7 mLs (70 mg) Subcutaneous 2 times daily  0     GABAPENTIN PO Take 300 mg by mouth 2 times daily        LORazepam (ATIVAN) 0.5 MG tablet Take 1 tablet (0.5 mg) by mouth 3 times daily as needed for anxiety       MAGNESIUM OXIDE PO Take 400 mg by mouth 2 times daily        MELATONIN PO Take 3 mg by mouth At Bedtime       omeprazole (PRILOSEC) 20 MG CR capsule Take 1 capsule (20 mg) by mouth daily 90 capsule 3     oxyCODONE (OXYCONTIN) 10 MG 12 hr tablet Take 1 tablet (10 mg) by mouth 2 times daily Give QAM and Q-Afternoon. 60 tablet 0     oxyCODONE (OXYCONTIN) 30 MG 12 hr tablet Take 1 tablet (30 mg) by mouth At Bedtime 30 tablet 0     oxyCODONE HCl (OXAYDO) 5 MG TABA Take 5 mg by mouth 2 times daily as needed (breakthrough pain 8-10/10) 30 each 0     polyethylene glycol (MIRALAX/GLYCOLAX) Packet Take 17 g by mouth daily       senna-docusate (SENOKOT-S;PERICOLACE) 8.6-50 MG per tablet Take 2 tablets by mouth 2 times daily 100 tablet 0     STATIN NOT PRESCRIBED (INTENTIONAL) 1 each daily Please choose reason not prescribed, below       THIAMINE HCL PO Take 100 mg by mouth daily       tiZANidine (ZANAFLEX) 4 MG capsule Take 1 capsule (4 mg) by mouth 3 times daily as needed for muscle spasms 30 capsule 0     VENTOLIN  (90 Base) MCG/ACT Inhaler Inhale 2 puffs into the lungs every 4 hours as needed for shortness of breath /  dyspnea or wheezing 1 Inhaler 1     Warfarin Sodium (COUMADIN PO) Take by mouth daily Take as directed per INR results       REVIEW OF SYSTEMS: 10 point ROS of systems including Constitutional, Eyes, Respiratory, Cardiovascular, Gastroenterology, Genitourinary, Integumentary, Musculoskeletal, Psychiatric were all negative except for pertinent positives noted in my HPI.    Objective:  /72   Pulse 82   Temp 97.2  F (36.2  C)   Resp 20   Wt 67.7 kg (149 lb 3.2 oz)   SpO2 96%   BMI 18.90 kg/m    Exam:  GENERAL APPEARANCE: Alert, in no distress, cooperative.   ENT: Mouth and posterior oropharynx normal, moist mucous membranes, hearing acuity [place].  EYES: EOM, conjunctivae, lids, pupils and irises normal, PERRL2.   RESP: Respiratory effort and palpation of chest normal, no respiratory distress, Lung sounds diminished.  CV: Palpation and auscultation of heart done, rate and rhythm regular, no murmur, no rub or gallop, Edema 0+ BLE.  ABDOMEN: Normal bowel sounds, soft, nontender, no hepatosplenomegaly or other masses.  SKIN: Inspection/Palpation of skin and subcutaneous tissue baseline w/ fragility.  NEURO: 2-12 at patient's baseline, sensation baseline PPS.  PSYCH: Insight and judgement, memory baseline, affect and mood normal.    ASSESSMENT/PLAN:  History of pulmonary embolism  Encounter for therapeutic drug monitoring  Long term (current) use of anticoagulants  Chronic. Stable. Therapeutic. Will dose Coumadin as noted below and recheck INR in 5 days. Follow-up accordingly.Therapeutic INR for goal of 2-3.    Uncomplicated alcohol dependence (H)  Chronic pain syndrome  Acquired postural kyphosis  Left shoulder pain, unspecified chronicity and etiology  ADA (generalized anxiety disorder)  Paradoxical insomnia  Cough  Carcinoma, lung, left (H)  Acute-on-chronic. Encouraging nursing to use PRN Albuterol (which has not happened). Ok for cough drops. Will continue weekly sessions w/ psychology. On serotonin specific  reuptake inhibitor, w/ breakthrough ativan BID. Noting social situation exacerbating pain/anxiety, which is likely causing medication-seeking behavior. Follow-up w/in 2 weeks.     Orders:  1. Increase Melatonin to 6mg PO at bedtime. Dx: Insomnia.  2. Coumadin 6mg Sun/Tu/Th, 3mg AOD. Dx: H/O PE.  3. Recheck INR x1 on Wed 3/20/19. Dx: H/O PE.  FYI: Use PRN MDI Albuterol for cough (already ordered but has not been used).     Electronically signed by:  Dr. Lashanda Suárez, APRN CNP DNP

## 2019-03-15 NOTE — LETTER
"    3/15/2019        RE: Chilo Oneil  [facility]  [address] [facility] [address]        [place] [facility]  [place] Medical Record Number:  [id number]  Place of Service where encounter took place:  [facility]  ([facility]) [[id number]]  Chief Complaint   Patient presents with     Cough     HPI:    Chilo Oneil  is a 68 year old (1950), who is being seen today for an episodic care visit.  HPI information obtained from: facility chart records, facility staff, patient report, [facility] chart review and Care Everywhere [facility] chart review. Today's concern is:    History of pulmonary embolism  Encounter for therapeutic drug monitoring  Long term (current) use of anticoagulants  Patient denies bleeding/bruising, new swelling. His last INR was 2.33 on 3/12, his dosing over the last few days was 6mg x1 and 3mg x2 days. His INR today is therapeutic at 2.42. His historical dosing post-epidural injection procedure last week is noted below:  Date INR New Dose/Orders   3/12 2.33 6mg x1, 3mg x2, and stopped Lovenox.   3/8 1.49 6mg x2, 3mg x2 + Lovenox.   3/6 1.09 6mg x1 and 3mg x1 + Lovenox.   3/4 0.9 6mg x2 days, w/ Lovenox restart on 3/5.   3/3 1.03 HELD + Lovenox.   3/1 1.5 HELD.     Uncomplicated alcohol dependence (H)  Chronic pain syndrome  Carcinoma of left lung (H)  Cough  Acquired postural kyphosis  Left shoulder pain, unspecified chronicity and etiology  ADA (generalized anxiety disorder)  Paradoxical insomnia  Patient states he has had a cough for several days and it is disrupting his sleep. He wants something \"stronger\" for his cough and \"stronger\" for his sleep. Noting his cervical epidural injection procedure was 1 week ago and patient has stated sometimes pain is still bad and sometimes it's not....but he states that coughing is making it worse. Noting patient's social situation is strained, especially financially. He would like to move-in with help from his " friend Yamileth, and someday return to work, but he fears his pain will never be controlled enough to achieve this goal. Provider consulted in-house psych for counseling and ongoing support w/ this patient (also seen today 3/15).     Past Medical and Surgical History reviewed in Epic today.  MEDICATIONS:  Current Outpatient Medications   Medication Sig Dispense Refill     ACETAMINOPHEN PO Take 1,000 mg by mouth 3 times daily       AmLODIPine Besylate (NORVASC PO) Take 10 mg by mouth daily       camphor-menthol-methyl sal (MT ECKERT ULTRA STRENGTH) 4-10-30 % CREA Externally apply topically 3 times daily       DULoxetine HCl (CYMBALTA PO) Take 60 mg by mouth daily       enoxaparin (LOVENOX) 40 MG/0.4ML syringe Inject 0.7 mLs (70 mg) Subcutaneous 2 times daily  0     GABAPENTIN PO Take 300 mg by mouth 2 times daily        LORazepam (ATIVAN) 0.5 MG tablet Take 1 tablet (0.5 mg) by mouth 3 times daily as needed for anxiety       MAGNESIUM OXIDE PO Take 400 mg by mouth 2 times daily        MELATONIN PO Take 3 mg by mouth At Bedtime       omeprazole (PRILOSEC) 20 MG CR capsule Take 1 capsule (20 mg) by mouth daily 90 capsule 3     oxyCODONE (OXYCONTIN) 10 MG 12 hr tablet Take 1 tablet (10 mg) by mouth 2 times daily Give QAM and Q-Afternoon. 60 tablet 0     oxyCODONE (OXYCONTIN) 30 MG 12 hr tablet Take 1 tablet (30 mg) by mouth At Bedtime 30 tablet 0     oxyCODONE HCl (OXAYDO) 5 MG TABA Take 5 mg by mouth 2 times daily as needed (breakthrough pain 8-10/10) 30 each 0     polyethylene glycol (MIRALAX/GLYCOLAX) Packet Take 17 g by mouth daily       senna-docusate (SENOKOT-S;PERICOLACE) 8.6-50 MG per tablet Take 2 tablets by mouth 2 times daily 100 tablet 0     STATIN NOT PRESCRIBED (INTENTIONAL) 1 each daily Please choose reason not prescribed, below       THIAMINE HCL PO Take 100 mg by mouth daily       tiZANidine (ZANAFLEX) 4 MG capsule Take 1 capsule (4 mg) by mouth 3 times daily as needed for muscle spasms 30 capsule 0      VENTOLIN  (90 Base) MCG/ACT Inhaler Inhale 2 puffs into the lungs every 4 hours as needed for shortness of breath / dyspnea or wheezing 1 Inhaler 1     Warfarin Sodium (COUMADIN PO) Take by mouth daily Take as directed per INR results       REVIEW OF SYSTEMS: 10 point ROS of systems including Constitutional, Eyes, Respiratory, Cardiovascular, Gastroenterology, Genitourinary, Integumentary, Musculoskeletal, Psychiatric were all negative except for pertinent positives noted in my HPI.    Objective:  /72   Pulse 82   Temp 97.2  F (36.2  C)   Resp 20   Wt 67.7 kg (149 lb 3.2 oz)   SpO2 96%   BMI 18.90 kg/m     Exam:  GENERAL APPEARANCE: Alert, in no distress, cooperative.   ENT: Mouth and posterior oropharynx normal, moist mucous membranes, hearing acuity [place].  EYES: EOM, conjunctivae, lids, pupils and irises normal, PERRL2.   RESP: Respiratory effort and palpation of chest normal, no respiratory distress, Lung sounds diminished.  CV: Palpation and auscultation of heart done, rate and rhythm regular, no murmur, no rub or gallop, Edema 0+ BLE.  ABDOMEN: Normal bowel sounds, soft, nontender, no hepatosplenomegaly or other masses.  SKIN: Inspection/Palpation of skin and subcutaneous tissue baseline w/ fragility.  NEURO: 2-12 at patient's baseline, sensation baseline PPS.  PSYCH: Insight and judgement, memory baseline, affect and mood normal.    ASSESSMENT/PLAN:  History of pulmonary embolism  Encounter for therapeutic drug monitoring  Long term (current) use of anticoagulants  Chronic. Stable. Therapeutic. Will dose Coumadin as noted below and recheck INR in 5 days. Follow-up accordingly.Therapeutic INR for goal of 2-3.    Uncomplicated alcohol dependence (H)  Chronic pain syndrome  Acquired postural kyphosis  Left shoulder pain, unspecified chronicity and etiology  ADA (generalized anxiety disorder)  Paradoxical insomnia  Cough  Carcinoma, lung, left (H)  Acute-on-chronic. Encouraging nursing to use PRN  Albuterol (which has not happened). Ok for cough drops. Will continue weekly sessions w/ psychology. On serotonin specific reuptake inhibitor, w/ breakthrough ativan BID. Noting social situation exacerbating pain/anxiety, which is likely causing medication-seeking behavior. Follow-up w/in 2 weeks.     Orders:  1. Increase Melatonin to 6mg PO at bedtime. Dx: Insomnia.  2. Coumadin 6mg Sun/Tu/Th, 3mg AOD. Dx: H/O PE.  3. Recheck INR x1 on Wed 3/20/19. Dx: H/O PE.  FYI: Use PRN MDI Albuterol for cough (already ordered but has not been used).     Electronically signed by:  Dr. Lashanda Suárez, NATALIE CNP DNP      Sincerely,        NATALIE Werner CNP

## 2019-03-19 NOTE — PROGRESS NOTES
[place] [facility]  [place] Medical Record Number:  [id number]  Place of Service where encounter took place: [facility]  (S) [[id number]]    HPI:    Chilo Oneil is a 68 year old  (1950), who is being seen today for an episodic care visit at the above location.   HPI information obtained from: facility chart records, facility staff, patient report, [facility] chart review and Care Everywhere [facility] chart review. Today's concern is INR/Coumadin management for PE    ROS/Subjective:  Bleeding Signs/Symptoms:  None  Thromboembolic Signs/Symptoms:  None  Medication Changes:  Yes: Melatonin increased at last visit.  Dietary Changes:  No  Activity Changes: No  Bacterial/Viral Infection:  No  Missed Coumadin Doses:  None  On ASA: No  Other Concerns:  No    OBJECTIVE:  /72   Pulse 82   Temp 97.3  F (36.3  C)   Resp 20   Wt 67.6 kg (149 lb)   SpO2 96%   BMI 18.87 kg/m    Last INR: 2.42 on 3/15 (5 days ago).   INR Today:  2.51.  Current Dose: 6mg Sun/Tu/Th, 3mg AOD.    ASSESSMENT:  History of pulmonary embolism  Encounter for therapeutic drug monitoring  Long term (current) use of anticoagulants  Chronic. Stable. Therapeutic. Will dose Coumadin as noted below and recheck INR in 1 week on 3/27. Follow-up accordingly. Therapeutic INR for goal of 2-3.    PLAN:  New Dose: Same. 6mg Sun/Tu/Th, 3mg AOD.     Next INR: 1 week on 3/27.   Orders:  1. Move scheduled BMP from 3/25 to 3/27.  2. Nicotine Patch Transdermal, 21mg patch every day x 6 wks, then decrease to 14mg x 2wks, then update NP. Dx: Smoking Cessation.     Electronically signed by:  Dr. Lashanda Suárez, APRN CNP DNP

## 2019-03-20 NOTE — LETTER
3/20/2019        RE: Chilo Oneil  [facility]  [address] [facility] [address]        [place] [facility]  [place] Medical Record Number:  [id number]  Place of Service where encounter took place: [facility]  ([facility]) [[id number]]    HPI:    Chilo Oneil is a 68 year old  (1950), who is being seen today for an episodic care visit at the above location.   HPI information obtained from: facility chart records, facility staff, patient report, [facility] chart review and Care Everywhere [facility] chart review. Today's concern is INR/Coumadin management for PE    ROS/Subjective:  Bleeding Signs/Symptoms:  None  Thromboembolic Signs/Symptoms:  None  Medication Changes:  Yes: Melatonin increased at last visit.  Dietary Changes:  No  Activity Changes: No  Bacterial/Viral Infection:  No  Missed Coumadin Doses:  None  On ASA: No  Other Concerns:  No    OBJECTIVE:  /72   Pulse 82   Temp 97.3  F (36.3  C)   Resp 20   Wt 67.6 kg (149 lb)   SpO2 96%   BMI 18.87 kg/m     Last INR: 2.42 on 3/15 (5 days ago).   INR Today:  2.51.  Current Dose: 6mg Sun/Tu/Th, 3mg AOD.    ASSESSMENT:  History of pulmonary embolism  Encounter for therapeutic drug monitoring  Long term (current) use of anticoagulants  Chronic. Stable. Therapeutic. Will dose Coumadin as noted below and recheck INR in 1 week on 3/27. Follow-up accordingly. Therapeutic INR for goal of 2-3.    PLAN:  New Dose: Same. 6mg Sun/Tu/Th, 3mg AOD.     Next INR: 1 week on 3/27.   Orders:  1. Move scheduled BMP from 3/25 to 3/27.  2. Nicotine Patch Transdermal, 21mg patch every day x 6 wks, then decrease to 14mg x 2wks, then update NP. Dx: Smoking Cessation.     Electronically signed by:  NATALIE Harvey CNP DNP        Sincerely,        NATALIE Werner CNP

## 2019-03-21 NOTE — TELEPHONE ENCOUNTER
[place] [facility] TELEPHONE ENCOUNTER  Chilo Oneil is a 68 year old  (1950), contacted provider today interested in adjunct therapies for pain management such as acupuncture. He requested to know if provider would recommend he pursue these complimentary therapies.     ASSESSMENT/PLAN  Chronic. Stable. Provider recommended he pursue safe complimentary therapies through either chiropractor care, acupuncture therapies, or massage etc, on an independent basis. He was given contact information of local providers and educated on safety and consistency with these therapies should he decide to move forward.     Electronically signed by:  Dr. Lashanda Suárez, APRN CNP DNP

## 2019-03-23 NOTE — ED AVS SNAPSHOT
[facility]  [address] [address]  Phone:  [phone]  Fax:  [phone]                                    Chilo Oneil   MRN: [id number]    Department:  [facility] Department   Date of Visit:  3/23/2019           After Visit Summary Signature Page    I have received my discharge instructions, and my questions have been answered. I have discussed any challenges I see with this plan with the nurse or doctor.    ..........................................................................................................................................  Patient/Patient Representative Signature      ..........................................................................................................................................  Patient Representative Print Name and Relationship to Patient    ..................................................               ................................................  Date                                   Time    ..........................................................................................................................................  Reviewed by Signature/Title    ...................................................              ..............................................  Date                                               Time          22EPIC Rev 08/18

## 2019-03-23 NOTE — ED NOTES
..DATE:  3/23/2019   TIME OF RECEIPT FROM LAB:  4026  LAB TEST:  INR  LAB VALUE:  5.85  RESULTS GIVEN WITH READ-BACK TO (PROVIDER):  Brent Slade MD  TIME LAB VALUE REPORTED TO PROVIDER:   [id number]

## 2019-03-23 NOTE — ED NOTES
[facility] transport called and will transport via W/C to [place] in approximately 30 minutes

## 2019-03-23 NOTE — ED NOTES
"Coming from [place], stated \"doesn't feel right\" increased SOA, sat 90% RR 30, given ativan for anxiety with minimal relief, left shoulder and arm pain, hx PE, lung CA, on coumadin, still smokes  "

## 2019-03-23 NOTE — ED NOTES
..DATE:  3/23/2019   TIME OF RECEIPT FROM LAB:  1699  LAB TEST:  INR  LAB VALUE:  5.85  RESULTS GIVEN WITH READ-BACK TO (PROVIDER):  Brent Slade MD  TIME LAB VALUE REPORTED TO PROVIDER:   [id number]

## 2019-03-23 NOTE — ED PROVIDER NOTES
History     Chief Complaint   Patient presents with     Shortness of Breath     lung CA, sat 90%, RR 30, left shoulder and arm pain, hx PE     HPI  Chilo Oneil is a 68 year old male who was brought in by EMS from [facility] because of pain and shortness of breath.  He is very irritable and it is difficult to get good history from him.  He has chronic dyspnea and chronic pain from lung cancer.  He said this is his usual pain in his shoulder and chest for which she is receiving oxycodone at [place] but his pain control was not sufficient today.  Paramedics reported that on their arrival his O2 sats were diminished in the 80s on room air.  He does not normally use supplemental oxygen.  He does continue to smoke a half of cigarettes per day.  He says he got a upper respiratory infection and a cough about a week and a half ago.  He does not believe he has been on antibiotics.  He says he is bringing up discolored phlegm.  He denies any new ankle edema or calf Pain.  He denies abdominal pain.  However he is constipated and has not had a bowel movement in 2 days.  He has a history of PE and is on warfarin.  He is receiving OxyContin 30 mg at bedtime time he receives 10 mg OxyContin twice daily in the morning.  He also receives immediate relief oxycodone 5 mg as needed for pain and received a dose today at 8 AM and 2 doses yesterday.  He receives 0.5 mg lorazepam as needed for anxiety.  He had a dose this morning at 00:56 AM and 2 doses yesterday.    Allergies:  Allergies   Allergen Reactions     Cipro [Ciprofloxacin] Swelling       Problem List:    Patient Active Problem List    Diagnosis Date Noted     Health Care Home 02/13/2019     Priority: Medium     [facility] Care Coordinator  Deepthi Wood MA, LSW  [phone]    [facility] CARE PLAN SUMMARY    Member Name:  Chilo Oneil  Address:  [place] [address] Phone: [phone] (home)     :  1950 Date of Assessment:  19 LTTC at [facility]     Health Plan:  [employer] [facility] [place]  Health Plan Number: [id number] Medical Assistance Number: [id number]  Financial Worker:  [facility]   Case #:     FVP Care Coordinator:  CHARLEY Valdes CC Phone:  [phone]  CC Fax:  [phone]   FVP Enrollment Date: 18 Case Mix:    Rate Cell:    Waiver Type:     Primary Emergency Contact: TAHIR NUNEZ (1o HC agent)  Mobile Phone: [phone]  Relation: Sister    Secondary Emergency Contact: Krystaestelita Samm (2o HC agent)  Mobile Phone: [phone]  Relation: Brother Language:  English  :  No   Health Care Agent/POA:   Advanced Directives/Living Will:     Primary Care Clinic/Phone/Fax:  [facility]/(p) [phone], (f) [phone] Primary Dx:  Lung CA C24.90  Secondary Dx:  HTN I10   Primary Physician:  Lasahnda Suárez   Height:  Not on file  Weight:  157 lbs   Specialty Physician:    Audiologist:     Eye Care Provider:   Dental Care Provider:    [facility] [phone]   Other:             Iron deficiency anemia 2019     Priority: Medium     Protein-calorie malnutrition (H) 2018     Priority: Medium     History of pulmonary embolism 2018     Priority: Medium     Encounter for therapeutic drug monitoring 2018     Priority: Medium     Chronic left shoulder pain 2018     Priority: Medium     Hip pain, left, unclear chronicity/etiology 2018     Priority: Medium     Current episode of major depressive disorder without prior episode 2018     Priority: Medium     Long term (current) use of anticoagulants 2018     Priority: Medium     Other pulmonary embolism without acute cor pulmonale, unspecified chronicity (H) 07/10/2018     Priority: Medium     Left shoulder pain, unspecified chronicity 07/10/2018     Priority: Medium     Falls frequently 07/10/2018     Priority: Medium     Near syncope 07/10/2018     Priority: Medium      Coronary artery disease involving native heart without angina pectoris, unspecified vessel or lesion type 07/10/2018     Priority: Medium     H/O acute myocardial infarction 07/10/2018     Priority: Medium     Essential hypertension 07/10/2018     Priority: Medium     CKD (chronic kidney disease) stage 3, GFR 30-59 ml/min (H) 07/10/2018     Priority: Medium     KYMBERLY (acute kidney injury) (H) 07/10/2018     Priority: Medium     Uncomplicated alcohol dependence (H) 07/10/2018     Priority: Medium     Poor nutrition 07/10/2018     Priority: Medium     Nausea and vomiting, intractability of vomiting not specified, unspecified vomiting type 07/10/2018     Priority: Medium     ADA (generalized anxiety disorder) 07/10/2018     Priority: Medium     Insomnia, unspecified type 07/10/2018     Priority: Medium     Debility 07/10/2018     Priority: Medium     Cognitive impairment 07/10/2018     Priority: Medium     Pancytopenia (H) 07/10/2018     Priority: Medium     Pulmonary emboli (H) 06/05/2018     Priority: Medium     Pulmonary embolism (H) 06/05/2018     Priority: Medium     Hypokalemia 05/31/2018     Priority: Medium     Hypomagnesemia 05/31/2018     Priority: Medium     Carcinoma, lung, left (H) 04/12/2018     Priority: Medium     CAD (coronary artery disease) 03/28/2018     Priority: Medium     No previous angiogram.  No previous stenting.    Atypical Stress Test consistent with possible CAD 8/2016: Myocardial perfusion imaging using single isotope technique demonstrated a small of inferior ischemia at the base to mid ventricle There is a second moderate area of ischemia in the anterior and anteroseptal wall from base through mid ventricle, involving the lateral base as well. There is a small fixed portion in the anteroseptal base consistent with prior infarction There is a fixed inferoseptal defect from apex to mid ventricle consistent with either prior nontransmural infarct or attenuation artifact. Gated images  demonstrated inferior, inferoseptal, anterior and anteroseptal basal hypokinesis.  The left ventricular systolic function is 52% at rest and 47% post stress.       Hyponatremia 03/28/2018     Priority: Medium     SOB (shortness of breath) 03/28/2018     Priority: Medium     Chemotherapy induced nausea and vomiting 03/28/2018     Priority: Medium     Lung cancer (H)      Priority: Medium     Left shoulder pain, cough. Bx 12/2017- Non Small Cell. Resection planned 4/2018       Uncomplicated asthma      Priority: Medium     Anxiety 12/28/2017     Priority: Medium     Gastroesophageal reflux disease without esophagitis 12/28/2017     Priority: Medium     PAD (peripheral artery disease) (H) 06/13/2016     Priority: Medium     Benign essential hypertension 06/13/2016     Priority: Medium     Hyperlipidemia LDL goal <100 06/13/2016     Priority: Medium     Tobacco use disorder 10/16/2009     Priority: Medium        Past Medical History:    Past Medical History:   Diagnosis Date     GERD (gastroesophageal reflux disease)      HTN (hypertension)      Lung cancer (H)      MI (myocardial infarction) (H)        Past Surgical History:    Past Surgical History:   Procedure Laterality Date     FRACTURE TX, ANKLE RT/LT  1975    Fracture TX Ankle, LT     INSERT PORT VASCULAR ACCESS Left 5/15/2018    Procedure: INSERT PORT VASCULAR ACCESS;  Left chest Port-a-Cath Placement;  Surgeon: Gurjit Fox MD;  Location: [facility] OR     OPEN REDUCTION INTERNAL FIXATION HIP NAILING  9/20/2013    Procedure: OPEN REDUCTION INTERNAL FIXATION HIP NAILING;  Left Hip Open Reduction Internal Fixation ;  Surgeon: Rosas Dennis MD;  Location: [facility] OR     THORACOSCOPIC BIOPSY LUNG Left 4/11/2018    Procedure: THORACOSCOPIC BIOPSY LUNG;  subxiphoid left video assisted thorascopic surgery pleural biops, left lower lobe wedge biopsy ;  Surgeon: Mega Moreno MD;  Location:  OR     VASCULAR SURGERY         Family History:    Family History    Problem Relation Age of Onset     Diabetes Brother         type 2     Diabetes Father        Social History:  Marital Status:  Single [1]  Social History     Tobacco Use     Smoking status: Current Every Day Smoker     Packs/day: 0.15     Years: 47.00     Pack years: 7.05     Types: Cigarettes     Start date: 12/26/1967     Smokeless tobacco: Never Used     Tobacco comment: 4-5 cigs daily.    Substance Use Topics     Alcohol use: Yes     Comment: 6-8 beers per day, last 3/27 at 6pm     Drug use: Yes     Types: Marijuana     Comment: 3 times a day, 2-3 times/week for pain        Medications:      ACETAMINOPHEN PO   AmLODIPine Besylate (NORVASC PO)   DULoxetine HCl (CYMBALTA PO)   GABAPENTIN PO   guaiFENesin (ROBITUSSIN) 100 MG/5ML liquid   LORazepam (ATIVAN) 0.5 MG tablet   magnesium oxide (MAG-OX) 400 MG tablet   MELATONIN PO   nicotine (NICODERM CQ) 21 MG/24HR 24 hr patch   omeprazole (PRILOSEC) 20 MG CR capsule   oxyCODONE (OXYCONTIN) 10 MG 12 hr tablet   oxyCODONE (OXYCONTIN) 30 MG 12 hr tablet   oxyCODONE HCl (OXAYDO) 5 MG TABA   polyethylene glycol (MIRALAX/GLYCOLAX) Packet   senna-docusate (SENOKOT-S;PERICOLACE) 8.6-50 MG per tablet   THIAMINE HCL PO   tiZANidine (ZANAFLEX) 4 MG capsule   VENTOLIN  (90 Base) MCG/ACT Inhaler   Warfarin Sodium (COUMADIN PO)   STATIN NOT PRESCRIBED (INTENTIONAL)         Review of Systems  All other systems are reviewed and are negative    Physical Exam   BP: 152/61  Pulse: 73  Heart Rate: 80  Temp: 98.5  F (36.9  C)  Resp: 18  Weight: 68 kg (150 lb)  SpO2: 95 %      Physical Exam    Nursing note and vitals were reviewed.  Constitutional: Awake and alert, poorly nourished, chronically ill, irritable elderly male asking for pain medication but not appearing acutely ill  HEENT: EOMI.   Neck: Freely mobile.  Cardiovascular: Cardiac examination reveals normal heart rate and regular rhythm without murmur.  Pulmonary/Chest: Breathing is unlabored.  Breath sounds are clear   Bilaterally but diminished in the left posterior inferior lung fields.  There no retractions, tachypnea, rales, wheezes, or rhonchi.  O2 sats are 93% to 97% on room air.  Abdomen: Soft, nontender, no HSM or masses rebound or guarding.  Musculoskeletal: Extremities are warm and well-perfused and without edema  Neurological: Alert, oriented, thought content logical, coherent   Skin: Warm, dry, no rashes.  Psychiatric: Affect irritable but consolable..    ED Course        Procedures               EKG Interpretation:      Interpreted by Brent Slade  Time reviewed: 14:33  Symptoms at time of EKG: chest pain   Rhythm: normal sinus   Rate: normal  Axis: normal  Ectopy: none  Conduction: normal  ST Segments/ T Waves: No ST-T wave changes  Q Waves: none  Comparison to prior: Unchanged from 7/3/18    Clinical Impression: normal EKG    Critical Care time:  none               Results for orders placed or performed during the hospital encounter of 03/23/19 (from the past 24 hour(s))   CBC with platelets differential   Result Value Ref Range    WBC 11.4 (H) 4.0 - 11.0 10e9/L    RBC Count 3.12 (L) 4.4 - 5.9 10e12/L    Hemoglobin 9.5 (L) 13.3 - 17.7 g/dL    Hematocrit 28.7 (L) 40.0 - 53.0 %    MCV 92 78 - 100 fl    MCH 30.4 26.5 - 33.0 pg    MCHC 33.1 31.5 - 36.5 g/dL    RDW 13.9 10.0 - 15.0 %    Platelet Count 641 (H) 150 - 450 10e9/L    Diff Method Automated Method     % Neutrophils 78.4 %    % Lymphocytes 10.8 %    % Monocytes 9.7 %    % Eosinophils 0.3 %    % Basophils 0.4 %    % Immature Granulocytes 0.4 %    Nucleated RBCs 0 0 /100    Absolute Neutrophil 8.9 (H) 1.6 - 8.3 10e9/L    Absolute Lymphocytes 1.2 0.8 - 5.3 10e9/L    Absolute Monocytes 1.1 0.0 - 1.3 10e9/L    Absolute Eosinophils 0.0 0.0 - 0.7 10e9/L    Absolute Basophils 0.1 0.0 - 0.2 10e9/L    Abs Immature Granulocytes 0.0 0 - 0.4 10e9/L    Absolute Nucleated RBC 0.0    Comprehensive metabolic panel   Result Value Ref Range    Sodium 126 (L) 133 - 144 mmol/L     Potassium 4.6 3.4 - 5.3 mmol/L    Chloride 92 (L) 94 - 109 mmol/L    Carbon Dioxide 26 20 - 32 mmol/L    Anion Gap 8 3 - 14 mmol/L    Glucose 95 70 - 99 mg/dL    Urea Nitrogen 20 7 - 30 mg/dL    Creatinine 0.97 0.66 - 1.25 mg/dL    GFR Estimate 80 >60 mL/min/[1.73_m2]    GFR Estimate If Black >90 >60 mL/min/[1.73_m2]    Calcium 9.5 8.5 - 10.1 mg/dL    Bilirubin Total 0.2 0.2 - 1.3 mg/dL    Albumin 3.0 (L) 3.4 - 5.0 g/dL    Protein Total 7.2 6.8 - 8.8 g/dL    Alkaline Phosphatase 102 40 - 150 U/L    ALT 10 0 - 70 U/L    AST 10 0 - 45 U/L   Troponin I   Result Value Ref Range    Troponin I ES <0.015 0.000 - 0.045 ug/L   Nt probnp inpatient (BNP)   Result Value Ref Range    N-Terminal Pro BNP Inpatient 759 0 - 900 pg/mL   INR   Result Value Ref Range    INR 5.85 (HH) 0.86 - 1.14   CT Chest Pulmonary Embolism w Contrast    Narrative    Exam: CT CHEST PULMONARY EMBOLISM W CONTRAST 3/23/2019 2:57 PM    Comparison: 2/28/2018     Clinical History: Dyspnea, lung cancer.    Technique: Volumetric helical acquisition of the chest were obtained  following pulmonary embolism protocol. Three-dimensional (3D)  post-processed angiographic images were reconstructed, archived to  PACS and used in interpretation of this study. Radiation dose for this  scan was reduced using automated exposure control, adjustment of the  mA and/or kV according to patient size, or iterative reconstruction  technique.    Contrast: 69 mL Isovue 370    Findings:  Contrast bolus timing is adequate for evaluation for pulmonary emboli.  There is no evidence of pulmonary emboli.  There is no evidence of  right heart strain.     Apically predominant emphysema. Spiculated nodule anteriorly in the  LEFT upper lobe measures 1.9 x 1.8 x 1.4 cm (series 5 image 67 and  series 7 image 38), increased from 1.4 x 0.9 x 0.8 cm on 12/28/2018.  Moderate loculated pleural effusion on the LEFT. RIGHT lung is clear.    The heart size and great vessels are normal in caliber.  Mild  mediastinal and RIGHT hilar lymphadenopathy again seen, does not  appear significantly changed.    Upper abdomen: Evaluation of the upper abdomen is limited by contrast  bolus timing and coverage.    Bones: No concerning lytic or sclerotic lesions.      Impression    Impression:   1. LEFT upper lobe pulmonary nodule increased in size since  12/28/2018. Additionally, loculated LEFT pleural effusion is new since  that time.  2. No evidence of pulmonary bolus.  3. Mild apically predominant emphysema.    SAIGE WATT MD       Medications   HYDROmorphone (PF) (DILAUDID) injection 0.5 mg (0.5 mg Intravenous Given 3/23/19 1547)   heparin 100 UNIT/ML injection 5 mL (5 mLs Intracatheter Given 3/23/19 1549)   iopamidol (ISOVUE-370) solution 69 mL (69 mLs Intravenous Given 3/23/19 1441)   sodium chloride 0.9 % bag 500mL for CT scan flush use (96 mLs Intravenous Given 3/23/19 1441)   HYDROmorphone (PF) (DILAUDID) injection 0.5 mg (0.5 mg Intravenous Given 3/23/19 1543)       Assessments & Plan (with Medical Decision Making)     68-year-old male with a history of adenocarcinoma of the lung and PE presented with increased pain and chronic dyspnea.  His O2 sats in the ED here were consistently 93-97%.  Workup in the emergency department revealed that his lung tumor is increasing in size and he has a new moderate sized loculated left pleural effusion.  Rest of the evaluation was reassuring.  I discussed the findings with him.  At this time I see no indication for admission.  We were able to control his pain with additional doses of Dilaudid through the IV.  I will increase his OxyContin from 10/10/1930 to 20/10/30 and he has additional oxycodone for additional breakthrough pain.  He will talk with Dr. Mccauley and schedule an appointment to discuss whether he wishes to proceed with additional treatment for his cancer now that it is his apparently worsening.  He tells me he is inclined to go on hospice because he tolerated  chemotherapy very poorly with the initial treatment.  At this time he does not need supplemental oxygen.     His INR was somewhat supratherapeutic at 5.0.  He shows no evidence of bleeding nor what I expect any at this level.  He has no PE on CT today.  I have asked him to hold his Coumadin for 3 days and then recheck the INR.    I have reviewed the nursing notes.    I have reviewed the findings, diagnosis, plan and need for follow up with the patient.          Medication List      Modified    * oxyCODONE HCl 5 MG Taba  Commonly known as:  OXAYDO  5 mg, Oral, 2 TIMES DAILY PRN  What changed:  Another medication with the same name was changed. Make sure you understand how and when to take each.     * oxyCODONE 30 MG 12 hr tablet  Commonly known as:  OXYCONTIN  30 mg, Oral, AT BEDTIME  What changed:  Another medication with the same name was changed. Make sure you understand how and when to take each.     * oxyCODONE 10 MG 12 hr tablet  Commonly known as:  oxyCONTIN  10 mg, Oral, 2 TIMES DAILY, Give 20 mg QAM and 10 mg Q-Afternoon.  What changed:  additional instructions         * This list has 3 medication(s) that are the same as other medications prescribed for you. Read the directions carefully, and ask your doctor or other care provider to review them with you.                Final diagnoses:   Adenocarcinoma, lung, left (H)   Pleural effusion on left   Supratherapeutic INR       3/23/2019   [facility] EMERGENCY DEPARTMENT     Brent Slade MD  03/23/19 [id number]

## 2019-03-23 NOTE — PROGRESS NOTES
Chilo's Lorazepam order ended as it was limited and so asking for a continuation of the medication as he is anxious and not feeling well today.    Ordered a few more Lorazepam and extended order for 30 days    Electronically signed by Pippa Jacome RN, CNP

## 2019-03-23 NOTE — PROGRESS NOTES
Script sent to [facility] pharmacy.    Correction made and sent to [facility] instead    Electronically signed by Pippa Jacome RN, CNP

## 2019-03-23 NOTE — DISCHARGE INSTRUCTIONS
Schedule an appointment to see Dr. Mccauley next week to discuss your options for the growing tumor in the new fluid collection in the lung.  Increase OxyContin prescription to 20 mg in the morning, 10 mg in the afternoon, and 30 mg in the evening.  You may continue to use oxycodone 5 mg up to every 4 hours if needed for additional pain relief.  Hold warfarin for 3 days and repeat INR 3/26

## 2019-03-23 NOTE — ED NOTES
Pt states he does not have a ride back home to [place].  Staff will call to attempt to arrange a ride with [facility].  Pt states he wants to go home.

## 2019-03-24 NOTE — TELEPHONE ENCOUNTER
Nursing called back again this afternoon to state that Chilo was not feeling any better and requested to be seen in the ED.  R = 30, O2 sat = 90%, hx of PE    Ok to send to the ED    Electronically signed by Pippa Jacome RN, CNP

## 2019-03-25 NOTE — PROGRESS NOTES
[facility]  [place] Medical Record Number:  [id number]  Place of Service where encounter took place:  [facility]  ([facility]) [[id number]]  Chief Complaint   Patient presents with     Hospital F/U     HPI:  Chilo Oneil  is a 68 year old (1950), who is being seen today for an episodic care visit s/p trip to ED @ [facility] over the weekend. HPI information obtained from: facility chart records, facility staff, patient report, [facility] chart review and Care Everywhere [facility] chart review. Today's concern is:    ADA (generalized anxiety disorder)  Chronic pain syndrome  Paradoxical insomnia  Current mild episode of major depressive disorder  Chronic left shoulder pain  History of pulmonary embolism  Uncomplicated alcohol dependence (H)  Carcinoma, lung, left (H)  Patient went to ED for short visit, received scans which showed slightly worsened lung carcinoma and small pleural effusion. He was also found to be supratherapeutic on his INR >5, his coumadin is currently on hold w/ a recheck INR ordered for tomorrow.     We had a daryn discussion about his use of ETOH, as his last INR on 3/20 was therapeutic at 2.51. He did state that he went out w/ friends several days after 3/20 and had some beer. His OxyContin was increased in ED, he finds that using his albuterol inhaler is helpful, but he is overall anxious about his financial situation and depressed about the changes seen on CT. He follows-up w/ oncology w/in 1 month. He is nervous. He states he is irritable (nursing staff confirms) he is using PRN Ativan regularly, he states he cannot sleep.  We talked about his Cymbalta dosing, and adding something for more depression/anxiety/sleep control, and he was agreeable to this. He is being seen regularly by in-house psych.  Given these stressors, he is NOT ready to cut back on smoking, and is asking to not use Nicotine patches at this time. He denies CP, states SOB  is improved, denies fever/chills, denies nausea/heartburn, his pain is baseline but exacerbated by anxiety/depression. When asked about bleeding/bruising, he is unsure, but thinks that he had some blood in his stool last night.     Past Medical and Surgical History reviewed in Epic today.  MEDICATIONS:  Medication Sig     ACETAMINOPHEN PO Take 1,000 mg by mouth 3 times daily     AmLODIPine Besylate (NORVASC PO) Take 10 mg by mouth daily     DULoxetine HCl (CYMBALTA PO) Take 60 mg by mouth daily     GABAPENTIN PO Take 300 mg by mouth 2 times daily      LORazepam (ATIVAN) 0.5 MG tablet Take 1 tablet (0.5 mg) by mouth 3 times daily as needed for anxiety     magnesium oxide (MAG-OX) 400 MG tablet Take 400 mg by mouth 2 times daily     MELATONIN PO Take 6 mg by mouth At Bedtime      nicotine (NICODERM CQ) 21 MG/24HR 24 hr patch Place 1 patch onto the skin daily as needed      omeprazole (PRILOSEC) 20 MG CR capsule Take 1 capsule (20 mg) by mouth daily     oxyCODONE (OXYCONTIN) 10 MG 12 hr tablet Take 1 tablet (10 mg) by mouth 2 times daily Give 20 mg QAM and 10 mg Q-Afternoon.     oxyCODONE (OXYCONTIN) 30 MG 12 hr tablet Take 1 tablet (30 mg) by mouth At Bedtime     oxyCODONE HCl (OXAYDO) 5 MG TABA Take 5 mg by mouth 2 times daily as needed (breakthrough pain 8-10/10)     polyethylene glycol (MIRALAX/GLYCOLAX) Packet Take 17 g by mouth daily     senna-docusate (SENOKOT-S;PERICOLACE) 8.6-50 MG per tablet Take 2 tablets by mouth 2 times daily     STATIN NOT PRESCRIBED (INTENTIONAL) 1 each daily Please choose reason not prescribed, below     THIAMINE HCL PO Take 100 mg by mouth daily     tiZANidine (ZANAFLEX) 4 MG capsule Take 1 capsule (4 mg) by mouth 3 times daily as needed for muscle spasms     VENTOLIN  (90 Base) MCG/ACT Inhaler Inhale 2 puffs into the lungs every 4 hours as needed for shortness of breath / dyspnea or wheezing     REVIEW OF SYSTEMS: 10 point ROS of systems including Constitutional, Eyes,  Respiratory, Cardiovascular, Gastroenterology, Genitourinary, Integumentary, Musculoskeletal, Psychiatric were all negative except for pertinent positives noted in my HPI.    Objective:  /80   Pulse 96   Temp 97.1  F (36.2  C)   Resp 30   Wt 67.6 kg (149 lb)   SpO2 90%   BMI 18.87 kg/m    Exam:  GENERAL APPEARANCE: Alert, in no distress, cooperative.   ENT: Mouth and posterior oropharynx normal, moist mucous membranes, hearing acuity [place].  EYES: EOM, conjunctivae, lids, pupils and irises normal, PERRL2.   RESP: Respiratory effort and palpation of chest normal, no respiratory distress, Lung sounds diminished.  CV: Palpation and auscultation of heart done, rate and rhythm regular, no murmur, no rub or gallop, Edema 0+ BLE.  ABDOMEN: Normal bowel sounds, soft, nontender, no hepatosplenomegaly or other masses.  SKIN: Inspection/Palpation of skin and subcutaneous tissue baseline w/ fragility.  NEURO: 2-12 at patient's baseline, sensation baseline PPS.  PSYCH: Insight and judgement, memory baseline, affect and mood normal.    Emergency Department Labs:   Most Recent 3 CBC's:  Recent Labs   Lab Test 03/23/19  1432 03/06/19 12/28/18  1405   WBC 11.4* 8.5 7.4   HGB 9.5* 8.8* 9.7*   MCV 92 98 103*   * 499* 472*     Most Recent 3 BMP's:  Recent Labs   Lab Test 03/23/19  1432 12/28/18  1405 09/26/18  0900   * 132* 134   POTASSIUM 4.6 4.8 4.4   CHLORIDE 92* 100 99   CO2 26 29 29   BUN 20 17 22   CR 0.97 1.08 0.78   ANIONGAP 8 3 6   DAMARI 9.5 9.2 10.0   GLC 95 90 114*     Most Recent 3 INR's:  Recent Labs   Lab Test 03/23/19  1432 03/06/19 03/04/19  0822   INR 5.85* 1.09 0.90     ASSESSMENT/PLAN:  ADA (generalized anxiety disorder)  Chronic pain syndrome  Paradoxical insomnia  Current mild episode of major depressive disorder  Chronic left shoulder pain  History of pulmonary embolism  Uncomplicated alcohol dependence (H)  Carcinoma, lung, left (H)  Acute-on-chronic. Tenuous. Will start Remeron to assist w/  sleep/depression/anxiety in addition to other measures. Will order stool hemoccult x1, and change Nicotine patches to PRN. Noting CBC and INRs are already ordered on follow-up for this patient. Provider to follow-up w/ results or w/in 1 week.     Orders written by provider at facility and transcribed by : Dusty Montano  1. Change Nicotine patches to PRN. patient not ready to start. Notify NP when this changes  2. Remeron 7.5 mg by mouth at bedtime Dx: Depression/ADA.  3. Stool heme occult x1 ASAP Dx: Supertheraputic INR  FYI: Recheck INRs and CBC already ordered.     Electronically signed by:  Dr. Lashanda Suárez, APRN CNP DNP

## 2019-03-25 NOTE — LETTER
3/25/2019        RE: Chilo Oneil  [facility]  [address] [facility] [address]        [place] [facility]  [place] Medical Record Number:  [id number]  Place of Service where encounter took place:  [facility]  ([facility]) [[id number]]  Chief Complaint   Patient presents with     Hospital F/U     HPI:  Chilo Oneil  is a 68 year old (1950), who is being seen today for an episodic care visit s/p trip to ED @ [facility] over the weekend. HPI information obtained from: facility chart records, facility staff, patient report, [facility] chart review and Care Everywhere [facility] chart review. Today's concern is:    ADA (generalized anxiety disorder)  Chronic pain syndrome  Paradoxical insomnia  Chronic left shoulder pain  History of pulmonary embolism  Uncomplicated alcohol dependence (H)  Carcinoma, lung, left (H)  Patient went to ED for short visit, received scans which showed slightly worsened lung carcinoma and small pleural effusion. He was also found to be supratherapeutic on his INR >5, his coumadin is currently on hold w/ a recheck INR ordered for tomorrow.     We had a daryn discussion about his use of ETOH, as his last INR on 3/20 was therapeutic at 2.51. He did state that he went out w/ friends several days after 3/20 and had some beer. His OxyContin was increased in ED, he finds that using his albuterol inhaler is helpful, but he is overall anxious about his financial situation and depressed about the changes seen on CT. He follows-up w/ oncology w/in 1 month. He is nervous. He states he is irritable (nursing staff confirms) he is using PRN Ativan regularly, he states he cannot sleep.  We talked about his Cymbalta dosing, and adding something for more depression/anxiety/sleep control, and he was agreeable to this. He is being seen regularly by in-house psych.  Given these stressors, he is NOT ready to cut back on smoking, and is asking  to not use Nicotine patches at this time. He denies CP, states SOB is improved, denies fever/chills, denies nausea/heartburn, his pain is baseline but exacerbated by anxiety/depression. When asked about bleeding/bruising, he is unsure, but thinks that he had some blood in his stool last night.     Past Medical and Surgical History reviewed in Epic today.  MEDICATIONS:  Medication Sig     ACETAMINOPHEN PO Take 1,000 mg by mouth 3 times daily     AmLODIPine Besylate (NORVASC PO) Take 10 mg by mouth daily     DULoxetine HCl (CYMBALTA PO) Take 60 mg by mouth daily     GABAPENTIN PO Take 300 mg by mouth 2 times daily      LORazepam (ATIVAN) 0.5 MG tablet Take 1 tablet (0.5 mg) by mouth 3 times daily as needed for anxiety     magnesium oxide (MAG-OX) 400 MG tablet Take 400 mg by mouth 2 times daily     MELATONIN PO Take 6 mg by mouth At Bedtime      nicotine (NICODERM CQ) 21 MG/24HR 24 hr patch Place 1 patch onto the skin daily as needed      omeprazole (PRILOSEC) 20 MG CR capsule Take 1 capsule (20 mg) by mouth daily     oxyCODONE (OXYCONTIN) 10 MG 12 hr tablet Take 1 tablet (10 mg) by mouth 2 times daily Give 20 mg QAM and 10 mg Q-Afternoon.     oxyCODONE (OXYCONTIN) 30 MG 12 hr tablet Take 1 tablet (30 mg) by mouth At Bedtime     oxyCODONE HCl (OXAYDO) 5 MG TABA Take 5 mg by mouth 2 times daily as needed (breakthrough pain 8-10/10)     polyethylene glycol (MIRALAX/GLYCOLAX) Packet Take 17 g by mouth daily     senna-docusate (SENOKOT-S;PERICOLACE) 8.6-50 MG per tablet Take 2 tablets by mouth 2 times daily     STATIN NOT PRESCRIBED (INTENTIONAL) 1 each daily Please choose reason not prescribed, below     THIAMINE HCL PO Take 100 mg by mouth daily     tiZANidine (ZANAFLEX) 4 MG capsule Take 1 capsule (4 mg) by mouth 3 times daily as needed for muscle spasms     VENTOLIN  (90 Base) MCG/ACT Inhaler Inhale 2 puffs into the lungs every 4 hours as needed for shortness of breath / dyspnea or wheezing     REVIEW OF  SYSTEMS: 10 point ROS of systems including Constitutional, Eyes, Respiratory, Cardiovascular, Gastroenterology, Genitourinary, Integumentary, Musculoskeletal, Psychiatric were all negative except for pertinent positives noted in my HPI.    Objective:  /80   Pulse 96   Temp 97.1  F (36.2  C)   Resp 30   Wt 67.6 kg (149 lb)   SpO2 90%   BMI 18.87 kg/m     Exam:  GENERAL APPEARANCE: Alert, in no distress, cooperative.   ENT: Mouth and posterior oropharynx normal, moist mucous membranes, hearing acuity [place].  EYES: EOM, conjunctivae, lids, pupils and irises normal, PERRL2.   RESP: Respiratory effort and palpation of chest normal, no respiratory distress, Lung sounds diminished.  CV: Palpation and auscultation of heart done, rate and rhythm regular, no murmur, no rub or gallop, Edema 0+ BLE.  ABDOMEN: Normal bowel sounds, soft, nontender, no hepatosplenomegaly or other masses.  SKIN: Inspection/Palpation of skin and subcutaneous tissue baseline w/ fragility.  NEURO: 2-12 at patient's baseline, sensation baseline PPS.  PSYCH: Insight and judgement, memory baseline, affect and mood normal.    Emergency Department Labs:   Most Recent 3 CBC's:  Recent Labs   Lab Test 03/23/19  1432 03/06/19 12/28/18  1405   WBC 11.4* 8.5 7.4   HGB 9.5* 8.8* 9.7*   MCV 92 98 103*   * 499* 472*     Most Recent 3 BMP's:  Recent Labs   Lab Test 03/23/19  1432 12/28/18  1405 09/26/18  0900   * 132* 134   POTASSIUM 4.6 4.8 4.4   CHLORIDE 92* 100 99   CO2 26 29 29   BUN 20 17 22   CR 0.97 1.08 0.78   ANIONGAP 8 3 6   DAMARI 9.5 9.2 10.0   GLC 95 90 114*     Most Recent 3 INR's:  Recent Labs   Lab Test 03/23/19  1432 03/06/19 03/04/19  0822   INR 5.85* 1.09 0.90     ASSESSMENT/PLAN:  ADA (generalized anxiety disorder)  Chronic pain syndrome  Paradoxical insomnia  Chronic left shoulder pain  History of pulmonary embolism  Encounter for therapeutic drug monitoring  Long term (current) use of anticoagulants  Uncomplicated alcohol  dependence (H)  Carcinoma, lung, left (H)  Acute-on-chronic. Tenuous. Will start Remeron to assist w/ sleep/depression/anxiety in addition to other measures. Will order stool hemoccult x1, and change Nicotine patches to PRN. Noting CBC and INRs are already ordered on follow-up for this patient. Provider to follow-up w/ results or w/in 1 week.     Orders written by provider at facility and transcribed by : Dusty Montano  1. Change Nicotine patches to PRN. patient not ready to start. Notify NP when this changes  2. Remeron 7.5 mg by mouth at bedtime Dx: Depression/ADA.  3. Stool heme occult x1 ASAP Dx: Supertheraputic INR  FYI: Recheck INRs and CBC already ordered.     Electronically signed by:  Dr. Lashanda Suárez, APRHUY CNP DNP            Sincerely,        NATALIE Werner CNP

## 2019-03-25 NOTE — LETTER
3/25/2019        RE: Chilo Oneil  [facility]  [address] [facility] [address]        [place] [facility]  [place] Medical Record Number:  [id number]  Place of Service where encounter took place:  [facility]  ([facility]) [[id number]]  Chief Complaint   Patient presents with     Hospital F/U     HPI:  Chilo Oneil  is a 68 year old (1950), who is being seen today for an episodic care visit s/p trip to ED @ [facility] over the weekend. HPI information obtained from: facility chart records, facility staff, patient report, [facility] chart review and Care Everywhere [facility] chart review. Today's concern is:    ADA (generalized anxiety disorder)  Chronic pain syndrome  Paradoxical insomnia  Current mild episode of major depressive disorder  Chronic left shoulder pain  History of pulmonary embolism  Uncomplicated alcohol dependence (H)  Carcinoma, lung, left (H)  Patient went to ED for short visit, received scans which showed slightly worsened lung carcinoma and small pleural effusion. He was also found to be supratherapeutic on his INR >5, his coumadin is currently on hold w/ a recheck INR ordered for tomorrow.     We had a daryn discussion about his use of ETOH, as his last INR on 3/20 was therapeutic at 2.51. He did state that he went out w/ friends several days after 3/20 and had some beer. His OxyContin was increased in ED, he finds that using his albuterol inhaler is helpful, but he is overall anxious about his financial situation and depressed about the changes seen on CT. He follows-up w/ oncology w/in 1 month. He is nervous. He states he is irritable (nursing staff confirms) he is using PRN Ativan regularly, he states he cannot sleep.  We talked about his Cymbalta dosing, and adding something for more depression/anxiety/sleep control, and he was agreeable to this. He is being seen regularly by in-house psych.  Given these stressors, he  is NOT ready to cut back on smoking, and is asking to not use Nicotine patches at this time. He denies CP, states SOB is improved, denies fever/chills, denies nausea/heartburn, his pain is baseline but exacerbated by anxiety/depression. When asked about bleeding/bruising, he is unsure, but thinks that he had some blood in his stool last night.     Past Medical and Surgical History reviewed in Epic today.  MEDICATIONS:  Medication Sig     ACETAMINOPHEN PO Take 1,000 mg by mouth 3 times daily     AmLODIPine Besylate (NORVASC PO) Take 10 mg by mouth daily     DULoxetine HCl (CYMBALTA PO) Take 60 mg by mouth daily     GABAPENTIN PO Take 300 mg by mouth 2 times daily      LORazepam (ATIVAN) 0.5 MG tablet Take 1 tablet (0.5 mg) by mouth 3 times daily as needed for anxiety     magnesium oxide (MAG-OX) 400 MG tablet Take 400 mg by mouth 2 times daily     MELATONIN PO Take 6 mg by mouth At Bedtime      nicotine (NICODERM CQ) 21 MG/24HR 24 hr patch Place 1 patch onto the skin daily as needed      omeprazole (PRILOSEC) 20 MG CR capsule Take 1 capsule (20 mg) by mouth daily     oxyCODONE (OXYCONTIN) 10 MG 12 hr tablet Take 1 tablet (10 mg) by mouth 2 times daily Give 20 mg QAM and 10 mg Q-Afternoon.     oxyCODONE (OXYCONTIN) 30 MG 12 hr tablet Take 1 tablet (30 mg) by mouth At Bedtime     oxyCODONE HCl (OXAYDO) 5 MG TABA Take 5 mg by mouth 2 times daily as needed (breakthrough pain 8-10/10)     polyethylene glycol (MIRALAX/GLYCOLAX) Packet Take 17 g by mouth daily     senna-docusate (SENOKOT-S;PERICOLACE) 8.6-50 MG per tablet Take 2 tablets by mouth 2 times daily     STATIN NOT PRESCRIBED (INTENTIONAL) 1 each daily Please choose reason not prescribed, below     THIAMINE HCL PO Take 100 mg by mouth daily     tiZANidine (ZANAFLEX) 4 MG capsule Take 1 capsule (4 mg) by mouth 3 times daily as needed for muscle spasms     VENTOLIN  (90 Base) MCG/ACT Inhaler Inhale 2 puffs into the lungs every 4 hours as needed for shortness  of breath / dyspnea or wheezing     REVIEW OF SYSTEMS: 10 point ROS of systems including Constitutional, Eyes, Respiratory, Cardiovascular, Gastroenterology, Genitourinary, Integumentary, Musculoskeletal, Psychiatric were all negative except for pertinent positives noted in my HPI.    Objective:  /80   Pulse 96   Temp 97.1  F (36.2  C)   Resp 30   Wt 67.6 kg (149 lb)   SpO2 90%   BMI 18.87 kg/m     Exam:  GENERAL APPEARANCE: Alert, in no distress, cooperative.   ENT: Mouth and posterior oropharynx normal, moist mucous membranes, hearing acuity [place].  EYES: EOM, conjunctivae, lids, pupils and irises normal, PERRL2.   RESP: Respiratory effort and palpation of chest normal, no respiratory distress, Lung sounds diminished.  CV: Palpation and auscultation of heart done, rate and rhythm regular, no murmur, no rub or gallop, Edema 0+ BLE.  ABDOMEN: Normal bowel sounds, soft, nontender, no hepatosplenomegaly or other masses.  SKIN: Inspection/Palpation of skin and subcutaneous tissue baseline w/ fragility.  NEURO: 2-12 at patient's baseline, sensation baseline PPS.  PSYCH: Insight and judgement, memory baseline, affect and mood normal.    Emergency Department Labs:   Most Recent 3 CBC's:  Recent Labs   Lab Test 03/23/19  1432 03/06/19 12/28/18  1405   WBC 11.4* 8.5 7.4   HGB 9.5* 8.8* 9.7*   MCV 92 98 103*   * 499* 472*     Most Recent 3 BMP's:  Recent Labs   Lab Test 03/23/19  1432 12/28/18  1405 09/26/18  0900   * 132* 134   POTASSIUM 4.6 4.8 4.4   CHLORIDE 92* 100 99   CO2 26 29 29   BUN 20 17 22   CR 0.97 1.08 0.78   ANIONGAP 8 3 6   DAMARI 9.5 9.2 10.0   GLC 95 90 114*     Most Recent 3 INR's:  Recent Labs   Lab Test 03/23/19  1432 03/06/19 03/04/19  0822   INR 5.85* 1.09 0.90     ASSESSMENT/PLAN:  ADA (generalized anxiety disorder)  Chronic pain syndrome  Paradoxical insomnia  Current mild episode of major depressive disorder  Chronic left shoulder pain  History of pulmonary  embolism  Uncomplicated alcohol dependence (H)  Carcinoma, lung, left (H)  Acute-on-chronic. Tenuous. Will start Remeron to assist w/ sleep/depression/anxiety in addition to other measures. Will order stool hemoccult x1, and change Nicotine patches to PRN. Noting CBC and INRs are already ordered on follow-up for this patient. Provider to follow-up w/ results or w/in 1 week.     Orders written by provider at facility and transcribed by : Dusty Montano  1. Change Nicotine patches to PRN. patient not ready to start. Notify NP when this changes  2. Remeron 7.5 mg by mouth at bedtime Dx: Depression/ADA.  3. Stool heme occult x1 ASAP Dx: Supertheraputic INR  FYI: Recheck INRs and CBC already ordered.     Electronically signed by:  Dr. Lashanda Suárez, APRN CNP DNP            Sincerely,        NATALIE Werner CNP

## 2019-03-26 NOTE — PROGRESS NOTES
[place] [facility]  [place] Medical Record Number:  [id number]  Place of Service where encounter took place: [facility]  (FGS) [[id number]]    HPI:    Chilo Oneil is a 68 year old  (1950), who is being seen today for an episodic care visit at the above location.   HPI information obtained from: facility chart records, facility staff, patient report, [facility] chart review and Care Everywhere [facility] chart review. Today's concern is INR/Coumadin management for PE    ROS/Subjective:  Bleeding Signs/Symptoms: None.  Thromboembolic Signs/Symptoms:  None  Medication Changes:  Yes: OxyContin and PRN Oxycodone increased and Remeron started last visit.  Dietary Changes:  Yes. Patient admits to drinking beer.   Activity Changes: No  Bacterial/Viral Infection:  No  Missed Coumadin Doses:  YES, secondary to supratherapeutic INR on 5.85.   On ASA: No  Other Concerns:  No, denies fever/chills, states cough is better. Still not sleeping great, but feeling better.     OBJECTIVE:  /74   Pulse 82   Temp 98  F (36.7  C)   Resp 16   Wt 62.1 kg (137 lb)   SpO2 (!) 86%   BMI 17.35 kg/m    Last INR: 5.85 on 3/23, then 2.63 on 3/26.  INR Today: 1.80.  Current Dose: HELD x 3 days. Then 1mg yesterday. Prior was 6mg Sun/Tu/Th, 3mg AOD.    ASSESSMENT:  History of pulmonary embolism  Encounter for therapeutic drug monitoring  Long term (current) use of anticoagulants  Chronic. Stable. Slightly subtherapeutic. Will dose Coumadin as noted below and recheck INR in 5 days. Noting some slight leukocytosis on labs today, will recheck w/ next blood work. Follow-up accordingly. Therapeutic INR for goal of 2-3.    PLAN:  New Dose: 6mg Tu/Th, 3mg AOD.   Next INR: Monday 4/1/19.    Electronically signed by:  Dr. Lashanda Suárez, APRN CNP DNP

## 2019-03-27 NOTE — LETTER
3/27/2019        RE: Chilo Oneil  [facility]  [address] [facility] [address]        [place] [facility]  [place] Medical Record Number:  [id number]  Place of Service where encounter took place: [facility]  (FGS) [[id number]]    HPI:    Chilo Oneil is a 68 year old  (1950), who is being seen today for an episodic care visit at the above location.   HPI information obtained from: facility chart records, facility staff, patient report, [facility] chart review and Care Everywhere [facility] chart review. Today's concern is INR/Coumadin management for PE    ROS/Subjective:  Bleeding Signs/Symptoms: None.  Thromboembolic Signs/Symptoms:  None  Medication Changes:  Yes: OxyContin and PRN Oxycodone increased and Remeron started last visit.  Dietary Changes:  Yes. Patient admits to drinking beer.   Activity Changes: No  Bacterial/Viral Infection:  No  Missed Coumadin Doses:  YES, secondary to supratherapeutic INR on 5.85.   On ASA: No  Other Concerns:  No, denies fever/chills, states cough is better. Still not sleeping great, but feeling better.     OBJECTIVE:  /74   Pulse 82   Temp 98  F (36.7  C)   Resp 16   Wt 62.1 kg (137 lb)   SpO2 (!) 86%   BMI 17.35 kg/m     Last INR: 5.85 on 3/23, then 2.63 on 3/26.  INR Today: 1.80.  Current Dose: HELD x 3 days. Then 1mg yesterday. Prior was 6mg Sun/Tu/Th, 3mg AOD.    ASSESSMENT:  History of pulmonary embolism  Encounter for therapeutic drug monitoring  Long term (current) use of anticoagulants  Chronic. Stable. Slightly subtherapeutic. Will dose Coumadin as noted below and recheck INR in 5 days. Noting some slight leukocytosis on labs today, will recheck w/ next blood work. Follow-up accordingly. Therapeutic INR for goal of 2-3.    PLAN:  New Dose: 6mg Tu/Th, 3mg AOD.   Next INR: Monday 4/1/19.    Electronically signed by:  NATALIE Harvey CNP DNP      Sincerely,        NATALIE Werner  CNP

## 2019-03-31 NOTE — ED AVS SNAPSHOT
[facility] Emergency Department  [address] [address]  Phone:  [phone]  Fax:  [phone]                                    Chilo Oneil   MRN: [id number]    Department:  [facility] Emergency Department   Date of Visit:  3/31/2019           After Visit Summary Signature Page    I have received my discharge instructions, and my questions have been answered. I have discussed any challenges I see with this plan with the nurse or doctor.    ..........................................................................................................................................  Patient/Patient Representative Signature      ..........................................................................................................................................  Patient Representative Print Name and Relationship to Patient    ..................................................               ................................................  Date                                   Time    ..........................................................................................................................................  Reviewed by Signature/Title    ...................................................              ..............................................  Date                                               Time          22EPIC Rev 08/18

## 2019-03-31 NOTE — PROGRESS NOTES
[place] [facility]  Chief Complaint   Patient presents with     retirement Regulatory   [place] Medical Record Number:  [id number]  Place of Service where encounter took place:  [facility]  ([facility]) [[id number]]    HPI:  Chilo Oneil  is 68 year old (1950), who is being seen today for a federally mandated E/M visit.  HPI information obtained from: facility chart records, facility staff, patient report, [facility] chart review and Care Everywhere [facility] chart review. Today's concerns are:    History of pulmonary embolism  Encounter for therapeutic drug monitoring  Long term (current) use of anticoagulants  Last INR was 1.80 on 3/27, and we resumed previous coumadin orders. Noting patient is intermittently drinking, for which he has been counseled. He denies bleeding/bruising, blood in stool, and his INR was due to be drawn today, but he refused the lab because he was too tired and they came early. Trend is as noted below:   Date INR New Dose/Orders   3/27 1.80 6mg Tu/Th, 3mg AOD.   3/26 2.63 1mg    3/23 5.83 Held Coumadin     Chronic pain syndrome  Carcinoma, lung, left (H)  ADA (generalized anxiety disorder)  Paradoxical insomnia  Patient seen on follow-up today, as we started low-dose Remeron 1 week ago, to assist w/ insomnia, appetite, and depression/anxiety. PHQ-9 last week was 2/27 (comparable to prior).  He is already on Cymbalta. Chart review shows he is utilizing Ativan PRN almost daily, and sometimes BID PRN.  He is also using his breakthrough Oxycodone daily if not several times per day on top of OxyContin. Today, patient states pain is baseline, his sleep I reported as variable but he states he iis dreaming ore. His cough is improved, he continues to smoke w/o end date at this time. We talked extensively about how pain medications will not fix his sleep, cancer, or financial concerns. Last night, patient wanted to go to the ED secondary to constipation (recevied 2 enemas  from nursing on-site @ ), and then received [place]  at ED, which cleared him out. Nursing reports that he had been refusing Miralax for several days. Provider educated patient regarding the important of bowel regimen w/ opioids. He states understanding.     CT scan last night revealed worsened pleural effusion and possible worsened cancer. Patient is to be rescanned tomorrow, and to follow-up w/ oncology. He stated to me that he may go forward and try to resume treatments for cancer. If the treatments will be too hard, he acknowledges that hospice might be helpful.     ALLERGIES:Cipro [ciprofloxacin]PAST MEDICAL HISTORY:   has a past medical history of GERD (gastroesophageal reflux disease), HTN (hypertension), Lung cancer (H), and MI (myocardial infarction) (H). He also has no past medical history of Complication of anesthesia, Difficult intubation, Malignant hyperthermia, or PONV (postoperative nausea and vomiting).PAST SURGICAL HISTORY:   has a past surgical history that includes fracture tx, ankle rt/lt (1975); Open reduction internal fixation hip nailing (9/20/2013); Thoracoscopic biopsy lung (Left, 4/11/2018); vascular surgery; and Insert port vascular access (Left, 5/15/2018).FAMILY HISTORY: family history includes Diabetes in his brother and father.SOCIAL HISTORY:  reports that he has been smoking cigarettes.  He started smoking about 51 years ago. He has a 7.05 pack-year smoking history. he has never used smokeless tobacco. He reports that he drinks alcohol. He reports that he uses drugs. Drug: Marijuana.    MEDICATIONS:  Current Outpatient Medications   Medication Sig Dispense Refill     ACETAMINOPHEN PO Take 1,000 mg by mouth 3 times daily       AmLODIPine Besylate (NORVASC PO) Take 10 mg by mouth daily       DULoxetine HCl (CYMBALTA PO) Take 60 mg by mouth daily       GABAPENTIN PO Take 300 mg by mouth 2 times daily        LORazepam (ATIVAN) 0.5 MG tablet Take 1 tablet (0.5 mg) by mouth 3 times daily  as needed for anxiety 25 tablet 0     magnesium oxide (MAG-OX) 400 MG tablet Take 400 mg by mouth 2 times daily       MELATONIN PO Take 6 mg by mouth At Bedtime        mirtazapine (REMERON) 7.5 MG tablet Take 1 tablet (7.5 mg) by mouth At Bedtime       nicotine (NICODERM CQ) 21 MG/24HR 24 hr patch Place 1 patch onto the skin daily as needed        omeprazole (PRILOSEC) 20 MG CR capsule Take 1 capsule (20 mg) by mouth daily 90 capsule 3     oxyCODONE (OXYCONTIN) 10 MG 12 hr tablet Take 1 tablet (10 mg) by mouth 2 times daily Give 20 mg QAM and 10 mg Q-Afternoon. 90 tablet 0     oxyCODONE (OXYCONTIN) 30 MG 12 hr tablet Take 1 tablet (30 mg) by mouth At Bedtime 30 tablet 0     polyethylene glycol (MIRALAX/GLYCOLAX) Packet Take 17 g by mouth daily       senna-docusate (SENOKOT-S;PERICOLACE) 8.6-50 MG per tablet Take 2 tablets by mouth 2 times daily 100 tablet 0     STATIN NOT PRESCRIBED (INTENTIONAL) 1 each daily Please choose reason not prescribed, below       THIAMINE HCL PO Take 100 mg by mouth daily       tiZANidine (ZANAFLEX) 4 MG capsule Take 1 capsule (4 mg) by mouth 3 times daily as needed for muscle spasms 30 capsule 0     VENTOLIN  (90 Base) MCG/ACT Inhaler Inhale 2 puffs into the lungs every 4 hours as needed for shortness of breath / dyspnea or wheezing 1 Inhaler 1     calcium carbonate (TUMS) 500 MG chewable tablet Take 1 tablet (500 mg) by mouth 2 times daily       oxyCODONE (ROXICODONE) 5 MG tablet Take 1 tablet (5 mg) by mouth every 4 hours as needed for pain 12 tablet 0     Case Management:  I have reviewed the care plan and MDS and do agree with the plan. Patient's desire to return to the community is present, but is not able due to care needs . Information reviewed:  Medications, vital signs, orders, and nursing notes.    ROS: 6- point ROS of systems including Constitutional, Eyes, Respiratory, Cardiovascular, Gastroenterology, Genitourinary, Integumentary, Musculoskeletal, Psychiatric were all  negative except for pertinent positives noted in my HPI.    Vitals:  /68   Pulse 86   Temp 98.3  F (36.8  C)   Resp 18   Wt 62.1 kg (137 lb)   SpO2 92%   BMI 17.12 kg/m    Body mass index is 17.12 kg/m .  Exam:  GENERAL APPEARANCE: Alert, in no distress, cooperative.   ENT: Mouth and posterior oropharynx normal, moist mucous membranes, hearing acuity [place].  EYES: EOM, conjunctivae, lids, pupils and irises normal, PERRL2.   RESP: Respiratory effort and palpation of chest normal, no respiratory distress, Lung sounds diminished throughout.  CV: Palpation and auscultation of heart done, rate and rhythm regular, no murmur, no rub or gallop, Edema 0+ BLE.  ABDOMEN: Normal bowel sounds, soft, nontender, no hepatosplenomegaly or other masses.  SKIN: Inspection/Palpation of skin and subcutaneous tissue baseline w/ fragility.  NEURO: 2-12 at patient's baseline, sensation baseline PPS.  PSYCH: Insight and judgement, memory baseline, affect and mood normal.    Lab/Diagnostic data:     Most Recent 3 CBC's:  Recent Labs   Lab Test 03/31/19 2004 03/23/19  1432 03/06/19   WBC 16.7* 11.4* 8.5   HGB 9.1* 9.5* 8.8*   MCV 93 92 98   * 641* 499*     Most Recent 3 BMP's:  Recent Labs   Lab Test 03/31/19 2004 03/27/19 03/23/19  1432   * 133* 126*   POTASSIUM 3.9 4.1 4.6   CHLORIDE 94 91* 92*   CO2 31 31 26   BUN 16 19 20   CR 1.07 1.34* 0.97   ANIONGAP 7 11 8   DAMARI 8.9 10.5 9.5   GLC 96 67* 95     ASSESSMENT/PLAN  History of pulmonary embolism  Encounter for therapeutic drug monitoring  Long term (current) use of anticoagulants  Chronic. Stable. INR is unknown. Will dose Coumadin as noted below and recheck INR tomorrow as patient agreed that he would let the lab draw his blood after our conversation. Follow-up accordingly.    Chronic pain syndrome  Carcinoma, lung, left (H)  ADA (generalized anxiety disorder)  Paradoxical insomnia  Chronic. Stable. Unchanged or possibly worsened. Patient states understanding that  he needs to take bowel medications. He acknowledges anxiety around changes in health. Provider to follow-up after oncology visit to discuss POC w/ patient further.     Orders written by provider at facility and transcribed by : Dusty Montano  1. Coumadin 3 mg x1 today (4/1) Dx: H/O PE  2. Recheck INR x1 tomorrow (4/2)    Electronically signed by:  Dr. Lashanda Suárez, APRN CNP DNP

## 2019-04-01 NOTE — DISCHARGE INSTRUCTIONS
ICD-10-CM    1. Constipation, unspecified constipation type K59.00     This is likely related to the use of pain medications (oxycodone).  pink lady enema was used here.  Take magnesium citrate 1 bottle on 4/1/2019.  Then miralax 1/2 to 1 capful daily for at least the next week.  then bowel regimen to prevent constiptation.   2. Abdominal pain, generalized R10.84     No serious findings on CT   3. Malignant neoplasm of lower lobe of left lung (H) C34.32    4. Pleural effusion J90     this is increased from prior. discuss further with oncology.  this could be causing the shoulder pain

## 2019-04-01 NOTE — LETTER
4/1/2019        RE: Chilo Oneil  [facility]  [address] [address]        [place] [facility]  Chief Complaint   Patient presents with     detention Regulatory   [place] [facility] Record Number:  [id number]  Place of Service where encounter took place:  [facility]  ([facility]) [[id number]]    HPI:  Chilo Oneil  is 68 year old (1950), who is being seen today for a federally mandated E/M visit.  HPI information obtained from: facility chart records, facility staff, patient report, [facility] chart review and Care Everywhere [facility] chart review. Today's concerns are:    History of pulmonary embolism  Encounter for therapeutic drug monitoring  Long term (current) use of anticoagulants  Last INR was 1.80 on 3/27, and we resumed previous coumadin orders. Noting patient is intermittently drinking, for which he has been counseled. He denies bleeding/bruising, blood in stool, and his INR was due to be drawn today, but he refused the lab because he was too tired and they came early. Trend is as noted below:   Date INR New Dose/Orders   3/27 1.80 6mg Tu/Th, 3mg AOD.   3/26 2.63 1mg    3/23 5.83 Held Coumadin     Chronic pain syndrome  Carcinoma, lung, left (H)  ADA (generalized anxiety disorder)  Paradoxical insomnia  Patient seen on follow-up today, as we started low-dose Remeron 1 week ago, to assist w/ insomnia, appetite, and depression/anxiety. PHQ-9 last week was 2/27 (comparable to prior).  He is already on Cymbalta. Chart review shows he is utilizing Ativan PRN almost daily, and sometimes BID PRN.  He is also using his breakthrough Oxycodone daily if not several times per day on top of OxyContin. Today, patient states pain is baseline, his sleep I reported as variable but he states he iis dreaming ore. His cough is improved, he continues to smoke w/o end date at this time. We talked extensively about how pain medications will not fix his sleep,  cancer, or financial concerns. Last night, patient wanted to go to the ED secondary to constipation (recevied 2 enemas from nursing on-site @ ), and then received [place] lady at ED, which cleared him out. Nursing reports that he had been refusing Miralax for several days. Provider educated patient regarding the important of bowel regimen w/ opioids. He states understanding.     CT scan last night revealed worsened pleural effusion and possible worsened cancer. Patient is to be rescanned tomorrow, and to follow-up w/ oncology. He stated to me that he may go forward and try to resume treatments for cancer. If the treatments will be too hard, he acknowledges that hospice might be helpful.     ALLERGIES:Cipro [ciprofloxacin]PAST MEDICAL HISTORY:   has a past medical history of GERD (gastroesophageal reflux disease), HTN (hypertension), Lung cancer (H), and MI (myocardial infarction) (H). He also has no past medical history of Complication of anesthesia, Difficult intubation, Malignant hyperthermia, or PONV (postoperative nausea and vomiting).PAST SURGICAL HISTORY:   has a past surgical history that includes fracture tx, ankle rt/lt (1975); Open reduction internal fixation hip nailing (9/20/2013); Thoracoscopic biopsy lung (Left, 4/11/2018); vascular surgery; and Insert port vascular access (Left, 5/15/2018).FAMILY HISTORY: family history includes Diabetes in his brother and father.SOCIAL HISTORY:  reports that he has been smoking cigarettes.  He started smoking about 51 years ago. He has a 7.05 pack-year smoking history. he has never used smokeless tobacco. He reports that he drinks alcohol. He reports that he uses drugs. Drug: Marijuana.    MEDICATIONS:  Current Outpatient Medications   Medication Sig Dispense Refill     ACETAMINOPHEN PO Take 1,000 mg by mouth 3 times daily       AmLODIPine Besylate (NORVASC PO) Take 10 mg by mouth daily       DULoxetine HCl (CYMBALTA PO) Take 60 mg by mouth daily       GABAPENTIN PO  Take 300 mg by mouth 2 times daily        LORazepam (ATIVAN) 0.5 MG tablet Take 1 tablet (0.5 mg) by mouth 3 times daily as needed for anxiety 25 tablet 0     magnesium oxide (MAG-OX) 400 MG tablet Take 400 mg by mouth 2 times daily       MELATONIN PO Take 6 mg by mouth At Bedtime        mirtazapine (REMERON) 7.5 MG tablet Take 1 tablet (7.5 mg) by mouth At Bedtime       nicotine (NICODERM CQ) 21 MG/24HR 24 hr patch Place 1 patch onto the skin daily as needed        omeprazole (PRILOSEC) 20 MG CR capsule Take 1 capsule (20 mg) by mouth daily 90 capsule 3     oxyCODONE (OXYCONTIN) 10 MG 12 hr tablet Take 1 tablet (10 mg) by mouth 2 times daily Give 20 mg QAM and 10 mg Q-Afternoon. 90 tablet 0     oxyCODONE (OXYCONTIN) 30 MG 12 hr tablet Take 1 tablet (30 mg) by mouth At Bedtime 30 tablet 0     polyethylene glycol (MIRALAX/GLYCOLAX) Packet Take 17 g by mouth daily       senna-docusate (SENOKOT-S;PERICOLACE) 8.6-50 MG per tablet Take 2 tablets by mouth 2 times daily 100 tablet 0     STATIN NOT PRESCRIBED (INTENTIONAL) 1 each daily Please choose reason not prescribed, below       THIAMINE HCL PO Take 100 mg by mouth daily       tiZANidine (ZANAFLEX) 4 MG capsule Take 1 capsule (4 mg) by mouth 3 times daily as needed for muscle spasms 30 capsule 0     VENTOLIN  (90 Base) MCG/ACT Inhaler Inhale 2 puffs into the lungs every 4 hours as needed for shortness of breath / dyspnea or wheezing 1 Inhaler 1     calcium carbonate (TUMS) 500 MG chewable tablet Take 1 tablet (500 mg) by mouth 2 times daily       oxyCODONE (ROXICODONE) 5 MG tablet Take 1 tablet (5 mg) by mouth every 4 hours as needed for pain 12 tablet 0     Case Management:  I have reviewed the care plan and MDS and do agree with the plan. Patient's desire to return to the community is present, but is not able due to care needs . Information reviewed:  Medications, vital signs, orders, and nursing notes.    ROS: 6- point ROS of systems including Constitutional,  Eyes, Respiratory, Cardiovascular, Gastroenterology, Genitourinary, Integumentary, Musculoskeletal, Psychiatric were all negative except for pertinent positives noted in my HPI.    Vitals:  /68   Pulse 86   Temp 98.3  F (36.8  C)   Resp 18   Wt 62.1 kg (137 lb)   SpO2 92%   BMI 17.12 kg/m     Body mass index is 17.12 kg/m .  Exam:  GENERAL APPEARANCE: Alert, in no distress, cooperative.   ENT: Mouth and posterior oropharynx normal, moist mucous membranes, hearing acuity [place].  EYES: EOM, conjunctivae, lids, pupils and irises normal, PERRL2.   RESP: Respiratory effort and palpation of chest normal, no respiratory distress, Lung sounds diminished throughout.  CV: Palpation and auscultation of heart done, rate and rhythm regular, no murmur, no rub or gallop, Edema 0+ BLE.  ABDOMEN: Normal bowel sounds, soft, nontender, no hepatosplenomegaly or other masses.  SKIN: Inspection/Palpation of skin and subcutaneous tissue baseline w/ fragility.  NEURO: 2-12 at patient's baseline, sensation baseline PPS.  PSYCH: Insight and judgement, memory baseline, affect and mood normal.    Lab/Diagnostic data:     Most Recent 3 CBC's:  Recent Labs   Lab Test 03/31/19 2004 03/23/19  1432 03/06/19   WBC 16.7* 11.4* 8.5   HGB 9.1* 9.5* 8.8*   MCV 93 92 98   * 641* 499*     Most Recent 3 BMP's:  Recent Labs   Lab Test 03/31/19 2004 03/27/19 03/23/19  1432   * 133* 126*   POTASSIUM 3.9 4.1 4.6   CHLORIDE 94 91* 92*   CO2 31 31 26   BUN 16 19 20   CR 1.07 1.34* 0.97   ANIONGAP 7 11 8   DAMARI 8.9 10.5 9.5   GLC 96 67* 95     ASSESSMENT/PLAN  History of pulmonary embolism  Encounter for therapeutic drug monitoring  Long term (current) use of anticoagulants  Chronic. Stable. INR is unknown. Will dose Coumadin as noted below and recheck INR tomorrow as patient agreed that he would let the lab draw his blood after our conversation. Follow-up accordingly.    Chronic pain syndrome  Carcinoma, lung, left (H)  ADA (generalized  anxiety disorder)  Paradoxical insomnia  Chronic. Stable. Unchanged or possibly worsened. Patient states understanding that he needs to take bowel medications. He acknowledges anxiety around changes in health. Provider to follow-up after oncology visit to discuss POC w/ patient further.     Orders written by provider at facility and transcribed by : Dusty Montano  1. Coumadin 3 mg x1 today (4/1) Dx: H/O PE  2. Recheck INR x1 tomorrow (4/2)    Electronically signed by:  NATALIE Harvey CNP DNP      Sincerely,        NATALIE Werner CNP

## 2019-04-01 NOTE — ED TRIAGE NOTES
Pt states no BM x5 days. Denies N/V/D. + abdominal pain. States no results after enemas x2 today. Staff at [facility] stated to EMS, near syncope with enema.

## 2019-04-01 NOTE — ED PROVIDER NOTES
History     Chief Complaint   Patient presents with     Constipation     no BM x5 days     HPI  Chilo Oneil is a 68 year old male who presents with a history of lung cancer followed by Dr. Bell, peripheral arterial disease, prior pulmonary embolism, coronary disease, hypertension, history of acute kidney injury presents from [facility] for abdominal pain and no bowel movement in the last 5 days.  Several enemas were attempted rectal disimpaction was attempted although was very uncomfortable for him.  He has no associated fever.  Has felt nauseous but has had no vomiting.  No dysuria urgency frequency hematuria.  He is no prior abdominal surgeries and denies abdominal hernias.  He has been on several opioids for chest pain radiating to his left shoulder at the site of his lung mass.    Presented 1 week ago for evaluation of chest pain and underwent a CT chest PE study without pulmonary embolism.  He does have the left upper chest mass.  His EKG and troponin were reassuring at that time.    Allergies:  Allergies   Allergen Reactions     Cipro [Ciprofloxacin] Swelling       Problem List:    Patient Active Problem List    Diagnosis Date Noted     Health Care Home 2019     Priority: Medium     [facility] Care Coordinator  Deepthi Wood MA, LSW  [phone]    [facility] CARE PLAN SUMMARY    Member Name:  Chilo Oneil  Address:  [place] [address] Phone: [phone] (home)    :  1950 Date of Assessment:  19 LTTC at [facility]     Health Plan[place]  [facility]  Health Plan Number: [id number] Medical Assistance Number: [id number]  Financial Worker:  [facility]   Case #:     FVP Care Coordinator:  CHARLEY Valdes CC Phone:  [phone]  CC Fax:  [phone]   FV Enrollment Date: 18 Case Mix:    Rate Cell:    Waiver Type:     Primary Emergency Contact: TAHIR NUNEZ (1o HC agent)  Mobile Phone:  [phone]  Relation: Sister    Secondary Emergency Contact: Samm Oneil (2o HC agent)  Mobile Phone: [phone]  Relation: Brother Language:  English  :  No   Health Care Agent/POA:   Advanced Directives/Living Will:     Primary Care Clinic/Phone/Fax:  [facility]/(p) [phone], (f) [phone] Primary Dx:  Lung CA C24.90  Secondary Dx:  HTN I10   Primary Physician:  Lahsanda Suárez   Height:  Not on file  Weight:  157 lbs   Specialty Physician:    Audiologist:     Eye Care Provider:   Dental Care Provider:    [facility] [phone]   Other:             Iron deficiency anemia 01/03/2019     Priority: Medium     Protein-calorie malnutrition (H) 11/13/2018     Priority: Medium     History of pulmonary embolism 09/04/2018     Priority: Medium     Encounter for therapeutic drug monitoring 09/04/2018     Priority: Medium     Chronic left shoulder pain 08/27/2018     Priority: Medium     Hip pain, left, unclear chronicity/etiology 07/17/2018     Priority: Medium     Current episode of major depressive disorder without prior episode 07/16/2018     Priority: Medium     Long term (current) use of anticoagulants 07/13/2018     Priority: Medium     Other pulmonary embolism without acute cor pulmonale, unspecified chronicity (H) 07/10/2018     Priority: Medium     Left shoulder pain, unspecified chronicity 07/10/2018     Priority: Medium     Falls frequently 07/10/2018     Priority: Medium     Near syncope 07/10/2018     Priority: Medium     Coronary artery disease involving native heart without angina pectoris, unspecified vessel or lesion type 07/10/2018     Priority: Medium     H/O acute myocardial infarction 07/10/2018     Priority: Medium     Essential hypertension 07/10/2018     Priority: Medium     CKD (chronic kidney disease) stage 3, GFR 30-59 ml/min (H) 07/10/2018     Priority: Medium     KYMBERLY (acute kidney injury) (H) 07/10/2018     Priority: Medium     Uncomplicated  alcohol dependence (H) 07/10/2018     Priority: Medium     Poor nutrition 07/10/2018     Priority: Medium     Nausea and vomiting, intractability of vomiting not specified, unspecified vomiting type 07/10/2018     Priority: Medium     ADA (generalized anxiety disorder) 07/10/2018     Priority: Medium     Insomnia, unspecified type 07/10/2018     Priority: Medium     Debility 07/10/2018     Priority: Medium     Cognitive impairment 07/10/2018     Priority: Medium     Pancytopenia (H) 07/10/2018     Priority: Medium     Pulmonary emboli (H) 06/05/2018     Priority: Medium     Pulmonary embolism (H) 06/05/2018     Priority: Medium     Hypokalemia 05/31/2018     Priority: Medium     Hypomagnesemia 05/31/2018     Priority: Medium     Carcinoma, lung, left (H) 04/12/2018     Priority: Medium     CAD (coronary artery disease) 03/28/2018     Priority: Medium     No previous angiogram.  No previous stenting.    Atypical Stress Test consistent with possible CAD 8/2016: Myocardial perfusion imaging using single isotope technique demonstrated a small of inferior ischemia at the base to mid ventricle There is a second moderate area of ischemia in the anterior and anteroseptal wall from base through mid ventricle, involving the lateral base as well. There is a small fixed portion in the anteroseptal base consistent with prior infarction There is a fixed inferoseptal defect from apex to mid ventricle consistent with either prior nontransmural infarct or attenuation artifact. Gated images demonstrated inferior, inferoseptal, anterior and anteroseptal basal hypokinesis.  The left ventricular systolic function is 52% at rest and 47% post stress.       Hyponatremia 03/28/2018     Priority: Medium     SOB (shortness of breath) 03/28/2018     Priority: Medium     Chemotherapy induced nausea and vomiting 03/28/2018     Priority: Medium     Lung cancer (H)      Priority: Medium     Left shoulder pain, cough. Bx 12/2017- Non Small Cell.  Resection planned 4/2018       Uncomplicated asthma      Priority: Medium     Anxiety 12/28/2017     Priority: Medium     Gastroesophageal reflux disease without esophagitis 12/28/2017     Priority: Medium     PAD (peripheral artery disease) (H) 06/13/2016     Priority: Medium     Benign essential hypertension 06/13/2016     Priority: Medium     Hyperlipidemia LDL goal <100 06/13/2016     Priority: Medium     Tobacco use disorder 10/16/2009     Priority: Medium        Past Medical History:    Past Medical History:   Diagnosis Date     GERD (gastroesophageal reflux disease)      HTN (hypertension)      Lung cancer (H)      MI (myocardial infarction) (H)        Past Surgical History:    Past Surgical History:   Procedure Laterality Date     FRACTURE TX, ANKLE RT/LT  1975    Fracture TX Ankle, LT     INSERT PORT VASCULAR ACCESS Left 5/15/2018    Procedure: INSERT PORT VASCULAR ACCESS;  Left chest Port-a-Cath Placement;  Surgeon: Gurjit Fox MD;  Location: [facility] OR     OPEN REDUCTION INTERNAL FIXATION HIP NAILING  9/20/2013    Procedure: OPEN REDUCTION INTERNAL FIXATION HIP NAILING;  Left Hip Open Reduction Internal Fixation ;  Surgeon: Rosas Dennis MD;  Location: [facility] OR     THORACOSCOPIC BIOPSY LUNG Left 4/11/2018    Procedure: THORACOSCOPIC BIOPSY LUNG;  subxiphoid left video assisted thorascopic surgery pleural biops, left lower lobe wedge biopsy ;  Surgeon: Mega Moreno MD;  Location:  OR     VASCULAR SURGERY         Family History:    Family History   Problem Relation Age of Onset     Diabetes Brother         type 2     Diabetes Father        Social History:  Marital Status:  Single [1]  Social History     Tobacco Use     Smoking status: Current Every Day Smoker     Packs/day: 0.15     Years: 47.00     Pack years: 7.05     Types: Cigarettes     Start date: 12/26/1967     Smokeless tobacco: Never Used     Tobacco comment: 4-5 cigs daily.    Substance Use Topics     Alcohol use: Yes      "Comment: 6-8 beers per day, last 3/27 at 6pm     Drug use: Yes     Types: Marijuana     Comment: 3 times a day, 2-3 times/week for pain        Medications:      ACETAMINOPHEN PO   AmLODIPine Besylate (NORVASC PO)   calcium carbonate (TUMS) 500 MG chewable tablet   DULoxetine HCl (CYMBALTA PO)   GABAPENTIN PO   guaiFENesin (ORGANIDIN) 200 MG tablet   LORazepam (ATIVAN) 0.5 MG tablet   magnesium oxide (MAG-OX) 400 MG tablet   MELATONIN PO   mirtazapine (REMERON) 7.5 MG tablet   nicotine (NICODERM CQ) 21 MG/24HR 24 hr patch   omeprazole (PRILOSEC) 20 MG CR capsule   oxyCODONE (OXYCONTIN) 10 MG 12 hr tablet   oxyCODONE (OXYCONTIN) 30 MG 12 hr tablet   oxyCODONE (ROXICODONE) 5 MG tablet   polyethylene glycol (MIRALAX/GLYCOLAX) Packet   senna-docusate (SENOKOT-S;PERICOLACE) 8.6-50 MG per tablet   STATIN NOT PRESCRIBED (INTENTIONAL)   THIAMINE HCL PO   tiZANidine (ZANAFLEX) 4 MG capsule   VENTOLIN  (90 Base) MCG/ACT Inhaler         Review of Systems   Constitutional: Negative for chills, diaphoresis and fever.   HENT: Negative for ear pain, sinus pressure and sore throat.    Eyes: Negative for visual disturbance.   Respiratory: Negative for cough, shortness of breath and wheezing.    Cardiovascular: Negative for chest pain and palpitations.   Gastrointestinal: Positive for abdominal pain and constipation. Negative for blood in stool, diarrhea, nausea and vomiting.   Genitourinary: Negative for dysuria, frequency and urgency.   Skin: Negative for rash.   Neurological: Negative for headaches.   All other systems reviewed and are negative.      Physical Exam   BP: 148/79  Pulse: 75  Temp: 97.8  F (36.6  C)  Resp: 18  Height: 190.5 cm (6' 3\")  Weight: 63.5 kg (140 lb)  SpO2: 97 %      Physical Exam   Constitutional: He appears distressed.   HENT:   Mouth/Throat: Oropharynx is clear and moist.   Eyes: Conjunctivae are normal.   Neck: Neck supple.   Cardiovascular: Normal rate, regular rhythm and normal heart sounds. "   Pulmonary/Chest: Effort normal and breath sounds normal. No stridor. No respiratory distress. He has no wheezes.   Abdominal: Soft. Bowel sounds are normal. He exhibits no distension and no mass. There is tenderness. There is no guarding.   Musculoskeletal: He exhibits no edema.   Neurological: He exhibits normal muscle tone.   Skin: He is not diaphoretic. No pallor.     Rectal exam is without stool and needs no disimpaction.  no mass palpated.    Patient is complaining of shoulder pain and is in moderate distress related to this.  He notes that it frequently radiates from the upper chest into the shoulder related to his lung mass.  Age of motion of the shoulders not appear to exacerbate his symptoms.    ED Course        Procedures               Critical Care time:  none               Results for orders placed or performed during the hospital encounter of 03/31/19 (from the past 24 hour(s))   CBC with platelets differential   Result Value Ref Range    WBC 16.7 (H) 4.0 - 11.0 10e9/L    RBC Count 3.07 (L) 4.4 - 5.9 10e12/L    Hemoglobin 9.1 (L) 13.3 - 17.7 g/dL    Hematocrit 28.4 (L) 40.0 - 53.0 %    MCV 93 78 - 100 fl    MCH 29.6 26.5 - 33.0 pg    MCHC 32.0 31.5 - 36.5 g/dL    RDW 14.6 10.0 - 15.0 %    Platelet Count 611 (H) 150 - 450 10e9/L    Diff Method Automated Method     % Neutrophils 85.3 %    % Lymphocytes 7.7 %    % Monocytes 5.9 %    % Eosinophils 0.4 %    % Basophils 0.2 %    % Immature Granulocytes 0.5 %    Nucleated RBCs 0 0 /100    Absolute Neutrophil 14.3 (H) 1.6 - 8.3 10e9/L    Absolute Lymphocytes 1.3 0.8 - 5.3 10e9/L    Absolute Monocytes 1.0 0.0 - 1.3 10e9/L    Absolute Eosinophils 0.1 0.0 - 0.7 10e9/L    Absolute Basophils 0.0 0.0 - 0.2 10e9/L    Abs Immature Granulocytes 0.1 0 - 0.4 10e9/L    Absolute Nucleated RBC 0.0    Comprehensive metabolic panel   Result Value Ref Range    Sodium 132 (L) 133 - 144 mmol/L    Potassium 3.9 3.4 - 5.3 mmol/L    Chloride 94 94 - 109 mmol/L    Carbon Dioxide 31  20 - 32 mmol/L    Anion Gap 7 3 - 14 mmol/L    Glucose 96 70 - 99 mg/dL    Urea Nitrogen 16 7 - 30 mg/dL    Creatinine 1.07 0.66 - 1.25 mg/dL    GFR Estimate 71 >60 mL/min/[1.73_m2]    GFR Estimate If Black 82 >60 mL/min/[1.73_m2]    Calcium 8.9 8.5 - 10.1 mg/dL    Bilirubin Total 0.2 0.2 - 1.3 mg/dL    Albumin 2.8 (L) 3.4 - 5.0 g/dL    Protein Total 6.8 6.8 - 8.8 g/dL    Alkaline Phosphatase 101 40 - 150 U/L    ALT 12 0 - 70 U/L    AST 11 0 - 45 U/L   Lipase   Result Value Ref Range    Lipase 87 73 - 393 U/L   UA with Microscopic   Result Value Ref Range    Color Urine Yellow     Appearance Urine Clear     Glucose Urine Negative NEG^Negative mg/dL    Bilirubin Urine Negative NEG^Negative    Ketones Urine Negative NEG^Negative mg/dL    Specific Gravity Urine 1.016 1.003 - 1.035    Blood Urine Negative NEG^Negative    pH Urine 5.0 5.0 - 7.0 pH    Protein Albumin Urine Negative NEG^Negative mg/dL    Urobilinogen mg/dL 0.0 0.0 - 2.0 mg/dL    Nitrite Urine Negative NEG^Negative    Leukocyte Esterase Urine Negative NEG^Negative    Source Midstream Urine     WBC Urine 1 0 - 5 /HPF    RBC Urine <1 0 - 2 /HPF    Hyaline Casts 4 (H) 0 - 2 /LPF   CT Abdomen Pelvis w Contrast    Narrative    CT ABDOMEN PELVIS WITH CONTRAST   3/31/2019 9:40 PM     HISTORY: Abdominal pain. History of lung cancer.    TECHNIQUE: 68 mL Isovue 370 IV were administered. After contrast  administration, volumetric helical sections were acquired from the  lung bases to the ischial tuberosities. Coronal images were also  reconstructed. Radiation dose for this scan was reduced using  automated exposure control, adjustment of the mA and/or kV according  to patient size, or iterative reconstruction technique.    COMPARISON: CT of the chest, abdomen, and pelvis performed 12/28/2018.      FINDINGS: Small to moderate left pleural effusion may be partially  loculated, and is new since the previous exam. There is mild  associated compressive atelectasis at the  left lung base. Coronary  artery calcification. The liver, gallbladder, spleen, adrenal glands,  and pancreas are unremarkable. There are two right renal cysts noted,  with the largest in the lower pole measuring 4 cm. The kidneys are  otherwise unremarkable. No hydronephrosis. Advanced atherosclerotic  aortoiliac calcification. No bowel obstruction. Moderate amount of  stool throughout the colon and within the rectum. No convincing  evidence for colitis or diverticulitis.  No free fluid in the pelvis.  There is moderate prostatic calcification. Old fractures of the right  inferior pubic ramus and right pubic bone are again noted. Three  surgical screws are noted within the left femoral head and neck.  Degenerative changes are noted in the visualized thoracolumbar spine.      Impression    IMPRESSION:   1. No acute abnormality in the abdomen. No cause for abdominal pain is  identified.  2. Small to moderate left pleural effusion is new since the previous  exam, and may be partially loculated.  3. Moderate amount of stool throughout the colon and within the  rectum.    RICCO DUARTE MD       Medications   sodium chloride 0.9% infusion (not administered)   pink lady enema (not administered)   HYDROmorphone (PF) (DILAUDID) injection 0.5 mg (not administered)   oxyCODONE (ROXICODONE) tablet 10 mg (not administered)       Assessments & Plan (with Medical Decision Making)     MDM: Chilo Oneil is a 68 year old male who presents with history of lung cancer and chronic pain on increasing doses of opioids presenting here from [facility].  He was refractory to enemas performed at the [facility].  He also had attempted disimpaction there.  He has had lower abdominal pain associated with his current symptoms and nausea without vomiting.  He has not stooled in 5 days.  He also has chronic shoulder pain radiating from the lower chest and upper abdomen that is been persistent.  He was treated for the pain here  as well as undergoing CT of the abdomen for moderate abdominal tenderness on palpation with normal vital signs and afebrile.  CT demonstrated no new change within the abdomen but he does have a lower lobe effusion that may be new from prior and recommended for follow-up.  He had good relief of his pain with significant stool output with pink lady enema.  I have made additional recommendations for bowel regimen.  This is likely related to his use of opioids for his lung cancer related pain.  Precautions are given for return.    I have reviewed the nursing notes.    I have reviewed the findings, diagnosis, plan and need for follow up with the patient.          Medication List      There are no discharge medications for this visit.         Final diagnoses:   Constipation, unspecified constipation type - This is likely related to the use of pain medications (oxycodone).  pink lady enema was used here.  Take magnesium citrate 1 bottle on 4/1/2019.  Then miralax 1/2 to 1 capful daily for at least the next week.  then bowel regimen to prevent constiptation.   Abdominal pain, generalized - No serious findings on CT   Malignant neoplasm of lower lobe of left lung (H)   Pleural effusion - this is increased from prior. discuss further with oncology.  this could be causing the shoulder pain       3/31/2019   [facility] EMERGENCY DEPARTMENT     Carlo Diaz MD  04/01/19 0218

## 2019-04-04 NOTE — LETTER
4/4/2019        RE: Chilo Oneil  [facility]  [address] [facility] [address]        [facility]  [facility] Record Number:  [id number]  Place of Service where encounter took place: [facility]  ([facility]) [[id number]]    HPI: Chilo Oneil is a 68 year old  (1950), who is being seen today for an episodic care visit at the above location.   HPI information obtained from: facility chart records, facility staff, patient report, [facility] chart review and Care Everywhere [facility] chart review. Today's concern is INR/Coumadin management for PE    ROS/Subjective:  Bleeding Signs/Symptoms:  None  Thromboembolic Signs/Symptoms:  None  Medication Changes:  No  Dietary Changes:  No  Activity Changes: No  Bacterial/Viral Infection:  No  Missed Coumadin Doses:  None  On ASA: No  Other Concerns:  No    OBJECTIVE:  /64   Pulse 82   Temp 98.2  F (36.8  C)   Resp 18   Wt 63.7 kg (140 lb 6.4 oz)   SpO2 93%   BMI 17.55 kg/m     Last INR: 2.04 on 4/2/19.  INR Today: 2.79.  Current Dose: 6mg Tu/Th, 3mg AOD.     ASSESSMENT:  History of pulmonary embolism  Encounter for therapeutic drug monitoring  Long term (current) use of anticoagulants  Chronic. Stable. Therapeutic. Will dose Coumadin as noted below and recheck INR in about 1 week. Follow-up accordingly. Therapeutic INR for goal of 2-3.     PLAN:   transcribed by : Dusty Montano  New Dose: Coumadin 6mg Tu/Th, 3 mg AOD. Dx: H/O PE.  Next INR: 4/10/19.    Electronically signed by:  Dr. Lashanda Suárez APRN CNP DNP      Sincerely,        NATALIE Werner CNP

## 2019-04-04 NOTE — PROGRESS NOTES
[facility]  [facility] Record Number:  [id number]  Place of Service where encounter took place: [facility]  ([facility]) [[id number]]    HPI: Chilo Oneil is a 68 year old  (1950), who is being seen today for an episodic care visit at the above location.   HPI information obtained from: facility chart records, facility staff, patient report, [facility] chart review and Care Everywhere [facility] chart review. Today's concern is INR/Coumadin management for PE    ROS/Subjective:  Bleeding Signs/Symptoms:  None  Thromboembolic Signs/Symptoms:  None  Medication Changes:  No  Dietary Changes:  No  Activity Changes: No  Bacterial/Viral Infection:  No  Missed Coumadin Doses:  None  On ASA: No  Other Concerns:  No    OBJECTIVE:  /64   Pulse 82   Temp 98.2  F (36.8  C)   Resp 18   Wt 63.7 kg (140 lb 6.4 oz)   SpO2 93%   BMI 17.55 kg/m    Last INR: 2.04 on 4/2/19.  INR Today: 2.79.  Current Dose: 6mg Tu/Th, 3mg AOD.     ASSESSMENT:  History of pulmonary embolism  Encounter for therapeutic drug monitoring  Long term (current) use of anticoagulants  Chronic. Stable. Therapeutic. Will dose Coumadin as noted below and recheck INR in about 1 week. Follow-up accordingly. Therapeutic INR for goal of 2-3.     PLAN:   transcribed by : Dusty Montano  New Dose: Coumadin 6mg Tu/Th, 3 mg AOD. Dx: H/O PE.  Next INR: 4/10/19.    Electronically signed by:  Dr. Lashanda Suárez, APRN CNP DNP

## 2019-04-05 NOTE — PATIENT INSTRUCTIONS
Please arrange for bone scan.  CT-guided biopsy of lung nodule left upper lobe.  Send samples for next generation testing and PDL 1.  Follow-up with the results

## 2019-04-05 NOTE — PROGRESS NOTES
04/09/19  Patient is not managed by [facility]. He is dosed per the NP at the Nursing home.    Flakito Pardo RN, BSN, PHN  Anticoagulation Clinic   [phone]        Per Dr. Mccauley, patient will need to hold coumadin x 5 days prior to CT guided lung biopsy. He does not need to bridge with lovenox as his PE was in June 2018.    Facility where patient is staying has been updated and this encounter has been routed to the anticoagulation clinic.

## 2019-04-05 NOTE — LETTER
4/5/2019         RE: Chilo Oneil  [facility]  [address] [place] [id number]        Dear Colleague,    Thank you for referring your patient, Chilo Oneil, to the [facility]. Please see a copy of my visit note below.    04/09/19  Patient is not managed by [facility]. He is dosed per the NP at the Nursing home.    Flakito Pardo RN, BSN, PHN  Anticoagulation Clinic   [phone]        Per Dr. Mccauley, patient will need to hold coumadin x 5 days prior to CT guided lung biopsy. He does not need to bridge with lovenox as his PE was in June 2018.    Facility where patient is staying has been updated and this encounter has been routed to the anticoagulation clinic.    Hematology/ Oncology Follow-up Visit:  Apr 5, 2019    Reason for Visit:   Chief Complaint   Patient presents with     Oncology Clinic Visit     Carcinoma, lung, left (H), F/up after ED, review labs and CT       Oncologic History:    Carcinoma, lung, left (H)  Patient presented with L side shoulder and has been traveling  to L/side about  to the whole L/side upper back and L side of chest for about 3 week he has had the pain has been taking aleve.  Subsequently went on to have a CT scan done.  Which showed 1.6 cm nodular infiltrate in the left upper lobe.  The lesion appear spiculated and worrisome for lung cancer.  There is a second 0.7 cm indeterminate nodule in the lingular portion of the left lung.  Subsequently the patient went on to have CT-guided biopsy.  The pathology came back showing moderately differentiated adenocarcinoma. He had subsequent noted left thoracoscopic left pleural biopsies and left lobe which resection on April 11, 2018.  The surgical pathology came back with pleural biopsies came back positive for invasive adenocarcinoma with parietal pleural invasion.  The wedge resection came back with adenocarcinoma with acinar patterns of growth moderately differentiated the tumor size is 1.1 and 0.3 cm  into 2 separate tumor nodules.  Visceropleural invasion was identified the margins were negative lymphovascular invasion was not identified large venous artery involvement was not identified as well.  The pathologic staging of pT3N0.        Interval History:  She returning today for follow-up.  Is been having increasing left shoulder pain.  He denies any cough or shortness of breath or cough or wheezing.  He denies any weight loss.  He denies any fever or chills.    Review Of Systems:  Constitutional: Negative for fever, chills, and night sweats.  Skin: negative.  Eyes: negative.  Ears/Nose/Throat: negative.  Respiratory: No shortness of breath, dyspnea on exertion, cough, or hemoptysis.  Cardiovascular: negative.  Gastrointestinal: negative.  Genitourinary: negative.  Musculoskeletal: negative.  Neurologic: negative.  Psychiatric: negative.  Hematologic/Lymphatic/Immunologic: negative.  Endocrine: negative.    All other ROS negative unless mentioned in interval history.    Past medical, social, surgical, and family histories reviewed.    Allergies:  Allergies as of 04/05/2019 - Reviewed 04/05/2019   Allergen Reaction Noted     Cipro [ciprofloxacin] Swelling 06/22/2009       Current Medications:  Current Outpatient Medications   Medication Sig Dispense Refill     ACETAMINOPHEN PO Take 1,000 mg by mouth 3 times daily       AmLODIPine Besylate (NORVASC PO) Take 10 mg by mouth daily       DULoxetine HCl (CYMBALTA PO) Take 60 mg by mouth daily       GABAPENTIN PO Take 300 mg by mouth 2 times daily        LORazepam (ATIVAN) 0.5 MG tablet Take 1 tablet (0.5 mg) by mouth 3 times daily as needed for anxiety 25 tablet 0     magnesium oxide (MAG-OX) 400 MG tablet Take 400 mg by mouth 2 times daily       MELATONIN PO Take 6 mg by mouth At Bedtime        mirtazapine (REMERON) 7.5 MG tablet Take 1 tablet (7.5 mg) by mouth At Bedtime       nicotine (NICODERM CQ) 21 MG/24HR 24 hr patch Place 1 patch onto the skin daily as needed    "     omeprazole (PRILOSEC) 20 MG CR capsule Take 1 capsule (20 mg) by mouth daily 90 capsule 3     oxyCODONE (OXYCONTIN) 10 MG 12 hr tablet Take 1 tablet (10 mg) by mouth 2 times daily Give 20 mg QAM and 10 mg Q-Afternoon. 90 tablet 0     oxyCODONE (OXYCONTIN) 30 MG 12 hr tablet Take 1 tablet (30 mg) by mouth At Bedtime 30 tablet 0     oxyCODONE (ROXICODONE) 5 MG tablet Take 1 tablet (5 mg) by mouth every 4 hours as needed for pain 12 tablet 0     polyethylene glycol (MIRALAX/GLYCOLAX) Packet Take 17 g by mouth daily       senna-docusate (SENOKOT-S;PERICOLACE) 8.6-50 MG per tablet Take 2 tablets by mouth 2 times daily 100 tablet 0     STATIN NOT PRESCRIBED (INTENTIONAL) 1 each daily Please choose reason not prescribed, below       THIAMINE HCL PO Take 100 mg by mouth daily       tiZANidine (ZANAFLEX) 4 MG capsule Take 1 capsule (4 mg) by mouth 3 times daily as needed for muscle spasms 30 capsule 0     VENTOLIN  (90 Base) MCG/ACT Inhaler Inhale 2 puffs into the lungs every 4 hours as needed for shortness of breath / dyspnea or wheezing 1 Inhaler 1     Warfarin Sodium (COUMADIN PO) Take by mouth daily Take daily as directed per INR results          Physical Exam:  /76 (BP Location: Right arm, Patient Position: Sitting, Cuff Size: Adult Regular)   Pulse 99   Temp 97.8  F (36.6  C) (Axillary)   Resp 20   Ht 1.905 m (6' 3\")   Wt 65.2 kg (143 lb 11.2 oz)   SpO2 94%   BMI 17.96 kg/m     Wt Readings from Last 12 Encounters:   04/05/19 65.2 kg (143 lb 11.2 oz)   04/04/19 63.7 kg (140 lb 6.4 oz)   04/01/19 62.1 kg (137 lb)   03/31/19 63.5 kg (140 lb)   03/27/19 62.1 kg (137 lb)   03/25/19 67.6 kg (149 lb)   03/23/19 68 kg (150 lb)   03/20/19 67.6 kg (149 lb)   03/14/19 67.7 kg (149 lb 3.2 oz)   03/12/19 67.7 kg (149 lb 3.2 oz)   03/08/19 68.9 kg (152 lb)   03/06/19 68.9 kg (152 lb)     ECOG performance status: 1  GENERAL APPEARANCE: Healthy, alert and in no acute distress.  HEENT: Sclerae anicteric. " PERRLA. Oropharynx without ulcers, lesions, or thrush.  NECK: Supple. No asymmetry or masses.  LYMPHATICS: No palpable cervical, supraclavicular, axillary, or inguinal lymphadenopathy.  RESP: Lungs clear to auscultation bilaterally without rales, rhonchi or wheezes.  CARDIOVASCULAR: Regular rate and rhythm. Normal S1, S2; no S3 or S4. No murmur, gallop, or rub.  ABDOMEN: Soft, nontender. Bowel sounds normal. No palpable organomegaly or masses.  MUSCULOSKELETAL: Extremities without gross deformities noted. No edema of bilateral lower extremities.  SKIN: No suspicious lesions or rashes.  NEURO: Alert and oriented x 3. Cranial nerves II-XII grossly intact.  PSYCHIATRIC: Mentation and affect appear normal.    Laboratory/Imaging Studies:  Infusion Therapy Visit on 04/02/2019   Component Date Value Ref Range Status     WBC 04/02/2019 11.5* 4.0 - 11.0 10e9/L Final     RBC Count 04/02/2019 3.31* 4.4 - 5.9 10e12/L Final     Hemoglobin 04/02/2019 9.5* 13.3 - 17.7 g/dL Final     Hematocrit 04/02/2019 30.5* 40.0 - 53.0 % Final     MCV 04/02/2019 92  78 - 100 fl Final     MCH 04/02/2019 28.7  26.5 - 33.0 pg Final     MCHC 04/02/2019 31.1* 31.5 - 36.5 g/dL Final     RDW 04/02/2019 14.6  10.0 - 15.0 % Final     Platelet Count 04/02/2019 672* 150 - 450 10e9/L Final     Diff Method 04/02/2019 Automated Method   Final     % Neutrophils 04/02/2019 77.9  % Final     % Lymphocytes 04/02/2019 11.8  % Final     % Monocytes 04/02/2019 7.4  % Final     % Eosinophils 04/02/2019 1.9  % Final     % Basophils 04/02/2019 0.5  % Final     % Immature Granulocytes 04/02/2019 0.5  % Final     Nucleated RBCs 04/02/2019 0  0 /100 Final     Absolute Neutrophil 04/02/2019 8.9* 1.6 - 8.3 10e9/L Final     Absolute Lymphocytes 04/02/2019 1.4  0.8 - 5.3 10e9/L Final     Absolute Monocytes 04/02/2019 0.9  0.0 - 1.3 10e9/L Final     Absolute Eosinophils 04/02/2019 0.2  0.0 - 0.7 10e9/L Final     Absolute Basophils 04/02/2019 0.1  0.0 - 0.2 10e9/L Final      Abs Immature Granulocytes 04/02/2019 0.1  0 - 0.4 10e9/L Final     Absolute Nucleated RBC 04/02/2019 0.0   Final     Sodium 04/02/2019 134  133 - 144 mmol/L Final     Potassium 04/02/2019 4.2  3.4 - 5.3 mmol/L Final     Chloride 04/02/2019 96  94 - 109 mmol/L Final     Carbon Dioxide 04/02/2019 33* 20 - 32 mmol/L Final     Anion Gap 04/02/2019 5  3 - 14 mmol/L Final     Glucose 04/02/2019 106* 70 - 99 mg/dL Final     Urea Nitrogen 04/02/2019 12  7 - 30 mg/dL Final     Creatinine 04/02/2019 1.07  0.66 - 1.25 mg/dL Final     GFR Estimate 04/02/2019 71  >60 mL/min/[1.73_m2] Final    Comment: Non  GFR Calc  Starting 12/18/2018, serum creatinine based estimated GFR (eGFR) will be   calculated using the Chronic Kidney Disease Epidemiology Collaboration   (CKD-EPI) equation.       GFR Estimate If Black 04/02/2019 82  >60 mL/min/[1.73_m2] Final    Comment:  GFR Calc  Starting 12/18/2018, serum creatinine based estimated GFR (eGFR) will be   calculated using the Chronic Kidney Disease Epidemiology Collaboration   (CKD-EPI) equation.       Calcium 04/02/2019 9.2  8.5 - 10.1 mg/dL Final     Bilirubin Total 04/02/2019 0.2  0.2 - 1.3 mg/dL Final     Albumin 04/02/2019 2.9* 3.4 - 5.0 g/dL Final     Protein Total 04/02/2019 7.2  6.8 - 8.8 g/dL Final     Alkaline Phosphatase 04/02/2019 109  40 - 150 U/L Final     ALT 04/02/2019 13  0 - 70 U/L Final     AST 04/02/2019 13  0 - 45 U/L Final   Transferred Records on 03/27/2019   Component Date Value Ref Range Status     INR 03/27/2019 1.80* 0.90 - 1.10 Final     Sodium 03/27/2019 133* 136 - 145 mmol/L Final     Potassium 03/27/2019 4.1  3.5 - 5.0 mmol/L Final     Chloride 03/27/2019 91* 98 - 107 mmol/L Final     Carbon Dioxide 03/27/2019 31  22 - 31 mmol/L Final     Anion Gap 03/27/2019 11  5 - 18 mmol/L Final     Glucose 03/27/2019 67* 70 - 125 mg/dL Final     Urea Nitrogen 03/27/2019 19  8 - 22 mg/dL Final     Creatinine 03/27/2019 1.34* 0.70 - 1.30  mg/dL Final     Calcium 03/27/2019 10.5  8.5 - 10.5 mg/dL Final     Protein Total 03/27/2019 7.1  6.0 - 8.0 g/dL Final     Albumin 03/27/2019 2.9* 3.5 - 5.0 g/dL Final     Bilirubin Total 03/27/2019 0.2  0.0 - 1.0 mg/dL Final     Alkaline Phosphatase 03/27/2019 114  45 - 120 U/L Final     AST 03/27/2019 14  0 - 40 U/L Final     ALT 03/27/2019 <9  0 - 45 U/L Final     GFR Estimate 03/27/2019 53* >60 ml/min/1.73m2 Final     GFR Estimate If Black 03/27/2019 >60  >60 ml/min/1.73m2 Final     TSH 03/27/2019 1.26  0.30 - 5.00 uIU/mL Final        Recent Results (from the past 744 hour(s))   CT Chest Pulmonary Embolism w Contrast    Narrative    Exam: CT CHEST PULMONARY EMBOLISM W CONTRAST 3/23/2019 2:57 PM    Comparison: 2/28/2018     Clinical History: Dyspnea, lung cancer.    Technique: Volumetric helical acquisition of the chest were obtained  following pulmonary embolism protocol. Three-dimensional (3D)  post-processed angiographic images were reconstructed, archived to  PACS and used in interpretation of this study. Radiation dose for this  scan was reduced using automated exposure control, adjustment of the  mA and/or kV according to patient size, or iterative reconstruction  technique.    Contrast: 69 mL Isovue 370    Findings:  Contrast bolus timing is adequate for evaluation for pulmonary emboli.  There is no evidence of pulmonary emboli.  There is no evidence of  right heart strain.     Apically predominant emphysema. Spiculated nodule anteriorly in the  LEFT upper lobe measures 1.9 x 1.8 x 1.4 cm (series 5 image 67 and  series 7 image 38), increased from 1.4 x 0.9 x 0.8 cm on 12/28/2018.  Moderate loculated pleural effusion on the LEFT. RIGHT lung is clear.    The heart size and great vessels are normal in caliber. Mild  mediastinal and RIGHT hilar lymphadenopathy again seen, does not  appear significantly changed.    Upper abdomen: Evaluation of the upper abdomen is limited by contrast  bolus timing and  coverage.    Bones: No concerning lytic or sclerotic lesions.      Impression    Impression:   1. LEFT upper lobe pulmonary nodule increased in size since  12/28/2018. Additionally, loculated LEFT pleural effusion is new since  that time.  2. No evidence of pulmonary bolus.  3. Mild apically predominant emphysema.    SAIGE WATT MD   CT Abdomen Pelvis w Contrast    Narrative    CT ABDOMEN PELVIS WITH CONTRAST   3/31/2019 9:40 PM     HISTORY: Abdominal pain. History of lung cancer.    TECHNIQUE: 68 mL Isovue 370 IV were administered. After contrast  administration, volumetric helical sections were acquired from the  lung bases to the ischial tuberosities. Coronal images were also  reconstructed. Radiation dose for this scan was reduced using  automated exposure control, adjustment of the mA and/or kV according  to patient size, or iterative reconstruction technique.    COMPARISON: CT of the chest, abdomen, and pelvis performed 12/28/2018.      FINDINGS: Small to moderate left pleural effusion may be partially  loculated, and is new since the previous exam. There is mild  associated compressive atelectasis at the left lung base. Coronary  artery calcification. The liver, gallbladder, spleen, adrenal glands,  and pancreas are unremarkable. There are two right renal cysts noted,  with the largest in the lower pole measuring 4 cm. The kidneys are  otherwise unremarkable. No hydronephrosis. Advanced atherosclerotic  aortoiliac calcification. No bowel obstruction. Moderate amount of  stool throughout the colon and within the rectum. No convincing  evidence for colitis or diverticulitis.  No free fluid in the pelvis.  There is moderate prostatic calcification. Old fractures of the right  inferior pubic ramus and right pubic bone are again noted. Three  surgical screws are noted within the left femoral head and neck.  Degenerative changes are noted in the visualized thoracolumbar spine.      Impression    IMPRESSION:   1.  No acute abnormality in the abdomen. No cause for abdominal pain is  identified.  2. Small to moderate left pleural effusion is new since the previous  exam, and may be partially loculated.  3. Moderate amount of stool throughout the colon and within the  rectum.    RICCO DUARTE MD       Assessment and plan:    (C34.92) Carcinoma, lung, left (H)  (primary encounter diagnosis)  Reviewed with the patient today most recent imaging studies.  I would recommend to arrange for CT-guided FNA from the spiculated lesion in the left upper lobe.  I will also arrange for a bone scan I will see the patient when these results are available to discuss about further management plan    (I10) Benign essential hypertension  Patient currently on Norvasc milligrams orally daily.    (K21.9) Gastroesophageal reflux disease without esophagitis  Currently on result 20 mg orally daily.    (I26.99) Other pulmonary embolism without acute cor pulmonale, unspecified chronicity (H)  Continues on anticoagulation with warfarin.  INR has been monitored through the anticoagulation clinic    The patient is ready to learn, no apparent learning barriers were identified.  Diagnosis and treatment plans were explained to the patient. The patient expressed understanding of the content. The patient asked appropriate questions. The patient questions were answered to his satisfaction.    Chart documentation with Dragon Voice recognition Software. Although reviewed after completion, some words and grammatical errors may remain.    Again, thank you for allowing me to participate in the care of your patient.        Sincerely,        Mor Mccauley MD

## 2019-04-05 NOTE — NURSING NOTE
"Oncology Rooming Note    April 5, 2019 11:40 AM   Chilo Oneil is a 68 year old male who presents for:    Chief Complaint   Patient presents with     Oncology Clinic Visit     Carcinoma, lung, left (H), F/up after ED, review labs and CT     Initial Vitals: /76 (BP Location: Right arm, Patient Position: Sitting, Cuff Size: Adult Regular)   Pulse 99   Temp 97.8  F (36.6  C) (Axillary)   Resp 20   Ht 1.905 m (6' 3\")   Wt 65.2 kg (143 lb 11.2 oz)   SpO2 94%   BMI 17.96 kg/m   Estimated body mass index is 17.96 kg/m  as calculated from the following:    Height as of this encounter: 1.905 m (6' 3\").    Weight as of this encounter: 65.2 kg (143 lb 11.2 oz). Body surface area is 1.86 meters squared.  Severe Pain (6) Comment: Data Unavailable   No LMP for male patient.  Allergies reviewed: Yes  Medications reviewed: Yes    Medications: Medication refills not needed today.  Pharmacy name entered into [facility]:    [facility] - [place], [place] - [address] [facility] [address] - [place], [place] - [address] [facility]  [facility] - [place], - [place], [place] - [address] [facility] [id number] - [place], [place] - [address] [facility]    Clinical concerns: Carcinoma, lung, left (H), F/up after ED, review labs and CT      Queenie Burgos LPN            3  "

## 2019-04-09 NOTE — PROGRESS NOTES
Hematology/ Oncology Follow-up Visit:  Apr 5, 2019    Reason for Visit:   Chief Complaint   Patient presents with     Oncology Clinic Visit     Carcinoma, lung, left (H), F/up after ED, review labs and CT       Oncologic History:    Carcinoma, lung, left (H)  Patient presented with L side shoulder and has been traveling  to L/side about  to the whole L/side upper back and L side of chest for about 3 week he has had the pain has been taking aleve.  Subsequently went on to have a CT scan done.  Which showed 1.6 cm nodular infiltrate in the left upper lobe.  The lesion appear spiculated and worrisome for lung cancer.  There is a second 0.7 cm indeterminate nodule in the lingular portion of the left lung.  Subsequently the patient went on to have CT-guided biopsy.  The pathology came back showing moderately differentiated adenocarcinoma. He had subsequent noted left thoracoscopic left pleural biopsies and left lobe which resection on April 11, 2018.  The surgical pathology came back with pleural biopsies came back positive for invasive adenocarcinoma with parietal pleural invasion.  The wedge resection came back with adenocarcinoma with acinar patterns of growth moderately differentiated the tumor size is 1.1 and 0.3 cm into 2 separate tumor nodules.  Visceropleural invasion was identified the margins were negative lymphovascular invasion was not identified large venous artery involvement was not identified as well.  The pathologic staging of pT3N0.        Interval History:  She returning today for follow-up.  Is been having increasing left shoulder pain.  He denies any cough or shortness of breath or cough or wheezing.  He denies any weight loss.  He denies any fever or chills.    Review Of Systems:  Constitutional: Negative for fever, chills, and night sweats.  Skin: negative.  Eyes: negative.  Ears/Nose/Throat: negative.  Respiratory: No shortness of breath, dyspnea on exertion, cough, or hemoptysis.  Cardiovascular:  negative.  Gastrointestinal: negative.  Genitourinary: negative.  Musculoskeletal: negative.  Neurologic: negative.  Psychiatric: negative.  Hematologic/Lymphatic/Immunologic: negative.  Endocrine: negative.    All other ROS negative unless mentioned in interval history.    Past medical, social, surgical, and family histories reviewed.    Allergies:  Allergies as of 04/05/2019 - Reviewed 04/05/2019   Allergen Reaction Noted     Cipro [ciprofloxacin] Swelling 06/22/2009       Current Medications:  Current Outpatient Medications   Medication Sig Dispense Refill     ACETAMINOPHEN PO Take 1,000 mg by mouth 3 times daily       AmLODIPine Besylate (NORVASC PO) Take 10 mg by mouth daily       DULoxetine HCl (CYMBALTA PO) Take 60 mg by mouth daily       GABAPENTIN PO Take 300 mg by mouth 2 times daily        LORazepam (ATIVAN) 0.5 MG tablet Take 1 tablet (0.5 mg) by mouth 3 times daily as needed for anxiety 25 tablet 0     magnesium oxide (MAG-OX) 400 MG tablet Take 400 mg by mouth 2 times daily       MELATONIN PO Take 6 mg by mouth At Bedtime        mirtazapine (REMERON) 7.5 MG tablet Take 1 tablet (7.5 mg) by mouth At Bedtime       nicotine (NICODERM CQ) 21 MG/24HR 24 hr patch Place 1 patch onto the skin daily as needed        omeprazole (PRILOSEC) 20 MG CR capsule Take 1 capsule (20 mg) by mouth daily 90 capsule 3     oxyCODONE (OXYCONTIN) 10 MG 12 hr tablet Take 1 tablet (10 mg) by mouth 2 times daily Give 20 mg QAM and 10 mg Q-Afternoon. 90 tablet 0     oxyCODONE (OXYCONTIN) 30 MG 12 hr tablet Take 1 tablet (30 mg) by mouth At Bedtime 30 tablet 0     oxyCODONE (ROXICODONE) 5 MG tablet Take 1 tablet (5 mg) by mouth every 4 hours as needed for pain 12 tablet 0     polyethylene glycol (MIRALAX/GLYCOLAX) Packet Take 17 g by mouth daily       senna-docusate (SENOKOT-S;PERICOLACE) 8.6-50 MG per tablet Take 2 tablets by mouth 2 times daily 100 tablet 0     STATIN NOT PRESCRIBED (INTENTIONAL) 1 each daily Please choose reason  "not prescribed, below       THIAMINE HCL PO Take 100 mg by mouth daily       tiZANidine (ZANAFLEX) 4 MG capsule Take 1 capsule (4 mg) by mouth 3 times daily as needed for muscle spasms 30 capsule 0     VENTOLIN  (90 Base) MCG/ACT Inhaler Inhale 2 puffs into the lungs every 4 hours as needed for shortness of breath / dyspnea or wheezing 1 Inhaler 1     Warfarin Sodium (COUMADIN PO) Take by mouth daily Take daily as directed per INR results          Physical Exam:  /76 (BP Location: Right arm, Patient Position: Sitting, Cuff Size: Adult Regular)   Pulse 99   Temp 97.8  F (36.6  C) (Axillary)   Resp 20   Ht 1.905 m (6' 3\")   Wt 65.2 kg (143 lb 11.2 oz)   SpO2 94%   BMI 17.96 kg/m    Wt Readings from Last 12 Encounters:   04/05/19 65.2 kg (143 lb 11.2 oz)   04/04/19 63.7 kg (140 lb 6.4 oz)   04/01/19 62.1 kg (137 lb)   03/31/19 63.5 kg (140 lb)   03/27/19 62.1 kg (137 lb)   03/25/19 67.6 kg (149 lb)   03/23/19 68 kg (150 lb)   03/20/19 67.6 kg (149 lb)   03/14/19 67.7 kg (149 lb 3.2 oz)   03/12/19 67.7 kg (149 lb 3.2 oz)   03/08/19 68.9 kg (152 lb)   03/06/19 68.9 kg (152 lb)     ECOG performance status: 1  GENERAL APPEARANCE: Healthy, alert and in no acute distress.  HEENT: Sclerae anicteric. PERRLA. Oropharynx without ulcers, lesions, or thrush.  NECK: Supple. No asymmetry or masses.  LYMPHATICS: No palpable cervical, supraclavicular, axillary, or inguinal lymphadenopathy.  RESP: Lungs clear to auscultation bilaterally without rales, rhonchi or wheezes.  CARDIOVASCULAR: Regular rate and rhythm. Normal S1, S2; no S3 or S4. No murmur, gallop, or rub.  ABDOMEN: Soft, nontender. Bowel sounds normal. No palpable organomegaly or masses.  MUSCULOSKELETAL: Extremities without gross deformities noted. No edema of bilateral lower extremities.  SKIN: No suspicious lesions or rashes.  NEURO: Alert and oriented x 3. Cranial nerves II-XII grossly intact.  PSYCHIATRIC: Mentation and affect appear " normal.    Laboratory/Imaging Studies:  Infusion Therapy Visit on 04/02/2019   Component Date Value Ref Range Status     WBC 04/02/2019 11.5* 4.0 - 11.0 10e9/L Final     RBC Count 04/02/2019 3.31* 4.4 - 5.9 10e12/L Final     Hemoglobin 04/02/2019 9.5* 13.3 - 17.7 g/dL Final     Hematocrit 04/02/2019 30.5* 40.0 - 53.0 % Final     MCV 04/02/2019 92  78 - 100 fl Final     MCH 04/02/2019 28.7  26.5 - 33.0 pg Final     MCHC 04/02/2019 31.1* 31.5 - 36.5 g/dL Final     RDW 04/02/2019 14.6  10.0 - 15.0 % Final     Platelet Count 04/02/2019 672* 150 - 450 10e9/L Final     Diff Method 04/02/2019 Automated Method   Final     % Neutrophils 04/02/2019 77.9  % Final     % Lymphocytes 04/02/2019 11.8  % Final     % Monocytes 04/02/2019 7.4  % Final     % Eosinophils 04/02/2019 1.9  % Final     % Basophils 04/02/2019 0.5  % Final     % Immature Granulocytes 04/02/2019 0.5  % Final     Nucleated RBCs 04/02/2019 0  0 /100 Final     Absolute Neutrophil 04/02/2019 8.9* 1.6 - 8.3 10e9/L Final     Absolute Lymphocytes 04/02/2019 1.4  0.8 - 5.3 10e9/L Final     Absolute Monocytes 04/02/2019 0.9  0.0 - 1.3 10e9/L Final     Absolute Eosinophils 04/02/2019 0.2  0.0 - 0.7 10e9/L Final     Absolute Basophils 04/02/2019 0.1  0.0 - 0.2 10e9/L Final     Abs Immature Granulocytes 04/02/2019 0.1  0 - 0.4 10e9/L Final     Absolute Nucleated RBC 04/02/2019 0.0   Final     Sodium 04/02/2019 134  133 - 144 mmol/L Final     Potassium 04/02/2019 4.2  3.4 - 5.3 mmol/L Final     Chloride 04/02/2019 96  94 - 109 mmol/L Final     Carbon Dioxide 04/02/2019 33* 20 - 32 mmol/L Final     Anion Gap 04/02/2019 5  3 - 14 mmol/L Final     Glucose 04/02/2019 106* 70 - 99 mg/dL Final     Urea Nitrogen 04/02/2019 12  7 - 30 mg/dL Final     Creatinine 04/02/2019 1.07  0.66 - 1.25 mg/dL Final     GFR Estimate 04/02/2019 71  >60 mL/min/[1.73_m2] Final    Comment: Non  GFR Calc  Starting 12/18/2018, serum creatinine based estimated GFR (eGFR) will be    calculated using the Chronic Kidney Disease Epidemiology Collaboration   (CKD-EPI) equation.       GFR Estimate If Black 04/02/2019 82  >60 mL/min/[1.73_m2] Final    Comment:  GFR Calc  Starting 12/18/2018, serum creatinine based estimated GFR (eGFR) will be   calculated using the Chronic Kidney Disease Epidemiology Collaboration   (CKD-EPI) equation.       Calcium 04/02/2019 9.2  8.5 - 10.1 mg/dL Final     Bilirubin Total 04/02/2019 0.2  0.2 - 1.3 mg/dL Final     Albumin 04/02/2019 2.9* 3.4 - 5.0 g/dL Final     Protein Total 04/02/2019 7.2  6.8 - 8.8 g/dL Final     Alkaline Phosphatase 04/02/2019 109  40 - 150 U/L Final     ALT 04/02/2019 13  0 - 70 U/L Final     AST 04/02/2019 13  0 - 45 U/L Final   Transferred Records on 03/27/2019   Component Date Value Ref Range Status     INR 03/27/2019 1.80* 0.90 - 1.10 Final     Sodium 03/27/2019 133* 136 - 145 mmol/L Final     Potassium 03/27/2019 4.1  3.5 - 5.0 mmol/L Final     Chloride 03/27/2019 91* 98 - 107 mmol/L Final     Carbon Dioxide 03/27/2019 31  22 - 31 mmol/L Final     Anion Gap 03/27/2019 11  5 - 18 mmol/L Final     Glucose 03/27/2019 67* 70 - 125 mg/dL Final     Urea Nitrogen 03/27/2019 19  8 - 22 mg/dL Final     Creatinine 03/27/2019 1.34* 0.70 - 1.30 mg/dL Final     Calcium 03/27/2019 10.5  8.5 - 10.5 mg/dL Final     Protein Total 03/27/2019 7.1  6.0 - 8.0 g/dL Final     Albumin 03/27/2019 2.9* 3.5 - 5.0 g/dL Final     Bilirubin Total 03/27/2019 0.2  0.0 - 1.0 mg/dL Final     Alkaline Phosphatase 03/27/2019 114  45 - 120 U/L Final     AST 03/27/2019 14  0 - 40 U/L Final     ALT 03/27/2019 <9  0 - 45 U/L Final     GFR Estimate 03/27/2019 53* >60 ml/min/1.73m2 Final     GFR Estimate If Black 03/27/2019 >60  >60 ml/min/1.73m2 Final     TSH 03/27/2019 1.26  0.30 - 5.00 uIU/mL Final        Recent Results (from the past 744 hour(s))   CT Chest Pulmonary Embolism w Contrast    Narrative    Exam: CT CHEST PULMONARY EMBOLISM W CONTRAST 3/23/2019  2:57 PM    Comparison: 2/28/2018     Clinical History: Dyspnea, lung cancer.    Technique: Volumetric helical acquisition of the chest were obtained  following pulmonary embolism protocol. Three-dimensional (3D)  post-processed angiographic images were reconstructed, archived to  PACS and used in interpretation of this study. Radiation dose for this  scan was reduced using automated exposure control, adjustment of the  mA and/or kV according to patient size, or iterative reconstruction  technique.    Contrast: 69 mL Isovue 370    Findings:  Contrast bolus timing is adequate for evaluation for pulmonary emboli.  There is no evidence of pulmonary emboli.  There is no evidence of  right heart strain.     Apically predominant emphysema. Spiculated nodule anteriorly in the  LEFT upper lobe measures 1.9 x 1.8 x 1.4 cm (series 5 image 67 and  series 7 image 38), increased from 1.4 x 0.9 x 0.8 cm on 12/28/2018.  Moderate loculated pleural effusion on the LEFT. RIGHT lung is clear.    The heart size and great vessels are normal in caliber. Mild  mediastinal and RIGHT hilar lymphadenopathy again seen, does not  appear significantly changed.    Upper abdomen: Evaluation of the upper abdomen is limited by contrast  bolus timing and coverage.    Bones: No concerning lytic or sclerotic lesions.      Impression    Impression:   1. LEFT upper lobe pulmonary nodule increased in size since  12/28/2018. Additionally, loculated LEFT pleural effusion is new since  that time.  2. No evidence of pulmonary bolus.  3. Mild apically predominant emphysema.    SAIGE WATT MD   CT Abdomen Pelvis w Contrast    Narrative    CT ABDOMEN PELVIS WITH CONTRAST   3/31/2019 9:40 PM     HISTORY: Abdominal pain. History of lung cancer.    TECHNIQUE: 68 mL Isovue 370 IV were administered. After contrast  administration, volumetric helical sections were acquired from the  lung bases to the ischial tuberosities. Coronal images were also  reconstructed.  Radiation dose for this scan was reduced using  automated exposure control, adjustment of the mA and/or kV according  to patient size, or iterative reconstruction technique.    COMPARISON: CT of the chest, abdomen, and pelvis performed 12/28/2018.      FINDINGS: Small to moderate left pleural effusion may be partially  loculated, and is new since the previous exam. There is mild  associated compressive atelectasis at the left lung base. Coronary  artery calcification. The liver, gallbladder, spleen, adrenal glands,  and pancreas are unremarkable. There are two right renal cysts noted,  with the largest in the lower pole measuring 4 cm. The kidneys are  otherwise unremarkable. No hydronephrosis. Advanced atherosclerotic  aortoiliac calcification. No bowel obstruction. Moderate amount of  stool throughout the colon and within the rectum. No convincing  evidence for colitis or diverticulitis.  No free fluid in the pelvis.  There is moderate prostatic calcification. Old fractures of the right  inferior pubic ramus and right pubic bone are again noted. Three  surgical screws are noted within the left femoral head and neck.  Degenerative changes are noted in the visualized thoracolumbar spine.      Impression    IMPRESSION:   1. No acute abnormality in the abdomen. No cause for abdominal pain is  identified.  2. Small to moderate left pleural effusion is new since the previous  exam, and may be partially loculated.  3. Moderate amount of stool throughout the colon and within the  rectum.    RICCO DUARTE MD       Assessment and plan:    (C34.92) Carcinoma, lung, left (H)  (primary encounter diagnosis)  Reviewed with the patient today most recent imaging studies.  I would recommend to arrange for CT-guided FNA from the spiculated lesion in the left upper lobe.  I will also arrange for a bone scan I will see the patient when these results are available to discuss about further management plan    (I10) Benign essential  hypertension  Patient currently on Norvasc milligrams orally daily.    (K21.9) Gastroesophageal reflux disease without esophagitis  Currently on result 20 mg orally daily.    (I26.99) Other pulmonary embolism without acute cor pulmonale, unspecified chronicity (H)  Continues on anticoagulation with warfarin.  INR has been monitored through the anticoagulation clinic    The patient is ready to learn, no apparent learning barriers were identified.  Diagnosis and treatment plans were explained to the patient. The patient expressed understanding of the content. The patient asked appropriate questions. The patient questions were answered to his satisfaction.    Chart documentation with Dragon Voice recognition Software. Although reviewed after completion, some words and grammatical errors may remain.

## 2019-04-09 NOTE — PROGRESS NOTES
"[facility]  [facility] Medical Record Number:  [id number]  Place of Service where encounter took place:  [facility]  ([facility]) [[id number]]  Chief Complaint   Patient presents with     Nursing Home Acute     HPI: Chilo Oneil  is a 68 year old (1950), who is being seen today for an episodic care visit.  HPI information obtained from: facility chart records, facility staff, patient report, [facility] chart review and Care Everywhere [facility] chart review. Today's concern is:    Carcinoma, lung, left (H)  Chronic pain syndrome  Chronic left shoulder pain  History of pulmonary embolism  Encounter for therapeutic drug monitoring  Long term (current) use of anticoagulants  Last INR was 2.79 on 4/4/19, and current coumadin dosing is 6mg Tu/Th, and 3mg AOD. His INR resulted today at 2.43. Patient had an oncology follow-up on 4/5, for which they recommended a bone scan and CT guided lung biopsy. These have been scheduled, with bone scan on 4/16 and CT guided biopsy on 4/24. Oncology recommending 5 day of holding coumadin with NO bridging required.     Today, patient states he hopes that the biopsy or bone scan explains his pain sxs more.  He denies bleeding/bruising. He is hoping it all \"works out\" but is nervous. Nursing reported an 8lb weight loss, and patient is currently receiving supplements TID @ 4oz/ea. Noting recent start of Remeron to asist w/ this problem and ongoing depression/anxiety.    Past Medical and Surgical History reviewed in [facility] today.  REVIEW OF SYSTEMS: 6 point ROS of systems including Constitutional, Eyes, Respiratory, Cardiovascular, Gastroenterology, Genitourinary, Integumentary, Musculoskeletal, Psychiatric were all negative except for pertinent positives noted in my HPI.    Objective:  /64   Pulse 82   Temp 98.2  F (36.8  C)   Resp 18   Wt 65.3 kg (144 lb)   SpO2 93%   BMI 18.00 kg/m    Exam:   GENERAL APPEARANCE: Alert, in no distress, cooperative. "   ENT: Mouth/posterior oropharynx intact w/ moist mucous membranes, hearing acuity [place].  EYES: EOM, conjunctivae, lids, pupils and irises normal, PERRL2.   RESP: Respiratory effort fair, no respiratory distress, Lung sounds diminished. On RA.   CV: Auscultation of heart reveals S1, S2, rate and rhythm regular, no murmur, no rub or gallop, Edema 0+ BLE. Peripheral pulses are 2+.  ABDOMEN: Normal bowel sounds, soft, non-tender abdomen, and no masses palpated.  SKIN: Inspection/Palpation of skin and subcutaneous tissue baseline w/ fragility. No wounds/rashes noted.   NEURO: CN II-XII at patient's baseline, sensation baseline PPS.  PSYCH: Insight, judgement, and memory are baseline, affect and mood are flat/nervous.    Labs: Recent labs in [facility] reviewed by me today and:   Most Recent 3 CBC's:  Recent Labs   Lab Test 04/02/19  1000 03/31/19 2004 03/23/19  1432   WBC 11.5* 16.7* 11.4*   HGB 9.5* 9.1* 9.5*   MCV 92 93 92   * 611* 641*     Most Recent 3 BMP's:  Recent Labs   Lab Test 04/02/19  1000 03/31/19 2004 03/27/19    132* 133*   POTASSIUM 4.2 3.9 4.1   CHLORIDE 96 94 91*   CO2 33* 31 31   BUN 12 16 19   CR 1.07 1.07 1.34*   ANIONGAP 5 7 11   DAMARI 9.2 8.9 10.5   * 96 67*     Most Recent 3 INR's:  Recent Labs   Lab Test 03/27/19 03/23/19  1432 03/20/19   INR 1.80* 5.85* 2.51*     ASSESSMENT/PLAN:  Carcinoma, lung, left (H)  Chronic pain syndrome  Chronic left shoulder pain  History of pulmonary embolism  Encounter for therapeutic drug monitoring  Long term (current) use of anticoagulants  Chronic. Tenuous. INR therapeutic. Will dose Coumadin as noted below and recheck INR in about 1 week. Will write coumadin hold orders for CT guided lung biopsy. Will increase house supplement for weight loss. Follow-up accordingly.    Orders written by provider at facility and transcribed by : Dusty Montano  1. Increase House supplement to 8oz TID   2. Coumadin 6 mg po Tu/Th  3. Coumadin 3 mg PO every  day On all other days  4. Recheck INR x1 on 4/18  5. Stop/Hold Coumadin on 4/19(no dose) will only order restart after procedure on 4/24    Electronically signed by:  Dr. Lashanda Suárez, NATALIE CNP DNP

## 2019-04-10 NOTE — LETTER
[facility]  [address] [place] [address]  [phone]    May 1, 2018    Chilo Oneil  [address]  [place] [address]      Dear Freddy,    I am a clinic care coordinator- who works with Dr. Plasencia at the [facility] and I wanted to let you know I need to meet with you in person to complete your Medical Assistance application for [facility].  I left you a message today on your phone, but also wanted to send a letter to be sure you get this message.    Please feel free to contact me at [phone], with any questions or concerns. We at [facility] are focused on providing you with the highest-quality healthcare experience possible and that all starts with you.     Sincerely,     Sofya Paulino    Enclosed: I have enclosed a copy of the Complex Care Plan. This has helpful information and goals that we have talked about. Please keep this in an easy to access place to use as needed.   Current some day smoker

## 2019-04-10 NOTE — LETTER
"    4/10/2019        RE: Chilo Oneil  [facility]  [address] [facility] [address]        [facility]  [facility] Record Number:  [id number]  Place of Service where encounter took place:  [facility]  ([facility]) [[id number]]  Chief Complaint   Patient presents with     Nursing Home Acute     HPI: Chilo Oneil  is a 68 year old (1950), who is being seen today for an episodic care visit.  HPI information obtained from: facility chart records, facility staff, patient report, [facility] chart review and Care Everywhere [facility] chart review. Today's concern is:    Carcinoma, lung, left (H)  Chronic pain syndrome  Chronic left shoulder pain  History of pulmonary embolism  Encounter for therapeutic drug monitoring  Long term (current) use of anticoagulants  Last INR was 2.79 on 4/4/19, and current coumadin dosing is 6mg Tu/Th, and 3mg AOD. His INR resulted today at 2.43. Patient had an oncology follow-up on 4/5, for which they recommended a bone scan and CT guided lung biopsy. These have been scheduled, with bone scan on 4/16 and CT guided biopsy on 4/24. Oncology recommending 5 day of holding coumadin with NO bridging required.     Today, patient states he hopes that the biopsy or bone scan explains his pain sxs more.  He denies bleeding/bruising. He is hoping it all \"works out\" but is nervous. Nursing reported an 8lb weight loss, and patient is currently receiving supplements TID @ 4oz/ea. Noting recent start of Remeron to asist w/ this problem and ongoing depression/anxiety.    [facility] and [facility].  REVIEW OF SYSTEMS: 6 point ROS of systems including Constitutional, Eyes, Respiratory, Cardiovascular, Gastroenterology, Genitourinary, Integumentary, Musculoskeletal, Psychiatric were all negative except for pertinent positives noted in my HPI.    Objective:  /64   Pulse 82   Temp 98.2  F (36.8  C)   Resp 18   Wt " 65.3 kg (144 lb)   SpO2 93%   BMI 18.00 kg/m     Exam:   GENERAL APPEARANCE: Alert, in no distress, cooperative.   ENT: Mouth/posterior oropharynx intact w/ moist mucous membranes, hearing acuity [place].  EYES: EOM, conjunctivae, lids, pupils and irises normal, PERRL2.   RESP: Respiratory effort fair, no respiratory distress, Lung sounds diminished. On RA.   CV: Auscultation of heart reveals S1, S2, rate and rhythm regular, no murmur, no rub or gallop, Edema 0+ BLE. Peripheral pulses are 2+.  ABDOMEN: Normal bowel sounds, soft, non-tender abdomen, and no masses palpated.  SKIN: Inspection/Palpation of skin and subcutaneous tissue baseline w/ fragility. No wounds/rashes noted.   NEURO: CN II-XII at patient's baseline, sensation baseline PPS.  PSYCH: Insight, judgement, and memory are baseline, affect and mood are flat/nervous.    Labs: Recent labs in [facility] reviewed by me today and:   Most Recent 3 CBC's:  Recent Labs   Lab Test 04/02/19  1000 03/31/19 2004 03/23/19  1432   WBC 11.5* 16.7* 11.4*   HGB 9.5* 9.1* 9.5*   MCV 92 93 92   * 611* 641*     Most Recent 3 BMP's:  Recent Labs   Lab Test 04/02/19  1000 03/31/19 2004 03/27/19    132* 133*   POTASSIUM 4.2 3.9 4.1   CHLORIDE 96 94 91*   CO2 33* 31 31   BUN 12 16 19   CR 1.07 1.07 1.34*   ANIONGAP 5 7 11   DAMARI 9.2 8.9 10.5   * 96 67*     Most Recent 3 INR's:  Recent Labs   Lab Test 03/27/19 03/23/19  1432 03/20/19   INR 1.80* 5.85* 2.51*     ASSESSMENT/PLAN:  Carcinoma, lung, left (H)  Chronic pain syndrome  Chronic left shoulder pain  History of pulmonary embolism  Encounter for therapeutic drug monitoring  Long term (current) use of anticoagulants  Chronic. Tenuous. INR therapeutic. Will dose Coumadin as noted below and recheck INR in about 1 week. Will write coumadin hold orders for CT guided lung biopsy. Will increase house supplement for weight loss. Follow-up accordingly.    Orders written by provider at facility and transcribed by team  coordinator: Dusty Montano  1. Increase House supplement to 8oz TID   2. Coumadin 6 mg po Tu/Th  3. Coumadin 3 mg PO every day On all other days  4. Recheck INR x1 on 4/18  5. Stop/Hold Coumadin on 4/19(no dose) will only order restart after procedure on 4/24    Electronically signed by:  NATALIE Harvey CNP DNP              Sincerely,        NATALIE Werner CNP

## 2019-04-16 NOTE — PROGRESS NOTES
PRE-OP EVALUATION:  [place] Medical Record Number:  [id number]  Place of Service where encounter took place:  [facility]  ([facility]) [[id number]]  Today's date: 2019    [facility]  [address]  [facility] [address]  [phone]  Dept: [phone]    PRE-OP EVALUATION:  Today's date: 2019    Chilo Oneil (: 1950) presents for pre-operative evaluation assessment as requested by Dr. Mccauley.  He requires evaluation and anesthesia risk assessment prior to undergoing surgery/procedure for CT-guided FNA biopsy of left upper lobe of lung.    Proposed Surgery/ Procedure: CT-guided lung Biopsy.   Date of Surgery/ Procedure: 19.  Time of Surgery/ Procedure: 0900.  Hospital/Surgical Facility: [facility].   Primary Physician: Dr. Lashanda Suárez APRN CNP DNP  Type of Anesthesia Anticipated: Moderate conscious sedation.  Patient has a Health Care Directive or Living Will: YES. POLST states DNR/DNI.    1. YES - DO YOU HAVE A HISTORY OF HEART ATTACK, STROKE, STENT, BYPASS OR SURGERY ON AN ARTERY IN THE HEAD, NECK, HEART OR LEG? Personal history of MI many years ago.  2. NO - Do you ever have any pain or discomfort in your chest?  3. NO - Do you have a history of  Heart Failure?  4. YES - ARE YOUR TROUBLED BY SHORTNESS OF BREATH WHEN WALKING ON THE LEVEL, UP A SLIGHT HILL OR AT NIGHT? Smoking and current lung cancer.   5. NO - Do you currently have a cold, bronchitis or other respiratory infection?  6. YES - DO YOU HAVE A COUGH, SHORTNESS OF BREATH OR WHEEZING? Cough, SOB w/ activity secondary to lung cancer/COPD/smoking.  7. NO - Do you sometimes get pains in the calves of your legs when you walk?  8. YES - DO YOU OR ANYONE IN YOUR FAMILY HAVE PREVIOUS HISTORY OF BLOOD CLOTS? Personal history of PE, 2018.   9. NO - Do you or does anyone in your family have a serious bleeding problem such as prolonged bleeding following surgeries or cuts?  10. NO -  Have you ever had problems with anemia or been told to take iron pills?  11. NO - Have you had any abnormal blood loss such as black, tarry or bloody stools, or abnormal vaginal bleeding?  12. YES - HAVE YOU EVER HAD A BLOOD TRANSFUSION? With other procedures/treamtments.  13. NO - Have you or any of your relatives ever had problems with anesthesia?  14. NO - Do you have sleep apnea, excessive snoring or daytime drowsiness?  15. NO - Do you have any prosthetic heart valves?  16. NO - Do you have prosthetic joints?  17. NO - Is there any chance that you may be pregnant?    HPI:     HPI related to upcoming procedure: Patient has ongoing left shoulder/neck pain, suspecting this is referred from left upper lobe lesions. He denies fever/chills, bleeding/bruising, he is anticoagulated on coumadin. He recently tried to quit smoking and is utilizing a nicotine patch at this time. Due to some reported SOB, he is utilizing O2, with sats mid-90's.     COPD - Patient has a longstanding history of moderate-severe COPD secondary to smoking which is complicated by lungCA. Patient has been doing well overall noting SOB and continues on medication regimen consisting of albuterol for rescue, O2 for comfort/SOB, and nicotine patch without adverse reactions or side effects.                                                                                                           .  DEPRESSION - Patient has a long history of Depression of moderate severity requiring medication for control with recent symptoms being relapsing/remitting. Current symptoms of depression include depressed mood, hopelessness, diminished interest or pleasure in activities, weight loss, anxiety, irritablility.                                                                                                                                                                                    .  SLEEP PROBLEM - Patient has a longstanding history of depression and mood  changes. Patient has tried OTC medications with limited success.                                                                                                                                         .  MEDICAL HISTORY:     Patient Active Problem List    Diagnosis Date Noted     Health Care Home 2019     Priority: Medium     [facility] Care Coordinator  Deepthi Wood MA, LSW  [phone]    [facility] CARE PLAN SUMMARY    Member Name:  Chilo Oneil  Address:  [place] [address] Phone: [phone] (home)    :  1950 Date of Assessment:  19 LTTC at [facility]     Health Plan:  [facility]  Health Plan Number: [id number] Medical Assistance Number: [id number]  Financial Worker:  [facility]   Case #:     [facility] Care Coordinator:  Deepthi Wood MA [facility] CC Phone:  [phone]  CC Fax:  [phone]   FVP Enrollment Date: 18 Case Mix:    Rate Cell:    Waiver Type:     Primary Emergency Contact: TAHIR NUNEZ (1o HC agent)  Mobile Phone: [phone]  Relation: Sister    Secondary Emergency Contact: Samm Oneil (2o HC agent)  Mobile Phone: [phone]  Relation: Brother Language:  English  :  No   Health Care Agent/POA:   Advanced Directives/Living Will:     Primary Care Clinic/Phone/Fax:  [facility]/(p) [phone], (f) [phone] Primary Dx:  Lung CA C24.90  Secondary Dx:  HTN I10   Primary Physician:  Lashanda Suárez   Height:  Not on file  Weight:  157 lbs   Specialty Physician:    Audiologist:     Eye Care Provider:   Dental Care Provider:    [facility] [phone]   Other:             Iron deficiency anemia 2019     Priority: Medium     Protein-calorie malnutrition (H) 2018     Priority: Medium     History of pulmonary embolism 2018     Priority: Medium     Encounter for therapeutic drug monitoring 2018     Priority: Medium     Chronic left shoulder pain 2018      Priority: Medium     Hip pain, left, unclear chronicity/etiology 07/17/2018     Priority: Medium     Current episode of major depressive disorder without prior episode 07/16/2018     Priority: Medium     Long term (current) use of anticoagulants 07/13/2018     Priority: Medium     Other pulmonary embolism without acute cor pulmonale, unspecified chronicity (H) 07/10/2018     Priority: Medium     Left shoulder pain, unspecified chronicity 07/10/2018     Priority: Medium     Falls frequently 07/10/2018     Priority: Medium     Near syncope 07/10/2018     Priority: Medium     Coronary artery disease involving native heart without angina pectoris, unspecified vessel or lesion type 07/10/2018     Priority: Medium     H/O acute myocardial infarction 07/10/2018     Priority: Medium     Essential hypertension 07/10/2018     Priority: Medium     CKD (chronic kidney disease) stage 3, GFR 30-59 ml/min (H) 07/10/2018     Priority: Medium     KYMBERLY (acute kidney injury) (H) 07/10/2018     Priority: Medium     Uncomplicated alcohol dependence (H) 07/10/2018     Priority: Medium     Poor nutrition 07/10/2018     Priority: Medium     Nausea and vomiting, intractability of vomiting not specified, unspecified vomiting type 07/10/2018     Priority: Medium     ADA (generalized anxiety disorder) 07/10/2018     Priority: Medium     Insomnia, unspecified type 07/10/2018     Priority: Medium     Debility 07/10/2018     Priority: Medium     Cognitive impairment 07/10/2018     Priority: Medium     Pancytopenia (H) 07/10/2018     Priority: Medium     Pulmonary emboli (H) 06/05/2018     Priority: Medium     Pulmonary embolism (H) 06/05/2018     Priority: Medium     Hypokalemia 05/31/2018     Priority: Medium     Hypomagnesemia 05/31/2018     Priority: Medium     Carcinoma, lung, left (H) 04/12/2018     Priority: Medium     CAD (coronary artery disease) 03/28/2018     Priority: Medium     No previous angiogram.  No previous stenting.     Atypical Stress Test consistent with possible CAD 8/2016: Myocardial perfusion imaging using single isotope technique demonstrated a small of inferior ischemia at the base to mid ventricle There is a second moderate area of ischemia in the anterior and anteroseptal wall from base through mid ventricle, involving the lateral base as well. There is a small fixed portion in the anteroseptal base consistent with prior infarction There is a fixed inferoseptal defect from apex to mid ventricle consistent with either prior nontransmural infarct or attenuation artifact. Gated images demonstrated inferior, inferoseptal, anterior and anteroseptal basal hypokinesis.  The left ventricular systolic function is 52% at rest and 47% post stress.       Hyponatremia 03/28/2018     Priority: Medium     SOB (shortness of breath) 03/28/2018     Priority: Medium     Chemotherapy induced nausea and vomiting 03/28/2018     Priority: Medium     Lung cancer (H)      Priority: Medium     Left shoulder pain, cough. Bx 12/2017- Non Small Cell. Resection planned 4/2018       Uncomplicated asthma      Priority: Medium     Anxiety 12/28/2017     Priority: Medium     Gastroesophageal reflux disease without esophagitis 12/28/2017     Priority: Medium     PAD (peripheral artery disease) (H) 06/13/2016     Priority: Medium     Benign essential hypertension 06/13/2016     Priority: Medium     Hyperlipidemia LDL goal <100 06/13/2016     Priority: Medium     Tobacco use disorder 10/16/2009     Priority: Medium      Past Medical History:   Diagnosis Date     GERD (gastroesophageal reflux disease)      HTN (hypertension)      Lung cancer (H)      MI (myocardial infarction) (H)     2016, medically managed     Past Surgical History:   Procedure Laterality Date     FRACTURE TX, ANKLE RT/LT  1975    Fracture TX Ankle, LT     INSERT PORT VASCULAR ACCESS Left 5/15/2018    Procedure: INSERT PORT VASCULAR ACCESS;  Left chest Port-a-Cath Placement;  Surgeon:  Gurjit Fox MD;  Location: [facility] OR     OPEN REDUCTION INTERNAL FIXATION HIP NAILING  9/20/2013    Procedure: OPEN REDUCTION INTERNAL FIXATION HIP NAILING;  Left Hip Open Reduction Internal Fixation ;  Surgeon: Rosas Dennis MD;  Location: [facility] OR     THORACOSCOPIC BIOPSY LUNG Left 4/11/2018    Procedure: THORACOSCOPIC BIOPSY LUNG;  subxiphoid left video assisted thorascopic surgery pleural biops, left lower lobe wedge biopsy ;  Surgeon: Mega Moreno MD;  Location: [facility]     VASCULAR SURGERY       Current Outpatient Medications   Medication Sig Dispense Refill     ACETAMINOPHEN PO Take 1,000 mg by mouth 3 times daily       AmLODIPine Besylate (NORVASC PO) Take 10 mg by mouth daily       DULoxetine HCl (CYMBALTA PO) Take 60 mg by mouth daily       GABAPENTIN PO Take 300 mg by mouth 2 times daily        LORazepam (ATIVAN) 0.5 MG tablet Take 1 tablet (0.5 mg) by mouth 3 times daily as needed for anxiety 25 tablet 0     magnesium oxide (MAG-OX) 400 MG tablet Take 400 mg by mouth 2 times daily       MELATONIN PO Take 6 mg by mouth At Bedtime        mirtazapine (REMERON) 7.5 MG tablet Take 1 tablet (7.5 mg) by mouth At Bedtime       nicotine (NICODERM CQ) 21 MG/24HR 24 hr patch Place 1 patch onto the skin daily as needed        omeprazole (PRILOSEC) 20 MG CR capsule Take 1 capsule (20 mg) by mouth daily 90 capsule 3     oxyCODONE (OXYCONTIN) 10 MG 12 hr tablet Take 1 tablet (10 mg) by mouth 2 times daily Give 20 mg QAM and 10 mg Q-Afternoon. 90 tablet 0     oxyCODONE (OXYCONTIN) 30 MG 12 hr tablet Take 1 tablet (30 mg) by mouth At Bedtime 30 tablet 0     oxyCODONE (ROXICODONE) 5 MG tablet Take 1 tablet (5 mg) by mouth every 4 hours as needed for pain 30 tablet 0     polyethylene glycol (MIRALAX/GLYCOLAX) Packet Take 17 g by mouth daily       senna-docusate (SENOKOT-S;PERICOLACE) 8.6-50 MG per tablet Take 2 tablets by mouth 2 times daily 100 tablet 0     STATIN NOT PRESCRIBED (INTENTIONAL) 1 each daily  Please choose reason not prescribed, below       THIAMINE HCL PO Take 100 mg by mouth daily       tiZANidine (ZANAFLEX) 4 MG capsule Take 1 capsule (4 mg) by mouth 3 times daily as needed for muscle spasms 30 capsule 0     VENTOLIN  (90 Base) MCG/ACT Inhaler Inhale 2 puffs into the lungs every 4 hours as needed for shortness of breath / dyspnea or wheezing 1 Inhaler 1     Warfarin Sodium (COUMADIN PO) Take by mouth daily Take daily as directed per INR results     OTC products: See medrec above. He is also noted to be using ETOH on a weekly basis (or more).     Allergies   Allergen Reactions     Cipro [Ciprofloxacin] Swelling      Latex Allergy: NO  Social History      Tobacco Use     Smoking status: Current Every Day Smoker     Packs/day: 1/2     Years: 47.00     Pack years: 7.05     Types: Cigarettes     Start date: 12/26/1967     Smokeless tobacco: Never Used     Tobacco comment: 10+ cigs daily.   Substance Use Topics     Alcohol use: Yes     Comment: 1-2 beers/week (that he reports to staff, but true intake is unknown).      History   Drug Use     Types: Marijuana     Comment: occasional (couple of times/month).     REVIEW OF SYSTEMS:   6 point ROS including Respiratory, CV, GI and , other than that noted in the HPI, is negative.    EXAM:   /68   Pulse 92   Temp 98.3  F (36.8  C)   Resp 20   Wt 66.7 kg (147 lb)   SpO2 93%   BMI 18.37 kg/m     GENERAL APPEARANCE: Alert, in no distress, cooperative.   ENT: Mouth/posterior oropharynx intact w/ moist mucous membranes, hearing acuity [place].  EYES: EOM, conjunctivae, lids, pupils and irises normal, PERRL2.   RESP: Respiratory effort fair, no respiratory distress, Lung sounds diminished throughout. On RA at this time.  CV: Auscultation of heart reveals S1, S2, rate and rhythm regular, no murmur, no rub or gallop, Edema 0+ BLE. Peripheral pulses are 2+.  ABDOMEN: Normal bowel sounds, soft, non-tender abdomen, and no masses palpated.  SKIN:  Inspection/Palpation of skin and subcutaneous tissue baseline w/ fragility. No wounds/rashes noted.  NEURO: CN II-XII at patient's baseline, sensation baseline PPS.  PSYCH: Insight, judgement, and memory are baseline, affect and mood are sad/frusterated.     DIAGNOSTICS:   No labs or EKG required for low risk surgery and all diagnostics are recent/UTD:    EKG (March 2019): appears normal, Normal Sinus Rhythm, normal axis, normal intervals, no acute ST/T changes c/w ischemia, no LVH by voltage criteria, unchanged from previous tracings.    Serum Potassium: 4.2 (4/2/19).    Serum Creatinine: 1.09 (4/2/19).    Hemoglobin A1C: 5.1 (3/27/19).    Coagulation Studies: INR today 1.84 and CBC baseline (4/2/19) as noted below.     Recent Labs   Lab Test 04/02/19  1000 03/31/19  2004 03/27/19 03/23/19  1432 04/04/18  1256   HGB 9.5* 9.1*  --  9.5* 14.5   * 611*  --  641* 359   INR  --   --  1.80* 5.85* 0.96    132* 133* 126* 130*   POTASSIUM 4.2 3.9 4.1 4.6 3.8   CR 1.07 1.07 1.34* 0.97 0.79   A1C  --   --   --   --  4.7       IMPRESSION:   Reason for surgery/procedure: Lung Cancer.  Diagnosis/reason for consult: Pre-Operative Evaluation.    The proposed surgical procedure is considered Moderate risk.  REVISED CARDIAC RISK INDEX  The patient has the following serious cardiovascular risks for perioperative complications such as (MI, PE, VFib and 3  AV Block):    Coronary Artery Disease (MI, positive stress test, angina, Qs on EKG): Variable hx of CAD/MI and this cannot be completed ruled-out.     INTERPRETATION: 1 risks: Class II (Low risk: 0.9% complication rate).    The patient has the following additional risks for perioperative complications:    High tolerance to opioid analgesics due to ongoing left lung cancer and referred left neck/shoulder pain (on OxyContin TID, oxycodone PRN, etc).       ICD-10-CM    1. Carcinoma, lung, left (H) C34.92    2. Chronic pain syndrome G89.4    3. Chronic left shoulder pain  M25.512     G89.29    4. History of pulmonary embolism Z86.711    5. Encounter for therapeutic drug monitoring Z51.81    6. Long term (current) use of anticoagulants Z79.01    7. ADA (generalized anxiety disorder) F41.1    8. Current mild episode of major depressive disorder without prior episode (H) F32.0    9. Acquired postural kyphosis M40.00    10. Moderate protein-calorie malnutrition (H) E44.0      ORDERS:     Pulmonary Risk  Advised smoking cessation.     Anemia  Anemia and does not require treatment prior to surgery.  Monitor Hemoglobin postoperatively.    Medications  Patient is to take all scheduled medications on the day of surgery EXCEPT for modifications listed below:    Anticoagulant or Antiplatelet Medication Use: WARFARIN to be held: Thromboembolic risk is low (e.g. single DVT/PE >12 months ago, A fib and [id number]-NDCe < 4 without prior CVA/TIA), hold warfarin 5 days (INR >/= 2) without bridging before procedure.      APPROVAL GIVEN to proceed with proposed procedure, without further diagnostic evaluation.     Signed Electronically by:   NATALIE Harvey CNP DNP    Copy of this evaluation report is provided to requesting provider.    Orders transcribed by JOHN Montano:  1. Cancel all coumadin and holding orders, as this procedure date has changed.   2. Coumadin 6 mg PO QD on M/W/F  3. Coumadin 3 mg PO QD on all other days. Last dose on 4/25 then hold x5 days for procedure on 5/1/19  4. Renew Ativan (again) today. See separate note in this encounter.

## 2019-04-18 NOTE — LETTER
2019        RE: Chilo Oneil  [facility]  [address] [facility] [address]        PRE-OP EVALUATION:  [facility] Medical Record Number:  [id number]  Place of Service where encounter took place:  [facility]  ([facility]) [[id number]]  Today's date: 2019    [facility]  [address] [place] [address] [address]  [facility] [address] [phone]  Dept: [phone]    PRE-OP EVALUATION:  Today's date: 2019    Chilo Oneil (: 1950) presents for pre-operative evaluation assessment as requested by Dr. Mccauley.  He requires evaluation and anesthesia risk assessment prior to undergoing surgery/procedure for CT-guided FNA biopsy of left upper lobe of lung.    Proposed Surgery/ Procedure: CT-guided lung Biopsy.   Date of Surgery/ Procedure: 19.  Time of Surgery/ Procedure: 0900.  Hospital/Surgical Facility: [facility].   Primary Physician: Dr. Lashanda Suárez APRN CNP DNP  Type of Anesthesia Anticipated: Moderate conscious sedation.  Patient has a Health Care Directive or Living Will: YES. POLST states DNR/DNI.    1. YES - DO YOU HAVE A HISTORY OF HEART ATTACK, STROKE, STENT, BYPASS OR SURGERY ON AN ARTERY IN THE HEAD, NECK, HEART OR LEG? Personal history of MI many years ago.  2. NO - Do you ever have any pain or discomfort in your chest?  3. NO - Do you have a history of  Heart Failure?  4. YES - ARE YOUR TROUBLED BY SHORTNESS OF BREATH WHEN WALKING ON THE LEVEL, UP A SLIGHT HILL OR AT NIGHT? Smoking and current lung cancer.   5. NO - Do you currently have a cold, bronchitis or other respiratory infection?  6. YES - DO YOU HAVE A COUGH, SHORTNESS OF BREATH OR WHEEZING? Cough, SOB w/ activity secondary to lung cancer/COPD/smoking.  7. NO - Do you sometimes get pains in the calves of your legs when you walk?  8. YES - DO YOU OR ANYONE IN YOUR FAMILY HAVE PREVIOUS HISTORY OF BLOOD CLOTS? Personal history of PE, 2018.   9. NO - Do you or does anyone  in your family have a serious bleeding problem such as prolonged bleeding following surgeries or cuts?  10. NO - Have you ever had problems with anemia or been told to take iron pills?  11. NO - Have you had any abnormal blood loss such as black, tarry or bloody stools, or abnormal vaginal bleeding?  12. YES - HAVE YOU EVER HAD A BLOOD TRANSFUSION? With other procedures/treamtments.  13. NO - Have you or any of your relatives ever had problems with anesthesia?  14. NO - Do you have sleep apnea, excessive snoring or daytime drowsiness?  15. NO - Do you have any prosthetic heart valves?  16. NO - Do you have prosthetic joints?  17. NO - Is there any chance that you may be pregnant?    HPI:     HPI related to upcoming procedure: Patient has ongoing left shoulder/neck pain, suspecting this is referred from left upper lobe lesions. He denies fever/chills, bleeding/bruising, he is anticoagulated on coumadin. He recently tried to quit smoking and is utilizing a nicotine patch at this time. Due to some reported SOB, he is utilizing O2, with sats mid-90's.     COPD - Patient has a longstanding history of moderate-severe COPD secondary to smoking which is complicated by lungCA. Patient has been doing well overall noting SOB and continues on medication regimen consisting of albuterol for rescue, O2 for comfort/SOB, and nicotine patch without adverse reactions or side effects.                                                                                                           .  DEPRESSION - Patient has a long history of Depression of moderate severity requiring medication for control with recent symptoms being relapsing/remitting. Current symptoms of depression include depressed mood, hopelessness, diminished interest or pleasure in activities, weight loss, anxiety, irritablility.                                                                                                                                                                                     .  SLEEP PROBLEM - Patient has a longstanding history of depression and mood changes. Patient has tried OTC medications with limited success.                                                                                                                                         .  MEDICAL HISTORY:     Patient Active Problem List    Diagnosis Date Noted     Health Care Home 2019     Priority: Medium     [facility] Care Coordinator  Deepthi Wood MA, LSW  [phone]    [facility] CARE PLAN SUMMARY    Member Name:  Chilo Oneil  Address:  [place] [address] Phone: [phone] (home)    :  1950 Date of Assessment:  19 LTTC at [facility]     Health Plan:  [facility]  Health Plan Number: [id number] Medical Assistance Number: [id number]  Financial Worker:  [facility]   Case #:     FVP Care Coordinator:  CHARLEY Valdes CC Phone:  [phone]  CC Fax:  [phone]   FVP Enrollment Date: 18 Case Mix:    Rate Cell:    Waiver Type:     Primary Emergency Contact: TAHIR NUNEZ (1o HC agent)  Mobile Phone: [phone]  Relation: Sister    Secondary Emergency Contact: Samm Oneil (2o HC agent)  Mobile Phone: [phone]  Relation: Brother Language:  English  :  No   Health Care Agent/POA:   Advanced Directives/Living Will:     Primary Care Clinic/Phone/Fax:  [facility]/(p) [phone], (f) [phone] Primary Dx:  Lung CA C24.90  Secondary Dx:  HTN I10   Primary Physician:  Lashanda Suárez   Height:  Not on file  Weight:  157 lbs   Specialty Physician:    Audiologist:     Eye Care Provider:   Dental Care Provider:    [facility] [phone]   Other:             Iron deficiency anemia 2019     Priority: Medium     Protein-calorie malnutrition (H) 2018     Priority: Medium     History of pulmonary embolism 2018     Priority: Medium      Encounter for therapeutic drug monitoring 09/04/2018     Priority: Medium     Chronic left shoulder pain 08/27/2018     Priority: Medium     Hip pain, left, unclear chronicity/etiology 07/17/2018     Priority: Medium     Current episode of major depressive disorder without prior episode 07/16/2018     Priority: Medium     Long term (current) use of anticoagulants 07/13/2018     Priority: Medium     Other pulmonary embolism without acute cor pulmonale, unspecified chronicity (H) 07/10/2018     Priority: Medium     Left shoulder pain, unspecified chronicity 07/10/2018     Priority: Medium     Falls frequently 07/10/2018     Priority: Medium     Near syncope 07/10/2018     Priority: Medium     Coronary artery disease involving native heart without angina pectoris, unspecified vessel or lesion type 07/10/2018     Priority: Medium     H/O acute myocardial infarction 07/10/2018     Priority: Medium     Essential hypertension 07/10/2018     Priority: Medium     CKD (chronic kidney disease) stage 3, GFR 30-59 ml/min (H) 07/10/2018     Priority: Medium     KYMBERLY (acute kidney injury) (H) 07/10/2018     Priority: Medium     Uncomplicated alcohol dependence (H) 07/10/2018     Priority: Medium     Poor nutrition 07/10/2018     Priority: Medium     Nausea and vomiting, intractability of vomiting not specified, unspecified vomiting type 07/10/2018     Priority: Medium     ADA (generalized anxiety disorder) 07/10/2018     Priority: Medium     Insomnia, unspecified type 07/10/2018     Priority: Medium     Debility 07/10/2018     Priority: Medium     Cognitive impairment 07/10/2018     Priority: Medium     Pancytopenia (H) 07/10/2018     Priority: Medium     Pulmonary emboli (H) 06/05/2018     Priority: Medium     Pulmonary embolism (H) 06/05/2018     Priority: Medium     Hypokalemia 05/31/2018     Priority: Medium     Hypomagnesemia 05/31/2018     Priority: Medium     Carcinoma, lung, left (H) 04/12/2018     Priority: Medium     CAD  (coronary artery disease) 03/28/2018     Priority: Medium     No previous angiogram.  No previous stenting.    Atypical Stress Test consistent with possible CAD 8/2016: Myocardial perfusion imaging using single isotope technique demonstrated a small of inferior ischemia at the base to mid ventricle There is a second moderate area of ischemia in the anterior and anteroseptal wall from base through mid ventricle, involving the lateral base as well. There is a small fixed portion in the anteroseptal base consistent with prior infarction There is a fixed inferoseptal defect from apex to mid ventricle consistent with either prior nontransmural infarct or attenuation artifact. Gated images demonstrated inferior, inferoseptal, anterior and anteroseptal basal hypokinesis.  The left ventricular systolic function is 52% at rest and 47% post stress.       Hyponatremia 03/28/2018     Priority: Medium     SOB (shortness of breath) 03/28/2018     Priority: Medium     Chemotherapy induced nausea and vomiting 03/28/2018     Priority: Medium     Lung cancer (H)      Priority: Medium     Left shoulder pain, cough. Bx 12/2017- Non Small Cell. Resection planned 4/2018       Uncomplicated asthma      Priority: Medium     Anxiety 12/28/2017     Priority: Medium     Gastroesophageal reflux disease without esophagitis 12/28/2017     Priority: Medium     PAD (peripheral artery disease) (H) 06/13/2016     Priority: Medium     Benign essential hypertension 06/13/2016     Priority: Medium     Hyperlipidemia LDL goal <100 06/13/2016     Priority: Medium     Tobacco use disorder 10/16/2009     Priority: Medium      Past Medical History:   Diagnosis Date     GERD (gastroesophageal reflux disease)      HTN (hypertension)      Lung cancer (H)      MI (myocardial infarction) (H)     2016, medically managed     Past Surgical History:   Procedure Laterality Date     FRACTURE TX, ANKLE RT/LT  1975    Fracture TX Ankle, LT     INSERT PORT VASCULAR  ACCESS Left 5/15/2018    Procedure: INSERT PORT VASCULAR ACCESS;  Left chest Port-a-Cath Placement;  Surgeon: Gurjit Fox MD;  Location: [facility] OR     OPEN REDUCTION INTERNAL FIXATION HIP NAILING  9/20/2013    Procedure: OPEN REDUCTION INTERNAL FIXATION HIP NAILING;  Left Hip Open Reduction Internal Fixation ;  Surgeon: Rosas Dennis MD;  Location: [facility] OR     THORACOSCOPIC BIOPSY LUNG Left 4/11/2018    Procedure: THORACOSCOPIC BIOPSY LUNG;  subxiphoid left video assisted thorascopic surgery pleural biops, left lower lobe wedge biopsy ;  Surgeon: Mega Moreno MD;  Location: [facility]     VASCULAR SURGERY       Current Outpatient Medications   Medication Sig Dispense Refill     ACETAMINOPHEN PO Take 1,000 mg by mouth 3 times daily       AmLODIPine Besylate (NORVASC PO) Take 10 mg by mouth daily       DULoxetine HCl (CYMBALTA PO) Take 60 mg by mouth daily       GABAPENTIN PO Take 300 mg by mouth 2 times daily        LORazepam (ATIVAN) 0.5 MG tablet Take 1 tablet (0.5 mg) by mouth 3 times daily as needed for anxiety 25 tablet 0     magnesium oxide (MAG-OX) 400 MG tablet Take 400 mg by mouth 2 times daily       MELATONIN PO Take 6 mg by mouth At Bedtime        mirtazapine (REMERON) 7.5 MG tablet Take 1 tablet (7.5 mg) by mouth At Bedtime       nicotine (NICODERM CQ) 21 MG/24HR 24 hr patch Place 1 patch onto the skin daily as needed        omeprazole (PRILOSEC) 20 MG CR capsule Take 1 capsule (20 mg) by mouth daily 90 capsule 3     oxyCODONE (OXYCONTIN) 10 MG 12 hr tablet Take 1 tablet (10 mg) by mouth 2 times daily Give 20 mg QAM and 10 mg Q-Afternoon. 90 tablet 0     oxyCODONE (OXYCONTIN) 30 MG 12 hr tablet Take 1 tablet (30 mg) by mouth At Bedtime 30 tablet 0     oxyCODONE (ROXICODONE) 5 MG tablet Take 1 tablet (5 mg) by mouth every 4 hours as needed for pain 30 tablet 0     polyethylene glycol (MIRALAX/GLYCOLAX) Packet Take 17 g by mouth daily       senna-docusate (SENOKOT-S;PERICOLACE) 8.6-50 MG per  tablet Take 2 tablets by mouth 2 times daily 100 tablet 0     STATIN NOT PRESCRIBED (INTENTIONAL) 1 each daily Please choose reason not prescribed, below       THIAMINE HCL PO Take 100 mg by mouth daily       tiZANidine (ZANAFLEX) 4 MG capsule Take 1 capsule (4 mg) by mouth 3 times daily as needed for muscle spasms 30 capsule 0     VENTOLIN  (90 Base) MCG/ACT Inhaler Inhale 2 puffs into the lungs every 4 hours as needed for shortness of breath / dyspnea or wheezing 1 Inhaler 1     Warfarin Sodium (COUMADIN PO) Take by mouth daily Take daily as directed per INR results     OTC products: See medrec above. He is also noted to be using ETOH on a weekly basis (or more).     Allergies   Allergen Reactions     Cipro [Ciprofloxacin] Swelling      Latex Allergy: NO  Social History      Tobacco Use     Smoking status: Current Every Day Smoker     Packs/day: 1/2     Years: 47.00     Pack years: 7.05     Types: Cigarettes     Start date: 12/26/1967     Smokeless tobacco: Never Used     Tobacco comment: 10+ cigs daily.   Substance Use Topics     Alcohol use: Yes     Comment: 1-2 beers/week (that he reports to staff, but true intake is unknown).      History   Drug Use     Types: Marijuana     Comment: occasional (couple of times/month).     REVIEW OF SYSTEMS:   6 point ROS including Respiratory, CV, GI and , other than that noted in the HPI, is negative.    EXAM:   /68   Pulse 92   Temp 98.3  F (36.8  C)   Resp 20   Wt 66.7 kg (147 lb)   SpO2 93%   BMI 18.37 kg/m      GENERAL APPEARANCE: Alert, in no distress, cooperative.   ENT: Mouth/posterior oropharynx intact w/ moist mucous membranes, hearing acuity [place].  EYES: EOM, conjunctivae, lids, pupils and irises normal, PERRL2.   RESP: Respiratory effort fair, no respiratory distress, Lung sounds diminished throughout. On RA at this time.  CV: Auscultation of heart reveals S1, S2, rate and rhythm regular, no murmur, no rub or gallop, Edema 0+ BLE. Peripheral  pulses are 2+.  ABDOMEN: Normal bowel sounds, soft, non-tender abdomen, and no masses palpated.  SKIN: Inspection/Palpation of skin and subcutaneous tissue baseline w/ fragility. No wounds/rashes noted.  NEURO: CN II-XII at patient's baseline, sensation baseline PPS.  PSYCH: Insight, judgement, and memory are baseline, affect and mood are sad/frusterated.     DIAGNOSTICS:   No labs or EKG required for low risk surgery and all diagnostics are recent/UTD:    EKG (March 2019): appears normal, Normal Sinus Rhythm, normal axis, normal intervals, no acute ST/T changes c/w ischemia, no LVH by voltage criteria, unchanged from previous tracings.    Serum Potassium: 4.2 (4/2/19).    Serum Creatinine: 1.09 (4/2/19).    Hemoglobin A1C: 5.1 (3/27/19).    Coagulation Studies: INR today 1.84 and CBC baseline (4/2/19) as noted below.     Recent Labs   Lab Test 04/02/19  1000 03/31/19 2004 03/27/19 03/23/19  1432 04/04/18  1256   HGB 9.5* 9.1*  --  9.5* 14.5   * 611*  --  641* 359   INR  --   --  1.80* 5.85* 0.96    132* 133* 126* 130*   POTASSIUM 4.2 3.9 4.1 4.6 3.8   CR 1.07 1.07 1.34* 0.97 0.79   A1C  --   --   --   --  4.7       IMPRESSION:   Reason for surgery/procedure: Lung Cancer.  Diagnosis/reason for consult: Pre-Operative Evaluation.    The proposed surgical procedure is considered Moderate risk.  REVISED CARDIAC RISK INDEX  The patient has the following serious cardiovascular risks for perioperative complications such as (MI, PE, VFib and 3  AV Block):    Coronary Artery Disease (MI, positive stress test, angina, Qs on EKG): Variable hx of CAD/MI and this cannot be completed ruled-out.     INTERPRETATION: 1 risks: Class II (Low risk: 0.9% complication rate).    The patient has the following additional risks for perioperative complications:    High tolerance to opioid analgesics due to ongoing left lung cancer and referred left neck/shoulder pain (on OxyContin TID, oxycodone PRN, etc).       ICD-10-CM    1.  Carcinoma, lung, left (H) C34.92    2. Chronic pain syndrome G89.4    3. Chronic left shoulder pain M25.512     G89.29    4. History of pulmonary embolism Z86.711    5. Encounter for therapeutic drug monitoring Z51.81    6. Long term (current) use of anticoagulants Z79.01    7. ADA (generalized anxiety disorder) F41.1    8. Current mild episode of major depressive disorder without prior episode (H) F32.0    9. Acquired postural kyphosis M40.00    10. Moderate protein-calorie malnutrition (H) E44.0      ORDERS:     Pulmonary Risk  Advised smoking cessation.     Anemia  Anemia and does not require treatment prior to surgery.  Monitor Hemoglobin postoperatively.    Medications  Patient is to take all scheduled medications on the day of surgery EXCEPT for modifications listed below:    Anticoagulant or Antiplatelet Medication Use: WARFARIN to be held: Thromboembolic risk is low (e.g. single DVT/PE >12 months ago, A fib and [id number] < 4 without prior CVA/TIA), hold warfarin 5 days (INR >/= 2) without bridging before procedure.      APPROVAL GIVEN to proceed with proposed procedure, without further diagnostic evaluation.     Signed Electronically by:   Dr. Lashanda Suárez, APRN CNP DNP    Copy of this evaluation report is provided to requesting provider.    Orders transcribed by JOHN Montano:  1. Cancel all coumadin and holding orders, as this procedure date has changed.   2. Coumadin 6 mg PO QD on M/W/F  3. Coumadin 3 mg PO QD on all other days. Last dose on 4/25 then hold x5 days for procedure on 5/1/19  4. Renew Ativan (again) today. See separate note in this encounter.      [facility]  Chief Complaint   Patient presents with     Renewal of PRN Ativan Statement.     Chilo Oneil is a 69 year old (1950) who has required PRN Ativan since his cancer diagnosis in early 2017 and specifically his stay in long-term care. Provider has previously given the CMS required 14-day prescription.  We have  noted upon numerous reassessments that this patient requires PRN dosing ongoing, especially given his changing health and consideration of either active cancer treatment or hospice.    ASSESSMENT/PLAN  Per CMS regulations surrounding the use of PRN Ativan (considered a psychotropic), I (the primary care provider) hereby state that it is appropriate to extend this order beyond the initial and subsequent 14-day prescriptions, secondary to this patient's clinical condition. His clinical picture, frequent ED visits, and breakthrough anxiety warrant the continued PRN use of Ativan.  The duration of this therapy will be greater than 6 months and may change secondary to his health/condition.     Orders:  Nursing or PharmD to notify provider when Rx is needed.    Electronically signed by:  NATALIE Harvey CNP DNP           Sincerely,        NATALIE Werner CNP

## 2019-04-19 NOTE — PROGRESS NOTES
[facility]  Chief Complaint   Patient presents with     Renewal of PRN Ativan Statement.     Chilo Oneil is a 69 year old (1950) who has required PRN Ativan since his cancer diagnosis in early 2017 and specifically his stay in long-term care. Provider has previously given the CMS required 14-day prescription.  We have noted upon numerous reassessments that this patient requires PRN dosing ongoing, especially given his changing health and consideration of either active cancer treatment or hospice.    ASSESSMENT/PLAN  Per CMS regulations surrounding the use of PRN Ativan (considered a psychotropic), I (the primary care provider) hereby state that it is appropriate to extend this order beyond the initial and subsequent 14-day prescriptions, secondary to this patient's clinical condition. His clinical picture, frequent ED visits, and breakthrough anxiety warrant the continued PRN use of Ativan.  The duration of this therapy will be greater than 6 months and may change secondary to his health/condition.     Orders:  Nursing or PharmD to notify provider when Rx is needed.    Electronically signed by:  NATALIE Harvey CNP DNP

## 2019-04-23 NOTE — ED AVS SNAPSHOT
[facility] Emergency Department  [address] [address]  Phone:  [phone]  Fax:  [phone]                                    Chilo Oneil   MRN: [id number]    Department:  [facility] Emergency Department   Date of Visit:  4/23/2019           After Visit Summary Signature Page    I have received my discharge instructions, and my questions have been answered. I have discussed any challenges I see with this plan with the nurse or doctor.    ..........................................................................................................................................  Patient/Patient Representative Signature      ..........................................................................................................................................  Patient Representative Print Name and Relationship to Patient    ..................................................               ................................................  Date                                   Time    ..........................................................................................................................................  Reviewed by Signature/Title    ...................................................              ..............................................  Date                                               Time          22EPIC Rev 08/18

## 2019-04-23 NOTE — ED TRIAGE NOTES
Left shoulder, flank pain been going on for 6 hours now really bad. Got 50 mcg of fentanyl, 30 mg of oxy at 2330. Decreasing pain meds and not working well. Lung cancer.

## 2019-04-23 NOTE — ED PROVIDER NOTES
History     Chief Complaint   Patient presents with     Flank Pain     chronic flank pain secondary to cancer.      HPI  Chilo Oneil is a 69 year old male who presents for left chest pain.  The patient has a history of adenocarcinoma with metastases and has chronic left shoulder and left chest pain related to this.  He says this is his chronic pain but he says it has become much worse over the past day or 2.  Pain is sharp, severe, nonradiating, he does not feel increasingly short of breath compared to his prior.  He has been taking his home pain medicine for this, oxycodone.  He says it is not helping right now.  He denies fever, chills, headache, vomiting, diarrhea, dysuria, or rash.    Allergies:  Allergies   Allergen Reactions     Cipro [Ciprofloxacin] Swelling       Problem List:    Patient Active Problem List    Diagnosis Date Noted     Health Care Home 2019     Priority: Medium     [facility] Care Coordinator  Deepthi Wood MA, W  [phone]    [facility] CARE PLAN SUMMARY    Member Name:  Chilo Oneil  Address:  [place] [address] Phone: [phone] (home)    :  1950 Date of Assessment:  19 LTTC at [facility]     Health Plan:  [employer]  Health Plan Number: [id number] Medical Assistance Number: [id number]  Financial Worker:  [facility]   Case #:     [facility] Care Coordinator:  CHARLEY Valdes CC Phone:  [phone]  CC Fax:  [phone]   FVP Enrollment Date: 18 Case Mix:    Rate Cell:    Waiver Type:     Primary Emergency Contact: TAHIR NUNEZ (1o HC agent)  Mobile Phone: [phone]  Relation: Sister    Secondary Emergency Contact: KrystaSamm capone (2o HC agent)  Mobile Phone: [phone]  Relation: Brother Language:  English  :  No   Health Care Agent/POA:   Advanced Directives/Living Will:     Primary Care Clinic/Phone/Fax:  [facility]/(p) [phone], (f) [phone]  Primary Dx:  Lung CA C24.90  Secondary Dx:  HTN I10   Primary Physician:  Lashanda Suárez   Height:  Not on file  Weight:  157 lbs   Specialty Physician:    Audiologist:     Eye Care Provider:   Dental Care Provider:    [facility]: [facility] [phone]   Other:             Iron deficiency anemia 01/03/2019     Priority: Medium     Protein-calorie malnutrition (H) 11/13/2018     Priority: Medium     History of pulmonary embolism 09/04/2018     Priority: Medium     Encounter for therapeutic drug monitoring 09/04/2018     Priority: Medium     Chronic left shoulder pain 08/27/2018     Priority: Medium     Hip pain, left, unclear chronicity/etiology 07/17/2018     Priority: Medium     Current episode of major depressive disorder without prior episode 07/16/2018     Priority: Medium     Long term (current) use of anticoagulants 07/13/2018     Priority: Medium     Other pulmonary embolism without acute cor pulmonale, unspecified chronicity (H) 07/10/2018     Priority: Medium     Left shoulder pain, unspecified chronicity 07/10/2018     Priority: Medium     Falls frequently 07/10/2018     Priority: Medium     Near syncope 07/10/2018     Priority: Medium     Coronary artery disease involving native heart without angina pectoris, unspecified vessel or lesion type 07/10/2018     Priority: Medium     H/O acute myocardial infarction 07/10/2018     Priority: Medium     Essential hypertension 07/10/2018     Priority: Medium     CKD (chronic kidney disease) stage 3, GFR 30-59 ml/min (H) 07/10/2018     Priority: Medium     KYMBERLY (acute kidney injury) (H) 07/10/2018     Priority: Medium     Uncomplicated alcohol dependence (H) 07/10/2018     Priority: Medium     Poor nutrition 07/10/2018     Priority: Medium     Nausea and vomiting, intractability of vomiting not specified, unspecified vomiting type 07/10/2018     Priority: Medium     ADA (generalized anxiety disorder) 07/10/2018     Priority: Medium     Insomnia, unspecified type  07/10/2018     Priority: Medium     Debility 07/10/2018     Priority: Medium     Cognitive impairment 07/10/2018     Priority: Medium     Pancytopenia (H) 07/10/2018     Priority: Medium     Pulmonary emboli (H) 06/05/2018     Priority: Medium     Pulmonary embolism (H) 06/05/2018     Priority: Medium     Hypokalemia 05/31/2018     Priority: Medium     Hypomagnesemia 05/31/2018     Priority: Medium     Carcinoma, lung, left (H) 04/12/2018     Priority: Medium     CAD (coronary artery disease) 03/28/2018     Priority: Medium     No previous angiogram.  No previous stenting.    Atypical Stress Test consistent with possible CAD 8/2016: Myocardial perfusion imaging using single isotope technique demonstrated a small of inferior ischemia at the base to mid ventricle There is a second moderate area of ischemia in the anterior and anteroseptal wall from base through mid ventricle, involving the lateral base as well. There is a small fixed portion in the anteroseptal base consistent with prior infarction There is a fixed inferoseptal defect from apex to mid ventricle consistent with either prior nontransmural infarct or attenuation artifact. Gated images demonstrated inferior, inferoseptal, anterior and anteroseptal basal hypokinesis.  The left ventricular systolic function is 52% at rest and 47% post stress.       Hyponatremia 03/28/2018     Priority: Medium     SOB (shortness of breath) 03/28/2018     Priority: Medium     Chemotherapy induced nausea and vomiting 03/28/2018     Priority: Medium     Lung cancer (H)      Priority: Medium     Left shoulder pain, cough. Bx 12/2017- Non Small Cell. Resection planned 4/2018       Uncomplicated asthma      Priority: Medium     Anxiety 12/28/2017     Priority: Medium     Gastroesophageal reflux disease without esophagitis 12/28/2017     Priority: Medium     PAD (peripheral artery disease) (H) 06/13/2016     Priority: Medium     Benign essential hypertension 06/13/2016      Priority: Medium     Hyperlipidemia LDL goal <100 06/13/2016     Priority: Medium     Tobacco use disorder 10/16/2009     Priority: Medium        Past Medical History:    Past Medical History:   Diagnosis Date     GERD (gastroesophageal reflux disease)      HTN (hypertension)      Lung cancer (H)      MI (myocardial infarction) (H)        Past Surgical History:    Past Surgical History:   Procedure Laterality Date     FRACTURE TX, ANKLE RT/LT  1975    Fracture TX Ankle, LT     INSERT PORT VASCULAR ACCESS Left 5/15/2018    Procedure: INSERT PORT VASCULAR ACCESS;  Left chest Port-a-Cath Placement;  Surgeon: Gurjit Fox MD;  Location: [facility] OR     OPEN REDUCTION INTERNAL FIXATION HIP NAILING  9/20/2013    Procedure: OPEN REDUCTION INTERNAL FIXATION HIP NAILING;  Left Hip Open Reduction Internal Fixation ;  Surgeon: Rosas Dennis MD;  Location: [facility] OR     THORACOSCOPIC BIOPSY LUNG Left 4/11/2018    Procedure: THORACOSCOPIC BIOPSY LUNG;  subxiphoid left video assisted thorascopic surgery pleural biops, left lower lobe wedge biopsy ;  Surgeon: Mega Moreno MD;  Location:  OR     VASCULAR SURGERY         Family History:    Family History   Problem Relation Age of Onset     Diabetes Brother         type 2     Diabetes Father        Social History:  Marital Status:  Single [1]  Social History     Tobacco Use     Smoking status: Current Every Day Smoker     Packs/day: 0.15     Years: 47.00     Pack years: 7.05     Types: Cigarettes     Start date: 12/26/1967     Smokeless tobacco: Never Used     Tobacco comment: 4-5 cigs daily.    Substance Use Topics     Alcohol use: Yes     Comment: 6-8 beers per day, last 3/27 at 6pm     Drug use: Yes     Types: Marijuana     Comment: 3 times a day, 2-3 times/week for pain        Medications:      ACETAMINOPHEN PO   AmLODIPine Besylate (NORVASC PO)   DULoxetine HCl (CYMBALTA PO)   GABAPENTIN PO   LORazepam (ATIVAN) 0.5 MG tablet   magnesium oxide (MAG-OX) 400 MG  "tablet   MELATONIN PO   mirtazapine (REMERON) 7.5 MG tablet   nicotine (NICODERM CQ) 21 MG/24HR 24 hr patch   omeprazole (PRILOSEC) 20 MG CR capsule   oxyCODONE (OXYCONTIN) 10 MG 12 hr tablet   oxyCODONE (OXYCONTIN) 30 MG 12 hr tablet   oxyCODONE (ROXICODONE) 5 MG tablet   polyethylene glycol (MIRALAX/GLYCOLAX) Packet   senna-docusate (SENOKOT-S;PERICOLACE) 8.6-50 MG per tablet   STATIN NOT PRESCRIBED (INTENTIONAL)   THIAMINE HCL PO   tiZANidine (ZANAFLEX) 4 MG capsule   VENTOLIN  (90 Base) MCG/ACT Inhaler   Warfarin Sodium (COUMADIN PO)         Review of Systems  Pertinent positives and negatives listed in the HPI, all other systems reviewed and are negative.    Physical Exam   BP: 134/61  Heart Rate: 73  Temp: 98  F (36.7  C)  Resp: 24  Height: 190.5 cm (6' 3\")  Weight: 65.8 kg (145 lb)  SpO2: 94 %      Physical Exam   Constitutional: He is oriented to person, place, and time. He appears well-developed and well-nourished. He appears distressed.   HENT:   Head: Normocephalic and atraumatic.   Right Ear: External ear normal.   Left Ear: External ear normal.   Nose: Nose normal.   Eyes: Conjunctivae are normal. No scleral icterus.   Neck: Normal range of motion.   Cardiovascular: Normal rate and regular rhythm.   Pulmonary/Chest: Effort normal. No stridor. No respiratory distress.   Abdominal: Soft. He exhibits no distension.   Neurological: He is alert and oriented to person, place, and time.   Skin: Skin is warm and dry. He is not diaphoretic.   Psychiatric: He has a normal mood and affect. His behavior is normal.   Nursing note and vitals reviewed.      ED Course        Procedures               Critical Care time:  none               Results for orders placed or performed during the hospital encounter of 04/23/19 (from the past 24 hour(s))   Chest XR,  PA & LAT    Narrative    CHEST 2 VIEWS  4/23/2019 5:11 AM     HISTORY: Worsening of chronic chest pain and shortness of breath.    COMPARISON: 3/23/2019 - CT " chest.    FINDINGS: A moderate-sized left pleural effusion with adjacent  opacities. A 1.6 cm nodular opacity in the mid left lung, projecting  over the posterior aspect of the seventh rib is again noted. The right  lung is clear. Borderline cardiomegaly. Atherosclerotic calcification  in the thoracic aorta. Left anterior chest wall port with catheter  entering the left subclavian vein and distal tip in the superior vena  cava.      Impression    IMPRESSION:  1. Moderate-sized left pleural effusion with adjacent opacities that  most likely represent atelectasis.  2. No other findings suspicious for active cardiopulmonary disease.  3. A nonspecific 1.6 cm nodular opacity in the mid left lung is again  noted.    JACQUI NEGRO MD       Medications   HYDROmorphone (DILAUDID) injection 2 mg (1 mg Intravenous Given 4/23/19 0401)   HYDROmorphone (DILAUDID) injection 1 mg (0.5 mg Intravenous Given 4/23/19 0653)   OLANZapine (zyPREXA) injection 5 mg (5 mg Intramuscular Given 4/23/19 0444)   HYDROmorphone (DILAUDID) injection 1 mg (1 mg Intravenous Given 4/23/19 0444)       Assessments & Plan (with Medical Decision Making)   69-year-old male with a history of metastatic pulmonary adenocarcinoma who presents for acute worsening of his chronic pain.  Heart rate 73, temperature is 98  F, SPO2 is 94% on room air.  Chest x-ray obtained, images reviewed independently as well as he does have sized left pleural effusion.  No pneumothorax.  No fevers and no symptoms suggestive of pneumonia or empyema.  The patient does arrive with a POLST form stating he wants comfort cares only and does not want treatment to prolong his life at this time.  Therefore we discussed whether to treat him for his pain versus workup the multiple potential causes of this right-sided chest wall pain.  He felt it was most appropriate to treat him for his pain and avoid test at this time.  I think this is reasonable given that this pain seems very typical of  his chronic pain in the typical location but just worse from prior.  Therefore he is given IV hydromorphone as well as olanzapine.  On recheck he is feeling better and tolerating oral intake.  He is breathing comfortably.  He is safe to discharge back to home with instructions to return if worse, otherwise follow-up in clinic.  The patient is in agreement with this plan.    I have reviewed the nursing notes.    I have reviewed the findings, diagnosis, plan and need for follow up with the patient.          Medication List      There are no discharge medications for this visit.         Final diagnoses:   Chronic pain due to neoplasm   Other chest pain       4/23/2019   [facility] EMERGENCY DEPARTMENT     Nikolay Vance MD  04/23/19 0711

## 2019-04-23 NOTE — ED NOTES
Pt states he is in 10/10 pain, warm blanket offered and given and also adjusted bed and patient position as well as made a neck pillow for patient comfort, patient getting angry with RN insisting on pain medication. RN educated patient that as soon as the MD is able to see him we will see what we can give him, but we need to wait until then.

## 2019-04-23 NOTE — DISCHARGE INSTRUCTIONS
Continue to take your home medications.  Return to the emergency department for worsening breathing, fevers, or other concerns.  Otherwise follow-up in clinic.

## 2019-04-23 NOTE — ED NOTES
"Patient is very verbally abusive to staff. Yelling at RN, I apologized for the wait and said I am doing everything I can to help you. He stated \"that's not enough\" then began cussing at me and telling me to hurry up and \"get my shit together\" ERT then came in and assisted patient to the restroom.   "

## 2019-04-24 NOTE — LETTER
"    4/24/2019        RE: Chilo Oneil  [facility]  [address] [facility] [address]        [facility]  [place] Record Number:  [id number]  Place of Service where encounter took place:  [facility]  ([facility]) [[id number]]  Chief Complaint   Patient presents with     Nursing Home Acute     HPI:  Chilo Oneil  is a 69 year old (1950), who is being seen today for an episodic care visit.  HPI information obtained from: facility chart records, facility staff, patient report and [facility] chart review.     Today's concern is:  Carcinoma, lung, left (H)  Chronic pain syndrome  Chronic left shoulder pain  ADA (generalized anxiety disorder)  Diarrhea, unspecified type  Provider followed-up w/ patient today surrounding POC/anxiety/pain, and ED visits. This is patient's 3rd ED visit in 1 month. Noting extra stressors w/ cancer status unknown, his brother passing away last week.     Today, provider reiterated importance of bone scan scheduled for tomorrow because this will guide care and help us to determine next steps in addition to his biopsy scheduled on 5/1. He immediately became anxious and told me \"I can't go to that, my brother's services are tomorrow.\" Provider reassured patient and spoke w/ staff immediately to have bone scan rescheduled, but before biopsy on 5/1. Patient is using O2 today for comfort, he states his pain is better, he just feels like he is at his wits end w/ feeling bad. He explained he has had diarrhea for the last few nights. Noting he is on Senna S BID.     Past Medical and Surgical History reviewed in [facility] today.  REVIEW OF SYSTEMS: 4 point ROS including Respiratory, CV, GI and , other than that noted in the HPI,  is negative    Objective:  /74   Pulse 69   Temp 97.4  F (36.3  C)   Resp 22   Wt 65.7 kg (144 lb 12.8 oz)   SpO2 97%   BMI 18.10 kg/m     Exam:  GENERAL APPEARANCE: Alert, in no distress, cooperative. "   ENT: Mouth/posterior oropharynx intact w/ moist mucous membranes, hearing acuity [place].  EYES: EOM, conjunctivae, lids, pupils and irises normal, PERRL2.   RESP: Respiratory effort fair, no respiratory distress, Lung sounds diminished throughout. On 2.5LO2.  CV: Auscultation of heart reveals S1, S2, rate and rhythm regular, no murmur, no rub or gallop, Edema 0+ BLE. Peripheral pulses are 2+.  ABDOMEN: Normal bowel sounds, soft, non-tender abdomen, and no masses palpated.  SKIN: Inspection/Palpation of skin and subcutaneous tissue baseline w/ fragility. No wounds/rashes noted.   NEURO: CN II-XII at patient's baseline, sensation baseline PPS.  PSYCH: Insight, judgement, and memory are baseline, affect and mood are anxious..    Labs:   Most Recent 3 CBC's:  Recent Labs   Lab Test 04/02/19  1000 03/31/19 2004 03/23/19  1432   WBC 11.5* 16.7* 11.4*   HGB 9.5* 9.1* 9.5*   MCV 92 93 92   * 611* 641*     Most Recent 3 BMP's:  Recent Labs   Lab Test 04/02/19  1000 03/31/19 2004 03/27/19    132* 133*   POTASSIUM 4.2 3.9 4.1   CHLORIDE 96 94 91*   CO2 33* 31 31   BUN 12 16 19   CR 1.07 1.07 1.34*   ANIONGAP 5 7 11   DAMARI 9.2 8.9 10.5   * 96 67*     ASSESSMENT/PLAN:  Carcinoma, lung, left (H)  Chronic pain syndrome  Chronic left shoulder pain  ADA (generalized anxiety disorder)  Diarrhea, unspecified type  Acute-on-chronic. Tenuous. This patient either needs hospice care or needs to move forward w/ cancer treatment. He would like to know bone scan and biopsy results before making a final decision. We will adjust bowel medications, hold Magox for loose stools, and ask nursing to reach out for nuclear med scan rescheduling. Should actual biopsy be rescheduled (again), then notify provider right away as Coumadin will need adjustment. Noting he is seeing in-house psych, on Cymbalta, Remeron, lots of opioids, benzodiazepines, and is alert/anxious enough at times to request transfer to ED. We educated patient  that these trips, although providing some temporary relief, are interrupting his POC. Follow-up continuously and as needed.    Orders transcribed by : Dusty Montano  1. Change Nuc Med apt to 4/29/19  2. Change Senna S to 2 tab PO QAM Dx: Loose stools  3. Hold mag oxide for loose stools    Electronically signed by:  Dr. Lashanda Suárez, NATALIE CNP DNP            Sincerely,        NATALIE Werner CNP

## 2019-04-24 NOTE — PROGRESS NOTES
"[place] [facility]  [place] [facility] Record Number:  [id number]  Place of Service where encounter took place:  [facility]  ([facility]) [[id number]]  Chief Complaint   Patient presents with     Nursing Home Acute     HPI:  Chilo Oneil  is a 69 year old (1950), who is being seen today for an episodic care visit.  HPI information obtained from: facility chart records, facility staff, patient report and [facility] chart review.     Today's concern is:  Carcinoma, lung, left (H)  Chronic pain syndrome  Chronic left shoulder pain  ADA (generalized anxiety disorder)  Diarrhea, unspecified type  Provider followed-up w/ patient today surrounding POC/anxiety/pain, and ED visits. This is patient's 3rd ED visit in 1 month. Noting extra stressors w/ cancer status unknown, his brother passing away last week.     Today, provider reiterated importance of bone scan scheduled for tomorrow because this will guide care and help us to determine next steps in addition to his biopsy scheduled on 5/1. He immediately became anxious and told me \"I can't go to that, my brother's services are tomorrow.\" Provider reassured patient and spoke w/ staff immediately to have bone scan rescheduled, but before biopsy on 5/1. Patient is using O2 today for comfort, he states his pain is better, he just feels like he is at his wits end w/ feeling bad. He explained he has had diarrhea for the last few nights. Noting he is on Senna S BID.     Past Medical and Surgical History reviewed in Epic today.  REVIEW OF SYSTEMS: 4 point ROS including Respiratory, CV, GI and , other than that noted in the HPI,  is negative    Objective:  /74   Pulse 69   Temp 97.4  F (36.3  C)   Resp 22   Wt 65.7 kg (144 lb 12.8 oz)   SpO2 97%   BMI 18.10 kg/m    Exam:  GENERAL APPEARANCE: Alert, in no distress, cooperative.   ENT: Mouth/posterior oropharynx intact w/ moist mucous membranes, hearing acuity [place].  EYES: EOM, conjunctivae, " lids, pupils and irises normal, PERRL2.   RESP: Respiratory effort fair, no respiratory distress, Lung sounds diminished throughout. On 2.5LO2.  CV: Auscultation of heart reveals S1, S2, rate and rhythm regular, no murmur, no rub or gallop, Edema 0+ BLE. Peripheral pulses are 2+.  ABDOMEN: Normal bowel sounds, soft, non-tender abdomen, and no masses palpated.  SKIN: Inspection/Palpation of skin and subcutaneous tissue baseline w/ fragility. No wounds/rashes noted.   NEURO: CN II-XII at patient's baseline, sensation baseline PPS.  PSYCH: Insight, judgement, and memory are baseline, affect and mood are anxious..    Labs:   Most Recent 3 CBC's:  Recent Labs   Lab Test 04/02/19 1000 03/31/19 2004 03/23/19  1432   WBC 11.5* 16.7* 11.4*   HGB 9.5* 9.1* 9.5*   MCV 92 93 92   * 611* 641*     Most Recent 3 BMP's:  Recent Labs   Lab Test 04/02/19 1000 03/31/19 2004 03/27/19    132* 133*   POTASSIUM 4.2 3.9 4.1   CHLORIDE 96 94 91*   CO2 33* 31 31   BUN 12 16 19   CR 1.07 1.07 1.34*   ANIONGAP 5 7 11   DAMARI 9.2 8.9 10.5   * 96 67*     ASSESSMENT/PLAN:  Carcinoma, lung, left (H)  Chronic pain syndrome  Chronic left shoulder pain  ADA (generalized anxiety disorder)  Diarrhea, unspecified type  Acute-on-chronic. Tenuous. This patient either needs hospice care or needs to move forward w/ cancer treatment. He would like to know bone scan and biopsy results before making a final decision. We will adjust bowel medications, hold Magox for loose stools, and ask nursing to reach out for nuclear med scan rescheduling. Should actual biopsy be rescheduled (again), then notify provider right away as Coumadin will need adjustment. Noting he is seeing in-house psych, on Cymbalta, Remeron, lots of opioids, benzodiazepines, and is alert/anxious enough at times to request transfer to ED. We educated patient that these trips, although providing some temporary relief, are interrupting his POC. Follow-up continuously and as  needed.    Orders transcribed by : Dusty Montano  1. Change Nuc Med apt to 4/29/19  2. Change Senna S to 2 tab PO QAM Dx: Loose stools  3. Hold mag oxide for loose stools    Electronically signed by:  Dr. Lashanda Suárez, APRN CNP DNP

## 2019-04-30 NOTE — PROGRESS NOTES
Patient has a bone scan today. He is having some anxiety with this test. He is requesting something to help him relax through the scan. Patient has a . He would like prescription sent to [facility] pharmacy. Will send request to Dr. Mccauley.  Mor Mooney RN, MD  You 7 minutes ago (8:29 AM)     Please give him Ativan 1 mg orally 15 minutes before his procedure.        Script ordered. Patient was notified.  Ashley Durand RN

## 2019-05-03 NOTE — PROGRESS NOTES
[facility]  [place] [facility] Record Number:  [id number]  Place of Service where encounter took place:  [facility]  (S) [[id number]]  Chief Complaint   Patient presents with     Nursing Home Acute     HPI: Chilo Oneil  is a 69 year old (1950), who is being seen today for an episodic care visit.  HPI information obtained from: facility chart records, facility staff, patient report, [facility] chart review and Care Everywhere [facility] chart review. Today's concern is:    Carcinoma, lung, left (H)  Chronic pain syndrome  Chronic left shoulder pain  ADA (generalized anxiety disorder)  History of pulmonary embolism  Encounter for therapeutic drug monitoring  Long term (current) use of anticoagulants  Current mild episode of major depressive disorder without prior episode (H)  Patient seen today on follow-up s/p bone scan, and CT-guided left lung biopsy. He received moderate conscious sedation w/ Fentanyl and Versed, and his OAC has been on hold since 4/26. His last INR was 1.84 on 4/18, and his coumadin dosing prior was 6mg M/W/F and 3mg AOD. Patient states he is sleepy, that the procedure went well, he denies any new SOB, CP, or drainage from site.      Past Medical and Surgical History reviewed in Epic today.  REVIEW OF SYSTEMS: 4 point ROS including Respiratory, CV, GI and , other than that noted in the HPI, is negative.    Objective:  /72   Pulse 96   Temp 97.8  F (36.6  C)   Resp 20   Wt 64 kg (141 lb)   SpO2 94%   BMI 17.62 kg/m    Exam:  GENERAL APPEARANCE: Alert, in no distress, cooperative.   ENT: Mouth/posterior oropharynx intact w/ moist mucous membranes, hearing acuity [place].  EYES: EOM, conjunctivae, lids, pupils and irises normal, PERRL2.   RESP: Respiratory effort fair, no respiratory distress, Lung sounds diminished. On 2LO2.  CV: Auscultation of heart reveals S1, S2, rate and rhythm regular, no murmur, no rub or gallop, Edema 0+ BLE. Peripheral pulses  are 2+.  ABDOMEN: Normal bowel sounds, soft, non-tender abdomen, and no masses palpated.  SKIN: Inspection/Palpation of skin and subcutaneous tissue baseline w/ fragility. No wounds/rashes noted. Biopsy site covered.   NEURO: CN II-XII at patient's baseline, sensation baseline PPS.  PSYCH: Insight, judgement, and memory are baseline, affect and mood are flat and drowsy from procedure.    Labs:   Labs done in SNF are in [facility]. Please refer to them using [employer]/Care Everywhere. and Recent labs in [facility] reviewed by me today.     ASSESSMENT/PLAN:  Carcinoma, lung, left (H)  Chronic pain syndrome  Chronic left shoulder pain  ADA (generalized anxiety disorder)  History of pulmonary embolism  Encounter for therapeutic drug monitoring  Long term (current) use of anticoagulants  Current mild episode of major depressive disorder without prior episode (H)  Acute-on-chronic. S/p CT-guided lung biopsy. Will restart anticoagulation at previous dose, and given 72 hr half-life of Coumadin, will recheck INR on 5/7. Noting next oncology appt scheduled on 5/10 for patient to review biopsy results. Follow-up routinely or as needed.    Orders transcribed by : Dusty Montano  1. Coumadin 6 mg PO every day on M/W/F  2. Coumadin 3 mg PO every day on AOD  3. Recheck INR x1 on 5/7/19 Dx: H/O PE    Electronically signed by:  Dr. Lashanda Suárez, APRN CNP DNP

## 2019-05-03 NOTE — IP AVS SNAPSHOT
[facility]  [address]  [facility] [address]  Phone:  [phone]  Fax:  [phone]                                    After Visit Summary   5/3/2019    Chilo Oneil    MRN: [id number]           After Visit Summary Signature Page    I have received my discharge instructions, and my questions have been answered. I have discussed any challenges I see with this plan with the nurse or doctor.    ..........................................................................................................................................  Patient/Patient Representative Signature      ..........................................................................................................................................  Patient Representative Print Name and Relationship to Patient    ..................................................               ................................................  Date                                   Time    ..........................................................................................................................................  Reviewed by Signature/Title    ...................................................              ..............................................  Date                                               Time          22EPIC Rev 08/18

## 2019-05-03 NOTE — DISCHARGE INSTRUCTIONS
Discharge Instructions Needle Biopsy: Lung  You had a procedure called a needle biopsy of one of your lungs. In this procedure, a hollow needle is used to take one or more samples of your lung tissue. The tissue is then examined under a microscope. There are several different types of needle biopsies. Two types are:    Fine needle aspiration. A small amount of tissue is withdrawn (aspirated) using a very fine needle.    Core biopsy. A larger tissue sample is removed for examination.  A biopsy needle is inserted through your skin into your chest and lung. This is called a transthoracic approach, which means across or through the chest (thorax). Scans are done at the same time so that your provider can find the area where he or she would like to sample tissue. Needle biopsies do not require cuts or incisions into the body like open biopsies.  Your healthcare provider will use the results of your biopsy to help diagnose your condition.  Home care    The site of the biopsy may feel numb for a while if you received numbing medicine.    You might have a little soreness following the needle biopsy.    Follow your healthcare provider's instructions about removing bandages and showering or bathing.    You may be sleepy after the biopsy if you received medicine to help you relax (sedation). You should not drive until the next day or as instructed by your healthcare provider.    You should not do heavy lifting, a lot of stair climbing, or take part in sports the day of your biopsy. You can get back to your regular activities as instructed by your healthcare provider.  Follow-up care  Follow up with your healthcare provider, or as advised. Be sure you make an appointment with your healthcare provider to discuss the biopsy results.     When to seek medical advice  Call your healthcare provider right away if any of these occur:    Infection. You might have redness, pain, swelling, or drainage at the site of your  biopsies.    Bleeding. You might also have bleeding at the site of your biopsies.    Coughing up blood. This may only be a small amount.    Collapsed lung (pneumothorax). This means that air from your lungs leaks out into the spaces between your lungs and chest wall. It can lead to trouble breathing and a collapsed lung. Watch for trouble breathing, a fast pulse, sharp pains in your chest or shoulder, and bluish skin   .

## 2019-05-03 NOTE — IP AVS SNAPSHOT
MRN:[id number]                      After Visit Summary   5/3/2019    Chlio Oneil    MRN: [id number]           Visit Information        Provider Department      5/3/2019  8:00 AM [facility] RAD; [facility] IMAGING NURSE; [facility]           Review of your medicines      UNREVIEWED medicines. Ask your doctor about these medicines       Dose / Directions   ACETAMINOPHEN PO      Dose:  1000 mg  Take 1,000 mg by mouth 3 times daily  Refills:  0     COUMADIN PO      Take by mouth daily Take daily as directed per INR results  Refills:  0     CYMBALTA PO      Dose:  60 mg  Take 60 mg by mouth daily  Refills:  0     GABAPENTIN PO      Dose:  300 mg  Take 300 mg by mouth 2 times daily  Refills:  0     * LORazepam 0.5 MG tablet  Commonly known as:  ATIVAN  Used for:  Anxiety      Dose:  0.5 mg  Take 1 tablet (0.5 mg) by mouth 3 times daily as needed for anxiety  Quantity:  25 tablet  Refills:  0     * LORazepam 1 MG tablet  Commonly known as:  ATIVAN  Used for:  Carcinoma, lung, left (H)      Give 1 tablet by mouth 15 minutes before procedure.  Quantity:  1 tablet  Refills:  0     magnesium oxide 400 MG tablet  Commonly known as:  MAG-OX      Dose:  400 mg  Take 400 mg by mouth 2 times daily  Refills:  0     MELATONIN PO      Dose:  6 mg  Take 6 mg by mouth At Bedtime  Refills:  0     mirtazapine 7.5 MG tablet  Commonly known as:  REMERON  Used for:  ADA (generalized anxiety disorder), Uncomplicated alcohol dependence (H)      Dose:  7.5 mg  Take 1 tablet (7.5 mg) by mouth At Bedtime  Refills:  0     nicotine 21 MG/24HR 24 hr patch  Commonly known as:  NICODERM CQ      Dose:  1 patch  Place 1 patch onto the skin daily as needed  Refills:  0     NORVASC PO      Dose:  10 mg  Take 10 mg by mouth daily  Refills:  0     omeprazole 20 MG DR capsule  Commonly known as:  priLOSEC  Used for:  Carcinoma, lung, left (H)      Dose:  20 mg  Take 1 capsule (20 mg) by mouth daily  Quantity:  90 capsule  Refills:  3      * oxyCODONE 30 MG 12 hr tablet  Commonly known as:  OXYCONTIN  Used for:  Carcinoma, lung, left (H), Chronic pain syndrome, Acquired postural kyphosis, Left shoulder pain, unspecified chronicity and etiology      Dose:  30 mg  Take 1 tablet (30 mg) by mouth At Bedtime  Quantity:  30 tablet  Refills:  0     * oxyCODONE 10 MG 12 hr tablet  Commonly known as:  oxyCONTIN  Used for:  Chronic pain syndrome, Acquired postural kyphosis, Left shoulder pain, unspecified chronicity and etiology      Dose:  10 mg  Take 1 tablet (10 mg) by mouth 2 times daily Give 20 mg QAM and 10 mg Q-Afternoon.  Quantity:  90 tablet  Refills:  0     * oxyCODONE 5 MG tablet  Commonly known as:  ROXICODONE  Used for:  Chronic pain syndrome, Chronic left shoulder pain      Dose:  5 mg  Take 1 tablet (5 mg) by mouth every 4 hours as needed for pain  Quantity:  30 tablet  Refills:  0     polyethylene glycol packet  Commonly known as:  MIRALAX/GLYCOLAX      Dose:  17 g  Take 17 g by mouth daily  Refills:  0     SENNA-docusate sodium 8.6-50 MG tablet  Commonly known as:  SENNA S  Used for:  Diarrhea, unspecified type      Dose:  2 tablet  Take 2 tablets by mouth every morning  Refills:  0     STATIN NOT PRESCRIBED  Commonly known as:  INTENTIONAL  Used for:  Carcinoma, lung, left (H)      Dose:  1 each  1 each daily Please choose reason not prescribed, below  Refills:  0     THIAMINE HCL PO      Dose:  100 mg  Take 100 mg by mouth daily  Refills:  0     tiZANidine 4 MG capsule  Commonly known as:  ZANAFLEX  Used for:  Chronic pain syndrome      Dose:  4 mg  Take 1 capsule (4 mg) by mouth 3 times daily as needed for muscle spasms  Quantity:  30 capsule  Refills:  0     VENTOLIN  (90 Base) MCG/ACT inhaler  Generic drug:  albuterol      Dose:  2 puff  Inhale 2 puffs into the lungs every 4 hours as needed for shortness of breath / dyspnea or wheezing  Quantity:  1 Inhaler  Refills:  1         * This list has 5 medication(s) that are the same as  other medications prescribed for you. Read the directions carefully, and ask your doctor or other care provider to review them with you.                  Protect others around you: Learn how to safely use, store and throw away your medicines at www.disposemymeds.org.       Follow-ups after your visit       Your next 10 appointments already scheduled    May 03, 2019 12:00 PM CDT  Nursing Home with NATALIE Enamorado CNP  [facility] ([facility]) [address] [place] [address]  [facility] [address]  [phone]      May 10, 2019  1:40 PM CDT  Return Visit with Mor Mccauley MD  [facility] ([facility]) [facility]  [address]  [facility] [address]  [phone]         Care Instructions       Further instructions from your care team         Discharge Instructions Needle Biopsy: Lung  You had a procedure called a needle biopsy of one of your lungs. In this procedure, a hollow needle is used to take one or more samples of your lung tissue. The tissue is then examined under a microscope. There are several different types of needle biopsies. Two types are:    Fine needle aspiration. A small amount of tissue is withdrawn (aspirated) using a very fine needle.    Core biopsy. A larger tissue sample is removed for examination.  A biopsy needle is inserted through your skin into your chest and lung. This is called a transthoracic approach, which means across or through the chest (thorax). Scans are done at the same time so that your provider can find the area where he or she would like to sample tissue. Needle biopsies do not require cuts or incisions into the body like open biopsies.  Your healthcare provider will use the results of your biopsy to help diagnose your condition.  Home care    The site of the biopsy may feel numb for a while if you received numbing medicine.    You might have a little soreness following the needle  biopsy.    Follow your healthcare provider's instructions about removing bandages and showering or bathing.    You may be sleepy after the biopsy if you received medicine to help you relax (sedation). You should not drive until the next day or as instructed by your healthcare provider.    You should not do heavy lifting, a lot of stair climbing, or take part in sports the day of your biopsy. You can get back to your regular activities as instructed by your healthcare provider.  Follow-up care  Follow up with your healthcare provider, or as advised. Be sure you make an appointment with your healthcare provider to discuss the biopsy results.     When to seek medical advice  Call your healthcare provider right away if any of these occur:    Infection. You might have redness, pain, swelling, or drainage at the site of your biopsies.    Bleeding. You might also have bleeding at the site of your biopsies.    Coughing up blood. This may only be a small amount.    Collapsed lung (pneumothorax). This means that air from your lungs leaks out into the spaces between your lungs and chest wall. It can lead to trouble breathing and a collapsed lung. Watch for trouble breathing, a fast pulse, sharp pains in your chest or shoulder, and bluish skin   .            Additional Information About Your Visit       Care EveryWhere ID    This is your Care EveryWhere ID. This could be used by other organizations to access your [facility] medical records  [id number]       Your Vitals Were     Blood Pressure   145/63    Pulse   69    Temperature   96.6  F (35.9  C)    Respirations   20    Pulse Oximetry   96%          Primary Care Provider Office Phone # Fax #    NATALIE Enamorado [phone]      Equal Access to Services    [facility]: Hadii krysta walterso Soearl, waaxda luqadaha, qaybta kaalmada aderaul, celina hewitt . So [facility] [phone].    ATENCIÓN: Si go murphy galaviz  disposición servicios gratuitos de asistencia lingüística. Yecenia posada [phone].    We comply with applicable federal civil rights laws and [place] laws. We do not discriminate on the basis of race, color, national origin, age, disability, sex, sexual orientation, or gender identity.           Thank you!    Thank you for choosing [facility] for your care. Our goal is always to provide you with excellent care. Hearing back from our patients is one way we can continue to improve our services. Please take a few minutes to complete the written survey that you may receive in the mail after you visit with us. Thank you!            Medication List      ASK your doctor about these medications          Morning Afternoon Evening Bedtime As Needed    ACETAMINOPHEN PO  INSTRUCTIONS:  Take 1,000 mg by mouth 3 times daily                     COUMADIN PO  INSTRUCTIONS:  Take by mouth daily Take daily as directed per INR results                     CYMBALTA PO  INSTRUCTIONS:  Take 60 mg by mouth daily                     GABAPENTIN PO  INSTRUCTIONS:  Take 300 mg by mouth 2 times daily                     * LORazepam 0.5 MG tablet  Also known as:  ATIVAN  INSTRUCTIONS:  Take 1 tablet (0.5 mg) by mouth 3 times daily as needed for anxiety                     * LORazepam 1 MG tablet  Also known as:  ATIVAN  INSTRUCTIONS:  Give 1 tablet by mouth 15 minutes before procedure.                     magnesium oxide 400 MG tablet  Also known as:  MAG-OX  INSTRUCTIONS:  Take 400 mg by mouth 2 times daily                     MELATONIN PO  INSTRUCTIONS:  Take 6 mg by mouth At Bedtime                     mirtazapine 7.5 MG tablet  Also known as:  REMERON  INSTRUCTIONS:  Take 1 tablet (7.5 mg) by mouth At Bedtime                     nicotine 21 MG/24HR 24 hr patch  Also known as:  NICODERM CQ  INSTRUCTIONS:  Place 1 patch onto the skin daily as needed                     NORVASC PO  INSTRUCTIONS:  Take 10 mg by mouth daily                      omeprazole 20 MG DR capsule  Also known as:  priLOSEC  INSTRUCTIONS:  Take 1 capsule (20 mg) by mouth daily                     * oxyCODONE 30 MG 12 hr tablet  Also known as:  OXYCONTIN  INSTRUCTIONS:  Take 1 tablet (30 mg) by mouth At Bedtime                     * oxyCODONE 10 MG 12 hr tablet  Also known as:  oxyCONTIN  INSTRUCTIONS:  Take 1 tablet (10 mg) by mouth 2 times daily Give 20 mg QAM and 10 mg Q-Afternoon.                     * oxyCODONE 5 MG tablet  Also known as:  ROXICODONE  INSTRUCTIONS:  Take 1 tablet (5 mg) by mouth every 4 hours as needed for pain                     polyethylene glycol packet  Also known as:  MIRALAX/GLYCOLAX  INSTRUCTIONS:  Take 17 g by mouth daily                     SENNA-docusate sodium 8.6-50 MG tablet  Also known as:  SENNA S  INSTRUCTIONS:  Take 2 tablets by mouth every morning                     STATIN NOT PRESCRIBED  Also known as:  INTENTIONAL  INSTRUCTIONS:  1 each daily Please choose reason not prescribed, below                     THIAMINE HCL PO  INSTRUCTIONS:  Take 100 mg by mouth daily                     tiZANidine 4 MG capsule  Also known as:  ZANAFLEX  INSTRUCTIONS:  Take 1 capsule (4 mg) by mouth 3 times daily as needed for muscle spasms                     VENTOLIN  (90 Base) MCG/ACT inhaler  INSTRUCTIONS:  Inhale 2 puffs into the lungs every 4 hours as needed for shortness of breath / dyspnea or wheezing  Generic drug:  albuterol                        * This list has 5 medication(s) that are the same as other medications prescribed for you. Read the directions carefully, and ask your doctor or other care provider to review them with you.

## 2019-05-03 NOTE — PROGRESS NOTES
RADIOLOGY PROCEDURE NOTE  Patient name: Chilo Oneil  MRN: [id number]  : 1950    Pre-procedure diagnosis: Left upper lobe lung lesion.  Post-procedure diagnosis: Same    Procedure Date/Time: May 3, 2019  8:25 AM  Procedure: CT guided needle core biopsy.  Estimated blood loss: None  Specimen(s) collected with description: Two needle cores.  The patient tolerated the procedure well with no immediate complications.    See imaging dictation for procedural details.    Provider name: Papa Vasquez  Assistant(s):None

## 2019-05-03 NOTE — PROGRESS NOTES
Post Procedure Summary:  Prior to the start of the procedure and with procedural staff participation, I verbally confirmed the patient s identity using two indicators, relevant allergies, that the procedure was appropriate and matched the consent or emergent situation, and that the correct equipment/implants were available. Immediately prior to starting the procedure I conducted the Time Out with the procedural staff and re-confirmed the patient s name, procedure, and site/side. (The [facility] universal protocol was followed.)  YES       Sedatives: Fentanyl and Versed    Vital signs, airway and pulse oximetry were monitored and remained stable throughout the procedure and sedation was maintained until the procedure was complete.  The patient was monitored by staff until sedation discharge criteria were met. YES    Patient tolerance: Patient tolerated procedure well without any immediate complications.    Time of sedation in minutes: 22 minutes from beginning to end of physician one to one monitoring.

## 2019-05-03 NOTE — LETTER
5/3/2019        RE: Chilo Oneil  [facility]  [address] [facility] [address]        [facility]  [facility] Record Number:  [id number]  Place of Service where encounter took place:  [facility]  ([facility]) [[id number]]  Chief Complaint   Patient presents with     Nursing Home Acute     HPI: Chilo Oneil  is a 69 year old (1950), who is being seen today for an episodic care visit.  HPI information obtained from: facility chart records, facility staff, patient report, [facility] chart review and Care Everywhere [facility] chart review. Today's concern is:    Carcinoma, lung, left (H)  Chronic pain syndrome  Chronic left shoulder pain  ADA (generalized anxiety disorder)  History of pulmonary embolism  Encounter for therapeutic drug monitoring  Long term (current) use of anticoagulants  Current mild episode of major depressive disorder without prior episode (H)  Patient seen today on follow-up s/p bone scan, and CT-guided left lung biopsy. He received moderate conscious sedation w/ Fentanyl and Versed, and his OAC has been on hold since 4/26. His last INR was 1.84 on 4/18, and his coumadin dosing prior was 6mg M/W/F and 3mg AOD. Patient states he is sleepy, that the procedure went well, he denies any new SOB, CP, or drainage from site.      Past Medical and Surgical History reviewed in [facility] today.  REVIEW OF SYSTEMS: 4 point ROS including Respiratory, CV, GI and , other than that noted in the HPI, is negative.    Objective:  /72   Pulse 96   Temp 97.8  F (36.6  C)   Resp 20   Wt 64 kg (141 lb)   SpO2 94%   BMI 17.62 kg/m     Exam:  GENERAL APPEARANCE: Alert, in no distress, cooperative.   ENT: Mouth/posterior oropharynx intact w/ moist mucous membranes, hearing acuity [place].  EYES: EOM, conjunctivae, lids, pupils and irises normal, PERRL2.   RESP: Respiratory effort fair, no respiratory distress, Lung sounds diminished. On 2LO2.  CV:  Auscultation of heart reveals S1, S2, rate and rhythm regular, no murmur, no rub or gallop, Edema 0+ BLE. Peripheral pulses are 2+.  ABDOMEN: Normal bowel sounds, soft, non-tender abdomen, and no masses palpated.  SKIN: Inspection/Palpation of skin and subcutaneous tissue baseline w/ fragility. No wounds/rashes noted. Biopsy site covered.   NEURO: CN II-XII at patient's baseline, sensation baseline PPS.  PSYCH: Insight, judgement, and memory are baseline, affect and mood are flat and drowsy from procedure.    Labs:   Labs done in SNF are in [facility]. Please refer to them using [employer]/Care Everywhere. and Recent labs in [facility] reviewed by me today.     ASSESSMENT/PLAN:  Carcinoma, lung, left (H)  Chronic pain syndrome  Chronic left shoulder pain  ADA (generalized anxiety disorder)  History of pulmonary embolism  Encounter for therapeutic drug monitoring  Long term (current) use of anticoagulants  Current mild episode of major depressive disorder without prior episode (H)  Acute-on-chronic. S/p CT-guided lung biopsy. Will restart anticoagulation at previous dose, and given 72 hr half-life of Coumadin, will recheck INR on 5/7. Noting next oncology appt scheduled on 5/10 for patient to review biopsy results. Follow-up routinely or as needed.    Orders transcribed by : Dusty Montano  1. Coumadin 6 mg PO every day on M/W/F  2. Coumadin 3 mg PO every day on AOD  3. Recheck INR x1 on 5/7/19 Dx: H/O PE    Electronically signed by:  NATALIE Harvey CNP DNP              Sincerely,        NATALIE Werner CNP

## 2019-05-08 NOTE — LETTER
5/8/2019        RE: Chilo Oneil  [facility]  [address] [facility] [address]        [facility]  [place] [facility] Record Number:  [id number]  Place of Service where encounter took place: [facility]  ([facility]) [[id number]]    HPI: Chilo Oneil is a 69 year old  (1950), who is being seen today for an episodic care visit at the above location.   HPI information obtained from: facility chart records, facility staff, patient report and [facility] chart review. Today's concern is INR/Coumadin management for PE    ROS/Subjective:  Bleeding Signs/Symptoms:  None  Thromboembolic Signs/Symptoms:  None  Medication Changes:  No  Dietary Changes:  No  Activity Changes: No  Bacterial/Viral Infection:  No  Missed Coumadin Doses:  None  On ASA: No  Other Concerns:  No    OBJECTIVE:  /72   Pulse 96   Temp 97.8  F (36.6  C)   Resp 20   Wt 65.3 kg (144 lb)   SpO2 94%   BMI 18.00 kg/m     Last INR: 1.84 on 4/18 prior to procedure on 5/3.  INR Today:  2.0  Current Dose: 6mg M/W/F, 3mg AOD was restarted on 5/3.     ASSESSMENT:  History of pulmonary embolism  Encounter for therapeutic drug monitoring  Long term (current) use of anticoagulants  Chronic. Stable. Therapeutic. Will dose Coumadin as noted below and recheck INR in 1 week. Follow-up accordingly.Therapeutic INR for goal of 2-3.    PLAN:   Orders:   New Dose: 6mg M/W/F, 3mg AOD.  Next INR: 1 week.     Electronically signed by:  NATALIE Harvey CNP DNP        Sincerely,        NATALIE Werner CNP

## 2019-05-08 NOTE — PROGRESS NOTES
Next Gen and PDL-1 ordered. Clerical called and spoke with Vicki. She will print order and send to appropriate pathology labs.  Ashley Durand RN

## 2019-05-08 NOTE — PROGRESS NOTES
[facility]  [place] [facility] Record Number:  [id number]  Place of Service where encounter took place: [facility]  ([facility]) [[id number]]    HPI: Chilo Oneil is a 69 year old  (1950), who is being seen today for an episodic care visit at the above location.   HPI information obtained from: facility chart records, facility staff, patient report and [facility] chart review. Today's concern is INR/Coumadin management for PE    ROS/Subjective:  Bleeding Signs/Symptoms:  None  Thromboembolic Signs/Symptoms:  None  Medication Changes:  No  Dietary Changes:  No  Activity Changes: No  Bacterial/Viral Infection:  No  Missed Coumadin Doses:  None  On ASA: No  Other Concerns:  No    OBJECTIVE:  /72   Pulse 96   Temp 97.8  F (36.6  C)   Resp 20   Wt 65.3 kg (144 lb)   SpO2 94%   BMI 18.00 kg/m    Last INR: 1.84 on 4/18 prior to procedure on 5/3.  INR Today:  2.0  Current Dose: 6mg M/W/F, 3mg AOD was restarted on 5/3.     ASSESSMENT:  History of pulmonary embolism  Encounter for therapeutic drug monitoring  Long term (current) use of anticoagulants  Chronic. Stable. Therapeutic. Will dose Coumadin as noted below and recheck INR in 1 week. Follow-up accordingly.Therapeutic INR for goal of 2-3.    PLAN:   Orders:   New Dose: 6mg M/W/F, 3mg AOD.  Next INR: 1 week.     Electronically signed by:  Dr. Lashanda Suárez, APRN CNP DNP

## 2019-05-10 NOTE — LETTER
"    5/10/2019         RE: Chilo Oneil  [facility]  [address] [place] [id number]        Dear Colleague,    Thank you for referring your patient, Chilo Oneil, to the [facility]. Please see a copy of my visit note below.    Oncology Rooming Note    May 10, 2019 1:18 PM   Chilo Oneil is a 69 year old male who presents for:    Chief Complaint   Patient presents with     Oncology Clinic Visit     Follow up malignant neoplasm of lower lobe of left lung. Review results for imaging and biopsy.      Initial Vitals: /61 (BP Location: Right arm, Patient Position: Sitting, Cuff Size: Adult Large)   Pulse 93   Temp 97.5  F (36.4  C) (Tympanic)   Resp 18   Ht 1.905 m (6' 3\")   Wt 64.9 kg (143 lb)   SpO2 97%   BMI 17.87 kg/m    Estimated body mass index is 17.87 kg/m  as calculated from the following:    Height as of this encounter: 1.905 m (6' 3\").    Weight as of this encounter: 64.9 kg (143 lb). Body surface area is 1.85 meters squared.  Extreme Pain (8) Comment: Data Unavailable   No LMP for male patient.  Allergies reviewed: Yes  Medications reviewed: Yes    Medications: Medication refills not needed today.  Pharmacy name entered into EPIC: [email or web address]. [facility], - [place], [place] - [address] [place] [address]    Clinical concerns: Follow up malignant neoplasm of lower lobe of left lung. Review results for imaging and biopsy. C/o Significant abdominal pains left worse than right. Left sided shoulder pains 8/10.    Dorothy Hart, DENISE              Chemotherapy Education    Patient is a 69 year old male here today for chemotherapy education, accompanied by kindra.  Pt has a cancer diagnosis of   Encounter Diagnoses   Name Primary?     Chronic pain syndrome Yes     Acquired postural kyphosis      Left shoulder pain, unspecified chronicity and etiology      Carcinoma, lung, left (H)    and their main concern is \"making the pain stop\".  Their Oncologist is Dr. PAGE " "Garrick, and PCP is Lashanda Suárez  Reviewed the following with the patient and their support person:  Treatment goal: palliative  Treatment regimen & duration: Keytruda, Carboplatin, Alimta day 1 every 21 days; and rationale for strict adherence to this schedule, including specific medication names including pre-treatment medications and at home scheduled or as needed medications, delivery methods,.  Potential side effects: Side effects and management; including skin changes/hand-foot syndrome, anemia, neutropenia, thrombocytopenia, diarrhea/constipation, hair loss syndrome, memory changes/ \"chemobrain\", mouth sores, taste changes, neuropathy, fatigue, myelosuppression, sexuality/infertility, and risk of extravasation or infiltration.  Infection prevention, and monitoring of lab values, what lab tests and what changes of these values meant, along with the possibility of hydration or blood product transfusion, or the need to defer or hold treatment.    Chemotherapy information, including ways it is excreted from the body and cleaning and containment of vomitus or other bodily fluid, use of the bathroom, sexual health and intimacy, what to do if needing to miss a treatment, when to call a provider and the need for staff to wear protective equipment.  Importance of Central line care (port) or IV site care.  Written information:  Written information including the \"Getting Ready for Chemotherapy: What to Expect, Before, During, and After your Treatment\" booklets in the cancer new patient binder, specific drug information guides printed from [employer],  Also, information on when to contact the provider, various programs offered at [facility], and our business card with contact information given; Oncology Clinic, RN Case Manager, and the after hours Nurse Advise Line.  General orientation to the Medical Oncology department, Infusion Services department, Huc/scheduling, bathrooms and usual flow of the treatment " day provided as well as introduction to the Infusion nurses.  No barriers to learning identified. Patient and family verbalized understanding of all written and verbal information. All questions answered to patients satisfaction.   Other concerns: Reviewed importance of B12 injections every 9 weeks, starting 1 week prior to 1st Alimta infusion as well as folic acid supplement daily. Pt will have 1st B12 injection today before he leaves and will start the folic acid today once he is back at the assisted living facility. Dexamethasone ordered to be taken as well. All new orders and refills provided to assisted living facility with our direct line provided.  Pt instructed to call with further questions or concerns.  Patient states understanding and is in agreement with this plan.  Copy of appointments, and after visit summary (AVS) given to patient. Patient discharged via wheelchair.    Face to Face time with patient: 15 min    Erica Elliott RN, BSN, OCN  Oncology Hematology   Breast Cancer Navigator  [facility]  [email or web address]  Phone [phone]      Hematology/ Oncology Follow-up Visit:  May 10, 2019    Reason for Visit:   Chief Complaint   Patient presents with     Oncology Clinic Visit     Follow up malignant neoplasm of lower lobe of left lung. Review results for imaging and biopsy.        Oncologic History:  Carcinoma, lung, left (H)  Patient presented with L side shoulder and has been traveling  to L/side about  to the whole L/side upper back and L side of chest for about 3 week he has had the pain has been taking aleve.  Subsequently went on to have a CT scan done.  Which showed 1.6 cm nodular infiltrate in the left upper lobe.  The lesion appear spiculated and worrisome for lung cancer.  There is a second 0.7 cm indeterminate nodule in the lingular portion of the left lung.  Subsequently the patient went on to have CT-guided biopsy.  The pathology came back  showing moderately differentiated adenocarcinoma. He had subsequent noted left thoracoscopic left pleural biopsies and left lobe which resection on April 11, 2018.  The surgical pathology came back with pleural biopsies came back positive for invasive adenocarcinoma with parietal pleural invasion.  The wedge resection came back with adenocarcinoma with acinar patterns of growth moderately differentiated the tumor size is 1.1 and 0.3 cm into 2 separate tumor nodules.  Visceropleural invasion was identified the margins were negative lymphovascular invasion was not identified large venous artery involvement was not identified as well.  The pathologic staging of pT3N0.        Interval History:  Returning today for follow-up visit.  He continues to have pain in the left shoulder.  He is currently using oxycodone 10 mg for breakthrough pain as well as 30 mg every 12 hours of OxyContin.  The biopsy results came back consistent with adenocarcinoma of the lung.  Patient had exertional dyspnea.  He denies any cough or wheezing.  He denies any recent weight loss or fever or chills.    Review Of Systems:  Constitutional: Negative for fever, chills, and night sweats.  Increasing fatigue  Skin: negative.  Eyes: negative.  Ears/Nose/Throat: negative.  Respiratory: No shortness of breath, dyspnea on exertion, cough, or hemoptysis.  Cardiovascular: negative.  Gastrointestinal: negative.  Genitourinary: negative.  Musculoskeletal: negative.  Neurologic: negative.  Psychiatric: negative.  Hematologic/Lymphatic/Immunologic: negative.  Endocrine: negative.    All other ROS negative unless mentioned in interval history.    Past medical, social, surgical, and family histories reviewed.    Allergies:  Allergies as of 05/10/2019 - Reviewed 05/10/2019   Allergen Reaction Noted     Cipro [ciprofloxacin] Swelling 06/22/2009       Current Medications:  Current Outpatient Medications   Medication Sig Dispense Refill     ACETAMINOPHEN PO Take  1,000 mg by mouth 3 times daily       AmLODIPine Besylate (NORVASC PO) Take 10 mg by mouth daily       DULoxetine HCl (CYMBALTA PO) Take 60 mg by mouth daily       gabapentin (NEURONTIN) 600 MG tablet Take 1 tablet (600 mg) by mouth 2 times daily 90 tablet 1     LORazepam (ATIVAN) 0.5 MG tablet Take 1 tablet (0.5 mg) by mouth 3 times daily as needed for anxiety 30 tablet 0     magnesium oxide (MAG-OX) 400 MG tablet Take 400 mg by mouth 2 times daily       MELATONIN PO Take 6 mg by mouth At Bedtime        mirtazapine (REMERON) 7.5 MG tablet Take 1 tablet (7.5 mg) by mouth At Bedtime       omeprazole (PRILOSEC) 20 MG CR capsule Take 1 capsule (20 mg) by mouth daily 90 capsule 3     oxyCODONE (OXYCONTIN) 10 MG 12 hr tablet Take 1 tablet (10 mg) by mouth 2 times daily Give 20 mg QAM and 10 mg Q-Afternoon. 90 tablet 0     oxyCODONE (OXYCONTIN) 30 MG 12 hr tablet Take 1 tablet (30 mg) by mouth At Bedtime 30 tablet 0     oxyCODONE (ROXICODONE) 5 MG tablet Take 1 tablet (5 mg) by mouth every 4 hours as needed for pain 30 tablet 0     polyethylene glycol (MIRALAX/GLYCOLAX) Packet Take 17 g by mouth daily       SENNA-docusate sodium (SENNA S) 8.6-50 MG tablet Take 2 tablets by mouth every morning       THIAMINE HCL PO Take 100 mg by mouth daily       tiZANidine (ZANAFLEX) 4 MG capsule Take 1 capsule (4 mg) by mouth 3 times daily as needed for muscle spasms 30 capsule 0     VENTOLIN  (90 Base) MCG/ACT Inhaler Inhale 2 puffs into the lungs every 4 hours as needed for shortness of breath / dyspnea or wheezing 1 Inhaler 1     Warfarin Sodium (COUMADIN PO) Take by mouth daily Take daily as directed per INR results       [START ON 5/16/2019] dexamethasone (DECADRON) 4 MG tablet Take 4 mg by mouth 2 times daily (with meals) Start one day prior to Pemetrexed (Alimta), continue on the day of treatment, and the day following Pemetrexed.. 6 tablet 3     folic acid (FOLVITE) 1 MG tablet Take 1 tablet (1,000 mcg) by mouth daily  "Begin 7 days before Pemetrexed (Alimta) starts and continue for 21 days after Pemetrexed is discontinued. 30 tablet 4     nicotine (NICODERM CQ) 21 MG/24HR 24 hr patch Place 1 patch onto the skin daily as needed        ondansetron (ZOFRAN) 8 MG tablet Take 1 tablet (8 mg) by mouth every 8 hours as needed for nausea (vomiting) 10 tablet 3     prochlorperazine (COMPAZINE) 10 MG tablet Take 1 tablet (10 mg) by mouth every 6 hours as needed (Nausea/Vomiting) 30 tablet 3     STATIN NOT PRESCRIBED (INTENTIONAL) 1 each daily Please choose reason not prescribed, below (Patient not taking: Reported on 5/10/2019)          Physical Exam:  /61 (BP Location: Right arm, Patient Position: Sitting, Cuff Size: Adult Large)   Pulse 93   Temp 97.5  F (36.4  C) (Tympanic)   Resp 18   Ht 1.905 m (6' 3\")   Wt 64.9 kg (143 lb)   SpO2 97%   BMI 17.87 kg/m     Wt Readings from Last 12 Encounters:   05/10/19 64.9 kg (143 lb)   05/08/19 65.3 kg (144 lb)   05/03/19 64 kg (141 lb)   04/24/19 65.7 kg (144 lb 12.8 oz)   04/23/19 65.8 kg (145 lb)   04/18/19 66.7 kg (147 lb)   04/10/19 65.3 kg (144 lb)   04/05/19 65.2 kg (143 lb 11.2 oz)   04/04/19 63.7 kg (140 lb 6.4 oz)   04/01/19 62.1 kg (137 lb)   03/31/19 63.5 kg (140 lb)   03/27/19 62.1 kg (137 lb)     ECOG performance status: 1  GENERAL APPEARANCE: Healthy, alert and in no acute distress.  HEENT: Sclerae anicteric. PERRLA. Oropharynx without ulcers, lesions, or thrush.  NECK: Supple. No asymmetry or masses.  LYMPHATICS: No palpable cervical, supraclavicular, axillary, or inguinal lymphadenopathy.  RESP: Lungs clear to auscultation bilaterally without rales, rhonchi or wheezes.  CARDIOVASCULAR: Regular rate and rhythm. Normal S1, S2; no S3 or S4. No murmur, gallop, or rub.  ABDOMEN: Soft, nontender. Bowel sounds normal. No palpable organomegaly or masses.  MUSCULOSKELETAL: Extremities without gross deformities noted. No edema of bilateral lower extremities.  SKIN: No suspicious " lesions or rashes.  NEURO: Alert and oriented x 3. Cranial nerves II-XII grossly intact.  PSYCHIATRIC: Mentation and affect appear normal.    Laboratory/Imaging Studies:  No visits with results within 2 Week(s) from this visit.   Latest known visit with results is:   Infusion Therapy Visit on 04/02/2019   Component Date Value Ref Range Status     WBC 04/02/2019 11.5* 4.0 - 11.0 10e9/L Final     RBC Count 04/02/2019 3.31* 4.4 - 5.9 10e12/L Final     Hemoglobin 04/02/2019 9.5* 13.3 - 17.7 g/dL Final     Hematocrit 04/02/2019 30.5* 40.0 - 53.0 % Final     MCV 04/02/2019 92  78 - 100 fl Final     MCH 04/02/2019 28.7  26.5 - 33.0 pg Final     MCHC 04/02/2019 31.1* 31.5 - 36.5 g/dL Final     RDW 04/02/2019 14.6  10.0 - 15.0 % Final     Platelet Count 04/02/2019 672* 150 - 450 10e9/L Final     Diff Method 04/02/2019 Automated Method   Final     % Neutrophils 04/02/2019 77.9  % Final     % Lymphocytes 04/02/2019 11.8  % Final     % Monocytes 04/02/2019 7.4  % Final     % Eosinophils 04/02/2019 1.9  % Final     % Basophils 04/02/2019 0.5  % Final     % Immature Granulocytes 04/02/2019 0.5  % Final     Nucleated RBCs 04/02/2019 0  0 /100 Final     Absolute Neutrophil 04/02/2019 8.9* 1.6 - 8.3 10e9/L Final     Absolute Lymphocytes 04/02/2019 1.4  0.8 - 5.3 10e9/L Final     Absolute Monocytes 04/02/2019 0.9  0.0 - 1.3 10e9/L Final     Absolute Eosinophils 04/02/2019 0.2  0.0 - 0.7 10e9/L Final     Absolute Basophils 04/02/2019 0.1  0.0 - 0.2 10e9/L Final     Abs Immature Granulocytes 04/02/2019 0.1  0 - 0.4 10e9/L Final     Absolute Nucleated RBC 04/02/2019 0.0   Final     Sodium 04/02/2019 134  133 - 144 mmol/L Final     Potassium 04/02/2019 4.2  3.4 - 5.3 mmol/L Final     Chloride 04/02/2019 96  94 - 109 mmol/L Final     Carbon Dioxide 04/02/2019 33* 20 - 32 mmol/L Final     Anion Gap 04/02/2019 5  3 - 14 mmol/L Final     Glucose 04/02/2019 106* 70 - 99 mg/dL Final     Urea Nitrogen 04/02/2019 12  7 - 30 mg/dL Final      Creatinine 04/02/2019 1.07  0.66 - 1.25 mg/dL Final     GFR Estimate 04/02/2019 71  >60 mL/min/[1.73_m2] Final    Comment: Non  GFR Calc  Starting 12/18/2018, serum creatinine based estimated GFR (eGFR) will be   calculated using the Chronic Kidney Disease Epidemiology Collaboration   (CKD-EPI) equation.       GFR Estimate If Black 04/02/2019 82  >60 mL/min/[1.73_m2] Final    Comment:  GFR Calc  Starting 12/18/2018, serum creatinine based estimated GFR (eGFR) will be   calculated using the Chronic Kidney Disease Epidemiology Collaboration   (CKD-EPI) equation.       Calcium 04/02/2019 9.2  8.5 - 10.1 mg/dL Final     Bilirubin Total 04/02/2019 0.2  0.2 - 1.3 mg/dL Final     Albumin 04/02/2019 2.9* 3.4 - 5.0 g/dL Final     Protein Total 04/02/2019 7.2  6.8 - 8.8 g/dL Final     Alkaline Phosphatase 04/02/2019 109  40 - 150 U/L Final     ALT 04/02/2019 13  0 - 70 U/L Final     AST 04/02/2019 13  0 - 45 U/L Final        Recent Results (from the past 744 hour(s))   Chest XR,  PA & LAT    Narrative    CHEST 2 VIEWS  4/23/2019 5:11 AM     HISTORY: Worsening of chronic chest pain and shortness of breath.    COMPARISON: 3/23/2019 - CT chest.    FINDINGS: A moderate-sized left pleural effusion with adjacent  opacities. A 1.6 cm nodular opacity in the mid left lung, projecting  over the posterior aspect of the seventh rib is again noted. The right  lung is clear. Borderline cardiomegaly. Atherosclerotic calcification  in the thoracic aorta. Left anterior chest wall port with catheter  entering the left subclavian vein and distal tip in the superior vena  cava.      Impression    IMPRESSION:  1. Moderate-sized left pleural effusion with adjacent opacities that  most likely represent atelectasis.  2. No other findings suspicious for active cardiopulmonary disease.  3. A nonspecific 1.6 cm nodular opacity in the mid left lung is again  noted.    JACQUI NEGRO MD   NM Bone Scan Whole Body     Narrative    NUCLEAR MEDICINE WHOLE BODY BONE SCAN  4/30/2019 10:38 AM    HISTORY: Lung cancer.    COMPARISON:       Prior bone scan: None.       Other relevant study: A CT of the abdomen and pelvis on 3/31/2019  and a whole-body PET/CT on 8/1/2018.     TECHNIQUE: Following the uneventful intravenous administration of 26.3  mCi [id number], routine delayed imaging was performed of the entire  body.    IMAGES OBTAINED: Planar whole body anterior/posterior images and  planar pelvis anterior/posterior images.    FINDINGS: There is mild degenerative type increased uptake within the  mid thoracic spine on the right. Degenerative uptake is also seen in  both knees. No other abnormal osseous uptake is seen. Normal soft  tissue distribution is noted.       Impression    IMPRESSION: No scintigraphic evidence of osseous metastatic disease.     ADILIA ALSTON MD   CT Lung Mediastinum Biopsy    Narrative    5/3/2019 8:26 AM    HISTORY: Left upper lobe lung lesion.    COMPARISON: 3/23/2019.    CT-GUIDED NEEDLE CORE BIOPSY OF LEFT UPPER LOBE LUNG LESION: Risks and  benefits of a CT guided core biopsy of the left upper lobe lung lesion  are discussed with the patient. Under CT guidance, aseptic conditions,  and utilizing 10 mL 1% lidocaine as local anesthetic, a 19 gauge  coaxial needle is placed. Through this, two 20 gauge core biopsy  samples are obtained and placed in formalin for pathologic analysis.  The patient tolerated the procedure well. There is no significant  blood loss.    Radiation dose for this procedure is reduced using automated exposure  control of mA and/or kV according to patient size, or iterative  reconstruction technique.    CONSCIOUS SEDATION: The patient's status is monitored before, during,  and after the procedure. The patient is given 1.5 mg of Versed and 75  mcg of fentanyl intravenously during the procedure. Intra procedure  time is 22 minutes. There were no untoward effects demonstrated by  these  administered medications.      Impression    IMPRESSION: Technically uneventful CT guided needle core biopsy, as  described above. Pathologic results are pending.    VERONICA HINTON MD   XR Chest 1 View    Narrative    XR CHEST 1 VW 5/3/2019 8:37 AM    HISTORY: Post left lung biopsy.    COMPARISON: 4/23/2019    FINDINGS: No pneumothorax. Stable left pleural effusion. Right lung is  relatively clear. Port and catheter appear appropriately positioned.      Impression    IMPRESSION: No pneumothorax.    ANA ENNIS MD   XR Chest 1 View    Narrative    XR CHEST 1 VW 5/3/2019 10:41 AM    HISTORY: Post left lung biopsy.    COMPARISON: 5/3/2019    FINDINGS: No pneumothorax. Chest appears stable.      Impression    IMPRESSION: No pneumothorax.    ANA ENNIS MD       Assessment and plan:    (C34.92) Carcinoma, lung, left (H)  I reviewed with the patient today the results of the pathology confirming the recurrence of his cancer.  We talked about chemotherapy options.  We talked about the rationale of palliative chemotherapy.  I would recommend chemotherapy with Alimta, carboplatin with immunotherapy Keytruda.I discussed the rationale behind using combination Pemetrexed (Alimta) and Carboplatin and Keytruda in detail as well as major side effects including, but not limited to immediate/short term side effects of acute infusion reaction/anaphylaxis, life-threatening infection, nausea/vomiting, diarrhea/constipation, mucositis and sore throat, anorexia, rash, swelling, rare alopecia, anemia, neutropenia, thrombocytopenia, and fatigue as well as small risk of long-term side effect of duration and dose dependent peripheral neuropathy. We discussed risk of PE/DVT.  We also discussed the importance of pre-medication with Dexamethasone to reduce the incidence and severity of skin reactions as well as Folic Acid and Vitamin B12 supplementation to reduce treatment related toxicity. Patient instructed to call with signs or symptoms of  infection including, but not limited to temperature greater than or equal to 100.5 degrees Fahrenheit, chills, severe sore throat, ear or sinus pain, mouth sores, cough, painful urination, and/or non-healing wound. The patient was given written information about Pemetrexed (Alimta), Keytruda and Carboplatin, agrees to proceed with treatment, and denies any further questions at this time.    (G89.4) Chronic pain syndrome  (primary encounter diagnosis)  We discussed with the patient management of his pain.  He is currently on OxyContin 30 mg twice daily as well as oxycodone 10 mg as needed for breakthrough pain.  He is currently also on gabapentin the dose will be increased to 600 mg 3 times daily.    (I10) Benign essential hypertension  Blood pressure is currently well controlled    (K21.9) Gastroesophageal reflux disease without esophagitis  Patient currently on omeprazole 20 mg orally daily.    (F17.200) Tobacco use disorder  I strongly emphasized the importance of quitting smoking.    The patient is ready to learn, no apparent learning barriers were identified.  Diagnosis and treatment plans were explained to the patient. The patient expressed understanding of the content. The patient asked appropriate questions. The patient questions were answered to his satisfaction.    Time spent 40 minutes more than 50% of the time in counseling and coordination of care including discussion of management of recurrent adenocarcinoma of the lung, potential side effects of chemotherapy and immunotherapy, symptom management, follow-up and prognosis    Chart documentation with Dragon Voice recognition Software. Although reviewed after completion, some words and grammatical errors may remain.      Again, thank you for allowing me to participate in the care of your patient.        Sincerely,        Mor Mccauley MD

## 2019-05-10 NOTE — PROGRESS NOTES
PDL-1/NGS cancelled per Dr. Mccauley. This has been done in the past. Updated Dr. York.     Rylee Ca RN on 5/10/2019 at 1:44 PM

## 2019-05-10 NOTE — PROGRESS NOTES
Infusion Nursing Note:  Chilo Chaparroestelita presents today for B12 injection.    Patient seen by provider today: Yes: Dr. Mccauley   present during visit today: Not Applicable.    Note: N/A.    Intravenous Access:  No Intravenous access/labs at this visit.    Treatment Conditions:  Not Applicable.      Post Infusion Assessment:  Patient tolerated B12 injection IM to the left upper arm without incident.       Discharge Plan:   Patient discharged in stable condition accompanied by: friend.  Departure Mode: Wheelchair.  Pt to return on 5/15/19 at 10:30 am for C1D1 labs    Toña Harris RN

## 2019-05-13 NOTE — PROGRESS NOTES
"Chemotherapy Education    Patient is a 69 year old male here today for chemotherapy education, accompanied by kindra.  Pt has a cancer diagnosis of   Encounter Diagnoses   Name Primary?     Chronic pain syndrome Yes     Acquired postural kyphosis      Left shoulder pain, unspecified chronicity and etiology      Carcinoma, lung, left (H)    and their main concern is \"making the pain stop\".  Their Oncologist is Dr. KAYLEE Mccauley, and PCP is Lashanda Suárez  Reviewed the following with the patient and their support person:  Treatment goal: palliative  Treatment regimen & duration: Keytruda, Carboplatin, Alimta day 1 every 21 days; and rationale for strict adherence to this schedule, including specific medication names including pre-treatment medications and at home scheduled or as needed medications, delivery methods,.  Potential side effects: Side effects and management; including skin changes/hand-foot syndrome, anemia, neutropenia, thrombocytopenia, diarrhea/constipation, hair loss syndrome, memory changes/ \"chemobrain\", mouth sores, taste changes, neuropathy, fatigue, myelosuppression, sexuality/infertility, and risk of extravasation or infiltration.  Infection prevention, and monitoring of lab values, what lab tests and what changes of these values meant, along with the possibility of hydration or blood product transfusion, or the need to defer or hold treatment.    Chemotherapy information, including ways it is excreted from the body and cleaning and containment of vomitus or other bodily fluid, use of the bathroom, sexual health and intimacy, what to do if needing to miss a treatment, when to call a provider and the need for staff to wear protective equipment.  Importance of Central line care (port) or IV site care.  Written information:  Written information including the \"Getting Ready for Chemotherapy: What to Expect, Before, During, and After your Treatment\" booklets in the cancer new patient binder, specific " drug information guides printed from [employer],  Also, information on when to contact the provider, various programs offered at [facility], [place], and our business card with contact information given; Oncology Clinic, RN Case Manager, and the after hours Nurse Advise Line.  General orientation to the Medical Oncology department, Infusion Services department, Huc/scheduling, bathrooms and usual flow of the treatment day provided as well as introduction to the Infusion nurses.  No barriers to learning identified. Patient and family verbalized understanding of all written and verbal information. All questions answered to patients satisfaction.   Other concerns: Reviewed importance of B12 injections every 9 weeks, starting 1 week prior to 1st Alimta infusion as well as folic acid supplement daily. Pt will have 1st B12 injection today before he leaves and will start the folic acid today once he is back at the assisted living facility. Dexamethasone ordered to be taken as well. All new orders and refills provided to assisted living facility with our direct line provided.  Pt instructed to call with further questions or concerns.  Patient states understanding and is in agreement with this plan.  Copy of appointments, and after visit summary (AVS) given to patient. Patient discharged via wheelchair.    Face to Face time with patient: 15 min    Erica Elliott RN, BSN, OCN  Oncology Hematology   Breast Cancer Navigator  [facility]  [email or web address]  Phone [phone]

## 2019-05-13 NOTE — TELEPHONE ENCOUNTER
Tobacco Treatment Team at the [facility] attempted to reach Mr. Oneil on 5/13/2019 regarding the tobacco cessation program to help Mr. Oneil to quit smoking. We will attempt to reach Mr. Oneil another time.

## 2019-05-14 NOTE — PROGRESS NOTES
Hematology/ Oncology Follow-up Visit:  May 10, 2019    Reason for Visit:   Chief Complaint   Patient presents with     Oncology Clinic Visit     Follow up malignant neoplasm of lower lobe of left lung. Review results for imaging and biopsy.        Oncologic History:  Carcinoma, lung, left (H)  Patient presented with L side shoulder and has been traveling  to L/side about  to the whole L/side upper back and L side of chest for about 3 week he has had the pain has been taking aleve.  Subsequently went on to have a CT scan done.  Which showed 1.6 cm nodular infiltrate in the left upper lobe.  The lesion appear spiculated and worrisome for lung cancer.  There is a second 0.7 cm indeterminate nodule in the lingular portion of the left lung.  Subsequently the patient went on to have CT-guided biopsy.  The pathology came back showing moderately differentiated adenocarcinoma. He had subsequent noted left thoracoscopic left pleural biopsies and left lobe which resection on April 11, 2018.  The surgical pathology came back with pleural biopsies came back positive for invasive adenocarcinoma with parietal pleural invasion.  The wedge resection came back with adenocarcinoma with acinar patterns of growth moderately differentiated the tumor size is 1.1 and 0.3 cm into 2 separate tumor nodules.  Visceropleural invasion was identified the margins were negative lymphovascular invasion was not identified large venous artery involvement was not identified as well.  The pathologic staging of pT3N0.        Interval History:  Returning today for follow-up visit.  He continues to have pain in the left shoulder.  He is currently using oxycodone 10 mg for breakthrough pain as well as 30 mg every 12 hours of OxyContin.  The biopsy results came back consistent with adenocarcinoma of the lung.  Patient had exertional dyspnea.  He denies any cough or wheezing.  He denies any recent weight loss or fever or chills.    Review Of  Systems:  Constitutional: Negative for fever, chills, and night sweats.  Increasing fatigue  Skin: negative.  Eyes: negative.  Ears/Nose/Throat: negative.  Respiratory: No shortness of breath, dyspnea on exertion, cough, or hemoptysis.  Cardiovascular: negative.  Gastrointestinal: negative.  Genitourinary: negative.  Musculoskeletal: negative.  Neurologic: negative.  Psychiatric: negative.  Hematologic/Lymphatic/Immunologic: negative.  Endocrine: negative.    All other ROS negative unless mentioned in interval history.    Past medical, social, surgical, and family histories reviewed.    Allergies:  Allergies as of 05/10/2019 - Reviewed 05/10/2019   Allergen Reaction Noted     Cipro [ciprofloxacin] Swelling 06/22/2009       Current Medications:  Current Outpatient Medications   Medication Sig Dispense Refill     ACETAMINOPHEN PO Take 1,000 mg by mouth 3 times daily       AmLODIPine Besylate (NORVASC PO) Take 10 mg by mouth daily       DULoxetine HCl (CYMBALTA PO) Take 60 mg by mouth daily       gabapentin (NEURONTIN) 600 MG tablet Take 1 tablet (600 mg) by mouth 2 times daily 90 tablet 1     LORazepam (ATIVAN) 0.5 MG tablet Take 1 tablet (0.5 mg) by mouth 3 times daily as needed for anxiety 30 tablet 0     magnesium oxide (MAG-OX) 400 MG tablet Take 400 mg by mouth 2 times daily       MELATONIN PO Take 6 mg by mouth At Bedtime        mirtazapine (REMERON) 7.5 MG tablet Take 1 tablet (7.5 mg) by mouth At Bedtime       omeprazole (PRILOSEC) 20 MG CR capsule Take 1 capsule (20 mg) by mouth daily 90 capsule 3     oxyCODONE (OXYCONTIN) 10 MG 12 hr tablet Take 1 tablet (10 mg) by mouth 2 times daily Give 20 mg QAM and 10 mg Q-Afternoon. 90 tablet 0     oxyCODONE (OXYCONTIN) 30 MG 12 hr tablet Take 1 tablet (30 mg) by mouth At Bedtime 30 tablet 0     oxyCODONE (ROXICODONE) 5 MG tablet Take 1 tablet (5 mg) by mouth every 4 hours as needed for pain 30 tablet 0     polyethylene glycol (MIRALAX/GLYCOLAX) Packet Take 17 g by  "mouth daily       SENNA-docusate sodium (SENNA S) 8.6-50 MG tablet Take 2 tablets by mouth every morning       THIAMINE HCL PO Take 100 mg by mouth daily       tiZANidine (ZANAFLEX) 4 MG capsule Take 1 capsule (4 mg) by mouth 3 times daily as needed for muscle spasms 30 capsule 0     VENTOLIN  (90 Base) MCG/ACT Inhaler Inhale 2 puffs into the lungs every 4 hours as needed for shortness of breath / dyspnea or wheezing 1 Inhaler 1     Warfarin Sodium (COUMADIN PO) Take by mouth daily Take daily as directed per INR results       [START ON 5/16/2019] dexamethasone (DECADRON) 4 MG tablet Take 4 mg by mouth 2 times daily (with meals) Start one day prior to Pemetrexed (Alimta), continue on the day of treatment, and the day following Pemetrexed.. 6 tablet 3     folic acid (FOLVITE) 1 MG tablet Take 1 tablet (1,000 mcg) by mouth daily Begin 7 days before Pemetrexed (Alimta) starts and continue for 21 days after Pemetrexed is discontinued. 30 tablet 4     nicotine (NICODERM CQ) 21 MG/24HR 24 hr patch Place 1 patch onto the skin daily as needed        ondansetron (ZOFRAN) 8 MG tablet Take 1 tablet (8 mg) by mouth every 8 hours as needed for nausea (vomiting) 10 tablet 3     prochlorperazine (COMPAZINE) 10 MG tablet Take 1 tablet (10 mg) by mouth every 6 hours as needed (Nausea/Vomiting) 30 tablet 3     STATIN NOT PRESCRIBED (INTENTIONAL) 1 each daily Please choose reason not prescribed, below (Patient not taking: Reported on 5/10/2019)          Physical Exam:  /61 (BP Location: Right arm, Patient Position: Sitting, Cuff Size: Adult Large)   Pulse 93   Temp 97.5  F (36.4  C) (Tympanic)   Resp 18   Ht 1.905 m (6' 3\")   Wt 64.9 kg (143 lb)   SpO2 97%   BMI 17.87 kg/m    Wt Readings from Last 12 Encounters:   05/10/19 64.9 kg (143 lb)   05/08/19 65.3 kg (144 lb)   05/03/19 64 kg (141 lb)   04/24/19 65.7 kg (144 lb 12.8 oz)   04/23/19 65.8 kg (145 lb)   04/18/19 66.7 kg (147 lb)   04/10/19 65.3 kg (144 lb) "   04/05/19 65.2 kg (143 lb 11.2 oz)   04/04/19 63.7 kg (140 lb 6.4 oz)   04/01/19 62.1 kg (137 lb)   03/31/19 63.5 kg (140 lb)   03/27/19 62.1 kg (137 lb)     ECOG performance status: 1  GENERAL APPEARANCE: Healthy, alert and in no acute distress.  HEENT: Sclerae anicteric. PERRLA. Oropharynx without ulcers, lesions, or thrush.  NECK: Supple. No asymmetry or masses.  LYMPHATICS: No palpable cervical, supraclavicular, axillary, or inguinal lymphadenopathy.  RESP: Lungs clear to auscultation bilaterally without rales, rhonchi or wheezes.  CARDIOVASCULAR: Regular rate and rhythm. Normal S1, S2; no S3 or S4. No murmur, gallop, or rub.  ABDOMEN: Soft, nontender. Bowel sounds normal. No palpable organomegaly or masses.  MUSCULOSKELETAL: Extremities without gross deformities noted. No edema of bilateral lower extremities.  SKIN: No suspicious lesions or rashes.  NEURO: Alert and oriented x 3. Cranial nerves II-XII grossly intact.  PSYCHIATRIC: Mentation and affect appear normal.    Laboratory/Imaging Studies:  No visits with results within 2 Week(s) from this visit.   Latest known visit with results is:   Infusion Therapy Visit on 04/02/2019   Component Date Value Ref Range Status     WBC 04/02/2019 11.5* 4.0 - 11.0 10e9/L Final     RBC Count 04/02/2019 3.31* 4.4 - 5.9 10e12/L Final     Hemoglobin 04/02/2019 9.5* 13.3 - 17.7 g/dL Final     Hematocrit 04/02/2019 30.5* 40.0 - 53.0 % Final     MCV 04/02/2019 92  78 - 100 fl Final     MCH 04/02/2019 28.7  26.5 - 33.0 pg Final     MCHC 04/02/2019 31.1* 31.5 - 36.5 g/dL Final     RDW 04/02/2019 14.6  10.0 - 15.0 % Final     Platelet Count 04/02/2019 672* 150 - 450 10e9/L Final     Diff Method 04/02/2019 Automated Method   Final     % Neutrophils 04/02/2019 77.9  % Final     % Lymphocytes 04/02/2019 11.8  % Final     % Monocytes 04/02/2019 7.4  % Final     % Eosinophils 04/02/2019 1.9  % Final     % Basophils 04/02/2019 0.5  % Final     % Immature Granulocytes 04/02/2019 0.5  %  Final     Nucleated RBCs 04/02/2019 0  0 /100 Final     Absolute Neutrophil 04/02/2019 8.9* 1.6 - 8.3 10e9/L Final     Absolute Lymphocytes 04/02/2019 1.4  0.8 - 5.3 10e9/L Final     Absolute Monocytes 04/02/2019 0.9  0.0 - 1.3 10e9/L Final     Absolute Eosinophils 04/02/2019 0.2  0.0 - 0.7 10e9/L Final     Absolute Basophils 04/02/2019 0.1  0.0 - 0.2 10e9/L Final     Abs Immature Granulocytes 04/02/2019 0.1  0 - 0.4 10e9/L Final     Absolute Nucleated RBC 04/02/2019 0.0   Final     Sodium 04/02/2019 134  133 - 144 mmol/L Final     Potassium 04/02/2019 4.2  3.4 - 5.3 mmol/L Final     Chloride 04/02/2019 96  94 - 109 mmol/L Final     Carbon Dioxide 04/02/2019 33* 20 - 32 mmol/L Final     Anion Gap 04/02/2019 5  3 - 14 mmol/L Final     Glucose 04/02/2019 106* 70 - 99 mg/dL Final     Urea Nitrogen 04/02/2019 12  7 - 30 mg/dL Final     Creatinine 04/02/2019 1.07  0.66 - 1.25 mg/dL Final     GFR Estimate 04/02/2019 71  >60 mL/min/[1.73_m2] Final    Comment: Non  GFR Calc  Starting 12/18/2018, serum creatinine based estimated GFR (eGFR) will be   calculated using the Chronic Kidney Disease Epidemiology Collaboration   (CKD-EPI) equation.       GFR Estimate If Black 04/02/2019 82  >60 mL/min/[1.73_m2] Final    Comment:  GFR Calc  Starting 12/18/2018, serum creatinine based estimated GFR (eGFR) will be   calculated using the Chronic Kidney Disease Epidemiology Collaboration   (CKD-EPI) equation.       Calcium 04/02/2019 9.2  8.5 - 10.1 mg/dL Final     Bilirubin Total 04/02/2019 0.2  0.2 - 1.3 mg/dL Final     Albumin 04/02/2019 2.9* 3.4 - 5.0 g/dL Final     Protein Total 04/02/2019 7.2  6.8 - 8.8 g/dL Final     Alkaline Phosphatase 04/02/2019 109  40 - 150 U/L Final     ALT 04/02/2019 13  0 - 70 U/L Final     AST 04/02/2019 13  0 - 45 U/L Final        Recent Results (from the past 744 hour(s))   Chest XR,  PA & LAT    Narrative    CHEST 2 VIEWS  4/23/2019 5:11 AM     HISTORY: Worsening of  chronic chest pain and shortness of breath.    COMPARISON: 3/23/2019 - CT chest.    FINDINGS: A moderate-sized left pleural effusion with adjacent  opacities. A 1.6 cm nodular opacity in the mid left lung, projecting  over the posterior aspect of the seventh rib is again noted. The right  lung is clear. Borderline cardiomegaly. Atherosclerotic calcification  in the thoracic aorta. Left anterior chest wall port with catheter  entering the left subclavian vein and distal tip in the superior vena  cava.      Impression    IMPRESSION:  1. Moderate-sized left pleural effusion with adjacent opacities that  most likely represent atelectasis.  2. No other findings suspicious for active cardiopulmonary disease.  3. A nonspecific 1.6 cm nodular opacity in the mid left lung is again  noted.    JACQUI NEGRO MD   NM Bone Scan Whole Body    Narrative    NUCLEAR MEDICINE WHOLE BODY BONE SCAN  4/30/2019 10:38 AM    HISTORY: Lung cancer.    COMPARISON:       Prior bone scan: None.       Other relevant study: A CT of the abdomen and pelvis on 3/31/2019  and a whole-body PET/CT on 8/1/2018.     TECHNIQUE: Following the uneventful intravenous administration of 26.3  mCi [id number], routine delayed imaging was performed of the entire  body.    IMAGES OBTAINED: Planar whole body anterior/posterior images and  planar pelvis anterior/posterior images.    FINDINGS: There is mild degenerative type increased uptake within the  mid thoracic spine on the right. Degenerative uptake is also seen in  both knees. No other abnormal osseous uptake is seen. Normal soft  tissue distribution is noted.       Impression    IMPRESSION: No scintigraphic evidence of osseous metastatic disease.     ADILIA ALSTON MD   CT Lung Mediastinum Biopsy    Narrative    5/3/2019 8:26 AM    HISTORY: Left upper lobe lung lesion.    COMPARISON: 3/23/2019.    CT-GUIDED NEEDLE CORE BIOPSY OF LEFT UPPER LOBE LUNG LESION: Risks and  benefits of a CT guided core biopsy  of the left upper lobe lung lesion  are discussed with the patient. Under CT guidance, aseptic conditions,  and utilizing 10 mL 1% lidocaine as local anesthetic, a 19 gauge  coaxial needle is placed. Through this, two 20 gauge core biopsy  samples are obtained and placed in formalin for pathologic analysis.  The patient tolerated the procedure well. There is no significant  blood loss.    Radiation dose for this procedure is reduced using automated exposure  control of mA and/or kV according to patient size, or iterative  reconstruction technique.    CONSCIOUS SEDATION: The patient's status is monitored before, during,  and after the procedure. The patient is given 1.5 mg of Versed and 75  mcg of fentanyl intravenously during the procedure. Intra procedure  time is 22 minutes. There were no untoward effects demonstrated by  these administered medications.      Impression    IMPRESSION: Technically uneventful CT guided needle core biopsy, as  described above. Pathologic results are pending.    VERONICA HINTON MD   XR Chest 1 View    Narrative    XR CHEST 1 VW 5/3/2019 8:37 AM    HISTORY: Post left lung biopsy.    COMPARISON: 4/23/2019    FINDINGS: No pneumothorax. Stable left pleural effusion. Right lung is  relatively clear. Port and catheter appear appropriately positioned.      Impression    IMPRESSION: No pneumothorax.    ANA ENNIS MD   XR Chest 1 View    Narrative    XR CHEST 1 VW 5/3/2019 10:41 AM    HISTORY: Post left lung biopsy.    COMPARISON: 5/3/2019    FINDINGS: No pneumothorax. Chest appears stable.      Impression    IMPRESSION: No pneumothorax.    ANA ENNIS MD       Assessment and plan:    (C34.92) Carcinoma, lung, left (H)  I reviewed with the patient today the results of the pathology confirming the recurrence of his cancer.  Patient was treated with Alimta and cisplatin which was stopped by the patient because of increasing side effects mostly related to cisplatin.  We talked about chemotherapy  options.  We talked about the rationale of palliative chemotherapy.  I would recommend chemotherapy with Alimta, carboplatin with immunotherapy Keytruda.I discussed the rationale behind using combination Pemetrexed (Alimta) and Carboplatin and Keytruda in detail as well as major side effects including, but not limited to immediate/short term side effects of acute infusion reaction/anaphylaxis, life-threatening infection, nausea/vomiting, diarrhea/constipation, mucositis and sore throat, anorexia, rash, swelling, rare alopecia, anemia, neutropenia, thrombocytopenia, and fatigue as well as small risk of long-term side effect of duration and dose dependent peripheral neuropathy. We discussed risk of PE/DVT.  We also discussed the importance of pre-medication with Dexamethasone to reduce the incidence and severity of skin reactions as well as Folic Acid and Vitamin B12 supplementation to reduce treatment related toxicity. Patient instructed to call with signs or symptoms of infection including, but not limited to temperature greater than or equal to 100.5 degrees Fahrenheit, chills, severe sore throat, ear or sinus pain, mouth sores, cough, painful urination, and/or non-healing wound. The patient was given written information about Pemetrexed (Alimta), Keytruda and Carboplatin, agrees to proceed with treatment, and denies any further questions at this time.    (G89.4) Chronic pain syndrome  (primary encounter diagnosis)  We discussed with the patient management of his pain.  He is currently on OxyContin 30 mg twice daily as well as oxycodone 10 mg as needed for breakthrough pain.  He is currently also on gabapentin the dose will be increased to 600 mg 3 times daily.    (I10) Benign essential hypertension  Blood pressure is currently well controlled    (K21.9) Gastroesophageal reflux disease without esophagitis  Patient currently on omeprazole 20 mg orally daily.    (F17.200) Tobacco use disorder  I strongly emphasized  the importance of quitting smoking.    The patient is ready to learn, no apparent learning barriers were identified.  Diagnosis and treatment plans were explained to the patient. The patient expressed understanding of the content. The patient asked appropriate questions. The patient questions were answered to his satisfaction.    Time spent 40 minutes more than 50% of the time in counseling and coordination of care including discussion of management of recurrent adenocarcinoma of the lung, potential side effects of chemotherapy and immunotherapy, symptom management, follow-up and prognosis    Chart documentation with Dragon Voice recognition Software. Although reviewed after completion, some words and grammatical errors may remain.

## 2019-05-14 NOTE — PROGRESS NOTES
5-8-19   CC has been reaching out to YASMIN Richmond at the [facility] about member periodically to see if there has been any update on housing options for member and up to this point member is still at SNF with no definite plan in place to move to the community.    Yi stated today that member is 'in limbo' at this time.     CC then stated to her that CC would need to come and do another full assessment of member since it has been over 60 days since last assessment which was done on 2-14-19. Yi stated understanding of this and will update CC as needed.     5-14-19   Relocation worker is still in place as well so CC sent email to her asking if there was any update as well. CC also asked for date in which relocations services will be done.      Clementine emailed back and stated that no placement has been found that member is agreeable too but she did hear that foster placement may need to be looked at.  She was going to visit with member again and then will f/u with CC as needed.   She also stated that member has relocation assistance until 8-19     Deepthi Wood MA [facility] Care Coordinator   [phone]

## 2019-05-15 NOTE — PROGRESS NOTES
[facility]  [place] Medical Record Number:  [id number]  Place of Service where encounter took place: [facility]  ([facility]) [[id number]]    HPI:    Chilo Oneil is a 69 year old  (1950), who is being seen today for an episodic care visit at the above location.   HPI information obtained from: facility chart records, facility staff, patient report and [facility] chart review. Today's concern is INR/Coumadin management for PE    ROS/Subjective:  Bleeding Signs/Symptoms:  None  Thromboembolic Signs/Symptoms:  None  Medication Changes:  No  Dietary Changes:  No  Activity Changes: No  Bacterial/Viral Infection:  No  Missed Coumadin Doses:  None  On ASA: No  Other Concerns:  No    OBJECTIVE:  /78   Pulse 82   Temp 98.9  F (37.2  C)   Resp 18   Wt 63 kg (138 lb 12.8 oz)   SpO2 94%   BMI 17.35 kg/m    Last INR: 2.0 on 5/8  INR Today:  2.17  Current Dose:  6mg MWF, 3mg ROW  INR Flow sheet at SNF:    ASSESSMENT:     Other chronic pulmonary embolism with acute cor pulmonale (H)  Encounter for therapeutic drug monitoring  Long term (current) use of anticoagulants  Therapeutic INR for goal of 2-3    PLAN:   Orders written by provider at facility  New Dose: No Change    Next INR: 5/27      Electronically signed by:  NATALIE Monaco CNP

## 2019-05-16 NOTE — PROGRESS NOTES
Infusion Nursing Note:  Chilo Oneil presents today for Alimta,Carbo,Keytruda.    Patient seen by provider today: No   present during visit today: Not Applicable.    Note: Pt had Vit B-12 injection 5/10/19.  Pt was given Dex as prescribed starting day before & morning of chemo at [facility].    Intravenous Access:  Implanted Port.    Treatment Conditions:  Results reviewed, labs MET treatment parameters, ok to proceed with treatment.      Post Infusion Assessment:  Patient tolerated infusion without incident.       Discharge Plan:   Patient discharged in stable condition accompanied by: attendant.    Christian Meng RN                         no cough/no wheezing/no dyspnea/no pleuritic chest pain

## 2019-05-20 NOTE — LETTER
5/20/2019        RE: Chilo Oneil  [facility]  [address] [place] [address]        [place] [facility]  [place] Medical Record Number:  [id number]  Place of Service where encounter took place:  [facility]  ([facility]) [[id number]]  Chief Complaint   Patient presents with     Nursing Home Acute       HPI:    Chilo Oneil  is a 69 year old (1950), who is being seen today for an episodic care visit.  HPI information obtained from: facility staff, patient report and [facility] chart review.     Patient was seen today per Facility's request for behaviors. Resident diagnosed with lung cancer and started with chemotherapy a few days ago, reports feeling tired, nauseous, fatigued, c/o upper thoracic spine pain. His roommate reports Mr. Oneil had a rough night, in pain, screaming for help, kept going to the bathroom all night long.   -Nursing staff reports that patient's behavior is worsening,  more confusions, not willing to cooperate at times, refusing vitals this morning and skin is cool to touch, able to get oxygen sat at 90% with 4 liters of oxygen, has been exhibiting increased agitation and confusion, wants to try to get up out of his chair but he is very unstable and weak.  Requires one-on-one attention at times.  Given Ativan ordered 0.5 mg 3 times daily as needed has been using 1-3 times a day, complains of pain in the left side of his upper back rating 10 out of 10, very anxious as aggressive at times sitting is really scared, asking for sleep of the bladder pills.    Past Medical and Surgical History reviewed in Epic today.    MEDICATIONS:  Current Outpatient Medications   Medication Sig Dispense Refill     ACETAMINOPHEN PO Take 1,000 mg by mouth 3 times daily       AmLODIPine Besylate (NORVASC PO) Take 10 mg by mouth daily       dexamethasone (DECADRON) 4 MG tablet Take 4 mg by mouth 2 times daily (with meals) Start one day prior to Pemetrexed  (Alimta), continue on the day of treatment, and the day following Pemetrexed.. 6 tablet 3     DULoxetine HCl (CYMBALTA PO) Take 60 mg by mouth daily       folic acid (FOLVITE) 1 MG tablet Take 1 tablet (1,000 mcg) by mouth daily Begin 7 days before Pemetrexed (Alimta) starts and continue for 21 days after Pemetrexed is discontinued. 30 tablet 4     gabapentin (NEURONTIN) 600 MG tablet Take 1 tablet (600 mg) by mouth 2 times daily 90 tablet 1     LORazepam (ATIVAN) 0.5 MG tablet Take 1 tablet (0.5 mg) by mouth 3 times daily as needed for anxiety 30 tablet 0     magnesium oxide (MAG-OX) 400 MG tablet Take 400 mg by mouth 2 times daily       MELATONIN PO Take 6 mg by mouth At Bedtime        mirtazapine (REMERON) 7.5 MG tablet Take 1 tablet (7.5 mg) by mouth At Bedtime       nicotine (NICODERM CQ) 21 MG/24HR 24 hr patch Place 1 patch onto the skin daily as needed        omeprazole (PRILOSEC) 20 MG CR capsule Take 1 capsule (20 mg) by mouth daily 90 capsule 3     ondansetron (ZOFRAN) 8 MG tablet Take 1 tablet (8 mg) by mouth every 8 hours as needed for nausea (vomiting) 10 tablet 3     oxyCODONE (OXYCONTIN) 10 MG 12 hr tablet Take 1 tablet (10 mg) by mouth 2 times daily Give 20 mg QAM and 10 mg Q-Afternoon. 90 tablet 0     oxyCODONE (OXYCONTIN) 30 MG 12 hr tablet Take 1 tablet (30 mg) by mouth At Bedtime 30 tablet 0     oxyCODONE (ROXICODONE) 5 MG tablet Take 1 tablet (5 mg) by mouth every 4 hours as needed for pain 30 tablet 0     polyethylene glycol (MIRALAX/GLYCOLAX) Packet Take 17 g by mouth daily       prochlorperazine (COMPAZINE) 10 MG tablet Take 1 tablet (10 mg) by mouth every 6 hours as needed (Nausea/Vomiting) 30 tablet 3     SENNA-docusate sodium (SENNA S) 8.6-50 MG tablet Take 2 tablets by mouth every morning       STATIN NOT PRESCRIBED (INTENTIONAL) 1 each daily Please choose reason not prescribed, below       THIAMINE HCL PO Take 100 mg by mouth daily       tiZANidine (ZANAFLEX) 4 MG capsule Take 1 capsule  "(4 mg) by mouth 3 times daily as needed for muscle spasms 30 capsule 0     VENTOLIN  (90 Base) MCG/ACT Inhaler Inhale 2 puffs into the lungs every 4 hours as needed for shortness of breath / dyspnea or wheezing 1 Inhaler 1     Warfarin Sodium (COUMADIN PO) Take by mouth daily Take daily as directed per INR results       REVIEW OF SYSTEMS: very limited due to patient mentation status.     Objective:  /76   Pulse 88   Temp 98.5  F (36.9  C)   Resp 18   Wt 63 kg (138 lb 12.8 oz)   SpO2 94%   BMI 17.35 kg/m       Exam:  General: lying in bed, tired looking, drowsy.   Heart: S1S2 audible,   Lung: good air entry  MSK: tenderness over upper thoracic spines.   Psych: drowsy, speech slow, confused.     Labs:  Recent labs in [facility] reviewed by me today.     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 Now exhibiting a constellation of symptoms creating the \" Chemobrain\" impression.   - on decadron, ativan, melatonin, remeron, oxycodone, compazine, tizanidine.   - will order x-ray for the thoracic spine given ongoing pain. continue ativan prn, counseled nursing staff to offer prn narcotics when is needed, update Oncology.   - may reduce  To stop tizanidine.          Electronically signed by:  Chavo Ellis MD             Sincerely,        Chavo Ellis MD    "

## 2019-05-20 NOTE — TELEPHONE ENCOUNTER
Status Check:    Pt is a 69 year old male, recently in clinic for C1D1 Keytruda, Alimta, Carboplatin 05.16.19.  Call placed for status check.    Primary care provider is: Lashanda Suárez    Diagnosis:   Encounter Diagnoses   Name Primary?     Carcinoma, lung, left (H) Yes     Chronic pain syndrome      Oncology provider is:  Dr. KAYLEE Mccauley    How are you doing/feeling?: Patient did not want to speak on the phone. Spoke with staff nurse at [facility] per patient request. [facility] reports that patient experienced nausea and pain on Friday that dissipated with use of antiemetic and PRN pain medication. Friday - today has had an increase in anxiety which required 1:1 monitoring on Saturday. Has an order for PRN ativan TID but has not been given it 3 times daily for the anxiety. [facility] reports 1 possible episode of hallucinating about a bird, but is unsure if patient was dreaming as he was in bed or if this was a hallucination.  Any further issues?: [facility] reports patient denies fever or chills, discomfort. Is eating and drinking smaller amounts than ususal and is constipated. Does report SOB/dyspnea, but states this is patient's baseline. Is unable to report what liter of oxygen patient is on or what the O2 sat is. [facility] sent a message to patient's PCP with these changes who will be increasing the anti-anxiety medication.  Next Follow up/Recommendations: Did ask that nursing staff update oncology with adverse effects possibly from chemotherapy, verified they have the chemotherapy education available listing adverse effects. Also, to keep f/u appointment with Dr. Mccauley on Friday as scheduled.  Advised patient to utilize PRN medications as needed, eat nutritious meals, drink plenty of fluids, and keep scheduled appointments. If symptoms or other concerns develop after hours, encouraged patient to call our after hours number: [phone]. Patient's nurse at [facility] instructed to call with any questions or concerns.   States understanding and is in agreement with this plan.    Approximately 20 minutes spent on telephone with patient's nurse reviewing assessment and symptom(s) and providing symptom management.    Erica Elliott RN, BSN, OCN  Oncology Hematology   Breast Cancer Navigator  [facility]  [email or web address]  [phone]

## 2019-05-20 NOTE — PROGRESS NOTES
[place] [facility]  [place] Medical Record Number:  [id number]  Place of Service where encounter took place:  [facility]  ([facility]) [[id number]]  Chief Complaint   Patient presents with     Nursing Home Acute       HPI:    Chilo Oneil  is a 69 year old (1950), who is being seen today for an episodic care visit.  HPI information obtained from: facility staff, patient report and [facility] chart review.     Patient was seen today per Facility's request for behaviors. Resident diagnosed with lung cancer and started with chemotherapy a few days ago, reports feeling tired, nauseous, fatigued, c/o upper thoracic spine pain. His roommate reports Mr. Oneil had a rough night, in pain, screaming for help, kept going to the bathroom all night long.   -Nursing staff reports that patient's behavior is worsening,  more confusions, not willing to cooperate at times, refusing vitals this morning and skin is cool to touch, able to get oxygen sat at 90% with 4 liters of oxygen, has been exhibiting increased agitation and confusion, wants to try to get up out of his chair but he is very unstable and weak.  Requires one-on-one attention at times.  Given Ativan ordered 0.5 mg 3 times daily as needed has been using 1-3 times a day, complains of pain in the left side of his upper back rating 10 out of 10, very anxious as aggressive at times sitting is really scared, asking for sleep of the bladder pills.    Past Medical and Surgical History reviewed in Epic today.    MEDICATIONS:  Current Outpatient Medications   Medication Sig Dispense Refill     ACETAMINOPHEN PO Take 1,000 mg by mouth 3 times daily       AmLODIPine Besylate (NORVASC PO) Take 10 mg by mouth daily       dexamethasone (DECADRON) 4 MG tablet Take 4 mg by mouth 2 times daily (with meals) Start one day prior to Pemetrexed (Alimta), continue on the day of treatment, and the day following Pemetrexed.. 6 tablet 3     DULoxetine HCl (CYMBALTA PO)  Take 60 mg by mouth daily       folic acid (FOLVITE) 1 MG tablet Take 1 tablet (1,000 mcg) by mouth daily Begin 7 days before Pemetrexed (Alimta) starts and continue for 21 days after Pemetrexed is discontinued. 30 tablet 4     gabapentin (NEURONTIN) 600 MG tablet Take 1 tablet (600 mg) by mouth 2 times daily 90 tablet 1     LORazepam (ATIVAN) 0.5 MG tablet Take 1 tablet (0.5 mg) by mouth 3 times daily as needed for anxiety 30 tablet 0     magnesium oxide (MAG-OX) 400 MG tablet Take 400 mg by mouth 2 times daily       MELATONIN PO Take 6 mg by mouth At Bedtime        mirtazapine (REMERON) 7.5 MG tablet Take 1 tablet (7.5 mg) by mouth At Bedtime       nicotine (NICODERM CQ) 21 MG/24HR 24 hr patch Place 1 patch onto the skin daily as needed        omeprazole (PRILOSEC) 20 MG CR capsule Take 1 capsule (20 mg) by mouth daily 90 capsule 3     ondansetron (ZOFRAN) 8 MG tablet Take 1 tablet (8 mg) by mouth every 8 hours as needed for nausea (vomiting) 10 tablet 3     oxyCODONE (OXYCONTIN) 10 MG 12 hr tablet Take 1 tablet (10 mg) by mouth 2 times daily Give 20 mg QAM and 10 mg Q-Afternoon. 90 tablet 0     oxyCODONE (OXYCONTIN) 30 MG 12 hr tablet Take 1 tablet (30 mg) by mouth At Bedtime 30 tablet 0     oxyCODONE (ROXICODONE) 5 MG tablet Take 1 tablet (5 mg) by mouth every 4 hours as needed for pain 30 tablet 0     polyethylene glycol (MIRALAX/GLYCOLAX) Packet Take 17 g by mouth daily       prochlorperazine (COMPAZINE) 10 MG tablet Take 1 tablet (10 mg) by mouth every 6 hours as needed (Nausea/Vomiting) 30 tablet 3     SENNA-docusate sodium (SENNA S) 8.6-50 MG tablet Take 2 tablets by mouth every morning       STATIN NOT PRESCRIBED (INTENTIONAL) 1 each daily Please choose reason not prescribed, below       THIAMINE HCL PO Take 100 mg by mouth daily       tiZANidine (ZANAFLEX) 4 MG capsule Take 1 capsule (4 mg) by mouth 3 times daily as needed for muscle spasms 30 capsule 0     VENTOLIN  (90 Base) MCG/ACT Inhaler  "Inhale 2 puffs into the lungs every 4 hours as needed for shortness of breath / dyspnea or wheezing 1 Inhaler 1     Warfarin Sodium (COUMADIN PO) Take by mouth daily Take daily as directed per INR results       REVIEW OF SYSTEMS: very limited due to patient mentation status.     Objective:  /76   Pulse 88   Temp 98.5  F (36.9  C)   Resp 18   Wt 63 kg (138 lb 12.8 oz)   SpO2 94%   BMI 17.35 kg/m      Exam:  General: lying in bed, tired looking, drowsy.   Heart: S1S2 audible,   Lung: good air entry  MSK: tenderness over upper thoracic spines.   Psych: drowsy, speech slow, confused.     Labs:  Recent labs in [facility] reviewed by me today.     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 Now exhibiting a constellation of symptoms creating the \" Chemobrain\" impression.   - on decadron, ativan, melatonin, remeron, oxycodone, compazine, tizanidine.   - will order x-ray for the thoracic spine given ongoing pain. continue ativan prn, counseled nursing staff to offer prn narcotics when is needed, update Oncology.   - may reduce  To stop tizanidine.          Electronically signed by:  Chavo Ellis MD         "

## 2019-05-30 ENCOUNTER — PATIENT OUTREACH (OUTPATIENT)
Dept: GERIATRIC MEDICINE | Facility: CLINIC | Age: 69
End: 2019-05-30

## 2019-05-30 NOTE — PROGRESS NOTES
5-30-19   CC received notice from [employer] that member had passed away as of 5-27-19.  She was informed by Deepthi Craig.     CC then also received email from Cayla, relocation worker as well that she had called SW at [facility] to get update on member and was told he had passed away.      CC had done initial visit with member to refer to relocation worker but no CP had been complete due to member not having and appropriate discharge plan.      CC will then close case.   Deepthi Wood MA [facility] Care Coordinator   [phone]

## 2020-03-15 NOTE — TELEPHONE ENCOUNTER
Left a message for the patient to return my call at [phone] to schedule surgery with Dr Moreno.    
Home

## 2020-10-05 NOTE — ASSESSMENT & PLAN NOTE
Patient presented with L side shoulder and has been traveling  to L/side about  to the whole L/side upper back and L side of chest for about 3 week he has had the pain has been taking aleve.  Subsequently went on to have a CT scan done.  Which showed 1.6 cm nodular infiltrate in the left upper lobe.  The lesion appear spiculated and worrisome for lung cancer.  There is a second 0.7 cm indeterminate nodule in the lingular portion of the left lung.  Subsequently the patient went on to have CT-guided biopsy.  The pathology came back showing moderately differentiated adenocarcinoma. He had subsequent noted left thoracoscopic left pleural biopsies and left lobe which resection on April 11, 2018.  The surgical pathology came back with pleural biopsies came back positive for invasive adenocarcinoma with parietal pleural invasion.  The wedge resection came back with adenocarcinoma with acinar patterns of growth moderately differentiated the tumor size is 1.1 and 0.3 cm into 2 separate tumor nodules.  Visceropleural invasion was identified the margins were negative lymphovascular invasion was not identified large venous artery involvement was not identified as well.  The pathologic staging of pT3N0.     no

## 2021-02-16 NOTE — NURSING NOTE
"Initial /74 (BP Location: Left arm, Patient Position: Chair, Cuff Size: Adult Regular)  Pulse 86  Wt 149 lb 9.6 oz (67.9 kg)  SpO2 99%  BMI 18.7 kg/m2 Estimated body mass index is 18.7 kg/(m^2) as calculated from the following:    Height as of 5/31/18: 6' 3\" (1.905 m).    Weight as of this encounter: 149 lb 9.6 oz (67.9 kg). .    Chel Fontaine    " Pt up ad rodrigo and med compliant. Pt appeared sad and depressed. Pt seen in pt room during Writer's shift. Pt denies current suicidal ideation, does not endorse plan/intent. Pt denies current homicidal ideation  does not endorse plan/intent. Pt denies auditory and visual hallucinations. Pt agrees to notify staff of any safety concerns during shift. Will continue to follow plan of care.

## 2022-01-11 NOTE — TELEPHONE ENCOUNTER
Pt called to find out what date/time are available in the next couple week for him to get another appointment with Dr. Moreno. He has niece/nephew or a friend that he is going to coordinate with to set this up.     Called and gave him these following openings. 2/28 at 9:30, 1:30, 3:00 or 3/7 at 8:00, 9:30, 1:00, 3:00, 4:00. Pt was going to call his ride options and see what works. He will call back when he knows which one works and I also gave him the number [phone] to call to set this up if finds out over the weekend. Pt verbalized understanding.    Yes

## 2022-02-17 PROBLEM — Z76.89 HEALTH CARE HOME: Chronic | Status: ACTIVE | Noted: 2019-01-01

## 2024-06-17 PROBLEM — Z76.89 HEALTH CARE HOME: Status: RESOLVED | Noted: 2019-01-01 | Resolved: 2024-06-17
